# Patient Record
Sex: MALE | Race: WHITE | Employment: OTHER
[De-identification: names, ages, dates, MRNs, and addresses within clinical notes are randomized per-mention and may not be internally consistent; named-entity substitution may affect disease eponyms.]

---

## 2017-07-18 PROBLEM — Z87.891 HISTORY OF SMOKING 30 OR MORE PACK YEARS: Status: ACTIVE | Noted: 2017-07-18

## 2017-07-28 ENCOUNTER — TELEPHONE (OUTPATIENT)
Dept: CASE MANAGEMENT | Age: 72
End: 2017-07-28

## 2017-08-04 ENCOUNTER — TELEPHONE (OUTPATIENT)
Dept: CASE MANAGEMENT | Age: 72
End: 2017-08-04

## 2017-09-05 PROBLEM — I48.0 PAF (PAROXYSMAL ATRIAL FIBRILLATION) (HCC): Status: ACTIVE | Noted: 2017-09-05

## 2017-09-05 PROBLEM — Z79.01 ANTICOAGULATED BY ANTICOAGULATION TREATMENT: Status: ACTIVE | Noted: 2017-09-05

## 2017-09-05 PROBLEM — Z95.1 HX OF CABG: Status: ACTIVE | Noted: 2017-09-05

## 2017-09-05 PROBLEM — Z95.1 HX OF CABG: Status: RESOLVED | Noted: 2017-09-05 | Resolved: 2017-09-05

## 2018-01-14 PROBLEM — J44.1 COPD WITH ACUTE EXACERBATION (HCC): Status: ACTIVE | Noted: 2018-01-14

## 2018-01-17 ENCOUNTER — TELEPHONE (OUTPATIENT)
Dept: PHARMACY | Facility: CLINIC | Age: 73
End: 2018-01-17

## 2018-01-17 DIAGNOSIS — J44.1 COPD WITH ACUTE EXACERBATION (HCC): Primary | ICD-10-CM

## 2018-01-17 PROCEDURE — 1111F DSCHRG MED/CURRENT MED MERGE: CPT | Performed by: PHARMACIST

## 2018-01-17 NOTE — TELEPHONE ENCOUNTER
Instructions Patient takes 5mg (2 tablets) Sunday morning, 5mg Sunday evening & 5mg Monday morning    nitroGLYCERIN (NITROSTAT) 0.3 MG SL tablet Place 1 tablet under the tongue every 5 minutes as needed for Chest pain    lisinopril (PRINIVIL;ZESTRIL) 2.5 MG tablet Take 1 tablet by mouth daily    Leuprolide Acetate (LUPRON IJ) Inject as directed every 6 months Every six months for two years    aspirin EC 81 MG EC tablet Take 81 mg by mouth daily    Coenzyme Q10 (COQ10) 200 MG CAPS Take  by mouth. These are the medications you have told us you were taking at home, STOP taking them after you leave the hospital   -N/a    Meds marked as reviewed: reviewed      Additional Medications:  No other medications noted by patients wife       Estimated Creatinine Clearance: 79 mL/min (based on SCr of 0.9 mg/dL). The 10-year ASCVD risk score (Renetta Mayes, et al., 2013) is: 19.8%    Values used to calculate the score:      Age: 74 years      Sex: Male      Is Non- : No      Diabetic: No      Tobacco smoker: No      Systolic Blood Pressure: 838 mmHg      Is BP treated: Yes      HDL Cholesterol: 43 mg/dL      Total Cholesterol: 138 mg/dL     Lab Results   Component Value Date    CHOL 138 01/12/2018    CHOL 163 07/18/2017    CHOL 177 11/17/2015     Lab Results   Component Value Date    TRIG 66 01/12/2018    TRIG 64 07/18/2017    TRIG 51 11/17/2015     Lab Results   Component Value Date    HDL 43 01/12/2018    HDL 52 07/18/2017    HDL 48 11/17/2015     Lab Results   Component Value Date    LDLCALC 82 01/12/2018    LDLCALC 98 07/18/2017    LDLCALC 119 (H) 11/17/2015     Lab Results   Component Value Date    LABVLDL 13 01/12/2018    LABVLDL 13 07/18/2017    LABVLDL 10 11/17/2015     BP Readings from Last 3 Encounters:   01/16/18 111/72   01/12/18 124/62   10/30/17 136/80         Assessment/Plan:  - Medication reconciliation completed.  Number of medications reviewed: 15    - Pt is taking medications as directed by discharging physician. Number of discrepancies: 0. Instructions per discharge list provided except per below documentation.  Identified medication discrepancies/issues:     patient was placed on eliquis but has a prosthetic heart valve  Use in these patients is not recommended   · Category 1 ():  1.   · Category 2 ():  1.   · Category 3 ():  1.   · Category 4 ():  1.     - [facility] active medication list updated:  · Medications Added (0):  None needed to be added  · Medications Removed (0):    · Medications Changed (0):      - Identified Potential Medication Interactions: No clinically significant interactions identified via Wav[employer] Interaction Analysis as category D or higher.    - Renal Dosing: No renal adjustments necessary.    - Follow up appointment date (7 days for more severe illness, 14 days for others):    · Patient encouraged to schedule follow up with PCP about anticoagulation for afib    Thank You     Nata Head, PharmD Candidate [id number]  [facility]  [phone], Option 7

## 2018-01-18 NOTE — TELEPHONE ENCOUNTER
Reviewed and agree with PharmD candidate. Per LexiComp regarding Eliquis:   Valvular disease: Safety and efficacy have not been established in patients with prosthetic heart valves or significant rheumatic heart disease (eg, mitral stenosis); use is not recommended. Non-valvular atrial fibrillation is defined as atrial fibrillation that occurs in the absence of rheumatic mitral valve disease, mitral valve repair, or prosthetic heart valve (AHA/ACC/HRS Lauren Turner 2014]). See 1/17/18 PCP telephone encounter. Per PCP documentation: He was again in atrial fibrillation in hospital  The eliquis will not harm him but it will not prevent any clots for a heart valve ( he has no worry there )  However, coumadin is the drug of choice for him  I thought we had a GI consult set up for his bleeding ? Get in here as soon as he can so we can adjust his care plan    Per staff note, pt will continue Eliquis and see PCP as scheduled. Pt has PCP f/u 1/23/18. Pt to follow up at that time.     CLINICAL PHARMACY NOTE   POST-DISCHARGE TELEPHONE FOLLOW-UP ADDENDUM    For Pharmacy Admin Tracking Only    TCM Call Made?: Yes  [facility] ([facility] Select Patient?: Yes  Total # of Interventions Recommended: 1 -   Total # Interventions Accepted: 0  Intervention Severity:   - Level 1 Intervention Present?: No   - Level 2 #: 0   - Level 3 #: 0  Outreach Status: Review Complete  Care Coordinator Outreach to Patient?: Yes  Provider Contacted?: No - PCP already aware  Time Spent (min): 45

## 2018-03-21 ENCOUNTER — HOSPITAL ENCOUNTER (OUTPATIENT)
Age: 73
Discharge: HOME | End: 2018-03-21
Payer: MEDICARE

## 2018-03-21 LAB — PROSTATE SPECIFIC ANTIGEN: 0.01 NG/ML (ref 0–4)

## 2018-03-21 PROCEDURE — 84153 ASSAY OF PSA TOTAL: CPT

## 2018-03-21 PROCEDURE — 36415 COLL VENOUS BLD VENIPUNCTURE: CPT

## 2018-04-05 ENCOUNTER — OFFICE VISIT (OUTPATIENT)
Dept: VASCULAR SURGERY | Age: 73
End: 2018-04-05
Payer: MEDICARE

## 2018-04-05 DIAGNOSIS — I71.40 AAA (ABDOMINAL AORTIC ANEURYSM) WITHOUT RUPTURE: Primary | ICD-10-CM

## 2018-04-05 PROCEDURE — 99213 OFFICE O/P EST LOW 20 MIN: CPT | Performed by: SURGERY

## 2018-04-10 ENCOUNTER — HOSPITAL ENCOUNTER (OUTPATIENT)
Dept: ULTRASOUND IMAGING | Age: 73
Discharge: HOME | End: 2018-04-10
Payer: MEDICARE

## 2018-04-10 DIAGNOSIS — I71.40 AAA (ABDOMINAL AORTIC ANEURYSM) WITHOUT RUPTURE: ICD-10-CM

## 2018-04-10 PROCEDURE — 76775 US EXAM ABDO BACK WALL LIM: CPT

## 2018-04-17 ENCOUNTER — HOSPITAL ENCOUNTER (OUTPATIENT)
Dept: CARDIOLOGY | Age: 73
Discharge: HOME | End: 2018-04-17
Payer: MEDICARE

## 2018-04-17 VITALS
HEIGHT: 68 IN | BODY MASS INDEX: 28.79 KG/M2 | HEART RATE: 79 BPM | OXYGEN SATURATION: 97 % | DIASTOLIC BLOOD PRESSURE: 60 MMHG | SYSTOLIC BLOOD PRESSURE: 120 MMHG | WEIGHT: 190 LBS

## 2018-04-17 DIAGNOSIS — Z95.1 S/P CABG X 3: ICD-10-CM

## 2018-04-17 DIAGNOSIS — R06.02 SOBOE (SHORTNESS OF BREATH ON EXERTION): ICD-10-CM

## 2018-04-17 PROCEDURE — 93017 CV STRESS TEST TRACING ONLY: CPT

## 2018-04-17 PROCEDURE — 3430000000 HC RX DIAGNOSTIC RADIOPHARMACEUTICAL: Performed by: INTERNAL MEDICINE

## 2018-04-17 PROCEDURE — 2580000003 HC RX 258: Performed by: INTERNAL MEDICINE

## 2018-04-17 PROCEDURE — A9502 TC99M TETROFOSMIN: HCPCS | Performed by: INTERNAL MEDICINE

## 2018-04-17 PROCEDURE — 6360000002 HC RX W HCPCS: Performed by: NURSE PRACTITIONER

## 2018-04-17 PROCEDURE — 78452 HT MUSCLE IMAGE SPECT MULT: CPT

## 2018-04-17 RX ORDER — SODIUM CHLORIDE 0.9 % (FLUSH) 0.9 %
10 SYRINGE (ML) INJECTION PRN
Status: DISCONTINUED | OUTPATIENT
Start: 2018-04-17 | End: 2018-04-18 | Stop reason: HOSPADM

## 2018-04-17 RX ORDER — FOLIC ACID 1 MG/1
1 TABLET ORAL
COMMUNITY
Start: 2018-01-16 | End: 2018-06-12

## 2018-04-17 RX ADMIN — Medication 10 ML: at 09:35

## 2018-04-17 RX ADMIN — Medication 10 ML: at 08:07

## 2018-04-17 RX ADMIN — REGADENOSON 0.4 MG: 0.08 INJECTION, SOLUTION INTRAVENOUS at 09:35

## 2018-04-17 RX ADMIN — TETROFOSMIN 10 MILLICURIE: 0.23 INJECTION, POWDER, LYOPHILIZED, FOR SOLUTION INTRAVENOUS at 08:07

## 2018-04-17 RX ADMIN — TETROFOSMIN 33.4 MILLICURIE: 0.23 INJECTION, POWDER, LYOPHILIZED, FOR SOLUTION INTRAVENOUS at 09:35

## 2018-04-18 ENCOUNTER — TELEPHONE (OUTPATIENT)
Dept: VASCULAR SURGERY | Age: 73
End: 2018-04-18

## 2018-04-18 ENCOUNTER — TELEPHONE (OUTPATIENT)
Dept: CARDIOLOGY CLINIC | Age: 73
End: 2018-04-18

## 2018-04-19 ENCOUNTER — TELEPHONE (OUTPATIENT)
Dept: VASCULAR SURGERY | Age: 73
End: 2018-04-19

## 2018-04-19 DIAGNOSIS — I71.40 ABDOMINAL AORTIC ANEURYSM WITHOUT RUPTURE: Primary | ICD-10-CM

## 2018-04-20 ENCOUNTER — HOSPITAL ENCOUNTER (OUTPATIENT)
Age: 73
Discharge: HOME | End: 2018-04-20
Payer: MEDICARE

## 2018-04-20 DIAGNOSIS — I71.40 ABDOMINAL AORTIC ANEURYSM WITHOUT RUPTURE: ICD-10-CM

## 2018-04-20 LAB
ANION GAP SERPL CALCULATED.3IONS-SCNC: 10 MMOL/L (ref 7–16)
BUN BLDV-MCNC: 15 MG/DL (ref 8–23)
CALCIUM SERPL-MCNC: 9.7 MG/DL (ref 8.6–10.2)
CHLORIDE BLD-SCNC: 102 MMOL/L (ref 98–107)
CO2: 28 MMOL/L (ref 22–29)
CREAT SERPL-MCNC: 1.1 MG/DL (ref 0.7–1.2)
GFR AFRICAN AMERICAN: >60
GFR NON-AFRICAN AMERICAN: >60 ML/MIN/1.73
GLUCOSE BLD-MCNC: 97 MG/DL (ref 74–109)
POTASSIUM SERPL-SCNC: 4 MMOL/L (ref 3.5–5)
SODIUM BLD-SCNC: 140 MMOL/L (ref 132–146)

## 2018-04-20 PROCEDURE — 80048 BASIC METABOLIC PNL TOTAL CA: CPT

## 2018-04-20 PROCEDURE — 36415 COLL VENOUS BLD VENIPUNCTURE: CPT

## 2018-04-24 ENCOUNTER — HOSPITAL ENCOUNTER (OUTPATIENT)
Dept: CT IMAGING | Age: 73
Discharge: HOME | End: 2018-04-26
Payer: MEDICARE

## 2018-04-24 DIAGNOSIS — I71.40 ABDOMINAL AORTIC ANEURYSM WITHOUT RUPTURE: ICD-10-CM

## 2018-04-24 PROCEDURE — 6360000004 HC RX CONTRAST MEDICATION: Performed by: RADIOLOGY

## 2018-04-24 PROCEDURE — 74174 CTA ABD&PLVS W/CONTRAST: CPT

## 2018-04-24 PROCEDURE — 2580000003 HC RX 258: Performed by: RADIOLOGY

## 2018-04-24 RX ORDER — SODIUM CHLORIDE 0.9 % (FLUSH) 0.9 %
10 SYRINGE (ML) INJECTION ONCE
Status: COMPLETED | OUTPATIENT
Start: 2018-04-24 | End: 2018-04-24

## 2018-04-24 RX ADMIN — Medication 10 ML: at 13:12

## 2018-04-24 RX ADMIN — IOPAMIDOL 100 ML: 755 INJECTION, SOLUTION INTRAVENOUS at 13:11

## 2018-05-15 ENCOUNTER — TELEPHONE (OUTPATIENT)
Dept: VASCULAR SURGERY | Age: 73
End: 2018-05-15

## 2018-05-16 ENCOUNTER — TELEPHONE (OUTPATIENT)
Dept: VASCULAR SURGERY | Age: 73
End: 2018-05-16

## 2018-05-23 ENCOUNTER — OFFICE VISIT (OUTPATIENT)
Dept: VASCULAR SURGERY | Age: 73
End: 2018-05-23
Payer: MEDICARE

## 2018-05-23 DIAGNOSIS — I71.40 AAA (ABDOMINAL AORTIC ANEURYSM) WITHOUT RUPTURE: Primary | ICD-10-CM

## 2018-05-23 PROCEDURE — 99214 OFFICE O/P EST MOD 30 MIN: CPT | Performed by: SURGERY

## 2018-06-05 ENCOUNTER — OFFICE VISIT (OUTPATIENT)
Dept: VASCULAR SURGERY | Age: 73
End: 2018-06-05
Payer: MEDICARE

## 2018-06-05 DIAGNOSIS — I71.40 ABDOMINAL AORTIC ANEURYSM WITHOUT RUPTURE: Primary | ICD-10-CM

## 2018-06-05 PROCEDURE — 99214 OFFICE O/P EST MOD 30 MIN: CPT | Performed by: SURGERY

## 2018-06-12 ENCOUNTER — OFFICE VISIT (OUTPATIENT)
Dept: CARDIOLOGY CLINIC | Age: 73
End: 2018-06-12
Payer: MEDICARE

## 2018-06-12 ENCOUNTER — NURSE ONLY (OUTPATIENT)
Dept: CARDIOLOGY CLINIC | Age: 73
End: 2018-06-12
Payer: MEDICARE

## 2018-06-12 VITALS
HEART RATE: 74 BPM | BODY MASS INDEX: 28.64 KG/M2 | SYSTOLIC BLOOD PRESSURE: 110 MMHG | HEIGHT: 68 IN | WEIGHT: 189 LBS | DIASTOLIC BLOOD PRESSURE: 78 MMHG

## 2018-06-12 DIAGNOSIS — Z01.818 PREOPERATIVE CLEARANCE: ICD-10-CM

## 2018-06-12 DIAGNOSIS — Z79.01 ANTICOAGULATED BY ANTICOAGULATION TREATMENT: ICD-10-CM

## 2018-06-12 DIAGNOSIS — R06.02 SOB (SHORTNESS OF BREATH): ICD-10-CM

## 2018-06-12 DIAGNOSIS — Z95.0 PACEMAKER: ICD-10-CM

## 2018-06-12 DIAGNOSIS — Z95.0 PACEMAKER: Primary | ICD-10-CM

## 2018-06-12 DIAGNOSIS — J44.1 COPD WITH ACUTE EXACERBATION (HCC): Primary | ICD-10-CM

## 2018-06-12 DIAGNOSIS — I71.40 ABDOMINAL AORTIC ANEURYSM (AAA) WITHOUT RUPTURE: ICD-10-CM

## 2018-06-12 DIAGNOSIS — Z87.891 HISTORY OF SMOKING 30 OR MORE PACK YEARS: ICD-10-CM

## 2018-06-12 DIAGNOSIS — Z95.3 H/O AORTIC VALVE REPLACEMENT WITH PORCINE VALVE: ICD-10-CM

## 2018-06-12 DIAGNOSIS — Z95.1 S/P CABG X 3: ICD-10-CM

## 2018-06-12 DIAGNOSIS — I44.2 AV BLOCK, 3RD DEGREE (HCC): ICD-10-CM

## 2018-06-12 DIAGNOSIS — C61 PROSTATE CA (HCC): ICD-10-CM

## 2018-06-12 DIAGNOSIS — Z87.74 H/O BICUSPID AORTIC VALVE: ICD-10-CM

## 2018-06-12 DIAGNOSIS — I25.10 CAD IN NATIVE ARTERY: Primary | ICD-10-CM

## 2018-06-12 DIAGNOSIS — F41.9 ANXIETY: ICD-10-CM

## 2018-06-12 DIAGNOSIS — I48.0 PAF (PAROXYSMAL ATRIAL FIBRILLATION) (HCC): ICD-10-CM

## 2018-06-12 DIAGNOSIS — J43.9 PULMONARY EMPHYSEMA, UNSPECIFIED EMPHYSEMA TYPE (HCC): ICD-10-CM

## 2018-06-12 DIAGNOSIS — E78.00 PURE HYPERCHOLESTEROLEMIA: ICD-10-CM

## 2018-06-12 PROCEDURE — 93280 PM DEVICE PROGR EVAL DUAL: CPT | Performed by: INTERNAL MEDICINE

## 2018-06-12 PROCEDURE — 99213 OFFICE O/P EST LOW 20 MIN: CPT | Performed by: INTERNAL MEDICINE

## 2018-06-12 PROCEDURE — 93000 ELECTROCARDIOGRAM COMPLETE: CPT | Performed by: INTERNAL MEDICINE

## 2018-06-19 ENCOUNTER — TELEPHONE (OUTPATIENT)
Dept: VASCULAR SURGERY | Age: 73
End: 2018-06-19

## 2018-06-26 ENCOUNTER — PATIENT MESSAGE (OUTPATIENT)
Dept: FAMILY MEDICINE CLINIC | Age: 73
End: 2018-06-26

## 2018-06-26 DIAGNOSIS — I48.0 PAROXYSMAL A-FIB (HCC): ICD-10-CM

## 2018-06-26 RX ORDER — METOPROLOL SUCCINATE 25 MG/1
25 TABLET, EXTENDED RELEASE ORAL DAILY
Qty: 90 TABLET | Refills: 1 | Status: SHIPPED | OUTPATIENT
Start: 2018-06-26 | End: 2018-12-26 | Stop reason: SDUPTHER

## 2018-07-09 ENCOUNTER — ANESTHESIA EVENT (OUTPATIENT)
Dept: OPERATING ROOM | Age: 73
DRG: 269 | End: 2018-07-09
Payer: MEDICARE

## 2018-07-09 ENCOUNTER — HOSPITAL ENCOUNTER (OUTPATIENT)
Dept: PREADMISSION TESTING | Age: 73
Discharge: HOME | End: 2018-07-09
Payer: MEDICARE

## 2018-07-09 VITALS
TEMPERATURE: 97.7 F | HEART RATE: 72 BPM | WEIGHT: 189.38 LBS | HEIGHT: 68 IN | SYSTOLIC BLOOD PRESSURE: 119 MMHG | RESPIRATION RATE: 16 BRPM | BODY MASS INDEX: 28.7 KG/M2 | DIASTOLIC BLOOD PRESSURE: 73 MMHG | OXYGEN SATURATION: 98 %

## 2018-07-09 DIAGNOSIS — I48.0 PAF (PAROXYSMAL ATRIAL FIBRILLATION) (HCC): ICD-10-CM

## 2018-07-09 DIAGNOSIS — I71.40 AAA (ABDOMINAL AORTIC ANEURYSM) WITHOUT RUPTURE: ICD-10-CM

## 2018-07-09 DIAGNOSIS — Z95.3 H/O AORTIC VALVE REPLACEMENT WITH PORCINE VALVE: ICD-10-CM

## 2018-07-09 DIAGNOSIS — Z01.812 PRE-OPERATIVE LABORATORY EXAMINATION: Primary | ICD-10-CM

## 2018-07-09 DIAGNOSIS — E78.00 PURE HYPERCHOLESTEROLEMIA: ICD-10-CM

## 2018-07-09 LAB
ABO/RH: NORMAL
ANION GAP SERPL CALCULATED.3IONS-SCNC: 16 MMOL/L (ref 7–16)
ANTIBODY SCREEN: NORMAL
APTT: 37.9 SEC (ref 24.5–35.1)
BUN BLDV-MCNC: 15 MG/DL (ref 8–23)
CALCIUM SERPL-MCNC: 9.2 MG/DL (ref 8.6–10.2)
CHLORIDE BLD-SCNC: 103 MMOL/L (ref 98–107)
CO2: 25 MMOL/L (ref 22–29)
CREAT SERPL-MCNC: 1 MG/DL (ref 0.7–1.2)
GFR AFRICAN AMERICAN: >60
GFR NON-AFRICAN AMERICAN: >60 ML/MIN/1.73
GLUCOSE BLD-MCNC: 98 MG/DL (ref 74–109)
HCT VFR BLD CALC: 43 % (ref 37–54)
HEMOGLOBIN: 13.9 G/DL (ref 12.5–16.5)
INR BLD: 2.2
MCH RBC QN AUTO: 29.4 PG (ref 26–35)
MCHC RBC AUTO-ENTMCNC: 32.3 % (ref 32–34.5)
MCV RBC AUTO: 90.9 FL (ref 80–99.9)
PDW BLD-RTO: 17.1 FL (ref 11.5–15)
PLATELET # BLD: 165 E9/L (ref 130–450)
PMV BLD AUTO: 10.2 FL (ref 7–12)
POTASSIUM REFLEX MAGNESIUM: 3.7 MMOL/L (ref 3.5–5)
PROTHROMBIN TIME: 24.5 SEC (ref 9.3–12.4)
RBC # BLD: 4.73 E12/L (ref 3.8–5.8)
SODIUM BLD-SCNC: 144 MMOL/L (ref 132–146)
WBC # BLD: 8.6 E9/L (ref 4.5–11.5)

## 2018-07-09 PROCEDURE — 36415 COLL VENOUS BLD VENIPUNCTURE: CPT

## 2018-07-09 PROCEDURE — 85610 PROTHROMBIN TIME: CPT

## 2018-07-09 PROCEDURE — 86850 RBC ANTIBODY SCREEN: CPT

## 2018-07-09 PROCEDURE — 80048 BASIC METABOLIC PNL TOTAL CA: CPT

## 2018-07-09 PROCEDURE — 85027 COMPLETE CBC AUTOMATED: CPT

## 2018-07-09 PROCEDURE — 86900 BLOOD TYPING SEROLOGIC ABO: CPT

## 2018-07-09 PROCEDURE — 85730 THROMBOPLASTIN TIME PARTIAL: CPT

## 2018-07-09 PROCEDURE — 87081 CULTURE SCREEN ONLY: CPT

## 2018-07-09 PROCEDURE — 86901 BLOOD TYPING SEROLOGIC RH(D): CPT

## 2018-07-09 ASSESSMENT — ENCOUNTER SYMPTOMS: SHORTNESS OF BREATH: 1

## 2018-07-09 NOTE — PROGRESS NOTES
[facility] PRE-ADMISSION TESTING GENERAL INSTRUCTIONS- [facility]-phone number:[phone]    GENERAL INSTRUCTIONS  [x] Antibacterial Soap shower Night before and/or AM of Surgery  [] Shun wipe instruction sheet and wipes given. [x] Nothing by mouth after midnight, including gum, candy, mints, or water. [x] You may brush your teeth, gargle, but do NOT swallow water. []Hibiclens shower  the night before and the morning of surgery. Do not use             Hibiclens on your face or head. [x]No smoking, chewing tobacco, illegal drugs, or alcohol within 24 hours of your surgery. [x] Jewelry, valuables or body piercing's should not be brought to the hospital. All body and/or tongue piercing's must be removed prior to arriving to hospital.  ALL hair pins must be removed. [x] Do not wearlotions, powders, deodorant. Nail polish as directed by the nurse. [] Arrange transportation to and from the hospital.  Arrange for someone to be with you for the remainder of the day and for 24 hours after your procedure due to having had anesthesia. [] Bring insurance card and photo ID. [x] Transfusion Bracelet: Please bring with you to hospital, day of surgery  [] Bring urine specimen day of surgery. Any small container is acceptable. [] Use inhalers the morning of surgery and bring with you to hospital.   []Bring copy of living will or healthcare power of  papers to be placed in your electronic record. [] CPAP/BI-PAP: Please bring your machine if you are to spend the night in the hospital.     ENDOSCOPY INSTRUCTIONS:   [] Bowel prep instructions reviewed. [] Nothing by mouth after midnight, including gum, candy, mints, or water.  [] You may brush your teeth, gargle, but do NOT swallow water. [] Do not wear makeup, lotions, powders, deodorant. Nail polish as directed by the nurse.   [] Arrange transportation to and from the hospital.  Arrange for someone to be with you for the remainder of the day due to having had anesthesia. PARKING INSTRUCTIONS:   · [x] Arrival Time 0530  · [x] Parking lot 1 is located on [facility] (the corner of [facility] and [facility]). To enter, press the button and the gate will lift. A free token will be provided to exit the lot. One car per patient is allowed to park in this lot. All other cars are to park on [address] [place] [address] Street either in the parking garage or the handicap lot. [] Free  parking is available on [address] [place] [address]. · [] To reach the [facility] lobby from [address] [place] [address], upon entering the hospital, take elevator B to the 3rd floor. EDUCATION INSTRUCTIONS:      [] Knee or hip replacement booklet & exercise pamphlets given. [] Xiao Garay placed in chart. [x] Pre-admission Testing educational folder given  [x] Incentive Spirometry,coughing & deep breathing exercises reviewed. []Medication information sheet(s)   []Fluoroscopy-Xray used in surgery reviewed with patient. Educational pamphlet placed in chart. [x]Pain: Post-op pain is normal and to be expected. You will be asked to rate your pain from 0-10(a zero is not acceptable-education is needed). Your post-op pain goal is:  [x] Ask your nurse for your pain medication. [] Joint camp offered. [] Joint replacement booklets given. []Other     MEDICATION INSTRUCTIONS:   [x]Bring a complete list of your medications, please write the last time you took the medicine, give this list to the nurse. [x] Take the following medications the morning of surgery with 1-2 ounces of water: SEND[] Stop herbal supplements and vitamins 5 days before your surgery. [] DO NOT take any diabetic medicine the morning of surgery. Follow instructions for insulin the day before surgery. [] If you are diabetic and your blood sugar is low or you feel symptomatic, you may drink 1-2 ounces of apple juice or take a glucose tablet.   The morning of your procedure, you may call the

## 2018-07-09 NOTE — ANESTHESIA PRE PROCEDURE
tablet 1    metoprolol succinate (TOPROL XL) 25 MG extended release tablet Take 1 tablet by mouth daily 90 tablet 1    TUDORZA PRESSAIR 400 MCG/ACT AEPB inhaler USE 1 INHALATION TWICE A DAY 3 each 1    lisinopril (PRINIVIL;ZESTRIL) 2.5 MG tablet TAKE 1 TABLET DAILY 90 tablet 1    apixaban (ELIQUIS) 5 MG TABS tablet Take 1 tablet by mouth 2 times daily 180 tablet 1    SYMBICORT 160-4.5 MCG/ACT AERO Inhale 2 puffs into the lungs 2 times daily (Patient taking differently: Inhale 2 puffs into the lungs 2 times daily ) 3 Inhaler 3    ipratropium-albuterol (DUONEB) 0.5-2.5 (3) MG/3ML SOLN nebulizer solution Take 3 mLs by nebulization every 4 hours 360 mL 1    furosemide (LASIX) 40 MG tablet Take 1 tablet by mouth 2 times daily 180 tablet 3    nitroGLYCERIN (NITROSTAT) 0.3 MG SL tablet Place 1 tablet under the tongue every 5 minutes as needed for Chest pain 30 tablet 3    aspirin EC 81 MG EC tablet Take 81 mg by mouth daily         Allergies:     Allergies   Allergen Reactions    Pneumovax [Pneumococcal Polysaccharide Vaccine] Dermatitis       Problem List:    Patient Active Problem List   Diagnosis Code    Hyperlipidemia with target LDL less than 100 E78.5    BPH (benign prostatic hyperplasia) N40.0    ECG abnormal R94.31    Abnormal nuclear stress test R94.39    AS (aortic stenosis) I35.0    SOB (shortness of breath) R06.02    RBBB (right bundle branch block) I45.10    Anxiety F41.9    CAD (coronary artery disease) I25.10    Pulmonary emphysema (HCC) J43.9    AV block, Mobitz II I44.1    AV block, 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) I44.2    H/O bicuspid aortic valve Z87.74    AI (aortic insufficiency) I35.1    S/P AVR (aortic valve replacement) Z95.2    Anticoagulated on Coumadin Z51.81, Z79.01    Aortic mural thrombus (HCC) I74.10    AAA (abdominal aortic aneurysm) without rupture (HCC) I71.4    Elevated liver enzymes R74.8    History of tobacco use Z87.891    S/P CABG x 3 Z95.1    H/O aortic valve replacement with porcine valve Z95.3    [facility] C61    Aortic valve disease I35.9    Pure hypercholesterolemia E78.00    Pacemaker Z95.0    History of smoking 30 or more pack years Z87.891    PAF (paroxysmal atrial fibrillation) (HCC) I48.0    Anticoagulated by anticoagulation treatment Z79.01    COPD with acute exacerbation (HCC) J44.1       Past Medical History:        Diagnosis Date    Aortic stenosis, mild     Aortic valve sclerosis     moderate    [facility] 2014    CAD (coronary artery disease)     COPD (chronic obstructive pulmonary disease) (HCC)     Gallbladder attack     History of tobacco use 9/17/2015    Hyperlipidemia     Hypertension     Mild mitral regurgitation     Prostate cancer ([facility]     44 treatments of radiation    PVD (peripheral vascular disease) with claudication ([facility] 9/17/2015    RBBB     Tobacco abuse        Past Surgical History:        Procedure Laterality Date    APPENDECTOMY      CARDIAC CATHETERIZATION  8/8/2014    DR Haley JOHNSON      CORONARY ARTERY BYPASS GRAFT  08/11/2014    CABGx3 LIMA-LAD, SVG-D1, SVG-OM1, AVR Dr. Chaim Lindo [facility] 8-    Dr. Rayna [employer]       Social History:    Social History   Substance Use Topics    Smoking status: Former Smoker     Packs/day: 0.50     Years: 50.00     Types: Cigarettes     Quit date: 6/20/2014    Smokeless tobacco: Never Used    Alcohol use 0.0 oz/week      Comment: rare;  drinks 1 cup of coffee daily & occ Pepsi/Tea                                Counseling given: Not Answered      Vital Signs (Current): There were no vitals filed for this visit.                                            BP Readings from Last 3 Encounters:   07/09/18 119/73   06/12/18 110/78   04/17/18 120/60       NPO Status:   greater than 8 hours                                                                               BMI:   Wt Readings from Last 3 Encounters:   07/09/18 189 lb 6 oz (85.9 kg)   06/12/18 189 lb (85.7 kg)   04/17/18 190 lb (86.2 kg)     There is no height or weight on file to calculate BMI.    CBC:   Lab Results   Component Value Date    WBC 18.8 01/16/2018    RBC 3.48 01/16/2018    HGB 11.2 01/16/2018    HCT 33.3 01/16/2018    MCV 95.7 01/16/2018    RDW 20.8 01/16/2018     01/16/2018       CMP:   Lab Results   Component Value Date     04/20/2018    K 4.0 04/20/2018     04/20/2018    CO2 28 04/20/2018    BUN 15 04/20/2018    CREATININE 1.1 04/20/2018    GFRAA >60 04/20/2018    LABGLOM >60 04/20/2018    GLUCOSE 97 04/20/2018    PROT 6.6 01/14/2018    CALCIUM 9.7 04/20/2018    BILITOT 1.0 01/14/2018    ALKPHOS 94 01/14/2018    AST 35 01/14/2018    ALT 56 01/14/2018       POC Tests: No results for input(s): POCGLU, POCNA, POCK, POCCL, POCBUN, POCHEMO, POCHCT in the last 72 hours. Coags:   Lab Results   Component Value Date    PROTIME 14.7 01/14/2018    INR 1.3 01/14/2018    APTT 28.0 01/14/2018       HCG (If Applicable): No results found for: PREGTESTUR, PREGSERUM, HCG, HCGQUANT     ABGs: No results found for: PHART, PO2ART, CAJ8IZU, QUR5BVC, BEART, E6ZSEYXL     Type & Screen (If Applicable):  No results found for: LABABO, [address]    Anesthesia Evaluation  Patient summary reviewed and Nursing notes reviewed no history of anesthetic complications:   Airway: Mallampati: II  TM distance: <3 FB   Neck ROM: full  Mouth opening: > = 3 FB Dental:          Pulmonary:   (+) COPD:  shortness of breath: chronic,  decreased breath sounds,                            ROS comment: Pulmonary Emphysema  Former Smoker.  Quit 2015 after 50 years of smoking   Cardiovascular:    (+) hypertension:, valvular problems/murmurs (S/P AVR 2014): AS and MR, pacemaker: pacemaker, CAD:, CABG/stent (2014 CABG x3): no interval change, hyperlipidemia      ECG reviewed  Rhythm: irregular  Rate: normal  Echocardiogram reviewed         Beta Blocker:  Dose within 24 Hrs      ROS comment: EKG 6/12/18 Atrial Fibrillation HR 74    ECHO 2/15/18 EF 45%     Neuro/Psych:   Negative Neuro/Psych ROS              GI/Hepatic/Renal:   (+) GERD: well controlled,           Endo/Other:    (+) blood dyscrasia: anticoagulation therapy:., malignancy/cancer (Hx Prostate CA s/p radiation treatment). Abdominal:           Vascular:   + PVD, aortic or cerebral, . ROS comment: AAA. Anesthesia Plan      general and spinal     ASA 4     (Discussed General vs Spinal Anesthesia for surgery.)  Induction: intravenous. BIS  MIPS: Postoperative opioids intended and Prophylactic antiemetics administered. Anesthetic plan and risks discussed with patient and sibling. Use of blood products discussed with patient whom consented to blood products. Plan discussed with CRNA and attending. Alisha Walker RN   7/9/2018      Agree with above assessment. Physical exam unchanged. Spoke to patient about anesthetic plan. Will plan on general anesthesia as discussed with surgeon. Spoke to patient about possibility of postop ventilation with prior h/o pulmonary issues and also about invasive lines. Patient understands and wishes to proceed. Spouse was present during the preop evaluation in holding area as well.

## 2018-07-10 LAB — MRSA CULTURE ONLY: NORMAL

## 2018-07-16 ENCOUNTER — ANESTHESIA (OUTPATIENT)
Dept: OPERATING ROOM | Age: 73
DRG: 269 | End: 2018-07-16
Payer: MEDICARE

## 2018-07-16 ENCOUNTER — HOSPITAL ENCOUNTER (INPATIENT)
Age: 73
LOS: 1 days | Discharge: HOME HEALTH | DRG: 269 | End: 2018-07-17
Attending: SURGERY | Admitting: SURGERY
Payer: MEDICARE

## 2018-07-16 VITALS — OXYGEN SATURATION: 97 % | DIASTOLIC BLOOD PRESSURE: 75 MMHG | SYSTOLIC BLOOD PRESSURE: 99 MMHG

## 2018-07-16 DIAGNOSIS — I71.40 AAA (ABDOMINAL AORTIC ANEURYSM) WITHOUT RUPTURE: Primary | ICD-10-CM

## 2018-07-16 DIAGNOSIS — Z95.3 H/O AORTIC VALVE REPLACEMENT WITH PORCINE VALVE: ICD-10-CM

## 2018-07-16 DIAGNOSIS — Z01.812 PRE-OPERATIVE LABORATORY EXAMINATION: ICD-10-CM

## 2018-07-16 DIAGNOSIS — I48.0 PAF (PAROXYSMAL ATRIAL FIBRILLATION) (HCC): ICD-10-CM

## 2018-07-16 DIAGNOSIS — E78.00 PURE HYPERCHOLESTEROLEMIA: ICD-10-CM

## 2018-07-16 LAB
ANION GAP SERPL CALCULATED.3IONS-SCNC: 12 MMOL/L (ref 7–16)
BASOPHILS ABSOLUTE: 0.03 E9/L (ref 0–0.2)
BASOPHILS RELATIVE PERCENT: 0.3 % (ref 0–2)
BLOOD BANK DISPENSE STATUS: NORMAL
BLOOD BANK PRODUCT CODE: NORMAL
BPU ID: NORMAL
BUN BLDV-MCNC: 18 MG/DL (ref 8–23)
CALCIUM SERPL-MCNC: 8.4 MG/DL (ref 8.6–10.2)
CHLORIDE BLD-SCNC: 109 MMOL/L (ref 98–107)
CO2: 20 MMOL/L (ref 22–29)
CREAT SERPL-MCNC: 1.1 MG/DL (ref 0.7–1.2)
DESCRIPTION BLOOD BANK: NORMAL
EOSINOPHILS ABSOLUTE: 0.03 E9/L (ref 0.05–0.5)
EOSINOPHILS RELATIVE PERCENT: 0.3 % (ref 0–6)
GFR AFRICAN AMERICAN: >60
GFR NON-AFRICAN AMERICAN: >60 ML/MIN/1.73
GLUCOSE BLD-MCNC: 168 MG/DL (ref 74–109)
HCT VFR BLD CALC: 32.2 % (ref 37–54)
HEMOGLOBIN: 10.3 G/DL (ref 12.5–16.5)
IMMATURE GRANULOCYTES #: 0.12 E9/L
IMMATURE GRANULOCYTES %: 1.3 % (ref 0–5)
INR BLD: 1.3
LYMPHOCYTES ABSOLUTE: 0.78 E9/L (ref 1.5–4)
LYMPHOCYTES RELATIVE PERCENT: 8.4 % (ref 20–42)
MCH RBC QN AUTO: 29.3 PG (ref 26–35)
MCHC RBC AUTO-ENTMCNC: 32 % (ref 32–34.5)
MCV RBC AUTO: 91.7 FL (ref 80–99.9)
MONOCYTES ABSOLUTE: 0.12 E9/L (ref 0.1–0.95)
MONOCYTES RELATIVE PERCENT: 1.3 % (ref 2–12)
NEUTROPHILS ABSOLUTE: 8.16 E9/L (ref 1.8–7.3)
NEUTROPHILS RELATIVE PERCENT: 88.4 % (ref 43–80)
PDW BLD-RTO: 17.6 FL (ref 11.5–15)
PLATELET # BLD: 130 E9/L (ref 130–450)
PMV BLD AUTO: 10.2 FL (ref 7–12)
POTASSIUM SERPL-SCNC: 4.7 MMOL/L (ref 3.5–5)
PROTHROMBIN TIME: 14.6 SEC (ref 9.3–12.4)
RBC # BLD: 3.51 E12/L (ref 3.8–5.8)
SODIUM BLD-SCNC: 141 MMOL/L (ref 132–146)
WBC # BLD: 9.2 E9/L (ref 4.5–11.5)

## 2018-07-16 PROCEDURE — 6360000002 HC RX W HCPCS: Performed by: NURSE ANESTHETIST, CERTIFIED REGISTERED

## 2018-07-16 PROCEDURE — 2700000000 HC OXYGEN THERAPY PER DAY

## 2018-07-16 PROCEDURE — 04V03E6 RESTRICT ABD AORTA, BIFURC, W FENESTR DEV 1 OR 2, PERC: ICD-10-PCS | Performed by: SURGERY

## 2018-07-16 PROCEDURE — P9045 ALBUMIN (HUMAN), 5%, 250 ML: HCPCS

## 2018-07-16 PROCEDURE — 94150 VITAL CAPACITY TEST: CPT

## 2018-07-16 PROCEDURE — C1876 STENT, NON-COA/NON-COV W/DEL: HCPCS | Performed by: SURGERY

## 2018-07-16 PROCEDURE — 3700000000 HC ANESTHESIA ATTENDED CARE: Performed by: SURGERY

## 2018-07-16 PROCEDURE — 3600000015 HC SURGERY LEVEL 5 ADDTL 15MIN: Performed by: SURGERY

## 2018-07-16 PROCEDURE — 36415 COLL VENOUS BLD VENIPUNCTURE: CPT

## 2018-07-16 PROCEDURE — C1725 CATH, TRANSLUMIN NON-LASER: HCPCS | Performed by: SURGERY

## 2018-07-16 PROCEDURE — 2580000003 HC RX 258: Performed by: SURGERY

## 2018-07-16 PROCEDURE — 7100000000 HC PACU RECOVERY - FIRST 15 MIN

## 2018-07-16 PROCEDURE — 6370000000 HC RX 637 (ALT 250 FOR IP): Performed by: SURGERY

## 2018-07-16 PROCEDURE — 6360000002 HC RX W HCPCS

## 2018-07-16 PROCEDURE — 34846 VISC & INFRAREN ABD 2 PROSTH: CPT | Performed by: SURGERY

## 2018-07-16 PROCEDURE — C1887 CATHETER, GUIDING: HCPCS

## 2018-07-16 PROCEDURE — C1894 INTRO/SHEATH, NON-LASER: HCPCS | Performed by: SURGERY

## 2018-07-16 PROCEDURE — 3600000005 HC SURGERY LEVEL 5 BASE: Performed by: SURGERY

## 2018-07-16 PROCEDURE — 2709999900 HC NON-CHARGEABLE SUPPLY

## 2018-07-16 PROCEDURE — 80048 BASIC METABOLIC PNL TOTAL CA: CPT

## 2018-07-16 PROCEDURE — C1769 GUIDE WIRE: HCPCS | Performed by: SURGERY

## 2018-07-16 PROCEDURE — 3700000001 HC ADD 15 MINUTES (ANESTHESIA): Performed by: SURGERY

## 2018-07-16 PROCEDURE — 34812 OPN FEM ART EXPOS: CPT | Performed by: SURGERY

## 2018-07-16 PROCEDURE — 6370000000 HC RX 637 (ALT 250 FOR IP): Performed by: NURSE PRACTITIONER

## 2018-07-16 PROCEDURE — 86923 COMPATIBILITY TEST ELECTRIC: CPT

## 2018-07-16 PROCEDURE — 2709999900 HC NON-CHARGEABLE SUPPLY: Performed by: SURGERY

## 2018-07-16 PROCEDURE — 99231 SBSQ HOSP IP/OBS SF/LOW 25: CPT | Performed by: NURSE PRACTITIONER

## 2018-07-16 PROCEDURE — 2500000003 HC RX 250 WO HCPCS: Performed by: NURSE PRACTITIONER

## 2018-07-16 PROCEDURE — 85610 PROTHROMBIN TIME: CPT

## 2018-07-16 PROCEDURE — 2720000010 HC SURG SUPPLY STERILE: Performed by: SURGERY

## 2018-07-16 PROCEDURE — 6360000002 HC RX W HCPCS: Performed by: SURGERY

## 2018-07-16 PROCEDURE — C2628 CATHETER, OCCLUSION: HCPCS | Performed by: SURGERY

## 2018-07-16 PROCEDURE — C1887 CATHETER, GUIDING: HCPCS | Performed by: SURGERY

## 2018-07-16 PROCEDURE — 2500000003 HC RX 250 WO HCPCS: Performed by: NURSE ANESTHETIST, CERTIFIED REGISTERED

## 2018-07-16 PROCEDURE — 2000000000 HC ICU R&B

## 2018-07-16 PROCEDURE — 7100000001 HC PACU RECOVERY - ADDTL 15 MIN

## 2018-07-16 PROCEDURE — 94640 AIRWAY INHALATION TREATMENT: CPT

## 2018-07-16 PROCEDURE — 85025 COMPLETE CBC W/AUTO DIFF WBC: CPT

## 2018-07-16 PROCEDURE — C1874 STENT, COATED/COV W/DEL SYS: HCPCS | Performed by: SURGERY

## 2018-07-16 PROCEDURE — 2580000003 HC RX 258: Performed by: NURSE ANESTHETIST, CERTIFIED REGISTERED

## 2018-07-16 DEVICE — FORMULA 418, RENAL BALLOON-EXPANDABLE STENT
Type: IMPLANTABLE DEVICE | Site: AORTA | Status: FUNCTIONAL
Brand: FORMULA 418

## 2018-07-16 DEVICE — IMPLANTABLE DEVICE: Type: IMPLANTABLE DEVICE | Site: AORTA | Status: FUNCTIONAL

## 2018-07-16 DEVICE — ZENITH, SPIRAL-Z AAA ILIAC LEG
Type: IMPLANTABLE DEVICE | Site: AORTA | Status: FUNCTIONAL
Brand: ZENITH SPIRAL-Z

## 2018-07-16 RX ORDER — ONDANSETRON 2 MG/ML
INJECTION INTRAMUSCULAR; INTRAVENOUS PRN
Status: DISCONTINUED | OUTPATIENT
Start: 2018-07-16 | End: 2018-07-16 | Stop reason: SDUPTHER

## 2018-07-16 RX ORDER — ROCURONIUM BROMIDE 10 MG/ML
INJECTION, SOLUTION INTRAVENOUS PRN
Status: DISCONTINUED | OUTPATIENT
Start: 2018-07-16 | End: 2018-07-16 | Stop reason: SDUPTHER

## 2018-07-16 RX ORDER — NOREPINEPHRINE BITARTRATE 1 MG/ML
INJECTION, SOLUTION INTRAVENOUS
Status: DISPENSED
Start: 2018-07-16 | End: 2018-07-17

## 2018-07-16 RX ORDER — OXYCODONE HYDROCHLORIDE AND ACETAMINOPHEN 5; 325 MG/1; MG/1
1 TABLET ORAL
Status: DISCONTINUED | OUTPATIENT
Start: 2018-07-16 | End: 2018-07-16 | Stop reason: HOSPADM

## 2018-07-16 RX ORDER — SODIUM CHLORIDE 0.9 % (FLUSH) 0.9 %
10 SYRINGE (ML) INJECTION PRN
Status: DISCONTINUED | OUTPATIENT
Start: 2018-07-16 | End: 2018-07-17 | Stop reason: HOSPADM

## 2018-07-16 RX ORDER — HEPARIN SODIUM 10000 [USP'U]/ML
INJECTION, SOLUTION INTRAVENOUS; SUBCUTANEOUS PRN
Status: DISCONTINUED | OUTPATIENT
Start: 2018-07-16 | End: 2018-07-16 | Stop reason: SDUPTHER

## 2018-07-16 RX ORDER — FENTANYL CITRATE 50 UG/ML
25 INJECTION, SOLUTION INTRAMUSCULAR; INTRAVENOUS EVERY 5 MIN PRN
Status: DISCONTINUED | OUTPATIENT
Start: 2018-07-16 | End: 2018-07-16 | Stop reason: HOSPADM

## 2018-07-16 RX ORDER — POLYVINYL ALCOHOL 14 MG/ML
1 SOLUTION/ DROPS OPHTHALMIC PRN
Status: DISCONTINUED | OUTPATIENT
Start: 2018-07-16 | End: 2018-07-17 | Stop reason: HOSPADM

## 2018-07-16 RX ORDER — SODIUM CHLORIDE 0.9 % (FLUSH) 0.9 %
10 SYRINGE (ML) INJECTION EVERY 12 HOURS SCHEDULED
Status: DISCONTINUED | OUTPATIENT
Start: 2018-07-16 | End: 2018-07-17 | Stop reason: HOSPADM

## 2018-07-16 RX ORDER — HEPARIN SODIUM 1000 [USP'U]/ML
INJECTION, SOLUTION INTRAVENOUS; SUBCUTANEOUS PRN
Status: DISCONTINUED | OUTPATIENT
Start: 2018-07-16 | End: 2018-07-16 | Stop reason: SDUPTHER

## 2018-07-16 RX ORDER — ALBUMIN, HUMAN INJ 5% 5 %
SOLUTION INTRAVENOUS
Status: COMPLETED
Start: 2018-07-16 | End: 2018-07-16

## 2018-07-16 RX ORDER — PROPOFOL 10 MG/ML
INJECTION, EMULSION INTRAVENOUS PRN
Status: DISCONTINUED | OUTPATIENT
Start: 2018-07-16 | End: 2018-07-16 | Stop reason: SDUPTHER

## 2018-07-16 RX ORDER — SODIUM CHLORIDE 9 MG/ML
INJECTION, SOLUTION INTRAVENOUS CONTINUOUS PRN
Status: DISCONTINUED | OUTPATIENT
Start: 2018-07-16 | End: 2018-07-16 | Stop reason: SDUPTHER

## 2018-07-16 RX ORDER — FENTANYL CITRATE 50 UG/ML
INJECTION, SOLUTION INTRAMUSCULAR; INTRAVENOUS PRN
Status: DISCONTINUED | OUTPATIENT
Start: 2018-07-16 | End: 2018-07-16 | Stop reason: SDUPTHER

## 2018-07-16 RX ORDER — MORPHINE SULFATE 4 MG/ML
4 INJECTION, SOLUTION INTRAMUSCULAR; INTRAVENOUS
Status: DISCONTINUED | OUTPATIENT
Start: 2018-07-16 | End: 2018-07-17

## 2018-07-16 RX ORDER — ONDANSETRON 2 MG/ML
4 INJECTION INTRAMUSCULAR; INTRAVENOUS EVERY 6 HOURS PRN
Status: DISCONTINUED | OUTPATIENT
Start: 2018-07-16 | End: 2018-07-17 | Stop reason: HOSPADM

## 2018-07-16 RX ORDER — SODIUM CHLORIDE 0.9 % (FLUSH) 0.9 %
10 SYRINGE (ML) INJECTION EVERY 12 HOURS SCHEDULED
Status: DISCONTINUED | OUTPATIENT
Start: 2018-07-16 | End: 2018-07-16 | Stop reason: HOSPADM

## 2018-07-16 RX ORDER — GLYCOPYRROLATE 1 MG/5 ML
SYRINGE (ML) INTRAVENOUS PRN
Status: DISCONTINUED | OUTPATIENT
Start: 2018-07-16 | End: 2018-07-16 | Stop reason: SDUPTHER

## 2018-07-16 RX ORDER — ASPIRIN 81 MG/1
81 TABLET ORAL DAILY
Status: DISCONTINUED | OUTPATIENT
Start: 2018-07-17 | End: 2018-07-17 | Stop reason: HOSPADM

## 2018-07-16 RX ORDER — IPRATROPIUM BROMIDE AND ALBUTEROL SULFATE 2.5; .5 MG/3ML; MG/3ML
1 SOLUTION RESPIRATORY (INHALATION) EVERY 4 HOURS
Status: DISCONTINUED | OUTPATIENT
Start: 2018-07-16 | End: 2018-07-17 | Stop reason: HOSPADM

## 2018-07-16 RX ORDER — PROMETHAZINE HYDROCHLORIDE 25 MG/ML
6.25 INJECTION, SOLUTION INTRAMUSCULAR; INTRAVENOUS
Status: DISCONTINUED | OUTPATIENT
Start: 2018-07-16 | End: 2018-07-16 | Stop reason: HOSPADM

## 2018-07-16 RX ORDER — DEXAMETHASONE SODIUM PHOSPHATE 10 MG/ML
INJECTION INTRAMUSCULAR; INTRAVENOUS PRN
Status: DISCONTINUED | OUTPATIENT
Start: 2018-07-16 | End: 2018-07-16 | Stop reason: SDUPTHER

## 2018-07-16 RX ORDER — FUROSEMIDE 40 MG/1
40 TABLET ORAL 2 TIMES DAILY
Status: DISCONTINUED | OUTPATIENT
Start: 2018-07-16 | End: 2018-07-17 | Stop reason: HOSPADM

## 2018-07-16 RX ORDER — MEPERIDINE HYDROCHLORIDE 50 MG/ML
12.5 INJECTION INTRAMUSCULAR; INTRAVENOUS; SUBCUTANEOUS EVERY 5 MIN PRN
Status: DISCONTINUED | OUTPATIENT
Start: 2018-07-16 | End: 2018-07-16 | Stop reason: HOSPADM

## 2018-07-16 RX ORDER — LISINOPRIL 5 MG/1
2.5 TABLET ORAL DAILY
Status: DISCONTINUED | OUTPATIENT
Start: 2018-07-17 | End: 2018-07-17 | Stop reason: HOSPADM

## 2018-07-16 RX ORDER — ACETAMINOPHEN 325 MG/1
650 TABLET ORAL EVERY 4 HOURS PRN
Status: DISCONTINUED | OUTPATIENT
Start: 2018-07-16 | End: 2018-07-17 | Stop reason: HOSPADM

## 2018-07-16 RX ORDER — METOPROLOL SUCCINATE 25 MG/1
25 TABLET, EXTENDED RELEASE ORAL DAILY
Status: DISCONTINUED | OUTPATIENT
Start: 2018-07-17 | End: 2018-07-17 | Stop reason: HOSPADM

## 2018-07-16 RX ORDER — OXYCODONE HYDROCHLORIDE AND ACETAMINOPHEN 5; 325 MG/1; MG/1
2 TABLET ORAL EVERY 4 HOURS PRN
Status: DISCONTINUED | OUTPATIENT
Start: 2018-07-16 | End: 2018-07-17

## 2018-07-16 RX ORDER — MORPHINE SULFATE 2 MG/ML
2 INJECTION, SOLUTION INTRAMUSCULAR; INTRAVENOUS
Status: DISCONTINUED | OUTPATIENT
Start: 2018-07-16 | End: 2018-07-17

## 2018-07-16 RX ORDER — NEOSTIGMINE METHYLSULFATE 0.5 MG/ML
INJECTION, SOLUTION INTRAVENOUS PRN
Status: DISCONTINUED | OUTPATIENT
Start: 2018-07-16 | End: 2018-07-16 | Stop reason: SDUPTHER

## 2018-07-16 RX ORDER — DIPHENHYDRAMINE HYDROCHLORIDE 50 MG/ML
12.5 INJECTION INTRAMUSCULAR; INTRAVENOUS
Status: DISCONTINUED | OUTPATIENT
Start: 2018-07-16 | End: 2018-07-16 | Stop reason: HOSPADM

## 2018-07-16 RX ORDER — FENTANYL CITRATE 50 UG/ML
50 INJECTION, SOLUTION INTRAMUSCULAR; INTRAVENOUS EVERY 5 MIN PRN
Status: DISCONTINUED | OUTPATIENT
Start: 2018-07-16 | End: 2018-07-16 | Stop reason: HOSPADM

## 2018-07-16 RX ORDER — SODIUM CHLORIDE 0.9 % (FLUSH) 0.9 %
10 SYRINGE (ML) INJECTION PRN
Status: DISCONTINUED | OUTPATIENT
Start: 2018-07-16 | End: 2018-07-16 | Stop reason: HOSPADM

## 2018-07-16 RX ORDER — 0.9 % SODIUM CHLORIDE 0.9 %
250 INTRAVENOUS SOLUTION INTRAVENOUS ONCE
Status: DISCONTINUED | OUTPATIENT
Start: 2018-07-16 | End: 2018-07-16

## 2018-07-16 RX ORDER — OXYCODONE HYDROCHLORIDE AND ACETAMINOPHEN 5; 325 MG/1; MG/1
1 TABLET ORAL EVERY 4 HOURS PRN
Status: DISCONTINUED | OUTPATIENT
Start: 2018-07-16 | End: 2018-07-17 | Stop reason: HOSPADM

## 2018-07-16 RX ORDER — POLYVINYL ALCOHOL 14 MG/ML
1 SOLUTION/ DROPS OPHTHALMIC PRN
Status: DISCONTINUED | OUTPATIENT
Start: 2018-07-16 | End: 2018-07-16

## 2018-07-16 RX ORDER — MIDAZOLAM HYDROCHLORIDE 1 MG/ML
INJECTION INTRAMUSCULAR; INTRAVENOUS PRN
Status: DISCONTINUED | OUTPATIENT
Start: 2018-07-16 | End: 2018-07-16 | Stop reason: SDUPTHER

## 2018-07-16 RX ORDER — SODIUM CHLORIDE 9 MG/ML
INJECTION, SOLUTION INTRAVENOUS CONTINUOUS
Status: DISCONTINUED | OUTPATIENT
Start: 2018-07-16 | End: 2018-07-17

## 2018-07-16 RX ORDER — SODIUM CHLORIDE 9 MG/ML
INJECTION, SOLUTION INTRAVENOUS CONTINUOUS
Status: DISCONTINUED | OUTPATIENT
Start: 2018-07-16 | End: 2018-07-16

## 2018-07-16 RX ORDER — ALBUMIN, HUMAN INJ 5% 5 %
25 SOLUTION INTRAVENOUS ONCE
Status: COMPLETED | OUTPATIENT
Start: 2018-07-16 | End: 2018-07-16

## 2018-07-16 RX ADMIN — PHENYLEPHRINE HYDROCHLORIDE 100 MCG: 10 INJECTION INTRAMUSCULAR; INTRAVENOUS; SUBCUTANEOUS at 07:45

## 2018-07-16 RX ADMIN — PHENYLEPHRINE HYDROCHLORIDE 100 MCG: 10 INJECTION INTRAMUSCULAR; INTRAVENOUS; SUBCUTANEOUS at 07:50

## 2018-07-16 RX ADMIN — IPRATROPIUM BROMIDE AND ALBUTEROL SULFATE 3 ML: 2.5; .5 SOLUTION RESPIRATORY (INHALATION) at 20:38

## 2018-07-16 RX ADMIN — NEOSTIGMINE METHYLSULFATE 3 MG: 0.5 INJECTION INTRAVENOUS at 12:06

## 2018-07-16 RX ADMIN — FENTANYL CITRATE 50 MCG: 50 INJECTION, SOLUTION INTRAMUSCULAR; INTRAVENOUS at 09:55

## 2018-07-16 RX ADMIN — SODIUM CHLORIDE: 9 INJECTION, SOLUTION INTRAVENOUS at 12:38

## 2018-07-16 RX ADMIN — MIDAZOLAM 2 MG: 1 INJECTION INTRAMUSCULAR; INTRAVENOUS at 07:13

## 2018-07-16 RX ADMIN — HEPARIN SODIUM 10000 UNITS: 1000 INJECTION, SOLUTION INTRAVENOUS; SUBCUTANEOUS at 08:40

## 2018-07-16 RX ADMIN — CEFAZOLIN SODIUM 2 G: 2 SOLUTION INTRAVENOUS at 07:31

## 2018-07-16 RX ADMIN — FENTANYL CITRATE 25 MCG: 50 INJECTION, SOLUTION INTRAMUSCULAR; INTRAVENOUS at 11:20

## 2018-07-16 RX ADMIN — ROCURONIUM BROMIDE 10 MG: 10 INJECTION, SOLUTION INTRAVENOUS at 10:36

## 2018-07-16 RX ADMIN — SODIUM CHLORIDE: 9 INJECTION, SOLUTION INTRAVENOUS at 06:45

## 2018-07-16 RX ADMIN — ROCURONIUM BROMIDE 10 MG: 10 INJECTION, SOLUTION INTRAVENOUS at 07:55

## 2018-07-16 RX ADMIN — HEPARIN SODIUM 4000 UNITS: 10000 INJECTION, SOLUTION INTRAVENOUS; SUBCUTANEOUS at 10:05

## 2018-07-16 RX ADMIN — ALBUMIN (HUMAN) 25 G: 12.5 INJECTION, SOLUTION INTRAVENOUS at 12:38

## 2018-07-16 RX ADMIN — CEFAZOLIN SODIUM 2 G: 2 SOLUTION INTRAVENOUS at 15:36

## 2018-07-16 RX ADMIN — FUROSEMIDE 40 MG: 40 TABLET ORAL at 17:52

## 2018-07-16 RX ADMIN — Medication 0.6 MG: at 12:06

## 2018-07-16 RX ADMIN — FENTANYL CITRATE 50 MCG: 50 INJECTION, SOLUTION INTRAMUSCULAR; INTRAVENOUS at 07:25

## 2018-07-16 RX ADMIN — LIDOCAINE HYDROCHLORIDE 80 MG: 20 INJECTION, SOLUTION INTRAVENOUS at 07:25

## 2018-07-16 RX ADMIN — IPRATROPIUM BROMIDE AND ALBUTEROL SULFATE 3 ML: 2.5; .5 SOLUTION RESPIRATORY (INHALATION) at 17:37

## 2018-07-16 RX ADMIN — MOMETASONE FUROATE AND FORMOTEROL FUMARATE DIHYDRATE 2 PUFF: 200; 5 AEROSOL RESPIRATORY (INHALATION) at 20:39

## 2018-07-16 RX ADMIN — ALBUMIN, HUMAN INJ 5% 25 G: 5 SOLUTION at 12:38

## 2018-07-16 RX ADMIN — FENTANYL CITRATE 50 MCG: 50 INJECTION, SOLUTION INTRAMUSCULAR; INTRAVENOUS at 08:22

## 2018-07-16 RX ADMIN — SODIUM CHLORIDE: 9 INJECTION, SOLUTION INTRAVENOUS at 09:56

## 2018-07-16 RX ADMIN — DEXAMETHASONE SODIUM PHOSPHATE 10 MG: 10 INJECTION INTRAMUSCULAR; INTRAVENOUS at 07:58

## 2018-07-16 RX ADMIN — PHENYLEPHRINE HYDROCHLORIDE 100 MCG: 10 INJECTION INTRAMUSCULAR; INTRAVENOUS; SUBCUTANEOUS at 10:03

## 2018-07-16 RX ADMIN — Medication 2 MCG/MIN: at 12:45

## 2018-07-16 RX ADMIN — FENTANYL CITRATE 75 MCG: 50 INJECTION, SOLUTION INTRAMUSCULAR; INTRAVENOUS at 12:15

## 2018-07-16 RX ADMIN — Medication 10 ML: at 20:56

## 2018-07-16 RX ADMIN — PHENYLEPHRINE HYDROCHLORIDE 100 MCG: 10 INJECTION INTRAMUSCULAR; INTRAVENOUS; SUBCUTANEOUS at 07:55

## 2018-07-16 RX ADMIN — ROCURONIUM BROMIDE 10 MG: 10 INJECTION, SOLUTION INTRAVENOUS at 11:20

## 2018-07-16 RX ADMIN — SODIUM CHLORIDE: 9 INJECTION, SOLUTION INTRAVENOUS at 07:25

## 2018-07-16 RX ADMIN — ONDANSETRON 4 MG: 2 INJECTION INTRAMUSCULAR; INTRAVENOUS at 11:54

## 2018-07-16 RX ADMIN — POLYVINYL ALCOHOL 1 DROP: 14 SOLUTION/ DROPS OPHTHALMIC at 19:09

## 2018-07-16 RX ADMIN — ROCURONIUM BROMIDE 40 MG: 10 INJECTION, SOLUTION INTRAVENOUS at 07:25

## 2018-07-16 RX ADMIN — SODIUM CHLORIDE: 9 INJECTION, SOLUTION INTRAVENOUS at 21:06

## 2018-07-16 RX ADMIN — PROPOFOL 150 MG: 10 INJECTION, EMULSION INTRAVENOUS at 07:25

## 2018-07-16 ASSESSMENT — PULMONARY FUNCTION TESTS
PIF_VALUE: 24
PIF_VALUE: 25
PIF_VALUE: 30
PIF_VALUE: 27
PIF_VALUE: 25
PIF_VALUE: 24
PIF_VALUE: 0
PIF_VALUE: 25
PIF_VALUE: 24
PIF_VALUE: 25
PIF_VALUE: 28
PIF_VALUE: 1
PIF_VALUE: 24
PIF_VALUE: 24
PIF_VALUE: 21
PIF_VALUE: 23
PIF_VALUE: 1
PIF_VALUE: 25
PIF_VALUE: 26
PIF_VALUE: 23
PIF_VALUE: 20
PIF_VALUE: 26
PIF_VALUE: 27
PIF_VALUE: 22
PIF_VALUE: 28
PIF_VALUE: 0
PIF_VALUE: 24
PIF_VALUE: 24
PIF_VALUE: 26
PIF_VALUE: 24
PIF_VALUE: 28
PIF_VALUE: 25
PIF_VALUE: 25
PIF_VALUE: 24
PIF_VALUE: 25
PIF_VALUE: 31
PIF_VALUE: 24
PIF_VALUE: 25
PIF_VALUE: 23
PIF_VALUE: 25
PIF_VALUE: 24
PIF_VALUE: 25
PIF_VALUE: 25
PIF_VALUE: 24
PIF_VALUE: 23
PIF_VALUE: 26
PIF_VALUE: 26
PIF_VALUE: 30
PIF_VALUE: 25
PIF_VALUE: 26
PIF_VALUE: 24
PIF_VALUE: 28
PIF_VALUE: 24
PIF_VALUE: 26
PIF_VALUE: 25
PIF_VALUE: 25
PIF_VALUE: 1
PIF_VALUE: 25
PIF_VALUE: 27
PIF_VALUE: 25
PIF_VALUE: 24
PIF_VALUE: 24
PIF_VALUE: 19
PIF_VALUE: 24
PIF_VALUE: 2
PIF_VALUE: 28
PIF_VALUE: 25
PIF_VALUE: 23
PIF_VALUE: 18
PIF_VALUE: 25
PIF_VALUE: 24
PIF_VALUE: 24
PIF_VALUE: 26
PIF_VALUE: 26
PIF_VALUE: 25
PIF_VALUE: 1
PIF_VALUE: 1
PIF_VALUE: 24
PIF_VALUE: 25
PIF_VALUE: 24
PIF_VALUE: 28
PIF_VALUE: 30
PIF_VALUE: 28
PIF_VALUE: 23
PIF_VALUE: 6
PIF_VALUE: 26
PIF_VALUE: 30
PIF_VALUE: 23
PIF_VALUE: 25
PIF_VALUE: 26
PIF_VALUE: 24
PIF_VALUE: 25
PIF_VALUE: 25
PIF_VALUE: 1
PIF_VALUE: 25
PIF_VALUE: 24
PIF_VALUE: 28
PIF_VALUE: 24
PIF_VALUE: 25
PIF_VALUE: 1
PIF_VALUE: 25
PIF_VALUE: 2
PIF_VALUE: 1
PIF_VALUE: 25
PIF_VALUE: 23
PIF_VALUE: 26
PIF_VALUE: 25
PIF_VALUE: 18
PIF_VALUE: 25
PIF_VALUE: 26
PIF_VALUE: 24
PIF_VALUE: 25
PIF_VALUE: 25
PIF_VALUE: 24
PIF_VALUE: 0
PIF_VALUE: 24
PIF_VALUE: 24
PIF_VALUE: 25
PIF_VALUE: 24
PIF_VALUE: 24
PIF_VALUE: 26
PIF_VALUE: 41
PIF_VALUE: 28
PIF_VALUE: 24
PIF_VALUE: 18
PIF_VALUE: 20
PIF_VALUE: 25
PIF_VALUE: 6
PIF_VALUE: 26
PIF_VALUE: 1
PIF_VALUE: 25
PIF_VALUE: 27
PIF_VALUE: 26
PIF_VALUE: 24
PIF_VALUE: 24
PIF_VALUE: 26
PIF_VALUE: 26
PIF_VALUE: 2
PIF_VALUE: 22
PIF_VALUE: 27
PIF_VALUE: 22
PIF_VALUE: 24
PIF_VALUE: 2
PIF_VALUE: 19
PIF_VALUE: 25
PIF_VALUE: 26
PIF_VALUE: 25
PIF_VALUE: 22
PIF_VALUE: 26
PIF_VALUE: 29
PIF_VALUE: 26
PIF_VALUE: 22
PIF_VALUE: 28
PIF_VALUE: 25
PIF_VALUE: 31
PIF_VALUE: 28
PIF_VALUE: 28
PIF_VALUE: 24
PIF_VALUE: 25
PIF_VALUE: 1
PIF_VALUE: 26
PIF_VALUE: 25
PIF_VALUE: 26
PIF_VALUE: 24
PIF_VALUE: 25
PIF_VALUE: 24
PIF_VALUE: 30
PIF_VALUE: 28
PIF_VALUE: 26
PIF_VALUE: 24
PIF_VALUE: 25
PIF_VALUE: 25
PIF_VALUE: 22
PIF_VALUE: 24
PIF_VALUE: 23
PIF_VALUE: 26
PIF_VALUE: 25
PIF_VALUE: 25
PIF_VALUE: 26
PIF_VALUE: 25
PIF_VALUE: 25
PIF_VALUE: 28
PIF_VALUE: 25
PIF_VALUE: 24
PIF_VALUE: 23
PIF_VALUE: 25
PIF_VALUE: 20
PIF_VALUE: 27
PIF_VALUE: 24
PIF_VALUE: 27
PIF_VALUE: 24
PIF_VALUE: 24
PIF_VALUE: 27
PIF_VALUE: 26
PIF_VALUE: 25
PIF_VALUE: 28
PIF_VALUE: 2
PIF_VALUE: 24
PIF_VALUE: 24
PIF_VALUE: 25
PIF_VALUE: 20
PIF_VALUE: 26
PIF_VALUE: 26
PIF_VALUE: 28
PIF_VALUE: 20
PIF_VALUE: 1
PIF_VALUE: 1
PIF_VALUE: 25
PIF_VALUE: 1
PIF_VALUE: 26
PIF_VALUE: 19
PIF_VALUE: 24
PIF_VALUE: 25
PIF_VALUE: 25
PIF_VALUE: 24
PIF_VALUE: 26
PIF_VALUE: 25
PIF_VALUE: 25
PIF_VALUE: 23
PIF_VALUE: 25
PIF_VALUE: 22
PIF_VALUE: 25
PIF_VALUE: 25
PIF_VALUE: 26
PIF_VALUE: 26
PIF_VALUE: 25
PIF_VALUE: 0
PIF_VALUE: 25
PIF_VALUE: 24
PIF_VALUE: 2
PIF_VALUE: 27
PIF_VALUE: 26
PIF_VALUE: 26
PIF_VALUE: 24
PIF_VALUE: 29
PIF_VALUE: 25
PIF_VALUE: 24
PIF_VALUE: 23
PIF_VALUE: 28
PIF_VALUE: 22
PIF_VALUE: 25
PIF_VALUE: 17
PIF_VALUE: 25
PIF_VALUE: 26
PIF_VALUE: 1
PIF_VALUE: 26
PIF_VALUE: 25
PIF_VALUE: 25
PIF_VALUE: 27
PIF_VALUE: 25
PIF_VALUE: 23
PIF_VALUE: 25
PIF_VALUE: 24
PIF_VALUE: 24
PIF_VALUE: 20
PIF_VALUE: 25
PIF_VALUE: 25
PIF_VALUE: 26
PIF_VALUE: 1
PIF_VALUE: 26
PIF_VALUE: 24
PIF_VALUE: 26
PIF_VALUE: 24
PIF_VALUE: 25

## 2018-07-16 ASSESSMENT — PAIN SCALES - GENERAL
PAINLEVEL_OUTOF10: 0

## 2018-07-16 ASSESSMENT — PAIN - FUNCTIONAL ASSESSMENT: PAIN_FUNCTIONAL_ASSESSMENT: 0-10

## 2018-07-16 NOTE — BRIEF OP NOTE
Brief Postoperative Note  ______________________________________________________________    Patient: Nathan Worthington  YOB: 1945  MRN: [id number]  Date of Procedure: 7/16/2018    Pre-Op Diagnosis: ABDOMINAL AORTIC ANEURYSM    Post-Op Diagnosis: Same       Procedure(s):  ENDOVASCULAR  AORTIC ANEURYSM REPAIR , ABDOMINAL AORTIC ANEURYSM, WITH FINISTRATED GRAFT    Anesthesia: General, Spinal    Surgeon(s):  Daria Saavedra MD    Staff:  Surgical Assistant: Eddi Morales  Scrub Person First: Lindell Simmonds, MA  Scrub Person Second: Gregorio Calr     Estimated Blood Loss: * No values recorded between 7/16/2018  7:13 AM and 7/16/2018 56:19 AM * mL    Complications: None    Specimens:   * No specimens in log *    Implants:    Implant Name Type Inv.  Item Serial No.  Lot No. LRB No. Used   GRAFT ZENITH FEN [id number] PROX MAIN BODY Vascular/Graft/Patch/Filter GRAFT ZENITH FEN [id number] PROX MAIN BODY  [employer] [id number] N/A 1   STENT FORMULA 418 RENAL BAL EXP [id number] Stent:Biliary/Pancreatic/Iliac STENT FORMULA 418 RENAL BAL EXP [id number]  [employer] [id number] Left 1   STENT FORMULA 418 RENAL BAL EXP [id number] Stent:Biliary/Pancreatic/Iliac STENT FORMULA 418 RENAL BAL EXP [id number]  [employer] [id number] Right 1   GRAFT ZENITH FEN [id number] DIST BIFUR MB Vascular/Graft/Patch/Filter GRAFT ZENITH FEN [id number] DIST BIFUR MB  [employer] [id number] Right 1   GRAFT SPIRAL Z ZENITH [id number] ILIAC LEG Vascular/Graft/Patch/Filter GRAFT SPIRAL Z ZENITH [id number] ILIAC LEG   [employer] [id number] Left 1         Drains:   NG/OG Tube Orogastric 18 fr Center mouth (Active)       Urethral Catheter Non-latex 16 fr (Active)   $ Urethral catheter insertion Inserted for procedure 7/16/2018  8:14 AM   Catheter Indications Perioperative use in selected surgeries including but not limited to urologic, pelvic or need for intraoperative monitoring of urinary output due to prolonged surgery, large volume infusion or need for diuretic therapy in surgery 7/16/2018  8:14 AM   Site Assessment No urethral drainage 7/16/2018  8:14 AM   Urine Color Yellow 7/16/2018  8:14 AM   Urine Appearance Clear 7/16/2018  8:14 AM       Findings:     Yanet Escalante MD  Date: 7/16/2018  Time: 11:51 AM

## 2018-07-16 NOTE — H&P
[facility]       Current Medications: Allergies:  Pneumovax [pneumococcal polysaccharide vaccine]    Social History     Social History    Marital status:      Spouse name: N/A    Number of children: N/A    Years of education: N/A     Occupational History    GM      Social History Main Topics    Smoking status: Former Smoker     Packs/day: 0.50     Years: 50.00     Types: Cigarettes     Quit date: 6/20/2014    Smokeless tobacco: Never Used    Alcohol use 0.0 oz/week      Comment: rare;  drinks 1 cup of coffee daily & occ Pepsi/Tea    Drug use: No    Sexual activity: Not on file     Other Topics Concern    Not on file     Social History Narrative    No narrative on file        Family History   Problem Relation Age of Onset    Heart Attack Brother     Stroke Father        PHYSICAL EXAM:    There were no vitals taken for this visit.   CONSTITUTIONAL:  awake, alert, cooperative, no apparent distress, and appears stated age  NEURO:  Normal  EYES:  lids and lashes normal  ENT:  normocepalic, without obvious abnormality  NECK:  supple, symmetrical, trachea midline  LUNGS:  no increased work of breathing  CARDIOVASCULAR:  regular rate  ABDOMEN:  soft, non-distended, non-tender      EXTREMITIES:    R UE Swelling absent Incisions absent          L UE Swelling absent Incisions absent           R LE Edema absent     Incisions absent    Varicose veins absent    Wounds small healing scratches    5/5 Strength   Neuropathy is absent    L LE Edema absent     Incisions absent    Varicose veins absent    Wounds absent   5/5 Strength   Neuropathy is absent    R femoral 1 L femoral 1   R posterior tibial bi L posterior tibial bi   R dorsalis pedis 1+/bi L dorsalis pedis 1+/bi       LABS:    Lab Results   Component Value Date    WBC 8.6 07/09/2018    HGB 13.9 07/09/2018    HCT 43.0 07/09/2018     07/09/2018    PROTIME 24.5 (H) 07/09/2018    INR 2.2 07/09/2018    K 3.7 07/09/2018    BUN 15 07/09/2018    CREATININE 1.0 07/09/2018       RADIOLOGY:  No results found.       Assesment/Plan  74 y.o. male with AAA    Admit  OR for elective EVAR with fenestrated graft      Adama Patel DO  7/16/18  6:46 AM

## 2018-07-16 NOTE — OP NOTE
Valentino Holloway  1945    DATE OF PROCEDURE: 7/16/2018     SURGEON: Lilia Nathan M.D.    ASSISTANT(S): Dr. Yareli Kaur [facility]    PREOPERATIVE DIAGNOSIS: A 5-cm abdominal aortic aneurysm without rupture. POSTOPERATIVE DIAGNOSIS: Same. OPERATION: Endovascular repair of visceral aorta and abdominal aortic aneurysm including two visceral artery endoprostheses (41239, Zenith), bilateral femoral artery exposures ([id number]). ANESTHESIA: General endotracheal.    ESTIMATED BLOOD LOSS: 645 ml    COMPLICATIONS: None    DESCRIPTION OF PROCEDURE: The patient was identified and the procedure was confirmed. The abdomen, groins and thighs were prepped and draped in the usual sterile fashion. Bilateral infrainguinal oblique groin incisions were made and carried down through the subcutaneous tissue. Dissection continued down to the inguinal ligaments, and the common femoral arteries were identified and freed from the surrounding tissues. The vessels were surrounded proximally and distally with Vesseloops for control. Inferior stab incisions were made for passage of the wires. The patient was heparinized maintaining an activated clotting time greater than 300 seconds. Bilateral 6-[place] citizen sheaths were inserted into the femoral arteries and Lunderquist wires were inserted into the thoracic aorta. The left femoral sheath was replaced with a 20-[place] citizen sheath that was advanced into the abdominal aorta. The proximal main body fenestrated component ([id number]) was inserted over a Lunderquist wire through the right femoral artery and into the abdominal aorta. An arteriogram was obtained to identify the position of the renal arteries, and the endograft was uncovered placing the fenestrations adjacent to the renal artery origins. Through the left sheath, two Vel sheaths were inserted through the graft fenestrations and into the renal arteries.   There was difficulty advancing the sheath into the left

## 2018-07-16 NOTE — PROGRESS NOTES
CVICU Admission Note    Name: Ivone Mckeon  MRN: [id number]    CC: Postoperative Critical Care Management     Indication for Surgery/Procedure: AAA  LVEF:  Normal        Important/Relevant PMH/PSH: HTN, HPL, AS and CAD s/p CABGx3/ AVR in 2014, Afib (AC on Eliquis), PVD, COPD, prostate cancer s/p radiation, former smoker    Procedure/Surgeries: 7/16/2018 Endovascular Aortic Aneurysm Repair with Fenestrated graft       Physical Exam:    BP (!) 146/79   Pulse 70   Temp 98.1 °F (36.7 °C) (Temporal)   Resp 22   Ht 5' 8\" (1.727 m)   Wt 189 lb (85.7 kg)   SpO2 94%   BMI 28.74 kg/m²       General Appearance: Drowsy, arrived extubated on . Eyes: PERRL  Pulmonary: Diminished bibasilar. No wheezes, no accessory muscle use noted   Cardiovascular: RRR, no heaves or thrills palpated   Telemetry: PPM  Abdomen: Soft, nontender  Extremities: Palpable pulses all extremities, no edema   Neurologic/Psych: Opens eyes to voice, VELASCO x4 to command   Skin: Warm and dry   Incision: bilateral groins HAMIAD, soft to palpation       Assessment/Plan: Day of Surgery     1. AAA S/p FEVAR  - Frequent neurovascular checks, monitor incisions closely for signs of hematoma   - Exchange  for NC once more alert   - Hematuria noted, wing catheter in place, monitor for clots/worsening of hematuria   - Continue IVF at 125cc/hr   - PRN morphine/percocet for pain management     2.  Postoperative hypotension  - On arrival to ICU, SBP 70-80s  - IVF resuscitation as needed, starting levophed infusion until improved, wean off as able         Electronically signed by NURY Kirk - CNP on 7/16/2018 at 12:34 PM

## 2018-07-17 VITALS
OXYGEN SATURATION: 93 % | RESPIRATION RATE: 15 BRPM | SYSTOLIC BLOOD PRESSURE: 107 MMHG | DIASTOLIC BLOOD PRESSURE: 69 MMHG | TEMPERATURE: 97.3 F | HEIGHT: 68 IN | WEIGHT: 209 LBS | HEART RATE: 91 BPM | BODY MASS INDEX: 31.67 KG/M2

## 2018-07-17 LAB
ANION GAP SERPL CALCULATED.3IONS-SCNC: 14 MMOL/L (ref 7–16)
BASOPHILS ABSOLUTE: 0.01 E9/L (ref 0–0.2)
BASOPHILS RELATIVE PERCENT: 0.1 % (ref 0–2)
BUN BLDV-MCNC: 19 MG/DL (ref 8–23)
CALCIUM SERPL-MCNC: 8.3 MG/DL (ref 8.6–10.2)
CHLORIDE BLD-SCNC: 105 MMOL/L (ref 98–107)
CO2: 20 MMOL/L (ref 22–29)
CREAT SERPL-MCNC: 1.1 MG/DL (ref 0.7–1.2)
EOSINOPHILS ABSOLUTE: 0 E9/L (ref 0.05–0.5)
EOSINOPHILS RELATIVE PERCENT: 0 % (ref 0–6)
GFR AFRICAN AMERICAN: >60
GFR NON-AFRICAN AMERICAN: >60 ML/MIN/1.73
GLUCOSE BLD-MCNC: 147 MG/DL (ref 74–109)
HCT VFR BLD CALC: 29.6 % (ref 37–54)
HEMOGLOBIN: 9.9 G/DL (ref 12.5–16.5)
IMMATURE GRANULOCYTES #: 0.09 E9/L
IMMATURE GRANULOCYTES %: 0.8 % (ref 0–5)
LYMPHOCYTES ABSOLUTE: 0.99 E9/L (ref 1.5–4)
LYMPHOCYTES RELATIVE PERCENT: 8.3 % (ref 20–42)
MCH RBC QN AUTO: 29.7 PG (ref 26–35)
MCHC RBC AUTO-ENTMCNC: 33.4 % (ref 32–34.5)
MCV RBC AUTO: 88.9 FL (ref 80–99.9)
MONOCYTES ABSOLUTE: 0.71 E9/L (ref 0.1–0.95)
MONOCYTES RELATIVE PERCENT: 6 % (ref 2–12)
NEUTROPHILS ABSOLUTE: 10.09 E9/L (ref 1.8–7.3)
NEUTROPHILS RELATIVE PERCENT: 84.8 % (ref 43–80)
PDW BLD-RTO: 17.5 FL (ref 11.5–15)
PLATELET # BLD: 118 E9/L (ref 130–450)
PMV BLD AUTO: 10.8 FL (ref 7–12)
POTASSIUM SERPL-SCNC: 3.8 MMOL/L (ref 3.5–5)
RBC # BLD: 3.33 E12/L (ref 3.8–5.8)
SODIUM BLD-SCNC: 139 MMOL/L (ref 132–146)
WBC # BLD: 11.9 E9/L (ref 4.5–11.5)

## 2018-07-17 PROCEDURE — 85025 COMPLETE CBC W/AUTO DIFF WBC: CPT

## 2018-07-17 PROCEDURE — 94640 AIRWAY INHALATION TREATMENT: CPT

## 2018-07-17 PROCEDURE — 2700000000 HC OXYGEN THERAPY PER DAY

## 2018-07-17 PROCEDURE — 6360000002 HC RX W HCPCS: Performed by: SURGERY

## 2018-07-17 PROCEDURE — 2580000003 HC RX 258: Performed by: SURGERY

## 2018-07-17 PROCEDURE — 36415 COLL VENOUS BLD VENIPUNCTURE: CPT

## 2018-07-17 PROCEDURE — 80048 BASIC METABOLIC PNL TOTAL CA: CPT

## 2018-07-17 RX ORDER — OXYCODONE HYDROCHLORIDE AND ACETAMINOPHEN 5; 325 MG/1; MG/1
1 TABLET ORAL EVERY 4 HOURS PRN
Qty: 10 TABLET | Refills: 0 | Status: SHIPPED | OUTPATIENT
Start: 2018-07-17 | End: 2018-07-24

## 2018-07-17 RX ADMIN — Medication 10 ML: at 10:46

## 2018-07-17 RX ADMIN — MOMETASONE FUROATE AND FORMOTEROL FUMARATE DIHYDRATE 2 PUFF: 200; 5 AEROSOL RESPIRATORY (INHALATION) at 08:33

## 2018-07-17 RX ADMIN — SODIUM CHLORIDE: 9 INJECTION, SOLUTION INTRAVENOUS at 04:34

## 2018-07-17 RX ADMIN — IPRATROPIUM BROMIDE AND ALBUTEROL SULFATE 3 ML: 2.5; .5 SOLUTION RESPIRATORY (INHALATION) at 04:37

## 2018-07-17 RX ADMIN — CEFAZOLIN SODIUM 2 G: 2 SOLUTION INTRAVENOUS at 00:29

## 2018-07-17 RX ADMIN — IPRATROPIUM BROMIDE AND ALBUTEROL SULFATE 3 ML: 2.5; .5 SOLUTION RESPIRATORY (INHALATION) at 08:33

## 2018-07-17 ASSESSMENT — PAIN SCALES - GENERAL
PAINLEVEL_OUTOF10: 0

## 2018-07-17 NOTE — PLAN OF CARE
Problem: Falls - Risk of:  Goal: Will remain free from falls  Will remain free from falls   Outcome: Met This Shift    Goal: Absence of physical injury  Absence of physical injury   Outcome: Met This Shift      Problem: Pain:  Goal: Pain level will decrease  Pain level will decrease   Outcome: Met This Shift    Goal: Control of acute pain  Control of acute pain   Outcome: Met This Shift    Goal: Control of chronic pain  Control of chronic pain   Outcome: Met This Shift      Problem: Infection - Surgical Site:  Goal: Will show no infection signs and symptoms  Will show no infection signs and symptoms   Outcome: Met This Shift      Problem: Tissue Perfusion:  Goal: Peripheral tissue perfusion will improve  Peripheral tissue perfusion will improve   Outcome: Met This Shift

## 2018-07-17 NOTE — PROGRESS NOTES
Vascular Surgery Progress Note    CC: f/u fEVAR    HISTORY:  The patient is awake, alert, and oriented. Denies pain. Tolerated po. Wants to go home. IMPRESSION:  POD # 1 s/p fEVAR. PLAN:  Discharge patient to home. Instructions given. F/u 2 weeks.     Patient Active Problem List   Diagnosis Code    Hyperlipidemia with target LDL less than 100 E78.5    BPH (benign prostatic hyperplasia) N40.0    ECG abnormal R94.31    Abnormal nuclear stress test R94.39    AS (aortic stenosis) I35.0    SOB (shortness of breath) R06.02    RBBB (right bundle branch block) I45.10    Anxiety F41.9    CAD (coronary artery disease) I25.10    Pulmonary emphysema (HCC) J43.9    AV block, Mobitz II I44.1    AV block, 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) I44.2    H/O bicuspid aortic valve Z87.74    AI (aortic insufficiency) I35.1    S/P AVR (aortic valve replacement) Z95.2    Anticoagulated on Coumadin Z51.81, Z79.01    Aortic mural thrombus (HCC) I74.10    AAA (abdominal aortic aneurysm) without rupture ([facility]) I71.4    Elevated liver enzymes R74.8    History of tobacco use Z87.891    S/P CABG x 3 Z95.1    H/O aortic valve replacement with porcine valve Z95.3    Prostate CA ([facility] C61    Aortic valve disease I35.9    Pure hypercholesterolemia E78.00    Pacemaker Z95.0    History of smoking 30 or more pack years Z87.891    PAF (paroxysmal atrial fibrillation) ([facility]) I48.0    Anticoagulated by anticoagulation treatment Z79.01    COPD with acute exacerbation ([facility]) J44.1       Current Medications:      sodium chloride flush, acetaminophen, oxyCODONE-acetaminophen **OR** [DISCONTINUED] oxyCODONE-acetaminophen, ondansetron, polyvinyl alcohol    apixaban  5 mg Oral BID    aspirin EC  81 mg Oral Daily    furosemide  40 mg Oral BID    ipratropium-albuterol  1 vial Nebulization Q4H    lisinopril  2.5 mg Oral Daily    metoprolol succinate  25 mg Oral Daily    mometasone-formoterol  2 puff Inhalation BID    sodium chloride flush 10 mL Intravenous 2 times per day          PHYSICAL EXAM:    Vitals:    07/17/18 0600   BP:    Pulse: 76   Resp: 16   Temp:    SpO2: 96%       Incisions CDI x4. Groins soft. DP 2+ B. Urine clear.     LABS:    Lab Results   Component Value Date    WBC 11.9 (H) 07/17/2018    HGB 9.9 (L) 07/17/2018    HCT 29.6 (L) 07/17/2018     (L) 07/17/2018    PROTIME 14.6 (H) 07/16/2018    INR 1.3 07/16/2018    APTT 37.9 (H) 07/09/2018    K 3.8 07/17/2018    BUN 19 07/17/2018    CREATININE 1.1 07/17/2018

## 2018-07-17 NOTE — PLAN OF CARE
Discharge instructions given to Pt and wife. Verbalized understanding, answered all questions best of ability at this time.

## 2018-07-17 NOTE — PLAN OF CARE
Problem: Pain:  Goal: Control of acute pain  Control of acute pain   Education on management of pain while in hospital and PRN medications available for treatment. Verbalized understanding at this time.

## 2018-07-18 ENCOUNTER — CARE COORDINATION (OUTPATIENT)
Dept: CASE MANAGEMENT | Age: 73
End: 2018-07-18

## 2018-07-18 DIAGNOSIS — I71.40 AAA (ABDOMINAL AORTIC ANEURYSM) WITHOUT RUPTURE: Primary | ICD-10-CM

## 2018-07-18 PROCEDURE — 1111F DSCHRG MED/CURRENT MED MERGE: CPT | Performed by: FAMILY MEDICINE

## 2018-07-18 NOTE — CARE COORDINATION
Stefan [facility] Transitions Initial Follow Up Call    Call within 2 business days of discharge: Yes    Patient: Kandice Bradshaw Patient : 1945   MRN: [id number]  Reason for Admission: There are no discharge diagnoses documented for the most recent discharge. Discharge Date: 18 RARS: Readmission Risk Score: 16     Spoke with: Janel [address] St: MATIAS    Non-face-to-face services provided:  Obtained and reviewed discharge summary and/or continuity of care documents  Education of patient/family/caregiver/guardian to support self-management-review of discharge instructions  Assessment and support for treatment adherence and medication management-review of medications and cg support    Care Transitions 24 Hour Call    Schedule Follow Up Appointment with PCP:  Completed  Do you have any ongoing symptoms?:  No  Do you have a copy of your discharge instructions?:  Yes  Do you have all of your prescriptions and are they filled?:  Yes  Have you been contacted by a [address]?:  No  Have you scheduled your follow up appointment?:  Yes  How are you going to get to your appointment?:  Car - family or friend to transport  Were you discharged with any Home Care or Post Acute Services:  Yes  Post Acute Services:  Home Health  Do you feel like you have everything you need to keep you well at home?:  Yes  Care Transitions Interventions  No Identified Needs         Follow Up:  Initial [facility] phone visit outreach to THE [facility] @ home s/p discharge from hospital stay for AAA repair with grafting. Introduction of self and role to his voiced understanding. He wishes to continue with service for today. He states he is feeling well @ home just some discomfort @ surgical site and he is using his oxycodone for. Denies s/s infection @ site and we reviewed this. He is ambulating slowly but safely around home, he states. [facility] has contacted him and are coming to do admission this morning.   Has h/o COPD with no SOB and using

## 2018-07-25 ENCOUNTER — TELEPHONE (OUTPATIENT)
Dept: VASCULAR SURGERY | Age: 73
End: 2018-07-25

## 2018-07-25 NOTE — TELEPHONE ENCOUNTER
Home care nurse called, patient's left hand went numb yesterday for about 10 minutes with purplish discoloration. Strength and pulses good. He also has cyanosis of tip of nose and lips which is not a new finding, has been present on and off since February. Patient has follow up appointment on 7-31-18.

## 2018-07-31 ENCOUNTER — OFFICE VISIT (OUTPATIENT)
Dept: VASCULAR SURGERY | Age: 73
End: 2018-07-31

## 2018-07-31 ENCOUNTER — TELEPHONE (OUTPATIENT)
Dept: VASCULAR SURGERY | Age: 73
End: 2018-07-31

## 2018-07-31 DIAGNOSIS — Z95.828 S/P AAA REPAIR USING BIFURCATION GRAFT: Primary | ICD-10-CM

## 2018-07-31 DIAGNOSIS — Z86.79 S/P AAA REPAIR USING BIFURCATION GRAFT: Primary | ICD-10-CM

## 2018-07-31 PROCEDURE — 99999 PR OFFICE/OUTPT VISIT,PROCEDURE ONLY: CPT | Performed by: NURSE PRACTITIONER

## 2018-08-15 ENCOUNTER — HOSPITAL ENCOUNTER (OUTPATIENT)
Dept: CT IMAGING | Age: 73
Discharge: HOME | End: 2018-08-17
Payer: MEDICARE

## 2018-08-15 DIAGNOSIS — Z86.79 S/P AAA REPAIR USING BIFURCATION GRAFT: ICD-10-CM

## 2018-08-15 DIAGNOSIS — Z95.828 S/P AAA REPAIR USING BIFURCATION GRAFT: ICD-10-CM

## 2018-08-15 PROCEDURE — 6360000004 HC RX CONTRAST MEDICATION: Performed by: RADIOLOGY

## 2018-08-15 PROCEDURE — 74174 CTA ABD&PLVS W/CONTRAST: CPT

## 2018-08-15 PROCEDURE — 2580000003 HC RX 258: Performed by: RADIOLOGY

## 2018-08-15 RX ORDER — SODIUM CHLORIDE 0.9 % (FLUSH) 0.9 %
10 SYRINGE (ML) INJECTION
Status: COMPLETED | OUTPATIENT
Start: 2018-08-15 | End: 2018-08-15

## 2018-08-15 RX ADMIN — IOPAMIDOL 100 ML: 755 INJECTION, SOLUTION INTRAVENOUS at 08:30

## 2018-08-15 RX ADMIN — Medication 10 ML: at 08:31

## 2018-08-15 NOTE — PROGRESS NOTES
Pt here as an outpatient for cta abd/pel. During injection of contrast iv infiltrated (lt ac). I was in room when it happened so stopped contrast injection pretty quickly. Removed iv and applied compresses. New iv placed in other arm and exam completed. Pt was sent home with discharge instructions paper.

## 2018-08-16 NOTE — PROGRESS NOTES
Follow up phone call  Spoke with pt's spouse. Left arm antecubital area with small amount of edema. Erythema around IV insertion site.  Pt denies pain

## 2018-08-21 ENCOUNTER — HOSPITAL ENCOUNTER (OUTPATIENT)
Dept: CARDIAC REHAB | Age: 73
Setting detail: THERAPIES SERIES
Discharge: HOME | End: 2018-08-21
Payer: MEDICARE

## 2018-08-23 ENCOUNTER — HOSPITAL ENCOUNTER (OUTPATIENT)
Dept: CARDIAC REHAB | Age: 73
Setting detail: THERAPIES SERIES
Discharge: HOME | End: 2018-08-23
Payer: MEDICARE

## 2018-08-23 PROCEDURE — G0424 PULMONARY REHAB W EXER: HCPCS

## 2018-08-28 ENCOUNTER — HOSPITAL ENCOUNTER (OUTPATIENT)
Dept: CARDIAC REHAB | Age: 73
Setting detail: THERAPIES SERIES
Discharge: HOME | End: 2018-08-28
Payer: MEDICARE

## 2018-08-28 PROCEDURE — G0424 PULMONARY REHAB W EXER: HCPCS

## 2018-08-30 ENCOUNTER — HOSPITAL ENCOUNTER (OUTPATIENT)
Dept: CARDIAC REHAB | Age: 73
Setting detail: THERAPIES SERIES
Discharge: HOME | End: 2018-08-30
Payer: MEDICARE

## 2018-08-30 ENCOUNTER — CARE COORDINATION (OUTPATIENT)
Dept: CARE COORDINATION | Age: 73
End: 2018-08-30

## 2018-08-30 PROCEDURE — G0424 PULMONARY REHAB W EXER: HCPCS

## 2018-09-04 ENCOUNTER — OFFICE VISIT (OUTPATIENT)
Dept: CARDIOLOGY CLINIC | Age: 73
End: 2018-09-04
Payer: MEDICARE

## 2018-09-04 ENCOUNTER — NURSE ONLY (OUTPATIENT)
Dept: CARDIOLOGY CLINIC | Age: 73
End: 2018-09-04
Payer: MEDICARE

## 2018-09-04 ENCOUNTER — HOSPITAL ENCOUNTER (OUTPATIENT)
Dept: CARDIAC REHAB | Age: 73
Setting detail: THERAPIES SERIES
Discharge: HOME | End: 2018-09-04
Payer: MEDICARE

## 2018-09-04 VITALS
BODY MASS INDEX: 28.79 KG/M2 | SYSTOLIC BLOOD PRESSURE: 110 MMHG | DIASTOLIC BLOOD PRESSURE: 68 MMHG | HEART RATE: 73 BPM | HEIGHT: 68 IN | WEIGHT: 190 LBS

## 2018-09-04 DIAGNOSIS — F41.9 ANXIETY: ICD-10-CM

## 2018-09-04 DIAGNOSIS — Z95.0 PACEMAKER: ICD-10-CM

## 2018-09-04 DIAGNOSIS — I45.10 RBBB (RIGHT BUNDLE BRANCH BLOCK): ICD-10-CM

## 2018-09-04 DIAGNOSIS — Z95.2 S/P AVR (AORTIC VALVE REPLACEMENT): ICD-10-CM

## 2018-09-04 DIAGNOSIS — I44.2 AV BLOCK, 3RD DEGREE (HCC): ICD-10-CM

## 2018-09-04 DIAGNOSIS — Z87.891 HISTORY OF SMOKING 30 OR MORE PACK YEARS: ICD-10-CM

## 2018-09-04 DIAGNOSIS — Z86.79 STATUS POST PERCUTANEOUS ABDOMINAL AORTIC ANEURYSM (AAA) REPAIR: ICD-10-CM

## 2018-09-04 DIAGNOSIS — C61 PROSTATE CA (HCC): ICD-10-CM

## 2018-09-04 DIAGNOSIS — Z95.0 PACEMAKER: Primary | ICD-10-CM

## 2018-09-04 DIAGNOSIS — Z79.01 ANTICOAGULATED BY ANTICOAGULATION TREATMENT: ICD-10-CM

## 2018-09-04 DIAGNOSIS — E78.5 HYPERLIPIDEMIA WITH TARGET LDL LESS THAN 100: ICD-10-CM

## 2018-09-04 DIAGNOSIS — J43.9 PULMONARY EMPHYSEMA, UNSPECIFIED EMPHYSEMA TYPE (HCC): ICD-10-CM

## 2018-09-04 DIAGNOSIS — Z95.1 HX OF CABG: ICD-10-CM

## 2018-09-04 DIAGNOSIS — I25.10 CAD IN NATIVE ARTERY: Primary | ICD-10-CM

## 2018-09-04 DIAGNOSIS — Z87.74 H/O BICUSPID AORTIC VALVE: ICD-10-CM

## 2018-09-04 DIAGNOSIS — Z98.890 STATUS POST PERCUTANEOUS ABDOMINAL AORTIC ANEURYSM (AAA) REPAIR: ICD-10-CM

## 2018-09-04 DIAGNOSIS — I48.21 PERMANENT ATRIAL FIBRILLATION (HCC): ICD-10-CM

## 2018-09-04 PROCEDURE — 99213 OFFICE O/P EST LOW 20 MIN: CPT | Performed by: INTERNAL MEDICINE

## 2018-09-04 PROCEDURE — 93280 PM DEVICE PROGR EVAL DUAL: CPT | Performed by: INTERNAL MEDICINE

## 2018-09-04 PROCEDURE — G0424 PULMONARY REHAB W EXER: HCPCS

## 2018-09-04 NOTE — PROGRESS NOTES
heart rate was 73 bpm and went up to only 98 bpm. He had normal BP response. The test was terminated because of extreme fatigue and  shortness of breath. There were no ischemic EKG changes at the heart rate achieved. The nuclear images were read by Dr. Jhonny Tilley as mostly fixed inferior and inferolateral wall defects with minimal reversibility involving the inferior wall with  mild inferobasal hypokinesis with an estimated ejection fraction of 60%. 4. Hyperlipidemia.    5. 8/8/14: Cardiac catheterization: Severe three vessel coronary artery disease. Normal left ventricular size with basal inferior wall akinesis with an estimated ejection fraction of 50%. Systemic hypertension. Elevated left ventricular end diastolic  pressure consistent with LV dysfunction. Mild calcific aortic stenosis and mild aortic regurgitation. High grade ostial narrowing of the left vertebral basilar artery with no significant disease of both carotid arteries. Mobitz II second degree AV block  and intermittent third degree AV block for which the patient had a temporary transvenous pacemaker inserted.    6. Carotid ultrasound done on 8/9/14 was reported as no evidence of hemodynamically significant stenosis.    7. 8/11/14: Coronary artery bypass graft surgery x3: LIMA to LAD; SVG to second diagonal branch of LAD; SVG to first marginal branch of LCX. Aortic valve replacement with a 23 mm Medtronic Mosaic Ultra bioprosthetic tissue valve.    8. Dual chamber pacemaker insertion on 8/12/14 for Mobitz type II second degree AV block and intermittent third degree heart block.    9. Paroxysmal atrial fibrillation/flutter in the perioperative period in 8/14.    10. Holter monitor done on 12/09/14 for 24 hours was read by Dr. Ritu Tidwell (cardiologist) as showing paced rhythm with an average heart rate of 73 BPM with frequent PVCs with one short run of six beats of nonsustained ventricular tachycardia at a rate of 126 BPM with rare PACs.    11.  Abdominal sounds. No pulsating masses felt. No bruits heard. Extremities:               No edema. Femoral pulses normal. Posterior tibialis pulses normal.  Skin:                          Normal turgor. No rashes or ulcers noted. Neurologic:                Oriented x 3.  No motor or sensory deficits detected.        REVIEW OF DIAGNOSTIC TESTS:  -Blood tests from 07/17/2018 reviewed:  BUN 19, creatinine 1.1, GFR >60, potassium 3.8. HGB 9.9, HCT 29.6, platelet count 879.    -Pacemaker interrogation done today showed normal pacemaker function with 100% atrial fibrillation. -EKG done today showed atrial fibrillation with demand ventricular pacing and RBBB noted on the non-paced beats and with nonspecific T-wave abnormality.                           BTOVLXISCH / DIAGNOSIS:   1.  Coronary artery disease status post coronary artery bypass graft surgery:  Clinically stable. 2.  Bicuspid aortic valve with aortic stenosis and aortic regurgitation status post aortic valve replacement surgery. 3.  High grade AV block status post dual chamber pacemaker insertion. 4.  Permanent atrial fibrillation:  First diagnosed in the perioperative period in 08/2014 with noted recurrences on pacemaker interrogation done on 09/05/2017.  5.  Hyperlipidemia: On low dose atorvastatin. 6.  History of elevated liver enzymes with higher dose of statins. 7.  Prostate cancer, S/P radiation therapy. 8.  Anxiety. 9.  Abdominal aortic aneurysm, status post endovascular repair 07/16/2018. 10.  Severe emphysema. 11.  Anemia in 07/2018 (with thrombocytopenia post endovascular repair). TREATMENT PLAN:  1. Reassure. 2.  Continue current medications. 3.  Routine pacemaker follow up. 4.  Follow up with cardiology in six months or on prn basis. Joshua [address] [place] [id number]  [phone]                                                              [phone])

## 2018-09-05 ENCOUNTER — CARE COORDINATION (OUTPATIENT)
Dept: CARE COORDINATION | Age: 73
End: 2018-09-05

## 2018-09-06 ENCOUNTER — HOSPITAL ENCOUNTER (OUTPATIENT)
Dept: CARDIAC REHAB | Age: 73
Setting detail: THERAPIES SERIES
Discharge: HOME | End: 2018-09-06
Payer: MEDICARE

## 2018-09-06 PROCEDURE — G0424 PULMONARY REHAB W EXER: HCPCS

## 2018-09-11 ENCOUNTER — HOSPITAL ENCOUNTER (OUTPATIENT)
Dept: CARDIAC REHAB | Age: 73
Setting detail: THERAPIES SERIES
Discharge: HOME | End: 2018-09-11
Payer: MEDICARE

## 2018-09-11 PROCEDURE — G0424 PULMONARY REHAB W EXER: HCPCS

## 2018-09-13 ENCOUNTER — HOSPITAL ENCOUNTER (OUTPATIENT)
Dept: CARDIAC REHAB | Age: 73
Setting detail: THERAPIES SERIES
Discharge: HOME | End: 2018-09-13
Payer: MEDICARE

## 2018-09-13 ENCOUNTER — HOSPITAL ENCOUNTER (OUTPATIENT)
Age: 73
Discharge: HOME | End: 2018-09-13
Payer: MEDICARE

## 2018-09-13 LAB — PROSTATE SPECIFIC ANTIGEN: <0.01 NG/ML (ref 0–4)

## 2018-09-13 PROCEDURE — 36415 COLL VENOUS BLD VENIPUNCTURE: CPT

## 2018-09-13 PROCEDURE — G0424 PULMONARY REHAB W EXER: HCPCS

## 2018-09-13 PROCEDURE — G0103 PSA SCREENING: HCPCS

## 2018-09-18 ENCOUNTER — HOSPITAL ENCOUNTER (OUTPATIENT)
Dept: CARDIAC REHAB | Age: 73
Setting detail: THERAPIES SERIES
Discharge: HOME | End: 2018-09-18
Payer: MEDICARE

## 2018-09-18 PROCEDURE — G0424 PULMONARY REHAB W EXER: HCPCS

## 2018-09-19 NOTE — PROGRESS NOTES
CAT scan reviewed. Widely patent stent graft without evidence of endovascular leak. 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 Left kidney well perfused. Will observe.

## 2018-09-20 ENCOUNTER — HOSPITAL ENCOUNTER (OUTPATIENT)
Dept: CARDIAC REHAB | Age: 73
Setting detail: THERAPIES SERIES
Discharge: HOME | End: 2018-09-20
Payer: MEDICARE

## 2018-09-20 PROCEDURE — G0424 PULMONARY REHAB W EXER: HCPCS

## 2018-09-25 ENCOUNTER — HOSPITAL ENCOUNTER (OUTPATIENT)
Dept: CARDIAC REHAB | Age: 73
Setting detail: THERAPIES SERIES
Discharge: HOME | End: 2018-09-25
Payer: MEDICARE

## 2018-09-25 PROCEDURE — G0424 PULMONARY REHAB W EXER: HCPCS

## 2018-09-26 ENCOUNTER — OFFICE VISIT (OUTPATIENT)
Dept: FAMILY MEDICINE CLINIC | Age: 73
End: 2018-09-26
Payer: MEDICARE

## 2018-09-26 VITALS
OXYGEN SATURATION: 97 % | RESPIRATION RATE: 18 BRPM | WEIGHT: 189.4 LBS | SYSTOLIC BLOOD PRESSURE: 128 MMHG | BODY MASS INDEX: 28.7 KG/M2 | DIASTOLIC BLOOD PRESSURE: 66 MMHG | HEIGHT: 68 IN | HEART RATE: 82 BPM

## 2018-09-26 DIAGNOSIS — M79.671 RIGHT FOOT PAIN: ICD-10-CM

## 2018-09-26 DIAGNOSIS — E78.5 HYPERLIPIDEMIA WITH TARGET LDL LESS THAN 100: ICD-10-CM

## 2018-09-26 DIAGNOSIS — J43.9 PULMONARY EMPHYSEMA, UNSPECIFIED EMPHYSEMA TYPE (HCC): Primary | ICD-10-CM

## 2018-09-26 DIAGNOSIS — Z95.1 S/P CABG X 3: ICD-10-CM

## 2018-09-26 DIAGNOSIS — I48.21 PERMANENT ATRIAL FIBRILLATION (HCC): ICD-10-CM

## 2018-09-26 DIAGNOSIS — I25.10 CORONARY ARTERY DISEASE INVOLVING NATIVE CORONARY ARTERY OF NATIVE HEART WITHOUT ANGINA PECTORIS: ICD-10-CM

## 2018-09-26 PROCEDURE — 3288F FALL RISK ASSESSMENT DOCD: CPT | Performed by: FAMILY MEDICINE

## 2018-09-26 PROCEDURE — 99214 OFFICE O/P EST MOD 30 MIN: CPT | Performed by: FAMILY MEDICINE

## 2018-09-26 PROCEDURE — G8510 SCR DEP NEG, NO PLAN REQD: HCPCS | Performed by: FAMILY MEDICINE

## 2018-09-26 RX ORDER — DOXYCYCLINE HYCLATE 100 MG
100 TABLET ORAL 2 TIMES DAILY
Qty: 20 TABLET | Refills: 0 | Status: SHIPPED | OUTPATIENT
Start: 2018-09-26 | End: 2018-10-06

## 2018-09-26 ASSESSMENT — ENCOUNTER SYMPTOMS
DIARRHEA: 0
ROS SKIN COMMENTS: MULTIPLE SKIN LESIONS
HEARTBURN: 0
BACK PAIN: 0
PHOTOPHOBIA: 0
WHEEZING: 0
SINUS PAIN: 0
NAUSEA: 0
STRIDOR: 0
EYE PAIN: 0
SORE THROAT: 0
SHORTNESS OF BREATH: 1
HEMOPTYSIS: 0
VOMITING: 0
DOUBLE VISION: 0
CONSTIPATION: 0
SPUTUM PRODUCTION: 0
BLURRED VISION: 0
BLOOD IN STOOL: 0
ABDOMINAL PAIN: 0
ORTHOPNEA: 0
EYES NEGATIVE: 1
EYE REDNESS: 0
COUGH: 0
EYE DISCHARGE: 0

## 2018-09-26 ASSESSMENT — PATIENT HEALTH QUESTIONNAIRE - PHQ9
1. LITTLE INTEREST OR PLEASURE IN DOING THINGS: 0
SUM OF ALL RESPONSES TO PHQ QUESTIONS 1-9: 0
SUM OF ALL RESPONSES TO PHQ9 QUESTIONS 1 & 2: 0
2. FEELING DOWN, DEPRESSED OR HOPELESS: 0
SUM OF ALL RESPONSES TO PHQ QUESTIONS 1-9: 0

## 2018-09-26 NOTE — PROGRESS NOTES
SUBJECTIVE  Kurt Cho is a 74 y.o. male. HPI/Chief C/O:  Chief Complaint   Patient presents with    Toe Pain     Pt c/o of sore on bottom of R big toe; pt states it is very sore when walking; toe is purple     Allergies   Allergen Reactions    Pneumovax [Pneumococcal Polysaccharide Vaccine] Dermatitis   He is here with a painful sore to the bottom of his right great toe      Shortness of Breath   This is a chronic problem. The current episode started more than 1 year ago. The problem occurs constantly. The problem has been gradually improving. Pertinent negatives include no abdominal pain, chest pain, claudication, coryza, ear pain, fever, headaches, hemoptysis, leg pain, leg swelling, neck pain, orthopnea, PND, rash, rhinorrhea, sore throat, sputum production, swollen glands, syncope, vomiting or wheezing. The symptoms are aggravated by any activity. He has tried steroid inhalers, ipratropium inhalers, beta agonist inhalers and oral steroids for the symptoms. The treatment provided moderate relief. His past medical history is significant for chronic lung disease (R/O pulmonary fibrosis) and COPD (emphysema, R/O pulmonary fibrosis). There is no history of allergies, aspirin allergies, asthma, bronchiolitis, CAD, DVT, a heart failure, PE, pneumonia or a recent surgery. Hypertension   This is a chronic problem. The current episode started more than 1 year ago. The problem is controlled. Associated symptoms include malaise/fatigue and shortness of breath. Pertinent negatives include no anxiety, blurred vision, chest pain, headaches, neck pain, orthopnea, palpitations, peripheral edema, PND or sweats. Risk factors for coronary artery disease include smoking/tobacco exposure, male gender, dyslipidemia, family history and stress. Past treatments include lifestyle changes, beta blockers, ACE inhibitors and diuretics. The current treatment provides significant improvement.  Compliance problems include exercise and diet. Hypertensive end-organ damage includes CAD/MI (aortic stenosis, RBBB,AV block, aortic valve replaced,  AAA , CABG x3 , pacemaker ) and PVD (AAA). There is no history of angina, kidney disease, CVA, heart failure, left ventricular hypertrophy or retinopathy. There is no history of chronic renal disease, coarctation of the aorta, hyperaldosteronism, hypercortisolism, hyperparathyroidism, a hypertension causing med, pheochromocytoma, renovascular disease, sleep apnea or a thyroid problem. ROS:  Review of Systems   Constitutional: Positive for malaise/fatigue. Negative for chills, diaphoresis, fever and weight loss. HENT: Negative for congestion, ear discharge, ear pain, hearing loss, nosebleeds, rhinorrhea, sinus pain, sore throat and tinnitus. Eyes: Negative. Negative for blurred vision, double vision, photophobia, pain, discharge and redness. Respiratory: Positive for shortness of breath. Negative for cough, hemoptysis, sputum production, wheezing and stridor. Cardiovascular: Negative for chest pain, palpitations, orthopnea, claudication, leg swelling, syncope and PND. Gastrointestinal: Negative for abdominal pain, blood in stool, constipation, diarrhea, heartburn, melena, nausea and vomiting. Genitourinary: Negative. Negative for dysuria, flank pain, frequency, hematuria and urgency. Musculoskeletal: Positive for joint pain (pain right toe). Negative for back pain, falls, myalgias and neck pain. Skin: Negative for itching and rash. Multiple skin lesions   Neurological: Positive for weakness. Negative for dizziness, tremors, sensory change, speech change, focal weakness, seizures, loss of consciousness and headaches. Endo/Heme/Allergies: Negative for environmental allergies and polydipsia. Bruises/bleeds easily. Psychiatric/Behavioral: Negative.       Past Medical/Surgical Hx;  Reviewed with patient      Diagnosis Date    Aortic stenosis, mild     Aortic valve sclerosis     moderate    Atrial fibrillation ([facility] [address] 2014    CAD (coronary artery disease)     COPD (chronic obstructive pulmonary disease) (HCC)     Gallbladder attack     History of tobacco use 9/17/2015    Hyperlipidemia     Hypertension     Mild mitral regurgitation     Prostate cancer ([facility] [address]     44 treatments of radiation    PVD (peripheral vascular disease) [facility]audication [facility] 9/17/2015    RBBB     Tobacco abuse      Past Surgical History:   Procedure Laterality Date    APPENDECTOMY      CARDIAC CATHETERIZATION  8/8/2014    DR Carmenza Arndt    COLONOSCOPY      CORONARY ARTERY BYPASS GRAFT  08/11/2014    CABGx3 LIMA-LAD, SVG-D1, SVG-OM1, AVR Dr. Dahlia Mendez Left 8-    Dr. Merlinda Ferdinand RS&I N/A 7/16/2018    ENDOVASCULAR  AORTIC ANEURYSM REPAIR , ABDOMINAL AORTIC ANEURYSM, WITH FINISTRATED GRAFT performed by Trevon Rossi MD at [address] [place] [address]       Past Family Hx:  Reviewed with patient  Family History   Problem Relation Age of Onset    Heart Attack Brother     Stroke Father        Social Hx:  Reviewed with patient  Social History   Substance Use Topics    Smoking status: Former Smoker     Packs/day: 0.50     Years: 50.00     Types: Cigarettes     Quit date: 6/20/2014    Smokeless tobacco: Never Used    Alcohol use 0.0 oz/week      Comment: rare;  drinks 1 cup of coffee daily & occ Pepsi/Tea       OBJECTIVE  /66   Pulse 82   Resp 18   Ht 5' 8\" (1.727 m)   Wt 189 lb 6.4 oz (85.9 kg)   SpO2 97%   BMI 28.80 kg/m²     Problem List:  Michelle Camacho  does not have any pertinent problems on file. PHYS EX:  Physical Exam   Constitutional: He is oriented to person, place, and time. He appears well-developed and well-nourished. No distress. He is not intubated. HENT:   Head: Normocephalic and atraumatic.    Right Ear: External ear normal.   Left Ear: External ear normal.   Nose: Nose normal. Mouth/Throat: Oropharynx is clear and moist. No oropharyngeal exudate. Eyes: Right eye exhibits no discharge. Left eye exhibits no discharge. No scleral icterus. Neck: Normal range of motion. Neck supple. No JVD present. No tracheal deviation present. No thyromegaly present. Cardiovascular: Normal rate and regular rhythm. No extrasystoles are present. PMI is not displaced. Exam reveals no gallop, no S3, no S4, no distant heart sounds, no friction rub and no decreased pulses. Murmur heard. Systolic murmur is present with a grade of 1/6    No diastolic murmur is present   Pulmonary/Chest: No accessory muscle usage or stridor. No apnea, no tachypnea and no bradypnea. He is not intubated. No respiratory distress. He has decreased breath sounds in the right upper field, the right middle field, the right lower field, the left upper field, the left middle field and the left lower field. He has no wheezes. He has no rhonchi. He has no rales. He exhibits no tenderness. Pacemaker placement  I hear NO fluid   Abdominal: Soft. Bowel sounds are normal. He exhibits no distension and no mass. There is no tenderness. There is no rebound and no guarding. No hernia. Musculoskeletal: Normal range of motion. He exhibits tenderness (right great toe pain). He exhibits no edema or deformity. Lymphadenopathy:     He has no cervical adenopathy. Neurological: He is alert and oriented to person, place, and time. He has normal reflexes. He displays normal reflexes. No cranial nerve deficit or sensory deficit. He exhibits normal muscle tone. Coordination normal.   Skin: Skin is warm. No rash noted. He is not diaphoretic. No erythema. No pallor. Multiple skin lesions      Nursing note and vitals reviewed. ASSESSMENT/PLAN  Gypsy Brunner was seen today for toe pain.     Diagnoses and all orders for this visit:    Pulmonary emphysema, unspecified emphysema type ([address]  --PLAN--aerosol accuneb 1.25 plus chest tablet Take 81 mg by mouth daily       No facility-administered encounter medications on file as of 9/26/2018. Return in about 4 weeks (around 10/24/2018).         Reviewed recent labs related to Kike's current problems      Discussed importance of regular Health Maintenance follow up  Health Maintenance   Topic    DTaP/Tdap/Td vaccine (1 - Tdap)    Shingles Vaccine (1 of 2 - 2 Dose Series)    Flu vaccine (1)    Potassium monitoring     Creatinine monitoring     Lipid screen     Colon cancer screen colonoscopy

## 2018-09-27 ENCOUNTER — HOSPITAL ENCOUNTER (OUTPATIENT)
Dept: CARDIAC REHAB | Age: 73
Setting detail: THERAPIES SERIES
Discharge: HOME | End: 2018-09-27
Payer: MEDICARE

## 2018-09-27 PROCEDURE — G0424 PULMONARY REHAB W EXER: HCPCS

## 2018-09-28 ASSESSMENT — ENCOUNTER SYMPTOMS
RHINORRHEA: 0
SWOLLEN GLANDS: 0

## 2018-10-02 ENCOUNTER — HOSPITAL ENCOUNTER (OUTPATIENT)
Dept: CARDIAC REHAB | Age: 73
Setting detail: THERAPIES SERIES
Discharge: HOME | End: 2018-10-02
Payer: MEDICARE

## 2018-10-02 PROCEDURE — G0424 PULMONARY REHAB W EXER: HCPCS

## 2018-10-04 ENCOUNTER — HOSPITAL ENCOUNTER (OUTPATIENT)
Dept: CARDIAC REHAB | Age: 73
Setting detail: THERAPIES SERIES
Discharge: HOME | End: 2018-10-04
Payer: MEDICARE

## 2018-10-04 PROCEDURE — G0424 PULMONARY REHAB W EXER: HCPCS

## 2018-10-09 ENCOUNTER — HOSPITAL ENCOUNTER (OUTPATIENT)
Dept: CARDIAC REHAB | Age: 73
Setting detail: THERAPIES SERIES
Discharge: HOME | End: 2018-10-09
Payer: MEDICARE

## 2018-10-09 PROCEDURE — G0424 PULMONARY REHAB W EXER: HCPCS

## 2018-10-11 ENCOUNTER — HOSPITAL ENCOUNTER (OUTPATIENT)
Dept: CARDIAC REHAB | Age: 73
Setting detail: THERAPIES SERIES
Discharge: HOME | End: 2018-10-11
Payer: MEDICARE

## 2018-10-11 PROCEDURE — G0424 PULMONARY REHAB W EXER: HCPCS

## 2018-10-16 ENCOUNTER — HOSPITAL ENCOUNTER (OUTPATIENT)
Dept: CARDIAC REHAB | Age: 73
Setting detail: THERAPIES SERIES
Discharge: HOME | End: 2018-10-16
Payer: MEDICARE

## 2018-10-16 PROCEDURE — G0424 PULMONARY REHAB W EXER: HCPCS

## 2018-10-18 ENCOUNTER — HOSPITAL ENCOUNTER (OUTPATIENT)
Dept: CARDIAC REHAB | Age: 73
Setting detail: THERAPIES SERIES
Discharge: HOME | End: 2018-10-18
Payer: MEDICARE

## 2018-10-18 PROCEDURE — G0424 PULMONARY REHAB W EXER: HCPCS

## 2018-10-22 RX ORDER — APIXABAN 5 MG/1
TABLET, FILM COATED ORAL
Qty: 180 TABLET | Refills: 0 | Status: SHIPPED | OUTPATIENT
Start: 2018-10-22 | End: 2019-01-20 | Stop reason: SDUPTHER

## 2018-10-23 ENCOUNTER — HOSPITAL ENCOUNTER (OUTPATIENT)
Dept: CARDIAC REHAB | Age: 73
Setting detail: THERAPIES SERIES
Discharge: HOME | End: 2018-10-23
Payer: MEDICARE

## 2018-10-23 PROCEDURE — G0424 PULMONARY REHAB W EXER: HCPCS

## 2018-10-25 ENCOUNTER — HOSPITAL ENCOUNTER (OUTPATIENT)
Dept: CARDIAC REHAB | Age: 73
Setting detail: THERAPIES SERIES
Discharge: HOME | End: 2018-10-25
Payer: MEDICARE

## 2018-10-25 PROCEDURE — G0424 PULMONARY REHAB W EXER: HCPCS

## 2018-10-30 ENCOUNTER — HOSPITAL ENCOUNTER (OUTPATIENT)
Dept: CARDIAC REHAB | Age: 73
Setting detail: THERAPIES SERIES
Discharge: HOME | End: 2018-10-30
Payer: MEDICARE

## 2018-10-30 PROCEDURE — G0424 PULMONARY REHAB W EXER: HCPCS

## 2018-11-01 ENCOUNTER — HOSPITAL ENCOUNTER (OUTPATIENT)
Dept: CARDIAC REHAB | Age: 73
Setting detail: THERAPIES SERIES
Discharge: HOME | End: 2018-11-01
Payer: MEDICARE

## 2018-11-01 PROCEDURE — G0424 PULMONARY REHAB W EXER: HCPCS

## 2018-11-06 ENCOUNTER — HOSPITAL ENCOUNTER (OUTPATIENT)
Dept: CARDIAC REHAB | Age: 73
Setting detail: THERAPIES SERIES
Discharge: HOME | End: 2018-11-06
Payer: MEDICARE

## 2018-11-06 PROCEDURE — G0424 PULMONARY REHAB W EXER: HCPCS

## 2018-11-08 ENCOUNTER — HOSPITAL ENCOUNTER (OUTPATIENT)
Dept: CARDIAC REHAB | Age: 73
Setting detail: THERAPIES SERIES
Discharge: HOME | End: 2018-11-08
Payer: MEDICARE

## 2018-11-08 PROCEDURE — G0424 PULMONARY REHAB W EXER: HCPCS

## 2018-11-13 ENCOUNTER — HOSPITAL ENCOUNTER (OUTPATIENT)
Dept: CARDIAC REHAB | Age: 73
Setting detail: THERAPIES SERIES
Discharge: HOME | End: 2018-11-13
Payer: MEDICARE

## 2018-11-13 PROCEDURE — G0424 PULMONARY REHAB W EXER: HCPCS

## 2018-11-15 ENCOUNTER — HOSPITAL ENCOUNTER (OUTPATIENT)
Dept: CARDIAC REHAB | Age: 73
Setting detail: THERAPIES SERIES
Discharge: HOME | End: 2018-11-15
Payer: MEDICARE

## 2018-11-15 PROCEDURE — G0424 PULMONARY REHAB W EXER: HCPCS

## 2018-11-20 ENCOUNTER — HOSPITAL ENCOUNTER (OUTPATIENT)
Dept: CARDIAC REHAB | Age: 73
Setting detail: THERAPIES SERIES
Discharge: HOME | End: 2018-11-20
Payer: MEDICARE

## 2018-11-20 PROCEDURE — G0424 PULMONARY REHAB W EXER: HCPCS

## 2018-11-27 ENCOUNTER — HOSPITAL ENCOUNTER (OUTPATIENT)
Dept: CARDIAC REHAB | Age: 73
Setting detail: THERAPIES SERIES
Discharge: HOME | End: 2018-11-27
Payer: MEDICARE

## 2018-11-27 PROCEDURE — G0424 PULMONARY REHAB W EXER: HCPCS

## 2018-11-29 ENCOUNTER — HOSPITAL ENCOUNTER (OUTPATIENT)
Dept: CARDIAC REHAB | Age: 73
Setting detail: THERAPIES SERIES
Discharge: HOME | End: 2018-11-29
Payer: MEDICARE

## 2018-11-29 PROCEDURE — G0424 PULMONARY REHAB W EXER: HCPCS

## 2018-12-04 ENCOUNTER — HOSPITAL ENCOUNTER (OUTPATIENT)
Dept: CARDIAC REHAB | Age: 73
Setting detail: THERAPIES SERIES
Discharge: HOME | End: 2018-12-04
Payer: MEDICARE

## 2018-12-04 PROCEDURE — G0424 PULMONARY REHAB W EXER: HCPCS

## 2018-12-06 ENCOUNTER — HOSPITAL ENCOUNTER (OUTPATIENT)
Dept: CARDIAC REHAB | Age: 73
Setting detail: THERAPIES SERIES
Discharge: HOME | End: 2018-12-06
Payer: MEDICARE

## 2018-12-06 PROCEDURE — G0424 PULMONARY REHAB W EXER: HCPCS

## 2018-12-11 ENCOUNTER — HOSPITAL ENCOUNTER (OUTPATIENT)
Dept: CARDIAC REHAB | Age: 73
Setting detail: THERAPIES SERIES
Discharge: HOME | End: 2018-12-11
Payer: MEDICARE

## 2018-12-11 PROCEDURE — G0424 PULMONARY REHAB W EXER: HCPCS

## 2018-12-13 ENCOUNTER — HOSPITAL ENCOUNTER (OUTPATIENT)
Dept: CARDIAC REHAB | Age: 73
Setting detail: THERAPIES SERIES
Discharge: HOME | End: 2018-12-13
Payer: MEDICARE

## 2018-12-13 PROCEDURE — G0424 PULMONARY REHAB W EXER: HCPCS

## 2018-12-13 RX ORDER — LISINOPRIL 2.5 MG/1
2.5 TABLET ORAL DAILY
Qty: 90 TABLET | Refills: 0 | Status: SHIPPED | OUTPATIENT
Start: 2018-12-13 | End: 2019-03-13 | Stop reason: SDUPTHER

## 2018-12-15 NOTE — TELEPHONE ENCOUNTER
From: Tenisha Borrego  Sent: 12/13/2018 9:01 AM EST  Subject: Medication Renewal Request    Tenisha Borrego would like a refill of the following medications:     lisinopril (PRINIVIL;ZESTRIL) 2.5 MG tablet Osjosh Preciado DO]   Patient Comment: I have 10 days left of elin jorge    Preferred pharmacy: [address] [place] [address] , [address] [facility] [phone] - F [phone]
Normal for race

## 2018-12-18 ENCOUNTER — HOSPITAL ENCOUNTER (OUTPATIENT)
Dept: CARDIAC REHAB | Age: 73
Setting detail: THERAPIES SERIES
Discharge: HOME | End: 2018-12-18
Payer: MEDICARE

## 2018-12-18 PROCEDURE — G0424 PULMONARY REHAB W EXER: HCPCS

## 2018-12-20 ENCOUNTER — HOSPITAL ENCOUNTER (OUTPATIENT)
Dept: CARDIAC REHAB | Age: 73
Setting detail: THERAPIES SERIES
Discharge: HOME | End: 2018-12-20
Payer: MEDICARE

## 2018-12-20 PROCEDURE — G0424 PULMONARY REHAB W EXER: HCPCS

## 2018-12-27 ENCOUNTER — HOSPITAL ENCOUNTER (OUTPATIENT)
Dept: CARDIAC REHAB | Age: 73
Setting detail: THERAPIES SERIES
Discharge: HOME | End: 2018-12-27
Payer: MEDICARE

## 2018-12-27 PROCEDURE — G0424 PULMONARY REHAB W EXER: HCPCS

## 2019-01-03 ENCOUNTER — HOSPITAL ENCOUNTER (OUTPATIENT)
Dept: CARDIAC REHAB | Age: 74
Setting detail: THERAPIES SERIES
Discharge: HOME | End: 2019-01-03
Payer: MEDICARE

## 2019-01-03 PROCEDURE — G0424 PULMONARY REHAB W EXER: HCPCS

## 2019-01-08 ENCOUNTER — CARE COORDINATION (OUTPATIENT)
Dept: CARE COORDINATION | Age: 74
End: 2019-01-08

## 2019-01-14 ENCOUNTER — HOSPITAL ENCOUNTER (OUTPATIENT)
Age: 74
Discharge: HOME | End: 2019-01-16
Payer: MEDICARE

## 2019-01-14 ENCOUNTER — OFFICE VISIT (OUTPATIENT)
Dept: FAMILY MEDICINE CLINIC | Age: 74
End: 2019-01-14
Payer: MEDICARE

## 2019-01-14 ENCOUNTER — HOSPITAL ENCOUNTER (OUTPATIENT)
Dept: GENERAL RADIOLOGY | Age: 74
Discharge: HOME | End: 2019-01-16
Payer: MEDICARE

## 2019-01-14 VITALS
OXYGEN SATURATION: 95 % | SYSTOLIC BLOOD PRESSURE: 120 MMHG | TEMPERATURE: 97.2 F | BODY MASS INDEX: 28.98 KG/M2 | WEIGHT: 191.2 LBS | HEART RATE: 73 BPM | RESPIRATION RATE: 18 BRPM | HEIGHT: 68 IN | DIASTOLIC BLOOD PRESSURE: 72 MMHG

## 2019-01-14 DIAGNOSIS — E78.5 HYPERLIPIDEMIA WITH TARGET LDL LESS THAN 100: ICD-10-CM

## 2019-01-14 DIAGNOSIS — Z12.5 SCREENING PSA (PROSTATE SPECIFIC ANTIGEN): ICD-10-CM

## 2019-01-14 DIAGNOSIS — R73.01 IFG (IMPAIRED FASTING GLUCOSE): ICD-10-CM

## 2019-01-14 DIAGNOSIS — J18.9 PNEUMONITIS: Primary | ICD-10-CM

## 2019-01-14 DIAGNOSIS — I74.10 AORTIC MURAL THROMBUS (HCC): ICD-10-CM

## 2019-01-14 DIAGNOSIS — R53.83 FATIGUE, UNSPECIFIED TYPE: ICD-10-CM

## 2019-01-14 DIAGNOSIS — I48.0 PAF (PAROXYSMAL ATRIAL FIBRILLATION) (HCC): ICD-10-CM

## 2019-01-14 DIAGNOSIS — I71.40 AAA (ABDOMINAL AORTIC ANEURYSM) WITHOUT RUPTURE: ICD-10-CM

## 2019-01-14 DIAGNOSIS — J18.9 PNEUMONITIS: ICD-10-CM

## 2019-01-14 DIAGNOSIS — J43.9 PULMONARY EMPHYSEMA, UNSPECIFIED EMPHYSEMA TYPE (HCC): ICD-10-CM

## 2019-01-14 DIAGNOSIS — I44.2 AV BLOCK, 3RD DEGREE (HCC): ICD-10-CM

## 2019-01-14 DIAGNOSIS — I48.21 PERMANENT ATRIAL FIBRILLATION (HCC): ICD-10-CM

## 2019-01-14 LAB
ALBUMIN SERPL-MCNC: 4.4 G/DL (ref 3.5–5.2)
ALP BLD-CCNC: 103 U/L (ref 40–129)
ALT SERPL-CCNC: 14 U/L (ref 0–40)
ANION GAP SERPL CALCULATED.3IONS-SCNC: 17 MMOL/L (ref 7–16)
AST SERPL-CCNC: 19 U/L (ref 0–39)
BASOPHILS ABSOLUTE: 0.07 E9/L (ref 0–0.2)
BASOPHILS RELATIVE PERCENT: 0.8 % (ref 0–2)
BILIRUB SERPL-MCNC: 2.3 MG/DL (ref 0–1.2)
BUN BLDV-MCNC: 15 MG/DL (ref 8–23)
CALCIUM SERPL-MCNC: 9.3 MG/DL (ref 8.6–10.2)
CHLORIDE BLD-SCNC: 99 MMOL/L (ref 98–107)
CHOLESTEROL, TOTAL: 131 MG/DL (ref 0–199)
CO2: 25 MMOL/L (ref 22–29)
CREAT SERPL-MCNC: 1.2 MG/DL (ref 0.7–1.2)
EOSINOPHILS ABSOLUTE: 0.17 E9/L (ref 0.05–0.5)
EOSINOPHILS RELATIVE PERCENT: 2 % (ref 0–6)
GFR AFRICAN AMERICAN: >60
GFR NON-AFRICAN AMERICAN: 59 ML/MIN/1.73
GLUCOSE BLD-MCNC: 94 MG/DL (ref 74–99)
HBA1C MFR BLD: 5.9 % (ref 4–5.6)
HCT VFR BLD CALC: 46 % (ref 37–54)
HDLC SERPL-MCNC: 45 MG/DL
HEMOGLOBIN: 14.6 G/DL (ref 12.5–16.5)
IMMATURE GRANULOCYTES #: 0.08 E9/L
IMMATURE GRANULOCYTES %: 0.9 % (ref 0–5)
INFLUENZA A ANTIBODY: NEGATIVE
INFLUENZA B ANTIBODY: NEGATIVE
LDL CHOLESTEROL CALCULATED: 74 MG/DL (ref 0–99)
LYMPHOCYTES ABSOLUTE: 1.54 E9/L (ref 1.5–4)
LYMPHOCYTES RELATIVE PERCENT: 18.1 % (ref 20–42)
MCH RBC QN AUTO: 30.7 PG (ref 26–35)
MCHC RBC AUTO-ENTMCNC: 31.7 % (ref 32–34.5)
MCV RBC AUTO: 96.8 FL (ref 80–99.9)
MONOCYTES ABSOLUTE: 0.69 E9/L (ref 0.1–0.95)
MONOCYTES RELATIVE PERCENT: 8.1 % (ref 2–12)
NEUTROPHILS ABSOLUTE: 5.98 E9/L (ref 1.8–7.3)
NEUTROPHILS RELATIVE PERCENT: 70.1 % (ref 43–80)
PDW BLD-RTO: 17.2 FL (ref 11.5–15)
PLATELET # BLD: 180 E9/L (ref 130–450)
PMV BLD AUTO: 11.2 FL (ref 7–12)
POTASSIUM SERPL-SCNC: 4.1 MMOL/L (ref 3.5–5)
PROSTATE SPECIFIC ANTIGEN: 0.03 NG/ML (ref 0–4)
RBC # BLD: 4.75 E12/L (ref 3.8–5.8)
SODIUM BLD-SCNC: 141 MMOL/L (ref 132–146)
TOTAL PROTEIN: 8.1 G/DL (ref 6.4–8.3)
TRIGL SERPL-MCNC: 58 MG/DL (ref 0–149)
TSH SERPL DL<=0.05 MIU/L-ACNC: 2.65 UIU/ML (ref 0.27–4.2)
VLDLC SERPL CALC-MCNC: 12 MG/DL
WBC # BLD: 8.5 E9/L (ref 4.5–11.5)

## 2019-01-14 PROCEDURE — 99215 OFFICE O/P EST HI 40 MIN: CPT | Performed by: FAMILY MEDICINE

## 2019-01-14 PROCEDURE — 85025 COMPLETE CBC W/AUTO DIFF WBC: CPT

## 2019-01-14 PROCEDURE — 84443 ASSAY THYROID STIM HORMONE: CPT

## 2019-01-14 PROCEDURE — 71046 X-RAY EXAM CHEST 2 VIEWS: CPT

## 2019-01-14 PROCEDURE — 87804 INFLUENZA ASSAY W/OPTIC: CPT | Performed by: FAMILY MEDICINE

## 2019-01-14 PROCEDURE — G0103 PSA SCREENING: HCPCS

## 2019-01-14 PROCEDURE — 80061 LIPID PANEL: CPT

## 2019-01-14 PROCEDURE — 83036 HEMOGLOBIN GLYCOSYLATED A1C: CPT

## 2019-01-14 PROCEDURE — 96372 THER/PROPH/DIAG INJ SC/IM: CPT | Performed by: FAMILY MEDICINE

## 2019-01-14 PROCEDURE — 80053 COMPREHEN METABOLIC PANEL: CPT

## 2019-01-14 RX ORDER — FLUTICASONE PROPIONATE 50 MCG
1 SPRAY, SUSPENSION (ML) NASAL DAILY
Qty: 1 BOTTLE | Refills: 3 | Status: SHIPPED | OUTPATIENT
Start: 2019-01-14 | End: 2019-03-05

## 2019-01-14 RX ORDER — BENZONATATE 200 MG/1
200 CAPSULE ORAL 3 TIMES DAILY PRN
Qty: 30 CAPSULE | Refills: 0 | Status: SHIPPED | OUTPATIENT
Start: 2019-01-14 | End: 2019-01-21

## 2019-01-14 RX ORDER — METHYLPREDNISOLONE 4 MG/1
TABLET ORAL
Qty: 1 KIT | Refills: 0 | Status: SHIPPED | OUTPATIENT
Start: 2019-01-14 | End: 2019-01-20

## 2019-01-14 RX ORDER — CEFTRIAXONE 500 MG/1
500 INJECTION, POWDER, FOR SOLUTION INTRAMUSCULAR; INTRAVENOUS ONCE
Status: COMPLETED | OUTPATIENT
Start: 2019-01-14 | End: 2019-01-14

## 2019-01-14 RX ADMIN — CEFTRIAXONE 500 MG: 500 INJECTION, POWDER, FOR SOLUTION INTRAMUSCULAR; INTRAVENOUS at 11:17

## 2019-01-14 ASSESSMENT — ENCOUNTER SYMPTOMS
PHOTOPHOBIA: 0
SHORTNESS OF BREATH: 1
RECTAL PAIN: 0
ABDOMINAL PAIN: 0
HEMOPTYSIS: 0
ALLERGIC/IMMUNOLOGIC NEGATIVE: 1
HOARSE VOICE: 0
GASTROINTESTINAL NEGATIVE: 1
FACIAL SWELLING: 0
EYE REDNESS: 0
CONSTIPATION: 0
APNEA: 0
VOICE CHANGE: 0
TROUBLE SWALLOWING: 0
RHINORRHEA: 0
COLOR CHANGE: 0
EYE PAIN: 0
COUGH: 1
SWOLLEN GLANDS: 0
VOMITING: 0
STRIDOR: 0
BLOOD IN STOOL: 0
ANAL BLEEDING: 0
ORTHOPNEA: 1
EYE ITCHING: 0
CHOKING: 0
BACK PAIN: 0
SPUTUM PRODUCTION: 0
EYE DISCHARGE: 0
SORE THROAT: 1
WHEEZING: 0
SINUS PRESSURE: 1
CHEST TIGHTNESS: 0
ABDOMINAL DISTENTION: 0
BLURRED VISION: 0

## 2019-01-14 ASSESSMENT — PATIENT HEALTH QUESTIONNAIRE - PHQ9
SUM OF ALL RESPONSES TO PHQ QUESTIONS 1-9: 2
1. LITTLE INTEREST OR PLEASURE IN DOING THINGS: 1
SUM OF ALL RESPONSES TO PHQ9 QUESTIONS 1 & 2: 2
2. FEELING DOWN, DEPRESSED OR HOPELESS: 1
SUM OF ALL RESPONSES TO PHQ QUESTIONS 1-9: 2

## 2019-01-29 ENCOUNTER — OFFICE VISIT (OUTPATIENT)
Dept: VASCULAR SURGERY | Age: 74
End: 2019-01-29
Payer: MEDICARE

## 2019-01-29 DIAGNOSIS — Z95.828 S/P AAA REPAIR USING BIFURCATION GRAFT: Primary | ICD-10-CM

## 2019-01-29 DIAGNOSIS — Z86.79 S/P AAA REPAIR USING BIFURCATION GRAFT: Primary | ICD-10-CM

## 2019-01-29 PROCEDURE — 99213 OFFICE O/P EST LOW 20 MIN: CPT | Performed by: NURSE PRACTITIONER

## 2019-02-19 ENCOUNTER — TELEPHONE (OUTPATIENT)
Dept: CASE MANAGEMENT | Age: 74
End: 2019-02-19

## 2019-03-05 ENCOUNTER — NURSE ONLY (OUTPATIENT)
Dept: CARDIOLOGY CLINIC | Age: 74
End: 2019-03-05
Payer: MEDICARE

## 2019-03-05 ENCOUNTER — OFFICE VISIT (OUTPATIENT)
Dept: CARDIOLOGY CLINIC | Age: 74
End: 2019-03-05
Payer: MEDICARE

## 2019-03-05 VITALS
WEIGHT: 188 LBS | BODY MASS INDEX: 28.49 KG/M2 | SYSTOLIC BLOOD PRESSURE: 114 MMHG | DIASTOLIC BLOOD PRESSURE: 64 MMHG | HEART RATE: 71 BPM | HEIGHT: 68 IN

## 2019-03-05 DIAGNOSIS — Z86.79 STATUS POST PERCUTANEOUS ABDOMINAL AORTIC ANEURYSM (AAA) REPAIR: ICD-10-CM

## 2019-03-05 DIAGNOSIS — Z95.0 PACEMAKER: Primary | ICD-10-CM

## 2019-03-05 DIAGNOSIS — Z95.2 S/P AVR (AORTIC VALVE REPLACEMENT): ICD-10-CM

## 2019-03-05 DIAGNOSIS — J43.9 PULMONARY EMPHYSEMA, UNSPECIFIED EMPHYSEMA TYPE (HCC): ICD-10-CM

## 2019-03-05 DIAGNOSIS — I44.2 AV BLOCK, 3RD DEGREE (HCC): ICD-10-CM

## 2019-03-05 DIAGNOSIS — Z87.74 H/O BICUSPID AORTIC VALVE: ICD-10-CM

## 2019-03-05 DIAGNOSIS — E78.5 HYPERLIPIDEMIA WITH TARGET LDL LESS THAN 100: ICD-10-CM

## 2019-03-05 DIAGNOSIS — C61 PROSTATE CA (HCC): ICD-10-CM

## 2019-03-05 DIAGNOSIS — Z98.890 STATUS POST PERCUTANEOUS ABDOMINAL AORTIC ANEURYSM (AAA) REPAIR: ICD-10-CM

## 2019-03-05 DIAGNOSIS — Z87.891 HISTORY OF SMOKING 30 OR MORE PACK YEARS: ICD-10-CM

## 2019-03-05 DIAGNOSIS — Z79.01 ANTICOAGULATED BY ANTICOAGULATION TREATMENT: ICD-10-CM

## 2019-03-05 DIAGNOSIS — I25.10 CAD IN NATIVE ARTERY: ICD-10-CM

## 2019-03-05 DIAGNOSIS — E80.6 HYPERBILIRUBINEMIA: ICD-10-CM

## 2019-03-05 DIAGNOSIS — I48.21 PERMANENT ATRIAL FIBRILLATION (HCC): ICD-10-CM

## 2019-03-05 DIAGNOSIS — F41.9 ANXIETY: ICD-10-CM

## 2019-03-05 DIAGNOSIS — Z95.1 HX OF CABG: Primary | ICD-10-CM

## 2019-03-05 DIAGNOSIS — Z95.0 PACEMAKER: ICD-10-CM

## 2019-03-05 PROCEDURE — 99213 OFFICE O/P EST LOW 20 MIN: CPT | Performed by: INTERNAL MEDICINE

## 2019-03-05 PROCEDURE — 93280 PM DEVICE PROGR EVAL DUAL: CPT | Performed by: INTERNAL MEDICINE

## 2019-03-13 RX ORDER — LISINOPRIL 2.5 MG/1
TABLET ORAL
Qty: 90 TABLET | Refills: 0 | Status: SHIPPED | OUTPATIENT
Start: 2019-03-13 | End: 2019-06-11 | Stop reason: SDUPTHER

## 2019-03-18 ENCOUNTER — HOSPITAL ENCOUNTER (OUTPATIENT)
Age: 74
Discharge: HOME | End: 2019-03-18
Payer: MEDICARE

## 2019-03-18 LAB — PROSTATE SPECIFIC ANTIGEN: 0.03 NG/ML (ref 0–4)

## 2019-03-18 PROCEDURE — 36415 COLL VENOUS BLD VENIPUNCTURE: CPT

## 2019-03-18 PROCEDURE — 84153 ASSAY OF PSA TOTAL: CPT

## 2019-03-27 DIAGNOSIS — I48.0 PAROXYSMAL A-FIB (HCC): ICD-10-CM

## 2019-03-27 RX ORDER — METOPROLOL SUCCINATE 25 MG/1
TABLET, EXTENDED RELEASE ORAL
Qty: 90 TABLET | Refills: 0 | Status: SHIPPED | OUTPATIENT
Start: 2019-03-27 | End: 2019-06-25 | Stop reason: SDUPTHER

## 2019-06-11 RX ORDER — LISINOPRIL 2.5 MG/1
TABLET ORAL
Qty: 90 TABLET | Refills: 0 | Status: SHIPPED | OUTPATIENT
Start: 2019-06-11 | End: 2019-12-03 | Stop reason: SDUPTHER

## 2019-07-03 DIAGNOSIS — I71.40 ABDOMINAL AORTIC ANEURYSM WITHOUT RUPTURE: Primary | ICD-10-CM

## 2019-07-03 DIAGNOSIS — Z95.828 S/P AAA REPAIR USING BIFURCATION GRAFT: ICD-10-CM

## 2019-07-03 DIAGNOSIS — Z86.79 S/P AAA REPAIR USING BIFURCATION GRAFT: ICD-10-CM

## 2019-07-22 ENCOUNTER — HOSPITAL ENCOUNTER (OUTPATIENT)
Age: 74
Discharge: HOME | End: 2019-07-22
Payer: MEDICARE

## 2019-07-22 DIAGNOSIS — I71.40 ABDOMINAL AORTIC ANEURYSM WITHOUT RUPTURE: ICD-10-CM

## 2019-07-22 DIAGNOSIS — Z86.79 S/P AAA REPAIR USING BIFURCATION GRAFT: ICD-10-CM

## 2019-07-22 DIAGNOSIS — Z95.828 S/P AAA REPAIR USING BIFURCATION GRAFT: ICD-10-CM

## 2019-07-22 LAB
ANION GAP SERPL CALCULATED.3IONS-SCNC: 11 MMOL/L (ref 7–16)
BUN BLDV-MCNC: 18 MG/DL (ref 8–23)
CALCIUM SERPL-MCNC: 9.2 MG/DL (ref 8.6–10.2)
CHLORIDE BLD-SCNC: 107 MMOL/L (ref 98–107)
CO2: 27 MMOL/L (ref 22–29)
CREAT SERPL-MCNC: 1.4 MG/DL (ref 0.7–1.2)
GFR AFRICAN AMERICAN: 60
GFR NON-AFRICAN AMERICAN: 49 ML/MIN/1.73
GLUCOSE BLD-MCNC: 99 MG/DL (ref 74–99)
POTASSIUM SERPL-SCNC: 3.9 MMOL/L (ref 3.5–5)
SODIUM BLD-SCNC: 145 MMOL/L (ref 132–146)

## 2019-07-22 PROCEDURE — 80048 BASIC METABOLIC PNL TOTAL CA: CPT

## 2019-07-22 PROCEDURE — 36415 COLL VENOUS BLD VENIPUNCTURE: CPT

## 2019-07-30 ENCOUNTER — TELEPHONE (OUTPATIENT)
Dept: VASCULAR SURGERY | Age: 74
End: 2019-07-30

## 2019-07-30 ENCOUNTER — OFFICE VISIT (OUTPATIENT)
Dept: VASCULAR SURGERY | Age: 74
End: 2019-07-30
Payer: MEDICARE

## 2019-07-30 DIAGNOSIS — I71.40 ABDOMINAL AORTIC ANEURYSM WITHOUT RUPTURE: Primary | ICD-10-CM

## 2019-07-30 DIAGNOSIS — Z86.79 S/P AAA REPAIR USING BIFURCATION GRAFT: ICD-10-CM

## 2019-07-30 DIAGNOSIS — Z95.828 S/P AAA REPAIR USING BIFURCATION GRAFT: ICD-10-CM

## 2019-07-30 PROCEDURE — 99213 OFFICE O/P EST LOW 20 MIN: CPT | Performed by: SURGERY

## 2019-07-30 NOTE — PROGRESS NOTES
2.5 MG tablet TAKE 1 TABLET DAILY 19  Yes Rudolph Puff Catterlin, DO   furosemide (LASIX) 40 MG tablet TAKE 1 TABLET TWICE A DAY 19  Yes Rudolph Puff Catterlin, DO   atorvastatin (LIPITOR) 10 MG tablet TAKE 1 TABLET DAILY 19  Yes Hermelindo Mauro, DO   TUDORZA PRESSAIR 400 MCG/ACT AEPB inhaler USE 1 INHALATION TWICE A DAY 18  Yes Rudolphhailey Mauro, DO   SYMBICORT 160-4.5 MCG/ACT AERO USE 2 INHALATIONS TWICE A DAY 18  Yes Rudolphhailey Husain Catterfarooq, DO   ipratropium-albuterol (DUONEB) 0.5-2.5 (3) MG/3ML SOLN nebulizer solution Take 3 mLs by nebulization every 4 hours 17  Yes Hermelindo Bedolla DO   nitroGLYCERIN (NITROSTAT) 0.3 MG SL tablet Place 1 tablet under the tongue every 5 minutes as needed for Chest pain 17  Yes Hermelindo Bedolla DO   aspirin EC 81 MG EC tablet Take 81 mg by mouth daily   Yes Historical Provider, MD     Allergies:  Pneumovax [pneumococcal polysaccharide vaccine]    Social History     Socioeconomic History    Marital status:      Spouse name: Not on file    Number of children: Not on file    Years of education: Not on file    Highest education level: Not on file   Occupational History    Occupation: [employer]   Social Needs    Financial resource strain: Not on file    Food insecurity:     Worry: Not on file     Inability: Not on file    Transportation needs:     Medical: Not on file     Non-medical: Not on file   Tobacco Use    Smoking status: Former Smoker     Packs/day: 0.50     Years: 50.00     Pack years: 25.00     Types: Cigarettes     Last attempt to quit: 2014     Years since quittin.1    Smokeless tobacco: Never Used   Substance and Sexual Activity    Alcohol use:  Yes     Alcohol/week: 0.0 standard drinks     Comment: rare;  drinks 1 cup of coffee daily & occ Pepsi/Tea    Drug use: No    Sexual activity: Not on file   Lifestyle    Physical activity:     Days per week: Not on file     Minutes per session: Not on file

## 2019-08-02 ENCOUNTER — HOSPITAL ENCOUNTER (OUTPATIENT)
Dept: CT IMAGING | Age: 74
Discharge: HOME | End: 2019-08-02
Payer: MEDICARE

## 2019-08-02 DIAGNOSIS — Z95.828 S/P AAA REPAIR USING BIFURCATION GRAFT: ICD-10-CM

## 2019-08-02 DIAGNOSIS — Z86.79 S/P AAA REPAIR USING BIFURCATION GRAFT: ICD-10-CM

## 2019-08-02 PROCEDURE — 74174 CTA ABD&PLVS W/CONTRAST: CPT

## 2019-08-02 PROCEDURE — 6360000004 HC RX CONTRAST MEDICATION: Performed by: RADIOLOGY

## 2019-08-02 RX ADMIN — IOPAMIDOL 80 ML: 755 INJECTION, SOLUTION INTRAVENOUS at 11:44

## 2019-08-22 ENCOUNTER — TELEPHONE (OUTPATIENT)
Dept: VASCULAR SURGERY | Age: 74
End: 2019-08-22

## 2019-08-28 ENCOUNTER — OFFICE VISIT (OUTPATIENT)
Dept: FAMILY MEDICINE CLINIC | Age: 74
End: 2019-08-28
Payer: MEDICARE

## 2019-08-28 ENCOUNTER — TELEPHONE (OUTPATIENT)
Dept: FAMILY MEDICINE CLINIC | Age: 74
End: 2019-08-28

## 2019-08-28 VITALS
OXYGEN SATURATION: 98 % | HEART RATE: 81 BPM | BODY MASS INDEX: 28.01 KG/M2 | HEIGHT: 68 IN | TEMPERATURE: 98.2 F | WEIGHT: 184.8 LBS | SYSTOLIC BLOOD PRESSURE: 118 MMHG | DIASTOLIC BLOOD PRESSURE: 72 MMHG

## 2019-08-28 DIAGNOSIS — R73.01 IFG (IMPAIRED FASTING GLUCOSE): ICD-10-CM

## 2019-08-28 DIAGNOSIS — E78.5 DYSLIPIDEMIA: ICD-10-CM

## 2019-08-28 DIAGNOSIS — I10 ESSENTIAL HYPERTENSION: ICD-10-CM

## 2019-08-28 DIAGNOSIS — Z00.00 ENCOUNTER FOR MEDICARE ANNUAL WELLNESS EXAM: Primary | ICD-10-CM

## 2019-08-28 DIAGNOSIS — Z00.00 ROUTINE GENERAL MEDICAL EXAMINATION AT A HEALTH CARE FACILITY: ICD-10-CM

## 2019-08-28 DIAGNOSIS — R19.7 DIARRHEA, UNSPECIFIED TYPE: ICD-10-CM

## 2019-08-28 PROCEDURE — G0438 PPPS, INITIAL VISIT: HCPCS | Performed by: FAMILY MEDICINE

## 2019-08-28 RX ORDER — DICYCLOMINE HYDROCHLORIDE 10 MG/1
10 CAPSULE ORAL 4 TIMES DAILY
Qty: 30 CAPSULE | Refills: 0 | Status: ON HOLD | OUTPATIENT
Start: 2019-08-28 | End: 2019-09-17

## 2019-08-28 ASSESSMENT — LIFESTYLE VARIABLES
HOW MANY STANDARD DRINKS CONTAINING ALCOHOL DO YOU HAVE ON A TYPICAL DAY: 0
HOW OFTEN DURING THE LAST YEAR HAVE YOU FAILED TO DO WHAT WAS NORMALLY EXPECTED FROM YOU BECAUSE OF DRINKING: 0
HOW OFTEN DO YOU HAVE SIX OR MORE DRINKS ON ONE OCCASION: 0
HOW OFTEN DURING THE LAST YEAR HAVE YOU BEEN UNABLE TO REMEMBER WHAT HAPPENED THE NIGHT BEFORE BECAUSE YOU HAD BEEN DRINKING: 0
AUDIT TOTAL SCORE: 2
HOW OFTEN DURING THE LAST YEAR HAVE YOU NEEDED AN ALCOHOLIC DRINK FIRST THING IN THE MORNING TO GET YOURSELF GOING AFTER A NIGHT OF HEAVY DRINKING: 0
HAS A RELATIVE, FRIEND, DOCTOR, OR ANOTHER HEALTH PROFESSIONAL EXPRESSED CONCERN ABOUT YOUR DRINKING OR SUGGESTED YOU CUT DOWN: 0
HOW OFTEN DURING THE LAST YEAR HAVE YOU FOUND THAT YOU WERE NOT ABLE TO STOP DRINKING ONCE YOU HAD STARTED: 0
HAVE YOU OR SOMEONE ELSE BEEN INJURED AS A RESULT OF YOUR DRINKING: 0
AUDIT-C TOTAL SCORE: 2
HOW OFTEN DURING THE LAST YEAR HAVE YOU HAD A FEELING OF GUILT OR REMORSE AFTER DRINKING: 0
HOW OFTEN DO YOU HAVE A DRINK CONTAINING ALCOHOL: 2

## 2019-08-28 ASSESSMENT — PATIENT HEALTH QUESTIONNAIRE - PHQ9
SUM OF ALL RESPONSES TO PHQ QUESTIONS 1-9: 1
SUM OF ALL RESPONSES TO PHQ QUESTIONS 1-9: 1

## 2019-08-28 NOTE — PROGRESS NOTES
Medicare Annual Wellness Visit  Name: Evelina Cannon Date: 2019   MRN: <[id number]> Sex: Male   Age: 74 y.o. Ethnicity: Non-/Non    : 1945 Race: Samantha Patel is here for Medicare AWV (here for wellness exam) and URI (c/o sore throat; cough-started 2 days ago; N/D; headache)    Screenings for behavioral, psychosocial and functional/safety risks, and cognitive dysfunction are all negative except as indicated below. These results, as well as other patient data from the [address] form, are documented in Flowsheets linked to this Encounter. Allergies   Allergen Reactions    Pneumovax [Pneumococcal Polysaccharide Vaccine] Dermatitis     Prior to Visit Medications    Medication Sig Taking? Authorizing Provider   dicyclomine (BENTYL) 10 MG capsule Take 1 capsule by mouth 4 times daily Yes Hermelindo Bedolla DO   apixaban (ELIQUIS) 5 MG TABS tablet TAKE ONE TABLET BY MOUTH TWICE A DAY Yes Hermelindo Bedolla DO   metoprolol succinate (TOPROL XL) 25 MG extended release tablet Take 1 tablet daily. Appointment required for further refills.  Yes Susana Bedolla DO   lisinopril (PRINIVIL;ZESTRIL) 2.5 MG tablet TAKE 1 TABLET DAILY Yes Hermelindo Bedolla DO   furosemide (LASIX) 40 MG tablet TAKE 1 TABLET TWICE A DAY Yes Susana Bedolla DO   atorvastatin (LIPITOR) 10 MG tablet TAKE 1 TABLET DAILY Yes Hermelindo Bedolla DO   TUDORZA PRESSAIR 400 MCG/ACT AEPB inhaler USE 1 INHALATION TWICE A DAY Yes Hermelindo Peña,    SYMBICORT 160-4.5 MCG/ACT AERO USE 2 INHALATIONS TWICE A DAY Yes Susana Bedolla DO   ipratropium-albuterol (DUONEB) 0.5-2.5 (3) MG/3ML SOLN nebulizer solution Take 3 mLs by nebulization every 4 hours Yes Hermelindo Bedolla DO   nitroGLYCERIN (NITROSTAT) 0.3 MG SL tablet Place 1 tablet under the tongue every 5 minutes as needed for Chest pain Yes Hermelindo Bedolla DO   aspirin EC 81 MG EC tablet Take 81 mg by mouth daily Yes Historical Provider, MD     Past Medical History:   Diagnosis Date    Aortic stenosis, mild     Aortic valve sclerosis     moderate    Atrial fibrillation ([facility] [address] 2014    CAD (coronary artery disease)     COPD (chronic obstructive pulmonary disease) ([facility] [address]     Gallbladder attack     History of tobacco use 9/17/2015    Hyperlipidemia     Hypertension     Mild mitral regurgitation     Prostate cancer ([facility] [address]     44 treatments of radiation    PVD (peripheral vascular disease) with claudication ([facility] [address] 9/17/2015    RBBB     Tobacco abuse      Past Surgical History:   Procedure Laterality Date    APPENDECTOMY      CARDIAC CATHETERIZATION  8/8/2014    DR Isadora Blair    COLONOSCOPY      CORONARY ARTERY BYPASS GRAFT  08/11/2014    CABGx3 LIMA-LAD, SVG-D1, SVG-OM1, AVR Dr. Maddy Craig Left 8-    Dr. Daphney Brush RS&I N/A 7/16/2018    ENDOVASCULAR  AORTIC ANEURYSM REPAIR , ABDOMINAL AORTIC ANEURYSM, WITH FINISTRATED GRAFT performed by Eleni Cogan, MD at [facility] OR     Family History   Problem Relation Age of Onset    Heart Attack Brother     Stroke Father        CareTeam (Including outside providers/suppliers regularly involved in providing care):   Patient Care Team:  Neomi Carrel, DO as PCP - General  Hermelindo Francisca Aaron DO as PCP - [facility]  Reagan Barney DO as Surgeon (Cardiothoracic Surgery)  Florence Rawls MD as Consulting Physician (Cardiology)    Wt Readings from Last 3 Encounters:   08/28/19 184 lb 12.8 oz (83.8 kg)   03/05/19 188 lb (85.3 kg)   01/14/19 191 lb 3.2 oz (86.7 kg)     Vitals:    08/28/19 1001   BP: 118/72   Pulse: 81   Temp: 98.2 °F (36.8 °C)   SpO2: 98%   Weight: 184 lb 12.8 oz (83.8 kg)   Height: 5' 8\" (1.727 m)     Body mass index is 28.1 kg/m².     Based upon direct observation of the patient, evaluation of cognition reveals recent and remote memory intact. General Appearance: alert and oriented to person, place and time, well-developed and well-nourished, in no acute distress  Skin: multiple facial lesions, he sees a dermatologist   Head: normocephalic and atraumatic  Eyes: pupils equal, round, and reactive to light, extraocular eye movements intact, conjunctivae normal  ENT: tympanic membrane, external ear and ear canal normal bilaterally, oropharynx clear and moist with normal mucous membranes, hearing grossly normal bilaterally and nose without deformity, nasal mucosa and turbinates normal without polyps  Neck: neck supple and non tender without mass, no thyromegaly or thyroid nodules, no cervical lymphadenopathy and neck supple and non tender without mass   Pulmonary/Chest: decreased breath sounds noted- all fields both right and left   Cardiovascular: irregularly irregular rhythm noted  Abdomen: soft, non-tender, non-distended, normal bowel sounds, no masses or organomegaly  Genitourinary: genitals normal without hernia or inguinal adenopathy, H/O prostate cancer  Extremities: no cyanosis and no clubbing  Musculoskeletal: normal range of motion, no joint swelling, deformity or tenderness  Neurologic: gait and coordination normal and speech normal    Patient's complete Health Risk Assessment and screening values have been reviewed and are found in Flowsheets. The following problems were reviewed today and where indicated follow up appointments were made and/or referrals ordered. Positive Risk Factor Screenings with Interventions:     General Health:  General  In general, how would you say your health is?: Good  In the past 7 days, have you experienced any of the following?  New or Increased Pain, New or Increased Fatigue, Loneliness, Social Isolation, Stress or Anger?: (!) New or Increased Fatigue  Do you get the social and emotional support that you need?: Yes  Do you have a Living Will?: Yes  General Health Risk Interventions:  · his valdemar gs and breathing are doing much better. Over all health has improved and he feels much better    Health Habits/Nutrition:  Health Habits/Nutrition  Do you exercise for at least 20 minutes 2-3 times per week?: (!) No  Have you lost any weight without trying in the past 3 months?: (!) Yes  Do you eat fewer than 2 meals per day?: (!) Yes  Have you seen a dentist within the past year?: Yes  Body mass index is 28.1 kg/m².   Health Habits/Nutrition Interventions:  · no acute issues    Hearing/Vision:  No exam data present  Hearing/Vision  Do you or your family notice any trouble with your hearing?: (!) Yes  Do you have difficulty driving, watching TV, or doing any of your daily activities because of your eyesight?: (!) Yes  Have you had an eye exam within the past year?: Yes  Hearing/Vision Interventions:  · no acute issues    Safety:  Safety  Do you have working smoke detectors?: Yes  Have all throw rugs been removed or fastened?: (!) No  Do you have non-slip mats or surfaces in all bathtubs/showers?: Yes  Do all of your stairways have a railing or banister?: Yes  Are your doorways, halls and stairs free of clutter?: Yes  Do you always fasten your seatbelt when you are in a car?: Yes  Safety Interventions:  · Home safety tips provided    Personalized Preventive Plan   Current Health Maintenance Status  Immunization History   Administered Date(s) Administered    Influenza Vaccine, unspecified formulation 09/15/2016    Influenza Virus Vaccine 10/01/2014, 10/19/2017    Influenza Whole 10/19/2015    Influenza, High Dose (Fluzone 65 yrs and older) 10/02/2018    Pneumococcal Conjugate 13-valent (Ozella Creamer) 11/24/2015    Pneumococcal Polysaccharide ([id number]) 07/18/2017        Health Maintenance   Topic Date Due    DTaP/Tdap/Td vaccine (1 - Tdap) 11/16/1962    Shingles Vaccine (1 of 2) 11/16/1993    Annual Wellness Visit (AWV)  11/16/2006    Flu vaccine (1) 09/01/2019    A1C test (Diabetic

## 2019-08-28 NOTE — PATIENT INSTRUCTIONS
Patient Education        Well Visit, Over 72: Care Instructions  Your Care Instructions    Physical exams can help you stay healthy. Your doctor has checked your overall health and may have suggested ways to take good care of yourself. He or she also may have recommended tests. At home, you can help prevent illness with healthy eating, regular exercise, and other steps. Follow-up care is a key part of your treatment and safety. Be sure to make and go to all appointments, and call your doctor if you are having problems. It's also a good idea to know your test results and keep a list of the medicines you take. How can you care for yourself at home? · Reach and stay at a healthy weight. This will lower your risk for many problems, such as obesity, diabetes, heart disease, and high blood pressure. · Get at least 30 minutes of exercise on most days of the week. Walking is a good choice. You also may want to do other activities, such as running, swimming, cycling, or playing tennis or team sports. · Do not smoke. Smoking can make health problems worse. If you need help quitting, talk to your doctor about stop-smoking programs and medicines. These can increase your chances of quitting for good. · Protect your skin from too much sun. When you're outdoors from 10 a.m. to 4 p.m., stay in the shade or cover up with clothing and a hat with a wide brim. Wear sunglasses that block UV rays. Even when it's cloudy, put broad-spectrum sunscreen (SPF 30 or higher) on any exposed skin. · See a dentist one or two times a year for checkups and to have your teeth cleaned. · Wear a seat belt in the car. · Limit alcohol to 2 drinks a day for men and 1 drink a day for women. Too much alcohol can cause health problems. Follow your doctor's advice about when to have certain tests. These tests can spot problems early. For men and women  · Cholesterol.  Your doctor will tell you how often to have this done based on your overall

## 2019-09-03 ENCOUNTER — HOSPITAL ENCOUNTER (OUTPATIENT)
Dept: ULTRASOUND IMAGING | Age: 74
Discharge: HOME | End: 2019-09-03
Payer: MEDICARE

## 2019-09-03 DIAGNOSIS — Z00.00 ENCOUNTER FOR MEDICARE ANNUAL WELLNESS EXAM: ICD-10-CM

## 2019-09-03 PROCEDURE — 76775 US EXAM ABDO BACK WALL LIM: CPT

## 2019-09-16 ENCOUNTER — TELEPHONE (OUTPATIENT)
Dept: FAMILY MEDICINE CLINIC | Age: 74
End: 2019-09-16

## 2019-09-16 ENCOUNTER — APPOINTMENT (OUTPATIENT)
Dept: CT IMAGING | Age: 74
DRG: 871 | End: 2019-09-16
Payer: MEDICARE

## 2019-09-16 ENCOUNTER — APPOINTMENT (OUTPATIENT)
Dept: GENERAL RADIOLOGY | Age: 74
DRG: 871 | End: 2019-09-16
Payer: MEDICARE

## 2019-09-16 ENCOUNTER — HOSPITAL ENCOUNTER (INPATIENT)
Age: 74
LOS: 7 days | Discharge: NURSING, REHAB OR LONG-TERM CARE | DRG: 871 | End: 2019-09-24
Attending: EMERGENCY MEDICINE | Admitting: INTERNAL MEDICINE
Payer: MEDICARE

## 2019-09-16 DIAGNOSIS — J18.9 PNEUMONIA DUE TO ORGANISM: ICD-10-CM

## 2019-09-16 DIAGNOSIS — I50.21 ACUTE SYSTOLIC CONGESTIVE HEART FAILURE (HCC): ICD-10-CM

## 2019-09-16 DIAGNOSIS — K74.60 HEPATIC CIRRHOSIS, UNSPECIFIED HEPATIC CIRRHOSIS TYPE, UNSPECIFIED WHETHER ASCITES PRESENT (HCC): Primary | ICD-10-CM

## 2019-09-16 LAB
ALBUMIN SERPL-MCNC: 3.6 G/DL (ref 3.5–5.2)
ALP BLD-CCNC: 117 U/L (ref 40–129)
ALT SERPL-CCNC: 17 U/L (ref 0–40)
AMMONIA: <10 UMOL/L (ref 16–60)
ANION GAP SERPL CALCULATED.3IONS-SCNC: 17 MMOL/L (ref 7–16)
AST SERPL-CCNC: 34 U/L (ref 0–39)
BASOPHILS ABSOLUTE: 0.09 E9/L (ref 0–0.2)
BASOPHILS RELATIVE PERCENT: 0.6 % (ref 0–2)
BILIRUB SERPL-MCNC: 4.9 MG/DL (ref 0–1.2)
BILIRUBIN DIRECT: 2.3 MG/DL (ref 0–0.3)
BILIRUBIN, INDIRECT: 2.6 MG/DL (ref 0–1)
BUN BLDV-MCNC: 16 MG/DL (ref 8–23)
CALCIUM SERPL-MCNC: 9.3 MG/DL (ref 8.6–10.2)
CHLORIDE BLD-SCNC: 95 MMOL/L (ref 98–107)
CO2: 25 MMOL/L (ref 22–29)
CREAT SERPL-MCNC: 1.2 MG/DL (ref 0.7–1.2)
EOSINOPHILS ABSOLUTE: 0.01 E9/L (ref 0.05–0.5)
EOSINOPHILS RELATIVE PERCENT: 0.1 % (ref 0–6)
GFR AFRICAN AMERICAN: >60
GFR NON-AFRICAN AMERICAN: 59 ML/MIN/1.73
GLUCOSE BLD-MCNC: 111 MG/DL (ref 74–99)
HCT VFR BLD CALC: 43.8 % (ref 37–54)
HEMOGLOBIN: 15 G/DL (ref 12.5–16.5)
IMMATURE GRANULOCYTES #: 0.12 E9/L
IMMATURE GRANULOCYTES %: 0.8 % (ref 0–5)
LACTIC ACID: 2.2 MMOL/L (ref 0.5–2.2)
LIPASE: 19 U/L (ref 13–60)
LYMPHOCYTES ABSOLUTE: 0.94 E9/L (ref 1.5–4)
LYMPHOCYTES RELATIVE PERCENT: 6 % (ref 20–42)
MCH RBC QN AUTO: 32.6 PG (ref 26–35)
MCHC RBC AUTO-ENTMCNC: 34.2 % (ref 32–34.5)
MCV RBC AUTO: 95.2 FL (ref 80–99.9)
MONOCYTES ABSOLUTE: 0.85 E9/L (ref 0.1–0.95)
MONOCYTES RELATIVE PERCENT: 5.4 % (ref 2–12)
NEUTROPHILS ABSOLUTE: 13.65 E9/L (ref 1.8–7.3)
NEUTROPHILS RELATIVE PERCENT: 87.1 % (ref 43–80)
PDW BLD-RTO: 15 FL (ref 11.5–15)
PLATELET # BLD: 227 E9/L (ref 130–450)
PMV BLD AUTO: 10.3 FL (ref 7–12)
POTASSIUM REFLEX MAGNESIUM: 3.7 MMOL/L (ref 3.5–5)
RBC # BLD: 4.6 E12/L (ref 3.8–5.8)
SODIUM BLD-SCNC: 137 MMOL/L (ref 132–146)
TOTAL PROTEIN: 8.3 G/DL (ref 6.4–8.3)
TROPONIN: 0.03 NG/ML (ref 0–0.03)
WBC # BLD: 15.7 E9/L (ref 4.5–11.5)

## 2019-09-16 PROCEDURE — 83605 ASSAY OF LACTIC ACID: CPT

## 2019-09-16 PROCEDURE — 84484 ASSAY OF TROPONIN QUANT: CPT

## 2019-09-16 PROCEDURE — 99285 EMERGENCY DEPT VISIT HI MDM: CPT

## 2019-09-16 PROCEDURE — 2700000000 HC OXYGEN THERAPY PER DAY

## 2019-09-16 PROCEDURE — 71045 X-RAY EXAM CHEST 1 VIEW: CPT

## 2019-09-16 PROCEDURE — 82140 ASSAY OF AMMONIA: CPT

## 2019-09-16 PROCEDURE — 6360000002 HC RX W HCPCS: Performed by: STUDENT IN AN ORGANIZED HEALTH CARE EDUCATION/TRAINING PROGRAM

## 2019-09-16 PROCEDURE — 74177 CT ABD & PELVIS W/CONTRAST: CPT

## 2019-09-16 PROCEDURE — 93005 ELECTROCARDIOGRAM TRACING: CPT | Performed by: STUDENT IN AN ORGANIZED HEALTH CARE EDUCATION/TRAINING PROGRAM

## 2019-09-16 PROCEDURE — 2580000003 HC RX 258: Performed by: STUDENT IN AN ORGANIZED HEALTH CARE EDUCATION/TRAINING PROGRAM

## 2019-09-16 PROCEDURE — 2500000003 HC RX 250 WO HCPCS: Performed by: STUDENT IN AN ORGANIZED HEALTH CARE EDUCATION/TRAINING PROGRAM

## 2019-09-16 PROCEDURE — 83690 ASSAY OF LIPASE: CPT

## 2019-09-16 PROCEDURE — 36415 COLL VENOUS BLD VENIPUNCTURE: CPT

## 2019-09-16 PROCEDURE — 80048 BASIC METABOLIC PNL TOTAL CA: CPT

## 2019-09-16 PROCEDURE — 96368 THER/DIAG CONCURRENT INF: CPT

## 2019-09-16 PROCEDURE — 85025 COMPLETE CBC W/AUTO DIFF WBC: CPT

## 2019-09-16 PROCEDURE — 80076 HEPATIC FUNCTION PANEL: CPT

## 2019-09-16 PROCEDURE — 87040 BLOOD CULTURE FOR BACTERIA: CPT

## 2019-09-16 PROCEDURE — 96365 THER/PROPH/DIAG IV INF INIT: CPT

## 2019-09-16 PROCEDURE — 6360000004 HC RX CONTRAST MEDICATION: Performed by: RADIOLOGY

## 2019-09-16 RX ADMIN — IOPAMIDOL 80 ML: 755 INJECTION, SOLUTION INTRAVENOUS at 21:47

## 2019-09-16 RX ADMIN — SODIUM CHLORIDE 1000 ML: 900 INJECTION, SOLUTION INTRAVENOUS at 20:50

## 2019-09-16 RX ADMIN — DOXYCYCLINE 100 MG: 100 INJECTION, POWDER, LYOPHILIZED, FOR SOLUTION INTRAVENOUS at 22:37

## 2019-09-16 RX ADMIN — WATER 1 G: 1 INJECTION INTRAMUSCULAR; INTRAVENOUS; SUBCUTANEOUS at 22:09

## 2019-09-16 NOTE — TELEPHONE ENCOUNTER
Greg Lira is not eating, all he does is sleep, he fell 9/15/19, seems confused often, had dry heaves per wife.  Would you like for him to come in?

## 2019-09-17 ENCOUNTER — APPOINTMENT (OUTPATIENT)
Dept: ULTRASOUND IMAGING | Age: 74
DRG: 871 | End: 2019-09-17
Payer: MEDICARE

## 2019-09-17 ENCOUNTER — TELEPHONE (OUTPATIENT)
Dept: CARDIOLOGY CLINIC | Age: 74
End: 2019-09-17

## 2019-09-17 PROBLEM — A41.9 SEPSIS (HCC): Status: ACTIVE | Noted: 2019-09-17

## 2019-09-17 PROBLEM — K74.60 CIRRHOSIS (HCC): Status: ACTIVE | Noted: 2019-09-17

## 2019-09-17 LAB
BACTERIA: ABNORMAL /HPF
BILIRUBIN URINE: ABNORMAL
BLOOD, URINE: ABNORMAL
CASTS: ABNORMAL /LPF
CLARITY: CLEAR
COLOR: YELLOW
EKG ATRIAL RATE: 85 BPM
EKG Q-T INTERVAL: 374 MS
EKG QRS DURATION: 152 MS
EKG QTC CALCULATION (BAZETT): 513 MS
EKG R AXIS: -45 DEGREES
EKG T AXIS: 101 DEGREES
EKG VENTRICULAR RATE: 113 BPM
FILM ARRAY ADENOVIRUS: NORMAL
FILM ARRAY BORDETELLA PERTUSSIS: NORMAL
FILM ARRAY CHLAMYDOPHILIA PNEUMONIAE: NORMAL
FILM ARRAY CORONAVIRUS 229E: NORMAL
FILM ARRAY CORONAVIRUS HKU1: NORMAL
FILM ARRAY CORONAVIRUS NL63: NORMAL
FILM ARRAY CORONAVIRUS OC43: NORMAL
FILM ARRAY INFLUENZA A VIRUS 09H1: NORMAL
FILM ARRAY INFLUENZA A VIRUS H1: NORMAL
FILM ARRAY INFLUENZA A VIRUS H3: NORMAL
FILM ARRAY INFLUENZA A VIRUS: NORMAL
FILM ARRAY INFLUENZA B: NORMAL
FILM ARRAY METAPNEUMOVIRUS: NORMAL
FILM ARRAY MYCOPLASMA PNEUMONIAE: NORMAL
FILM ARRAY PARAINFLUENZA VIRUS 1: NORMAL
FILM ARRAY PARAINFLUENZA VIRUS 2: NORMAL
FILM ARRAY PARAINFLUENZA VIRUS 3: NORMAL
FILM ARRAY PARAINFLUENZA VIRUS 4: NORMAL
FILM ARRAY RESPIRATORY SYNCITIAL VIRUS: NORMAL
FILM ARRAY RHINOVIRUS/ENTEROVIRUS: NORMAL
GLUCOSE URINE: NEGATIVE MG/DL
KETONES, URINE: ABNORMAL MG/DL
L. PNEUMOPHILA SEROGP 1 UR AG: ABNORMAL
LEUKOCYTE ESTERASE, URINE: NEGATIVE
LV EF: 33 %
LVEF MODALITY: NORMAL
NITRITE, URINE: NEGATIVE
ORGANISM: ABNORMAL
PH UA: 5 (ref 5–9)
PROTEIN UA: 100 MG/DL
RBC UA: ABNORMAL /HPF (ref 0–2)
SPECIFIC GRAVITY UA: 1.02 (ref 1–1.03)
UROBILINOGEN, URINE: 4 E.U./DL
WBC UA: ABNORMAL /HPF (ref 0–5)

## 2019-09-17 PROCEDURE — 87450 HC DIRECT STREP B ANTIGEN: CPT

## 2019-09-17 PROCEDURE — 87581 M.PNEUMON DNA AMP PROBE: CPT

## 2019-09-17 PROCEDURE — 81001 URINALYSIS AUTO W/SCOPE: CPT

## 2019-09-17 PROCEDURE — 93306 TTE W/DOPPLER COMPLETE: CPT

## 2019-09-17 PROCEDURE — 6370000000 HC RX 637 (ALT 250 FOR IP): Performed by: INTERNAL MEDICINE

## 2019-09-17 PROCEDURE — 87486 CHLMYD PNEUM DNA AMP PROBE: CPT

## 2019-09-17 PROCEDURE — 93010 ELECTROCARDIOGRAM REPORT: CPT | Performed by: INTERNAL MEDICINE

## 2019-09-17 PROCEDURE — 2700000000 HC OXYGEN THERAPY PER DAY

## 2019-09-17 PROCEDURE — 87206 SMEAR FLUORESCENT/ACID STAI: CPT

## 2019-09-17 PROCEDURE — 76705 ECHO EXAM OF ABDOMEN: CPT

## 2019-09-17 PROCEDURE — 6360000002 HC RX W HCPCS: Performed by: INTERNAL MEDICINE

## 2019-09-17 PROCEDURE — 1200000000 HC SEMI PRIVATE

## 2019-09-17 PROCEDURE — 2580000003 HC RX 258: Performed by: INTERNAL MEDICINE

## 2019-09-17 PROCEDURE — 87070 CULTURE OTHR SPECIMN AEROBIC: CPT

## 2019-09-17 PROCEDURE — 87633 RESP VIRUS 12-25 TARGETS: CPT

## 2019-09-17 PROCEDURE — 87088 URINE BACTERIA CULTURE: CPT

## 2019-09-17 PROCEDURE — 87798 DETECT AGENT NOS DNA AMP: CPT

## 2019-09-17 PROCEDURE — 94640 AIRWAY INHALATION TREATMENT: CPT

## 2019-09-17 RX ORDER — ALBUTEROL SULFATE 2.5 MG/3ML
2.5 SOLUTION RESPIRATORY (INHALATION) EVERY 6 HOURS PRN
Status: DISCONTINUED | OUTPATIENT
Start: 2019-09-17 | End: 2019-09-24 | Stop reason: HOSPADM

## 2019-09-17 RX ORDER — ASPIRIN 81 MG/1
81 TABLET ORAL DAILY
Status: DISCONTINUED | OUTPATIENT
Start: 2019-09-17 | End: 2019-09-24 | Stop reason: HOSPADM

## 2019-09-17 RX ORDER — LISINOPRIL 2.5 MG/1
2.5 TABLET ORAL DAILY
Status: DISCONTINUED | OUTPATIENT
Start: 2019-09-17 | End: 2019-09-24 | Stop reason: HOSPADM

## 2019-09-17 RX ORDER — BUDESONIDE AND FORMOTEROL FUMARATE DIHYDRATE 160; 4.5 UG/1; UG/1
1 AEROSOL RESPIRATORY (INHALATION) 2 TIMES DAILY
Status: DISCONTINUED | OUTPATIENT
Start: 2019-09-17 | End: 2019-09-17 | Stop reason: CLARIF

## 2019-09-17 RX ORDER — ONDANSETRON 2 MG/ML
4 INJECTION INTRAMUSCULAR; INTRAVENOUS EVERY 6 HOURS PRN
Status: DISCONTINUED | OUTPATIENT
Start: 2019-09-17 | End: 2019-09-22 | Stop reason: ALTCHOICE

## 2019-09-17 RX ORDER — IPRATROPIUM BROMIDE AND ALBUTEROL SULFATE 2.5; .5 MG/3ML; MG/3ML
1 SOLUTION RESPIRATORY (INHALATION)
Status: DISCONTINUED | OUTPATIENT
Start: 2019-09-17 | End: 2019-09-24 | Stop reason: HOSPADM

## 2019-09-17 RX ORDER — SODIUM CHLORIDE AND POTASSIUM CHLORIDE .9; .15 G/100ML; G/100ML
SOLUTION INTRAVENOUS CONTINUOUS
Status: DISCONTINUED | OUTPATIENT
Start: 2019-09-17 | End: 2019-09-20

## 2019-09-17 RX ORDER — METOPROLOL SUCCINATE 25 MG/1
25 TABLET, EXTENDED RELEASE ORAL DAILY
Status: DISCONTINUED | OUTPATIENT
Start: 2019-09-17 | End: 2019-09-24 | Stop reason: HOSPADM

## 2019-09-17 RX ORDER — AZITHROMYCIN 250 MG/1
500 TABLET, FILM COATED ORAL DAILY
Status: DISCONTINUED | OUTPATIENT
Start: 2019-09-17 | End: 2019-09-17

## 2019-09-17 RX ORDER — LEVOFLOXACIN 500 MG/1
500 TABLET, FILM COATED ORAL DAILY
Status: DISCONTINUED | OUTPATIENT
Start: 2019-09-17 | End: 2019-09-24 | Stop reason: HOSPADM

## 2019-09-17 RX ORDER — SODIUM CHLORIDE 0.9 % (FLUSH) 0.9 %
10 SYRINGE (ML) INJECTION EVERY 12 HOURS SCHEDULED
Status: DISCONTINUED | OUTPATIENT
Start: 2019-09-17 | End: 2019-09-24 | Stop reason: HOSPADM

## 2019-09-17 RX ORDER — SODIUM CHLORIDE 0.9 % (FLUSH) 0.9 %
10 SYRINGE (ML) INJECTION PRN
Status: DISCONTINUED | OUTPATIENT
Start: 2019-09-17 | End: 2019-09-24 | Stop reason: HOSPADM

## 2019-09-17 RX ORDER — ACETAMINOPHEN 325 MG/1
650 TABLET ORAL EVERY 4 HOURS PRN
Status: DISCONTINUED | OUTPATIENT
Start: 2019-09-17 | End: 2019-09-24 | Stop reason: HOSPADM

## 2019-09-17 RX ADMIN — MOMETASONE FUROATE AND FORMOTEROL FUMARATE DIHYDRATE 2 PUFF: 200; 5 AEROSOL RESPIRATORY (INHALATION) at 10:43

## 2019-09-17 RX ADMIN — APIXABAN 5 MG: 5 TABLET, FILM COATED ORAL at 20:56

## 2019-09-17 RX ADMIN — ACETAMINOPHEN 650 MG: 325 TABLET, FILM COATED ORAL at 08:57

## 2019-09-17 RX ADMIN — LEVOFLOXACIN 500 MG: 500 TABLET, FILM COATED ORAL at 13:34

## 2019-09-17 RX ADMIN — POTASSIUM CHLORIDE AND SODIUM CHLORIDE: 900; 150 INJECTION, SOLUTION INTRAVENOUS at 07:45

## 2019-09-17 RX ADMIN — APIXABAN 5 MG: 5 TABLET, FILM COATED ORAL at 08:57

## 2019-09-17 RX ADMIN — LISINOPRIL 2.5 MG: 2.5 TABLET ORAL at 08:56

## 2019-09-17 RX ADMIN — TIOTROPIUM BROMIDE 18 MCG: 18 CAPSULE ORAL; RESPIRATORY (INHALATION) at 10:43

## 2019-09-17 RX ADMIN — METOPROLOL SUCCINATE 25 MG: 25 TABLET, FILM COATED, EXTENDED RELEASE ORAL at 08:57

## 2019-09-17 RX ADMIN — IPRATROPIUM BROMIDE AND ALBUTEROL SULFATE 1 AMPULE: 2.5; .5 SOLUTION RESPIRATORY (INHALATION) at 19:34

## 2019-09-17 RX ADMIN — POTASSIUM CHLORIDE AND SODIUM CHLORIDE: 900; 150 INJECTION, SOLUTION INTRAVENOUS at 20:57

## 2019-09-17 RX ADMIN — Medication 10 ML: at 13:38

## 2019-09-17 RX ADMIN — AZITHROMYCIN MONOHYDRATE 500 MG: 250 TABLET ORAL at 08:57

## 2019-09-17 RX ADMIN — ASPIRIN 81 MG: 81 TABLET, COATED ORAL at 08:57

## 2019-09-17 RX ADMIN — MOMETASONE FUROATE AND FORMOTEROL FUMARATE DIHYDRATE 2 PUFF: 200; 5 AEROSOL RESPIRATORY (INHALATION) at 19:34

## 2019-09-17 RX ADMIN — IPRATROPIUM BROMIDE AND ALBUTEROL SULFATE 1 AMPULE: 2.5; .5 SOLUTION RESPIRATORY (INHALATION) at 22:49

## 2019-09-17 ASSESSMENT — ENCOUNTER SYMPTOMS
VOMITING: 0
BACK PAIN: 0
CONSTIPATION: 0
WHEEZING: 0
EYE PAIN: 0
NAUSEA: 0
SHORTNESS OF BREATH: 0
COUGH: 0
COLOR CHANGE: 0
CHEST TIGHTNESS: 0
CHOKING: 0
ABDOMINAL PAIN: 1
DIARRHEA: 0

## 2019-09-17 ASSESSMENT — PAIN SCALES - GENERAL
PAINLEVEL_OUTOF10: 0

## 2019-09-17 NOTE — CONSULTS
No cervical adenopathy  LUNGS:  Clear to auscultation bilaterally with no wheezes, rales or rhonchi. No increased work of breathing, good air exchange. CARDIOVASCULAR: Regular rate and rhythm, no murmur  ABDOMEN:  normal bowel sounds in all 4 quadrants, soft, non-distended, non-tender, no masses palpated, no hepatosplenomegally  MUSCULOSKELETAL:  There is no redness, warmth, or swelling of the joints. Full range of motion noted. Motor strength is 5 out of 5 all extremities bilaterally. Tone is normal.  EXTREMITIES: No edema, 2+ pulses bilaterally (radial and dorsalis pedis)  NEUROLOGIC:  Awake, alert, oriented to name, place and time. Cranial nerves II-XII are grossly intact. Motor is 5 out of 5 bilaterally. SKIN: Normal skin color, texture, and turgor. There is no redness, warmth, or swelling. No bruising or bleeding, no mottling. PSYCH: Affect, behavior and insight are all within normal limits.       DATA:  Results for orders placed or performed during the hospital encounter of 09/16/19   CBC auto differential   Result Value Ref Range    WBC 15.7 (H) 4.5 - 11.5 E9/L    RBC 4.60 3.80 - 5.80 E12/L    Hemoglobin 15.0 12.5 - 16.5 g/dL    Hematocrit 43.8 37.0 - 54.0 %    MCV 95.2 80.0 - 99.9 fL    MCH 32.6 26.0 - 35.0 pg    MCHC 34.2 32.0 - 34.5 %    RDW 15.0 11.5 - 15.0 fL    Platelets 394 418 - 398 E9/L    MPV 10.3 7.0 - 12.0 fL    Neutrophils % 87.1 (H) 43.0 - 80.0 %    Immature Granulocytes % 0.8 0.0 - 5.0 %    Lymphocytes % 6.0 (L) 20.0 - 42.0 %    Monocytes % 5.4 2.0 - 12.0 %    Eosinophils % 0.1 0.0 - 6.0 %    Basophils % 0.6 0.0 - 2.0 %    Neutrophils Absolute 13.65 (H) 1.80 - 7.30 E9/L    Immature Granulocytes # 0.12 E9/L    Lymphocytes Absolute 0.94 (L) 1.50 - 4.00 E9/L    Monocytes Absolute 0.85 0.10 - 0.95 E9/L    Eosinophils Absolute 0.01 (L) 0.05 - 0.50 E9/L    Basophils Absolute 0.09 0.00 - 0.20 O6/B   Basic Metabolic Panel w/ Reflex to MG   Result Value Ref Range    Sodium 137 132 - 146 mmol/L Potassium reflex Magnesium 3.7 3.5 - 5.0 mmol/L    Chloride 95 (L) 98 - 107 mmol/L    CO2 25 22 - 29 mmol/L    Anion Gap 17 (H) 7 - 16 mmol/L    Glucose 111 (H) 74 - 99 mg/dL    BUN 16 8 - 23 mg/dL    CREATININE 1.2 0.7 - 1.2 mg/dL    GFR Non-African American 59 >=60 mL/min/1.73    GFR African American >60     Calcium 9.3 8.6 - 10.2 mg/dL   Hepatic function panel   Result Value Ref Range    Total Protein 8.3 6.4 - 8.3 g/dL    Alb 3.6 3.5 - 5.2 g/dL    Alkaline Phosphatase 117 40 - 129 U/L    ALT 17 0 - 40 U/L    AST 34 0 - 39 U/L    Total Bilirubin 4.9 (H) 0.0 - 1.2 mg/dL    Bilirubin, Direct 2.3 (H) 0.0 - 0.3 mg/dL    Bilirubin, Indirect 2.6 (H) 0.0 - 1.0 mg/dL   Lipase   Result Value Ref Range    Lipase 19 13 - 60 U/L   Troponin   Result Value Ref Range    Troponin 0.03 0.00 - 0.03 ng/mL   Lactic Acid, Plasma   Result Value Ref Range    Lactic Acid 2.2 0.5 - 2.2 mmol/L   Urinalysis with Microscopic   Result Value Ref Range    Color, UA Yellow Straw/Yellow    Clarity, UA Clear Clear    Glucose, Ur Negative Negative mg/dL    Bilirubin Urine MODERATE (A) Negative    Ketones, Urine TRACE (A) Negative mg/dL    Specific Gravity, UA 1.020 1.005 - 1.030    Blood, Urine LARGE (A) Negative    pH, UA 5.0 5.0 - 9.0    Protein,  (A) Negative mg/dL    Urobilinogen, Urine 4.0 (A) <2.0 E.U./dL    Nitrite, Urine Negative Negative    Leukocyte Esterase, Urine Negative Negative    Casts FEW /LPF    WBC, UA 0-1 0 - 5 /HPF    RBC, UA 1-3 0 - 2 /HPF    Bacteria, UA NONE /HPF   Ammonia   Result Value Ref Range    Ammonia <10.0 (L) 16.0 - 60.0 umol/L   EKG 12 Lead   Result Value Ref Range    Ventricular Rate 113 BPM    Atrial Rate 85 BPM    QRS Duration 152 ms    Q-T Interval 374 ms    QTc Calculation (Bazett) 513 ms    R Axis -45 degrees    T Axis 101 degrees         IMAGING:  Ct Abdomen Pelvis W Iv Contrast Additional Contrast? None    Result Date: 9/17/2019  LOCATION:200 EXAM: CT ABDOMEN PELVIS W IV CONTRAST COMPARISON: None within normal limits. 1. Unremarkable right upper quadrant ultrasound. Xr Chest 1 Vw    Result Date: 9/16/2019  LOCATION: 200 EXAM: XR CHEST 1 VIEW COMPARISON: 1/14/2019 HISTORY:  chest pain chest pain TECHNIQUE: Single frontal view of the chest was obtained. FINDINGS:  SUPPORT DEVICES: Pacemaker leads intact and properly positioned unchanged. . LUNGS: Dense infiltrate left lung base since prior examination favoring pneumonia. Hernandez Trey PLEURA: No effusions or pneumothorax. LUNG VOLUMES: Satisfactory inspirator effort. MEDIASTINAL STRUCTURES: No lymphadenopathy. Normal aortic contour. HEART SIZE: Normal. BONES AND SOFT TISSUES: No fracture or soft tissue abnormality. 1. Extensive left pneumonic infiltrate, new since prior examination     Us Retroperitoneal Limited    Result Date: 9/3/2019  LOCATION: 200 EXAM: US RETROPERITONEAL LIMITED COMPARISON: None HISTORY: Wellness exam Technique: Real-time, gray scale, color flow of the kidneys and bladder was obtained. Findings: Right kidney: The right kidney measures 10.4 x 5.2 x 4.6 cm. Cortical thickness and echogenicity is within normal limits bilaterally. No hydronephrosis, perinephric fluid collections, or shadowing renal calculus is seen. Left Kidney: The left kidney measures 10.3 x 5.0 x 4.4 cm. Cortical thickness and echogenicity is within normal limits bilaterally. No hydronephrosis, perinephric fluid collections, or shadowing renal calculus is seen. The bladder appears unremarkable. IMPRESSION 1. Normal retroperitoneal ultrasound with attention to the kidneys.          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

## 2019-09-17 NOTE — H&P
Inability: Not on file    Transportation needs:     Medical: Not on file     Non-medical: Not on file   Tobacco Use    Smoking status: Former Smoker     Packs/day: 0.50     Years: 50.00     Pack years: 25.00     Types: Cigarettes     Last attempt to quit: 2014     Years since quittin.2    Smokeless tobacco: Never Used   Substance and Sexual Activity    Alcohol use: Yes     Alcohol/week: 0.0 standard drinks     Comment: rare;  drinks 1 cup of coffee daily & occ Pepsi/Tea    Drug use: No    Sexual activity: Not on file   Lifestyle    Physical activity:     Days per week: Not on file     Minutes per session: Not on file    Stress: Not on file   Relationships    Social connections:     Talks on phone: Not on file     Gets together: Not on file     Attends Congregation service: Not on file     Active member of club or organization: Not on file     Attends meetings of clubs or organizations: Not on file     Relationship status: Not on file    Intimate partner violence:     Fear of current or ex partner: Not on file     Emotionally abused: Not on file     Physically abused: Not on file     Forced sexual activity: Not on file   Other Topics Concern    Not on file   Social History Narrative    Not on file       ROS:  General:   Admits to generalized malaise and fatigue. Psychological:   Denies anxiety, disorientation or hallucinations    ENT:    Denies epistaxis, headaches, vertigo or visual changes    Cardiovascular:   Denies any chest pain, irregular heartbeats, or palpitations. No paroxysmal nocturnal dyspnea. Respiratory:   Admits to shortness of breath that is most noticeable with exertion. He has chronic coughing. He has minimal sputum production. Gastrointestinal:   Denies nausea, vomiting, diarrhea, or constipation. Denies any abdominal pain. Denies change in bowel habits or stools. Genito-Urinary:    Denies any urgency, frequency, hematuria.   Voiding without difficulty. Musculoskeletal:   Denies joint pain, joint stiffness, joint swelling or muscle pain    Neurology:    Denies any headache or focal neurological deficits. Admits to profound weakness and deconditioning. Derm:    Denies any rashes, ulcers, or excoriations. Denies bruising. Extremities:   Denies any lower extremity swelling or edema. PHYSICAL EXAM:  VITALS:  Vitals:    09/17/19 1130   BP: (!) 104/54   Pulse: 90   Resp: 18   Temp: 98 °F (36.7 °C)   SpO2: 94%         CONSTITUTIONAL:    Awake, alert, cooperative, no apparent distress, and appears stated age    EYES:    PERRL, EOMI, sclera clear, conjunctiva normal    ENT:    Normocephalic, atraumatic, sinuses nontender on palpation. External ears without lesions. Oral pharynx with moist mucus membranes. Tonsils without erythema or exudates. Nasal cannula oxygen is in place. NECK:    Supple, symmetrical, trachea midline, no adenopathy, thyroid symmetric, not enlarged and no tenderness, skin normal, no bruits, no JVD    HEMATOLOGIC/LYMPHATICS:    No cervical lymphadenopathy and no supraclavicular lymphadenopathy    LUNGS:    Diminished bilaterally but with decent aeration throughout. CARDIOVASCULAR:    Normal apical impulse, regular rate and rhythm, normal S1 and S2, no S3 or S4, and mild systolic murmur. ABDOMEN:    No scars, normal bowel sounds, soft, non-distended, non-tender, no masses palpated, no hepatosplenomegaly, no rebound or guarding elicited on palpation     MUSCULOSKELETAL:    There is no redness, warmth, or swelling of the joints. Full range of motion noted. Motor strength is 5 out of 5 all extremities bilaterally. Tone is normal.    NEUROLOGIC:    Awake, alert, oriented to name, place and time. Cranial nerves II-XII are grossly intact. Motor is 5 out of 5 bilaterally. SKIN:    No bruising or bleeding. No redness, warmth, or swelling    EXTREMITIES:    Peripheral pulses present.   No edema, cyanosis, or

## 2019-09-17 NOTE — CONSULTS
HISTORY       Family History   Problem Relation Age of Onset    Heart Attack Brother     Stroke Father      SOCIAL HISTORY       Social History     Socioeconomic History    Marital status:      Spouse name: None    Number of children: None    Years of education: None    Highest education level: None   Occupational History    Occupation: GM   Social Needs    Financial resource strain: None    Food insecurity:     Worry: None     Inability: None    Transportation needs:     Medical: None     Non-medical: None   Tobacco Use    Smoking status: Former Smoker     Packs/day: 0.50     Years: 50.00     Pack years: 25.00     Types: Cigarettes     Last attempt to quit: 2014     Years since quittin.2    Smokeless tobacco: Never Used   Substance and Sexual Activity    Alcohol use:  Yes     Alcohol/week: 0.0 standard drinks     Comment: rare;  drinks 1 cup of coffee daily & occ Pepsi/Tea    Drug use: No    Sexual activity: None   Lifestyle    Physical activity:     Days per week: None     Minutes per session: None    Stress: None   Relationships    Social connections:     Talks on phone: None     Gets together: None     Attends [employer] service: None     Active member of club or organization: None     Attends meetings of clubs or organizations: None     Relationship status: None    Intimate partner violence:     Fear of current or ex partner: None     Emotionally abused: None     Physically abused: None     Forced sexual activity: None   Other Topics Concern    None   Social History Narrative    None     ·      PHYSICAL EXAM    (up to 7 for level 4, 8 or more forlevel 5)     ED Triage Vitals   BP Temp Temp Source Pulse Resp SpO2 Height Weight   19 2225 19   (!) 135/97 98.1 °F (36.7 °C) Oral 110 26 95 % 5' 8\" (1.727 m) 180 lb (81.6 kg)     Vitals:    Vitals:    19 0745 19 2330 09/17/19 1015 09/17/19 1130   BP: 133/83 119/69  (!) 104/54   Pulse: 104 108  90   Resp: 20 28  18   Temp: 98.6 °F (37 °C) 100.3 °F (37.9 °C) 98.1 °F (36.7 °C) 98 °F (36.7 °C)   TempSrc: Oral Oral Oral Oral   SpO2: 95% 93%  94%   Weight:       Height:         Physical Exam   Constitutional/General: Alert and oriented, NAD  Head: NC/AT  Eyes: PERRL, EOMI icteric  Mouth: Normal mucosa, no thrush   Neck: Supple, full ROM,    Pulmonary: Lungs dec to auscultation bilaterally. Not in respiratory distress  Cardiovascular:  Regular rate and rhythm, no murmurs, gallops, or rubs. Abdomen: Soft, + BS. No distension. Nontender. Extremities: Moves all extremities x 4. Warm and well perfused  Pulses:  Distal pulses intact  Skin: Warm and dry without rash  Neurologic:    No focal deficits  Psych: Normal Affect     DIAGNOSTICRESULTS   RADIOLOGY:   Ct Abdomen Pelvis W Iv Contrast Additional Contrast? None    Result Date: 9/17/2019  LOCATION:200 EXAM: CT ABDOMEN PELVIS W IV CONTRAST COMPARISON: None HISTORY: Abdominal pain TECHNIQUE:Contrast-enhanced helical abdomen and pelvis CT was performed. Coronal and sagittal reconstructions also obtained. Automated dose control was used for this exam. CONTRAST: 80 mL Isovue-370 intravenous contrast was administered. FINDINGS: SUPPORT DEVICES: None LOWER THORAX: Limited evaluation of the lower chest demonstrates clear lung bases bilaterally. SOLID ORGANS: The liver, spleen, pancreas, and adrenal glands are normal. BILIARY SYSTEM: No evidence of biliary ductal dilatation. GENITOURINARY: The kidneys are normal in appearance bilaterally with no evidence of hydronephrosis. No stones are appreciated. The bladder is unremarkable. GASTROINTESTINAL: The stomach, small and large bowel are normal in caliber without evidence of focal wall thickening. There is no evidence of bowel obstruction. APPENDIX: Within normal limits without adjacent fat stranding. FLUID COLLECTIONS: None.  LYMPH NODES: No

## 2019-09-17 NOTE — ED PROVIDER NOTES
lids are normal. Scleral icterus is present. Cardiovascular: Regular rhythm and normal heart sounds. Tachycardia present. Pulmonary/Chest: Effort normal and breath sounds normal.   Abdominal: Soft. Normal appearance and bowel sounds are normal. There is tenderness in the right upper quadrant. There is positive Su's sign. Neurological: He is alert and oriented to person, place, and time. GCS eye subscore is 4. GCS verbal subscore is 5. GCS motor subscore is 6. Skin: Skin is warm, dry and intact. Psychiatric: He has a normal mood and affect. His speech is normal and behavior is normal. Judgment and thought content normal. Cognition and memory are normal.        Procedures     MDM  Number of Diagnoses or Management Options  Hepatic cirrhosis, unspecified hepatic cirrhosis type, unspecified whether ascites present [facility]):   Pneumonia due to organism:   Diagnosis management comments: Patient's chest x-ray displayed left-sided infiltrate concerning for pneumonia, patient also has hyperbilirubinemia and elevated white count. Patient started on Rocephin and doxycycline in the ED. Abdominal CT was ordered which was unremarkable. Right upper quadrant ultrasound was obtained which showed concern for cirrhosis but was negative for cholecystitis, Galilea lithiasis, or cholangitis. Patient to be admitted for further work-up and evaluation. Patient and wife were counseled on this and they verbalized understanding and agreeable to admission at this time. ED Course as of Sep 17 0100   Mon Sep 16, 2019   2101   ATTENDING PROVIDER ATTESTATION:     I have personally performed and/or participated in the history, exam, medical decision making, and procedures and agree with all pertinent clinical information unless otherwise noted. I have also reviewed and agree with the past medical, family and social history unless otherwise noted.     I have discussed this patient in detail with the resident and provided the instruction and education regarding the evidence-based evaluation and treatment of [unfilled]  History: patient with increased altered mental status, falls, generalized weakness and lethargy. Wife noticed he is jaundice today. No prior history of cirrhosis. Cardiac and AAA history. My findings: Gloria Ortega is a 74 y.o. male whom is in no distress. Physical exam reveals jaundice. Slightly dry mucous membranes. Heart rate is tachycardic. Lungs CTA. Abdomen is soft and mildly tender in the LUQ. He is currently A and O times 3 without focal neurologic deficits. My plan: Symptomatic and supportive care. CT abdomen labs and septic evaluation. Electronically signed by Stephy Motley DO on 9/16/19 at 9:05 PM          [JS]   4676 Spoke to Dr. Jannetta Felty, he will admit patient. [DS]   5992 Spoke to GI resident, he requested right upper quadrant ultrasound to verify whether or not there is dilation of the bile duct. States that if this happens to be ascending cholangitis, patient will have to be transferred to tertiary care center. Ultrasound has been ordered. [DS]      ED Course User Index  [DS] Alexandra Duckworth DO  [JS] Stephy Motley DO        ED Course as of Sep 17 0158   Mon Sep 16, 2019   2101   ATTENDING PROVIDER ATTESTATION:     I have personally performed and/or participated in the history, exam, medical decision making, and procedures and agree with all pertinent clinical information unless otherwise noted. I have also reviewed and agree with the past medical, family and social history unless otherwise noted. I have discussed this patient in detail with the resident and provided the instruction and education regarding the evidence-based evaluation and treatment of [unfilled]  History: patient with increased altered mental status, falls, generalized weakness and lethargy. Wife noticed he is jaundice today. No prior history of cirrhosis.   Cardiac and AAA history. My findings: Kd Garcias is a 74 y.o. male whom is in no distress. Physical exam reveals jaundice. Slightly dry mucous membranes. Heart rate is tachycardic. Lungs CTA. Abdomen is soft and mildly tender in the LUQ. He is currently A and O times 3 without focal neurologic deficits. My plan: Symptomatic and supportive care. CT abdomen labs and septic evaluation. Electronically signed by Maksim Lanza DO on 9/16/19 at 9:05 PM          [JS]   5227 Spoke to Dr. Madison Guerra, he will admit patient. [DS]   7525 Spoke to GI resident, he requested right upper quadrant ultrasound to verify whether or not there is dilation of the bile duct. States that if this happens to be ascending cholangitis, patient will have to be transferred to tertiary care center. Ultrasound has been ordered. [DS]      ED Course User Index  [DS] Jose Guadalupe RomanDO tisha  [JS] Maksim Lanza DO       --------------------------------------------- PAST HISTORY ---------------------------------------------  Past Medical History:  has a past medical history of Aortic stenosis, mild, Aortic valve sclerosis, Atrial fibrillation ([facility] CAD (coronary artery disease), COPD (chronic obstructive pulmonary disease) ([address] Gallbladder attack, History of tobacco use, Hyperlipidemia, Hypertension, Mild mitral regurgitation, Prostate cancer ([facility] PVD (peripheral vascular disease) with claudication ([address] RBBB, and Tobacco abuse. Past Surgical History:  has a past surgical history that includes Appendectomy; Cardiac catheterization (8/8/2014); Coronary artery bypass graft (08/11/2014); pacemaker placement (Left, 8-); Colonoscopy; and pr vascular embolization or occlusion arterial rs&i (N/A, 7/16/2018). Social History:  reports that he quit smoking about 5 years ago. His smoking use included cigarettes. He has a 25.00 pack-year smoking history. He has never used smokeless tobacco. He reports that he drinks alcohol.  He

## 2019-09-17 NOTE — PROGRESS NOTES
Message left with Dr. Claudetta Elm with call back number for admission orders.  Adamaris Bertrand RN

## 2019-09-17 NOTE — PROGRESS NOTES
Zan Hurst, charge nurse, notified patient's med rec is complete and patient states he did not take any medications yesterday. Charge nurse also notified SIRS screening is popping up for patient.     Electronically signed by Patel Greenfield RN on 9/17/2019 at 3:24 AM

## 2019-09-18 PROBLEM — E44.1 MILD PROTEIN-CALORIE MALNUTRITION (HCC): Status: ACTIVE | Noted: 2019-09-18

## 2019-09-18 LAB
ALBUMIN SERPL-MCNC: 2.8 G/DL (ref 3.5–5.2)
ALP BLD-CCNC: 88 U/L (ref 40–129)
ALT SERPL-CCNC: 19 U/L (ref 0–40)
ANION GAP SERPL CALCULATED.3IONS-SCNC: 15 MMOL/L (ref 7–16)
AST SERPL-CCNC: 45 U/L (ref 0–39)
BASOPHILS ABSOLUTE: 0.05 E9/L (ref 0–0.2)
BASOPHILS RELATIVE PERCENT: 0.5 % (ref 0–2)
BILIRUB SERPL-MCNC: 2.7 MG/DL (ref 0–1.2)
BILIRUBIN DIRECT: 1.7 MG/DL (ref 0–0.3)
BILIRUBIN DIRECT: 1.7 MG/DL (ref 0–0.3)
BILIRUBIN, INDIRECT: 1 MG/DL (ref 0–1)
BUN BLDV-MCNC: 17 MG/DL (ref 8–23)
CALCIUM SERPL-MCNC: 8.5 MG/DL (ref 8.6–10.2)
CHLORIDE BLD-SCNC: 102 MMOL/L (ref 98–107)
CO2: 20 MMOL/L (ref 22–29)
CREAT SERPL-MCNC: 1.1 MG/DL (ref 0.7–1.2)
EOSINOPHILS ABSOLUTE: 0.06 E9/L (ref 0.05–0.5)
EOSINOPHILS RELATIVE PERCENT: 0.6 % (ref 0–6)
GAMMA GLUTAMYL TRANSFERASE: 31 U/L (ref 10–71)
GFR AFRICAN AMERICAN: >60
GFR NON-AFRICAN AMERICAN: >60 ML/MIN/1.73
GLUCOSE BLD-MCNC: 94 MG/DL (ref 74–99)
HCT VFR BLD CALC: 34.3 % (ref 37–54)
HEMOGLOBIN: 11.4 G/DL (ref 12.5–16.5)
IMMATURE GRANULOCYTES #: 0.1 E9/L
IMMATURE GRANULOCYTES %: 1.1 % (ref 0–5)
LYMPHOCYTES ABSOLUTE: 1.19 E9/L (ref 1.5–4)
LYMPHOCYTES RELATIVE PERCENT: 12.7 % (ref 20–42)
MAGNESIUM: 2 MG/DL (ref 1.6–2.6)
MCH RBC QN AUTO: 31.8 PG (ref 26–35)
MCHC RBC AUTO-ENTMCNC: 33.2 % (ref 32–34.5)
MCV RBC AUTO: 95.8 FL (ref 80–99.9)
MONOCYTES ABSOLUTE: 0.82 E9/L (ref 0.1–0.95)
MONOCYTES RELATIVE PERCENT: 8.7 % (ref 2–12)
NEUTROPHILS ABSOLUTE: 7.18 E9/L (ref 1.8–7.3)
NEUTROPHILS RELATIVE PERCENT: 76.4 % (ref 43–80)
PDW BLD-RTO: 14.9 FL (ref 11.5–15)
PHOSPHORUS: 2.3 MG/DL (ref 2.5–4.5)
PLATELET # BLD: 160 E9/L (ref 130–450)
PMV BLD AUTO: 10.2 FL (ref 7–12)
POTASSIUM SERPL-SCNC: 3.9 MMOL/L (ref 3.5–5)
PROCALCITONIN: 3.28 NG/ML (ref 0–0.08)
RBC # BLD: 3.58 E12/L (ref 3.8–5.8)
SODIUM BLD-SCNC: 137 MMOL/L (ref 132–146)
TOTAL PROTEIN: 6.6 G/DL (ref 6.4–8.3)
WBC # BLD: 9.4 E9/L (ref 4.5–11.5)

## 2019-09-18 PROCEDURE — 80076 HEPATIC FUNCTION PANEL: CPT

## 2019-09-18 PROCEDURE — 86255 FLUORESCENT ANTIBODY SCREEN: CPT

## 2019-09-18 PROCEDURE — 6370000000 HC RX 637 (ALT 250 FOR IP): Performed by: INTERNAL MEDICINE

## 2019-09-18 PROCEDURE — 82248 BILIRUBIN DIRECT: CPT

## 2019-09-18 PROCEDURE — 84100 ASSAY OF PHOSPHORUS: CPT

## 2019-09-18 PROCEDURE — 94640 AIRWAY INHALATION TREATMENT: CPT

## 2019-09-18 PROCEDURE — 92610 EVALUATE SWALLOWING FUNCTION: CPT | Performed by: SPEECH-LANGUAGE PATHOLOGIST

## 2019-09-18 PROCEDURE — 86038 ANTINUCLEAR ANTIBODIES: CPT

## 2019-09-18 PROCEDURE — 80048 BASIC METABOLIC PNL TOTAL CA: CPT

## 2019-09-18 PROCEDURE — 82525 ASSAY OF COPPER: CPT

## 2019-09-18 PROCEDURE — 2700000000 HC OXYGEN THERAPY PER DAY

## 2019-09-18 PROCEDURE — 85025 COMPLETE CBC W/AUTO DIFF WBC: CPT

## 2019-09-18 PROCEDURE — 82977 ASSAY OF GGT: CPT

## 2019-09-18 PROCEDURE — 84145 PROCALCITONIN (PCT): CPT

## 2019-09-18 PROCEDURE — 36415 COLL VENOUS BLD VENIPUNCTURE: CPT

## 2019-09-18 PROCEDURE — 2580000003 HC RX 258: Performed by: INTERNAL MEDICINE

## 2019-09-18 PROCEDURE — 1200000000 HC SEMI PRIVATE

## 2019-09-18 PROCEDURE — 6360000002 HC RX W HCPCS: Performed by: INTERNAL MEDICINE

## 2019-09-18 PROCEDURE — 83735 ASSAY OF MAGNESIUM: CPT

## 2019-09-18 PROCEDURE — 82390 ASSAY OF CERULOPLASMIN: CPT

## 2019-09-18 PROCEDURE — 92526 ORAL FUNCTION THERAPY: CPT | Performed by: SPEECH-LANGUAGE PATHOLOGIST

## 2019-09-18 PROCEDURE — 87040 BLOOD CULTURE FOR BACTERIA: CPT

## 2019-09-18 RX ORDER — LEVOFLOXACIN 500 MG/1
500 TABLET, FILM COATED ORAL DAILY
Qty: 10 TABLET | Refills: 0 | Status: SHIPPED | OUTPATIENT
Start: 2019-09-18 | End: 2019-09-24

## 2019-09-18 RX ADMIN — Medication 10 ML: at 21:37

## 2019-09-18 RX ADMIN — POTASSIUM CHLORIDE AND SODIUM CHLORIDE: 900; 150 INJECTION, SOLUTION INTRAVENOUS at 17:43

## 2019-09-18 RX ADMIN — METOPROLOL SUCCINATE 25 MG: 25 TABLET, FILM COATED, EXTENDED RELEASE ORAL at 09:30

## 2019-09-18 RX ADMIN — POTASSIUM CHLORIDE AND SODIUM CHLORIDE: 900; 150 INJECTION, SOLUTION INTRAVENOUS at 06:24

## 2019-09-18 RX ADMIN — IPRATROPIUM BROMIDE AND ALBUTEROL SULFATE 1 AMPULE: 2.5; .5 SOLUTION RESPIRATORY (INHALATION) at 18:30

## 2019-09-18 RX ADMIN — IPRATROPIUM BROMIDE AND ALBUTEROL SULFATE 1 AMPULE: 2.5; .5 SOLUTION RESPIRATORY (INHALATION) at 14:39

## 2019-09-18 RX ADMIN — IPRATROPIUM BROMIDE AND ALBUTEROL SULFATE 1 AMPULE: 2.5; .5 SOLUTION RESPIRATORY (INHALATION) at 07:04

## 2019-09-18 RX ADMIN — LISINOPRIL 2.5 MG: 2.5 TABLET ORAL at 09:30

## 2019-09-18 RX ADMIN — LEVOFLOXACIN 500 MG: 500 TABLET, FILM COATED ORAL at 09:30

## 2019-09-18 RX ADMIN — Medication 10 ML: at 09:30

## 2019-09-18 RX ADMIN — IPRATROPIUM BROMIDE AND ALBUTEROL SULFATE 1 AMPULE: 2.5; .5 SOLUTION RESPIRATORY (INHALATION) at 09:46

## 2019-09-18 RX ADMIN — APIXABAN 5 MG: 5 TABLET, FILM COATED ORAL at 21:00

## 2019-09-18 RX ADMIN — ASPIRIN 81 MG: 81 TABLET, COATED ORAL at 09:30

## 2019-09-18 RX ADMIN — APIXABAN 5 MG: 5 TABLET, FILM COATED ORAL at 09:30

## 2019-09-18 RX ADMIN — MOMETASONE FUROATE AND FORMOTEROL FUMARATE DIHYDRATE 2 PUFF: 200; 5 AEROSOL RESPIRATORY (INHALATION) at 17:10

## 2019-09-18 RX ADMIN — MOMETASONE FUROATE AND FORMOTEROL FUMARATE DIHYDRATE 2 PUFF: 200; 5 AEROSOL RESPIRATORY (INHALATION) at 07:04

## 2019-09-18 ASSESSMENT — PAIN SCALES - GENERAL
PAINLEVEL_OUTOF10: 0
PAINLEVEL_OUTOF10: 0

## 2019-09-18 NOTE — PROGRESS NOTES
Internal Medicine Progress Note    Digna Marivel. Isabel Mccarthy., & [address] Isabel Chung, F.A.C.O.I. Naomi Saleh D.O., F.A.C.O.I. Primary Care Physician: Juan Hammond DO   Admitting Physician:  Bonnie Cuevas DO  Admission date and time: 9/16/2019  7:55 PM    Room:  [address] [place] [address]    Patient Name: Sridhar Mott  MRN: [id number]    Date of Service: 9/18/2019     Subjective:  Carmel Quinonez has improved dramatically when compared to my examination yesterday. Liver function studies have trended downward and he denies any overt abdominal pain. He remains profoundly weak and deconditioned. His wife is mentioning that he has been complaining of dysphagia that is gotten progressively worse. He is having a difficult time swallowing pills. He would like to become more ambulatory today. Review of System: HEENT:  Humberto ear pain, sore throat, sinus or eye problems  Cardiovascular:   Denies any chest pain, irregular heartbeats, or palpitations. Respiratory:   Significant improvement in his presenting shortness of breath. Minimal coughing without sputum production. Gastrointestinal:   Denies nausea, vomiting, diarrhea, or constipation. Denies any abdominal pain. Extremities:   Denies any lower extremity swelling or edema. Neurology:    Denies any headache or focal neurological deficits. Profound weakness and deconditioning. Derm:    Denies any rashes, ulcers, or excoriations. Denies bruising. Genitourinary:    Denies any urgency, frequency, hematuria. Voiding without difficulty. Musculoskeletal:  Denies myalgias, joint complaints or back pain      Physical Exam:  I/O this shift:  In: 180 [P.O.:180]  Out: -   Blood pressure 97/67, pulse 92, temperature 98 °F (36.7 °C), temperature source Oral, resp. rate 22, height 5' 8\" (1.727 m), weight 177 lb (80.3 kg), SpO2 93 %. HEENT:    PERRLA. EOMI. Sclera clear. Buccal mucosa moist.  Neck:    Supple. Trachea midline.  No

## 2019-09-18 NOTE — PROGRESS NOTES
[address] [place] [address] [facility]  Progress Note    NAME:Kike Holloway  DATE:19    SUBJECTIVE:  Chief Complaint   Patient presents with    Abdominal Pain     F/U: pneumonia    Patient is tolerating medications. No reported adverse drug reactions. No FEVERS, CHILLS, nausea, vomiting, diarrhea. Per wife and pt can not take pills    Review of systems:  As stated above in the chief complaint, otherwise negative. Medications:  Scheduled Meds:   sodium chloride flush  10 mL Intravenous 2 times per day    influenza virus vaccine  0.5 mL Intramuscular Once    apixaban  5 mg Oral BID    aspirin EC  81 mg Oral Daily    lisinopril  2.5 mg Oral Daily    metoprolol succinate  25 mg Oral Daily    mometasone-formoterol  2 puff Inhalation BID    ipratropium-albuterol  1 ampule Inhalation Q4H WA    levofloxacin  500 mg Oral Daily     Continuous Infusions:   0.9% NaCl with KCl 20 mEq 100 mL/hr at 19 0624     PRN Meds:sodium chloride flush, acetaminophen, albuterol, ondansetron    OBJECTIVE:  /71   Pulse 104   Temp 99.2 °F (37.3 °C) (Oral)   Resp 18   Ht 5' 8\" (1.727 m)   Wt 177 lb (80.3 kg)   SpO2 94%   BMI 26.91 kg/m²   Temp  Av.4 °F (36.9 °C)  Min: 98 °F (36.7 °C)  Max: 99.2 °F (37.3 °C)  Constitutional:  The patient is awake, alert, and oriented. Skin:    Warm and dry. No rashes were noted. HEENT:   Round and reactive pupils. AT/NC  Neck:    Supple to movements. Chest:   No use of accessory muscles to breathe. Symmetrical expansion. Dec bs  Cardiovascular:  S1 and S2 are rhythmic and regular. No murmurs appreciated. Abdomen:   Positive bowel sounds to auscultation. Benign to palpation. N   Extremities:   No clubbing, no cyanosis, no edema.   CNS    AAxO   Lines: piv    Radiology:  Laboratory and Tests Review:  Lab Results   Component Value Date    WBC 15.7 (H) 2019    WBC 8.5 2019    WBC 11.9 (H) 2018    HGB 15.0 2019    HCT 43.8

## 2019-09-18 NOTE — CARE COORDINATION
SS NOTE: [facility] will accept pt and they do have the [facility] orders. Pt does plan on returning home at [facility]. SW will await PT/OT recommendations for any further dch need. Gustavo Pablo. 9/18/2019.3:29 PM.

## 2019-09-18 NOTE — PROGRESS NOTES
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)    H/O bicuspid aortic valve    AI (aortic insufficiency)    S/P AVR (aortic valve replacement)    Anticoagulated on Coumadin    Aortic mural thrombus (HCC)    AAA (abdominal aortic aneurysm) without rupture (HCC)    Elevated liver enzymes    History of tobacco use    S/P CABG x 3    H/O aortic valve replacement with porcine valve    Prostate CA ([address]    Aortic valve disease    Pure hypercholesterolemia    Pacemaker    History of smoking 30 or more pack years    PAF (paroxysmal atrial fibrillation) (HCC)    Anticoagulated by anticoagulation treatment    COPD with acute exacerbation ([address]    S/P AAA repair using bifurcation graft    Cirrhosis ([address]    Sepsis secondary to CAP ([address]    Mild protein-calorie malnutrition ([address]       Patient seen for swallow therapy 15 minutes. patient educated regarding results of bedside swallow. Reviewed current solid/liquid consistency diet recommendation for   Regular consistency solids with  thin liquids, and discussed compensatory strategies to ensure safe PO intake. Reviewed aspiration precautions. Lengthy discussion with patient and family regarding need to slow rate of intake of due to respiratory issues. Patient and or family  indicated understanding of all information provided via satisfactory verbal response.     Jessica Wise MSCCC/SLP  Speech Language Pathologist  [id number]

## 2019-09-18 NOTE — PROGRESS NOTES
Patient states that he has always had trouble swallowing pills and that his primary care physician is aware that he chews all of his PO medications in order to \"get them down\"  The patient's wife confirmed that this has been a long time problem for the patient. I administered his morning extended release medications with applesauce. I educated the patient and wife on the importance of not crushing or chewing extended release medications. He was able to swallow those pills fine in applesauce. The remaining pills he did chew up and swallow with water.

## 2019-09-18 NOTE — PROGRESS NOTES
listed below have been reviewed prior to initiation of this evaluation.      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)    H/O bicuspid aortic valve    AI (aortic insufficiency)    S/P AVR (aortic valve replacement)    Anticoagulated on Coumadin    Aortic mural thrombus (HCC)    AAA (abdominal aortic aneurysm) without rupture (HCC)    Elevated liver enzymes    History of tobacco use    S/P CABG x 3    H/O aortic valve replacement with porcine valve    Prostate CA ([address]    Aortic valve disease    Pure hypercholesterolemia    Pacemaker    History of smoking 30 or more pack years    PAF (paroxysmal atrial fibrillation) (HCC)    Anticoagulated by anticoagulation treatment    COPD with acute exacerbation ([address]    S/P AAA repair using bifurcation graft    Cirrhosis ([address]    Sepsis secondary to CAP ([address]    Mild protein-calorie malnutrition ([address]       Raulito Munoz MSCCC/SLP  Speech Language Pathologist  [id number]

## 2019-09-18 NOTE — PLAN OF CARE
Problem: Falls - Risk of:  Goal: Will remain free from falls  Description  Will remain free from falls  Outcome: Met This Shift     Problem: Falls - Risk of:  Goal: Absence of physical injury  Description  Absence of physical injury  Outcome: Met This Shift     Problem: Cardiac:  Goal: Ability to maintain vital signs within normal range will improve  Description  Ability to maintain vital signs within normal range will improve  Outcome: Met This Shift     Problem: Airway Clearance - Ineffective:  Goal: Ability to maintain a clear airway will improve  Description  Ability to maintain a clear airway will improve  Outcome: Met This Shift     Problem: Gas Exchange - Impaired:  Goal: Levels of oxygenation will improve  Description  Levels of oxygenation will improve  Outcome: Met This Shift

## 2019-09-19 LAB
ANION GAP SERPL CALCULATED.3IONS-SCNC: 14 MMOL/L (ref 7–16)
ANTI-NUCLEAR ANTIBODY (ANA): NEGATIVE
BASOPHILS ABSOLUTE: 0.06 E9/L (ref 0–0.2)
BASOPHILS RELATIVE PERCENT: 0.7 % (ref 0–2)
BUN BLDV-MCNC: 14 MG/DL (ref 8–23)
CALCIUM SERPL-MCNC: 8.3 MG/DL (ref 8.6–10.2)
CHLORIDE BLD-SCNC: 107 MMOL/L (ref 98–107)
CO2: 17 MMOL/L (ref 22–29)
CREAT SERPL-MCNC: 1 MG/DL (ref 0.7–1.2)
CULTURE, RESPIRATORY: NORMAL
EOSINOPHILS ABSOLUTE: 0.11 E9/L (ref 0.05–0.5)
EOSINOPHILS RELATIVE PERCENT: 1.4 % (ref 0–6)
GFR AFRICAN AMERICAN: >60
GFR NON-AFRICAN AMERICAN: >60 ML/MIN/1.73
GLUCOSE BLD-MCNC: 100 MG/DL (ref 74–99)
HCT VFR BLD CALC: 33.9 % (ref 37–54)
HEMOGLOBIN: 11 G/DL (ref 12.5–16.5)
IMMATURE GRANULOCYTES #: 0.1 E9/L
IMMATURE GRANULOCYTES %: 1.2 % (ref 0–5)
LYMPHOCYTES ABSOLUTE: 0.94 E9/L (ref 1.5–4)
LYMPHOCYTES RELATIVE PERCENT: 11.6 % (ref 20–42)
MCH RBC QN AUTO: 31.6 PG (ref 26–35)
MCHC RBC AUTO-ENTMCNC: 32.4 % (ref 32–34.5)
MCV RBC AUTO: 97.4 FL (ref 80–99.9)
MONOCYTES ABSOLUTE: 0.56 E9/L (ref 0.1–0.95)
MONOCYTES RELATIVE PERCENT: 6.9 % (ref 2–12)
NEUTROPHILS ABSOLUTE: 6.36 E9/L (ref 1.8–7.3)
NEUTROPHILS RELATIVE PERCENT: 78.2 % (ref 43–80)
PDW BLD-RTO: 15.3 FL (ref 11.5–15)
PLATELET # BLD: 157 E9/L (ref 130–450)
PMV BLD AUTO: 10.4 FL (ref 7–12)
POTASSIUM SERPL-SCNC: 4.1 MMOL/L (ref 3.5–5)
RBC # BLD: 3.48 E12/L (ref 3.8–5.8)
SMEAR, RESPIRATORY: NORMAL
SODIUM BLD-SCNC: 138 MMOL/L (ref 132–146)
URINE CULTURE, ROUTINE: NORMAL
WBC # BLD: 8.1 E9/L (ref 4.5–11.5)

## 2019-09-19 PROCEDURE — 85025 COMPLETE CBC W/AUTO DIFF WBC: CPT

## 2019-09-19 PROCEDURE — 92526 ORAL FUNCTION THERAPY: CPT | Performed by: SPEECH-LANGUAGE PATHOLOGIST

## 2019-09-19 PROCEDURE — 1200000000 HC SEMI PRIVATE

## 2019-09-19 PROCEDURE — 6370000000 HC RX 637 (ALT 250 FOR IP): Performed by: INTERNAL MEDICINE

## 2019-09-19 PROCEDURE — 87450 HC DIRECT STREP B ANTIGEN: CPT

## 2019-09-19 PROCEDURE — 36415 COLL VENOUS BLD VENIPUNCTURE: CPT

## 2019-09-19 PROCEDURE — 97165 OT EVAL LOW COMPLEX 30 MIN: CPT

## 2019-09-19 PROCEDURE — 6360000002 HC RX W HCPCS: Performed by: INTERNAL MEDICINE

## 2019-09-19 PROCEDURE — 94640 AIRWAY INHALATION TREATMENT: CPT

## 2019-09-19 PROCEDURE — 80048 BASIC METABOLIC PNL TOTAL CA: CPT

## 2019-09-19 PROCEDURE — 97530 THERAPEUTIC ACTIVITIES: CPT

## 2019-09-19 PROCEDURE — 97110 THERAPEUTIC EXERCISES: CPT | Performed by: SPEECH-LANGUAGE PATHOLOGIST

## 2019-09-19 PROCEDURE — 2580000003 HC RX 258: Performed by: INTERNAL MEDICINE

## 2019-09-19 PROCEDURE — 2700000000 HC OXYGEN THERAPY PER DAY

## 2019-09-19 RX ADMIN — METOPROLOL SUCCINATE 25 MG: 25 TABLET, FILM COATED, EXTENDED RELEASE ORAL at 10:25

## 2019-09-19 RX ADMIN — Medication 10 ML: at 21:47

## 2019-09-19 RX ADMIN — Medication 10 ML: at 10:26

## 2019-09-19 RX ADMIN — SALINE NASAL SPRAY 1 SPRAY: 1.5 SOLUTION NASAL at 00:13

## 2019-09-19 RX ADMIN — IPRATROPIUM BROMIDE AND ALBUTEROL SULFATE 1 AMPULE: 2.5; .5 SOLUTION RESPIRATORY (INHALATION) at 00:34

## 2019-09-19 RX ADMIN — MOMETASONE FUROATE AND FORMOTEROL FUMARATE DIHYDRATE 2 PUFF: 200; 5 AEROSOL RESPIRATORY (INHALATION) at 19:53

## 2019-09-19 RX ADMIN — IPRATROPIUM BROMIDE AND ALBUTEROL SULFATE 1 AMPULE: 2.5; .5 SOLUTION RESPIRATORY (INHALATION) at 19:53

## 2019-09-19 RX ADMIN — POTASSIUM CHLORIDE AND SODIUM CHLORIDE: 900; 150 INJECTION, SOLUTION INTRAVENOUS at 17:17

## 2019-09-19 RX ADMIN — SALINE NASAL SPRAY 1 SPRAY: 1.5 SOLUTION NASAL at 12:17

## 2019-09-19 RX ADMIN — APIXABAN 5 MG: 5 TABLET, FILM COATED ORAL at 21:47

## 2019-09-19 RX ADMIN — LISINOPRIL 2.5 MG: 2.5 TABLET ORAL at 10:24

## 2019-09-19 RX ADMIN — IPRATROPIUM BROMIDE AND ALBUTEROL SULFATE 1 AMPULE: 2.5; .5 SOLUTION RESPIRATORY (INHALATION) at 12:54

## 2019-09-19 RX ADMIN — IPRATROPIUM BROMIDE AND ALBUTEROL SULFATE 1 AMPULE: 2.5; .5 SOLUTION RESPIRATORY (INHALATION) at 09:17

## 2019-09-19 RX ADMIN — MOMETASONE FUROATE AND FORMOTEROL FUMARATE DIHYDRATE 2 PUFF: 200; 5 AEROSOL RESPIRATORY (INHALATION) at 05:19

## 2019-09-19 RX ADMIN — ASPIRIN 81 MG: 81 TABLET, COATED ORAL at 10:25

## 2019-09-19 RX ADMIN — SALINE NASAL SPRAY 1 SPRAY: 1.5 SOLUTION NASAL at 18:46

## 2019-09-19 RX ADMIN — LEVOFLOXACIN 500 MG: 500 TABLET, FILM COATED ORAL at 10:25

## 2019-09-19 RX ADMIN — APIXABAN 5 MG: 5 TABLET, FILM COATED ORAL at 10:25

## 2019-09-19 RX ADMIN — POTASSIUM CHLORIDE AND SODIUM CHLORIDE: 900; 150 INJECTION, SOLUTION INTRAVENOUS at 05:41

## 2019-09-19 RX ADMIN — IPRATROPIUM BROMIDE AND ALBUTEROL SULFATE 1 AMPULE: 2.5; .5 SOLUTION RESPIRATORY (INHALATION) at 05:19

## 2019-09-19 ASSESSMENT — PAIN SCALES - GENERAL
PAINLEVEL_OUTOF10: 0

## 2019-09-19 NOTE — PROGRESS NOTES
Occupational Therapy  Occupational Therapy Initial Assessment      Date:2019  Patient Name: Rocio Kim  MRN: [id number]  : 1945  Room: [address] [place] [address]    Evaluating OT: JUAN Pan/L [id number]    Recommendation for Placement: subacute vs HHOT pending progress  Recommended Adaptive Equipment: shower seat and grab bars - TBD   AM-PAC Daily Activity Raw Score:     Diagnosis:   1. Hepatic cirrhosis, unspecified hepatic cirrhosis type, unspecified whether ascites present ([facility]    2.  Pneumonia due to organism      Pertinent Medical History:   Past Medical History:   Diagnosis Date    Aortic stenosis, mild     Aortic valve sclerosis     moderate    Atrial fibrillation ([facility]     CAD (coronary artery disease)     CHF (congestive heart failure) (HCC)     COPD (chronic obstructive pulmonary disease) (HCC)     Gallbladder attack     History of tobacco use 2015    Hyperlipidemia     Hypertension     Mild mitral regurgitation     Prostate cancer ([facility]     44 treatments of radiation    PVD (peripheral vascular disease) with claudication ([facility] 2015    RBBB     Tobacco abuse       Precautions: Falls, alarm, extremely anxious, impulsivity d/t being anxious     Home Living: Pt lives with spouse in a 1 story condo, 2+1 steps to enter no rail  Bathroom Setup: walkin shower  Equipment owned: none    Prior Level of Function: independent with ADLs, independent with IADLs; ambulated with no device independently  Driving: yes  Occupation: retired    Pain Level: pt c/o 0/10 pain this session  Cognition: A&O: 4/4; Follows 1-2 step directions, pt extremely anxious;impulsive   Memory:  good     Sequencing:  fair     Problem solving:  fair     Judgement/safety:  fair       Functional Assessment:   Initial Eval Status  Date: 19 Treatment Status  Date: Short Term Goals  Treatment frequency: PRN   Feeding Independent       Grooming Minimal Assist   Independent    UB Dressing Minimal Assist Independent    LB Dressing Maximal Assist   Modified [place]    Bathing Moderate Assist  Modified [place]    Toileting Moderate Assist   Modified [place]    Bed Mobility  Supine to sit: Minimal Assist with HOB elevated  Sit to supine: Minimal Assist   Supine to sit: Independent   Sit to supine: Independent    Functional Transfers Minimal Assist sit to stand from EOB with wheeled walker  Independent    Functional Mobility N/t d/t pt extremely anxious/panicky with c/o SOB. SpO2 on RA 94-97% with activity. Nursing notified of pt's extreme panic, RN in to assess pt. Modified [place] with AAD   Balance Sitting:     Static: Stand by Assist    Dynamic: Min A  Standing: Min A with wheeled walker     Activity Tolerance Poor, limited by SOB/anxiety  fair   Visual/  Perceptual Glasses: yes                UE Assessment:  Hand Dominance: Right []  Left []   Strength ROM Additional Info:    RUE  n/t d/t pt anxious WFL good  and wfl FMC/dexterity noted during ADL tasks   LUE n/t d/t pt anxious WFL good  and wfl FMC/dexterity noted during ADL tasks     Hearing: WFL  Sensation:  No c/o numbness or tingling  Tone: WNL  Edema: none noted                            Comments/Treatment: OK from RN to see patient. Upon arrival, patient supine in bed with spouse present. OT eval completed. Therapist facilitated: Bed mobility, sitting balance at EOB for 10 minutes to increase dynamic sitting balance and activity tolerance, transfer training with verbal cues for hand placement, static standing balance. Pt with increasing anxiety/SOB at end of session, pt instructed on PLB and to slow breathing down, nursing notified and in to assess pt. Pt supine in bed at end of eval.  Call light and phone in reach. Bed alarm activated, RN and spouse present. All lines and tubes intact.  Pt would benefit from continued skilled OT to increase safety and independence with completion of ADL/IADL tasks for functional independence

## 2019-09-19 NOTE — PROGRESS NOTES
PROGRESS NOTE    By Anali Buck D.O GI Fellow    [facility]  Dr. Demetrio Loving MD, Dr. Kenyatta Metcalf MD, Dr Mayur Ramirez, Dr. Cris Kim MD, Dr. Mara Manning,       [place] Arturs [facility]  74 y.o.  male    SUBJECTIVE: Pt resting comfortably this morning         /80   Pulse 95   Temp 97.5 °F (36.4 °C) (Axillary)   Resp 20   Ht 5' 8\" (1.727 m)   Wt 176 lb (79.8 kg)   SpO2 93%   BMI 26.76 kg/m²     Gen: NAD, AAO x 3  HEENT:PEERL, no icterus  Heart: RRR, no M/R/G  Lungs: CTAB  Abd.: soft, NT, ND, BS +, no G/R, no HSM  Extr.: no C/C/E, no bruising      Stool (measured) : 0 mL  Lab Results   Component Value Date    WBC 9.4 09/18/2019    WBC 15.7 09/16/2019    WBC 8.5 01/14/2019    HGB 11.4 09/18/2019    HGB 15.0 09/16/2019    HGB 14.6 01/14/2019    HCT 34.3 09/18/2019    MCV 95.8 09/18/2019    RDW 14.9 09/18/2019     09/18/2019     09/16/2019     01/14/2019     Lab Results   Component Value Date     09/18/2019    K 3.9 09/18/2019    K 3.7 09/16/2019     09/18/2019    CO2 20 09/18/2019    BUN 17 09/18/2019    CREATININE 1.1 09/18/2019    CALCIUM 8.5 09/18/2019    PROT 6.6 09/18/2019    LABALBU 2.8 09/18/2019    BILITOT 2.7 09/18/2019    BILITOT 4.9 09/16/2019    BILITOT 2.3 01/14/2019    ALKPHOS 88 09/18/2019    ALKPHOS 117 09/16/2019    ALKPHOS 103 01/14/2019    AST 45 09/18/2019    AST 34 09/16/2019    AST 19 01/14/2019    ALT 19 09/18/2019    ALT 17 09/16/2019    ALT 14 01/14/2019     Lab Results   Component Value Date    LIPASE 19 09/16/2019    LIPASE 31 11/17/2015    LIPASE 35 08/11/2015     Lab Results   Component Value Date    AMYLASE 63 11/17/2015         ASSESSMENT/PLAN:  1.   Elevated Bilirubin  -no evidence of obstruction with known dilated CBD  -Bilirubin trending down; nonelevated  alkaline phosphatase  - Ct abdomen and RUQ negative for pathology; CBD within   normal limits   - liver serology is pending         Other comorbidities- per

## 2019-09-19 NOTE — PROGRESS NOTES
Speech Language Pathology      NAME:  Sanchez Blackburn  :  1945  DATE: 2019  ROOM:  [facility]/[id number]    patient seen for swallow therapy 30 minutes  Patient still reports poor intake. Session focused on education regarding pacing self during PO intake. Discussed need to rest and allow respiratory status to stabilize following exertion before attempting to take PO items. During session patient was able to demonstrate good implementation of the strategies. Discussed slow pursed lip breathing when episodes of cough stimulated a gagging sensation.   Wife present for part of session  Will continue POC    Cirrhosis ([facility] [K74.60]  Sepsis ([facility] [A41.9]    Gaines Favre MSCCC/SLP  Speech Language Pathologist  [id number]

## 2019-09-19 NOTE — PROGRESS NOTES
suspicious osteolytic or osteoblastic lesions. No fracture identified. 1. No acute findings. Us Gallbladder Ruq    Result Date: 9/17/2019  Location:200 Exam: US GALLBLADDER RUQ Comparison: None History:  Right upper quadrant pain Technique: Real-time, gray scale, color flow images of the right upper quadrant was obtained. Findings: The liver is normal  in echogenicity. No intrahepatic biliary ductal dilation seen. The majority of the pancreas is obscured by bowel gas. The visualized portions of the pancreas are unremarkable. The gallbladder is unremarkable without stones or sludge. No focal wall thickening seen. Sonographic Su sign was negative. The common bile duct measures 4.0 mm in greatest dimension. Screening examination of the right kidney is within normal limits. 1. Unremarkable right upper quadrant ultrasound. Xr Chest 1 Vw    Result Date: 9/16/2019  LOCATION: 200 EXAM: XR CHEST 1 VIEW COMPARISON: 1/14/2019 HISTORY:  chest pain chest pain TECHNIQUE: Single frontal view of the chest was obtained. FINDINGS:  SUPPORT DEVICES: Pacemaker leads intact and properly positioned unchanged. . LUNGS: Dense infiltrate left lung base since prior examination favoring pneumonia. Jonelle Loose PLEURA: No effusions or pneumothorax. LUNG VOLUMES: Satisfactory inspirator effort. MEDIASTINAL STRUCTURES: No lymphadenopathy. Normal aortic contour. HEART SIZE: Normal. BONES AND SOFT TISSUES: No fracture or soft tissue abnormality. 1. Extensive left pneumonic infiltrate, new since prior examination     Us Retroperitoneal Limited    Result Date: 9/3/2019  LOCATION: 200 EXAM: US RETROPERITONEAL LIMITED COMPARISON: None HISTORY: Wellness exam Technique: Real-time, gray scale, color flow of the kidneys and bladder was obtained. Findings: Right kidney: The right kidney measures 10.4 x 5.2 x 4.6 cm. Cortical thickness and echogenicity is within normal limits bilaterally.  No hydronephrosis, perinephric fluid collections, or

## 2019-09-20 ENCOUNTER — APPOINTMENT (OUTPATIENT)
Dept: CT IMAGING | Age: 74
DRG: 871 | End: 2019-09-20
Payer: MEDICARE

## 2019-09-20 LAB
ANION GAP SERPL CALCULATED.3IONS-SCNC: 13 MMOL/L (ref 7–16)
ANTI-MITOCHONDRIAL AB, IFA: NEGATIVE
BASOPHILS ABSOLUTE: 0.09 E9/L (ref 0–0.2)
BASOPHILS RELATIVE PERCENT: 0.8 % (ref 0–2)
BUN BLDV-MCNC: 14 MG/DL (ref 8–23)
CALCIUM SERPL-MCNC: 8.4 MG/DL (ref 8.6–10.2)
CERULOPLASMIN: 39 MG/DL (ref 15–30)
CHLORIDE BLD-SCNC: 107 MMOL/L (ref 98–107)
CO2: 18 MMOL/L (ref 22–29)
CREAT SERPL-MCNC: 0.8 MG/DL (ref 0.7–1.2)
EOSINOPHILS ABSOLUTE: 0.11 E9/L (ref 0.05–0.5)
EOSINOPHILS RELATIVE PERCENT: 1 % (ref 0–6)
GFR AFRICAN AMERICAN: >60
GFR NON-AFRICAN AMERICAN: >60 ML/MIN/1.73
GLUCOSE BLD-MCNC: 92 MG/DL (ref 74–99)
HCT VFR BLD CALC: 34.8 % (ref 37–54)
HEMOGLOBIN: 11.1 G/DL (ref 12.5–16.5)
IMMATURE GRANULOCYTES #: 0.19 E9/L
IMMATURE GRANULOCYTES %: 1.8 % (ref 0–5)
LYMPHOCYTES ABSOLUTE: 0.99 E9/L (ref 1.5–4)
LYMPHOCYTES RELATIVE PERCENT: 9.3 % (ref 20–42)
MCH RBC QN AUTO: 31.2 PG (ref 26–35)
MCHC RBC AUTO-ENTMCNC: 31.9 % (ref 32–34.5)
MCV RBC AUTO: 97.8 FL (ref 80–99.9)
MONOCYTES ABSOLUTE: 0.59 E9/L (ref 0.1–0.95)
MONOCYTES RELATIVE PERCENT: 5.5 % (ref 2–12)
NEUTROPHILS ABSOLUTE: 8.68 E9/L (ref 1.8–7.3)
NEUTROPHILS RELATIVE PERCENT: 81.6 % (ref 43–80)
PDW BLD-RTO: 15.3 FL (ref 11.5–15)
PLATELET # BLD: 173 E9/L (ref 130–450)
PMV BLD AUTO: 10.1 FL (ref 7–12)
POTASSIUM SERPL-SCNC: 4.7 MMOL/L (ref 3.5–5)
RBC # BLD: 3.56 E12/L (ref 3.8–5.8)
SMOOTH MUSCLE ANTIBODY: NEGATIVE
SODIUM BLD-SCNC: 138 MMOL/L (ref 132–146)
STREP PNEUMONIAE ANTIGEN, URINE: NORMAL
WBC # BLD: 10.7 E9/L (ref 4.5–11.5)

## 2019-09-20 PROCEDURE — 36415 COLL VENOUS BLD VENIPUNCTURE: CPT

## 2019-09-20 PROCEDURE — 97530 THERAPEUTIC ACTIVITIES: CPT | Performed by: PHYSICAL THERAPIST

## 2019-09-20 PROCEDURE — 85025 COMPLETE CBC W/AUTO DIFF WBC: CPT

## 2019-09-20 PROCEDURE — 80048 BASIC METABOLIC PNL TOTAL CA: CPT

## 2019-09-20 PROCEDURE — 1200000000 HC SEMI PRIVATE

## 2019-09-20 PROCEDURE — 6360000002 HC RX W HCPCS: Performed by: INTERNAL MEDICINE

## 2019-09-20 PROCEDURE — 94640 AIRWAY INHALATION TREATMENT: CPT

## 2019-09-20 PROCEDURE — 6370000000 HC RX 637 (ALT 250 FOR IP): Performed by: INTERNAL MEDICINE

## 2019-09-20 PROCEDURE — 97161 PT EVAL LOW COMPLEX 20 MIN: CPT | Performed by: PHYSICAL THERAPIST

## 2019-09-20 PROCEDURE — 72132 CT LUMBAR SPINE W/DYE: CPT

## 2019-09-20 PROCEDURE — 2580000003 HC RX 258: Performed by: INTERNAL MEDICINE

## 2019-09-20 PROCEDURE — 6360000004 HC RX CONTRAST MEDICATION: Performed by: RADIOLOGY

## 2019-09-20 RX ADMIN — IPRATROPIUM BROMIDE AND ALBUTEROL SULFATE 1 AMPULE: 2.5; .5 SOLUTION RESPIRATORY (INHALATION) at 06:12

## 2019-09-20 RX ADMIN — MOMETASONE FUROATE AND FORMOTEROL FUMARATE DIHYDRATE 2 PUFF: 200; 5 AEROSOL RESPIRATORY (INHALATION) at 06:11

## 2019-09-20 RX ADMIN — Medication 10 ML: at 08:55

## 2019-09-20 RX ADMIN — IPRATROPIUM BROMIDE AND ALBUTEROL SULFATE 1 AMPULE: 2.5; .5 SOLUTION RESPIRATORY (INHALATION) at 09:31

## 2019-09-20 RX ADMIN — IPRATROPIUM BROMIDE AND ALBUTEROL SULFATE 1 AMPULE: 2.5; .5 SOLUTION RESPIRATORY (INHALATION) at 12:51

## 2019-09-20 RX ADMIN — APIXABAN 5 MG: 5 TABLET, FILM COATED ORAL at 21:01

## 2019-09-20 RX ADMIN — Medication 10 ML: at 21:01

## 2019-09-20 RX ADMIN — IOPAMIDOL 80 ML: 755 INJECTION, SOLUTION INTRAVENOUS at 18:30

## 2019-09-20 RX ADMIN — LISINOPRIL 2.5 MG: 2.5 TABLET ORAL at 08:55

## 2019-09-20 RX ADMIN — METOPROLOL SUCCINATE 25 MG: 25 TABLET, FILM COATED, EXTENDED RELEASE ORAL at 08:54

## 2019-09-20 RX ADMIN — POTASSIUM CHLORIDE AND SODIUM CHLORIDE: 900; 150 INJECTION, SOLUTION INTRAVENOUS at 15:06

## 2019-09-20 RX ADMIN — LEVOFLOXACIN 500 MG: 500 TABLET, FILM COATED ORAL at 08:54

## 2019-09-20 RX ADMIN — APIXABAN 5 MG: 5 TABLET, FILM COATED ORAL at 08:55

## 2019-09-20 RX ADMIN — MOMETASONE FUROATE AND FORMOTEROL FUMARATE DIHYDRATE 2 PUFF: 200; 5 AEROSOL RESPIRATORY (INHALATION) at 17:02

## 2019-09-20 RX ADMIN — IPRATROPIUM BROMIDE AND ALBUTEROL SULFATE 1 AMPULE: 2.5; .5 SOLUTION RESPIRATORY (INHALATION) at 16:58

## 2019-09-20 RX ADMIN — IPRATROPIUM BROMIDE AND ALBUTEROL SULFATE 1 AMPULE: 2.5; .5 SOLUTION RESPIRATORY (INHALATION) at 21:40

## 2019-09-20 RX ADMIN — POTASSIUM CHLORIDE AND SODIUM CHLORIDE: 900; 150 INJECTION, SOLUTION INTRAVENOUS at 03:13

## 2019-09-20 RX ADMIN — ASPIRIN 81 MG: 81 TABLET, COATED ORAL at 08:55

## 2019-09-20 ASSESSMENT — PAIN SCALES - GENERAL
PAINLEVEL_OUTOF10: 0
PAINLEVEL_OUTOF10: 0

## 2019-09-20 NOTE — PROGRESS NOTES
symmetrical; scattered wheezes and coarse; no wheezes; no rhonchi; no rales  Abdomen:  soft, non-tender, non-distended; bowel sounds positive; no organomegaly or masses; no pain on palpation  Extremities:  peripheral pulses present; no peripheral edema; no ulcers  Neurologic:  alert and oriented x 3; no focal deficit; cranial nerves grossly intact  Skin:  no petechia; no hemorrhage; no wounds    Medications  Scheduled Meds    sodium chloride flush  10 mL Intravenous 2 times per day    influenza virus vaccine  0.5 mL Intramuscular Once    apixaban  5 mg Oral BID    aspirin EC  81 mg Oral Daily    lisinopril  2.5 mg Oral Daily    metoprolol succinate  25 mg Oral Daily    mometasone-formoterol  2 puff Inhalation BID    ipratropium-albuterol  1 ampule Inhalation Q4H WA    levofloxacin  500 mg Oral Daily     Infusion Meds    0.9% NaCl with KCl 20 mEq 100 mL/hr at 09/20/19 0313     PRN Meds sodium chloride, sodium chloride flush, acetaminophen, albuterol, ondansetron    Laboratory Data  Recent Results (from the past 24 hour(s))   CBC Auto Differential    Collection Time: 09/20/19  7:59 AM   Result Value Ref Range    WBC 10.7 4.5 - 11.5 E9/L    RBC 3.56 (L) 3.80 - 5.80 E12/L    Hemoglobin 11.1 (L) 12.5 - 16.5 g/dL    Hematocrit 34.8 (L) 37.0 - 54.0 %    MCV 97.8 80.0 - 99.9 fL    MCH 31.2 26.0 - 35.0 pg    MCHC 31.9 (L) 32.0 - 34.5 %    RDW 15.3 (H) 11.5 - 15.0 fL    Platelets 425 532 - 396 E9/L    MPV 10.1 7.0 - 12.0 fL    Neutrophils % 81.6 (H) 43.0 - 80.0 %    Immature Granulocytes % 1.8 0.0 - 5.0 %    Lymphocytes % 9.3 (L) 20.0 - 42.0 %    Monocytes % 5.5 2.0 - 12.0 %    Eosinophils % 1.0 0.0 - 6.0 %    Basophils % 0.8 0.0 - 2.0 %    Neutrophils Absolute 8.68 (H) 1.80 - 7.30 E9/L    Immature Granulocytes # 0.19 E9/L    Lymphocytes Absolute 0.99 (L) 1.50 - 4.00 E9/L    Monocytes Absolute 0.59 0.10 - 0.95 E9/L    Eosinophils Absolute 0.11 0.05 - 0.50 E9/L    Basophils Absolute 0.09 0.00 - 0.20 E9/L   Basic

## 2019-09-20 NOTE — PROGRESS NOTES
enter with Rail    Prior Level of Function: Patient ambulated independently  No device   Equipment owned: None,       Mentation: alert, cooperative, oriented x 3  and follows directions,      ROM: wfl    STRENGTH: 4-/5  PAIN: (measured on a visual analog scale with 0=no pain and 10=excruciating pain) 0/10. FUNCTIONAL ASSESSMENT   Bed Mobility- Supine to sit- Moderate assist          Scooting- Moderate assist       Sit to supine-  not assessed the patient is up in the chair; nursing aware             Transfers-Sit to stand- Moderate assist     Gait:  Patient ambulated 1-2 small steps   using wheeled walker with Moderate assist     Steps:  Not assessed      Balance: sit-good         stand fair  with wheeled walker     Edema: no  Endurance: poor hr 130's, 93-96%    Treatment: eval , standing activity  Therapist educated and facilitated patient on techniques to increase safety and independence with bed mobility, balance, functional transfers, and functional mobility. Sat edge of bed to increase dynamic sitting balance and activity tolerance. Patient demonstrating fair   understanding of education/techniques, requiring additional training/education. At end of session, patient in chair with alarm call light and phone within reach,   all lines and tubes intact, nursing notified. Pt would benefit from continued skilled PT to increase functional independence and quality of life. Rehab Potential: good  for baseline  Patients Goal: rehab      ASSESSMENT  Patient exhibits decreased strength, balance, coordination impairing functional mobility. GOALS to be met in 2 days.    Bed mobility-  Independent        Transfers-Sit to stand-Supervision      Gait:  Patient to ambulate 40 feet using wheeled walker with Supervision     Steps: Patient to go up and down 3  step(s) using 1 rail(s) with  Supervision          Increase strength in affected mm groups by 1/3 grade  Increase balance to allow for improvement towards functional goals. Increase endurance to allow for improvement towards functional goals.        Vassie Mcburney, PT

## 2019-09-20 NOTE — CARE COORDINATION
[address] Transitions Interview     2019    Patient: Lauryn Mcfarlane Patient : 1945   MRN: [id number]  Reason for Admission: Sepsis secondary to CAP  RARS: Readmission Risk Score: 16         Spoke with: Lauryn Mcfarlane and patient wife ([place])      Readmission Risk  Patient Active Problem List   Diagnosis    Hyperlipidemia with target LDL less than 100    BPH (benign prostatic hyperplasia)    ECG abnormal    Abnormal nuclear stress test    AS (aortic stenosis)    SOB (shortness of breath)    RBBB (right bundle branch block)    Anxiety    CAD (coronary artery disease)    Pulmonary emphysema (HCC)    AV block, Mobitz II    AV block, 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)    H/O bicuspid aortic valve    AI (aortic insufficiency)    S/P AVR (aortic valve replacement)    Anticoagulated on Coumadin    Aortic mural thrombus (HCC)    AAA (abdominal aortic aneurysm) without rupture (HCC)    Elevated liver enzymes    History of tobacco use    S/P CABG x 3    H/O aortic valve replacement with porcine valve    Prostate CA ([facility]    Aortic valve disease    Pure hypercholesterolemia    Pacemaker    History of smoking 30 or more pack years    PAF (paroxysmal atrial fibrillation) (HCC)    Anticoagulated by anticoagulation treatment    COPD with acute exacerbation ([address]    S/P AAA repair using bifurcation graft    Cirrhosis ([facility]    Sepsis secondary to CAP ([facility]    Mild protein-calorie malnutrition [facility])       Inpatient Assessment  Care Transitions Summary    Care Transitions Inpatient Review  Medication Review  Do you have all of your prescriptions and are they filled?:  Yes   Are you able to afford your medications?:  Yes  How often do you have difficulty taking your medications?:  I always take them as prescribed.   Housing Review  Who do you live with?:  Partner/Spouse/SO  Are you an active caregiver in your home?:  Yes  For whom are you the caregiver?:  1 inside cat  [place] [id number] you have a ?:  No  Do you have a [facility]?:  No  Durable Medical Equipment  Patient Home Equipment:  Nebulizer  Functional Review  Ability to seek help/take action for Emergent/Urgent situations i.e. fire, crime, inclement weather or health crisis. :  Independent  Ability handle personal hygiene needs (bathing/dressing/grooming): Independent  Ability to manage medications: Independent  Ability to prepare food:  Independent  Ability to maintain home (clean home, laundry): Independent  Ability to drive and/or has transportation:  Independent  Ability to do shopping:  Independent  Ability to manage finances: Independent  Is patient able to live independently?:  Yes  Hearing and Vision  Visual Impairment:  Visual impairment (Glasses/contacts)  Hearing Impairment:  Hard of hearing  Care Transitions Interventions  No Identified Needs       Met with patient, wife Silvano Jose) and son Bright Marrufo) today 9/20/19 at bedside. Explained role and reason for visit. Patient is receptive to be followed for Care Transition by CTN post hospital discharge. Confirmed with patient he follows with Dr. Lauryn Rosas for primary care, Dr. Serjio Caballero for cardiology, Dr. Terry Hall for pulmonology at [facility], Dr. Lisa Dykes for urology and has followed with Dr. [facility] in past for vascular surgery. Patient reports having a multitude of complaints including dizziness, confusion, increased shortness of breath, cough, off/on chest pain, chills and fever that started last week leading his family to bring to hospital. States having a fall at home one day PTA but denies any injury or hitting of head. Initial labs noted include: WBC= 15.7, Anion Gap= 17, Glucose= 111, Total Bilirubin= 4.9, Direct Bilirubin= 2.3, Indirect Bilirubin= 2.6, and positive urine legionella. CXR obtained on admission noted to show extensive left pneumonia infiltrate. Patient Admitted to Telemetry for Sepsis secondary to Community Acquired Pneumonia.

## 2019-09-21 LAB
ANION GAP SERPL CALCULATED.3IONS-SCNC: 15 MMOL/L (ref 7–16)
BASOPHILS ABSOLUTE: 0 E9/L (ref 0–0.2)
BASOPHILS RELATIVE PERCENT: 1 % (ref 0–2)
BUN BLDV-MCNC: 18 MG/DL (ref 8–23)
CALCIUM SERPL-MCNC: 8.7 MG/DL (ref 8.6–10.2)
CHLORIDE BLD-SCNC: 105 MMOL/L (ref 98–107)
CO2: 15 MMOL/L (ref 22–29)
CREAT SERPL-MCNC: 0.9 MG/DL (ref 0.7–1.2)
EOSINOPHILS ABSOLUTE: 0.42 E9/L (ref 0.05–0.5)
EOSINOPHILS RELATIVE PERCENT: 3.5 % (ref 0–6)
GFR AFRICAN AMERICAN: >60
GFR NON-AFRICAN AMERICAN: >60 ML/MIN/1.73
GLUCOSE BLD-MCNC: 93 MG/DL (ref 74–99)
HCT VFR BLD CALC: 35.9 % (ref 37–54)
HEMOGLOBIN: 11.8 G/DL (ref 12.5–16.5)
LYMPHOCYTES ABSOLUTE: 1.19 E9/L (ref 1.5–4)
LYMPHOCYTES RELATIVE PERCENT: 9.6 % (ref 20–42)
MCH RBC QN AUTO: 31.6 PG (ref 26–35)
MCHC RBC AUTO-ENTMCNC: 32.9 % (ref 32–34.5)
MCV RBC AUTO: 96.2 FL (ref 80–99.9)
MONOCYTES ABSOLUTE: 0.24 E9/L (ref 0.1–0.95)
MONOCYTES RELATIVE PERCENT: 1.8 % (ref 2–12)
NEUTROPHILS ABSOLUTE: 10.12 E9/L (ref 1.8–7.3)
NEUTROPHILS RELATIVE PERCENT: 85.1 % (ref 43–80)
PDW BLD-RTO: 15.2 FL (ref 11.5–15)
PLATELET # BLD: 222 E9/L (ref 130–450)
PMV BLD AUTO: 10.4 FL (ref 7–12)
POTASSIUM SERPL-SCNC: 4.9 MMOL/L (ref 3.5–5)
RBC # BLD: 3.73 E12/L (ref 3.8–5.8)
SEDIMENTATION RATE, ERYTHROCYTE: 115 MM/HR (ref 0–15)
SODIUM BLD-SCNC: 135 MMOL/L (ref 132–146)
TOTAL CK: 168 U/L (ref 20–200)
WBC # BLD: 11.9 E9/L (ref 4.5–11.5)

## 2019-09-21 PROCEDURE — 94640 AIRWAY INHALATION TREATMENT: CPT

## 2019-09-21 PROCEDURE — 36415 COLL VENOUS BLD VENIPUNCTURE: CPT

## 2019-09-21 PROCEDURE — 80048 BASIC METABOLIC PNL TOTAL CA: CPT

## 2019-09-21 PROCEDURE — 2580000003 HC RX 258: Performed by: INTERNAL MEDICINE

## 2019-09-21 PROCEDURE — 82550 ASSAY OF CK (CPK): CPT

## 2019-09-21 PROCEDURE — 85025 COMPLETE CBC W/AUTO DIFF WBC: CPT

## 2019-09-21 PROCEDURE — 6370000000 HC RX 637 (ALT 250 FOR IP): Performed by: INTERNAL MEDICINE

## 2019-09-21 PROCEDURE — 85651 RBC SED RATE NONAUTOMATED: CPT

## 2019-09-21 PROCEDURE — 1200000000 HC SEMI PRIVATE

## 2019-09-21 RX ADMIN — Medication 10 ML: at 20:39

## 2019-09-21 RX ADMIN — IPRATROPIUM BROMIDE AND ALBUTEROL SULFATE 1 AMPULE: 2.5; .5 SOLUTION RESPIRATORY (INHALATION) at 13:01

## 2019-09-21 RX ADMIN — APIXABAN 5 MG: 5 TABLET, FILM COATED ORAL at 09:11

## 2019-09-21 RX ADMIN — ASPIRIN 81 MG: 81 TABLET, COATED ORAL at 09:10

## 2019-09-21 RX ADMIN — IPRATROPIUM BROMIDE AND ALBUTEROL SULFATE 1 AMPULE: 2.5; .5 SOLUTION RESPIRATORY (INHALATION) at 21:18

## 2019-09-21 RX ADMIN — LEVOFLOXACIN 500 MG: 500 TABLET, FILM COATED ORAL at 09:11

## 2019-09-21 RX ADMIN — APIXABAN 5 MG: 5 TABLET, FILM COATED ORAL at 20:39

## 2019-09-21 RX ADMIN — METOPROLOL SUCCINATE 25 MG: 25 TABLET, FILM COATED, EXTENDED RELEASE ORAL at 09:10

## 2019-09-21 RX ADMIN — Medication 10 ML: at 09:00

## 2019-09-21 RX ADMIN — IPRATROPIUM BROMIDE AND ALBUTEROL SULFATE 1 AMPULE: 2.5; .5 SOLUTION RESPIRATORY (INHALATION) at 05:14

## 2019-09-21 RX ADMIN — IPRATROPIUM BROMIDE AND ALBUTEROL SULFATE 1 AMPULE: 2.5; .5 SOLUTION RESPIRATORY (INHALATION) at 09:15

## 2019-09-21 RX ADMIN — MOMETASONE FUROATE AND FORMOTEROL FUMARATE DIHYDRATE 2 PUFF: 200; 5 AEROSOL RESPIRATORY (INHALATION) at 05:14

## 2019-09-21 RX ADMIN — LISINOPRIL 2.5 MG: 2.5 TABLET ORAL at 09:10

## 2019-09-21 ASSESSMENT — PAIN SCALES - GENERAL
PAINLEVEL_OUTOF10: 0

## 2019-09-21 NOTE — PLAN OF CARE
Problem: Gas Exchange - Impaired:  Goal: Levels of oxygenation will improve  Description  Levels of oxygenation will improve  Outcome: Met This Shift     Problem: Hyperthermia:  Goal: Ability to maintain a body temperature in the normal range will improve  Description  Ability to maintain a body temperature in the normal range will improve  Outcome: Met This Shift

## 2019-09-21 NOTE — PROGRESS NOTES
Medication:  Scheduled Meds:   sodium chloride flush  10 mL Intravenous 2 times per day    influenza virus vaccine  0.5 mL Intramuscular Once    apixaban  5 mg Oral BID    aspirin EC  81 mg Oral Daily    lisinopril  2.5 mg Oral Daily    metoprolol succinate  25 mg Oral Daily    mometasone-formoterol  2 puff Inhalation BID    ipratropium-albuterol  1 ampule Inhalation Q4H WA    levofloxacin  500 mg Oral Daily     Continuous Infusions:    Objective Data:  CBC:   Recent Labs     09/19/19  0836 09/20/19 0759 09/21/19 0753   WBC 8.1 10.7 11.9*   HGB 11.0* 11.1* 11.8*    173 222     BMP:    Recent Labs     09/19/19  0836 09/20/19 0759 09/21/19 0753    138 135   K 4.1 4.7 4.9    107 105   CO2 17* 18* 15*   BUN 14 14 18   CREATININE 1.0 0.8 0.9   GLUCOSE 100* 92 93     CMP:    Lab Results   Component Value Date     09/21/2019    K 4.9 09/21/2019    K 3.7 09/16/2019     09/21/2019    CO2 15 09/21/2019    BUN 18 09/21/2019    CREATININE 0.9 09/21/2019    GFRAA >60 09/21/2019    LABGLOM >60 09/21/2019    GLUCOSE 93 09/21/2019    PROT 6.6 09/18/2019    LABALBU 2.8 09/18/2019    CALCIUM 8.7 09/21/2019    BILITOT 2.7 09/18/2019    ALKPHOS 88 09/18/2019    AST 45 09/18/2019    ALT 19 09/18/2019     Hepatic: No results for input(s): AST, ALT, ALB, BILITOT, ALKPHOS in the last 72 hours. Troponin: No results for input(s): TROPONINI in the last 72 hours. BNP: No results for input(s): BNP in the last 72 hours. Lipids: No results for input(s): CHOL, HDL in the last 72 hours. Invalid input(s): LDLCALCU  ABGs: No results found for: PHART, PO2ART, [id number]  INR: No results for input(s): INR, PROTIME in the last 72 hours. RAD: Ct Abdomen Pelvis W Iv Contrast Additional Contrast? None    Result Date: 9/17/2019  LOCATION:200 EXAM: CT ABDOMEN PELVIS W IV CONTRAST COMPARISON: None HISTORY: Abdominal pain TECHNIQUE:Contrast-enhanced helical abdomen and pelvis CT was performed.   Coronal and decision making and intervention. I reviewed the relevant imaging studies and available reports. I also discussed the differential diagnosis and all of the proposed management plans with the patient and individuals accompanying the patient to this visit. I reviewed the relevant imaging studies and available reports. Macario Dolan DO, F.A.C.O.I.   On 9/21/2019  4:12 PM

## 2019-09-21 NOTE — PROGRESS NOTES
WBC 9.4 09/18/2019    HGB 11.1 (L) 09/20/2019    HCT 34.8 (L) 09/20/2019    MCV 97.8 09/20/2019     09/20/2019     Lab Results   Component Value Date    NEUTROABS 8.68 (H) 09/20/2019    NEUTROABS 6.36 09/19/2019    NEUTROABS 7.18 09/18/2019     No results found for: CRPHS  Lab Results   Component Value Date    ALT 19 09/18/2019    AST 45 (H) 09/18/2019    GGT 31 09/18/2019    ALKPHOS 88 09/18/2019    BILITOT 2.7 (H) 09/18/2019     Lab Results   Component Value Date     09/20/2019    K 4.7 09/20/2019    K 3.7 09/16/2019     09/20/2019    CO2 18 09/20/2019    BUN 14 09/20/2019    CREATININE 0.8 09/20/2019    CREATININE 1.0 09/19/2019    CREATININE 1.1 09/18/2019    GFRAA >60 09/20/2019    LABGLOM >60 09/20/2019    GLUCOSE 92 09/20/2019    PROT 6.6 09/18/2019    LABALBU 2.8 09/18/2019    CALCIUM 8.4 09/20/2019    BILITOT 2.7 09/18/2019    ALKPHOS 88 09/18/2019    AST 45 09/18/2019    ALT 19 09/18/2019     No results found for: CRP  No results found for: [address]    Microbiology:   Lab Results   Component Value Date    BC 24 Hours- no growth 09/18/2019    BC 24 Hours- no growth 09/16/2019    ORG Presumptive Legionella Urine Antigen 09/17/2019     Lab Results   Component Value Date    BLOODCULT2 24 Hours- no growth 09/18/2019    BLOODCULT2 24 Hours- no growth 09/16/2019    ORG Presumptive Legionella Urine Antigen 09/17/2019     No results found for: WNDABS  Smear, Respiratory   Date Value Ref Range Status   09/17/2019   Final     Group 6: <25 PMN's/LPF and <25 Epithelial cells/LPF  Few Polymorphonuclear leukocytes  Epithelial cells not seen  Rare Gram positive diplococci  Rare Gram negative rods       No results found for: MPNEUMO, CLAMYDCU, LABLEGI, AFBCX, FUNGSM, LABFUNG  CULTURE, RESPIRATORY   Date Value Ref Range Status   09/17/2019 Oral Pharyngeal Danielle present  Final     No results found for: CXCATHTIP  No results found for: BFCS  No results found for: CXSURG  Urine Culture, Routine   Date Value

## 2019-09-21 NOTE — PLAN OF CARE
Problem: Falls - Risk of:  Goal: Will remain free from falls  Description  Will remain free from falls  Outcome: Met This Shift  Goal: Absence of physical injury  Description  Absence of physical injury  Outcome: Met This Shift     Problem: Cardiac:  Goal: Ability to maintain vital signs within normal range will improve  Description  Ability to maintain vital signs within normal range will improve  Outcome: Met This Shift  Goal: Cardiovascular alteration will improve  Description  Cardiovascular alteration will improve  Outcome: Met This Shift     Problem: Health Behavior:  Goal: Will modify at least one risk factor affecting health status  Description  Will modify at least one risk factor affecting health status  Outcome: Met This Shift  Goal: Identification of resources available to assist in meeting health care needs will improve  Description  Identification of resources available to assist in meeting health care needs will improve  Outcome: Met This Shift     Problem: Physical Regulation:  Goal: Complications related to the disease process, condition or treatment will be avoided or minimized  Description  Complications related to the disease process, condition or treatment will be avoided or minimized  Outcome: Met This Shift     Problem: Discharge Planning:  Goal: Discharged to appropriate level of care  Description  Discharged to appropriate level of care  Outcome: Met This Shift  Goal: Participates in care planning  Description  Participates in care planning  Outcome: Met This Shift     Problem: Airway Clearance - Ineffective:  Goal: Clear lung sounds  Description  Clear lung sounds  Outcome: Met This Shift  Goal: Ability to maintain a clear airway will improve  Description  Ability to maintain a clear airway will improve  Outcome: Met This Shift     Problem: Gas Exchange - Impaired:  Goal: Levels of oxygenation will improve  Description  Levels of oxygenation will improve  Outcome: Met This Shift

## 2019-09-21 NOTE — PROGRESS NOTES
[address] [place] [address] [facility]  Progress Note  Chief Complaint   Patient presents with    Abdominal Pain     SUBJECTIVE:    Patient is seen and examined at bedside. He is visibly short of breath, but tolerating Room air without pulse ox decreasing. He is unable to tolerating physical therapy. Will be placed in SNF  No cough, fever, chills, nausea, vomiting, diarrhea, rash  He is tolerating Levaquin without side effects. Review of systems:  As stated above in the chief complaint, otherwise negative. Medications:  Scheduled Meds:   sodium chloride flush  10 mL Intravenous 2 times per day    influenza virus vaccine  0.5 mL Intramuscular Once    apixaban  5 mg Oral BID    aspirin EC  81 mg Oral Daily    lisinopril  2.5 mg Oral Daily    metoprolol succinate  25 mg Oral Daily    mometasone-formoterol  2 puff Inhalation BID    ipratropium-albuterol  1 ampule Inhalation Q4H WA    levofloxacin  500 mg Oral Daily     Continuous Infusions:    PRN Meds:sodium chloride, sodium chloride flush, acetaminophen, albuterol, ondansetron    OBJECTIVE:  /76   Pulse 104   Temp 98.2 °F (36.8 °C) (Oral)   Resp 22   Ht 5' 8\" (1.727 m)   Wt 176 lb (79.8 kg)   SpO2 94%   BMI 26.76 kg/m²   Temp  Av.1 °F (36.7 °C)  Min: 98 °F (36.7 °C)  Max: 98.2 °F (36.8 °C)  Constitutional:  The patient is awake, alert, and oriented. Skin:    Warm and dry. No rashes were noted. HEENT:   Round and reactive pupils. AT/NC  Neck:    Supple to movements. Chest:   No use of accessory muscles to breathe. Symmetrical expansion. SOB with exertion. No cough  Cardiovascular:  S1 and S2 are rhythmic and regular. No murmurs appreciated. Abdomen:   Positive bowel sounds to auscultation. Benign to palpation. N   Extremities:   No clubbing, no cyanosis, no edema.   CNS    AAxO   Lines: piv    Radiology:  Laboratory and Tests Review:  Lab Results   Component Value Date    WBC 11.9 (H) 2019    WBC 10.7

## 2019-09-22 LAB
ANION GAP SERPL CALCULATED.3IONS-SCNC: 13 MMOL/L (ref 7–16)
BASOPHILS ABSOLUTE: 0.1 E9/L (ref 0–0.2)
BASOPHILS RELATIVE PERCENT: 0.9 % (ref 0–2)
BLOOD CULTURE, ROUTINE: NORMAL
BUN BLDV-MCNC: 20 MG/DL (ref 8–23)
BURR CELLS: ABNORMAL
CALCIUM SERPL-MCNC: 8.4 MG/DL (ref 8.6–10.2)
CHLORIDE BLD-SCNC: 105 MMOL/L (ref 98–107)
CO2: 20 MMOL/L (ref 22–29)
CREAT SERPL-MCNC: 1 MG/DL (ref 0.7–1.2)
CULTURE, BLOOD 2: NORMAL
EOSINOPHILS ABSOLUTE: 0.19 E9/L (ref 0.05–0.5)
EOSINOPHILS RELATIVE PERCENT: 1.7 % (ref 0–6)
GFR AFRICAN AMERICAN: >60
GFR NON-AFRICAN AMERICAN: >60 ML/MIN/1.73
GLUCOSE BLD-MCNC: 105 MG/DL (ref 74–99)
HCT VFR BLD CALC: 35.7 % (ref 37–54)
HEMOGLOBIN: 11.8 G/DL (ref 12.5–16.5)
LYMPHOCYTES ABSOLUTE: 1.32 E9/L (ref 1.5–4)
LYMPHOCYTES RELATIVE PERCENT: 12.2 % (ref 20–42)
MCH RBC QN AUTO: 31.8 PG (ref 26–35)
MCHC RBC AUTO-ENTMCNC: 33.1 % (ref 32–34.5)
MCV RBC AUTO: 96.2 FL (ref 80–99.9)
METAMYELOCYTES RELATIVE PERCENT: 1.7 % (ref 0–1)
MONOCYTES ABSOLUTE: 0.44 E9/L (ref 0.1–0.95)
MONOCYTES RELATIVE PERCENT: 4.3 % (ref 2–12)
MYELOCYTE PERCENT: 1.7 % (ref 0–0)
NEUTROPHILS ABSOLUTE: 8.91 E9/L (ref 1.8–7.3)
NEUTROPHILS RELATIVE PERCENT: 77.4 % (ref 43–80)
NUCLEATED RED BLOOD CELLS: 0.9 /100 WBC
PDW BLD-RTO: 15.5 FL (ref 11.5–15)
PLATELET # BLD: 261 E9/L (ref 130–450)
PMV BLD AUTO: 10.1 FL (ref 7–12)
POIKILOCYTES: ABNORMAL
POTASSIUM SERPL-SCNC: 4.3 MMOL/L (ref 3.5–5)
RBC # BLD: 3.71 E12/L (ref 3.8–5.8)
SODIUM BLD-SCNC: 138 MMOL/L (ref 132–146)
WBC # BLD: 11 E9/L (ref 4.5–11.5)

## 2019-09-22 PROCEDURE — 99222 1ST HOSP IP/OBS MODERATE 55: CPT | Performed by: NEUROLOGICAL SURGERY

## 2019-09-22 PROCEDURE — 2580000003 HC RX 258: Performed by: INTERNAL MEDICINE

## 2019-09-22 PROCEDURE — 94640 AIRWAY INHALATION TREATMENT: CPT

## 2019-09-22 PROCEDURE — 97116 GAIT TRAINING THERAPY: CPT

## 2019-09-22 PROCEDURE — 80048 BASIC METABOLIC PNL TOTAL CA: CPT

## 2019-09-22 PROCEDURE — 97110 THERAPEUTIC EXERCISES: CPT

## 2019-09-22 PROCEDURE — 85025 COMPLETE CBC W/AUTO DIFF WBC: CPT

## 2019-09-22 PROCEDURE — 1200000000 HC SEMI PRIVATE

## 2019-09-22 PROCEDURE — 6370000000 HC RX 637 (ALT 250 FOR IP): Performed by: INTERNAL MEDICINE

## 2019-09-22 PROCEDURE — 36415 COLL VENOUS BLD VENIPUNCTURE: CPT

## 2019-09-22 RX ADMIN — LISINOPRIL 2.5 MG: 2.5 TABLET ORAL at 08:30

## 2019-09-22 RX ADMIN — APIXABAN 5 MG: 5 TABLET, FILM COATED ORAL at 20:18

## 2019-09-22 RX ADMIN — IPRATROPIUM BROMIDE AND ALBUTEROL SULFATE 1 AMPULE: 2.5; .5 SOLUTION RESPIRATORY (INHALATION) at 17:56

## 2019-09-22 RX ADMIN — APIXABAN 5 MG: 5 TABLET, FILM COATED ORAL at 08:30

## 2019-09-22 RX ADMIN — MOMETASONE FUROATE AND FORMOTEROL FUMARATE DIHYDRATE 2 PUFF: 200; 5 AEROSOL RESPIRATORY (INHALATION) at 17:57

## 2019-09-22 RX ADMIN — ASPIRIN 81 MG: 81 TABLET, COATED ORAL at 08:30

## 2019-09-22 RX ADMIN — Medication 10 ML: at 20:18

## 2019-09-22 RX ADMIN — LEVOFLOXACIN 500 MG: 500 TABLET, FILM COATED ORAL at 08:30

## 2019-09-22 RX ADMIN — IPRATROPIUM BROMIDE AND ALBUTEROL SULFATE 1 AMPULE: 2.5; .5 SOLUTION RESPIRATORY (INHALATION) at 10:10

## 2019-09-22 RX ADMIN — IPRATROPIUM BROMIDE AND ALBUTEROL SULFATE 1 AMPULE: 2.5; .5 SOLUTION RESPIRATORY (INHALATION) at 13:49

## 2019-09-22 RX ADMIN — IPRATROPIUM BROMIDE AND ALBUTEROL SULFATE 1 AMPULE: 2.5; .5 SOLUTION RESPIRATORY (INHALATION) at 05:08

## 2019-09-22 RX ADMIN — Medication 10 ML: at 08:53

## 2019-09-22 RX ADMIN — MOMETASONE FUROATE AND FORMOTEROL FUMARATE DIHYDRATE 2 PUFF: 200; 5 AEROSOL RESPIRATORY (INHALATION) at 05:08

## 2019-09-22 RX ADMIN — METOPROLOL SUCCINATE 25 MG: 25 TABLET, FILM COATED, EXTENDED RELEASE ORAL at 08:30

## 2019-09-22 ASSESSMENT — ENCOUNTER SYMPTOMS
ABDOMINAL PAIN: 1
SHORTNESS OF BREATH: 0
BACK PAIN: 0
TROUBLE SWALLOWING: 0
PHOTOPHOBIA: 0

## 2019-09-22 ASSESSMENT — PAIN SCALES - GENERAL
PAINLEVEL_OUTOF10: 0

## 2019-09-22 NOTE — PROGRESS NOTES
Levine, Susan. \Hospital Has a New Name and Outlook.\"" filed at 9/22/2019 1317  Gross per 24 hour   Intake 660 ml   Output 130 ml   Net 530 ml   I/O last 3 completed shifts: In: 5 [P.O.:540]  Out: 280 [Urine:280]  No data found. Vital Signs:   Blood pressure 124/80, pulse 88, temperature 97.5 °F (36.4 °C), temperature source Oral, resp. rate 20, height 5' 8\" (1.727 m), weight 176 lb (79.8 kg), SpO2 92 %. Carmel Quinonez is a 74 y. o.  male who is alert, responsive, oriented to person, place, and time. General appearance:   Well preserved, alert, no distress. Head:  Normocephalic. No masses, lesions or tenderness. Eyes:  PERRLA. EOMI. Sclera clear. Buccal mucosa moist.  ENT:  Ears normal. Mucosa normal.  Neck:    Supple. Trachea midline. No thyromegaly. No JVD. No bruits. Heart:    Rhythm regular. Rate controlled. No murmurs. Lungs:    Diminished. Clear to auscultation bilaterally. No wheezes. No rhonchi. No rales. Abdomen:   Soft. Non-tender. Non-distended. Bowel sounds positive. No organomegaly or masses. No pain on palpation. Extremities:    Peripheral pulses present. No peripheral edema. No ulcers. No cyanosis. No clubbing. Neurologic:    Alert x 3. No focal deficit. Cranial nerves grossly intact. Weakness of the lower extremity   Psych:   Behavior is normal. Mood appears normal. Speech is not rapid and/or pressured. Musculoskeletal:   Spine ROM normal. Muscular strength intact. Gait not assessed. Integumentary:  No rashes  Skin normal color and texture.   Genitalia/Breast:  Deferred      Allergy:  Allergies   Allergen Reactions    Pneumovax [Pneumococcal Polysaccharide Vaccine] Dermatitis        Medication:  Scheduled Meds:   sodium chloride flush  10 mL Intravenous 2 times per day    influenza virus vaccine  0.5 mL Intramuscular Once    apixaban  5 mg Oral BID    aspirin EC  81 mg Oral Daily    lisinopril  2.5 mg Oral Daily    metoprolol succinate  25 mg Oral Daily    mometasone-formoterol  2 puff Inhalation BID and pain clinic. From a pulmonary standpoint of view the patient does have resolving sepsis secondary to Legionella disease. Continue antibiotic therapy.  is waiting precertification for nursing home for rehab. Activity as tolerated. Therapies to evaluate and treat. 30 minutes of critical care time was spent with the patient. This includes chart review, , reviewing rhythm strips, and discussion with those consultants involved in the patient's care. I reviewed the patient's past medical, surgical history and medication. Patient's medications were reviewed/continued/adjusted. Labs as ordered. Please see orders for further plan of care. Rhythm strips reviewed as well as consultant recommendations/notes and/or discussion. I reviewed the  course of events since last visit. More than 50% of my  time was spent at the bedside counseling and/or coordination of care with the patient and/or family with face to face contact. This time was spent reviewing notes and laboratory data, instructing and counseling the patient. Time I spent with the family or surrogate(s) is included only if the patient was incapable of providing the necessary information or participating in medical decisionsI also discussed the differential diagnosis and all of the proposed management plans with the patient and individuals accompanying the patient. Juan Drown requires this high level of physician care and nursing in the IMC/Telemetry due the complexity of decision management and chance of rapid decline or death. Continued cardiac monitoring and higher level of nursing are required. I am ready available for decision making and intervention. I reviewed the relevant imaging studies and available reports. I also discussed the differential diagnosis and all of the proposed management plans with the patient and individuals accompanying the patient to this visit. I reviewed the relevant imaging studies and

## 2019-09-22 NOTE — PROGRESS NOTES
Joie Apgar, charge nurse, notified about patient having complaints of feeling like his heart was \"beating fast\" just recently after getting back to bed from having a BM on bedside commode. Patient stated he hasn't \"felt this way before. \" Vitals were taken and pulse ox was assessed while patient was sitting at the edge of the bed. Patient denied any complaints of chest pain. Patient was taught to breathe easy. Helped patient get back in bed. Patient stated felt better. Will continue to monitor the patient.     Electronically signed by Catina Cotton RN on 2019 at 12:09 AM

## 2019-09-22 NOTE — PLAN OF CARE
Makayla Piña RN  Outcome: Met This Shift     Problem: Discharge Planning:  Goal: Participates in care planning  Description  Participates in care planning  9/22/2019 0424 by Alfredo Diaz RN  Outcome: Met This Shift  9/21/2019 1840 by Deep Loyd RN  Outcome: Met This Shift     Problem: Airway Clearance - Ineffective:  Goal: Ability to maintain a clear airway will improve  Description  Ability to maintain a clear airway will improve  9/22/2019 0424 by Alfredo Diaz RN  Outcome: Met This Shift  9/21/2019 1840 by Deep Loyd RN  Outcome: Met This Shift     Problem: Gas Exchange - Impaired:  Goal: Levels of oxygenation will improve  Description  Levels of oxygenation will improve  9/22/2019 0424 by Alfredo Diaz RN  Outcome: Met This Shift  9/21/2019 1840 by Deep Loyd RN  Outcome: Met This Shift     Problem: Hyperthermia:  Goal: Ability to maintain a body temperature in the normal range will improve  Description  Ability to maintain a body temperature in the normal range will improve  9/22/2019 0424 by Alfredo Diaz RN  Outcome: Met This Shift  9/21/2019 1840 by Deep Loyd RN  Outcome: Met This Shift     Problem: Discharge Planning:  Goal: Discharged to appropriate level of care  Description  Discharged to appropriate level of care  9/22/2019 0424 by Alfredo Diaz RN  Outcome: Not Met This Shift  Note:   Pt.  Not discharged yet  9/21/2019 1840 by Deep Loyd RN  Outcome: Met This Shift     Problem: Airway Clearance - Ineffective:  Goal: Clear lung sounds  Description  Clear lung sounds  9/22/2019 0424 by Alfredo Diaz RN  Outcome: Not Met This Shift  Note:   Lungs clear-diminished this shift  9/21/2019 1840 by Deep Loyd RN  Outcome: Met This Shift

## 2019-09-22 NOTE — PROGRESS NOTES
go up and down 3 step(s) using 1 rail(s) with  Supervision           Increase strength in affected mm groups by 1/3 grade  Increase balance to allow for improvement towards functional goals. Increase endurance to allow for improvement towards functional goals.

## 2019-09-23 VITALS
WEIGHT: 176 LBS | DIASTOLIC BLOOD PRESSURE: 81 MMHG | HEIGHT: 68 IN | OXYGEN SATURATION: 94 % | TEMPERATURE: 98 F | SYSTOLIC BLOOD PRESSURE: 111 MMHG | BODY MASS INDEX: 26.67 KG/M2 | RESPIRATION RATE: 18 BRPM | HEART RATE: 88 BPM

## 2019-09-23 LAB
ANION GAP SERPL CALCULATED.3IONS-SCNC: 12 MMOL/L (ref 7–16)
BASOPHILS ABSOLUTE: 0 E9/L (ref 0–0.2)
BASOPHILS RELATIVE PERCENT: 1.2 % (ref 0–2)
BLOOD CULTURE, ROUTINE: NORMAL
BUN BLDV-MCNC: 17 MG/DL (ref 8–23)
BURR CELLS: ABNORMAL
CALCIUM SERPL-MCNC: 8.3 MG/DL (ref 8.6–10.2)
CHLORIDE BLD-SCNC: 103 MMOL/L (ref 98–107)
CO2: 20 MMOL/L (ref 22–29)
CREAT SERPL-MCNC: 1 MG/DL (ref 0.7–1.2)
CULTURE, BLOOD 2: NORMAL
EOSINOPHILS ABSOLUTE: 0.09 E9/L (ref 0.05–0.5)
EOSINOPHILS RELATIVE PERCENT: 0.9 % (ref 0–6)
GFR AFRICAN AMERICAN: >60
GFR NON-AFRICAN AMERICAN: >60 ML/MIN/1.73
GLUCOSE BLD-MCNC: 96 MG/DL (ref 74–99)
HCT VFR BLD CALC: 33.9 % (ref 37–54)
HEMOGLOBIN: 11 G/DL (ref 12.5–16.5)
LYMPHOCYTES ABSOLUTE: 1.55 E9/L (ref 1.5–4)
LYMPHOCYTES RELATIVE PERCENT: 15.7 % (ref 20–42)
MCH RBC QN AUTO: 31.7 PG (ref 26–35)
MCHC RBC AUTO-ENTMCNC: 32.4 % (ref 32–34.5)
MCV RBC AUTO: 97.7 FL (ref 80–99.9)
METAMYELOCYTES RELATIVE PERCENT: 6.1 % (ref 0–1)
MONOCYTES ABSOLUTE: 0.48 E9/L (ref 0.1–0.95)
MONOCYTES RELATIVE PERCENT: 5.2 % (ref 2–12)
MYELOCYTE PERCENT: 1.7 % (ref 0–0)
NEUTROPHILS ABSOLUTE: 7.57 E9/L (ref 1.8–7.3)
NEUTROPHILS RELATIVE PERCENT: 70.4 % (ref 43–80)
NUCLEATED RED BLOOD CELLS: 1.7 /100 WBC
PDW BLD-RTO: 15.6 FL (ref 11.5–15)
PLATELET # BLD: 273 E9/L (ref 130–450)
PMV BLD AUTO: 10.1 FL (ref 7–12)
POIKILOCYTES: ABNORMAL
POLYCHROMASIA: ABNORMAL
POTASSIUM SERPL-SCNC: 4.1 MMOL/L (ref 3.5–5)
RBC # BLD: 3.47 E12/L (ref 3.8–5.8)
SODIUM BLD-SCNC: 135 MMOL/L (ref 132–146)
WBC # BLD: 9.7 E9/L (ref 4.5–11.5)

## 2019-09-23 PROCEDURE — 97110 THERAPEUTIC EXERCISES: CPT

## 2019-09-23 PROCEDURE — 97535 SELF CARE MNGMENT TRAINING: CPT

## 2019-09-23 PROCEDURE — 97530 THERAPEUTIC ACTIVITIES: CPT

## 2019-09-23 PROCEDURE — 6370000000 HC RX 637 (ALT 250 FOR IP): Performed by: INTERNAL MEDICINE

## 2019-09-23 PROCEDURE — 94640 AIRWAY INHALATION TREATMENT: CPT

## 2019-09-23 PROCEDURE — 1200000000 HC SEMI PRIVATE

## 2019-09-23 PROCEDURE — 92526 ORAL FUNCTION THERAPY: CPT | Performed by: SPEECH-LANGUAGE PATHOLOGIST

## 2019-09-23 PROCEDURE — 36415 COLL VENOUS BLD VENIPUNCTURE: CPT

## 2019-09-23 PROCEDURE — 85025 COMPLETE CBC W/AUTO DIFF WBC: CPT

## 2019-09-23 PROCEDURE — 80048 BASIC METABOLIC PNL TOTAL CA: CPT

## 2019-09-23 PROCEDURE — 97116 GAIT TRAINING THERAPY: CPT

## 2019-09-23 PROCEDURE — 2580000003 HC RX 258: Performed by: INTERNAL MEDICINE

## 2019-09-23 RX ORDER — FUROSEMIDE 40 MG/1
40 TABLET ORAL DAILY
Qty: 180 TABLET | Refills: 1 | Status: SHIPPED | OUTPATIENT
Start: 2019-09-23 | End: 2019-12-03 | Stop reason: DRUGHIGH

## 2019-09-23 RX ORDER — ALBUTEROL SULFATE 2.5 MG/3ML
2.5 SOLUTION RESPIRATORY (INHALATION) EVERY 6 HOURS PRN
Qty: 120 EACH | Refills: 3 | Status: SHIPPED | OUTPATIENT
Start: 2019-09-23 | End: 2019-10-07 | Stop reason: ALTCHOICE

## 2019-09-23 RX ORDER — IPRATROPIUM BROMIDE AND ALBUTEROL SULFATE 2.5; .5 MG/3ML; MG/3ML
3 SOLUTION RESPIRATORY (INHALATION)
Qty: 360 ML | DISCHARGE
Start: 2019-09-23 | End: 2019-10-03

## 2019-09-23 RX ADMIN — LEVOFLOXACIN 500 MG: 500 TABLET, FILM COATED ORAL at 08:48

## 2019-09-23 RX ADMIN — IPRATROPIUM BROMIDE AND ALBUTEROL SULFATE 1 AMPULE: 2.5; .5 SOLUTION RESPIRATORY (INHALATION) at 08:23

## 2019-09-23 RX ADMIN — IPRATROPIUM BROMIDE AND ALBUTEROL SULFATE 1 AMPULE: 2.5; .5 SOLUTION RESPIRATORY (INHALATION) at 04:43

## 2019-09-23 RX ADMIN — LISINOPRIL 2.5 MG: 2.5 TABLET ORAL at 08:47

## 2019-09-23 RX ADMIN — APIXABAN 5 MG: 5 TABLET, FILM COATED ORAL at 08:48

## 2019-09-23 RX ADMIN — IPRATROPIUM BROMIDE AND ALBUTEROL SULFATE 1 AMPULE: 2.5; .5 SOLUTION RESPIRATORY (INHALATION) at 16:29

## 2019-09-23 RX ADMIN — IPRATROPIUM BROMIDE AND ALBUTEROL SULFATE 1 AMPULE: 2.5; .5 SOLUTION RESPIRATORY (INHALATION) at 13:07

## 2019-09-23 RX ADMIN — APIXABAN 5 MG: 5 TABLET, FILM COATED ORAL at 20:13

## 2019-09-23 RX ADMIN — ASPIRIN 81 MG: 81 TABLET, COATED ORAL at 08:48

## 2019-09-23 RX ADMIN — MOMETASONE FUROATE AND FORMOTEROL FUMARATE DIHYDRATE 2 PUFF: 200; 5 AEROSOL RESPIRATORY (INHALATION) at 04:42

## 2019-09-23 RX ADMIN — Medication 10 ML: at 08:52

## 2019-09-23 RX ADMIN — MOMETASONE FUROATE AND FORMOTEROL FUMARATE DIHYDRATE 2 PUFF: 200; 5 AEROSOL RESPIRATORY (INHALATION) at 16:29

## 2019-09-23 RX ADMIN — IPRATROPIUM BROMIDE AND ALBUTEROL SULFATE 1 AMPULE: 2.5; .5 SOLUTION RESPIRATORY (INHALATION) at 22:45

## 2019-09-23 RX ADMIN — METOPROLOL SUCCINATE 25 MG: 25 TABLET, FILM COATED, EXTENDED RELEASE ORAL at 08:48

## 2019-09-23 RX ADMIN — Medication 10 ML: at 20:13

## 2019-09-23 ASSESSMENT — PAIN SCALES - GENERAL
PAINLEVEL_OUTOF10: 0
PAINLEVEL_OUTOF10: 0

## 2019-09-23 NOTE — DISCHARGE SUMMARY
Internal Medicine  Discharge Summary    NAME: Woody Farley  :  1945  MRN:  [id number]  PCP:Hermelindo Hammond DO  ADMITTED: 2019      DISCHARGED: 19    ADMITTING PHYSICIAN: Viktoriya Marrero DO    CONSULTANT(S):   IP CONSULT TO INTERNAL MEDICINE  IP CONSULT TO GI  IP CONSULT TO INFECTIOUS DISEASES  IP CONSULT TO SOCIAL WORK  IP CONSULT TO NEUROSURGERY     ADMITTING DIAGNOSIS:   Cirrhosis ([facility] [address] [K74.60]  Sepsis ([facility] [address] [A41.9]     DISCHARGE DIAGNOSES:   1. Sepsis secondary to extensive community-acquired pneumonia resulting in acute respiratory failure with hypoxia with cultures positive for Legionella  2. Elevated bilirubin  3. Coronary artery disease  4. Essential hypertension  5. Hyperlipidemia  6. Non-oxygen dependent COPD with chronic respiratory failure  7. Chronic atrial fibrillation on anticoagulation therapy  8. Weakness of the lower extremities showing what appear to be L3-L4 discogenic disease with the existing nerve root contact    BRIEF HISTORY OF PRESENT ILLNESS:   Jason Redd is a polite and pleasant 58-year-old gentleman who presented to [address] [place] [address] emergency department for overall illness. Work-up in the emergency department revealed extensive left pneumonia. Liver function studies were also elevated but radiographic studies did not indicate an acute intra-abdominal process. The patient denies any recent sick contacts. He does have a long-standing history of COPD. He is not an abuser of tobacco.  He continues to have shortness of breath on my examination. He is maintained on nasal cannula oxygen. He has chronic coughing without sputum production. He is feeling better than on presentation. His wife was present for the examination and updated accordingly.     LABS[de-identified]  Lab Results   Component Value Date    WBC 9.7 2019    HGB 11.0 (L) 2019    HCT 33.9 (L) 2019     2019     2019    K 4.1 2019     2019    CREATININE 1.0 Contrast Additional Contrast? None    Result Date: 9/17/2019  LOCATION:200 EXAM: CT ABDOMEN PELVIS W IV CONTRAST COMPARISON: None HISTORY: Abdominal pain TECHNIQUE:Contrast-enhanced helical abdomen and pelvis CT was performed. Coronal and sagittal reconstructions also obtained. Automated dose control was used for this exam. CONTRAST: 80 mL Isovue-370 intravenous contrast was administered. FINDINGS: SUPPORT DEVICES: None LOWER THORAX: Limited evaluation of the lower chest demonstrates clear lung bases bilaterally. SOLID ORGANS: The liver, spleen, pancreas, and adrenal glands are normal. BILIARY SYSTEM: No evidence of biliary ductal dilatation. GENITOURINARY: The kidneys are normal in appearance bilaterally with no evidence of hydronephrosis. No stones are appreciated. The bladder is unremarkable. GASTROINTESTINAL: The stomach, small and large bowel are normal in caliber without evidence of focal wall thickening. There is no evidence of bowel obstruction. APPENDIX: Within normal limits without adjacent fat stranding. FLUID COLLECTIONS: None. LYMPH NODES: No adenopathy by size criteria. VASCULAR STRUCTURES: Aortobiiliac graft is identified. No contrast extravasation appreciated. ABDOMINAL WALL: Normal with no herniations. OSSEOUS AND SOFT TISSUE STRUCTURES: Bone windows demonstrate no suspicious osteolytic or osteoblastic lesions. No fracture identified. 1. No acute findings. Us Gallbladder Ruq    Result Date: 9/17/2019  Location:200 Exam: US GALLBLADDER RUQ Comparison: None History:  Right upper quadrant pain Technique: Real-time, gray scale, color flow images of the right upper quadrant was obtained. Findings: The liver is normal  in echogenicity. No intrahepatic biliary ductal dilation seen. The majority of the pancreas is obscured by bowel gas. The visualized portions of the pancreas are unremarkable. The gallbladder is unremarkable without stones or sludge. No focal wall thickening seen.  Sonographic Christina Ran sign was negative. The common bile duct measures 4.0 mm in greatest dimension. Screening examination of the right kidney is within normal limits. 1. Unremarkable right upper quadrant ultrasound. Xr Chest 1 Vw    Result Date: 9/16/2019  LOCATION: 200 EXAM: XR CHEST 1 VIEW COMPARISON: 1/14/2019 HISTORY:  chest pain chest pain TECHNIQUE: Single frontal view of the chest was obtained. FINDINGS:  SUPPORT DEVICES: Pacemaker leads intact and properly positioned unchanged. . LUNGS: Dense infiltrate left lung base since prior examination favoring pneumonia. Hernandez Trey PLEURA: No effusions or pneumothorax. LUNG VOLUMES: Satisfactory inspirator effort. MEDIASTINAL STRUCTURES: No lymphadenopathy. Normal aortic contour. HEART SIZE: Normal. BONES AND SOFT TISSUES: No fracture or soft tissue abnormality. 1. Extensive left pneumonic infiltrate, new since prior examination     Us Retroperitoneal Limited    Result Date: 9/3/2019  LOCATION: 200 EXAM: US RETROPERITONEAL LIMITED COMPARISON: None HISTORY: Wellness exam Technique: Real-time, gray scale, color flow of the kidneys and bladder was obtained. Findings: Right kidney: The right kidney measures 10.4 x 5.2 x 4.6 cm. Cortical thickness and echogenicity is within normal limits bilaterally. No hydronephrosis, perinephric fluid collections, or shadowing renal calculus is seen. Left Kidney: The left kidney measures 10.3 x 5.0 x 4.4 cm. Cortical thickness and echogenicity is within normal limits bilaterally. No hydronephrosis, perinephric fluid collections, or shadowing renal calculus is seen. The bladder appears unremarkable. IMPRESSION 1. Normal retroperitoneal ultrasound with attention to the kidneys. HOSPITAL COURSE:   Gabi Haider did very well throughout the hospital stay. He was found to be suffering from Legionella pneumonia.   Antibiotic therapy was employed at the discretion of the infectious disease team.  He was placed on nebulized respiratory medications and his

## 2019-09-23 NOTE — PROGRESS NOTES
P Quality Flow/Interdisciplinary Rounds Progress Note        Quality Flow Rounds held on September 23, 2019    Disciplines Attending:  Bedside Nurse, ,  and Nursing Unit Leadership    Mesfin Rogers was admitted on 9/16/2019  7:55 PM    Anticipated Discharge Date:  Expected Discharge Date: 09/19/19    Disposition:    Raheem Score:  Raheem Scale Score: 20    Readmission Risk              Risk of Unplanned Readmission:        16           Discussed patient goal for the day, patient clinical progression, and barriers to discharge.   The following Goal(s) of the Day/Commitment(s) have been identified:  Awaiting pre-cert [facility] Bryanna Davis  September 23, 2019

## 2019-09-23 NOTE — PLAN OF CARE
sounds  Description  Clear lung sounds  9/23/2019 0404 by Maye Valdez RN  Outcome: Not Met This Shift  Note:   Pt.'s lungs diminished still  9/22/2019 1502 by Valdo Seay RN  Outcome: Met This Shift

## 2019-09-23 NOTE — PROGRESS NOTES
Speech Language Pathology      NAME:  Gerald Schultz  :  1945  DATE: 2019  ROOM:  [id number]    Patient seen for swallow therapy 15 minutes. Wife present. They both report slowly improving intake with decreased SOB episodes. Patient very aware of need to pace self especially during intake however he does report that he has noticed improvement in the time it takes he to recover when he does become SOB. Not able to rule out silent aspiration at bedside during sessions patient has not had any clinical indicators of aspiration. If silent aspiration is suspected need MBSS order.   Will continue POC    Cirrhosis ([facility] [address] [K74.60]  Sepsis ([facility] [address] [A41.9]    Ovidio Prado MSCCC/SLP  Speech Language Pathologist  [id number]

## 2019-09-23 NOTE — PROGRESS NOTES
[address] [place] [address] [facility]  Progress Note    NAME:Kike Holloway  DATE:19    SUBJECTIVE:  Chief Complaint   Patient presents with    Abdominal Pain     F/U:  Legionella pneumonia    Patient is tolerating medications. No reported adverse drug reactions. No FEVERS, CHILLS, nausea, vomiting, diarrhea. Review of systems:  As stated above in the chief complaint, otherwise negative. Medications:  Scheduled Meds:   sodium chloride flush  10 mL Intravenous 2 times per day    influenza virus vaccine  0.5 mL Intramuscular Once    apixaban  5 mg Oral BID    aspirin EC  81 mg Oral Daily    lisinopril  2.5 mg Oral Daily    metoprolol succinate  25 mg Oral Daily    mometasone-formoterol  2 puff Inhalation BID    ipratropium-albuterol  1 ampule Inhalation Q4H WA    levofloxacin  500 mg Oral Daily     Continuous Infusions:  PRN Meds:sodium chloride, sodium chloride flush, acetaminophen, albuterol    OBJECTIVE:  /67   Pulse 86   Temp 97.9 °F (36.6 °C) (Oral)   Resp 18   Ht 5' 8\" (1.727 m)   Wt 176 lb (79.8 kg)   SpO2 95%   BMI 26.76 kg/m²   Temp  Av.8 °F (36.6 °C)  Min: 97.6 °F (36.4 °C)  Max: 97.9 °F (36.6 °C)  Constitutional:  The patient is awake, alert, and oriented. Skin:    Warm and dry. No rashes were noted. HEENT:   Round and reactive pupils. AT/NC  Neck:    Supple to movements. Chest:   No use of accessory muscles to breathe. Symmetrical expansion. No wheezing, crackles or rhonchi. Dec bs  Cardiovascular:  S1 and S2 are rhythmic and regular. No murmurs appreciated. Abdomen:   Positive bowel sounds to auscultation. Benign to palpation. No masses felt. No hepatosplenomegaly. Extremities:   No clubbing, no cyanosis, no edema.   CNS    AAxO   Lines: p    Radiology:  Laboratory and Tests Review:  Lab Results   Component Value Date    WBC 9.7 2019    WBC 11.0 2019    WBC 11.9 (H) 2019    HGB 11.0 (L) 2019    HCT 33.9 (L)

## 2019-09-23 NOTE — DISCHARGE INSTR - COC
Influenza Virus Vaccine 10/01/2014, 10/19/2017    Influenza Whole 10/19/2015    Influenza, High Dose (Fluzone 65 yrs and older) 10/02/2018    Pneumococcal Conjugate 13-valent ([id number]) 11/24/2015    Pneumococcal Polysaccharide ([id number]) 07/18/2017       Active Problems:  Patient Active Problem List   Diagnosis Code    Hyperlipidemia with target LDL less than 100 E78.5    BPH (benign prostatic hyperplasia) N40.0    ECG abnormal R94.31    Abnormal nuclear stress test R94.39    AS (aortic stenosis) I35.0    SOB (shortness of breath) R06.02    RBBB (right bundle branch block) I45.10    Anxiety F41.9    CAD (coronary artery disease) I25.10    Pulmonary emphysema (HCC) J43.9    AV block, Mobitz II I44.1    AV block, 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) I44.2    H/O bicuspid aortic valve Z87.74    AI (aortic insufficiency) I35.1    S/P AVR (aortic valve replacement) Z95.2    Anticoagulated on Coumadin Z79.01    Aortic mural thrombus (HCC) I74.10    AAA (abdominal aortic aneurysm) without rupture (HCC) I71.4    Elevated liver enzymes R74.8    History of tobacco use Z87.891    S/P CABG x 3 Z95.1    H/O aortic valve replacement with porcine valve Z95.3    Prostate CA ([address] C61    Aortic valve disease I35.9    Pure hypercholesterolemia E78.00    Pacemaker Z95.0    History of smoking 30 or more pack years Z87.891    PAF (paroxysmal atrial fibrillation) (HCC) I48.0    Anticoagulated by anticoagulation treatment Z79.01    COPD with acute exacerbation (HCC) J44.1    S/P AAA repair using bifurcation graft Z95.828, Z86.79    Cirrhosis (HCC) K74.60    Sepsis secondary to CAP (HCC) A41.9    Mild protein-calorie malnutrition (HCC) E44.1    Bilateral leg weakness R29.898       Isolation/Infection:   Isolation          No Isolation            Nurse Assessment:  Last Vital Signs: /67   Pulse 86   Temp 97.9 °F (36.6 °C) (Oral)   Resp 18   Ht 5' 8\" (1.727 m)   Wt 176 lb (79.8 kg)   SpO2 95%   BMI 26.76 kg/m²     Last documented pain score (0-10 scale): Pain Level: 0  Last Weight:   Wt Readings from Last 1 Encounters:   09/18/19 176 lb (79.8 kg)     Mental Status:  oriented, alert, thought processes intact and able to concentrate and follow conversation    IV Access:  - None    Nursing Mobility/ADLs:  Walking   Assisted  Transfer  Assisted  Bathing  Assisted  Dressing  Assisted  Toileting  Assisted  Feeding  Assisted  Med Admin  Assisted  Med Delivery   whole    Wound Care Documentation and Therapy:  Incision 07/16/18 Groin Right (Active)   Number of days: 434       Incision 07/16/18 Groin Left (Active)   Number of days: 434        Elimination:  Continence:   · Bowel: Yes  · Bladder: Yes  Urinary Catheter: None   Colostomy/Ileostomy/Ileal Conduit: No       Date of Last BM: 9-23-19      Intake/Output Summary (Last 24 hours) at 9/23/2019 1302  Last data filed at 9/23/2019 0945  Gross per 24 hour   Intake 570 ml   Output 150 ml   Net 420 ml     I/O last 3 completed shifts: In: 450 [P.O.:450]  Out: 150 [Urine:150]    Safety Concerns:     History of Falls (last 30 days)    Impairments/Disabilities:      None    Nutrition Therapy:  Current Nutrition Therapy:   - Oral Diet:  General    Routes of Feeding: Oral  Liquids: Thin Liquids  Daily Fluid Restriction: no  Last Modified Barium Swallow with Video (Video Swallowing Test): not done    Treatments at the Time of Hospital Discharge:   Respiratory Treatments: Ming Goode  Oxygen Therapy:  is not on home oxygen therapy.   Ventilator:    - No ventilator support    Rehab Therapies: Physical Therapy and Occupational Therapy  Weight Bearing Status/Restrictions: No weight bearing restirctions  Other Medical Equipment (for information only, NOT a DME order):  cane and walker  Other Treatments: ***    Patient's personal belongings (please select all that are sent with patient):  None    RN SIGNATURE:  Electronically signed by Denise Owen RN on 9/24/19 at 2:32 PM    CASE

## 2019-09-23 NOTE — PLAN OF CARE
Problem: Falls - Risk of:  Goal: Will remain free from falls  Description  Will remain free from falls  9/23/2019 1709 by Padmini Kam RN  Outcome: Met This Shift     Problem: Falls - Risk of:  Goal: Absence of physical injury  Description  Absence of physical injury  9/23/2019 1709 by Padmini Kam RN  Outcome: Met This Shift     Problem: Cardiac:  Goal: Ability to maintain vital signs within normal range will improve  Description  Ability to maintain vital signs within normal range will improve  9/23/2019 1709 by Padmini Kam RN  Outcome: Met This Shift

## 2019-09-24 LAB
ANION GAP SERPL CALCULATED.3IONS-SCNC: 12 MMOL/L (ref 7–16)
ANISOCYTOSIS: ABNORMAL
BASOPHILS ABSOLUTE: 0.07 E9/L (ref 0–0.2)
BASOPHILS RELATIVE PERCENT: 0.9 % (ref 0–2)
BUN BLDV-MCNC: 12 MG/DL (ref 8–23)
BURR CELLS: ABNORMAL
CALCIUM SERPL-MCNC: 8.6 MG/DL (ref 8.6–10.2)
CHLORIDE BLD-SCNC: 106 MMOL/L (ref 98–107)
CO2: 21 MMOL/L (ref 22–29)
CREAT SERPL-MCNC: 1 MG/DL (ref 0.7–1.2)
EOSINOPHILS ABSOLUTE: 0 E9/L (ref 0.05–0.5)
EOSINOPHILS RELATIVE PERCENT: 1.9 % (ref 0–6)
GFR AFRICAN AMERICAN: >60
GFR NON-AFRICAN AMERICAN: >60 ML/MIN/1.73
GLUCOSE BLD-MCNC: 94 MG/DL (ref 74–99)
HCT VFR BLD CALC: 36.9 % (ref 37–54)
HEMOGLOBIN: 11.8 G/DL (ref 12.5–16.5)
LYMPHOCYTES ABSOLUTE: 0.83 E9/L (ref 1.5–4)
LYMPHOCYTES RELATIVE PERCENT: 9.6 % (ref 20–42)
MCH RBC QN AUTO: 31.5 PG (ref 26–35)
MCHC RBC AUTO-ENTMCNC: 32 % (ref 32–34.5)
MCV RBC AUTO: 98.4 FL (ref 80–99.9)
METAMYELOCYTES RELATIVE PERCENT: 0.9 % (ref 0–1)
MONOCYTES ABSOLUTE: 0.91 E9/L (ref 0.1–0.95)
MONOCYTES RELATIVE PERCENT: 11.3 % (ref 2–12)
MYELOCYTE PERCENT: 4.3 % (ref 0–0)
NEUTROPHILS ABSOLUTE: 6.47 E9/L (ref 1.8–7.3)
NEUTROPHILS RELATIVE PERCENT: 73 % (ref 43–80)
OVALOCYTES: ABNORMAL
PDW BLD-RTO: 16.2 FL (ref 11.5–15)
PLATELET # BLD: 296 E9/L (ref 130–450)
PMV BLD AUTO: 9.9 FL (ref 7–12)
POIKILOCYTES: ABNORMAL
POLYCHROMASIA: ABNORMAL
POTASSIUM SERPL-SCNC: 5 MMOL/L (ref 3.5–5)
RBC # BLD: 3.75 E12/L (ref 3.8–5.8)
SODIUM BLD-SCNC: 139 MMOL/L (ref 132–146)
TEAR DROP CELLS: ABNORMAL
WBC # BLD: 8.3 E9/L (ref 4.5–11.5)

## 2019-09-24 PROCEDURE — 97116 GAIT TRAINING THERAPY: CPT | Performed by: PHYSICAL THERAPIST

## 2019-09-24 PROCEDURE — 36415 COLL VENOUS BLD VENIPUNCTURE: CPT

## 2019-09-24 PROCEDURE — 94640 AIRWAY INHALATION TREATMENT: CPT

## 2019-09-24 PROCEDURE — 97530 THERAPEUTIC ACTIVITIES: CPT

## 2019-09-24 PROCEDURE — 6370000000 HC RX 637 (ALT 250 FOR IP): Performed by: INTERNAL MEDICINE

## 2019-09-24 PROCEDURE — 97110 THERAPEUTIC EXERCISES: CPT | Performed by: PHYSICAL THERAPIST

## 2019-09-24 PROCEDURE — 85025 COMPLETE CBC W/AUTO DIFF WBC: CPT

## 2019-09-24 PROCEDURE — 80048 BASIC METABOLIC PNL TOTAL CA: CPT

## 2019-09-24 PROCEDURE — 97535 SELF CARE MNGMENT TRAINING: CPT

## 2019-09-24 RX ORDER — LEVOFLOXACIN 500 MG/1
500 TABLET, FILM COATED ORAL DAILY
Qty: 3 TABLET | Refills: 0 | DISCHARGE
Start: 2019-09-24 | End: 2019-09-27

## 2019-09-24 RX ORDER — L. ACIDOPHILUS/L.BULGARICUS 100MM CELL
1 GRANULES IN PACKET (EA) ORAL 2 TIMES DAILY
Qty: 60 PACKET | Refills: 0 | Status: SHIPPED | OUTPATIENT
Start: 2019-09-24 | End: 2019-10-03

## 2019-09-24 RX ADMIN — APIXABAN 5 MG: 5 TABLET, FILM COATED ORAL at 10:34

## 2019-09-24 RX ADMIN — LISINOPRIL 2.5 MG: 2.5 TABLET ORAL at 10:35

## 2019-09-24 RX ADMIN — IPRATROPIUM BROMIDE AND ALBUTEROL SULFATE 1 AMPULE: 2.5; .5 SOLUTION RESPIRATORY (INHALATION) at 09:16

## 2019-09-24 RX ADMIN — METOPROLOL SUCCINATE 25 MG: 25 TABLET, FILM COATED, EXTENDED RELEASE ORAL at 10:35

## 2019-09-24 RX ADMIN — ASPIRIN 81 MG: 81 TABLET, COATED ORAL at 10:34

## 2019-09-24 RX ADMIN — LEVOFLOXACIN 500 MG: 500 TABLET, FILM COATED ORAL at 10:34

## 2019-09-24 RX ADMIN — MOMETASONE FUROATE AND FORMOTEROL FUMARATE DIHYDRATE 2 PUFF: 200; 5 AEROSOL RESPIRATORY (INHALATION) at 04:53

## 2019-09-24 RX ADMIN — IPRATROPIUM BROMIDE AND ALBUTEROL SULFATE 1 AMPULE: 2.5; .5 SOLUTION RESPIRATORY (INHALATION) at 04:52

## 2019-09-24 RX ADMIN — IPRATROPIUM BROMIDE AND ALBUTEROL SULFATE 1 AMPULE: 2.5; .5 SOLUTION RESPIRATORY (INHALATION) at 13:23

## 2019-09-24 NOTE — CARE COORDINATION
Discharge Planning: [facility] [phone], fax [phone], [address]  arranged for 6:30pm [facility], [phone], to transport pt to above SNF for skilled level of care. Pt's wife is here and aware of plan.   Electronically signed by MICHAEL Mike on 9/24/2019 at 2:54 PM

## 2019-09-24 NOTE — PROGRESS NOTES
Physical Therapy  Daily Treatment Note  9/24/2019  [id number]                      Becca Anderson   [id number]                              1945    Patient Active Problem List   Diagnosis    Hyperlipidemia with target LDL less than 100    BPH (benign prostatic hyperplasia)    ECG abnormal    Abnormal nuclear stress test    AS (aortic stenosis)    SOB (shortness of breath)    RBBB (right bundle branch block)    Anxiety    CAD (coronary artery disease)    Pulmonary emphysema (HCC)    AV block, Mobitz II    AV block, 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)    H/O bicuspid aortic valve    AI (aortic insufficiency)    S/P AVR (aortic valve replacement)    Anticoagulated on Coumadin    Aortic mural thrombus (HCC)    AAA (abdominal aortic aneurysm) without rupture (HCC)    Elevated liver enzymes    History of tobacco use    S/P CABG x 3    H/O aortic valve replacement with porcine valve    Prostate CA ([facility] [address]    Aortic valve disease    Pure hypercholesterolemia    Pacemaker    History of smoking 30 or more pack years    PAF (paroxysmal atrial fibrillation) (HCC)    Anticoagulated by anticoagulation treatment    COPD with acute exacerbation (HCC)    S/P AAA repair using bifurcation graft    Cirrhosis ([address]    Sepsis secondary to CAP ([facility] [address]    Mild protein-calorie malnutrition ([facility] [address]    Bilateral leg weakness       AM-[facility] Mobility Inpatient   How much difficulty turning over in bed?: A Little  How much difficulty sitting down on / standing up from a chair with arms?: A Little  How much difficulty moving from lying on back to sitting on side of bed?: A Little  How much help from another person moving to and from a bed to a chair?: A Little  How much help from another person needed to walk in hospital room?: A Little  How much help from another person for climbing 3-5 steps with a railing?: A Lot  -PAC Inpatient Mobility Raw Score : 17  AM-PAC Inpatient T-Scale Score : 42.13  Mobility Inpatient CMS 0-100%

## 2019-09-24 NOTE — PROGRESS NOTES
seat Modified [place]    Toileting Moderate Assist  N/T as pt had completed toileting prior to session Modified [place]    Bed Mobility  Supine to sit: Minimal Assist with HOB elevated  Sit to supine: Minimal Assist  Supervision for supine to sit; supervision for scooting; sit to supine N/T as pt seated in chair;nsg aware; family present. Supine to sit: Independent   Sit to supine: Independent    Functional Transfers Minimal Assist sit to stand from EOB with wheeled walker Min A for sit to stand transfers from EOB with FWW; transfer to/from chair in hallway at min A with FWW; transfer to bedside chair with min A with FWW  Independent    Functional Mobility N/t d/t pt extremely anxious/panicky with c/o SOB. SpO2 on RA 94-97% with activity. Nursing notified of pt's extreme panic, RN in to assess pt.  =40 ft x 2 with min A  with FWW; pt required mod seated rest break after 40 ft and 1-2 standing rest breaks and cuing for deep breathing techniques d/t SOB.   Pt's O2 sats remained =95% on room air with mobility Modified [place] with AAD   Balance Sitting:     Static: Stand by Assist    Dynamic: Min A  Standing: Min A with wheeled walker Sitting:     Static: Stand by Assist    Dynamic: Min A  Standing: Min A  with wheeled walker      Activity Tolerance Poor, limited by SOB/anxiety Fair/fair - (d/t SOB; standing rest breaks with cuing for deep breathing techniques)  fair   Visual/  Perceptual Glasses: yes                              A:  -Balance: Sitting: fair        Standing: fair  with Minimal Assist  with  FWW  -Endurance: fair /fair - (2* to decreased endurance, strength, SOB); cuing for ECT's during grooming, ADL retraining  -Edema: none noted  -Safety: fair (VC's for walker safety, sequencing, hand placement,ECT's, deep breathing techniques, AE for LE dressing)  - Pt/family education/training: bed mobility, transfer training, functional mobility, LE dressing technique, use of AE for LE dressing d/t COPD and for ECT's,  UE dressing,  sequencing, hand placement, walker safety, bathroom safety, DME,  deep breathing techniques, grooming, ADL retraining, self feeding  -Pt would benefit from additional ADLs, safety education, balance activities, transfer training, strength exercises, and over all endurance building.     P:  - Plan of care: Patient will be seen by OT PRN for therapeutic activity, ADL re-training, bed mobility,functional transfers, functional mobility, safety and fall prevention, balance and endurance activities, instruction and training in energy conservation principles, and   patient and family education.   - Patient and/or family understands diagnosis, prognosis and plan of care: yes   - ADL retraining, bathroom safety, DME    Treatment minutes: 2020 [facility], [address]

## 2019-09-24 NOTE — PROGRESS NOTES
aureus not isolated  Final   08/09/2014 Methicillin resistant Staph aureus not isolated  Final         Lab Results   Component Value Date    BLOODCULT2 5 Days- no growth 09/18/2019    ORG Presumptive Legionella Urine Antigen 09/17/2019     CULTURE, RESPIRATORY   Date Value Ref Range Status   09/17/2019 Oral Pharyngeal Danielle present  Final   01/15/2018 Oral Pharyngeal Danielle reduced  Final        ASSESSMENT: ON ADMISSION PT HAD   Chief Complaint   Patient presents with    Abdominal Pain      · Legionella pneumonia   · leukocytosis better     PLAN:  · Continue levaquin po - ok to crush for 3 more days  · Monitor   · ok from id standpoint for d/c    · Updated wife    Imaging and labs were reviewed per medical records and any ID pertinent labs were addressed with the patient. The patient was educated about the diagnosis, prognosis, indications, risks and benefits of treatment. An opportunity to ask questions was given to the patient and questions were answered.         Electronically signed by Krista Umana MD on 9/24/2019 at 12:36 PM

## 2019-09-24 NOTE — PROGRESS NOTES
Internal Medicine Progress Note    Kenny Enriquez. Stephen Noe., & [address] [facility] [address] Stephen Noe., F.A.C.O.I. Mihaela Fernandez D.O., F.A.C.O.I. Primary Care Physician: Dino Garner DO   Admitting Physician:  Candice Bull DO  Admission date and time: 9/16/2019  7:55 PM    Room:  [address] [place] [address]    Patient Name: Mesfin Rogers  MRN: [id number]    Date of Service: 9/24/2019     Subjective:  Gabi Haider was seen and evaluated in the presence of his wife today. Initial plans were for transfer to a skilled nursing facility yesterday but precertification was not obtained. We continue to await precertification currently. He continues to require extensive work with the therapy teams. He seems to be tolerating antibiotic support. Review of System: HEENT:  Humberto ear pain, sore throat, sinus or eye problems  Cardiovascular:   Denies any chest pain, irregular heartbeats, or palpitations. Respiratory:   Improving shortness of breath. Less coughing and no current sputum production. Gastrointestinal:   Denies nausea, vomiting, diarrhea, or constipation. Denies any abdominal pain. Extremities:   Denies any lower extremity swelling or edema. Neurology:    Denies any headache or focal neurological deficits. Ongoing weakness and deconditioning. Derm:    Denies any rashes, ulcers, or excoriations. Denies bruising. Genitourinary:    Denies any urgency, frequency, hematuria. Voiding without difficulty. Musculoskeletal:  Denies myalgias, joint complaints or back pain      Physical Exam:  No intake/output data recorded. Blood pressure 111/81, pulse 88, temperature 98 °F (36.7 °C), temperature source Oral, resp. rate 18, height 5' 8\" (1.727 m), weight 176 lb (79.8 kg), SpO2 94 %. HEENT:    PERRLA. EOMI. Sclera clear. Buccal mucosa moist.  Neck:    Supple. Trachea midline. No thyromegaly. No JVD. No bruits. Heart:    Rhythm regular, rate controlled. No murmurs.   Lungs:    Symmetrical.

## 2019-09-25 ENCOUNTER — CARE COORDINATION (OUTPATIENT)
Dept: CARE COORDINATION | Age: 74
End: 2019-09-25

## 2019-09-26 LAB
COPPER /G CREAT: 62.9 UG/G CRT (ref 10–45)
COPPER URINE: 8.3 UG/DL (ref 0.3–3.2)
COPPER, 24H UR: ABNORMAL UG/D (ref 3–45)
CREATININE 24 HOUR URINE: ABNORMAL MG/D (ref 800–2100)
CREATININE URINE: 132 MG/DL
HOURS COLLECTED: ABNORMAL HR
URINE TOTAL VOLUME: 65 ML

## 2019-10-02 ENCOUNTER — CARE COORDINATION (OUTPATIENT)
Dept: CARE COORDINATION | Age: 74
End: 2019-10-02

## 2019-10-02 DIAGNOSIS — E78.5 HYPERLIPIDEMIA WITH TARGET LDL LESS THAN 100: Primary | ICD-10-CM

## 2019-10-02 PROCEDURE — 1111F DSCHRG MED/CURRENT MED MERGE: CPT | Performed by: FAMILY MEDICINE

## 2019-10-03 ENCOUNTER — TELEPHONE (OUTPATIENT)
Dept: PHARMACY | Facility: CLINIC | Age: 74
End: 2019-10-03

## 2019-10-03 RX ORDER — BUDESONIDE AND FORMOTEROL FUMARATE DIHYDRATE 160; 4.5 UG/1; UG/1
2 AEROSOL RESPIRATORY (INHALATION) 2 TIMES DAILY
COMMUNITY
End: 2019-11-02 | Stop reason: SDUPTHER

## 2019-10-07 ENCOUNTER — OFFICE VISIT (OUTPATIENT)
Dept: FAMILY MEDICINE CLINIC | Age: 74
End: 2019-10-07
Payer: MEDICARE

## 2019-10-07 VITALS
OXYGEN SATURATION: 100 % | DIASTOLIC BLOOD PRESSURE: 66 MMHG | WEIGHT: 171 LBS | SYSTOLIC BLOOD PRESSURE: 110 MMHG | RESPIRATION RATE: 18 BRPM | HEART RATE: 67 BPM | HEIGHT: 68 IN | BODY MASS INDEX: 25.91 KG/M2 | TEMPERATURE: 97.9 F

## 2019-10-07 DIAGNOSIS — K74.60 CIRRHOSIS OF LIVER WITHOUT ASCITES, UNSPECIFIED HEPATIC CIRRHOSIS TYPE (HCC): ICD-10-CM

## 2019-10-07 DIAGNOSIS — I48.0 PAF (PAROXYSMAL ATRIAL FIBRILLATION) (HCC): ICD-10-CM

## 2019-10-07 DIAGNOSIS — Z23 IMMUNIZATION DUE: ICD-10-CM

## 2019-10-07 DIAGNOSIS — Z95.1 S/P CABG X 3: ICD-10-CM

## 2019-10-07 DIAGNOSIS — E44.1 MILD PROTEIN-CALORIE MALNUTRITION (HCC): ICD-10-CM

## 2019-10-07 DIAGNOSIS — A48.1 LEGIONELLA PNEUMONIA (HCC): ICD-10-CM

## 2019-10-07 DIAGNOSIS — J43.9 PULMONARY EMPHYSEMA, UNSPECIFIED EMPHYSEMA TYPE (HCC): Primary | ICD-10-CM

## 2019-10-07 PROCEDURE — G0008 ADMIN INFLUENZA VIRUS VAC: HCPCS | Performed by: FAMILY MEDICINE

## 2019-10-07 PROCEDURE — 1111F DSCHRG MED/CURRENT MED MERGE: CPT | Performed by: FAMILY MEDICINE

## 2019-10-07 PROCEDURE — 99495 TRANSJ CARE MGMT MOD F2F 14D: CPT | Performed by: FAMILY MEDICINE

## 2019-10-07 PROCEDURE — 90653 IIV ADJUVANT VACCINE IM: CPT | Performed by: FAMILY MEDICINE

## 2019-10-07 ASSESSMENT — PATIENT HEALTH QUESTIONNAIRE - PHQ9
SUM OF ALL RESPONSES TO PHQ9 QUESTIONS 1 & 2: 0
SUM OF ALL RESPONSES TO PHQ QUESTIONS 1-9: 0
1. LITTLE INTEREST OR PLEASURE IN DOING THINGS: 0
SUM OF ALL RESPONSES TO PHQ QUESTIONS 1-9: 0
2. FEELING DOWN, DEPRESSED OR HOPELESS: 0

## 2019-10-18 ENCOUNTER — NURSE ONLY (OUTPATIENT)
Dept: CARDIOLOGY CLINIC | Age: 74
End: 2019-10-18
Payer: MEDICARE

## 2019-10-18 DIAGNOSIS — I48.21 PERMANENT ATRIAL FIBRILLATION (HCC): ICD-10-CM

## 2019-10-18 DIAGNOSIS — Z95.0 PACEMAKER: Primary | ICD-10-CM

## 2019-10-18 PROCEDURE — 93280 PM DEVICE PROGR EVAL DUAL: CPT | Performed by: INTERNAL MEDICINE

## 2019-10-28 ENCOUNTER — TELEPHONE (OUTPATIENT)
Dept: FAMILY MEDICINE CLINIC | Age: 74
End: 2019-10-28

## 2019-10-29 ENCOUNTER — HOSPITAL ENCOUNTER (OUTPATIENT)
Age: 74
Discharge: HOME | End: 2019-10-31
Payer: MEDICARE

## 2019-10-29 ENCOUNTER — NURSE ONLY (OUTPATIENT)
Dept: FAMILY MEDICINE CLINIC | Age: 74
End: 2019-10-29
Payer: MEDICARE

## 2019-10-29 DIAGNOSIS — R73.01 IFG (IMPAIRED FASTING GLUCOSE): ICD-10-CM

## 2019-10-29 DIAGNOSIS — E78.5 DYSLIPIDEMIA: ICD-10-CM

## 2019-10-29 DIAGNOSIS — E78.5 HYPERLIPIDEMIA WITH TARGET LDL LESS THAN 100: ICD-10-CM

## 2019-10-29 DIAGNOSIS — Z00.00 ENCOUNTER FOR MEDICARE ANNUAL WELLNESS EXAM: ICD-10-CM

## 2019-10-29 DIAGNOSIS — R19.7 DIARRHEA, UNSPECIFIED TYPE: ICD-10-CM

## 2019-10-29 DIAGNOSIS — I10 ESSENTIAL HYPERTENSION: ICD-10-CM

## 2019-10-29 DIAGNOSIS — I48.0 PAF (PAROXYSMAL ATRIAL FIBRILLATION) (HCC): ICD-10-CM

## 2019-10-29 DIAGNOSIS — R74.8 ELEVATED LIVER ENZYMES: ICD-10-CM

## 2019-10-29 DIAGNOSIS — J44.1 COPD WITH ACUTE EXACERBATION (HCC): ICD-10-CM

## 2019-10-29 LAB
ANION GAP SERPL CALCULATED.3IONS-SCNC: 16 MMOL/L (ref 7–16)
BASOPHILS ABSOLUTE: 0.07 E9/L (ref 0–0.2)
BASOPHILS RELATIVE PERCENT: 0.8 % (ref 0–2)
BUN BLDV-MCNC: 16 MG/DL (ref 8–23)
CALCIUM SERPL-MCNC: 9.7 MG/DL (ref 8.6–10.2)
CHLORIDE BLD-SCNC: 120 MMOL/L (ref 98–107)
CHOLESTEROL, TOTAL: 132 MG/DL (ref 0–199)
CO2: 25 MMOL/L (ref 22–29)
CREAT SERPL-MCNC: 1.2 MG/DL (ref 0.7–1.2)
EOSINOPHILS ABSOLUTE: 0.22 E9/L (ref 0.05–0.5)
EOSINOPHILS RELATIVE PERCENT: 2.6 % (ref 0–6)
GFR AFRICAN AMERICAN: >60
GFR NON-AFRICAN AMERICAN: 59 ML/MIN/1.73
GLUCOSE BLD-MCNC: 99 MG/DL (ref 74–99)
HBA1C MFR BLD: 5.8 % (ref 4–5.6)
HCT VFR BLD CALC: 43.7 % (ref 37–54)
HDLC SERPL-MCNC: 42 MG/DL
HEMOGLOBIN: 13.3 G/DL (ref 12.5–16.5)
IMMATURE GRANULOCYTES #: 0.06 E9/L
IMMATURE GRANULOCYTES %: 0.7 % (ref 0–5)
LDL CHOLESTEROL CALCULATED: 79 MG/DL (ref 0–99)
LYMPHOCYTES ABSOLUTE: 1.99 E9/L (ref 1.5–4)
LYMPHOCYTES RELATIVE PERCENT: 23.7 % (ref 20–42)
MCH RBC QN AUTO: 30.6 PG (ref 26–35)
MCHC RBC AUTO-ENTMCNC: 30.4 % (ref 32–34.5)
MCV RBC AUTO: 100.7 FL (ref 80–99.9)
MONOCYTES ABSOLUTE: 0.64 E9/L (ref 0.1–0.95)
MONOCYTES RELATIVE PERCENT: 7.6 % (ref 2–12)
NEUTROPHILS ABSOLUTE: 5.43 E9/L (ref 1.8–7.3)
NEUTROPHILS RELATIVE PERCENT: 64.6 % (ref 43–80)
PDW BLD-RTO: 16.1 FL (ref 11.5–15)
PLATELET # BLD: 239 E9/L (ref 130–450)
PMV BLD AUTO: 10.4 FL (ref 7–12)
POTASSIUM SERPL-SCNC: 5 MMOL/L (ref 3.5–5)
RBC # BLD: 4.34 E12/L (ref 3.8–5.8)
SODIUM BLD-SCNC: 161 MMOL/L (ref 132–146)
TRIGL SERPL-MCNC: 57 MG/DL (ref 0–149)
VLDLC SERPL CALC-MCNC: 11 MG/DL
WBC # BLD: 8.4 E9/L (ref 4.5–11.5)

## 2019-10-29 PROCEDURE — 85025 COMPLETE CBC W/AUTO DIFF WBC: CPT

## 2019-10-29 PROCEDURE — 83036 HEMOGLOBIN GLYCOSYLATED A1C: CPT

## 2019-10-29 PROCEDURE — 80048 BASIC METABOLIC PNL TOTAL CA: CPT

## 2019-10-29 PROCEDURE — 80061 LIPID PANEL: CPT

## 2019-10-29 PROCEDURE — 36415 COLL VENOUS BLD VENIPUNCTURE: CPT | Performed by: FAMILY MEDICINE

## 2019-10-30 DIAGNOSIS — J43.9 PULMONARY EMPHYSEMA, UNSPECIFIED EMPHYSEMA TYPE (HCC): Primary | ICD-10-CM

## 2019-11-06 DIAGNOSIS — E86.0 DEHYDRATION: Primary | ICD-10-CM

## 2019-12-03 ENCOUNTER — OFFICE VISIT (OUTPATIENT)
Dept: FAMILY MEDICINE CLINIC | Age: 74
End: 2019-12-03
Payer: MEDICARE

## 2019-12-03 VITALS
HEIGHT: 68 IN | HEART RATE: 75 BPM | DIASTOLIC BLOOD PRESSURE: 68 MMHG | OXYGEN SATURATION: 95 % | SYSTOLIC BLOOD PRESSURE: 124 MMHG | BODY MASS INDEX: 26.37 KG/M2 | RESPIRATION RATE: 18 BRPM | WEIGHT: 174 LBS | TEMPERATURE: 97.8 F

## 2019-12-03 DIAGNOSIS — J43.9 PULMONARY EMPHYSEMA, UNSPECIFIED EMPHYSEMA TYPE (HCC): Primary | ICD-10-CM

## 2019-12-03 DIAGNOSIS — Z95.1 S/P CABG X 3: ICD-10-CM

## 2019-12-03 DIAGNOSIS — I48.0 PAF (PAROXYSMAL ATRIAL FIBRILLATION) (HCC): ICD-10-CM

## 2019-12-03 DIAGNOSIS — E78.00 PURE HYPERCHOLESTEROLEMIA: ICD-10-CM

## 2019-12-03 DIAGNOSIS — L30.9 DERMATITIS: ICD-10-CM

## 2019-12-03 DIAGNOSIS — I10 ESSENTIAL HYPERTENSION: ICD-10-CM

## 2019-12-03 DIAGNOSIS — Z95.0 PACEMAKER: ICD-10-CM

## 2019-12-03 PROCEDURE — 96372 THER/PROPH/DIAG INJ SC/IM: CPT | Performed by: FAMILY MEDICINE

## 2019-12-03 PROCEDURE — 99214 OFFICE O/P EST MOD 30 MIN: CPT | Performed by: FAMILY MEDICINE

## 2019-12-03 RX ORDER — FEXOFENADINE HCL 180 MG/1
180 TABLET ORAL DAILY
Qty: 30 TABLET | Refills: 0 | Status: SHIPPED | OUTPATIENT
Start: 2019-12-03 | End: 2020-01-02

## 2019-12-03 RX ORDER — LISINOPRIL 2.5 MG/1
TABLET ORAL
Qty: 90 TABLET | Refills: 1 | Status: SHIPPED
Start: 2019-12-03 | End: 2020-06-01

## 2019-12-03 RX ORDER — DEXAMETHASONE SODIUM PHOSPHATE 4 MG/ML
4 INJECTION, SOLUTION INTRA-ARTICULAR; INTRALESIONAL; INTRAMUSCULAR; INTRAVENOUS; SOFT TISSUE ONCE
Status: COMPLETED | OUTPATIENT
Start: 2019-12-03 | End: 2019-12-03

## 2019-12-03 RX ORDER — METHYLPREDNISOLONE 4 MG/1
TABLET ORAL
Qty: 1 KIT | Refills: 0 | Status: SHIPPED | OUTPATIENT
Start: 2019-12-03 | End: 2019-12-09

## 2019-12-03 RX ADMIN — DEXAMETHASONE SODIUM PHOSPHATE 4 MG: 4 INJECTION, SOLUTION INTRA-ARTICULAR; INTRALESIONAL; INTRAMUSCULAR; INTRAVENOUS; SOFT TISSUE at 09:47

## 2019-12-03 ASSESSMENT — ENCOUNTER SYMPTOMS
CHOKING: 0
WHEEZING: 0
ABDOMINAL PAIN: 0
ORTHOPNEA: 1
SPUTUM PRODUCTION: 0
PHOTOPHOBIA: 0
ALLERGIC/IMMUNOLOGIC NEGATIVE: 1
HEMOPTYSIS: 0
RECTAL PAIN: 0
FACIAL SWELLING: 0
ANAL BLEEDING: 0
STRIDOR: 0
EYE REDNESS: 0
SHORTNESS OF BREATH: 1
CONSTIPATION: 0
GASTROINTESTINAL NEGATIVE: 1
NAUSEA: 0
EYE DISCHARGE: 0
TROUBLE SWALLOWING: 0
EYE ITCHING: 0
BLOOD IN STOOL: 0
CHEST TIGHTNESS: 0
ABDOMINAL DISTENTION: 0
APNEA: 0
COLOR CHANGE: 0
SINUS PAIN: 0
BACK PAIN: 0
BLURRED VISION: 0
VOICE CHANGE: 0

## 2020-03-03 ENCOUNTER — NURSE ONLY (OUTPATIENT)
Dept: CARDIOLOGY CLINIC | Age: 75
End: 2020-03-03
Payer: MEDICARE

## 2020-03-03 ENCOUNTER — OFFICE VISIT (OUTPATIENT)
Dept: CARDIOLOGY CLINIC | Age: 75
End: 2020-03-03
Payer: MEDICARE

## 2020-03-03 VITALS
DIASTOLIC BLOOD PRESSURE: 64 MMHG | HEART RATE: 71 BPM | HEIGHT: 68 IN | SYSTOLIC BLOOD PRESSURE: 122 MMHG | WEIGHT: 170 LBS | BODY MASS INDEX: 25.76 KG/M2

## 2020-03-03 PROCEDURE — 93280 PM DEVICE PROGR EVAL DUAL: CPT | Performed by: INTERNAL MEDICINE

## 2020-03-03 PROCEDURE — 99214 OFFICE O/P EST MOD 30 MIN: CPT | Performed by: INTERNAL MEDICINE

## 2020-03-04 NOTE — PROGRESS NOTES
OFFICE VISIT        PRIMARY CARE PHYSICIAN:    Ezequiel Bedolla DO       ALLERGIES / SENSITIVITIES:    Allergies   Allergen Reactions    Pneumovax [Pneumococcal Polysaccharide Vaccine] Dermatitis        REVIEWED MEDICATIONS:      Current Outpatient Medications:     SYMBICORT 160-4.5 MCG/ACT AERO, USE 2 INHALATIONS TWICE A DAY, Disp: 20.4 g, Rfl: 6    lisinopril (PRINIVIL;ZESTRIL) 2.5 MG tablet, TAKE 1 TABLET DAILY, Disp: 90 tablet, Rfl: 1    hydrocortisone 2.5 % cream, Apply topically 2 times daily. , Disp: 1 Tube, Rfl: 5    furosemide (LASIX) 40 MG tablet, Take 1 tablet by mouth 2 times daily (Patient taking differently: Take 40 mg by mouth daily ), Disp: 180 tablet, Rfl: 1    apixaban (ELIQUIS) 5 MG TABS tablet, TAKE 1 TABLET TWICE A DAY, Disp: 180 tablet, Rfl: 1    aclidinium (TUDORZA PRESSAIR) 400 MCG/ACT AEPB inhaler, Inhale 1 puff into the lungs 2 times daily, Disp: , Rfl:     atorvastatin (LIPITOR) 10 MG tablet, TAKE 1 TABLET DAILY, Disp: 90 tablet, Rfl: 1    metoprolol succinate (TOPROL XL) 25 MG extended release tablet, TAKE 1 TABLET DAILY (APPOINTMENT REQUIRED FOR FURTHER REFILLS), Disp: 90 tablet, Rfl: 1    nitroGLYCERIN (NITROSTAT) 0.3 MG SL tablet, Place 1 tablet under the tongue every 5 minutes as needed for Chest pain, Disp: 30 tablet, Rfl: 3    aspirin EC 81 MG EC tablet, Take 81 mg by mouth daily, Disp: , Rfl:       S: REASON FOR VISIT:    Coronary artery disease/ischemic cardiomyopathy and history of bicuspid aortic valve, S/P coronary artery bypass graft surgery and aortic valve replacement surgery and history of high grade AV block, S/P dual chamber pacemaker insertion and chronic atrial fibrillation on chronic anti-coagulation with Eliquis. Cornelius Box is a 66-year-old gentleman with cardiovascular history as described below.   He was hospitalized from 9/16/2019 to 9/23/2019 with acute hypoxic respiratory failure secondary to reportedly extensive community acquired pneumonia with Legionella. He had elevated bilirubin and was seen by GI with a bilirubin trending down. He also complained of bilateral lower extremity weakness and was diagnosed with degenerative disc disease of the lumbar spine and was seen by neurosurgery. He went to rehab after his hospital stay. He is back to his baseline. He continues to go to the gym about 3 times a week. He denies chest pain. He has chronic exertional dyspnea, which is at baseline. He has chronic exertional dyspnea, which is at baseline. He denies orthopnea, PND's or lower extremity swelling. He takes Lasix once daily. He denies the subjective feeling of palpitations. He denies dizziness, presyncope or syncope. REVIEW OF SYSTEMS:    CONSTITUTIONAL:  Denies fevers, chills or night sweats. HEENT:  Denies headaches.  Denies changes in hearing or vision.  Denies dysphagia, hoarseness, hemoptysis, hematemesis or epistaxis. ENDOCRINE:  Denies polyphagia, polydipsia or polyuria.  Denies heat or cold intolerance. MUSCULOSKELETAL:  Denies any significant arthralgias or myalgias. SKIN:  He has been having on and off skin lesions and has an appointment with dermatology in that regard.    HEME/LYMPH:  Denies palpable lymph nodes. He denies bleeding or easy bruisability. HEART:  As above. LUNGS:  Denies any cough or sputum production. GI: Denies abdominal pain, nausea, vomiting,diarrhea, constipation, rectal bleeding or tarry stools. :  Denies hematuria or dysuria. PSYCHIATRIC:  Denies mood changes, anxiety or depression. NEUROLOGIC:  Denies memory loss, motor weakness, numbness, tingling or tremors.        CARDIOVASCULAR HISTORY:    1.  History of tobacco abuse.    2. Echocardiogram done on 7/21/2014 was read by Dr. Tea Damon as a technically very poor and limited study showing low normal left ventricular systolic function with an estimated ejection fraction of 50% with stage 1 left ventricular diastolic dysfunction and with septal motion consistent with conduction abnormality. Mild mitral regurgitation. Moderate aortic valve sclerosis with mild aortic stenosis with a peak/mean gradient of 36/16 mmHg with mild aortic regurgitation. Small localized pericardial effusion near the right ventricle.    3. 7/21/2014 Treadmill nuclear stress test: Rodrigue protocol. 3 minutes and 2 seconds. His baseline heart rate was 73 bpm and went up to only 98 bpm. He had normal BP response. The test was terminated because of extreme fatigue and shortness of breath. There were no ischemic EKG changes at the heart rate achieved. The nuclear images were read by Dr. Lyla Nye as mostly fixed inferior and inferolateral wall defects with minimal reversibility involving the inferior wall with mild inferobasal hypokinesis with an estimated ejection fraction of 60%. 4. Hyperlipidemia.    5. 8/8/14: Cardiac catheterization: Severe three vessel coronary artery disease. Normal left ventricular size with basal inferior wall akinesis with an estimated ejection fraction of 50%. Systemic hypertension. Elevated left ventricular end diastolic pressure consistent with LV dysfunction. Mild calcific aortic stenosis and mild aortic regurgitation. High grade ostial narrowing of the left vertebral basilar artery with no significant disease of both carotid arteries. Mobitz II second degree AV block and intermittent third degree AV block for which the patient had a temporary transvenous pacemaker inserted.    6. Carotid ultrasound done on 8/9/14 was reported as no evidence of hemodynamically significant stenosis.    7. 8/11/14: Coronary artery bypass graft surgery x3: LIMA to LAD; SVG to second diagonal branch of LAD; SVG to first marginal branch of LCX.  Aortic valve replacement with a 23 mm Medtronic Mosaic Ultra bioprosthetic tissue valve.    8. Dual chamber pacemaker insertion on 8/12/14 for Mobitz type II second degree AV block and intermittent third degree heart block.    9. Paroxysmal atrial fibrillation/flutter in the perioperative period in 8/14.    10. Holter monitor done on 12/09/14 for 24 hours was read by Dr. Melissa North (cardiologist) as showing paced rhythm with an average heart rate of 73 BPM with frequent PVC's with one short run of six beats of nonsustained ventricular tachycardia at a rate of 126 BPM with rare PAC's.    11. Abdominal aortic aneurysm:  a.  CT scan of the abdomen done on 09/01/2015 was reported as showing a 4-cm abdominal aortic aneurysm.    b.  US of abdominal aorta done on 03/31/2017 was read as showing a 4.6 x 4.8 abdominal aortic aneurysm without involvement of the iliac arteries. c.  CTA of the abdomen and pelvis done on 04/24/2018 showed a 5.1 x 5.3 cm AAA. d. Rudene Pong repair of abdominal aortic aneurysm 07/16/2018. 12. Elevated liver enzymes in 08/2015 at which time patient complained of abdominal pain: Lipitor was discontinued at that time. 13. 2-D echo February 15, 2018 [facility]:  Technically difficult exam due to body habitus. - Exam indication: Shortness of Breath- The left ventricle is normal in size. There is no left ventricular hypertrophy. Left ventricular systolic function is mildly decreased. EF = 45 ± 5% (visual est.)- The right ventricle is mildly dilated. Right ventricular systolic function is mildly decreased. - The left atrial cavity is moderately dilated. - The right atrial cavity is dilated. - There is moderate (2+) tricuspid valve regurgitation caused by annular dilatation.- Porcine Medtronic prosthetic aortic valve. There is trivial aortic valve regurgitation. The peak gradient is 35 mmHg, the mean gradient is 20 mmHg and the dimensionless valve index is 0.24. Bioprosthetic leaflets not well seen. - Estimated right ventricular systolic pressure is 27 mmHg consistent with normal pulmonary artery pressures. Estimated right atrial pressure is 5 mmHg. - The patient has not had a prior [facility] echocardiographic exam for comparison.   19 [facility] community acquired pneumonia with Legionella. 10. 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        FAMILY HISTORY:    Father  at age 80 from \"liver problems\".    Mother  at age 80 from \"old age\".     SOCIAL HISTORY:   Patient quit smoking on 2014. He denied any significant alcohol use. He denied any illicit drug use.       O:  COMPLETE PHYSICAL EXAM:      /64 (Site: Right Upper Arm, Position: Sitting, Cuff Size: Large Adult)   Pulse 71   Ht 5' 8\" (1.727 m)   Wt 170 lb (77.1 kg)   BMI 25.85 kg/m²      General:                    Elderly male in no acute distress.    Head & Neck:            Atraumatic, normocephalic head.  No JVD. No carotid bruits. Carotid upstrokes normal bilaterally. No thyromegaly. Sclerae not icteric. No xanthelasmas.  Mucous membranes moist.  Chest:                       Symmetrical and nontender.  Sternotomy scar well healed. Pacemaker site in the left infraclavicular area well healed.    Lungs:                        few crackles in right base   Heart:                        Normal S1 and S2. No S3. Grade 1/6 systolic murmur heard at the right upper sternal border.    Abdomen:                  Soft, nontender without organomegaly or masses. Normal bowel sounds. No pulsating masses felt.  No bruits heard. Extremities:               No edema. Femoral pulses normal. Posterior tibialis pulses normal.  Skin:                          Normal turgor. No rashes or ulcers noted. Neurologic:                Oriented x 3.  No motor or sensory deficits detected. REVIEW OF DIAGNOSTIC TESTS:    1. Blood tests from 10/29/2019 reviewed:  Sodium 161, potassium 5.0, BUN 16, creatinine 1.2, GFR 59, hemoglobin A1C 5.8, hemoglobin 13.3, hematocrit 43.7, WBC 8.4, platelet count 580, total cholesterol 132, triglycerides 57, HDL 42, LDL 79.   2.  EKG done today showed atrial fibrillation with ventricular pacing.    3.  Pacemaker

## 2020-03-23 RX ORDER — METOPROLOL SUCCINATE 25 MG/1
TABLET, EXTENDED RELEASE ORAL
Qty: 90 TABLET | Refills: 0 | Status: SHIPPED
Start: 2020-03-23 | End: 2020-09-24 | Stop reason: SDUPTHER

## 2020-06-05 RX ORDER — ACLIDINIUM BROMIDE 400 UG/1
POWDER, METERED RESPIRATORY (INHALATION)
Qty: 3 EACH | Refills: 3 | OUTPATIENT
Start: 2020-06-05

## 2020-06-26 RX ORDER — ATORVASTATIN CALCIUM 10 MG/1
TABLET, FILM COATED ORAL
Qty: 90 TABLET | Refills: 3 | OUTPATIENT
Start: 2020-06-26

## 2020-08-25 ENCOUNTER — OFFICE VISIT (OUTPATIENT)
Dept: VASCULAR SURGERY | Age: 75
End: 2020-08-25
Payer: MEDICARE

## 2020-08-25 VITALS — RESPIRATION RATE: 16 BRPM | WEIGHT: 165 LBS | HEIGHT: 68 IN | BODY MASS INDEX: 25.01 KG/M2

## 2020-08-25 PROCEDURE — 99213 OFFICE O/P EST LOW 20 MIN: CPT | Performed by: SURGERY

## 2020-08-25 NOTE — PROGRESS NOTES
activity     Days per week: Not on file     Minutes per session: Not on file    Stress: Not on file   Relationships    Social connections     Talks on phone: Not on file     Gets together: Not on file     Attends [employer] service: Not on file     Active member of club or organization: Not on file     Attends meetings of clubs or organizations: Not on file     Relationship status: Not on file    Intimate partner violence     Fear of current or ex partner: Not on file     Emotionally abused: Not on file     Physically abused: Not on file     Forced sexual activity: Not on file   Other Topics Concern    Not on file   Social History Narrative    Not on file        Family History   Problem Relation Age of Onset    Heart Attack Brother     Stroke Father        REVIEW OF SYSTEMS (New symptoms):    Eyes:      Blurred vision:  No [x]/Yes []               Diplopia:   No [x]/Yes []               Vision loss:       No [x]/Yes []   Ears, nose, throat:             Hearing loss:    No [x]/Yes []      Vertigo:   No [x]/Yes []                       Swallowing problem:  No [x]/Yes []               Nose bleeds:   No [x]/Yes []      Voice hoarseness:  No [x]/Yes []  Respiratory:             Cough:   No [x]/Yes []      Pleuritic chest pain:  No [x]/Yes []                        Dyspnea:   No [x]/Yes []      Wheezing:   No [x]/Yes []  Cardiovascular:             Angina:   No [x]/Yes []      Palpitations:   No [x]/Yes []          Claudication:    No [x]/Yes []      Leg swelling:   No [x]/Yes []  Gastrointestinal:             Nausea or vomiting:  No [x]/Yes []               Abdominal pain:  No [x]/Yes []                     Intestinal bleeding: No [x]/Yes []  Musculoskeletal:             Leg pain:   No [x]/Yes []      Back pain:   No [x]/Yes []                    Weakness:   No [x]/Yes []  Neurologic:             Numbness:   No [x]/Yes []      Paralysis:   No [x]/Yes []                       Headaches:   No [x]/Yes []  Hematologic, lymphatic:   Anemia:   No [x]/Yes []              Bleeding or bruising:  No [x]/Yes []              Fevers or chills: No [x]/Yes []  Endocrine:             Temp intolerance:   No [x]/Yes []                       Polydipsia, polyuria:  No [x]/Yes []  Skin:              Rash:    No [x]/Yes []      Ulcers:   No [x]/Yes []              Abnorm pigment: No [x]/Yes []  :              Frequency/urgency:  No [x]/Yes []      Hematuria:    No [x]/Yes []                      Incontinence:    No [x]/Yes []    PHYSICAL EXAM:  Vitals:    08/25/20 0925   Resp: 16     General Appearance: alert and oriented to person, place and time, in no acute distress, well developed and well- nourished  Neurologic: no cranial nerve deficit, speech normal  Head: normocephalic and atraumatic  Eyes: extraocular eye movements intact, conjunctivae normal  ENT: external ear and ear canal normal bilaterally, nose without deformity  Pulmonary/Chest: normal air movement, no respiratory distress  Cardiovascular: normal rate, regular rhythm  Abdomen: non-distended, no masses, umbilical hernia  Musculoskeletal: no joint deformity or tenderness  Extremities: no leg edema bilaterally  Skin: warm and dry, no rash or erythema    PULSE EXAM      Right      Left   Brachial     Radial     Femoral     Popliteal     Dorsalis Pedis     Posterior Tibial     (3=normal, 2=diminished, 1=barely palpable, 4=widened)    RADIOLOGY: Abdominal ultrasound:  Max aortic diameter =  cm. No evidence for endovascular leak. Problem List Items Addressed This Visit     S/P AAA repair using bifurcation graft - Primary    Relevant Orders    US ABDOMINAL AORTA LIMITED          The patient is due for an ultrasound and we will arrange for this. I will call him once we have the results and we will arrange for follow-up at that time. No follow-ups on file.

## 2020-08-31 RX ORDER — LISINOPRIL 2.5 MG/1
TABLET ORAL
Qty: 90 TABLET | Refills: 3 | OUTPATIENT
Start: 2020-08-31

## 2020-09-01 RX ORDER — METOPROLOL SUCCINATE 25 MG/1
TABLET, EXTENDED RELEASE ORAL
Qty: 90 TABLET | Refills: 0 | OUTPATIENT
Start: 2020-09-01

## 2020-09-22 ENCOUNTER — NURSE ONLY (OUTPATIENT)
Dept: CARDIOLOGY CLINIC | Age: 75
End: 2020-09-22
Payer: MEDICARE

## 2020-09-22 PROCEDURE — 93280 PM DEVICE PROGR EVAL DUAL: CPT | Performed by: INTERNAL MEDICINE

## 2020-09-23 ENCOUNTER — HOSPITAL ENCOUNTER (OUTPATIENT)
Dept: CARDIOLOGY | Age: 75
Discharge: HOME | End: 2020-09-23
Payer: MEDICARE

## 2020-09-23 PROCEDURE — 93978 VASCULAR STUDY: CPT

## 2020-09-23 NOTE — PROGRESS NOTES
[facility] - [facility]    This is a pre read worksheet - prior to official physician interpretation    Rosa Isela Rocha  1945  Date of study: 9/23/20    Indication for study:  AAA  Study : Aortic Ultrasound    Diameter Aorta:     Proximal:   cm     Mid:   cm     Distal:  4.7 x 4.8 cm     Right iliac: 1.6 x 1.5 cm     Left iliac: 1.4 x 1.4 cm    Additional comments: slightly limited d/t bowel gas. No obvious leaks visualized.         LAST STUDY  8/2/2019 CTA  5.1 cm

## 2020-09-24 RX ORDER — ATORVASTATIN CALCIUM 10 MG/1
10 TABLET, FILM COATED ORAL DAILY
Qty: 90 TABLET | Refills: 0 | Status: SHIPPED
Start: 2020-09-24 | End: 2020-12-07

## 2020-09-24 RX ORDER — LISINOPRIL 2.5 MG/1
TABLET ORAL
Qty: 90 TABLET | Refills: 0 | Status: SHIPPED
Start: 2020-09-24 | End: 2020-12-07

## 2020-09-24 RX ORDER — FUROSEMIDE 40 MG/1
40 TABLET ORAL DAILY
Qty: 90 TABLET | Refills: 0 | Status: SHIPPED
Start: 2020-09-24 | End: 2020-12-07

## 2020-09-24 RX ORDER — METOPROLOL SUCCINATE 25 MG/1
TABLET, EXTENDED RELEASE ORAL
Qty: 90 TABLET | Refills: 0 | Status: SHIPPED
Start: 2020-09-24 | End: 2020-12-23

## 2020-09-24 NOTE — TELEPHONE ENCOUNTER
Pt's pharmacy called stating that pt is almost out of medications. This MA attempted to return call to pt to notify him he is due for a visit. Pt's phone rang to voicemail. This MA left message for pt advising the Rx were sent to the pharmacy as a courtesy but he is due for a visit.     Electronically signed by Marc Apodaca MA on 9/24/20 at 11:06 AM EDT

## 2020-09-30 ENCOUNTER — HOSPITAL ENCOUNTER (OUTPATIENT)
Age: 75
Discharge: HOME | End: 2020-10-02
Payer: MEDICARE

## 2020-09-30 ENCOUNTER — OFFICE VISIT (OUTPATIENT)
Dept: FAMILY MEDICINE CLINIC | Age: 75
End: 2020-09-30
Payer: MEDICARE

## 2020-09-30 VITALS
SYSTOLIC BLOOD PRESSURE: 120 MMHG | OXYGEN SATURATION: 98 % | RESPIRATION RATE: 18 BRPM | DIASTOLIC BLOOD PRESSURE: 82 MMHG | WEIGHT: 168 LBS | HEART RATE: 72 BPM | HEIGHT: 68 IN | TEMPERATURE: 98.6 F | BODY MASS INDEX: 25.46 KG/M2

## 2020-09-30 PROBLEM — I50.21 ACUTE SYSTOLIC CONGESTIVE HEART FAILURE (HCC): Status: ACTIVE | Noted: 2020-09-30

## 2020-09-30 LAB
ALBUMIN SERPL-MCNC: 4.3 G/DL (ref 3.5–5.2)
ALP BLD-CCNC: 113 U/L (ref 40–129)
ALT SERPL-CCNC: 15 U/L (ref 0–40)
ANION GAP SERPL CALCULATED.3IONS-SCNC: 13 MMOL/L (ref 7–16)
AST SERPL-CCNC: 23 U/L (ref 0–39)
BASOPHILS ABSOLUTE: 0.11 E9/L (ref 0–0.2)
BASOPHILS RELATIVE PERCENT: 1.4 % (ref 0–2)
BILIRUB SERPL-MCNC: 1.5 MG/DL (ref 0–1.2)
BUN BLDV-MCNC: 19 MG/DL (ref 8–23)
CALCIUM SERPL-MCNC: 9.8 MG/DL (ref 8.6–10.2)
CHLORIDE BLD-SCNC: 100 MMOL/L (ref 98–107)
CHOLESTEROL, TOTAL: 156 MG/DL (ref 0–199)
CO2: 26 MMOL/L (ref 22–29)
CREAT SERPL-MCNC: 1.2 MG/DL (ref 0.7–1.2)
EOSINOPHILS ABSOLUTE: 0.2 E9/L (ref 0.05–0.5)
EOSINOPHILS RELATIVE PERCENT: 2.5 % (ref 0–6)
GFR AFRICAN AMERICAN: >60
GFR NON-AFRICAN AMERICAN: 59 ML/MIN/1.73
GLUCOSE BLD-MCNC: 82 MG/DL (ref 74–99)
HBA1C MFR BLD: 6.1 % (ref 4–5.6)
HCT VFR BLD CALC: 49 % (ref 37–54)
HDLC SERPL-MCNC: 53 MG/DL
HEMOGLOBIN: 15.6 G/DL (ref 12.5–16.5)
IMMATURE GRANULOCYTES #: 0.13 E9/L
IMMATURE GRANULOCYTES %: 1.6 % (ref 0–5)
LDL CHOLESTEROL CALCULATED: 93 MG/DL (ref 0–99)
LYMPHOCYTES ABSOLUTE: 1.77 E9/L (ref 1.5–4)
LYMPHOCYTES RELATIVE PERCENT: 22.3 % (ref 20–42)
MCH RBC QN AUTO: 31.2 PG (ref 26–35)
MCHC RBC AUTO-ENTMCNC: 31.8 % (ref 32–34.5)
MCV RBC AUTO: 98 FL (ref 80–99.9)
MONOCYTES ABSOLUTE: 0.55 E9/L (ref 0.1–0.95)
MONOCYTES RELATIVE PERCENT: 6.9 % (ref 2–12)
NEUTROPHILS ABSOLUTE: 5.17 E9/L (ref 1.8–7.3)
NEUTROPHILS RELATIVE PERCENT: 65.3 % (ref 43–80)
PDW BLD-RTO: 15.1 FL (ref 11.5–15)
PLATELET # BLD: 195 E9/L (ref 130–450)
PMV BLD AUTO: 10.7 FL (ref 7–12)
POTASSIUM SERPL-SCNC: 4.8 MMOL/L (ref 3.5–5)
PROSTATE SPECIFIC ANTIGEN: 0.07 NG/ML (ref 0–4)
RBC # BLD: 5 E12/L (ref 3.8–5.8)
SODIUM BLD-SCNC: 139 MMOL/L (ref 132–146)
TOTAL PROTEIN: 7.9 G/DL (ref 6.4–8.3)
TRIGL SERPL-MCNC: 50 MG/DL (ref 0–149)
TSH SERPL DL<=0.05 MIU/L-ACNC: 4.31 UIU/ML (ref 0.27–4.2)
URIC ACID, SERUM: 8.3 MG/DL (ref 3.4–7)
VLDLC SERPL CALC-MCNC: 10 MG/DL
WBC # BLD: 7.9 E9/L (ref 4.5–11.5)

## 2020-09-30 PROCEDURE — 80061 LIPID PANEL: CPT

## 2020-09-30 PROCEDURE — 80053 COMPREHEN METABOLIC PANEL: CPT

## 2020-09-30 PROCEDURE — 96372 THER/PROPH/DIAG INJ SC/IM: CPT | Performed by: FAMILY MEDICINE

## 2020-09-30 PROCEDURE — G0103 PSA SCREENING: HCPCS

## 2020-09-30 PROCEDURE — 83036 HEMOGLOBIN GLYCOSYLATED A1C: CPT

## 2020-09-30 PROCEDURE — 85025 COMPLETE CBC W/AUTO DIFF WBC: CPT

## 2020-09-30 PROCEDURE — 84550 ASSAY OF BLOOD/URIC ACID: CPT

## 2020-09-30 PROCEDURE — 84443 ASSAY THYROID STIM HORMONE: CPT

## 2020-09-30 PROCEDURE — G0439 PPPS, SUBSEQ VISIT: HCPCS | Performed by: FAMILY MEDICINE

## 2020-09-30 RX ORDER — FEXOFENADINE HCL 180 MG/1
180 TABLET ORAL DAILY
Qty: 30 TABLET | Refills: 0 | Status: SHIPPED
Start: 2020-09-30 | End: 2020-10-22

## 2020-09-30 RX ORDER — METHYLPREDNISOLONE 4 MG/1
TABLET ORAL
Qty: 1 KIT | Refills: 0 | Status: SHIPPED | OUTPATIENT
Start: 2020-09-30 | End: 2020-10-06

## 2020-09-30 RX ORDER — DEXAMETHASONE SODIUM PHOSPHATE 4 MG/ML
4 INJECTION, SOLUTION INTRA-ARTICULAR; INTRALESIONAL; INTRAMUSCULAR; INTRAVENOUS; SOFT TISSUE ONCE
Status: COMPLETED | OUTPATIENT
Start: 2020-09-30 | End: 2020-09-30

## 2020-09-30 RX ADMIN — DEXAMETHASONE SODIUM PHOSPHATE 4 MG: 4 INJECTION, SOLUTION INTRA-ARTICULAR; INTRALESIONAL; INTRAMUSCULAR; INTRAVENOUS; SOFT TISSUE at 10:44

## 2020-09-30 ASSESSMENT — LIFESTYLE VARIABLES
AUDIT-C TOTAL SCORE: 1
HOW OFTEN DURING THE LAST YEAR HAVE YOU HAD A FEELING OF GUILT OR REMORSE AFTER DRINKING: 0
AUDIT TOTAL SCORE: 1
HOW MANY STANDARD DRINKS CONTAINING ALCOHOL DO YOU HAVE ON A TYPICAL DAY: 0
HOW OFTEN DO YOU HAVE A DRINK CONTAINING ALCOHOL: 1
HOW OFTEN DURING THE LAST YEAR HAVE YOU NEEDED AN ALCOHOLIC DRINK FIRST THING IN THE MORNING TO GET YOURSELF GOING AFTER A NIGHT OF HEAVY DRINKING: 0
HOW OFTEN DURING THE LAST YEAR HAVE YOU BEEN UNABLE TO REMEMBER WHAT HAPPENED THE NIGHT BEFORE BECAUSE YOU HAD BEEN DRINKING: 0
HOW OFTEN DURING THE LAST YEAR HAVE YOU FAILED TO DO WHAT WAS NORMALLY EXPECTED FROM YOU BECAUSE OF DRINKING: 0
HOW OFTEN DURING THE LAST YEAR HAVE YOU FOUND THAT YOU WERE NOT ABLE TO STOP DRINKING ONCE YOU HAD STARTED: 0
HOW OFTEN DO YOU HAVE SIX OR MORE DRINKS ON ONE OCCASION: 0
HAVE YOU OR SOMEONE ELSE BEEN INJURED AS A RESULT OF YOUR DRINKING: 0
HAS A RELATIVE, FRIEND, DOCTOR, OR ANOTHER HEALTH PROFESSIONAL EXPRESSED CONCERN ABOUT YOUR DRINKING OR SUGGESTED YOU CUT DOWN: 0

## 2020-09-30 ASSESSMENT — PATIENT HEALTH QUESTIONNAIRE - PHQ9
2. FEELING DOWN, DEPRESSED OR HOPELESS: 0
SUM OF ALL RESPONSES TO PHQ QUESTIONS 1-9: 0
SUM OF ALL RESPONSES TO PHQ9 QUESTIONS 1 & 2: 0
SUM OF ALL RESPONSES TO PHQ QUESTIONS 1-9: 0
1. LITTLE INTEREST OR PLEASURE IN DOING THINGS: 0

## 2020-09-30 NOTE — PATIENT INSTRUCTIONS
Personalized Preventive Plan for Martha Valladares - 9/30/2020  Medicare offers a range of preventive health benefits. Some of the tests and screenings are paid in full while other may be subject to a deductible, co-insurance, and/or copay. Some of these benefits include a comprehensive review of your medical history including lifestyle, illnesses that may run in your family, and various assessments and screenings as appropriate. After reviewing your medical record and screening and assessments performed today your provider may have ordered immunizations, labs, imaging, and/or referrals for you. A list of these orders (if applicable) as well as your Preventive Care list are included within your After Visit Summary for your review. Other Preventive Recommendations:    · A preventive eye exam performed by an eye specialist is recommended every 1-2 years to screen for glaucoma; cataracts, macular degeneration, and other eye disorders. · A preventive dental visit is recommended every 6 months. · Try to get at least 150 minutes of exercise per week or 10,000 steps per day on a pedometer . · Order or download the FREE \"Exercise & Physical Activity: Your Everyday Guide\" from [employer]. Call 2-813.194.1135 or search [employer] online. · You need 1438-6620 mg of calcium and 0231-5840 IU of vitamin D per day. It is possible to meet your calcium requirement with diet alone, but a vitamin D supplement is usually necessary to meet this goal.  · When exposed to the sun, use a sunscreen that protects against both UVA and UVB radiation with an SPF of 30 or greater. Reapply every 2 to 3 hours or after sweating, drying off with a towel, or swimming. · Always wear a seat belt when traveling in a car. Always wear a helmet when riding a bicycle or motorcycle.

## 2020-09-30 NOTE — PROGRESS NOTES
Medicare Annual Wellness Visit  Name: Jacalyn Litten Date: 2020   MRN: <[id number]> Sex: Male   Age: 66 y.o. Ethnicity: Non-/Non    : 1945 Race: Zachariah Rojas is here for Medicare AWV (AWV) and Rash (Pt c/o itchy rash, all over his body)    Screenings for behavioral, psychosocial and functional/safety risks, and cognitive dysfunction are all negative except as indicated below. These results, as well as other patient data from the [facility], [place], operated by [facility] form, are documented in Flowsheets linked to this Encounter. Allergies   Allergen Reactions    Pneumovax [Pneumococcal Polysaccharide Vaccine] Dermatitis         Prior to Visit Medications    Medication Sig Taking? Authorizing Provider   methylPREDNISolone (MEDROL DOSEPACK) 4 MG tablet Take as directed Yes Liana Gilford Catterlin, DO   fexofenadine (ALLEGRA) 180 MG tablet Take 1 tablet by mouth daily Yes Hermelindo Bedolla DO   lisinopril (PRINIVIL;ZESTRIL) 2.5 MG tablet TAKE 1 TABLET DAILY Yes Hermelindo Bedolla DO   furosemide (LASIX) 40 MG tablet Take 1 tablet by mouth daily Yes Hermelindo Bedolla DO   apixaban (ELIQUIS) 5 MG TABS tablet TAKE 1 TABLET TWICE A DAY - appointment required for further refills.  Yes Liana Gilford Catterlin, DO   atorvastatin (LIPITOR) 10 MG tablet Take 1 tablet by mouth daily Yes Hermelindo Bedolla DO   metoprolol succinate (TOPROL XL) 25 MG extended release tablet TAKE 1 TABLET DAILY Yes Hermelindo Bedolla DO   SYMBICORT 160-4.5 MCG/ACT AERO USE 2 INHALATIONS TWICE A DAY Yes Hermelindo Bedolla DO   nitroGLYCERIN (NITROSTAT) 0.3 MG SL tablet Place 1 tablet under the tongue every 5 minutes as needed for Chest pain Yes Hermelindo Bedolla DO   aclidinium (TUDORZA PRESSAIR) 400 MCG/ACT AEPB inhaler Inhale 1 puff into the lungs 2 times daily  Historical Provider, MD   aspirin EC 81 MG EC tablet Take 81 mg by mouth daily  Historical Provider, MD         Past Medical History:   Diagnosis Date    Aortic stenosis, mild     Aortic valve sclerosis     moderate    Atrial fibrillation ([facility] 2014    CAD (coronary artery disease)     CHF (congestive heart failure) (HCC)     COPD (chronic obstructive pulmonary disease) (HCC)     Gallbladder attack     History of tobacco use 9/17/2015    Hyperlipidemia     Hypertension     Mild mitral regurgitation     Prostate cancer ([facility]     44 treatments of radiation    PVD (peripheral vascular disease) with claudication ([facility] 9/17/2015    RBBB     Tobacco abuse        Past Surgical History:   Procedure Laterality Date    APPENDECTOMY      CARDIAC CATHETERIZATION  8/8/2014    DR Coates-pt.  states had triple bypass \"5 years ago\"    COLONOSCOPY      CORONARY ARTERY BYPASS GRAFT  08/11/2014    CABGx3 LIMA-LAD, SVG-D1, SVG-OM1, AVR Dr. Oma Ewing ENDOSCOPY, COLON, DIAGNOSTIC      PACEMAKER PLACEMENT Left 8-    Dr. Tiffany Anders ARTERIAL RS&I N/A 7/16/2018    ENDOVASCULAR  AORTIC ANEURYSM REPAIR , ABDOMINAL AORTIC ANEURYSM, WITH FINISTRATED GRAFT performed by Kyle Valle MD at [place] [address]           Family History   Problem Relation Age of Onset    Heart Attack Brother     Stroke Father        CareTeam (Including outside providers/suppliers regularly involved in providing care):   Patient Care Team:  Christiano Friend DO as PCP - General  Christiano Friend DO as PCP - [facility] Empaneled Provider  Davidson Arias DO as Surgeon (Cardiothoracic Surgery)  Bi Santos MD as Consulting Physician (Cardiology)    Wt Readings from Last 3 Encounters:   09/30/20 168 lb (76.2 kg)   08/25/20 165 lb (74.8 kg)   03/03/20 170 lb (77.1 kg)     Vitals:    09/30/20 0945   BP: 120/82   Pulse: 72   Resp: 18   Temp: 98.6 °F (37 °C)   TempSrc: Temporal   SpO2: 98%   Weight: 168 lb (76.2 kg)   Height: 5' 8\" (1.727 m)     Body mass index is 25.54 kg/m². Based upon direct observation of the patient, evaluation of cognition reveals recent and remote memory intact. General Appearance: alert and oriented to person, place and time, well-developed and well-nourished, in no acute distress  Skin: diffuse urticaria   Head: normocephalic and atraumatic  Eyes: pupils equal, round, and reactive to light, extraocular eye movements intact, conjunctivae normal  ENT: tympanic membrane, external ear and ear canal normal bilaterally, oropharynx clear and moist with normal mucous membranes and hearing grossly normal bilaterally  Neck: neck supple and non tender without mass, no thyromegaly or thyroid nodules, no cervical lymphadenopathy   Pulmonary/Chest: clear to auscultation bilaterally- no wheezes, rales or rhonchi, normal air movement, no respiratory distress and no chest wall tenderness  Cardiovascular: normal rate, regular rhythm, normal S1 and S2, no murmurs, no gallops, intact distal pulses and no carotid bruits  Abdomen: soft, non-tender, non-distended, normal bowel sounds, no masses or organomegaly  Genitourinary: genitals normal without hernia or inguinal adenopathy  Extremities: no cyanosis and no clubbing  Musculoskeletal: normal range of motion, no joint swelling, deformity or tenderness  Neurologic: gait and coordination normal and speech normal    Patient's complete Health Risk Assessment and screening values have been reviewed and are found in Flowsheets. The following problems were reviewed today and where indicated follow up appointments were made and/or referrals ordered. Positive Risk Factor Screenings with Interventions:     Health Habits/Nutrition:  Health Habits/Nutrition  Do you exercise for at least 20 minutes 2-3 times per week?: Yes  Have you lost any weight without trying in the past 3 months?: No  Do you eat fewer than 2 meals per day?: (!) Yes  Have you seen a dentist within the past year?: Yes  Body mass index is 25.54 kg/m².   Health Habits/Nutrition Interventions:  · no acute issues    Hearing/Vision:  No exam data present  Hearing/Vision  Do you or your family notice any trouble with your hearing?: (!) Yes  Do you have difficulty driving, watching TV, or doing any of your daily activities because of your eyesight?: No  Have you had an eye exam within the past year?: Yes  Hearing/Vision Interventions:  · no acute issues    Personalized Preventive Plan   Current Health Maintenance Status  Immunization History   Administered Date(s) Administered    Influenza Vaccine, unspecified formulation 10/29/2013, 09/30/2014, 09/15/2016, 10/19/2017, 10/12/2018, 09/04/2020    Influenza Virus Vaccine 10/01/2014, 09/15/2016, 10/19/2017, 10/02/2018, 09/04/2020    Influenza Whole 10/19/2015    Influenza, High Dose (Fluzone 65 yrs and older) 10/02/2018    Influenza, Quadv, adjuvanted, 65 yrs +, IM, PF (Fluad) 09/04/2020    Influenza, Triv, inactivated, subunit, adjuvanted, IM (Fluad 65 yrs and older) 10/07/2019    Pneumococcal Conjugate 13-valent ([id number]) 11/24/2015    Pneumococcal Polysaccharide ([id number]) 07/18/2017, 10/09/2019        Health Maintenance   Topic Date Due    Hepatitis A vaccine (1 of 2 - Risk 2-dose series) 11/16/1946    Hepatitis B vaccine (1 of 3 - Risk 3-dose series) 11/16/1962    DTaP/Tdap/Td vaccine (1 - Tdap) 11/16/1962    Shingles Vaccine (1 of 2) 11/16/1993    Annual Wellness Visit (AWV)  05/29/2019    PSA counseling  03/18/2020    Lipid screen  10/29/2020    Potassium monitoring  10/29/2020    Creatinine monitoring  10/29/2020    Flu vaccine  Completed    Hib vaccine  Aged Out    Meningococcal (ACWY) vaccine  Aged Out     Recommendations for [employer] Due: see orders and patient instructions/AVS.  . Recommended screening schedule for the next 5-10 years is provided to the patient in written form: see Patient Jessica Tucker was seen today for medicare awv and rash.     Diagnoses and all orders for this visit:    Encounter for Medicare annual wellness exam  -     Comprehensive Metabolic Panel; Future  -     CBC Auto Differential; Future    ---VASCULAR PANEL  A) ASA, plavix, aggrenox  B) ELQUIS, pletal, tzd, STATIN  C) ACE, LASIX, folic, ccb  D) cannikinumab, fish oils     ---CARDIAC---ASA, ACE, BETA, STATIN, LASIX, ( ccb )    Mild protein-calorie malnutrition (HCC)  -     Comprehensive Metabolic Panel; Future  -     CBC Auto Differential; Future    Aortic mural thrombus (HCC)  -     Comprehensive Metabolic Panel; Future  -     CBC Auto Differential; Future    Cirrhosis of liver without ascites, unspecified hepatic cirrhosis type ([facility]  -     Comprehensive Metabolic Panel; Future  -     CBC Auto Differential; Future    Acute systolic congestive heart failure (HCC)  -     Comprehensive Metabolic Panel; Future  -     CBC Auto Differential; Future    PAF (paroxysmal atrial fibrillation) (HCC)  -     Comprehensive Metabolic Panel; Future  -     CBC Auto Differential; Future    Prostate CA (HCC)  -     Comprehensive Metabolic Panel; Future  -     CBC Auto Differential; Future    Fatigue, unspecified type  -     TSH without Reflex; Future  -     Uric Acid; Future  -     Comprehensive Metabolic Panel; Future  -     CBC Auto Differential; Future    Dyslipidemia  -     Lipid Panel; Future  -     CBC Auto Differential; Future    IFG (impaired fasting glucose)  -     Hemoglobin A1C; Future    Screening PSA (prostate specific antigen)  -     Psa screening; Future    Urticaria  -     dexamethasone (DECADRON) injection 4 mg  -     methylPREDNISolone (MEDROL DOSEPACK) 4 MG tablet; Take as directed  -     fexofenadine (ALLEGRA) 180 MG tablet;  Take 1 tablet by mouth daily  -     AFL - Donte Esparza MD, Dermatology, [place]  --I will hold the ACE as these are culprits in urticaria

## 2020-10-28 RX ORDER — ACLIDINIUM BROMIDE 400 UG/1
1 POWDER, METERED RESPIRATORY (INHALATION) 2 TIMES DAILY
Qty: 3 EACH | Refills: 1 | Status: SHIPPED
Start: 2020-10-28 | End: 2021-03-02

## 2020-10-28 NOTE — TELEPHONE ENCOUNTER
Patient called the clinical care line requesting medication refill. Chart reviewed and medication sent to the pharmacy.   Electronically signed by Julieta Carson on 10/28/2020 at 2:58 PM

## 2021-03-02 ENCOUNTER — OFFICE VISIT (OUTPATIENT)
Dept: CARDIOLOGY CLINIC | Age: 76
End: 2021-03-02
Payer: MEDICARE

## 2021-03-02 ENCOUNTER — NURSE ONLY (OUTPATIENT)
Dept: CARDIOLOGY CLINIC | Age: 76
End: 2021-03-02
Payer: MEDICARE

## 2021-03-02 VITALS
SYSTOLIC BLOOD PRESSURE: 110 MMHG | DIASTOLIC BLOOD PRESSURE: 64 MMHG | WEIGHT: 168 LBS | BODY MASS INDEX: 25.46 KG/M2 | HEART RATE: 71 BPM | HEIGHT: 68 IN

## 2021-03-02 DIAGNOSIS — M51.36 DDD (DEGENERATIVE DISC DISEASE), LUMBAR: ICD-10-CM

## 2021-03-02 DIAGNOSIS — E79.0 HYPERURICEMIA: ICD-10-CM

## 2021-03-02 DIAGNOSIS — J43.9 PULMONARY EMPHYSEMA, UNSPECIFIED EMPHYSEMA TYPE (HCC): ICD-10-CM

## 2021-03-02 DIAGNOSIS — I38 VHD (VALVULAR HEART DISEASE): ICD-10-CM

## 2021-03-02 DIAGNOSIS — R73.03 PREDIABETES: ICD-10-CM

## 2021-03-02 DIAGNOSIS — Z86.39 H/O HYPERLIPIDEMIA: ICD-10-CM

## 2021-03-02 DIAGNOSIS — I25.5 ISCHEMIC CARDIOMYOPATHY: ICD-10-CM

## 2021-03-02 DIAGNOSIS — E80.6 HYPERBILIRUBINEMIA: ICD-10-CM

## 2021-03-02 DIAGNOSIS — Z87.09 HISTORY OF ACUTE RESPIRATORY FAILURE: ICD-10-CM

## 2021-03-02 DIAGNOSIS — Z86.79 S/P AAA REPAIR USING BIFURCATION GRAFT: Primary | ICD-10-CM

## 2021-03-02 DIAGNOSIS — Z79.01 ANTICOAGULATED BY ANTICOAGULATION TREATMENT: ICD-10-CM

## 2021-03-02 DIAGNOSIS — Z87.891 EX-SMOKER: ICD-10-CM

## 2021-03-02 DIAGNOSIS — R29.898 WEAKNESS OF BOTH LOWER EXTREMITIES: ICD-10-CM

## 2021-03-02 DIAGNOSIS — Z79.899 ON STATIN THERAPY: ICD-10-CM

## 2021-03-02 DIAGNOSIS — Z95.828 S/P AAA REPAIR USING BIFURCATION GRAFT: Primary | ICD-10-CM

## 2021-03-02 DIAGNOSIS — N18.31 STAGE 3A CHRONIC KIDNEY DISEASE (HCC): ICD-10-CM

## 2021-03-02 DIAGNOSIS — Z85.46 H/O PROSTATE CANCER: ICD-10-CM

## 2021-03-02 DIAGNOSIS — Z95.1 HX OF CABG: ICD-10-CM

## 2021-03-02 DIAGNOSIS — Z95.2 S/P AVR (AORTIC VALVE REPLACEMENT): ICD-10-CM

## 2021-03-02 DIAGNOSIS — I48.21 PERMANENT ATRIAL FIBRILLATION (HCC): ICD-10-CM

## 2021-03-02 DIAGNOSIS — Z95.0 PACEMAKER: ICD-10-CM

## 2021-03-02 DIAGNOSIS — I48.21 PERMANENT ATRIAL FIBRILLATION (HCC): Primary | ICD-10-CM

## 2021-03-02 DIAGNOSIS — Z87.01 H/O: PNEUMONIA: ICD-10-CM

## 2021-03-02 PROCEDURE — 99214 OFFICE O/P EST MOD 30 MIN: CPT | Performed by: INTERNAL MEDICINE

## 2021-03-02 PROCEDURE — 93280 PM DEVICE PROGR EVAL DUAL: CPT | Performed by: INTERNAL MEDICINE

## 2021-03-02 PROCEDURE — 93000 ELECTROCARDIOGRAM COMPLETE: CPT | Performed by: INTERNAL MEDICINE

## 2021-03-02 NOTE — PROGRESS NOTES
OFFICE VISIT        PRIMARY CARE PHYSICIAN:    Jenae Bedolla DO       ALLERGIES / SENSITIVITIES:    Allergies   Allergen Reactions    Pneumovax [Pneumococcal Polysaccharide Vaccine] Dermatitis        REVIEWED MEDICATIONS:      Current Outpatient Medications:     metoprolol succinate (TOPROL XL) 25 MG extended release tablet, TAKE 1 TABLET DAILY, Disp: 90 tablet, Rfl: 0    atorvastatin (LIPITOR) 10 MG tablet, TAKE 1 TABLET DAILY, Disp: 90 tablet, Rfl: 1    furosemide (LASIX) 40 MG tablet, TAKE 1 TABLET DAILY, Disp: 90 tablet, Rfl: 1    apixaban (ELIQUIS) 5 MG TABS tablet, TAKE 1 TABLET TWICE A DAY (APPOINTMENT REQUIRED FOR FURTHER REFILLS), Disp: 180 tablet, Rfl: 1    lisinopril (PRINIVIL;ZESTRIL) 2.5 MG tablet, TAKE 1 TABLET DAILY, Disp: 90 tablet, Rfl: 1    fexofenadine (ALLEGRA) 180 MG tablet, TAKE 1 TABLET BY MOUTH EVERY DAY, Disp: 30 tablet, Rfl: 5    SYMBICORT 160-4.5 MCG/ACT AERO, USE 2 INHALATIONS TWICE A DAY, Disp: 20.4 g, Rfl: 6    nitroGLYCERIN (NITROSTAT) 0.3 MG SL tablet, Place 1 tablet under the tongue every 5 minutes as needed for Chest pain, Disp: 30 tablet, Rfl: 3      S: REASON FOR VISIT:    Coronary artery disease/ischemic cardiomyopathy and history of bicuspid aortic valve, S/P coronary artery bypass graft surgery and aortic valve replacement surgery in 8/2014. History of high grade AV block, S/P pacemaker insertion. Chronic atrial fibrillation on chronic anticoagulation on Eliquis. Va Ruano is a 49-year-old male with cardiovascular history as described below. He has not been involved in any formal exercise and stopped going to the gym after sustaining a fall. He denies chest pain. He has chronic exertional dyspnea, which has not worsened. He denies orthopnea, PND's or significant lower extremity swelling. He denies the subjective feeling of palpitations. He denies dizziness, presyncope or syncope.          REVIEW OF SYSTEMS:    CONSTITUTIONAL:  Denies fevers, chills or hypokinesis with an estimated ejection fraction of 60%. 4. Hyperlipidemia.    5. 8/8/14: Cardiac catheterization: Severe three vessel coronary artery disease. Normal left ventricular size with basal inferior wall akinesis with an estimated ejection fraction of 50%. Systemic hypertension. Elevated left ventricular end diastolic pressure consistent with LV dysfunction. Mild calcific aortic stenosis and mild aortic regurgitation. High grade ostial narrowing of the left vertebral basilar artery with no significant disease of both carotid arteries. Mobitz II second degree AV block and intermittent third degree AV block for which the patient had a temporary transvenous pacemaker inserted.    6. Carotid ultrasound done on 8/9/14 was reported as no evidence of hemodynamically significant stenosis.    7. 8/11/14: Coronary artery bypass graft surgery x3: LIMA to LAD; SVG to second diagonal branch of LAD; SVG to first marginal branch of LCX. Aortic valve replacement with a 23 mm Medtronic Mosaic Ultra bioprosthetic tissue valve.    8. Dual chamber pacemaker insertion on 8/12/14 for Mobitz type II second degree AV block and intermittent third degree heart block.    9. Paroxysmal atrial fibrillation/flutter in the perioperative period in 8/14.    10. Holter monitor done on 12/09/14 for 24 hours was read by Dr. Elyse Castillo (cardiologist) as showing paced rhythm with an average heart rate of 73 BPM with frequent PVC's with one short run of six beats of nonsustained ventricular tachycardia at a rate of 126 BPM with rare PAC's.    11. Abdominal aortic aneurysm:  a.  CT scan of the abdomen done on 09/01/2015 was reported as showing a 4-cm abdominal aortic aneurysm.    b.  US of abdominal aorta done on 03/31/2017 was read as showing a 4.6 x 4.8 abdominal aortic aneurysm without involvement of the iliac arteries. c.  CTA of the abdomen and pelvis done on 04/24/2018 showed a 5.1 x 5.3 cm AAA.   d.  Endovascular repair of abdominal aortic aneurysm 07/16/2018. 12. Elevated liver enzymes in 08/2015 at which time patient complained of abdominal pain: Lipitor was discontinued at that time. 13. 2-D echo February 15, 2018 [facility]:  Technically difficult exam due to body habitus. - Exam indication: Shortness of Breath- The left ventricle is normal in size. There is no left ventricular hypertrophy. Left ventricular systolic function is mildly decreased. EF = 45 ± 5% (visual est.)- The right ventricle is mildly dilated. Right ventricular systolic function is mildly decreased. - The left atrial cavity is moderately dilated. - The right atrial cavity is dilated. - There is moderate (2+) tricuspid valve regurgitation caused by annular dilatation.- Porcine Medtronic prosthetic aortic valve. There is trivial aortic valve regurgitation. The peak gradient is 35 mmHg, the mean gradient is 20 mmHg and the dimensionless valve index is 0.24. Bioprosthetic leaflets not well seen. - Estimated right ventricular systolic pressure is 27 mmHg consistent with normal pulmonary artery pressures. Estimated right atrial pressure is 5 mmHg. - The patient has not had a prior [facility] echocardiographic exam for comparison. [address] nuclear stress test done on 04/17/2018 showed normal myocardial perfusion images with normal left ventricular wall motion and systolic function with an ejection fraction calculated at 63%.    15. Echocardiogram done on 9/17/2019 was read by Dr. Sravanthi Cadet (Internist at [facility]) as showing normal left ventricular size with an ejection fraction of 33% with akinetic inferior wall. Mildly dilated right ventricle. Normal size atria. Mild mitral regurgitation. Moderate-to-severe tricuspid regurgitation with mild pulmonary hypertension, \"mild aortic stenosis\" with a maximum gradient of 23.41 mmHg and a mean gradient of 16.8 and a maximum gradient of 23.31 mmHg and a mean gradient of 16.18 mmHg with a reported aortic valve area of 0.6 cm²!     16. Prediabetes: Hemoglobin A1C 6.1 (on 2020).    PAST MEDICAL HISTORY:    1. As under cardiovascular history.    2. S/P appendectomy.    3. ? Anxiety.    4. Benign prostatic hypertrophy. 5. Emphysema  6. prostate cancer and status post radiation therapy  7. Psoriasis/treated with methotrexate around 4 months ago in 2017.  8. Pulmonary function studies 2/15/2018:    FVC 3.86 3.00 4.73 3.60 93 3.78 98 5   FEV 1 2.79 2.06 3.53 2.09 75 2.21 79   6   FEV1%F 72.78 63.10 82.46 58.10 80 58.52 80 1   FEV 2 3.07 2.03 4.11 2.47 81 2.58 84 4   FEV 3.66 2.64 4.67 2.72 74 2.74 75 1   FEV3%E 90.61 85.97 95.25 75.69 84 72.45 80 -4   MEF 50 3.48 1.86 5.11 1.01 29 1.01 29 -1   FIF 50 3.04 2.41 -21   F25/75 2.05 0.56 3.53 0.64 31 0.53 26 -17   FE%FIF 33.41 41.79 25   FEV6 3.12 3.16 1   PEF 7.54 5.18 9.90 6.16 82 5.67 75 -8   FET 14.42 14.08 -2   FETPEF 0.07 0.09 28   VBe%FV   2.15 2.56 19   VBEex 0.08 0.10 25   -----------------------------------------------------------------------------   VC MAX 3.86 3.00 4.73 3.60 93 3.78 98 5   -----------------------------------------------------------------------------   9. Hospitalized from 2019 to 2019 with acute hypoxic respiratory failure secondary to sepsis secondary to extensive community acquired pneumonia with Legionella. 10. 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 11. Chronic kidney disease. 12.  Hyperbilirubinemia.         FAMILY HISTORY:    Father  at age 80 from \"liver problems\".    Mother  at age 80 from \"old age\".     SOCIAL HISTORY:   Patient quit smoking on 2014. He denied any significant alcohol use.  He denied any illicit drug use.        O:  COMPLETE PHYSICAL EXAM:      /64 (Site: Right Upper Arm, Position: Sitting, Cuff Size: Large Adult)   Pulse 71   Ht 5' 8\" (1.727 m)   Wt 168 lb (76.2 kg)   BMI 25.54 kg/m²      General:                    Well-developed, well-nourished male in no acute distress.    Head & Neck:            Atraumatic, normocephalic head.  No JVD. No carotid bruits. Carotid upstrokes normal bilaterally. No thyromegaly. Sclerae not icteric. No xanthelasmas.  Mucous membranes moist.  Chest:                       Symmetrical and nontender.  Sternotomy scar well healed. Pacemaker site in the left infraclavicular area well    healed.    Lungs:                        few crackles in right base   Heart:                        Normal S1 and S2. No S3. Grade 1/6 systolic murmur heard at the right upper sternal border.    Abdomen:                  Soft, nontender without organomegaly or masses. Normal bowel sounds. No pulsating masses felt.  No bruits    heard. Extremities:               No edema. Femoral pulses normal. Posterior tibialis pulses normal.  Skin:                          Normal turgor. No rashes or ulcers noted. Neurologic:                Oriented x 3.  No motor or sensory deficits detected. REVIEW OF DIAGNOSTIC TESTS:    1. Blood tests from 9/30/2020 reviewed:  TSH 4.310 (borderline elevated. Uric acid 8.3 (elevated). BUN 19, creatinine 1.2, GFR 59, potassium 48, bilirubin 1.5. Otherwise, LFT's unremarkable. Total cholesterol 156, triglycerides 50, HDL 53, LDL 93, hemoglobin 15.6, hematocrit 49.0, hemoglobin A1C 6.1, PSA 0.07.    2.  EKG done today showed free pacing with atrial fibrillation as the underlying rhythm. 3.  Pacemaker interrogation done today showed a normal pacemaker function with atrial fibrillation as underlying rhythm. ASSESSMENT / DIAGNOSIS:   1.  Coronary artery disease status post coronary artery bypass graft surgery:  Clinically stable.    2.  Bicuspid aortic valve with aortic stenosis and aortic regurgitation status post aortic valve replacement surgery. 3.  History of high grade AV block status post dual chamber pacemaker insertion.   4.  Permanent atrial fibrillation:  First diagnosed in the perioperative period in 08/2014 with

## 2021-03-03 ENCOUNTER — TELEPHONE (OUTPATIENT)
Dept: CARDIOLOGY | Age: 76
End: 2021-03-03

## 2021-03-19 ENCOUNTER — TELEPHONE (OUTPATIENT)
Dept: CARDIOLOGY | Age: 76
End: 2021-03-19

## 2021-03-19 NOTE — TELEPHONE ENCOUNTER
3-19-21 @ 1405. Called and spoke with the patient with a reminder regarding his stress test on 3-19-21 @ 1405. Instructions reviewed. He denies any signs/symptoms of the Covid. He voiced understanding to all instructions.   Bryson Reynolds RN

## 2021-03-23 ENCOUNTER — HOSPITAL ENCOUNTER (OUTPATIENT)
Dept: CARDIOLOGY | Age: 76
Discharge: HOME | End: 2021-03-23
Payer: MEDICARE

## 2021-03-23 VITALS
HEART RATE: 70 BPM | WEIGHT: 168 LBS | OXYGEN SATURATION: 99 % | SYSTOLIC BLOOD PRESSURE: 132 MMHG | RESPIRATION RATE: 18 BRPM | BODY MASS INDEX: 25.46 KG/M2 | DIASTOLIC BLOOD PRESSURE: 88 MMHG | TEMPERATURE: 96.6 F | HEIGHT: 68 IN

## 2021-03-23 DIAGNOSIS — Z95.1 HX OF CABG: ICD-10-CM

## 2021-03-23 DIAGNOSIS — I48.0 PAROXYSMAL A-FIB (HCC): ICD-10-CM

## 2021-03-23 DIAGNOSIS — I38 VHD (VALVULAR HEART DISEASE): ICD-10-CM

## 2021-03-23 LAB
LV EF: 53 %
LVEF MODALITY: NORMAL

## 2021-03-23 PROCEDURE — 6360000002 HC RX W HCPCS: Performed by: INTERNAL MEDICINE

## 2021-03-23 PROCEDURE — 3430000000 HC RX DIAGNOSTIC RADIOPHARMACEUTICAL: Performed by: INTERNAL MEDICINE

## 2021-03-23 PROCEDURE — A9502 TC99M TETROFOSMIN: HCPCS | Performed by: INTERNAL MEDICINE

## 2021-03-23 PROCEDURE — 93017 CV STRESS TEST TRACING ONLY: CPT

## 2021-03-23 PROCEDURE — 2580000003 HC RX 258: Performed by: INTERNAL MEDICINE

## 2021-03-23 PROCEDURE — C8929 TTE W OR WO FOL WCON,DOPPLER: HCPCS

## 2021-03-23 PROCEDURE — 6360000004 HC RX CONTRAST MEDICATION: Performed by: INTERNAL MEDICINE

## 2021-03-23 PROCEDURE — 99999 PR OFFICE/OUTPT VISIT,PROCEDURE ONLY: CPT | Performed by: INTERNAL MEDICINE

## 2021-03-23 PROCEDURE — 78452 HT MUSCLE IMAGE SPECT MULT: CPT

## 2021-03-23 RX ORDER — SODIUM CHLORIDE 0.9 % (FLUSH) 0.9 %
10 SYRINGE (ML) INJECTION PRN
Status: DISCONTINUED | OUTPATIENT
Start: 2021-03-23 | End: 2021-03-24 | Stop reason: HOSPADM

## 2021-03-23 RX ORDER — METOPROLOL SUCCINATE 25 MG/1
TABLET, EXTENDED RELEASE ORAL
Qty: 90 TABLET | Refills: 3 | OUTPATIENT
Start: 2021-03-23

## 2021-03-23 RX ADMIN — TETROFOSMIN 34 MILLICURIE: 0.23 INJECTION, POWDER, LYOPHILIZED, FOR SOLUTION INTRAVENOUS at 09:35

## 2021-03-23 RX ADMIN — SODIUM CHLORIDE, PRESERVATIVE FREE 10 ML: 5 INJECTION INTRAVENOUS at 07:54

## 2021-03-23 RX ADMIN — SODIUM CHLORIDE, PRESERVATIVE FREE 10 ML: 5 INJECTION INTRAVENOUS at 09:35

## 2021-03-23 RX ADMIN — SODIUM CHLORIDE, PRESERVATIVE FREE 10 ML: 5 INJECTION INTRAVENOUS at 12:59

## 2021-03-23 RX ADMIN — PERFLUTREN 1.1 MG: 6.52 INJECTION, SUSPENSION INTRAVENOUS at 12:54

## 2021-03-23 RX ADMIN — SODIUM CHLORIDE, PRESERVATIVE FREE 10 ML: 5 INJECTION INTRAVENOUS at 12:54

## 2021-03-23 RX ADMIN — SODIUM CHLORIDE, PRESERVATIVE FREE 10 ML: 5 INJECTION INTRAVENOUS at 09:34

## 2021-03-23 RX ADMIN — REGADENOSON 0.4 MG: 0.08 INJECTION, SOLUTION INTRAVENOUS at 09:35

## 2021-03-23 RX ADMIN — TETROFOSMIN 10.2 MILLICURIE: 0.23 INJECTION, POWDER, LYOPHILIZED, FOR SOLUTION INTRAVENOUS at 07:54

## 2021-03-23 NOTE — PROCEDURES
[address] [place] [facility]. Sudhir Cano [facility]  [phone]                 Pharmacologic Stress Nuclear Gated SPECT Study    Name: Miladis Monge Account Number: [de-identified]    :  1945          Sex: male         Date of Study:  3/23/2021    Height: 5' 8\" (172.7 cm)         Weight: 168 lb (76.2 kg)     Ordering Provider: David Mcgowan          PCP: Tam Harrell DO      Cardiologist: David Mcgowan             Interpreting Physician: Shavonne Coates MD  _________________________________________________________________________________    Indication:   Evaluation of extent and severity of coronary artery disease    Clinical History:   Patient has prior history of coronary artery disease. Resting ECG:    V pacing    Procedure:   Pharmacologic stress testing was performed with regadenoson 0.4 mg for 15 seconds. The heart rate was 70 at baseline and alonso to 93 beats during the infusion. The blood pressure at baseline was 132/88 and blood pressure at the end of infusion was 146/52. Blood pressure response was normal during the stress procedure. The patient experienced mild shortness of breath post lexiscan but resolved in recovery during the infusion. ECG during the infusion did not change. IMAGING: Myocardial perfusion imaging was performed at rest 30-35 minutes following the intravenous injection of 10.2 mCi of (Tc-tetrofosmin) followed by 10 ml of Normal Saline. As per infusion protocol, the patient was injected intravenously with 34 mCi of (Tc-tetrofosmin) followed by 10 ml of Normal Saline. Gated post-stress tomographic imaging was performed 45 minutes after stress. FINDINGS: The overall quality of the study was good. Left ventricular cavity size was noted to be normal.    Rotational analog analysis demonstrated no patient motion or abnormal extracardiac radioactivity.     The gated SPECT stress

## 2021-03-24 ENCOUNTER — TELEPHONE (OUTPATIENT)
Dept: CARDIOLOGY CLINIC | Age: 76
End: 2021-03-24

## 2021-03-24 NOTE — TELEPHONE ENCOUNTER
Dr Eric Acevedo,    May I call patient re:  His 2-d echo and nuclear stress test results? Please advise, on what I can tell him. Dariela Hanna and spoke with patient re:  Above.   Annika

## 2021-04-27 RX ORDER — BUDESONIDE AND FORMOTEROL FUMARATE DIHYDRATE 160; 4.5 UG/1; UG/1
AEROSOL RESPIRATORY (INHALATION)
Qty: 20.4 G | Refills: 5 | Status: SHIPPED
Start: 2021-04-27 | End: 2021-07-28 | Stop reason: SDUPTHER

## 2021-06-05 DIAGNOSIS — I10 ESSENTIAL HYPERTENSION: ICD-10-CM

## 2021-06-05 DIAGNOSIS — I50.21 ACUTE SYSTOLIC CONGESTIVE HEART FAILURE (HCC): ICD-10-CM

## 2021-06-07 RX ORDER — LISINOPRIL 2.5 MG/1
TABLET ORAL
Qty: 90 TABLET | Refills: 3 | OUTPATIENT
Start: 2021-06-07

## 2021-06-07 RX ORDER — FUROSEMIDE 40 MG/1
TABLET ORAL
Qty: 90 TABLET | Refills: 3 | OUTPATIENT
Start: 2021-06-07

## 2021-06-07 RX ORDER — ATORVASTATIN CALCIUM 10 MG/1
TABLET, FILM COATED ORAL
Qty: 90 TABLET | Refills: 3 | OUTPATIENT
Start: 2021-06-07

## 2021-07-06 RX ORDER — ATORVASTATIN CALCIUM 10 MG/1
TABLET, FILM COATED ORAL
Qty: 90 TABLET | Refills: 1 | OUTPATIENT
Start: 2021-07-06

## 2021-07-12 NOTE — TELEPHONE ENCOUNTER
Patient called in asking for a refill on his apixaban (ELIQUIS) 5 MG TABS tablet until he is able to get in to the office on the 28th.

## 2021-07-28 ENCOUNTER — OFFICE VISIT (OUTPATIENT)
Dept: FAMILY MEDICINE CLINIC | Age: 76
End: 2021-07-28
Payer: MEDICARE

## 2021-07-28 VITALS
HEART RATE: 74 BPM | DIASTOLIC BLOOD PRESSURE: 68 MMHG | SYSTOLIC BLOOD PRESSURE: 112 MMHG | WEIGHT: 169 LBS | TEMPERATURE: 97.3 F | BODY MASS INDEX: 27.16 KG/M2 | OXYGEN SATURATION: 99 % | HEIGHT: 66 IN | RESPIRATION RATE: 16 BRPM

## 2021-07-28 DIAGNOSIS — E44.1 MILD PROTEIN-CALORIE MALNUTRITION (HCC): ICD-10-CM

## 2021-07-28 DIAGNOSIS — J43.2 CENTRILOBULAR EMPHYSEMA (HCC): ICD-10-CM

## 2021-07-28 DIAGNOSIS — C61 PROSTATE CA (HCC): ICD-10-CM

## 2021-07-28 DIAGNOSIS — I48.0 PAROXYSMAL A-FIB (HCC): ICD-10-CM

## 2021-07-28 DIAGNOSIS — I50.22 CHRONIC SYSTOLIC CONGESTIVE HEART FAILURE (HCC): ICD-10-CM

## 2021-07-28 DIAGNOSIS — E78.5 DYSLIPIDEMIA: ICD-10-CM

## 2021-07-28 DIAGNOSIS — I10 ESSENTIAL HYPERTENSION: Primary | ICD-10-CM

## 2021-07-28 DIAGNOSIS — Z87.891 PERSONAL HISTORY OF TOBACCO USE: ICD-10-CM

## 2021-07-28 DIAGNOSIS — I74.10 AORTIC MURAL THROMBUS (HCC): ICD-10-CM

## 2021-07-28 DIAGNOSIS — K74.60 CIRRHOSIS OF LIVER WITHOUT ASCITES, UNSPECIFIED HEPATIC CIRRHOSIS TYPE (HCC): ICD-10-CM

## 2021-07-28 DIAGNOSIS — Z87.891 HISTORY OF SMOKING 30 OR MORE PACK YEARS: ICD-10-CM

## 2021-07-28 DIAGNOSIS — R73.01 IFG (IMPAIRED FASTING GLUCOSE): ICD-10-CM

## 2021-07-28 DIAGNOSIS — I10 ESSENTIAL HYPERTENSION: ICD-10-CM

## 2021-07-28 DIAGNOSIS — I50.21 ACUTE SYSTOLIC CONGESTIVE HEART FAILURE (HCC): ICD-10-CM

## 2021-07-28 DIAGNOSIS — R53.83 FATIGUE, UNSPECIFIED TYPE: ICD-10-CM

## 2021-07-28 LAB
BASOPHILS ABSOLUTE: 0.07 E9/L (ref 0–0.2)
BASOPHILS RELATIVE PERCENT: 0.9 % (ref 0–2)
EOSINOPHILS ABSOLUTE: 0.16 E9/L (ref 0.05–0.5)
EOSINOPHILS RELATIVE PERCENT: 2.1 % (ref 0–6)
HBA1C MFR BLD: 5.7 % (ref 4–5.6)
HCT VFR BLD CALC: 48.9 % (ref 37–54)
HEMOGLOBIN: 15.5 G/DL (ref 12.5–16.5)
IMMATURE GRANULOCYTES #: 0.13 E9/L
IMMATURE GRANULOCYTES %: 1.7 % (ref 0–5)
LYMPHOCYTES ABSOLUTE: 1.52 E9/L (ref 1.5–4)
LYMPHOCYTES RELATIVE PERCENT: 20.2 % (ref 20–42)
MCH RBC QN AUTO: 31.4 PG (ref 26–35)
MCHC RBC AUTO-ENTMCNC: 31.7 % (ref 32–34.5)
MCV RBC AUTO: 99 FL (ref 80–99.9)
MONOCYTES ABSOLUTE: 0.46 E9/L (ref 0.1–0.95)
MONOCYTES RELATIVE PERCENT: 6.1 % (ref 2–12)
NEUTROPHILS ABSOLUTE: 5.2 E9/L (ref 1.8–7.3)
NEUTROPHILS RELATIVE PERCENT: 69 % (ref 43–80)
PDW BLD-RTO: 16.3 FL (ref 11.5–15)
PLATELET # BLD: 196 E9/L (ref 130–450)
PMV BLD AUTO: 10.3 FL (ref 7–12)
RBC # BLD: 4.94 E12/L (ref 3.8–5.8)
TSH SERPL DL<=0.05 MIU/L-ACNC: 3.35 UIU/ML (ref 0.27–4.2)
WBC # BLD: 7.5 E9/L (ref 4.5–11.5)

## 2021-07-28 PROCEDURE — 99214 OFFICE O/P EST MOD 30 MIN: CPT | Performed by: FAMILY MEDICINE

## 2021-07-28 PROCEDURE — G0296 VISIT TO DETERM LDCT ELIG: HCPCS | Performed by: FAMILY MEDICINE

## 2021-07-28 PROCEDURE — 3288F FALL RISK ASSESSMENT DOCD: CPT | Performed by: FAMILY MEDICINE

## 2021-07-28 RX ORDER — ATORVASTATIN CALCIUM 10 MG/1
TABLET, FILM COATED ORAL
Qty: 90 TABLET | Refills: 1 | Status: SHIPPED
Start: 2021-07-28 | End: 2021-07-30

## 2021-07-28 RX ORDER — BUDESONIDE AND FORMOTEROL FUMARATE DIHYDRATE 160; 4.5 UG/1; UG/1
AEROSOL RESPIRATORY (INHALATION)
Qty: 3 INHALER | Refills: 1 | Status: SHIPPED
Start: 2021-07-28 | End: 2022-02-04

## 2021-07-28 RX ORDER — LISINOPRIL 2.5 MG/1
TABLET ORAL
Qty: 90 TABLET | Refills: 1 | Status: SHIPPED
Start: 2021-07-28 | End: 2021-07-28 | Stop reason: SDUPTHER

## 2021-07-28 RX ORDER — LISINOPRIL 2.5 MG/1
TABLET ORAL
Qty: 30 TABLET | Refills: 0 | Status: SHIPPED
Start: 2021-07-28 | End: 2021-08-19

## 2021-07-28 RX ORDER — FUROSEMIDE 40 MG/1
TABLET ORAL
Qty: 90 TABLET | Refills: 1 | Status: SHIPPED
Start: 2021-07-28 | End: 2022-04-06 | Stop reason: SDUPTHER

## 2021-07-28 RX ORDER — METOPROLOL SUCCINATE 25 MG/1
TABLET, EXTENDED RELEASE ORAL
Qty: 90 TABLET | Refills: 1 | Status: SHIPPED
Start: 2021-07-28 | End: 2022-01-25

## 2021-07-28 ASSESSMENT — ENCOUNTER SYMPTOMS
ABDOMINAL PAIN: 0
ALLERGIC/IMMUNOLOGIC NEGATIVE: 1
BLOOD IN STOOL: 0
HEMOPTYSIS: 0
BLURRED VISION: 0
VOMITING: 0
CHEST TIGHTNESS: 0
EYE ITCHING: 0
ORTHOPNEA: 0
ABDOMINAL DISTENTION: 0
WHEEZING: 0
TROUBLE SWALLOWING: 0
CONSTIPATION: 0
EYE PAIN: 0
SINUS PRESSURE: 0
PHOTOPHOBIA: 0
APNEA: 0
COUGH: 0
DIARRHEA: 0
BACK PAIN: 0
RHINORRHEA: 0
COLOR CHANGE: 0
SHORTNESS OF BREATH: 1
VOICE CHANGE: 0
CHOKING: 0
EYE REDNESS: 0
ANAL BLEEDING: 0
FACIAL SWELLING: 0
SINUS PAIN: 0
SPUTUM PRODUCTION: 0
NAUSEA: 0
SORE THROAT: 0
EYE DISCHARGE: 0
STRIDOR: 0
RECTAL PAIN: 0
GASTROINTESTINAL NEGATIVE: 1

## 2021-07-28 ASSESSMENT — PATIENT HEALTH QUESTIONNAIRE - PHQ9
SUM OF ALL RESPONSES TO PHQ QUESTIONS 1-9: 0
2. FEELING DOWN, DEPRESSED OR HOPELESS: 0
1. LITTLE INTEREST OR PLEASURE IN DOING THINGS: 0
SUM OF ALL RESPONSES TO PHQ9 QUESTIONS 1 & 2: 0
SUM OF ALL RESPONSES TO PHQ QUESTIONS 1-9: 0
SUM OF ALL RESPONSES TO PHQ QUESTIONS 1-9: 0

## 2021-07-28 NOTE — PROGRESS NOTES
Corinne Oreilly is a 66 y.o. male. HPI/Chief C/O:  Chief Complaint   Patient presents with    Medication Refill     Regular check up and medication refills     Allergies   Allergen Reactions    Pneumovax [Pneumococcal Polysaccharide Vaccine] Dermatitis   The patient is here for a medication list and treatment planning review  We will go over our care planning goals as well as take care of all refills  We will set up labs as well     Shortness of Breath  This is a chronic problem. The current episode started more than 1 year ago. The problem occurs constantly. The problem has been gradually improving. Pertinent negatives include no abdominal pain, chest pain, claudication, coryza, ear pain, fever, headaches, hemoptysis, leg pain, leg swelling, neck pain, orthopnea, PND, rash, rhinorrhea, sore throat, sputum production, syncope, vomiting or wheezing. The symptoms are aggravated by any activity. He has tried steroid inhalers, ipratropium inhalers, beta agonist inhalers and oral steroids for the symptoms. The treatment provided moderate relief. His past medical history is significant for chronic lung disease (R/O pulmonary fibrosis), COPD (emphysema, R/O pulmonary fibrosis) and a heart failure. There is no history of allergies, aspirin allergies, asthma, bronchiolitis, CAD, DVT, PE, pneumonia or a recent surgery. Hypertension  This is a chronic problem. The current episode started more than 1 year ago. The problem is controlled. Associated symptoms include malaise/fatigue and shortness of breath. Pertinent negatives include no anxiety, blurred vision, chest pain, headaches, neck pain, orthopnea, palpitations, peripheral edema, PND or sweats. Risk factors for coronary artery disease include smoking/tobacco exposure, male gender, dyslipidemia, family history and stress. Past treatments include lifestyle changes, beta blockers, ACE inhibitors and diuretics.  The current treatment provides significant improvement. Compliance problems include exercise and diet. Hypertensive end-organ damage includes CAD/MI (aortic stenosis, RBBB,AV block, aortic valve replaced,  AAA , CABG x3 , pacemaker ), heart failure and PVD (AAA). There is no history of angina, kidney disease, CVA, left ventricular hypertrophy or retinopathy. There is no history of chronic renal disease, coarctation of the aorta, hyperaldosteronism, hypercortisolism, hyperparathyroidism, a hypertension causing med, pheochromocytoma, renovascular disease, sleep apnea or a thyroid problem. ROS:  Review of Systems   Constitutional: Positive for fatigue and malaise/fatigue. Negative for activity change, appetite change, chills, diaphoresis, fever and unexpected weight change. HENT: Negative. Negative for congestion, dental problem, drooling, ear discharge, ear pain, facial swelling, hearing loss, mouth sores, nosebleeds, postnasal drip, rhinorrhea, sinus pressure, sinus pain, sneezing, sore throat, tinnitus, trouble swallowing and voice change. Eyes: Negative for blurred vision, photophobia, pain, discharge, redness, itching and visual disturbance. Respiratory: Positive for shortness of breath. Negative for apnea, cough, hemoptysis, sputum production, choking, chest tightness, wheezing and stridor. Cardiovascular: Negative. Negative for chest pain, palpitations, orthopnea, claudication, leg swelling, syncope and PND. Gastrointestinal: Negative. Negative for abdominal distention, abdominal pain, anal bleeding, blood in stool, constipation, diarrhea, nausea, rectal pain and vomiting. Endocrine: Negative. Negative for cold intolerance, heat intolerance, polydipsia, polyphagia and polyuria. Genitourinary: Negative. Negative for decreased urine volume, difficulty urinating, discharge, dysuria, enuresis, flank pain, frequency, genital sores, hematuria, penile pain, penile swelling, scrotal swelling, testicular pain and urgency. Musculoskeletal: Positive for arthralgias. Negative for back pain, gait problem, joint swelling, myalgias, neck pain and neck stiffness. Skin: Negative. Negative for color change, pallor, rash and wound. Allergic/Immunologic: Negative. Negative for environmental allergies, food allergies and immunocompromised state. Neurological: Positive for weakness. Negative for dizziness, tremors, seizures, syncope, facial asymmetry, speech difficulty, light-headedness, numbness and headaches. Hematological: Negative. Negative for adenopathy. Does not bruise/bleed easily. Psychiatric/Behavioral: Negative. Negative for agitation, behavioral problems, confusion, decreased concentration, dysphoric mood, hallucinations, self-injury, sleep disturbance and suicidal ideas. The patient is not nervous/anxious and is not hyperactive. Past Medical/Surgical Hx;  Reviewed with patient      Diagnosis Date    Aortic stenosis, mild     Aortic valve sclerosis     moderate    Atrial fibrillation ([address] 2014    CAD (coronary artery disease)     CHF (congestive heart failure) (HCC)     COPD (chronic obstructive pulmonary disease) (HCC)     Gallbladder attack     History of tobacco use 9/17/2015    Hyperlipidemia     Hypertension     Mild mitral regurgitation     Prostate cancer ([address]     44 treatments of radiation    PVD (peripheral vascular disease) with claudication ([facility] 9/17/2015    RBBB     Tobacco abuse      Past Surgical History:   Procedure Laterality Date    APPENDECTOMY      CARDIAC CATHETERIZATION  8/8/2014    DR Coates-pt.  states had triple bypass \"5 years ago\"    COLONOSCOPY      CORONARY ARTERY BYPASS GRAFT  08/11/2014    CABGx3 LIMA-LAD, SVG-D1, SVG-OM1, AVR Dr. Guilherme Bosch, COLON, DIAGNOSTIC      PACEMAKER PLACEMENT Left 8-    Dr. Ramana Talavera RS&I N/A 7/16/2018    ENDOVASCULAR  AORTIC ANEURYSM REPAIR , ABDOMINAL AORTIC ANEURYSM, WITH FINISTRATED GRAFT performed by Rajinder Valladares MD at [facility]         Past Family Hx:  Reviewed with patient      Problem Relation Age of Onset    Heart Attack Brother     Stroke Father        Social Hx:  Reviewed with patient  Social History     Tobacco Use    Smoking status: Former Smoker     Packs/day: 0.50     Years: 50.00     Pack years: 25.00     Types: Cigarettes     Quit date: 2014     Years since quittin.1    Smokeless tobacco: Never Used   Substance Use Topics    Alcohol use: Yes     Alcohol/week: 0.0 standard drinks     Comment: rare;  drinks 1 cup of coffee daily & occ Peps[employer]/Tea       OBJECTIVE  /68   Pulse 74   Temp 97.3 °F (36.3 °C)   Resp 16   Ht 5' 6\" (1.676 m)   Wt 169 lb (76.7 kg)   SpO2 99%   BMI 27.28 kg/m²     Problem List:  Srvaanthi Hill does not have any pertinent problems on file. PHYS EX:  Physical Exam  Vitals and nursing note reviewed. Constitutional:       General: He is not in acute distress. Appearance: Normal appearance. He is well-developed. He is not ill-appearing, toxic-appearing or diaphoretic. Interventions: He is not intubated. HENT:      Head: Normocephalic and atraumatic. Right Ear: External ear normal. There is no impacted cerumen. Left Ear: External ear normal. There is no impacted cerumen. Nose: Nose normal. No congestion or rhinorrhea. Mouth/Throat:      Mouth: Mucous membranes are moist.      Pharynx: Oropharynx is clear. No oropharyngeal exudate or posterior oropharyngeal erythema. Eyes:      General: No scleral icterus. Right eye: No discharge. Left eye: No discharge. Neck:      Thyroid: No thyromegaly. Vascular: No carotid bruit or JVD. Trachea: No tracheal deviation. Cardiovascular:      Rate and Rhythm: Normal rate and regular rhythm. No extrasystoles are present. Chest Wall: PMI is not displaced.       Pulses: Normal skin lesions      Neurological:      General: No focal deficit present. Mental Status: He is alert and oriented to person, place, and time. Cranial Nerves: No cranial nerve deficit. Sensory: No sensory deficit. Motor: No weakness or abnormal muscle tone. Coordination: Coordination normal.      Gait: Gait normal.      Deep Tendon Reflexes: Reflexes are normal and symmetric. Reflexes normal.          The 10-year ASCVD risk score ([place] Solomon et al., 2013) is: 24.1%    Values used to calculate the score:      Age: 66 years      Sex: Male      Is Non- : No      Diabetic: No      Tobacco smoker: No      Systolic Blood Pressure: 794 mmHg      Is BP treated: Yes      HDL Cholesterol: 53 mg/dL      Total Cholesterol: 156 mg/dL    ASSESSMENT/PLAN  Gurwinder Murillo was seen today for medication refill. Diagnoses and all orders for this visit:    Essential hypertension ---controlled   --patient is instructed on low to moderate sodium ( 2 to 2.5 grams ), daily    Also to increase potassium in the diet to about 3.5 grams daily    Literature is provided     -     lisinopril (PRINIVIL;ZESTRIL) 2.5 MG tablet; TAKE 1 TABLET DAILY  -     Comprehensive Metabolic Panel; Future  -     CBC Auto Differential; Future    Acute systolic congestive heart failure (HCC)  -     furosemide (LASIX) 40 MG tablet; TAKE 1 TABLET DAILY  -     Comprehensive Metabolic Panel; Future  -     CBC Auto Differential; Future  ==resolved   --stable on current care planning  -- continue treatment as we are meeting goals       Paroxysmal A-fib (HCC)  -     apixaban (ELIQUIS) 5 MG TABS tablet; TAKE 1 TABLET TWICE A DAY  -     metoprolol succinate (TOPROL XL) 25 MG extended release tablet; TAKE 1 TABLET DAILY  -     Comprehensive Metabolic Panel;  Future  -     CBC Auto Differential; Future  --stable on current care planning  -- continue treatment as we are meeting goals     ---VASCULAR PANEL  A) asa, plavix, aggrenox  B) LDCT  -     CT Lung Screen (Annual);  Future        Outpatient Encounter Medications as of 7/28/2021   Medication Sig Dispense Refill    apixaban (ELIQUIS) 5 MG TABS tablet TAKE 1 TABLET TWICE A  tablet 1    SYMBICORT 160-4.5 MCG/ACT AERO USE 2 INHALATIONS TWICE A DAY 3 Inhaler 1    atorvastatin (LIPITOR) 10 MG tablet TAKE 1 TABLET DAILY 90 tablet 1    furosemide (LASIX) 40 MG tablet TAKE 1 TABLET DAILY 90 tablet 1    lisinopril (PRINIVIL;ZESTRIL) 2.5 MG tablet TAKE 1 TABLET DAILY 90 tablet 1    metoprolol succinate (TOPROL XL) 25 MG extended release tablet TAKE 1 TABLET DAILY 90 tablet 1    nitroGLYCERIN (NITROSTAT) 0.3 MG SL tablet Place 1 tablet under the tongue every 5 minutes as needed for Chest pain 30 tablet 3    [DISCONTINUED] apixaban (ELIQUIS) 5 MG TABS tablet TAKE 1 TABLET TWICE A DAY (APPOINTMENT REQUIRED FOR FURTHER REFILLS) 60 tablet 0    [DISCONTINUED] SYMBICORT 160-4.5 MCG/ACT AERO USE 2 INHALATIONS TWICE A DAY 20.4 g 5    [DISCONTINUED] metoprolol succinate (TOPROL XL) 25 MG extended release tablet TAKE 1 TABLET DAILY (Patient not taking: Reported on 7/28/2021) 90 tablet 0    [DISCONTINUED] atorvastatin (LIPITOR) 10 MG tablet TAKE 1 TABLET DAILY 90 tablet 1    [DISCONTINUED] furosemide (LASIX) 40 MG tablet TAKE 1 TABLET DAILY (Patient taking differently: Indications: pt takes as needed ) 90 tablet 1    [DISCONTINUED] lisinopril (PRINIVIL;ZESTRIL) 2.5 MG tablet TAKE 1 TABLET DAILY 90 tablet 1    fexofenadine (ALLEGRA) 180 MG tablet TAKE 1 TABLET BY MOUTH EVERY DAY (Patient not taking: Reported on 7/28/2021) 30 tablet 5     Facility-Administered Encounter Medications as of 7/28/2021   Medication Dose Route Frequency Provider Last Rate Last Admin    regadenoson (LEXISCAN) injection 0.4 mg  0.4 mg Intravenous ONCE PRN Rosalinda Rai MD        perflutren lipid microspheres (DEFINITY) injection 1.65 mg  1.5 mL Intravenous ONCE PRN Rosalinda Rai MD           No follow-ups on file.        Reviewed recent labs related to Kike's current problems      Discussed importance of regular Health Maintenance follow up  Health Maintenance   Topic    Hepatitis A vaccine (1 of 2 - Risk 2-dose series)    Hepatitis B vaccine (1 of 3 - Risk 3-dose series)    DTaP/Tdap/Td vaccine (1 - Tdap)    Shingles Vaccine (1 of 2)    Low dose CT lung screening     Flu vaccine (1)    Lipid screen     Potassium monitoring     Creatinine monitoring     PSA counseling     Annual Wellness Visit (AWV)     COVID-19 Vaccine     Hepatitis C screen     Hib vaccine     Meningococcal (ACWY) vaccine                                              Low Dose CT (LDCT) Lung Screening criteria met   Age 50-69   Pack year smoking >30   Still smoking or less than 15 year since quit   No sign or symptoms of lung cancer   > 11 months since last LDCT     Risks and benefits of lung cancer screening with LDCT scans discussed:    Significance of positive screen - False-positive LDCT results often occur. 95% of all positive results do not lead to a diagnosis of cancer. Usually further imaging can resolve most false-positive results; however, some patients may require invasive procedures. Over diagnosis risk - 10% to 12% of screen-detected lung cancer cases are over diagnosedthat is, the cancer would not have been detected in the patient's lifetime without the screening. Need for follow up screens annually to continue lung cancer screening effectiveness     Risks associated with radiation from annual LDCT- Radiation exposure is about the same as for a mammogram, which is about 1/3 of the annual background radiation exposure from everyday life. Starting screening at age 54 is not likely to increase cancer risk from radiation exposure.     Patients with comorbidities resulting in life expectancy of < 10 years, or that would preclude treatment of an abnormality identified on CT, should not be screened due to lack of benefit.     To obtain maximal benefit from this screening, smoking cessation and long-term abstinence from smoking is critical

## 2021-07-29 LAB
ALBUMIN SERPL-MCNC: 4.3 G/DL (ref 3.5–5.2)
ALP BLD-CCNC: 100 U/L (ref 40–129)
ALT SERPL-CCNC: 13 U/L (ref 0–40)
ANION GAP SERPL CALCULATED.3IONS-SCNC: 22 MMOL/L (ref 7–16)
AST SERPL-CCNC: 23 U/L (ref 0–39)
BILIRUB SERPL-MCNC: 1.5 MG/DL (ref 0–1.2)
BUN BLDV-MCNC: 17 MG/DL (ref 6–23)
CALCIUM SERPL-MCNC: 9.9 MG/DL (ref 8.6–10.2)
CHLORIDE BLD-SCNC: 106 MMOL/L (ref 98–107)
CHOLESTEROL, TOTAL: 169 MG/DL (ref 0–199)
CO2: 25 MMOL/L (ref 22–29)
CREAT SERPL-MCNC: 1.2 MG/DL (ref 0.7–1.2)
GFR AFRICAN AMERICAN: >60
GFR NON-AFRICAN AMERICAN: 59 ML/MIN/1.73
GLUCOSE BLD-MCNC: 80 MG/DL (ref 74–99)
HDLC SERPL-MCNC: 51 MG/DL
LDL CHOLESTEROL CALCULATED: 109 MG/DL (ref 0–99)
POTASSIUM SERPL-SCNC: 4.7 MMOL/L (ref 3.5–5)
PROSTATE SPECIFIC ANTIGEN: 0.09 NG/ML (ref 0–4)
SODIUM BLD-SCNC: 153 MMOL/L (ref 132–146)
TOTAL PROTEIN: 7.9 G/DL (ref 6.4–8.3)
TRIGL SERPL-MCNC: 45 MG/DL (ref 0–149)
URIC ACID, SERUM: 7.7 MG/DL (ref 3.4–7)
VLDLC SERPL CALC-MCNC: 9 MG/DL

## 2021-07-30 ENCOUNTER — CLINICAL DOCUMENTATION (OUTPATIENT)
Dept: FAMILY MEDICINE CLINIC | Age: 76
End: 2021-07-30

## 2021-07-30 RX ORDER — ATORVASTATIN CALCIUM 20 MG/1
20 TABLET, FILM COATED ORAL DAILY
Qty: 90 TABLET | Refills: 1
Start: 2021-07-30 | End: 2022-03-21 | Stop reason: SDUPTHER

## 2021-08-04 ENCOUNTER — TELEPHONE (OUTPATIENT)
Dept: CASE MANAGEMENT | Age: 76
End: 2021-08-04

## 2021-08-04 NOTE — TELEPHONE ENCOUNTER
I called the patient and he confirmed his CT lung screening at [facility] on 8/5/2021 at 12:00 pm.  I reminded the patient to arrive at 11:30 am, enter through the main entrance, and register. Patient confirmed.           Electronically signed by Asif Dunham on 8/4/21 at 11:46 AM EDT

## 2021-08-05 ENCOUNTER — HOSPITAL ENCOUNTER (OUTPATIENT)
Dept: CT IMAGING | Age: 76
Discharge: HOME | End: 2021-08-05
Payer: MEDICARE

## 2021-08-05 DIAGNOSIS — Z87.891 HISTORY OF SMOKING 30 OR MORE PACK YEARS: ICD-10-CM

## 2021-08-05 DIAGNOSIS — Z87.891 PERSONAL HISTORY OF TOBACCO USE: ICD-10-CM

## 2021-08-05 PROCEDURE — 71271 CT THORAX LUNG CANCER SCR C-: CPT

## 2021-08-06 ENCOUNTER — TELEPHONE (OUTPATIENT)
Dept: CASE MANAGEMENT | Age: 76
End: 2021-08-06

## 2021-08-06 NOTE — TELEPHONE ENCOUNTER
No call, encounter opened to process CT Lung Screening. CT Lung Screen: 8/9/2021    Impression   1. There is no appreciable pulmonary infiltrate, mass or suspicious nodule   2. Advanced emphysematous changes with development of interstitial fibrosis   more prominent within the left upper lobe. 3. Biapical pleuroparenchymal scarring.       LUNG RADS:   Per ACR Lung-RADS Version 1.1       Category 2, Benign appearance or behavior.       Management:  Continue annual lung screening with LDCT in 12 months.       RECOMMENDATIONS:   If you would like to register your patient with the [facility], please contact the Nurse Navigator at   [phone]. Pack years: 22    Social History     Tobacco Use  Smoking Status: Former Smoker    Start Date:    Quit Date: 06/20/2014   Types: Cigarettes   Packs/Day: 0.5   Years: 48   Pack Years: 25   Smokeless Tobacco: Never used        Results letter sent to patient via my chart or mailed.      [address] [facility]

## 2021-08-19 DIAGNOSIS — I10 ESSENTIAL HYPERTENSION: ICD-10-CM

## 2021-08-19 RX ORDER — LISINOPRIL 2.5 MG/1
TABLET ORAL
Qty: 90 TABLET | Refills: 1 | Status: SHIPPED
Start: 2021-08-19 | End: 2022-04-06 | Stop reason: SDUPTHER

## 2021-09-10 ENCOUNTER — HOSPITAL ENCOUNTER (EMERGENCY)
Age: 76
Discharge: HOME | End: 2021-09-10
Payer: MEDICARE

## 2021-09-10 ENCOUNTER — NURSE ONLY (OUTPATIENT)
Dept: CARDIOLOGY CLINIC | Age: 76
End: 2021-09-10
Payer: MEDICARE

## 2021-09-10 VITALS
BODY MASS INDEX: 25.01 KG/M2 | RESPIRATION RATE: 16 BRPM | HEART RATE: 70 BPM | OXYGEN SATURATION: 97 % | TEMPERATURE: 97.1 F | SYSTOLIC BLOOD PRESSURE: 112 MMHG | WEIGHT: 165 LBS | DIASTOLIC BLOOD PRESSURE: 88 MMHG | HEIGHT: 68 IN

## 2021-09-10 DIAGNOSIS — Z95.0 PACEMAKER: ICD-10-CM

## 2021-09-10 DIAGNOSIS — S61.452A CAT BITE OF LEFT HAND, INITIAL ENCOUNTER: Primary | ICD-10-CM
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
## 2021-09-10 DIAGNOSIS — I48.21 PERMANENT ATRIAL FIBRILLATION (HCC): Primary | ICD-10-CM

## 2021-09-10 PROCEDURE — 99283 EMERGENCY DEPT VISIT LOW MDM: CPT

## 2021-09-10 PROCEDURE — 90471 IMMUNIZATION ADMIN: CPT | Performed by: PHYSICIAN ASSISTANT

## 2021-09-10 PROCEDURE — 90715 TDAP VACCINE 7 YRS/> IM: CPT | Performed by: PHYSICIAN ASSISTANT

## 2021-09-10 PROCEDURE — 6370000000 HC RX 637 (ALT 250 FOR IP): Performed by: PHYSICIAN ASSISTANT

## 2021-09-10 PROCEDURE — 93280 PM DEVICE PROGR EVAL DUAL: CPT | Performed by: INTERNAL MEDICINE

## 2021-09-10 PROCEDURE — 6360000002 HC RX W HCPCS: Performed by: PHYSICIAN ASSISTANT

## 2021-09-10 RX ORDER — AMOXICILLIN AND CLAVULANATE POTASSIUM 875; 125 MG/1; MG/1
1 TABLET, FILM COATED ORAL ONCE
Status: COMPLETED | OUTPATIENT
Start: 2021-09-10 | End: 2021-09-10

## 2021-09-10 RX ORDER — DIAPER,BRIEF,INFANT-TODD,DISP
EACH MISCELLANEOUS ONCE
Status: DISCONTINUED | OUTPATIENT
Start: 2021-09-10 | End: 2021-09-10 | Stop reason: HOSPADM

## 2021-09-10 RX ORDER — AMOXICILLIN AND CLAVULANATE POTASSIUM 875; 125 MG/1; MG/1
1 TABLET, FILM COATED ORAL 2 TIMES DAILY
Qty: 20 TABLET | Refills: 0 | Status: SHIPPED | OUTPATIENT
Start: 2021-09-10 | End: 2021-09-20

## 2021-09-10 RX ORDER — ACETAMINOPHEN 500 MG
1000 TABLET ORAL ONCE
Status: DISCONTINUED | OUTPATIENT
Start: 2021-09-10 | End: 2021-09-10 | Stop reason: HOSPADM

## 2021-09-10 RX ADMIN — TETANUS TOXOID, REDUCED DIPHTHERIA TOXOID AND ACELLULAR PERTUSSIS VACCINE, ADSORBED 0.5 ML: 5; 2.5; 8; 8; 2.5 SUSPENSION INTRAMUSCULAR at 14:44

## 2021-09-10 RX ADMIN — AMOXICILLIN AND CLAVULANATE POTASSIUM 1 TABLET: 875; 125 TABLET, FILM COATED ORAL at 14:44

## 2021-09-10 ASSESSMENT — PAIN SCALES - GENERAL: PAINLEVEL_OUTOF10: 1

## 2021-09-10 NOTE — ED NOTES
Discharged  To follow with pmd.    rx x 1 escribed to [address] [place] [address], RN  09/10/21 [id number]

## 2021-09-11 NOTE — ED PROVIDER NOTES
Independent:    HPI:  9/10/21         Daniel Villatoro is a 66 y.o. male presenting to the ED for cat bite to his left palmar area by his own cat onset today. The complaint has been persistent, moderate in severity, and worsened by local palpation. The patient states that he was working on a puzzle when his cat was bothering him and he tried to move his cat away when his cat came up and bit him on his left hand thenar eminence region. His cat is an indoor cat and is up-to-date on its shots. He states his tetanus is not up-to-date. He reports local pain to the area of the bite. He states that he did gently clean the area and then came to ER for evaluation. He is able to move the hand and digits fully and denies any numbness or tingling. Review of Systems:   A complete review of systems was performed and pertinent positives and negatives are stated within HPI, all other systems reviewed and are negative.          --------------------------------------------- PAST HISTORY ---------------------------------------------  Past Medical History:  has a past medical history of Aortic stenosis, mild, Aortic valve sclerosis, Atrial fibrillation (HCC), CAD (coronary artery disease), CHF (congestive heart failure) ([facility] [address] COPD (chronic obstructive pulmonary disease) ([facility] [address] Gallbladder attack, History of tobacco use, Hyperlipidemia, Hypertension, Mild mitral regurgitation, Prostate cancer ([facility] [address] PVD (peripheral vascular disease) with claudication ([facility] [address] RBBB, and Tobacco abuse. Past Surgical History:  has a past surgical history that includes Appendectomy; Coronary artery bypass graft (08/11/2014); pacemaker placement (Left, 8-); Colonoscopy; pr vascular embolization or occlusion arterial rs&i (N/A, 7/16/2018); Cardiac catheterization (8/8/2014); Tonsillectomy; and Endoscopy, colon, diagnostic. Social History:  reports that he quit smoking about 7 years ago. His smoking use included cigarettes.  He has a 25.00 small hydrogen peroxide soak on exam  Skin: warm and dry without rash  Neurologic: GCS 15  Psych: Normal Affect      ------------------------------ ED COURSE/MEDICAL DECISION MAKING----------------------  Medications   Tetanus-Diphth-Acell Pertussis (BOOSTRIX) injection 0.5 mL (0.5 mLs IntraMUSCular Given 9/10/21 1444)   amoxicillin-clavulanate (AUGMENTIN) 875-125 MG per tablet 1 tablet (1 tablet Oral Given 9/10/21 1444)         ED COURSE:       Medical Decision Making:    Patient to ER, complains of cat bite to his left hand by his own cat. Patient placed in Betadine, saline and peroxide soak on exam.  Patient tetanus was updated. Wound was cleansed thoroughly and patient was advised of need to cleanse wound daily with Dial soap and to apply bacitracin to area and to look for any signs of worsening infection which include but not limited to increased redness, pain, puslike drainage, red streaking as well as systemic fever. If this occurs he needs to have the area rechecked immediately with PCP and to return to ER. He was given tetanus in ER as well as first dose of Augmentin and advised he needs to take the Augmentin twice daily and to be aggressive as to cleaning the area at home well. He was advised to monitor this daily and elevate his hand at rest.    Counseling: The emergency provider has spoken with the patient and discussed todays results, in addition to providing specific details for the plan of care and counseling regarding the diagnosis and prognosis. Questions are answered at this time and they are agreeable with the plan.      --------------------------------- IMPRESSION AND DISPOSITION ---------------------------------    IMPRESSION  1. Cat bite of left hand, initial encounter        DISPOSITION  Disposition: Discharge to home  Patient condition is stable      NOTE: This report was transcribed using voice recognition software.  Every effort was made to ensure accuracy; however, inadvertent

## 2022-01-06 DIAGNOSIS — E78.5 DYSLIPIDEMIA: ICD-10-CM

## 2022-01-06 DIAGNOSIS — I50.21 ACUTE SYSTOLIC CONGESTIVE HEART FAILURE (HCC): ICD-10-CM

## 2022-01-06 RX ORDER — FUROSEMIDE 40 MG/1
TABLET ORAL
Qty: 90 TABLET | Refills: 3 | OUTPATIENT
Start: 2022-01-06

## 2022-01-06 RX ORDER — ATORVASTATIN CALCIUM 10 MG/1
TABLET, FILM COATED ORAL
Qty: 90 TABLET | Refills: 3 | OUTPATIENT
Start: 2022-01-06

## 2022-01-24 DIAGNOSIS — I48.0 PAROXYSMAL A-FIB (HCC): ICD-10-CM

## 2022-01-25 RX ORDER — METOPROLOL SUCCINATE 25 MG/1
TABLET, EXTENDED RELEASE ORAL
Qty: 90 TABLET | Refills: 0 | Status: SHIPPED
Start: 2022-01-25 | End: 2022-03-01 | Stop reason: SDUPTHER

## 2022-02-01 DIAGNOSIS — J43.2 CENTRILOBULAR EMPHYSEMA (HCC): ICD-10-CM

## 2022-02-04 RX ORDER — BUDESONIDE AND FORMOTEROL FUMARATE DIHYDRATE 160; 4.5 UG/1; UG/1
AEROSOL RESPIRATORY (INHALATION)
Qty: 30.6 G | Refills: 3 | Status: SHIPPED
Start: 2022-02-04 | End: 2022-04-06 | Stop reason: SDUPTHER

## 2022-02-14 DIAGNOSIS — I48.0 PAROXYSMAL A-FIB (HCC): ICD-10-CM

## 2022-03-01 ENCOUNTER — OFFICE VISIT (OUTPATIENT)
Dept: CARDIOLOGY CLINIC | Age: 77
End: 2022-03-01
Payer: MEDICARE

## 2022-03-01 ENCOUNTER — NURSE ONLY (OUTPATIENT)
Dept: CARDIOLOGY CLINIC | Age: 77
End: 2022-03-01

## 2022-03-01 VITALS
SYSTOLIC BLOOD PRESSURE: 126 MMHG | HEART RATE: 70 BPM | WEIGHT: 166 LBS | BODY MASS INDEX: 25.16 KG/M2 | HEIGHT: 68 IN | DIASTOLIC BLOOD PRESSURE: 72 MMHG

## 2022-03-01 DIAGNOSIS — I48.0 PAROXYSMAL A-FIB (HCC): ICD-10-CM

## 2022-03-01 DIAGNOSIS — Z86.39 H/O HYPERLIPIDEMIA: ICD-10-CM

## 2022-03-01 DIAGNOSIS — R73.03 PREDIABETES: ICD-10-CM

## 2022-03-01 DIAGNOSIS — Z87.09 HISTORY OF ACUTE RESPIRATORY FAILURE: ICD-10-CM

## 2022-03-01 DIAGNOSIS — Z79.899 ON STATIN THERAPY: ICD-10-CM

## 2022-03-01 DIAGNOSIS — M51.36 DDD (DEGENERATIVE DISC DISEASE), LUMBAR: ICD-10-CM

## 2022-03-01 DIAGNOSIS — J43.9 PULMONARY EMPHYSEMA, UNSPECIFIED EMPHYSEMA TYPE (HCC): ICD-10-CM

## 2022-03-01 DIAGNOSIS — Z79.01 ANTICOAGULATED BY ANTICOAGULATION TREATMENT: ICD-10-CM

## 2022-03-01 DIAGNOSIS — Z87.891 EX-SMOKER: ICD-10-CM

## 2022-03-01 DIAGNOSIS — I25.5 ISCHEMIC CARDIOMYOPATHY: ICD-10-CM

## 2022-03-01 DIAGNOSIS — Z85.46 H/O PROSTATE CANCER: ICD-10-CM

## 2022-03-01 DIAGNOSIS — R29.898 WEAKNESS OF BOTH LOWER EXTREMITIES: ICD-10-CM

## 2022-03-01 DIAGNOSIS — I74.10 AORTIC MURAL THROMBUS (HCC): ICD-10-CM

## 2022-03-01 DIAGNOSIS — Z95.828 S/P AAA REPAIR USING BIFURCATION GRAFT: ICD-10-CM

## 2022-03-01 DIAGNOSIS — Z87.01 H/O: PNEUMONIA: ICD-10-CM

## 2022-03-01 DIAGNOSIS — Z86.79 S/P AAA REPAIR USING BIFURCATION GRAFT: ICD-10-CM

## 2022-03-01 DIAGNOSIS — I50.21 ACUTE SYSTOLIC CONGESTIVE HEART FAILURE (HCC): ICD-10-CM

## 2022-03-01 DIAGNOSIS — I44.39 HIGH-GRADE ATRIOVENTRICULAR BLOCK: ICD-10-CM

## 2022-03-01 DIAGNOSIS — Z95.2 S/P AVR (AORTIC VALVE REPLACEMENT): ICD-10-CM

## 2022-03-01 DIAGNOSIS — N18.31 STAGE 3A CHRONIC KIDNEY DISEASE (HCC): ICD-10-CM

## 2022-03-01 DIAGNOSIS — I38 VHD (VALVULAR HEART DISEASE): ICD-10-CM

## 2022-03-01 DIAGNOSIS — Z95.1 HX OF CABG: ICD-10-CM

## 2022-03-01 DIAGNOSIS — F41.9 ANXIETY: ICD-10-CM

## 2022-03-01 DIAGNOSIS — E79.0 HYPERURICEMIA: ICD-10-CM

## 2022-03-01 DIAGNOSIS — I48.21 PERMANENT ATRIAL FIBRILLATION (HCC): Primary | ICD-10-CM

## 2022-03-01 DIAGNOSIS — E80.6 HYPERBILIRUBINEMIA: ICD-10-CM

## 2022-03-01 DIAGNOSIS — Z95.0 PACEMAKER: ICD-10-CM

## 2022-03-01 PROCEDURE — 99214 OFFICE O/P EST MOD 30 MIN: CPT | Performed by: INTERNAL MEDICINE

## 2022-03-01 PROCEDURE — 93000 ELECTROCARDIOGRAM COMPLETE: CPT | Performed by: INTERNAL MEDICINE

## 2022-03-01 RX ORDER — METOPROLOL SUCCINATE 25 MG/1
TABLET, EXTENDED RELEASE ORAL
Qty: 90 TABLET | Refills: 3 | Status: SHIPPED
Start: 2022-03-01 | End: 2022-04-06 | Stop reason: SDUPTHER

## 2022-03-02 NOTE — PROGRESS NOTES
OFFICE VISIT        PRIMARY CARE PHYSICIAN:    Rob Bedolla DO       ALLERGIES / SENSITIVITIES:    Allergies   Allergen Reactions    Pneumovax [Pneumococcal Polysaccharide Vaccine] Dermatitis        REVIEWED MEDICATIONS:      Current Outpatient Medications:     metoprolol succinate (TOPROL XL) 25 MG extended release tablet, 1 tablet daily, Disp: 90 tablet, Rfl: 3    SYMBICORT 160-4.5 MCG/ACT AERO, USE 2 INHALATIONS TWICE A DAY, Disp: 30.6 g, Rfl: 3    lisinopril (PRINIVIL;ZESTRIL) 2.5 MG tablet, TAKE 1 TABLET BY MOUTH EVERY DAY, Disp: 90 tablet, Rfl: 1    atorvastatin (LIPITOR) 20 MG tablet, Take 1 tablet by mouth daily, Disp: 90 tablet, Rfl: 1    furosemide (LASIX) 40 MG tablet, TAKE 1 TABLET DAILY, Disp: 90 tablet, Rfl: 1    apixaban (ELIQUIS) 5 MG TABS tablet, TAKE 1 TABLET TWICE A DAY, Disp: 60 tablet, Rfl: 0    nitroGLYCERIN (NITROSTAT) 0.3 MG SL tablet, Place 1 tablet under the tongue every 5 minutes as needed for Chest pain, Disp: 30 tablet, Rfl: 3      S: REASON FOR VISIT:    Coronary artery disease/ischemic cardiomyopathy and history of bicuspid aortic valve, S/P coronary artery bypass graft surgery and aortic valve replacement surgery in 8/2014. History of high grade AV block, S/P pacemaker insertion. Chronic atrial fibrillation on chronic anticoagulation with Eliquis. Zandra Man is a 75-year-old male with cardiovascular history as described below. He has had no hospitalizations since his last office visit in 3/2021. He was in the Emergency Room in 9/2021 after he suffered from a cat bite. He has not been involved in any formal exercise during the winter months and has been more or less staying at home. He does, however, go to the store shopping. He denies chest pain. He has a chronic exertional dyspnea which is stable and has not worsened. He denies orthopnea, PND's or significant lower extremity swelling. He denies palpitations, dizziness, presyncope or syncope.   He had been off his Toprol XL and did not call our office or his PCP's office for a refill. REVIEW OF SYSTEMS:    CONSTITUTIONAL:  Denies fevers, chills or night sweats. HEENT:  Denies headaches.  Denies changes in hearing or vision.  Denies dysphagia, hoarseness, hemoptysis, hematemesis or epistaxis. ENDOCRINE:  Denies polyphagia, polydipsia or polyuria.  Denies heat or cold intolerance. MUSCULOSKELETAL:  Denies any significant arthralgias or myalgias. SKIN:  He has been having on and off skin lesions and has an appointment with dermatology in that regard.    HEME/LYMPH:  Denies palpable lymph nodes. He denies bleeding or easy bruisability. HEART:  As above. LUNGS:  Denies any cough or sputum production. GI: Denies abdominal pain, nausea, vomiting,diarrhea, constipation, rectal bleeding or tarry stools. :  Denies hematuria or dysuria. PSYCHIATRIC:  Denies mood changes, anxiety or depression. NEUROLOGIC:  Denies memory loss, motor weakness, numbness, tingling or tremors.         CARDIOVASCULAR HISTORY:    1. History of tobacco abuse.    2. Echocardiogram done on 7/21/2014 was read by Dr. Bigg Fragoso as a technically very poor and limited study showing low normal left ventricular systolic function with an estimated ejection fraction of 50% with stage 1 left ventricular diastolic dysfunction and with septal motion consistent with conduction abnormality. Mild mitral regurgitation. Moderate aortic valve sclerosis with mild aortic stenosis with a peak/mean gradient of 36/16 mmHg with mild aortic regurgitation. Small localized pericardial effusion near the right ventricle.    3. 7/21/2014 Treadmill nuclear stress test: Rodrigue protocol. 3 minutes and 2 seconds. His baseline heart rate was 73 bpm and went up to only 98 bpm. He had normal BP response. The test was terminated because of extreme fatigue and shortness of breath. There were no ischemic EKG changes at the heart rate achieved.  The nuclear images were read by  Severo Catrina as mostly fixed inferior and inferolateral wall defects with minimal reversibility involving the inferior wall with mild inferobasal hypokinesis with an estimated ejection fraction of 60%. 4. Hyperlipidemia.    5. 8/8/14: Cardiac catheterization: Severe three vessel coronary artery disease. Normal left ventricular size with basal inferior wall akinesis with an estimated ejection fraction of 50%. Systemic hypertension. Elevated left ventricular end diastolic pressure consistent with LV dysfunction. Mild calcific aortic stenosis and mild aortic regurgitation. High grade ostial narrowing of the left vertebral basilar artery with no significant disease of both carotid arteries. Mobitz II second degree AV block and intermittent third degree AV block for which the patient had a temporary transvenous pacemaker inserted.    6. Carotid ultrasound done on 8/9/14 was reported as no evidence of hemodynamically significant stenosis.    7. 8/11/14: Coronary artery bypass graft surgery x3: LIMA to LAD; SVG to second diagonal branch of LAD; SVG to first marginal branch of LCX. Aortic valve replacement with a 23 mm Medtronic Mosaic Ultra bioprosthetic tissue valve.    8. Dual chamber pacemaker insertion on 8/12/14 for Mobitz type II second degree AV block and intermittent third degree heart block.    9. Paroxysmal atrial fibrillation/flutter in the perioperative period in 8/14.    10. Holter monitor done on 12/09/14 for 24 hours was read by Dr. Bigg Fragoso (cardiologist) as showing paced rhythm with an average heart rate of 73 BPM with frequent PVC's with one short run of six beats of nonsustained ventricular tachycardia at a rate of 126 BPM with rare PAC's.    11.  Abdominal aortic aneurysm:  a.  CT scan of the abdomen done on 09/01/2015 was reported as showing a 4-cm abdominal aortic aneurysm.    b.  US of abdominal aorta done on 03/31/2017 was read as showing a 4.6 x 4.8 abdominal aortic aneurysm without involvement of the iliac arteries. c.  CTA of the abdomen and pelvis done on 04/24/2018 showed a 5.1 x 5.3 cm AAA. d. Kyra Grange repair of abdominal aortic aneurysm 07/16/2018. 12. Elevated liver enzymes in 08/2015 at which time patient complained of abdominal pain: Lipitor was discontinued at that time. 13. 2-D echo February 15, 2018 [facility]:  Technically difficult exam due to body habitus. - Exam indication: Shortness of Breath- The left ventricle is normal in size. There is no left ventricular hypertrophy. Left ventricular systolic function is mildly decreased. EF = 45 ± 5% (visual est.)- The right ventricle is mildly dilated. Right ventricular systolic function is mildly decreased. - The left atrial cavity is moderately dilated. - The right atrial cavity is dilated. - There is moderate (2+) tricuspid valve regurgitation caused by annular dilatation.- Porcine Medtronic prosthetic aortic valve. There is trivial aortic valve regurgitation. The peak gradient is 35 mmHg, the mean gradient is 20 mmHg and the dimensionless valve index is 0.24. Bioprosthetic leaflets not well seen. - Estimated right ventricular systolic pressure is 27 mmHg consistent with normal pulmonary artery pressures. Estimated right atrial pressure is 5 mmHg. - The patient has not had a prior [facility] echocardiographic exam for comparison. [address] nuclear stress test done on 04/17/2018 showed normal myocardial perfusion images with normal left ventricular wall motion and systolic function with an ejection fraction calculated at 63%.    15.  Echocardiogram done on 9/17/2019 was read by Dr. Ck Camarena (Internist at [facility]) as showing normal left ventricular size with an ejection fraction of 33% with akinetic inferior wall.  Mildly dilated right ventricle.  Normal size atria.  Mild mitral regurgitation.  Moderate-to-severe tricuspid regurgitation with mild pulmonary hypertension, \"mild aortic stenosis\" with a maximum gradient of 23.41 mmHg and a mean gradient of 16.8 and a maximum gradient of 23.31 mmHg and a mean gradient of 16.18 mmHg with a reported aortic valve area of 0.6 cm²!    16. Prediabetes:  Hemoglobin A1C 6.1 (on 9/30/2020). [facility] nuclear stress test done on 3/23/2021 was a normal study with the gated views showing no regional wall abnormality with a calculated ejection fraction of 69%. 18. Echocardiogram done on 3/23/2021 showed normal left ventricular size and wall thickness with septal motion consistent with the post open heart state/paced rhythm with an estimated ejection fraction of 50-55% with indeterminate diastolic dysfunction. Normal right ventricular size and function. Pacer wire visualized in the right ventricle/right atrium. Normal size atria. Mild mitral regurgitation. Bioprosthetic aortic valve, which is well seated with a mean gradient of 12.5 mmHg and no aortic regurgitation. Moderate to severe tricuspid regurgitation. Mild pulmonary hypertension.      PAST MEDICAL HISTORY:    1. As under cardiovascular history.    2. S/P appendectomy.    3. ? Anxiety.    4. Benign prostatic hypertrophy.   5. Emphysema  6. prostate cancer and status post radiation therapy  7. Psoriasis/treated with methotrexate around 4 months ago in October 2017.  8. Pulmonary function studies 2/15/2018:    FVC 3.86 3.00 4.73 3.60 93 3.78 98 5   FEV 1 2.79 2.06 3.53 2.09 75 2.21 79   6   FEV1%F 72.78 63.10 82.46 58.10 80 58.52 80 1   FEV 2 3.07 2.03 4.11 2.47 81 2.58 84 4   FEV 3.66 2.64 4.67 2.72 74 2.74 75 1   FEV3%E 90.61 85.97 95.25 75.69 84 72.45 80 -4   MEF 50 3.48 1.86 5.11 1.01 29 1.01 29 -1   FIF 50 3.04 2.41 -21   F25/75 2.05 0.56 3.53 0.64 31 0.53 26 -17   FE%FIF 33.41 41.79 25   FEV6 3.12 3.16 1   PEF 7.54 5.18 9.90 6.16 82 5.67 75 -8   FET 14.42 14.08 -2   FETPEF 0.07 0.09 28   VBe%FV   2.15 2.56 19   VBEex 0.08 0.10 25   -----------------------------------------------------------------------------   VC MAX 3.86 3.00 4.73 3.60 93 3.78 98 5 -----------------------------------------------------------------------------   9.  Hospitalized from 2019 to 2019 with acute hypoxic respiratory failure secondary to sepsis secondary to extensive community acquired pneumonia with Legionella.    10. 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   11. Chronic kidney disease. 12.  Hyperbilirubinemia. 13.  Hyperuricemia.        FAMILY HISTORY:    Father  at age 80 from \"liver problems\".    Mother  at age 80 from \"old age\".     SOCIAL HISTORY:   Patient quit smoking on 2014. He denied any significant alcohol use. He denied any illicit drug use.        O:  COMPLETE PHYSICAL EXAM:      /72 (Site: Right Upper Arm, Position: Sitting, Cuff Size: Medium Adult)   Pulse 70   Ht 5' 8\" (1.727 m)   Wt 166 lb (75.3 kg)   BMI 25.24 kg/m²      General:                     Well-developed, well-nourished male in no acute distress.    Head & Neck:             Atraumatic, normocephalic head.  No JVD. No carotid bruits. Carotid upstrokes normal bilaterally. No thyromegaly. Sclerae not icteric. No xanthelasmas.  Mucous membranes moist.  Chest:                        Symmetrical and nontender.  Sternotomy scar well healed. Pacemaker site in the left infraclavicular area well                            healed.    Lungs:                         few crackles in right base   Heart:                         Normal S1 and S2. No S3. Grade 1/6 systolic murmur heard at the right upper sternal border.    Abdomen:                   Soft, nontender without organomegaly or masses. Normal bowel sounds. No pulsating masses felt.  No bruits                                     heard. Extremities:                No edema. Femoral pulses normal. Posterior tibialis pulses normal.  Skin:                           Normal turgor. No rashes or ulcers noted. Neurologic:                 Oriented x 3.  No motor or sensory deficits detected.        REVIEW OF DIAGNOSTIC TESTS:    1. Blood tests from 7/28/2021 reviewed:  TSH normal.  Uric acid 7.7 (elevated). BUN 17, creatinine 1.2, GFR 59, potassium 4.7, total bilirubin 1.5, otherwise LFT's unremarkable. Total cholesterol 169, triglycerides 45, HDL 51, . CBC unremarkable. Hgb A1C 5.7. PSA normal.   2.  EKG done today showed atrial fibrillation with ventricular pacing. 3.  Pacemaker interrogation done today showed a normal pacemaker function with battery life of 4 months. ASSESSMENT / DIAGNOSIS:   1.  Coronary artery disease status post coronary artery bypass graft surgery:  Clinically stable.    2.  Bicuspid aortic valve with aortic stenosis and aortic regurgitation status post aortic valve replacement surgery. 3.  History of high grade AV block status post dual chamber pacemaker insertion. 4.  Permanent atrial fibrillation:  First diagnosed in the perioperative period in 08/2014 with noted recurrences on pacemaker interrogation done on 09/05/2017.  5.  Hyperlipidemia: On low dose atorvastatin.  History of elevated liver enzymes with higher dose of statins. 6.  Hyperbilirubinemia. 7.  Prostate cancer, S/P radiation therapy. 8.  Anxiety. 9.  Abdominal aortic aneurysm, status post endovascular repair 07/16/2018. 10.  Severe emphysema. History of tobacco abuse. 11. History of Legionella Pneumonia and acute hypoxic respiratory failure, 9/2019. 12.  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 13.  Chronic kidney disease. 14.  Hyperuricemia. 15.  Mild hyperbilirubinemia.         TREATMENT PLAN:  1. Reassure. 2.  Continue current medications:  Patient strongly advised not to let himself run out of medications without contacting our office or his PCP's office for refills. 3.  Close pacemaker follow up. 4.  Patient strongly advised walking for exercise.    5.  Follow up with Cardiology in 1 year or on a prn basis. Joshua Rocha.  [place] [id number]  [phone]

## 2022-04-04 ENCOUNTER — PATIENT MESSAGE (OUTPATIENT)
Dept: CARDIOLOGY CLINIC | Age: 77
End: 2022-04-04

## 2022-04-04 NOTE — TELEPHONE ENCOUNTER
From: Shaggy Piedra  To: Dr. Wendy Cesar: 4/4/2022 10:55 AM EDT  Subject: Eliquis     had a little mix up trying to order Eliquis 5Mg from Dr. Che Miller office. I have been without Eliquis for six days. To order from [address] will take 2 weeks or more. Can I get Eliquis from your office?

## 2022-04-06 ENCOUNTER — OFFICE VISIT (OUTPATIENT)
Dept: FAMILY MEDICINE CLINIC | Age: 77
End: 2022-04-06
Payer: MEDICARE

## 2022-04-06 VITALS
HEIGHT: 68 IN | SYSTOLIC BLOOD PRESSURE: 118 MMHG | BODY MASS INDEX: 25.16 KG/M2 | DIASTOLIC BLOOD PRESSURE: 72 MMHG | HEART RATE: 66 BPM | RESPIRATION RATE: 16 BRPM | OXYGEN SATURATION: 93 % | TEMPERATURE: 97 F | WEIGHT: 166 LBS

## 2022-04-06 DIAGNOSIS — J43.2 CENTRILOBULAR EMPHYSEMA (HCC): ICD-10-CM

## 2022-04-06 DIAGNOSIS — Z00.00 ENCOUNTER FOR MEDICARE ANNUAL WELLNESS EXAM: Primary | ICD-10-CM

## 2022-04-06 DIAGNOSIS — E44.1 MILD PROTEIN-CALORIE MALNUTRITION (HCC): ICD-10-CM

## 2022-04-06 DIAGNOSIS — I48.21 PERMANENT ATRIAL FIBRILLATION (HCC): ICD-10-CM

## 2022-04-06 DIAGNOSIS — I10 ESSENTIAL HYPERTENSION: ICD-10-CM

## 2022-04-06 DIAGNOSIS — E78.5 DYSLIPIDEMIA: ICD-10-CM

## 2022-04-06 DIAGNOSIS — R73.01 IFG (IMPAIRED FASTING GLUCOSE): ICD-10-CM

## 2022-04-06 DIAGNOSIS — C61 PROSTATE CA (HCC): ICD-10-CM

## 2022-04-06 DIAGNOSIS — R53.83 FATIGUE, UNSPECIFIED TYPE: ICD-10-CM

## 2022-04-06 DIAGNOSIS — Z00.00 MEDICARE ANNUAL WELLNESS VISIT, SUBSEQUENT: ICD-10-CM

## 2022-04-06 DIAGNOSIS — I48.0 PAROXYSMAL A-FIB (HCC): ICD-10-CM

## 2022-04-06 DIAGNOSIS — Z00.00 ENCOUNTER FOR MEDICARE ANNUAL WELLNESS EXAM: ICD-10-CM

## 2022-04-06 DIAGNOSIS — I50.21 ACUTE SYSTOLIC CONGESTIVE HEART FAILURE (HCC): ICD-10-CM

## 2022-04-06 LAB
ALBUMIN SERPL-MCNC: 3.8 G/DL (ref 3.5–5.2)
ALP BLD-CCNC: 121 U/L (ref 40–129)
ALT SERPL-CCNC: 10 U/L (ref 0–40)
ANION GAP SERPL CALCULATED.3IONS-SCNC: 14 MMOL/L (ref 7–16)
AST SERPL-CCNC: 22 U/L (ref 0–39)
BASOPHILS ABSOLUTE: 0.08 E9/L (ref 0–0.2)
BASOPHILS RELATIVE PERCENT: 1 % (ref 0–2)
BILIRUB SERPL-MCNC: 1.3 MG/DL (ref 0–1.2)
BUN BLDV-MCNC: 18 MG/DL (ref 6–23)
CALCIUM SERPL-MCNC: 9.8 MG/DL (ref 8.6–10.2)
CHLORIDE BLD-SCNC: 106 MMOL/L (ref 98–107)
CHOLESTEROL, TOTAL: 167 MG/DL (ref 0–199)
CO2: 21 MMOL/L (ref 22–29)
CREAT SERPL-MCNC: 1.2 MG/DL (ref 0.7–1.2)
EOSINOPHILS ABSOLUTE: 0.16 E9/L (ref 0.05–0.5)
EOSINOPHILS RELATIVE PERCENT: 2 % (ref 0–6)
GFR AFRICAN AMERICAN: >60
GFR NON-AFRICAN AMERICAN: 58 ML/MIN/1.73
GLUCOSE BLD-MCNC: 87 MG/DL (ref 74–99)
HBA1C MFR BLD: 5.7 % (ref 4–5.6)
HCT VFR BLD CALC: 46.2 % (ref 37–54)
HDLC SERPL-MCNC: 37 MG/DL
HEMOGLOBIN: 14.7 G/DL (ref 12.5–16.5)
IMMATURE GRANULOCYTES #: 0.07 E9/L
IMMATURE GRANULOCYTES %: 0.9 % (ref 0–5)
LDL CHOLESTEROL CALCULATED: 120 MG/DL (ref 0–99)
LYMPHOCYTES ABSOLUTE: 1.32 E9/L (ref 1.5–4)
LYMPHOCYTES RELATIVE PERCENT: 16.8 % (ref 20–42)
MCH RBC QN AUTO: 31.1 PG (ref 26–35)
MCHC RBC AUTO-ENTMCNC: 31.8 % (ref 32–34.5)
MCV RBC AUTO: 97.9 FL (ref 80–99.9)
MONOCYTES ABSOLUTE: 0.59 E9/L (ref 0.1–0.95)
MONOCYTES RELATIVE PERCENT: 7.5 % (ref 2–12)
NEUTROPHILS ABSOLUTE: 5.65 E9/L (ref 1.8–7.3)
NEUTROPHILS RELATIVE PERCENT: 71.8 % (ref 43–80)
PDW BLD-RTO: 15.1 FL (ref 11.5–15)
PLATELET # BLD: 175 E9/L (ref 130–450)
PMV BLD AUTO: 10.8 FL (ref 7–12)
POTASSIUM SERPL-SCNC: 5 MMOL/L (ref 3.5–5)
PROSTATE SPECIFIC ANTIGEN: 0.13 NG/ML (ref 0–4)
RBC # BLD: 4.72 E12/L (ref 3.8–5.8)
SODIUM BLD-SCNC: 141 MMOL/L (ref 132–146)
TOTAL PROTEIN: 7.9 G/DL (ref 6.4–8.3)
TRIGL SERPL-MCNC: 52 MG/DL (ref 0–149)
URIC ACID, SERUM: 7.4 MG/DL (ref 3.4–7)
VLDLC SERPL CALC-MCNC: 10 MG/DL
WBC # BLD: 7.9 E9/L (ref 4.5–11.5)

## 2022-04-06 PROCEDURE — G0439 PPPS, SUBSEQ VISIT: HCPCS | Performed by: FAMILY MEDICINE

## 2022-04-06 RX ORDER — METOPROLOL SUCCINATE 25 MG/1
TABLET, EXTENDED RELEASE ORAL
Qty: 90 TABLET | Refills: 3 | Status: SHIPPED | OUTPATIENT
Start: 2022-04-06

## 2022-04-06 RX ORDER — LISINOPRIL 2.5 MG/1
TABLET ORAL
Qty: 90 TABLET | Refills: 1 | Status: SHIPPED
Start: 2022-04-06 | End: 2022-10-05

## 2022-04-06 RX ORDER — ATORVASTATIN CALCIUM 20 MG/1
20 TABLET, FILM COATED ORAL DAILY
Qty: 90 TABLET | Refills: 1 | Status: SHIPPED
Start: 2022-04-06 | End: 2022-09-17

## 2022-04-06 RX ORDER — BUDESONIDE AND FORMOTEROL FUMARATE DIHYDRATE 160; 4.5 UG/1; UG/1
AEROSOL RESPIRATORY (INHALATION)
Qty: 30.6 G | Refills: 3 | Status: SHIPPED | OUTPATIENT
Start: 2022-04-06

## 2022-04-06 RX ORDER — FUROSEMIDE 40 MG/1
TABLET ORAL
Qty: 90 TABLET | Refills: 1 | Status: SHIPPED
Start: 2022-04-06 | End: 2022-09-17

## 2022-04-06 SDOH — ECONOMIC STABILITY: FOOD INSECURITY: WITHIN THE PAST 12 MONTHS, YOU WORRIED THAT YOUR FOOD WOULD RUN OUT BEFORE YOU GOT MONEY TO BUY MORE.: NEVER TRUE

## 2022-04-06 SDOH — ECONOMIC STABILITY: FOOD INSECURITY: WITHIN THE PAST 12 MONTHS, THE FOOD YOU BOUGHT JUST DIDN'T LAST AND YOU DIDN'T HAVE MONEY TO GET MORE.: NEVER TRUE

## 2022-04-06 ASSESSMENT — PATIENT HEALTH QUESTIONNAIRE - PHQ9
1. LITTLE INTEREST OR PLEASURE IN DOING THINGS: 0
SUM OF ALL RESPONSES TO PHQ QUESTIONS 1-9: 0
2. FEELING DOWN, DEPRESSED OR HOPELESS: 0
SUM OF ALL RESPONSES TO PHQ QUESTIONS 1-9: 0
SUM OF ALL RESPONSES TO PHQ9 QUESTIONS 1 & 2: 0

## 2022-04-06 ASSESSMENT — SOCIAL DETERMINANTS OF HEALTH (SDOH): HOW HARD IS IT FOR YOU TO PAY FOR THE VERY BASICS LIKE FOOD, HOUSING, MEDICAL CARE, AND HEATING?: NOT HARD AT ALL

## 2022-04-06 ASSESSMENT — LIFESTYLE VARIABLES: HOW OFTEN DO YOU HAVE A DRINK CONTAINING ALCOHOL: NEVER

## 2022-04-06 NOTE — PROGRESS NOTES
The 10-year ASCVD risk score (Carlos Jiménez, et al., 2013) is: 25%    Values used to calculate the score:      Age: 64 years      Sex: Male      Is Non- : No      Diabetic: No      Tobacco smoker: No      Systolic Blood Pressure: 072 mmHg      Is BP treated: No      HDL Cholesterol: 51 mg/dL      Total Cholesterol: 169 mg/dL   Medicare Annual Wellness Visit    Kate Bangura is here for Medicare AWV (AWV) and Medication Refill (Pharmacy verified meds pended.)    Assessment & Plan   Encounter for Medicare annual wellness exam  -     Comprehensive Metabolic Panel; Future  -     CBC with Auto Differential; Future    ---VASCULAR PANEL  A) asa, plavix, aggrenox  B) ELIQUIS, pletal, tzd, STATIN  C) ACE, LASIX, folic, ccb  D) cannikinumab, fish oils     ---CARDIAC---ELIQUIS, ACE, BETA, STATIN, LASIX, ( ccb )    Acute systolic congestive heart failure (HCC)  -     furosemide (LASIX) 40 MG tablet; TAKE 1 TABLET DAILY, Disp-90 tablet, R-1Normal  -     Comprehensive Metabolic Panel; Future  -     CBC with Auto Differential; Future  Essential hypertension  -     lisinopril (PRINIVIL;ZESTRIL) 2.5 MG tablet; TAKE 1 TABLET BY MOUTH EVERY DAY, Disp-90 tablet, R-1Normal  -     Comprehensive Metabolic Panel; Future  -     CBC with Auto Differential; Future  Permanent atrial fibrillation (HCC)  -     metoprolol succinate (TOPROL XL) 25 MG extended release tablet; 1 tablet daily, Disp-90 tablet, R-3Normal  -     Comprehensive Metabolic Panel; Future  -     CBC with Auto Differential; Future  Paroxysmal A-fib (HCC)  -     apixaban (ELIQUIS) 5 MG TABS tablet; TAKE 1 TABLET TWICE A DAY, Disp-180 tablet, R-1Normal  -     Comprehensive Metabolic Panel; Future  -     CBC with Auto Differential; Future  Centrilobular emphysema (HCC)  -     SYMBICORT 160-4.5 MCG/ACT AERO; USE 2 INHALATIONS TWICE A DAY, Disp-30.6 g, R-3, DAWNormal  -     Comprehensive Metabolic Panel;  Future  -     CBC with Auto Differential; Future  Mild protein-calorie malnutrition ([facility] [address]  -     Comprehensive Metabolic Panel; Future  -     CBC with Auto Differential; Future  Prostate CA (HCC)  -     Comprehensive Metabolic Panel; Future  -     CBC with Auto Differential; Future  -     PSA Screening; Future  Dyslipidemia  -     atorvastatin (LIPITOR) 20 MG tablet; Take 1 tablet by mouth daily, Disp-90 tablet, R-1Normal  -     Comprehensive Metabolic Panel; Future  -     Lipid Panel; Future  -     CBC with Auto Differential; Future  Fatigue, unspecified type  -     TSH; Future  -     Uric Acid; Future  -     Comprehensive Metabolic Panel; Future  -     CBC with Auto Differential; Future  IFG (impaired fasting glucose)  -     Hemoglobin A1C; Future      Recommendations for Preventive Services Due: see orders and patient instructions/AVS.  Recommended screening schedule for the next 5-10 years is provided to the patient in written form: see Patient Instructions/AVS.     No follow-ups on file. Subjective   The following acute and/or chronic problems were also addressed today:    Patient's complete Health Risk Assessment and screening values have been reviewed and are found in Flowsheets. The following problems were reviewed today and where indicated follow up appointments were made and/or referrals ordered.     Positive Risk Factor Screenings with Interventions:             General Health and ACP:  General  In general, how would you say your health is?: Very Good  In the past 7 days, have you experienced any of the following: New or Increased Pain, New or Increased Fatigue, Loneliness, Social Isolation, Stress or Anger?: No  Do you get the social and emotional support that you need?: Yes  Do you have a Living Will?: Yes    Advance Directives     Power of  Living Will ACP-Advance Directive ACP-Power of     Not on File Not on File Not on File Not on File      General Health Risk Interventions:  · he is feeling very good     Health Habits/Nutrition: Physical Activity: Insufficiently Active    Days of Exercise per Week: 3 days    Minutes of Exercise per Session: 20 min     Have you lost any weight without trying in the past 3 months?: (!) Yes  Body mass index: (!) 25.24  Have you seen the dentist within the past year?: Yes    Health Habits/Nutrition Interventions:  · no acute issues    Hearing/Vision:  Do you or your family notice any trouble with your hearing that hasn't been managed with hearing aids?: (!) Yes  Do you have difficulty driving, watching TV, or doing any of your daily activities because of your eyesight?: No  Have you had an eye exam within the past year?: Yes  No exam data present    Hearing/Vision Interventions:  · no acute issues            Objective   Vitals:    04/06/22 1037   BP: 118/72   Pulse: 66   Resp: 16   Temp: 97 °F (36.1 °C)   SpO2: 93%   Weight: 166 lb (75.3 kg)   Height: 5' 8\" (1.727 m)      Body mass index is 25.24 kg/m².         General Appearance: alert and oriented to person, place and time, well-developed and well-nourished, in no acute distress  Skin: warm and dry, no rash or erythema  Head: normocephalic and atraumatic  Eyes: pupils equal, round, and reactive to light, extraocular eye movements intact, conjunctivae normal  ENT: tympanic membrane, external ear and ear canal normal bilaterally, oropharynx clear and moist with normal mucous membranes  Neck: neck supple and non tender without mass, no thyromegaly or thyroid nodules, no cervical lymphadenopathy   Pulmonary/Chest: clear to auscultation bilaterally- no wheezes, rales or rhonchi, normal air movement, no respiratory distress  Cardiovascular: normal rate, regular rhythm, normal S1 and S2, no murmurs, no gallops, intact distal pulses and no carotid bruits  Abdomen: soft, non-tender, non-distended, normal bowel sounds, no masses or organomegaly  Genitourinary: genitals normal without hernia or inguinal adenopathy  Extremities: no cyanosis and no clubbing  Musculoskeletal: normal range of motion, no joint swelling, deformity or tenderness  Neurologic: gait and coordination normal and speech normal         Allergies   Allergen Reactions    Pneumovax [Pneumococcal Polysaccharide Vaccine] Dermatitis     Prior to Visit Medications    Medication Sig Taking?  Authorizing Provider   furosemide (LASIX) 40 MG tablet TAKE 1 TABLET DAILY Yes Hermelindo Bedolla DO   lisinopril (PRINIVIL;ZESTRIL) 2.5 MG tablet TAKE 1 TABLET BY MOUTH EVERY DAY Yes Hermelindo Bedolla DO   atorvastatin (LIPITOR) 20 MG tablet Take 1 tablet by mouth daily Yes Hermelindo Bedolla DO   metoprolol succinate (TOPROL XL) 25 MG extended release tablet 1 tablet daily Yes Hermelindo Bedolla DO   apixaban (ELIQUIS) 5 MG TABS tablet TAKE 1 TABLET TWICE A DAY Yes Hermelindo Mauro DO   SYMBICORT 160-4.5 MCG/ACT AERO USE 2 INHALATIONS TWICE A DAY Yes Hermelindo Bedolla DO   nitroGLYCERIN (NITROSTAT) 0.3 MG SL tablet Place 1 tablet under the tongue every 5 minutes as needed for Chest pain Yes Hermelindo Velazquez DO       [facility] (Including outside providers/suppliers regularly involved in providing care):   Patient Care Team:  Candie Muñiz DO as PCP - General  Hermelindo Velazquez DO as PCP - [facility] Empaneled Provider  Tha Das DO as Surgeon (Cardiothoracic Surgery)  Rubén Payan MD as Consulting Physician (Cardiology)  Jordin Byrd as Imaging Navigator    Reviewed and updated this visit:  Tobacco  Allergies  Meds  Problems  Med Hx  Surg Hx  Soc Hx  Fam Hx

## 2022-04-06 NOTE — PATIENT INSTRUCTIONS
Patient Education        Atrial Fibrillation: Care Instructions  Your Care Instructions     Atrial fibrillation is an irregular and often fast heartbeat. Treating this condition is important for several reasons. It can cause blood clots, which can travel from your heart to your brain and cause a stroke. If you have a fast heartbeat, you may feel lightheaded, dizzy, and weak. An irregular heartbeatcan also increase your risk for heart failure. Atrial fibrillation is often the result of another heart condition, such as high blood pressure or coronary artery disease. Making changes to improve yourheart condition will help you stay healthy and active. Follow-up care is a key part of your treatment and safety. Be sure to make and go to all appointments, and call your doctor if you are having problems. It's also a good idea to know your test results and keep alist of the medicines you take. How can you care for yourself at home? Medicines     Take your medicines exactly as prescribed. Call your doctor if you think you are having a problem with your medicine. You will get more details on the specific medicines your doctor prescribes.      If your doctor has given you a blood thinner to prevent a stroke, be sure you get instructions about how to take your medicine safely. Blood thinners can cause serious bleeding problems.      Do not take any vitamins, over-the-counter drugs, or herbal products without talking to your doctor first.   Lifestyle changes     Do not smoke. Smoking can increase your chance of a stroke and heart attack. If you need help quitting, talk to your doctor about stop-smoking programs and medicines. These can increase your chances of quitting for good.      Eat a heart-healthy diet.      Stay at a healthy weight. Lose weight if you need to.      Limit alcohol to 2 drinks a day for men and 1 drink a day for women. Too much alcohol can cause health problems.      Avoid colds and flu.  Get a pneumococcal vaccine shot. If you have had one before, ask your doctor whether you need another dose. Get a flu shot every year. If you must be around people with colds or flu, wash your hands often. Activity     If your doctor recommends it, get more exercise. Walking is a good choice. Bit by bit, increase the amount you walk every day. Try for at least 30 minutes on most days of the week. You also may want to swim, bike, or do other activities. Your doctor may suggest that you join a cardiac rehabilitation program so that you can have help increasing your physical activity safely.      Start light exercise if your doctor says it is okay. Even a small amount will help you get stronger, have more energy, and manage stress. Walking is an easy way to get exercise. Start out by walking a little more than you did in the hospital. Gradually increase the amount you walk.      When you exercise, watch for signs that your heart is working too hard. You are pushing too hard if you cannot talk while you are exercising. If you become short of breath or dizzy or have chest pain, sit down and rest immediately.      Check your pulse regularly. Place two fingers on the artery at the palm side of your wrist, in line with your thumb. If your heartbeat seems uneven or fast, talk to your doctor. When should you call for help? Call 911 anytime you think you may need emergency care. For example, call if:     You have symptoms of a heart attack. These may include:  ? Chest pain or pressure, or a strange feeling in the chest.  ? Sweating. ? Shortness of breath. ? Nausea or vomiting. ? Pain, pressure, or a strange feeling in the back, neck, jaw, or upper belly or in one or both shoulders or arms. ? Lightheadedness or sudden weakness. ? A fast or irregular heartbeat. After you call 911, the  may tell you to chew 1 adult-strength or 2 to 4 low-dose aspirin. Wait for an ambulance.  Do not try to drive yourself.      You have symptoms of a stroke. These may include:  ? Sudden numbness, tingling, weakness, or loss of movement in your face, arm, or leg, especially on only one side of your body. ? Sudden vision changes. ? Sudden trouble speaking. ? Sudden confusion or trouble understanding simple statements. ? Sudden problems with walking or balance. ? A sudden, severe headache that is different from past headaches.      You passed out (lost consciousness). Call your doctor now or seek immediate medical care if:     You have new or increased shortness of breath.      You feel dizzy or lightheaded, or you feel like you may faint.      Your heart rate becomes irregular.      You can feel your heart flutter in your chest or skip heartbeats. Tell your doctor if these symptoms are new or worse. Watch closely for changes in your health, and be sure to contact your doctor ifyou have any problems. Where can you learn more? Go to [email or web address] org and sign in to your [facility] account. Enter U020 in the [employer] box to learn more about \"Atrial Fibrillation: Care Instructions. \"     If you do not have an account, please click on the \"Sign Up Now\" link. Current as of: January 10, 2022               Content Version: 13.2  © 9884-5767 [employer]. Care instructions adapted under license by [facility] ([facility]). If you have questions about a medical condition or this instruction, always ask your healthcare professional. [place] [address] any warranty or liability for your use of this information. Personalized Preventive Plan for Abbi Quinonez - 4/6/2022  Medicare offers a range of preventive health benefits. Some of the tests and screenings are paid in full while other may be subject to a deductible, co-insurance, and/or copay.     Some of these benefits include a comprehensive review of your medical history including lifestyle, illnesses that may run in your family, and various assessments and screenings as appropriate. After reviewing your medical record and screening and assessments performed today your provider may have ordered immunizations, labs, imaging, and/or referrals for you. A list of these orders (if applicable) as well as your Preventive Care list are included within your After Visit Summary for your review. Other Preventive Recommendations:    · A preventive eye exam performed by an eye specialist is recommended every 1-2 years to screen for glaucoma; cataracts, macular degeneration, and other eye disorders. · A preventive dental visit is recommended every 6 months. · Try to get at least 150 minutes of exercise per week or 10,000 steps per day on a pedometer . · Order or download the FREE \"Exercise & Physical Activity: Your Everyday Guide\" from [employer]. Call 8-532.548.7889 or search [employer] online. · You need 9959-7262 mg of calcium and 2224-4608 IU of vitamin D per day. It is possible to meet your calcium requirement with diet alone, but a vitamin D supplement is usually necessary to meet this goal.  · When exposed to the sun, use a sunscreen that protects against both UVA and UVB radiation with an SPF of 30 or greater. Reapply every 2 to 3 hours or after sweating, drying off with a towel, or swimming. · Always wear a seat belt when traveling in a car. Always wear a helmet when riding a bicycle or motorcycle.

## 2022-04-07 LAB — TSH SERPL DL<=0.05 MIU/L-ACNC: 3.22 UIU/ML (ref 0.27–4.2)

## 2022-04-19 ENCOUNTER — NURSE ONLY (OUTPATIENT)
Dept: CARDIOLOGY CLINIC | Age: 77
End: 2022-04-19

## 2022-05-24 ENCOUNTER — NURSE ONLY (OUTPATIENT)
Dept: CARDIOLOGY CLINIC | Age: 77
End: 2022-05-24

## 2022-06-28 ENCOUNTER — NURSE ONLY (OUTPATIENT)
Dept: CARDIOLOGY CLINIC | Age: 77
End: 2022-06-28

## 2022-08-02 ENCOUNTER — OFFICE VISIT (OUTPATIENT)
Dept: NON INVASIVE DIAGNOSTICS | Age: 77
End: 2022-08-02
Payer: MEDICARE

## 2022-08-02 VITALS
DIASTOLIC BLOOD PRESSURE: 68 MMHG | WEIGHT: 166 LBS | HEART RATE: 71 BPM | HEIGHT: 68 IN | SYSTOLIC BLOOD PRESSURE: 118 MMHG | RESPIRATION RATE: 16 BRPM | BODY MASS INDEX: 25.16 KG/M2

## 2022-08-02 DIAGNOSIS — I50.20 SYSTOLIC CONGESTIVE HEART FAILURE, UNSPECIFIED HF CHRONICITY (HCC): ICD-10-CM

## 2022-08-02 DIAGNOSIS — I48.0 PAF (PAROXYSMAL ATRIAL FIBRILLATION) (HCC): Primary | ICD-10-CM

## 2022-08-02 PROCEDURE — 99205 OFFICE O/P NEW HI 60 MIN: CPT | Performed by: INTERNAL MEDICINE

## 2022-08-02 PROCEDURE — 93000 ELECTROCARDIOGRAM COMPLETE: CPT | Performed by: INTERNAL MEDICINE

## 2022-08-02 PROCEDURE — 1123F ACP DISCUSS/DSCN MKR DOCD: CPT | Performed by: INTERNAL MEDICINE

## 2022-08-02 NOTE — PROGRESS NOTES
[address] [facility],2[address] and Vascular [facility] Electrophysiology  Consultation Report  PATIENT: [address] RECORD NUMBER: [id number]  DATE OF SERVICE:  8/2/2022  ATTENDING ELECTROPHYSIOLOGIST: Lowell Chung MD  REFERRING PHYSICIAN: No ref. provider found and Hermelindo Smith DO  CHIEF COMPLAINT:   Pacemaker generator at DEEPAK        HPI: This is a 64 y.o. male with a history of CAD,AVR who presents to cardiac electrophysiology clinic for discussion regarding his pacemaker generator which has reached DEEPAK. The patient has a history of coronary artery disease and is S/P coronary artery bypass graft surgery and aortic valve replacement surgery in 8/2014. History of high grade AV block post surgery leading to  pacemaker insertion. He also has chronic atrial fibrillation now and is on Eliquis for systemic anticoagulation. In addition he underwent  an endovascular repair for AAA in 2018. The patient denies any new cardiac symptoms at present. He does manage his personal care without difficulty. He is able to cook and do his own laundry but has noticed poor balance as well as dyspnea with exertion such as walking up steps. No symptoms of paroxysmal nocturnal dyspnea, orthopnea or leg edema no syncope or near syncope.     Patient Active Problem List    Diagnosis Date Noted    Chronic systolic congestive heart failure ([address] 07/28/2021    Bilateral leg weakness     Mild protein-calorie malnutrition ([address] 09/18/2019    Sepsis secondary to CAP ([address] 09/17/2019    S/P AAA repair using bifurcation graft 07/30/2019    COPD with acute exacerbation ([address] 01/14/2018    PAF (paroxysmal atrial fibrillation) ([address] 09/05/2017    Anticoagulated by anticoagulation treatment 09/05/2017    History of smoking 30 or more pack years 07/18/2017    Pacemaker 09/06/2016    Aortic valve disease 03/21/2016    Pure hypercholesterolemia 03/21/2016    Prostate CA ([address] 03/15/2016    S/P CABG x 3 11/24/2015    H/O aortic valve replacement with porcine valve 11/24/2015    History of tobacco use 09/17/2015    AAA (abdominal aortic aneurysm) without rupture ([facility] 09/08/2015    Elevated liver enzymes 09/08/2015    Aortic mural thrombus ([facility] 09/02/2015    AV block, Mobitz II 09/18/2014    AV block, 3rd degree ([facility] [address] 09/18/2014    H/O bicuspid aortic valve 09/18/2014    AI (aortic insufficiency) 09/18/2014    S/P AVR (aortic valve replacement) 09/18/2014    Anticoagulated on Coumadin 09/18/2014    Pulmonary emphysema ([facility] [address] 08/27/2014    CAD (coronary artery disease) 08/09/2014    ECG abnormal 08/01/2014    Abnormal nuclear stress test 08/01/2014    AS (aortic stenosis) 08/01/2014    SOB (shortness of breath) 08/01/2014    RBBB (right bundle branch block) 08/01/2014    Anxiety 08/01/2014    Hyperlipidemia with target LDL less than 100 07/23/2014     Overview Note:     replace inactive diagnosis      BPH (benign prostatic hyperplasia) 07/23/2014     CARDIOVASCULAR HISTORY:    1. History of tobacco abuse. 2. Echocardiogram done on 7/21/2014 was read by Dr. Rosemary Forbes as a technically very poor and limited study showing low normal left ventricular systolic function with an estimated ejection fraction of 50% with stage 1 left ventricular diastolic dysfunction and with septal motion consistent with conduction abnormality. Mild mitral regurgitation. Moderate aortic valve sclerosis with mild aortic stenosis with a peak/mean gradient of 36/16 mmHg with mild aortic regurgitation. Small localized pericardial effusion near the right ventricle. 3. 7/21/2014 Treadmill nuclear stress test: Rodrigue protocol. 3 minutes and 2 seconds. His baseline heart rate was 73 bpm and went up to only 98 bpm. He had normal BP response. The test was terminated because of extreme fatigue and shortness of breath. There were no ischemic EKG changes at the heart rate achieved.  The nuclear images were read by Dr. Issa Martin as mostly fixed inferior and inferolateral wall defects with minimal reversibility involving the inferior wall with mild inferobasal hypokinesis with an estimated ejection fraction of 60%. 4. Hyperlipidemia. 5. 8/8/14: Cardiac catheterization: Severe three vessel coronary artery disease. Normal left ventricular size with basal inferior wall akinesis with an estimated ejection fraction of 50%. Systemic hypertension. Elevated left ventricular end diastolic pressure consistent with LV dysfunction. Mild calcific aortic stenosis and mild aortic regurgitation. High grade ostial narrowing of the left vertebral basilar artery with no significant disease of both carotid arteries. Mobitz II second degree AV block and intermittent third degree AV block for which the patient had a temporary transvenous pacemaker inserted. 6. Carotid ultrasound done on 8/9/14 was reported as no evidence of hemodynamically significant stenosis. 7. 8/11/14: Coronary artery bypass graft surgery x3: LIMA to LAD; SVG to second diagonal branch of LAD; SVG to first marginal branch of LCX. Aortic valve replacement with a 23 mm Medtronic Mosaic Ultra bioprosthetic tissue valve. 8. Dual chamber pacemaker insertion on 8/12/14 for Mobitz type II second degree AV block and intermittent third degree heart block. 9. Paroxysmal atrial fibrillation/flutter in the perioperative period in 8/14.    10. Holter monitor done on 12/09/14 for 24 hours was read by Dr. Kris Love (cardiologist) as showing paced rhythm with an average heart rate of 73 BPM with frequent PVC's with one short run of six beats of nonsustained ventricular tachycardia at a rate of 126 BPM with rare PAC's. 11. Abdominal aortic aneurysm:  a.  CT scan of the abdomen done on 09/01/2015 was reported as showing a 4-cm abdominal aortic aneurysm. b.  [address] of abdominal aorta done on 03/31/2017 was read as showing a 4.6 x 4.8 abdominal aortic aneurysm without involvement of the iliac arteries.   c.  CTA of the abdomen and pelvis done on 04/24/2018 showed a 5.1 x 5.3 cm AAA. d.  Endovascular repair of abdominal aortic aneurysm 07/16/2018. 12. Elevated liver enzymes in 08/2015 at which time patient complained of abdominal pain: Lipitor was discontinued at that time. 13. 2-D echo February 15, 2018 [facility]:  Technically difficult exam due to body habitus. - Exam indication: Shortness of Breath- The left ventricle is normal in size. There is no left ventricular hypertrophy. Left ventricular systolic function is mildly decreased. EF = 45 ± 5% (visual est.)- The right ventricle is mildly dilated. Right ventricular systolic function is mildly decreased. - The left atrial cavity is moderately dilated. - The right atrial cavity is dilated. - There is moderate (2+) tricuspid valve regurgitation caused by annular dilatation.- Porcine Medtronic prosthetic aortic valve. There is trivial aortic valve regurgitation. The peak gradient is 35 mmHg, the mean gradient is 20 mmHg and the dimensionless valve index is 0.24. Bioprosthetic leaflets not well seen. - Estimated right ventricular systolic pressure is 27 mmHg consistent with normal pulmonary artery pressures. Estimated right atrial pressure is 5 mmHg. - The patient has not had a prior CC echocardiographic exam for comparison. [address] nuclear stress test done on 04/17/2018 showed normal myocardial perfusion images with normal left ventricular wall motion and systolic function with an ejection fraction calculated at 63%. 15.  Echocardiogram done on 9/17/2019 was read by Dr. Florencia Seip (Internist at [facility]) as showing normal left ventricular size with an ejection fraction of 33% with akinetic inferior wall. Mildly dilated right ventricle. Normal size atria. Mild mitral regurgitation.  Moderate-to-severe tricuspid regurgitation with mild pulmonary hypertension, \"mild aortic stenosis\" with a maximum gradient of 23.41 mmHg and a mean gradient of 16.8 and a maximum gradient of 23.31 mmHg and a mean gradient of 16.18 mmHg with a reported aortic valve area of 0.6 cm²! 16. Prediabetes:  Hemoglobin A1C 6.1 (on 2020). PAST MEDICAL HISTORY:    1. As under cardiovascular history. 2. S/P appendectomy. 3. ? Anxiety. 4. Benign prostatic hypertrophy. 5. Emphysema  6. prostate cancer and status post radiation therapy  7. Psoriasis/treated with methotrexate around 4 months ago in 2017.  8. Pulmonary function studies 2/15/2018:    FVC 3.86 3.00 4.73 3.60 93 3.78 98 5   FEV 1 2.79 2.06 3.53 2.09 75 2.21 79   6   FEV1%F 72.78 63.10 82.46 58.10 80 58.52 80 1   FEV 2 3.07 2.03 4.11 2.47 81 2.58 84 4   FEV 3.66 2.64 4.67 2.72 74 2.74 75 1   FEV3%E 90.61 85.97 95.25 75.69 84 72.45 80 -4   MEF 50 3.48 1.86 5.11 1.01 29 1.01 29 -1   FIF 50 3.04 2.41 -21   F25/75 2.05 0.56 3.53 0.64 31 0.53 26 -17   FE%FIF 33.41 41.79 25   FEV6 3.12 3.16 1   PEF 7.54 5.18 9.90 6.16 82 5.67 75 -8   FET 14.42 14.08 -2   FETPEF 0.07 0.09 28   VBe%FV   2.15 2.56 19   VBEex 0.08 0.10 25   -----------------------------------------------------------------------------   VC MAX 3.86 3.00 4.73 3.60 93 3.78 98 5   -----------------------------------------------------------------------------  9. Hospitalized from 2019 to 2019 with acute hypoxic respiratory failure secondary to sepsis secondary to extensive community acquired pneumonia with Legionella. 10. 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 11. Chronic kidney disease. 12.  Hyperbilirubinemia. Family History   Problem Relation Age of Onset    Heart Attack Brother     Stroke Father        Social History     Tobacco Use    Smoking status: Former     Packs/day: 0.50     Years: 50.00     Pack years: 25.00     Types: Cigarettes     Quit date: 2014     Years since quittin.1    Smokeless tobacco: Never   Substance Use Topics    Alcohol use:  Yes     Alcohol/week: 0.0 standard drinks     Comment: rare; drinks 1 cup of coffee daily & occ Pepsi/Tea       Current Outpatient Medications   Medication Sig Dispense Refill    furosemide (LASIX) 40 MG tablet TAKE 1 TABLET DAILY 90 tablet 1    lisinopril (PRINIVIL;ZESTRIL) 2.5 MG tablet TAKE 1 TABLET BY MOUTH EVERY DAY 90 tablet 1    atorvastatin (LIPITOR) 20 MG tablet Take 1 tablet by mouth daily 90 tablet 1    metoprolol succinate (TOPROL XL) 25 MG extended release tablet 1 tablet daily 90 tablet 3    apixaban (ELIQUIS) 5 MG TABS tablet TAKE 1 TABLET TWICE A  tablet 1    SYMBICORT 160-4.5 MCG/ACT AERO USE 2 INHALATIONS TWICE A DAY 30.6 g 3    nitroGLYCERIN (NITROSTAT) 0.3 MG SL tablet Place 1 tablet under the tongue every 5 minutes as needed for Chest pain 30 tablet 3     Current Facility-Administered Medications   Medication Dose Route Frequency Provider Last Rate Last Admin    regadenoson (LEXISCAN) injection 0.4 mg  0.4 mg IntraVENous ONCE PRN Zachariah Madrigal MD        perflutren lipid microspheres (DEFINITY) injection 1.65 mg  1.5 mL IntraVENous ONCE PRN Zachariah Madrigal MD           Allergies   Allergen Reactions    Pneumovax [Pneumococcal Polysaccharide Vaccine] Dermatitis       ROS:   Constitutional: Negative for fever, activity change and appetite change. HENT: Negative for epistaxis. Eyes: Negative for diploplia, blurred vision. Respiratory: Negative for cough, chest tightness, shortness of breath and wheezing. Cardiovascular: pertinent positives in HPI  Gastrointestinal: Negative for abdominal pain and blood in stool. All other review of systems are negative     PHYSICAL EXAM:   Vitals:    08/02/22 1019   BP: 118/68   Pulse: 71   Resp: 16   Weight: 166 lb (75.3 kg)   Height: 5' 8\" (1.727 m)      Constitutional: Well-developed, no acute distress  Eyes: conjunctivae normal, no xanthelasma   Ears, Nose, Throat: oral mucosa moist, no cyanosis   CV: Laterally displaced PMI, regular rate and rhythm. Normal S1S2 and no S3.   Grade 2/6 systolic ejection murmur at the aortic area and the left sternal border  Lungs: clear to auscultation bilaterally, normal respiratory effort without used of accessory muscles  Abdomen: soft, non-tender, bowel sounds present, no masses or hepatomegaly   Musculoskeletal: no digital clubbing, no edema   Skin: warm, no rashes     I have personally reviewed the laboratory, cardiac diagnostic and radiographic testing as outlined below:    Data:    No results for input(s): WBC, HGB, HCT, PLT in the last 72 hours. No results for input(s): NA, K, CL, CO2, BUN, CREATININE, GLU, CALCIUM in the last 72 hours. Invalid input(s): MAGNESIUM   Lab Results   Component Value Date/Time    MG 2.0 09/18/2019 08:23 AM     No results for input(s): TSH in the last 72 hours. No results for input(s): INR in the last 72 hours. EKG: Atrial fibrillation with a right ventricular pacing    Echocardiogram: 3/21    Conclusions      Summary   Left ventricle is normal in size . Normal left ventricle wall thickness. Septal motion consistent with post open heart state / paced rhythm. Ejection fraction is visually estimated at 50-55%. Indeterminate diastolic function. Normal right ventricular size and function. Pacer wire visualized in right ventricle / right atrium. Normal size atria. Mild mitral regurgitation. There is a bioprosthetic aortic valve which is well seated with a mean   gradient of 12.5 mmHg and no aortic regurgitation. Moderate to severe tricuspid regurgitation. Mild pulmonary hypertension. Signature      ----------------------------------------------------------------   Electronically signed by Brooklynn Daniels MD(Interpreting   physician) on 03/23/2021 04:23 PM   ----------------------------------------------------------------    Cardiac Catheterization: None recently    Stress Test: 3/21    Impression:    Electrocardiographically non diagnostic because of the pace rhythm.   Myocardial perfusion imaging was normal. Overall left ventricular systolic function was normal without regional wall motion abnormalities. 4. Low risk general pharmacologic stress test.     Thank you for sending your patient to this [employer]. Electronically signed by Warden Babak MD on 3/23/2021 at 12:12 PM    Brief Pacemaker Check             I have independently reviewed all of the ECGs and rhythm strips per above     Assessment/Plan:    1. Coronary artery disease status post coronary artery bypass graft surgery:  Clinically stable. 2.  Bicuspid aortic valve with aortic stenosis and aortic regurgitation status post aortic valve replacement surgery. 3.  History of high grade AV block status post dual chamber pacemaker insertion. Pacemaker generator at DEEPAK. 4.  Permanent atrial fibrillation:  First diagnosed in the perioperative period in 08/2014 with noted recurrences on pacemaker interrogation done on 09/05/2017.    5.  Hyperlipidemia: On low dose atorvastatin. History of elevated liver enzymes with higher dose of statins. 6.  Abdominal aortic aneurysm, status post endovascular repair 07/16/2018.    7.  Severe emphysema. History of tobacco abuse. 8.  Chronic kidney disease. Recommendations:    ECHO cardiography--reassess LV function especially in view of his symptoms of dyspnea on exertion  2. Pacemaker generator change or upgrade to a biventricular pacemaker if indicated based on echocardiography results. Implications of an upgrade versus just a generator change discussed with patient at length. Risks and benefits explained in detail.- Risks, benefits, and alternatives of PPM generator replacement were discussed in detail today.  These risks include but are not limited to bleeding,infection, lead damage or discovery of a non-functional lead which would require lead revision and risks of blood clot, pneumothorax, hemothorax, cardiac perforation and tamponade, lead dislodgement, contrast induced nephropathy leading to short or even long term dialysis, vascular injury requiring emergent surgical repair, stroke and even death. The patient understands these risks and agrees to proceed with the procedure. All of the above was discussed with the patient and the family reviewing the above stated recommendations. And a total of >50% of that time involved face-to-face time providing counseling and or coordination of care with the other providers, preparation for the clinic visit, reviewing records/tests, counseling/education of the patient, ordering medications/tests/procedures, coordinating care, and documenting clinical information in the EHR. Thank you for allowing me to participate in your patient's care. Please call me if there are any questions or concerns.       Jorge Taylor MD, [facility]    Cardiac Electrophysiology  [facility]) Physicians  The Heart and Vascular [place]: [facility] Electrophysiology

## 2022-08-09 DIAGNOSIS — Z87.891 HISTORY OF SMOKING 30 OR MORE PACK YEARS: Primary | ICD-10-CM

## 2022-08-12 ENCOUNTER — HOSPITAL ENCOUNTER (OUTPATIENT)
Dept: NON INVASIVE DIAGNOSTICS | Age: 77
Discharge: HOME | End: 2022-08-12
Payer: MEDICARE

## 2022-08-12 DIAGNOSIS — I48.0 PAF (PAROXYSMAL ATRIAL FIBRILLATION) (HCC): ICD-10-CM

## 2022-08-12 DIAGNOSIS — I50.20 SYSTOLIC CONGESTIVE HEART FAILURE, UNSPECIFIED HF CHRONICITY (HCC): ICD-10-CM

## 2022-08-12 LAB
LV EF: 50 %
LVEF MODALITY: NORMAL

## 2022-08-12 PROCEDURE — 93306 TTE W/DOPPLER COMPLETE: CPT

## 2022-08-15 ENCOUNTER — TELEPHONE (OUTPATIENT)
Dept: NON INVASIVE DIAGNOSTICS | Age: 77
End: 2022-08-15

## 2022-08-15 NOTE — TELEPHONE ENCOUNTER
----- Message from Andres Calderón MD sent at 8/15/2022 10:40 AM EDT -----  Yes  Andres Calderón  ----- Message -----  From: Krzysztof Pricey  Sent: 8/15/2022   9:57 AM EDT  To: Andres Calderón MD    Here is the Echo you ordered. Would you like to proceed with Pacemaker gen change/ ugrade?

## 2022-08-15 NOTE — TELEPHONE ENCOUNTER
Patient is scheduled 8/22/2022 @ 11:00 am.  Patient was given instruction to be NPO after midnight and to hold his Eliquis 48 hours prior to his procedure. He was advised to take his other medication with a sip of water the morning of.   Patient verbalized understanding

## 2022-08-18 ENCOUNTER — TELEPHONE (OUTPATIENT)
Dept: NON INVASIVE DIAGNOSTICS | Age: 77
End: 2022-08-18

## 2022-08-18 NOTE — TELEPHONE ENCOUNTER
Called and spoke to Katie Longo. We went over his procedure pk. Katie Donta was advise again that he needs to hold his Eliquis for 48 prior to his procedure on 8/22/2022. His is not to have anything to eat or drink after midnight on Sunday. He may take his other medications with a sip of water the morning of procedure. Katie Longo was instructed to arrive at 9:00 am.  Patient verbalized understanding.

## 2022-08-22 ENCOUNTER — HOSPITAL ENCOUNTER (OUTPATIENT)
Dept: CARDIAC CATH/INVASIVE PROCEDURES | Age: 77
Discharge: HOME | End: 2022-08-22
Payer: MEDICARE

## 2022-08-22 ENCOUNTER — ANESTHESIA EVENT (OUTPATIENT)
Dept: CARDIAC CATH/INVASIVE PROCEDURES | Age: 77
End: 2022-08-22

## 2022-08-22 ENCOUNTER — ANESTHESIA (OUTPATIENT)
Dept: CARDIAC CATH/INVASIVE PROCEDURES | Age: 77
End: 2022-08-22

## 2022-08-22 VITALS
BODY MASS INDEX: 25.11 KG/M2 | HEIGHT: 67 IN | SYSTOLIC BLOOD PRESSURE: 110 MMHG | OXYGEN SATURATION: 99 % | HEART RATE: 70 BPM | DIASTOLIC BLOOD PRESSURE: 75 MMHG | RESPIRATION RATE: 20 BRPM | TEMPERATURE: 97.4 F | WEIGHT: 160 LBS

## 2022-08-22 LAB
ANION GAP SERPL CALCULATED.3IONS-SCNC: 12 MMOL/L (ref 7–16)
BASOPHILS ABSOLUTE: 0.07 E9/L (ref 0–0.2)
BASOPHILS RELATIVE PERCENT: 0.8 % (ref 0–2)
BUN BLDV-MCNC: 16 MG/DL (ref 6–23)
CALCIUM SERPL-MCNC: 9.4 MG/DL (ref 8.6–10.2)
CHLORIDE BLD-SCNC: 109 MMOL/L (ref 98–107)
CO2: 23 MMOL/L (ref 22–29)
CREAT SERPL-MCNC: 1.2 MG/DL (ref 0.7–1.2)
EOSINOPHILS ABSOLUTE: 0.25 E9/L (ref 0.05–0.5)
EOSINOPHILS RELATIVE PERCENT: 2.9 % (ref 0–6)
GFR AFRICAN AMERICAN: >60
GFR NON-AFRICAN AMERICAN: 58 ML/MIN/1.73
GLUCOSE BLD-MCNC: 110 MG/DL (ref 74–99)
HCT VFR BLD CALC: 46.4 % (ref 37–54)
HEMOGLOBIN: 15 G/DL (ref 12.5–16.5)
IMMATURE GRANULOCYTES #: 0.08 E9/L
IMMATURE GRANULOCYTES %: 0.9 % (ref 0–5)
LYMPHOCYTES ABSOLUTE: 0.95 E9/L (ref 1.5–4)
LYMPHOCYTES RELATIVE PERCENT: 11.2 % (ref 20–42)
MAGNESIUM: 2.3 MG/DL (ref 1.6–2.6)
MCH RBC QN AUTO: 30.7 PG (ref 26–35)
MCHC RBC AUTO-ENTMCNC: 32.3 % (ref 32–34.5)
MCV RBC AUTO: 94.9 FL (ref 80–99.9)
MONOCYTES ABSOLUTE: 0.51 E9/L (ref 0.1–0.95)
MONOCYTES RELATIVE PERCENT: 6 % (ref 2–12)
NEUTROPHILS ABSOLUTE: 6.62 E9/L (ref 1.8–7.3)
NEUTROPHILS RELATIVE PERCENT: 78.2 % (ref 43–80)
PDW BLD-RTO: 17.1 FL (ref 11.5–15)
PLATELET # BLD: 216 E9/L (ref 130–450)
PMV BLD AUTO: 10 FL (ref 7–12)
POTASSIUM SERPL-SCNC: 4.5 MMOL/L (ref 3.5–5)
RBC # BLD: 4.89 E12/L (ref 3.8–5.8)
SODIUM BLD-SCNC: 144 MMOL/L (ref 132–146)
WBC # BLD: 8.5 E9/L (ref 4.5–11.5)

## 2022-08-22 PROCEDURE — 3700000000 HC ANESTHESIA ATTENDED CARE

## 2022-08-22 PROCEDURE — 33228 REMV&REPLC PM GEN DUAL LEAD: CPT

## 2022-08-22 PROCEDURE — 6360000002 HC RX W HCPCS: Performed by: NURSE ANESTHETIST, CERTIFIED REGISTERED

## 2022-08-22 PROCEDURE — 6360000002 HC RX W HCPCS

## 2022-08-22 PROCEDURE — C1785 PMKR, DUAL, RATE-RESP: HCPCS

## 2022-08-22 PROCEDURE — 80048 BASIC METABOLIC PNL TOTAL CA: CPT

## 2022-08-22 PROCEDURE — 33228 REMV&REPLC PM GEN DUAL LEAD: CPT | Performed by: INTERNAL MEDICINE

## 2022-08-22 PROCEDURE — 3700000001 HC ADD 15 MINUTES (ANESTHESIA)

## 2022-08-22 PROCEDURE — 2709999900 HC NON-CHARGEABLE SUPPLY

## 2022-08-22 PROCEDURE — 2500000003 HC RX 250 WO HCPCS

## 2022-08-22 PROCEDURE — C1889 IMPLANT/INSERT DEVICE, NOC: HCPCS

## 2022-08-22 PROCEDURE — 83735 ASSAY OF MAGNESIUM: CPT

## 2022-08-22 PROCEDURE — 2580000003 HC RX 258: Performed by: NURSE ANESTHETIST, CERTIFIED REGISTERED

## 2022-08-22 PROCEDURE — 2580000003 HC RX 258

## 2022-08-22 PROCEDURE — C1732 CATH, EP, DIAG/ABL, 3D/VECT: HCPCS

## 2022-08-22 PROCEDURE — 85025 COMPLETE CBC W/AUTO DIFF WBC: CPT

## 2022-08-22 PROCEDURE — 36415 COLL VENOUS BLD VENIPUNCTURE: CPT

## 2022-08-22 RX ORDER — CEFAZOLIN SODIUM 1 G/3ML
INJECTION, POWDER, FOR SOLUTION INTRAMUSCULAR; INTRAVENOUS PRN
Status: DISCONTINUED | OUTPATIENT
Start: 2022-08-22 | End: 2022-08-22 | Stop reason: SDUPTHER

## 2022-08-22 RX ORDER — SODIUM CHLORIDE 9 MG/ML
INJECTION, SOLUTION INTRAVENOUS CONTINUOUS PRN
Status: DISCONTINUED | OUTPATIENT
Start: 2022-08-22 | End: 2022-08-22 | Stop reason: SDUPTHER

## 2022-08-22 RX ORDER — DOXYCYCLINE HYCLATE 100 MG
100 TABLET ORAL 2 TIMES DAILY
Qty: 14 TABLET | Refills: 0 | Status: SHIPPED | OUTPATIENT
Start: 2022-08-22 | End: 2022-08-29

## 2022-08-22 RX ORDER — SODIUM CHLORIDE 9 MG/ML
INJECTION, SOLUTION INTRAVENOUS CONTINUOUS
Status: DISCONTINUED | OUTPATIENT
Start: 2022-08-22 | End: 2022-08-23 | Stop reason: HOSPADM

## 2022-08-22 RX ORDER — PROPOFOL 10 MG/ML
INJECTION, EMULSION INTRAVENOUS PRN
Status: DISCONTINUED | OUTPATIENT
Start: 2022-08-22 | End: 2022-08-22 | Stop reason: SDUPTHER

## 2022-08-22 RX ORDER — FENTANYL CITRATE 50 UG/ML
INJECTION, SOLUTION INTRAMUSCULAR; INTRAVENOUS PRN
Status: DISCONTINUED | OUTPATIENT
Start: 2022-08-22 | End: 2022-08-22 | Stop reason: SDUPTHER

## 2022-08-22 RX ADMIN — PROPOFOL 20 MG: 10 INJECTION, EMULSION INTRAVENOUS at 14:35

## 2022-08-22 RX ADMIN — FENTANYL CITRATE 50 MCG: 50 INJECTION, SOLUTION INTRAMUSCULAR; INTRAVENOUS at 14:30

## 2022-08-22 RX ADMIN — CEFAZOLIN 2000 MG: 1 INJECTION, POWDER, FOR SOLUTION INTRAMUSCULAR; INTRAVENOUS at 14:30

## 2022-08-22 RX ADMIN — FENTANYL CITRATE 50 MCG: 50 INJECTION, SOLUTION INTRAMUSCULAR; INTRAVENOUS at 14:36

## 2022-08-22 RX ADMIN — SODIUM CHLORIDE: 9 INJECTION, SOLUTION INTRAVENOUS at 14:15

## 2022-08-22 ASSESSMENT — ENCOUNTER SYMPTOMS
SHORTNESS OF BREATH: 1
DYSPNEA ACTIVITY LEVEL: AFTER AMBULATING 1 FLIGHT OF STAIRS

## 2022-08-22 NOTE — ANESTHESIA PRE PROCEDURE
Department of Anesthesiology  Preprocedure Note       Name:  Toña Marcos   Age:  64 y.o.  :  1945                                          MRN:  [id number]         Date:  2022      Surgeon: Lisa Doran    Procedure: PPM GENERATOR REPLACEMENT    Medications prior to admission:   Prior to Admission medications    Medication Sig Start Date End Date Taking?  Authorizing Provider   furosemide (LASIX) 40 MG tablet TAKE 1 TABLET DAILY 22   Hermelindo Bedolla,    lisinopril (PRINIVIL;ZESTRIL) 2.5 MG tablet TAKE 1 TABLET BY MOUTH EVERY DAY 22   Hermelindo Bedolla,    atorvastatin (LIPITOR) 20 MG tablet Take 1 tablet by mouth daily 22   Ximena Mobley, DO   metoprolol succinate (TOPROL XL) 25 MG extended release tablet 1 tablet daily 22   Hermelindo Carl DO   apixaban (ELIQUIS) 5 MG TABS tablet TAKE 1 TABLET TWICE A DAY 22   Hermelindo Carl DO   SYMBICORT 160-4.5 MCG/ACT AERO USE 2 INHALATIONS TWICE A DAY 22   Hermelindo Bedolla DO   nitroGLYCERIN (NITROSTAT) 0.3 MG SL tablet Place 1 tablet under the tongue every 5 minutes as needed for Chest pain 17   Hermelindo Carl DO       Current medications:    Current Outpatient Medications   Medication Sig Dispense Refill    furosemide (LASIX) 40 MG tablet TAKE 1 TABLET DAILY 90 tablet 1    lisinopril (PRINIVIL;ZESTRIL) 2.5 MG tablet TAKE 1 TABLET BY MOUTH EVERY DAY 90 tablet 1    atorvastatin (LIPITOR) 20 MG tablet Take 1 tablet by mouth daily 90 tablet 1    metoprolol succinate (TOPROL XL) 25 MG extended release tablet 1 tablet daily 90 tablet 3    apixaban (ELIQUIS) 5 MG TABS tablet TAKE 1 TABLET TWICE A  tablet 1    SYMBICORT 160-4.5 MCG/ACT AERO USE 2 INHALATIONS TWICE A DAY 30.6 g 3    nitroGLYCERIN (NITROSTAT) 0.3 MG SL tablet Place 1 tablet under the tongue every 5 minutes as needed for Chest pain 30 tablet 3     Current Facility-Administered Medications   Medication Dose Route Frequency Provider Last Rate Last Admin    0.9 % sodium chloride infusion   IntraVENous Continuous Dominique Deng MD        ceFAZolin (ANCEF) 1,000 mg in sterile water 10 mL IV syringe  1,000 mg IntraVENous Q8H Dominique Deng MD        regadenoson (LEXISCAN) injection 0.4 mg  0.4 mg IntraVENous ONCE  [address], MD        perflutren lipid microspheres (DEFINITY) injection 1.65 mg  1.5 mL IntraVENous ONCE  [address], MD           Allergies:     Allergies   Allergen Reactions    Pneumovax [Pneumococcal Polysaccharide Vaccine] Dermatitis       Problem List:    Patient Active Problem List   Diagnosis Code    Hyperlipidemia with target LDL less than 100 E78.5    BPH (benign prostatic hyperplasia) N40.0    ECG abnormal R94.31    Abnormal nuclear stress test R94.39    AS (aortic stenosis) I35.0    SOB (shortness of breath) R06.02    RBBB (right bundle branch block) I45.10    Anxiety F41.9    CAD (coronary artery disease) I25.10    Pulmonary emphysema (HCC) J43.9    AV block, Mobitz II I44.1    AV block, 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) I44.2    H/O bicuspid aortic valve Z87.74    AI (aortic insufficiency) I35.1    S/P AVR (aortic valve replacement) Z95.2    Anticoagulated on Coumadin Z79.01    Aortic mural thrombus (HCC) I74.10    AAA (abdominal aortic aneurysm) without rupture (HCC) I71.4    Elevated liver enzymes R74.8    History of tobacco use Z87.891    S/P CABG x 3 Z95.1    H/O aortic valve replacement with porcine valve Z95.3    Prostate CA ([facility] [address] C61    Aortic valve disease I35.9    Pure hypercholesterolemia E78.00    Pacemaker Z95.0    History of smoking 30 or more pack years Z87.891    PAF (paroxysmal atrial fibrillation) (HCC) I48.0    Anticoagulated by anticoagulation treatment Z79.01    COPD with acute exacerbation (HCC) J44.1    S/P AAA repair using bifurcation graft Z95.828, Z86.79    Sepsis secondary to CAP (HCC) A41.9    Mild protein-calorie malnutrition (HCC) E44.1    Bilateral leg weakness R29.898    Chronic systolic congestive heart failure (HCC) I50.22       Past Medical History:        Diagnosis Date    Aortic stenosis, mild     Aortic valve sclerosis     moderate    Atrial fibrillation ([facility] [address]     CAD (coronary artery disease)     CHF (congestive heart failure) (HCC)     COPD (chronic obstructive pulmonary disease) (HCC)     Gallbladder attack     History of tobacco use 2015    Hyperlipidemia     Hypertension     Mild mitral regurgitation     Prostate cancer ([facility] [address]     44 treatments of radiation    PVD (peripheral vascular disease) with claudication ([facility] 2015    RBBB     Tobacco abuse        Past Surgical History:        Procedure Laterality Date    APPENDECTOMY      CARDIAC CATHETERIZATION  2014    DR Coates-pt. states had triple bypass \"5 years ago\"    COLONOSCOPY      CORONARY ARTERY BYPASS GRAFT  2014    CABGx3 [place], SVG-D1, SVG-OM1, AVR Dr. Denys Byrnes, COLON, DIAGNOSTIC      PACEMAKER PLACEMENT Left 2014    Dr. Mejia Barbosa RS&I N/A 2018    ENDOVASCULAR  AORTIC ANEURYSM REPAIR , ABDOMINAL AORTIC ANEURYSM, WITH FINISTRATED GRAFT performed by Roby Clark MD at [place] [address]         Social History:    Social History     Tobacco Use    Smoking status: Former     Packs/day: 0.50     Years: 50.00     Pack years: 25.00     Types: Cigarettes     Quit date: 2014     Years since quittin.1    Smokeless tobacco: Never   Substance Use Topics    Alcohol use: Yes     Alcohol/week: 0.0 standard drinks     Comment: rare;  drinks 1 cup of coffee daily & occ Pepsi/Tea                                Counseling given: Not Answered      Vital Signs (Current): There were no vitals filed for this visit.                                            BP Readings from Last 3 Encounters:   22 118/68   22 118/72 03/01/22 126/72       NPO Status:  >8 HRS                                                                               BMI:   Wt Readings from Last 3 Encounters:   08/02/22 166 lb (75.3 kg)   04/06/22 166 lb (75.3 kg)   03/01/22 166 lb (75.3 kg)     There is no height or weight on file to calculate BMI.    CBC:   Lab Results   Component Value Date/Time    WBC 8.5 08/22/2022 12:30 PM    RBC 4.89 08/22/2022 12:30 PM    HGB 15.0 08/22/2022 12:30 PM    HCT 46.4 08/22/2022 12:30 PM    MCV 94.9 08/22/2022 12:30 PM    RDW 17.1 08/22/2022 12:30 PM     08/22/2022 12:30 PM       CMP:   Lab Results   Component Value Date/Time     04/06/2022 12:00 PM    K 5.0 04/06/2022 12:00 PM    K 3.7 09/16/2019 08:40 PM     04/06/2022 12:00 PM    CO2 21 04/06/2022 12:00 PM    BUN 18 04/06/2022 12:00 PM    CREATININE 1.2 04/06/2022 12:00 PM    GFRAA >60 04/06/2022 12:00 PM    LABGLOM 58 04/06/2022 12:00 PM    GLUCOSE 87 04/06/2022 12:00 PM    PROT 7.9 04/06/2022 12:00 PM    CALCIUM 9.8 04/06/2022 12:00 PM    BILITOT 1.3 04/06/2022 12:00 PM    ALKPHOS 121 04/06/2022 12:00 PM    AST 22 04/06/2022 12:00 PM    ALT 10 04/06/2022 12:00 PM       POC Tests: No results for input(s): POCGLU, POCNA, POCK, POCCL, POCBUN, POCHEMO, POCHCT in the last 72 hours.     Coags:   Lab Results   Component Value Date/Time    PROTIME 14.6 07/16/2018 06:40 AM    INR 1.3 07/16/2018 06:40 AM    APTT 37.9 07/09/2018 10:15 AM       HCG (If Applicable): No results found for: PREGTESTUR, PREGSERUM, HCG, HCGQUANT     ABGs: No results found for: PHART, PO2ART, USC2WSV, GXX4GEU, BEART, Q1LCGTZV     Type & Screen (If Applicable):  No results found for: LABABO, LABRH    Drug/Infectious Status (If Applicable):  No results found for: HIV, HEPCAB    COVID-19 Screening (If Applicable): No results found for: COVID19        Anesthesia Evaluation  Patient summary reviewed and Nursing notes reviewed no history of anesthetic complications:   Airway: Mallampati: II  TM distance: >3 FB   Neck ROM: full  Mouth opening: > = 3 FB   Dental:          Pulmonary:normal exam  breath sounds clear to auscultation  (+) COPD:  shortness of breath: chronic,                            ROS comment: EMPHYSEMA   Cardiovascular:  Exercise tolerance: poor (<4 METS),   (+) hypertension:, valvular problems/murmurs (S/P AVR): AI, AS and MR, pacemaker (GENERATOR DEEPAK): pacemaker, CAD:, CABG/stent (3 VESSEL):, dysrhythmias (3* HEART BLOCK): atrial fibrillation, CHF: systolic, BERNARDO: after ambulating 1 flight of stairs,       ECG reviewed  Rhythm: regular  Rate: normal  Echocardiogram reviewed    Cleared by cardiology              Neuro/Psych:   (+) depression/anxiety              ROS comment: BLE WEAKNESS GI/Hepatic/Renal: Neg GI/Hepatic/Renal ROS            Endo/Other:    (+) blood dyscrasia: anticoagulation therapy:., malignancy/cancer (PROSTATE). Pt had no PAT visit        ROS comment: B hands/nailbeds dusky, nose/face many superficial veins, lips cyanotic. Eupneic,spO2 96%. Pt states this all normal state for him. Abdominal:       Abdomen: soft. Vascular:   + PVD, aortic or cerebral (with claudication), .        ROS comment: AAA REPAIR W GRAFT. Other Findings:           Anesthesia Plan      MAC     ASA 4       Induction: intravenous. Anesthetic plan and risks discussed with patient. Use of blood products discussed with patient whom. Plan discussed with attending.                     NURY Vu - CRNA   8/22/2022

## 2022-08-22 NOTE — DISCHARGE INSTRUCTIONS
[facility] ([facility]) Pacemaker Generator Change Patient Discharge instructions                      Medications: Follow Up: You will be seen on 9/6/22 at 11:00 at the [address] [place] [address] Drive for an incision check and brief pacemaker evaluation. If you need to reschedule this appointment, call the office at [phone] or [phone]. Incision Care:  Remove white pressure bandage in 24 hours. Please leave the current brown dressing (located under white pressure bandage) on for 1 week. REMOVE the dressing on Monday 8/29/22. Do not remove the steri strips from your incision, they will either fall off on their own or be removed at your follow up appointment. Keep the incision dry. You may shower; but do not let the water run directly on the incision for 1 week. Check the area daily. Notify the office at [phone] if you develop any redness, swelling, drainage, warmth, or fever greater than 100 degrees. No lotions, powders, or creams to site. Activity: You may continue regular daily activities tomorrow. You also may shower starting tomorrow: but do not let the water run on the incision for one week. The dressing is waterproof. ID Card: You will have a temporary ID card until a permanent card is sent to you by the device company. The permanent card will look like a 's license or credit card and should arrive within 8 weeks. Carry your ID card with you at all times.       [address] [place], [place] [phone]       MEDICATIONS    NO Frida Womack 8/24/22

## 2022-08-22 NOTE — ANESTHESIA POSTPROCEDURE EVALUATION
Department of Anesthesiology  Postprocedure Note    Patient: Karen Stanton  MRN: [id number]  YOB: 1945  Date of evaluation: 8/22/2022      Procedure Summary     Date: 08/22/22 [place] [facility]    Anesthesia Start: 8053 Anesthesia Stop: 9205    Procedure: PACEMAKER GENERATOR CHANGE W ANESTHESIA Diagnosis:       Paroxysmal atrial fibrillation      Unspecified systolic (congestive) heart failure    Scheduled Providers: NURY Frankel CRNA; Mayela Chino DO Responsible Provider: Emily Moe MD    Anesthesia Type: MAC ASA Status: 4          Anesthesia Type: No value filed. Emilee Phase I:      Emilee Phase II:        Anesthesia Post Evaluation    Patient location during evaluation: PACU  Patient participation: complete - patient participated  Level of consciousness: awake and alert  Airway patency: patent  Nausea & Vomiting: no nausea and no vomiting  Complications: no  Cardiovascular status: hemodynamically stable  Respiratory status: acceptable  Hydration status: euvolemic  There was medical reason for not using a multimodal analgesia pain management approach.

## 2022-08-22 NOTE — OP NOTE
[facility]) Physicians- The Heart and [address] Electrophysiology  Procedure Report  PATIENT: Mar Strong RECORD NUMBER: [id number]  DATE OF PROCEDURE:  8/22/2022  ATTENDING ELECTROPHYSIOLOGIST:  Perla Casanova MD      Procedure Performed:  1. Generator Replacement of a  Dual Chamber Permanent Pacemaker ([address])    Indication for Procedure:  1. Pacemaker pulse generator at elective replacement interval    Flouroscopy<1 MIN  Complications: none immediately apparent  EBL: 5-10 [facility]  Specimens: none  Contrast:none    FINDINGS:  Implanted device information:  1. New Pulse Generator is a [employer] [address]. Serial # [id number]  Placement: Left pectoral subcutaneous  Date of implant: 8/22/2022  2. Old Pulse Generator is a [employer]. Serial # [id number]  Placement: Left pectoral subcutaneous  Date of explant: 8/22/2022  Date of implant: 8/12/14  3. Right atrial lead parameters are as follows:  [address]. Serial # [id number]  Lead position: atrial appendage  Date of implant: 8/12/14  P-waves: 1.2 mV in AF  Pacing threshold: N/A  Impedance: 584 ohms. 4. Right ventricular lead parameters are as follows:  [address]. Serial # [id number]  Lead position: RV apex  Date of implant: 8/12/14  R-waves: 6.9 mV  Pacing threshold: 0.8 V at 0.4 ms. Impedance: 508 ohms. 5. Bradycardia parameters:  Mode: DDD  Base Rate: 60  AV delay:  n/a  Max Tracking Rate: 130    DETAILS OF THE OPERATION: The risks, benefits, alternatives of the procedure were explained to patient and family. They consented and agreed to proceed. Written consent was obtained in the chart. Blood products are not routinely needed for such procedures. The patient was brought to the Electrophysiology lab in a fasting and non-sedated state. The patient had electrocardiographic and hemodynamic monitoring equipment placed.  During the case the patient was under the care of an anesthesiologist/CRNA team for sedation and hemodynamic monitoring. A final time out was performed prior to beginning the procedure. The patient was prepped and draped in usual sterile fashion. 15 mL of 2% lidocaine was used to anesthetize the left pectoral area. Using a #10 blade an incision was made over the patient's previous incision. Using combination of scalpel and plasma-blade, we dissected down to the device pocket. The device and was then removed from the pocket and the leads were disconnected from the header of the device. The leads were visually inspected and lead parameters were checked and deemed to adequate. The patient's previous device pocket was then extended inferiorly and medially to approximate the size of the new device. Hemostasis was achieved with electrocautery and confirmed visually. The device pocket was then irrigated with antibiotic vancomycin solution. The leads were then attached to the appropriate binding posts on the header of the device. The set screws were then tightened with a torque wrench. Tug tests were performed on all leads to insure they are adequately fixated. The device and the leads were then placed within the device pocket inside of a [employer] absorbable anti-microbial pouch. Lead parameters were then rechecked via the device and deemed to be adequate. The incision was closed with 4 layers of absorbable suture. The deepest of which was  0 interrupted stitches followed by a 2nd layer of 2-0 interrupted sutures again followed by 3-0 running stitch performed perpendicular to the deep layer and, lastly, a 4-0 subcuticular stitch. The skin was then prepped with benzoin solution, Steri-Strips, and island Aquacel dressing were then applied. At the end of the case, the patient was hemodynamically stable and under the care of an anesthesiologist, the patient was brought back to the recovery area for post procedural monitoring. ASSESSMENT:  1.  Successful generator change of a [employer] dual chamber permanent pacemaker for elective replacement indicator     RECOMMENDATIONS:  1. Post-procedural monitoring in the recovery area  2. The patient may be discharged to home when hemodynamically stable, awake, tolerating oral intake, ambulating and has adequate pain control  4. The patient will receive a 7-day course of oral antibiotics. 5. The patient is to follow-up in device clinic within 2 weeks upon discharge  6. The patient is advised follow all post-operative device and wound care instructions as per the information provided in the pre-operative visit and discharge instructions.   7. No Eliquis for 48 hours    Tenzin Maguire MD  Cardiac Electrophysiology  [facility]) Physicians  The Heart and [facility] [place]: [facility] Electrophysiology  3:28 PM  8/22/2022

## 2022-08-30 ENCOUNTER — TELEPHONE (OUTPATIENT)
Dept: CARDIAC CATH/INVASIVE PROCEDURES | Age: 77
End: 2022-08-30

## 2022-08-30 NOTE — TELEPHONE ENCOUNTER
I called Mr. Barby Mathew to see how he is doing since his PGR on 8/22/22. He states he removed the aquacel dressing yesterday and there was a small amount of old blood on his dressing. The steri-strips are intact. He denies any redness, swelling, drainage, fever but does have some pain 3/10 on the corner of his incision since removing the dressing yesterday. I told him to call the office if the pain is getting worse or he exhibits any s/s of infection that we discussed. I reminded him of his follow up appt at the [address] [place] [address] Drive on 9/6/22 at 11:00 am.  He offers no other complaints & is thankful for the phone call.

## 2022-09-06 ENCOUNTER — NURSE ONLY (OUTPATIENT)
Dept: NON INVASIVE DIAGNOSTICS | Age: 77
End: 2022-09-06

## 2022-09-15 DIAGNOSIS — E78.5 DYSLIPIDEMIA: ICD-10-CM

## 2022-09-15 DIAGNOSIS — I48.0 PAROXYSMAL A-FIB (HCC): ICD-10-CM

## 2022-09-15 DIAGNOSIS — I50.21 ACUTE SYSTOLIC CONGESTIVE HEART FAILURE (HCC): ICD-10-CM

## 2022-09-17 RX ORDER — FUROSEMIDE 40 MG/1
TABLET ORAL
Qty: 90 TABLET | Refills: 3 | Status: SHIPPED | OUTPATIENT
Start: 2022-09-17

## 2022-09-17 RX ORDER — ATORVASTATIN CALCIUM 20 MG/1
TABLET, FILM COATED ORAL
Qty: 90 TABLET | Refills: 3 | Status: SHIPPED | OUTPATIENT
Start: 2022-09-17

## 2022-10-05 DIAGNOSIS — I10 ESSENTIAL HYPERTENSION: ICD-10-CM

## 2022-10-05 RX ORDER — LISINOPRIL 2.5 MG/1
TABLET ORAL
Qty: 90 TABLET | Refills: 3 | Status: SHIPPED | OUTPATIENT
Start: 2022-10-05

## 2022-10-18 ENCOUNTER — NURSE ONLY (OUTPATIENT)
Dept: NON INVASIVE DIAGNOSTICS | Age: 77
End: 2022-10-18

## 2022-11-23 ENCOUNTER — TELEPHONE (OUTPATIENT)
Dept: FAMILY MEDICINE CLINIC | Age: 77
End: 2022-11-23

## 2022-11-23 NOTE — TELEPHONE ENCOUNTER
Pt called through nurse triage. Pt states he is having tired legs, shortness of breath and states he is not sleeping at night. Pt states he feels like the issue is from not sleeping at night. Pt would like to schedule an appointment. Appt scheduled.      Electronically signed by Donaldo Beckett on 11/23/22 at 10:58 AM EST

## 2022-11-28 ENCOUNTER — OFFICE VISIT (OUTPATIENT)
Dept: FAMILY MEDICINE CLINIC | Age: 77
End: 2022-11-28
Payer: MEDICARE

## 2022-11-28 VITALS
WEIGHT: 158 LBS | DIASTOLIC BLOOD PRESSURE: 80 MMHG | OXYGEN SATURATION: 98 % | HEIGHT: 67 IN | RESPIRATION RATE: 17 BRPM | HEART RATE: 70 BPM | TEMPERATURE: 97.1 F | SYSTOLIC BLOOD PRESSURE: 118 MMHG | BODY MASS INDEX: 24.8 KG/M2

## 2022-11-28 DIAGNOSIS — J43.2 CENTRILOBULAR EMPHYSEMA (HCC): ICD-10-CM

## 2022-11-28 DIAGNOSIS — Z95.1 S/P CABG X 3: ICD-10-CM

## 2022-11-28 DIAGNOSIS — F51.01 PRIMARY INSOMNIA: ICD-10-CM

## 2022-11-28 DIAGNOSIS — Z87.891 PERSONAL HISTORY OF TOBACCO USE: ICD-10-CM

## 2022-11-28 DIAGNOSIS — I44.2 AV BLOCK, 3RD DEGREE (HCC): ICD-10-CM

## 2022-11-28 DIAGNOSIS — E78.5 HYPERLIPIDEMIA WITH TARGET LDL LESS THAN 100: ICD-10-CM

## 2022-11-28 DIAGNOSIS — R73.01 IFG (IMPAIRED FASTING GLUCOSE): ICD-10-CM

## 2022-11-28 DIAGNOSIS — R53.83 OTHER FATIGUE: ICD-10-CM

## 2022-11-28 DIAGNOSIS — I73.9 PVD (PERIPHERAL VASCULAR DISEASE) (HCC): ICD-10-CM

## 2022-11-28 DIAGNOSIS — I10 ESSENTIAL HYPERTENSION: ICD-10-CM

## 2022-11-28 DIAGNOSIS — Z95.3 H/O AORTIC VALVE REPLACEMENT WITH PORCINE VALVE: ICD-10-CM

## 2022-11-28 DIAGNOSIS — I48.0 PAF (PAROXYSMAL ATRIAL FIBRILLATION) (HCC): ICD-10-CM

## 2022-11-28 DIAGNOSIS — I71.40 ABDOMINAL AORTIC ANEURYSM (AAA) WITHOUT RUPTURE, UNSPECIFIED PART: ICD-10-CM

## 2022-11-28 DIAGNOSIS — C61 PROSTATE CA (HCC): ICD-10-CM

## 2022-11-28 DIAGNOSIS — R23.8 OTHER SKIN CHANGES: ICD-10-CM

## 2022-11-28 DIAGNOSIS — I50.22 CHRONIC SYSTOLIC CONGESTIVE HEART FAILURE (HCC): ICD-10-CM

## 2022-11-28 PROBLEM — J44.1 COPD WITH ACUTE EXACERBATION (HCC): Status: RESOLVED | Noted: 2018-01-14 | Resolved: 2022-11-28

## 2022-11-28 PROBLEM — A41.9 SEPSIS (HCC): Status: RESOLVED | Noted: 2019-09-17 | Resolved: 2022-11-28

## 2022-11-28 PROBLEM — E44.1 MILD PROTEIN-CALORIE MALNUTRITION (HCC): Status: RESOLVED | Noted: 2019-09-18 | Resolved: 2022-11-28

## 2022-11-28 LAB
BASOPHILS ABSOLUTE: 0.08 E9/L (ref 0–0.2)
BASOPHILS RELATIVE PERCENT: 1.1 % (ref 0–2)
EOSINOPHILS ABSOLUTE: 0.29 E9/L (ref 0.05–0.5)
EOSINOPHILS RELATIVE PERCENT: 3.9 % (ref 0–6)
HBA1C MFR BLD: 6.2 % (ref 4–5.6)
HCT VFR BLD CALC: 47.7 % (ref 37–54)
HEMOGLOBIN: 15.1 G/DL (ref 12.5–16.5)
IMMATURE GRANULOCYTES #: 0.07 E9/L
IMMATURE GRANULOCYTES %: 0.9 % (ref 0–5)
LYMPHOCYTES ABSOLUTE: 1.1 E9/L (ref 1.5–4)
LYMPHOCYTES RELATIVE PERCENT: 14.9 % (ref 20–42)
MCH RBC QN AUTO: 30.8 PG (ref 26–35)
MCHC RBC AUTO-ENTMCNC: 31.7 % (ref 32–34.5)
MCV RBC AUTO: 97.1 FL (ref 80–99.9)
MONOCYTES ABSOLUTE: 0.49 E9/L (ref 0.1–0.95)
MONOCYTES RELATIVE PERCENT: 6.6 % (ref 2–12)
NEUTROPHILS ABSOLUTE: 5.36 E9/L (ref 1.8–7.3)
NEUTROPHILS RELATIVE PERCENT: 72.6 % (ref 43–80)
PDW BLD-RTO: 17.3 FL (ref 11.5–15)
PLATELET # BLD: 186 E9/L (ref 130–450)
PMV BLD AUTO: 11.1 FL (ref 7–12)
RBC # BLD: 4.91 E12/L (ref 3.8–5.8)
WBC # BLD: 7.4 E9/L (ref 4.5–11.5)

## 2022-11-28 PROCEDURE — 3078F DIAST BP <80 MM HG: CPT | Performed by: FAMILY MEDICINE

## 2022-11-28 PROCEDURE — 99214 OFFICE O/P EST MOD 30 MIN: CPT | Performed by: FAMILY MEDICINE

## 2022-11-28 PROCEDURE — 3074F SYST BP LT 130 MM HG: CPT | Performed by: FAMILY MEDICINE

## 2022-11-28 PROCEDURE — 1123F ACP DISCUSS/DSCN MKR DOCD: CPT | Performed by: FAMILY MEDICINE

## 2022-11-28 PROCEDURE — G0296 VISIT TO DETERM LDCT ELIG: HCPCS | Performed by: FAMILY MEDICINE

## 2022-11-28 RX ORDER — TRAZODONE HYDROCHLORIDE 50 MG/1
25 TABLET ORAL NIGHTLY PRN
Qty: 90 TABLET | Refills: 1 | Status: SHIPPED | OUTPATIENT
Start: 2022-11-28

## 2022-11-28 RX ORDER — LISINOPRIL 2.5 MG/1
TABLET ORAL
Qty: 90 TABLET | Refills: 3 | Status: SHIPPED | OUTPATIENT
Start: 2022-11-28

## 2022-11-28 ASSESSMENT — ENCOUNTER SYMPTOMS
BLOOD IN STOOL: 0
VOMITING: 0
SINUS PAIN: 0
WHEEZING: 0
RHINORRHEA: 0
SHORTNESS OF BREATH: 1
DIARRHEA: 0
ABDOMINAL PAIN: 0
NAUSEA: 0
COLOR CHANGE: 1
ANAL BLEEDING: 0
EYE REDNESS: 0
TROUBLE SWALLOWING: 0
RECTAL PAIN: 0
CHEST TIGHTNESS: 0
SINUS PRESSURE: 0
STRIDOR: 0
EYE PAIN: 0
CHOKING: 0
PHOTOPHOBIA: 0
ALLERGIC/IMMUNOLOGIC NEGATIVE: 1
ORTHOPNEA: 0
BACK PAIN: 0
EYE ITCHING: 0
SPUTUM PRODUCTION: 0
EYE DISCHARGE: 0
BLURRED VISION: 0
SORE THROAT: 0
ABDOMINAL DISTENTION: 0
COUGH: 0
FACIAL SWELLING: 0
HEMOPTYSIS: 0
CONSTIPATION: 0
VOICE CHANGE: 0
APNEA: 0
GASTROINTESTINAL NEGATIVE: 1

## 2022-11-28 NOTE — PATIENT INSTRUCTIONS

## 2022-11-28 NOTE — PROGRESS NOTES
CANDY Manning is a 76 y.o. male. HPI/Chief C/O:  Chief Complaint   Patient presents with    Fatigue     Pt presents to the office for fatigue. Pt states he has been having troubles sleeping at night. Pt states he is having troubles with balance. Pt states he is having troubles with his bowels as well.  Shortness of Breath     Pt states he has been having difficulty breathing. Pt states when he lays down especially. Allergies   Allergen Reactions    Pneumovax [Pneumococcal Polysaccharide Vaccine] Dermatitis   The patient is here for a medication list and treatment planning review  We will go over our care planning goals as well as take care of all refills  We will set up labs as well      He is getting more shortness of breath and having as hard time with balance and muscle weakness     Shortness of Breath  This is a chronic problem. The current episode started more than 1 year ago. The problem occurs constantly. The problem has been gradually improving. Pertinent negatives include no abdominal pain, chest pain, claudication, coryza, ear pain, fever, headaches, hemoptysis, leg pain, leg swelling, neck pain, orthopnea, PND, rash, rhinorrhea, sore throat, sputum production, syncope, vomiting or wheezing. The symptoms are aggravated by any activity. He has tried steroid inhalers, ipratropium inhalers, beta agonist inhalers and oral steroids for the symptoms. The treatment provided moderate relief. His past medical history is significant for chronic lung disease (R/O pulmonary fibrosis), COPD (emphysema, R/O pulmonary fibrosis) and a heart failure. There is no history of allergies, aspirin allergies, asthma, bronchiolitis, CAD, DVT, PE, pneumonia or a recent surgery. Hypertension  This is a chronic problem. The current episode started more than 1 year ago. The problem is controlled. Associated symptoms include anxiety, malaise/fatigue and shortness of breath.  Pertinent negatives include no blurred vision, chest pain, headaches, neck pain, orthopnea, palpitations, peripheral edema, PND or sweats. Risk factors for coronary artery disease include smoking/tobacco exposure, male gender, dyslipidemia, family history and stress. Past treatments include lifestyle changes, beta blockers, ACE inhibitors and diuretics. The current treatment provides significant improvement. Compliance problems include exercise and diet. Hypertensive end-organ damage includes CAD/MI (aortic stenosis, RBBB,AV block, aortic valve replaced,  AAA , CABG x3 , pacemaker ), heart failure and PVD. There is no history of angina, kidney disease, CVA, left ventricular hypertrophy or retinopathy. There is no history of chronic renal disease, coarctation of the aorta, hyperaldosteronism, hypercortisolism, hyperparathyroidism, a hypertension causing med, pheochromocytoma, renovascular disease, sleep apnea or a thyroid problem. ROS:  Review of Systems   Constitutional:  Positive for fatigue and malaise/fatigue. Negative for activity change, appetite change, chills, diaphoresis, fever and unexpected weight change. HENT: Negative. Negative for congestion, dental problem, drooling, ear discharge, ear pain, facial swelling, hearing loss, mouth sores, nosebleeds, postnasal drip, rhinorrhea, sinus pressure, sinus pain, sneezing, sore throat, tinnitus, trouble swallowing and voice change. Eyes:  Negative for blurred vision, photophobia, pain, discharge, redness, itching and visual disturbance. Respiratory:  Positive for shortness of breath. Negative for apnea, cough, hemoptysis, sputum production, choking, chest tightness, wheezing and stridor. Cardiovascular: Negative. Negative for chest pain, palpitations, orthopnea, claudication, leg swelling, syncope and PND. Gastrointestinal: Negative. Negative for abdominal distention, abdominal pain, anal bleeding, blood in stool, constipation, diarrhea, nausea, rectal pain and vomiting. Endocrine: Negative. Negative for cold intolerance, heat intolerance, polydipsia, polyphagia and polyuria. Genitourinary: Negative. Negative for decreased urine volume, difficulty urinating, dysuria, enuresis, flank pain, frequency, genital sores, hematuria, penile discharge, penile pain, penile swelling, scrotal swelling, testicular pain and urgency. Musculoskeletal:  Positive for arthralgias, gait problem and myalgias. Negative for back pain, joint swelling, neck pain and neck stiffness. Skin:  Positive for color change (cyanosis to his nose, feet and hands). Negative for pallor, rash and wound. Allergic/Immunologic: Negative. Negative for environmental allergies, food allergies and immunocompromised state. Neurological:  Positive for weakness. Negative for dizziness, tremors, seizures, syncope, facial asymmetry, speech difficulty, light-headedness, numbness and headaches. Hematological: Negative. Negative for adenopathy. Does not bruise/bleed easily. Psychiatric/Behavioral:  Positive for dysphoric mood and sleep disturbance. Negative for agitation, behavioral problems, confusion, decreased concentration, hallucinations, self-injury and suicidal ideas. The patient is not nervous/anxious and is not hyperactive.        Past Medical/Surgical Hx;  Reviewed with patient      Diagnosis Date    Aortic stenosis, mild     Aortic valve sclerosis     moderate    Atrial fibrillation ([address] 2014    CAD (coronary artery disease)     CHF (congestive heart failure) (HCC)     COPD (chronic obstructive pulmonary disease) (HCC)     Gallbladder attack     History of tobacco use 9/17/2015    Hyperlipidemia     Hypertension     Mild mitral regurgitation     Prostate cancer ([address]     44 treatments of radiation    PVD (peripheral vascular disease) with claudication ([address] 9/17/2015    RBBB     Tobacco abuse      Past Surgical History:   Procedure Laterality Date    APPENDECTOMY      CARDIAC CATHETERIZATION  2014    DR Coates-pt. states had triple bypass \"5 years ago\"    COLONOSCOPY      CORONARY ARTERY BYPASS GRAFT  2014    CABGx3 [place], SVG-D1, SVG-OM1, AVR Dr. Antoine Rowell ENDOSCOPY, COLON, DIAGNOSTIC      PACEMAKER INSERTION Left 2022    [employer] Dual PPM-GR    PACEMAKER PLACEMENT Left 2014    Dr. Kate Alonso RS&I N/A 2018    ENDOVASCULAR  AORTIC ANEURYSM REPAIR , ABDOMINAL AORTIC ANEURYSM, WITH FINISTRATED GRAFT performed by Leandra Cowan MD at [facility]         Past Family Hx:  Reviewed with patient      Problem Relation Age of Onset    Heart Attack Brother     Stroke Father        Social Hx:  Reviewed with patient  Social History     Tobacco Use    Smoking status: Former     Packs/day: 0.50     Years: 50.00     Pack years: 25.00     Types: Cigarettes     Quit date: 2014     Years since quittin.4    Smokeless tobacco: Never   Substance Use Topics    Alcohol use: Yes     Alcohol/week: 0.0 standard drinks     Comment: rare;  drinks 1 cup of coffee daily & occ [employer]/Tea       OBJECTIVE  /80   Pulse 70   Temp 97.1 °F (36.2 °C) (Temporal)   Resp 17   Ht 5' 7\" (1.702 m)   Wt 158 lb (71.7 kg)   SpO2 98%   BMI 24.75 kg/m²     Problem List:  [facility] does not have any pertinent problems on file. PHYS EX:  Physical Exam  Vitals and nursing note reviewed. Constitutional:       General: He is not in acute distress. Appearance: Normal appearance. He is well-developed. He is not ill-appearing, toxic-appearing or diaphoretic. Interventions: He is not intubated. HENT:      Head: Normocephalic and atraumatic. Right Ear: External ear normal.      Left Ear: External ear normal.      Nose: Nose normal. No congestion or rhinorrhea. Mouth/Throat:      Mouth: Mucous membranes are moist.      Pharynx: Oropharynx is clear.  No oropharyngeal exudate or posterior oropharyngeal erythema. Eyes:      General: No scleral icterus. Right eye: No discharge. Left eye: No discharge. Neck:      Thyroid: No thyromegaly. Vascular: No carotid bruit or JVD. Trachea: No tracheal deviation. Cardiovascular:      Rate and Rhythm: Regular rhythm. No extrasystoles are present. Chest Wall: PMI is not displaced. Pulses: Normal pulses. No decreased pulses. Heart sounds: Heart sounds not distant. Murmur heard. Systolic murmur is present with a grade of 1/6. No diastolic murmur is present. No friction rub. No gallop. No S3 or S4 sounds. Pulmonary:      Effort: Pulmonary effort is normal. No tachypnea, bradypnea, accessory muscle usage, prolonged expiration, respiratory distress or retractions. He is not intubated. Breath sounds: Decreased air movement present. No stridor or transmitted upper airway sounds. Examination of the right-upper field reveals decreased breath sounds. Examination of the left-upper field reveals decreased breath sounds. Examination of the right-middle field reveals decreased breath sounds. Examination of the left-middle field reveals decreased breath sounds. Examination of the right-lower field reveals decreased breath sounds. Examination of the left-lower field reveals decreased breath sounds. Decreased breath sounds present. No wheezing, rhonchi or rales. Chest:      Chest wall: No tenderness. Abdominal:      General: Bowel sounds are normal. There is no distension. Palpations: Abdomen is soft. There is no mass. Tenderness: There is no abdominal tenderness. There is no right CVA tenderness, left CVA tenderness, guarding or rebound. Hernia: No hernia is present. Genitourinary:     Penis: No tenderness. Comments: H/O prostate cancer   Musculoskeletal:         General: No swelling, tenderness, deformity or signs of injury. Normal range of motion.       Cervical back: Normal range of motion and neck supple. No rigidity. No muscular tenderness. Right lower leg: No edema. Left lower leg: No edema. Lymphadenopathy:      Cervical: No cervical adenopathy. Skin:     General: Skin is warm. Coloration: Skin is not jaundiced or pale. Findings: No bruising, erythema, lesion or rash. Comments: Multiple skin lesions   Cyanosis to his hands, feet and the tip of his nose      Neurological:      General: No focal deficit present. Mental Status: He is alert and oriented to person, place, and time. Cranial Nerves: No cranial nerve deficit. Sensory: Sensory deficit present. Motor: Weakness present. No abnormal muscle tone. Coordination: Coordination abnormal.      Gait: Gait abnormal.      Deep Tendon Reflexes: Reflexes abnormal.      Comments: He is starting to have imbalance issues   R/O Parkinson's        ASSESSMENT/PLAN  Alex Landa was seen today for fatigue and shortness of breath. Diagnoses and all orders for this visit:    Centrilobular emphysema ([facility]  -     Fluticasone-Umeclidin-Vilant (Jeb Olivarez) 598-53.3-22 MCG/INH AEPB; Inhale 1 puff into the lungs daily  -     Comprehensive Metabolic Panel; Future  -     CBC with Auto Differential; Future  Long talk on treatment and prevention  Literature is given   --PLAN--aerosol accuneb 1.25 plus chest percussion--Rx      Hyperlipidemia with target LDL less than 100  -     Comprehensive Metabolic Panel; Future  -     Lipid Panel; Future  -     CBC with Auto Differential; Future  --Mediterranean diet, exercise, weight loss, vitamins    We have a long talk on cholesterol and importance of lowering it       S/P CABG x 3  -     Comprehensive Metabolic Panel;  Future  -     CBC with Auto Differential; Future    ---VASCULAR PANEL  A) asa, plavix, aggrenox  B) ELIQUIS, pletal, tzd, STATIN  C) ACE, LASIX, folic, ccb  D) cannikinumab, fish oils     ---CARDIAC---ELIQUIS, ACE, BETA, STATIN, LASIX, ( ccb )     IFG (impaired fasting glucose)  -     Comprehensive Metabolic Panel; Future  -     CBC with Auto Differential; Future  -     Hemoglobin A1C; Future    Primary insomnia  -     traZODone (DESYREL) 50 MG tablet; Take 0.5 tablets by mouth nightly as needed for Sleep or Depression  -     Comprehensive Metabolic Panel; Future  -     CBC with Auto Differential; Future    Other fatigue  -     TSH; Future  -     Uric Acid; Future  -     Comprehensive Metabolic Panel;  Future  -     CBC with Auto Differential; Future    Essential hypertension ---controlled   --patient is instructed on low to moderate sodium ( 2 to 2.5 grams ), daily    Also to increase potassium in the diet to about 3.5 grams daily    Literature is provided     -     lisinopril (PRINIVIL;ZESTRIL) 2.5 MG tablet; TAKE 1 TABLET DAILY    Other skin changes  -     External Referral To Dermatology    PVD (peripheral vascular disease) ([facility]  Long talk on treatment and prevention  Literature is given       AV block, 3rd degree ([facility]  --stable on current care planning  -- continue treatment as we are meeting goals       Abdominal aortic aneurysm (AAA) without rupture, unspecified part  --stable on current care planning  -- continue treatment as we are meeting goals       Prostate CA [facility])  --stable on current care planning  -- continue treatment as we are meeting goals       H/O aortic valve replacement with porcine valve  --stable on current care planning  -- continue treatment as we are meeting goals       PAF (paroxysmal atrial fibrillation) ([facility]  --stable on current care planning  -- continue treatment as we are meeting goals       Chronic systolic congestive heart failure ([facility]  --patient is instructed on low to moderate sodium ( 2 to 2.5 grams ), daily    Also to increase potassium in the diet to about 3.5 grams daily    Literature is provided     --patient is stable on good  afterload reduction    Patient is doing well on Lasix for a diuretic   --this patient has good systolic  support          Outpatient Encounter Medications as of 11/28/2022   Medication Sig Dispense Refill    traZODone (DESYREL) 50 MG tablet Take 0.5 tablets by mouth nightly as needed for Sleep or Depression 90 tablet 1    Fluticasone-Umeclidin-Vilant (TRELEGY ELLIPTA) 200-62.5-25 MCG/INH AEPB Inhale 1 puff into the lungs daily 1 each 3    lisinopril (PRINIVIL;ZESTRIL) 2.5 MG tablet TAKE 1 TABLET DAILY 90 tablet 3    apixaban (ELIQUIS) 5 MG TABS tablet TAKE 1 TABLET TWICE A  tablet 3    atorvastatin (LIPITOR) 20 MG tablet TAKE 1 TABLET DAILY 90 tablet 3    furosemide (LASIX) 40 MG tablet TAKE 1 TABLET DAILY 90 tablet 3    metoprolol succinate (TOPROL XL) 25 MG extended release tablet 1 tablet daily 90 tablet 3    nitroGLYCERIN (NITROSTAT) 0.3 MG SL tablet Place 1 tablet under the tongue every 5 minutes as needed for Chest pain 30 tablet 3    [DISCONTINUED] lisinopril (PRINIVIL;ZESTRIL) 2.5 MG tablet TAKE 1 TABLET DAILY 90 tablet 3    [DISCONTINUED] SYMBICORT 160-4.5 MCG/ACT AERO USE 2 INHALATIONS TWICE A DAY 30.6 g 3     No facility-administered encounter medications on file as of 11/28/2022. Return in about 6 weeks (around 1/9/2023). ?  ?    Reviewed recent labs related to Kike's current problems      Discussed importance of regular Health Maintenance follow up  Health Maintenance   Topic    Shingles vaccine (1 of 2)    Low dose CT lung screening     COVID-19 Vaccine (2 - Pfizer series)    Lipids     Depression Screen     Prostate Specific Antigen (PSA) Screening or Monitoring     Annual Wellness Visit (AWV)     DTaP/Tdap/Td vaccine (2 - Td or Tdap)    Flu vaccine     Hepatitis C screen     Hepatitis A vaccine     Hib vaccine     Meningococcal (ACWY) vaccine                                               Discussed with the patient the current USPSTF guidelines released March 9, 2021 for screening for lung cancer.     For adults aged 48 to [de-identified] years who have a 20 pack-year smoking history and currently smoke or have quit within the past 15 years the grade B recommendation is to:  Screen for lung cancer with low-dose computed tomography (LDCT) every year. Stop screening once a person has not smoked for 15 years or has a health problem that limits life expectancy or the ability to have lung surgery. The patient  reports that he quit smoking about 8 years ago. His smoking use included cigarettes. He has a 25.00 pack-year smoking history. He has never used smokeless tobacco.. Discussed with patient the risks and benefits of screening, including over-diagnosis, false positive rate, and total radiation exposure. The patient currently exhibits no signs or symptoms suggestive of lung cancer. Discussed with patient the importance of compliance with yearly annual lung cancer screenings and willingness to undergo diagnosis and treatment if screening scan is positive. In addition, the patient was counseled regarding the importance of remaining smoke free and/or total smoking cessation.     Also reviewed the following if the patient has Medicare that as of February 10, 2022, Medicare only covers LDCT screening in patients aged 51-72 with at least a 20 pack-year smoking history who currently smoke or have quit in the last 15 years

## 2022-11-29 LAB
ALBUMIN SERPL-MCNC: 3.9 G/DL (ref 3.5–5.2)
ALP BLD-CCNC: 186 U/L (ref 40–129)
ALT SERPL-CCNC: 14 U/L (ref 0–40)
ANION GAP SERPL CALCULATED.3IONS-SCNC: 15 MMOL/L (ref 7–16)
AST SERPL-CCNC: 29 U/L (ref 0–39)
BILIRUB SERPL-MCNC: 2.6 MG/DL (ref 0–1.2)
BUN BLDV-MCNC: 19 MG/DL (ref 6–23)
CALCIUM SERPL-MCNC: 9.8 MG/DL (ref 8.6–10.2)
CHLORIDE BLD-SCNC: 101 MMOL/L (ref 98–107)
CHOLESTEROL, TOTAL: 123 MG/DL (ref 0–199)
CO2: 22 MMOL/L (ref 22–29)
CREAT SERPL-MCNC: 1 MG/DL (ref 0.7–1.2)
GFR SERPL CREATININE-BSD FRML MDRD: >60 ML/MIN/1.73
GLUCOSE BLD-MCNC: 46 MG/DL (ref 74–99)
HDLC SERPL-MCNC: 38 MG/DL
LDL CHOLESTEROL CALCULATED: 75 MG/DL (ref 0–99)
POTASSIUM SERPL-SCNC: 5.2 MMOL/L (ref 3.5–5)
SODIUM BLD-SCNC: 138 MMOL/L (ref 132–146)
TOTAL PROTEIN: 8 G/DL (ref 6.4–8.3)
TRIGL SERPL-MCNC: 52 MG/DL (ref 0–149)
TSH SERPL DL<=0.05 MIU/L-ACNC: 5.37 UIU/ML (ref 0.27–4.2)
URIC ACID, SERUM: 6.5 MG/DL (ref 3.4–7)
VLDLC SERPL CALC-MCNC: 10 MG/DL

## 2022-12-21 ENCOUNTER — TELEPHONE (OUTPATIENT)
Dept: CASE MANAGEMENT | Age: 77
End: 2022-12-21

## 2022-12-21 NOTE — TELEPHONE ENCOUNTER
I called the patient and left a message reminding him of his CT lung screening at [address] [place] [address] on 12/22/2022 at 2:30 pm with an 2:00 pm arrival.  If unable to keep this appt call the office at [phone] to get rescheduled.           Electronically signed by Matthew Lock on 12/21/22 at 3:59 PM EST

## 2022-12-22 ENCOUNTER — HOSPITAL ENCOUNTER (OUTPATIENT)
Dept: CT IMAGING | Age: 77
Discharge: HOME | End: 2022-12-22
Payer: MEDICARE

## 2022-12-22 DIAGNOSIS — Z87.891 PERSONAL HISTORY OF TOBACCO USE: ICD-10-CM

## 2022-12-22 PROCEDURE — 71271 CT THORAX LUNG CANCER SCR C-: CPT

## 2022-12-27 ENCOUNTER — TELEPHONE (OUTPATIENT)
Dept: CASE MANAGEMENT | Age: 77
End: 2022-12-27

## 2023-01-09 ENCOUNTER — TELEMEDICINE (OUTPATIENT)
Dept: FAMILY MEDICINE CLINIC | Age: 78
End: 2023-01-09
Payer: MEDICARE

## 2023-01-09 DIAGNOSIS — J43.2 CENTRILOBULAR EMPHYSEMA (HCC): Primary | ICD-10-CM

## 2023-01-09 DIAGNOSIS — N18.31 STAGE 3A CHRONIC KIDNEY DISEASE (HCC): ICD-10-CM

## 2023-01-09 DIAGNOSIS — I48.0 PAF (PAROXYSMAL ATRIAL FIBRILLATION) (HCC): ICD-10-CM

## 2023-01-09 DIAGNOSIS — R53.83 OTHER FATIGUE: ICD-10-CM

## 2023-01-09 DIAGNOSIS — Z12.5 SCREENING PSA (PROSTATE SPECIFIC ANTIGEN): ICD-10-CM

## 2023-01-09 DIAGNOSIS — C61 PROSTATE CA (HCC): ICD-10-CM

## 2023-01-09 DIAGNOSIS — I73.9 PVD (PERIPHERAL VASCULAR DISEASE) (HCC): ICD-10-CM

## 2023-01-09 DIAGNOSIS — E78.5 DYSLIPIDEMIA: ICD-10-CM

## 2023-01-09 DIAGNOSIS — I50.22 CHRONIC SYSTOLIC CONGESTIVE HEART FAILURE (HCC): ICD-10-CM

## 2023-01-09 DIAGNOSIS — R73.03 PREDIABETES: ICD-10-CM

## 2023-01-09 PROCEDURE — 1123F ACP DISCUSS/DSCN MKR DOCD: CPT | Performed by: FAMILY MEDICINE

## 2023-01-09 PROCEDURE — 99213 OFFICE O/P EST LOW 20 MIN: CPT | Performed by: FAMILY MEDICINE

## 2023-01-09 ASSESSMENT — ENCOUNTER SYMPTOMS
SPUTUM PRODUCTION: 0
VOICE CHANGE: 0
RHINORRHEA: 0
EYE REDNESS: 0
STRIDOR: 0
BLOOD IN STOOL: 0
HEMOPTYSIS: 0
ABDOMINAL PAIN: 0
TROUBLE SWALLOWING: 0
SORE THROAT: 0
ALLERGIC/IMMUNOLOGIC NEGATIVE: 1
WHEEZING: 0
EYE PAIN: 0
ANAL BLEEDING: 0
ABDOMINAL DISTENTION: 0
SINUS PRESSURE: 0
PHOTOPHOBIA: 0
ORTHOPNEA: 0
COLOR CHANGE: 1
VOMITING: 0
DIARRHEA: 0
FACIAL SWELLING: 0
CONSTIPATION: 0
NAUSEA: 0
BLURRED VISION: 0
CHEST TIGHTNESS: 0
SINUS PAIN: 0
CHOKING: 0
APNEA: 0
BACK PAIN: 0
RECTAL PAIN: 0
EYE DISCHARGE: 0
SHORTNESS OF BREATH: 1
EYE ITCHING: 0
GASTROINTESTINAL NEGATIVE: 1
COUGH: 0

## 2023-01-09 ASSESSMENT — PATIENT HEALTH QUESTIONNAIRE - PHQ9
SUM OF ALL RESPONSES TO PHQ QUESTIONS 1-9: 0
1. LITTLE INTEREST OR PLEASURE IN DOING THINGS: 0
SUM OF ALL RESPONSES TO PHQ9 QUESTIONS 1 & 2: 0
2. FEELING DOWN, DEPRESSED OR HOPELESS: 0

## 2023-01-09 NOTE — PROGRESS NOTES
CANDY Gonzalez is a 76 y.o. male. HPI/Chief C/O:  Chief Complaint   Patient presents with    Follow-up     Pt here for a 6 week follow up. Pt would like to discuss his lung scan results as well. Other     Pt gives verbal consent for vv. Is in [facility]. Allergies   Allergen Reactions    Pneumovax [Pneumococcal Polysaccharide Vaccine] Dermatitis   TeleMedicine Video Visit    Corey Holloway, was evaluated through a synchronous (real-time) audio-video encounter. The patient (or guardian if applicable) is aware that this is a billable service. , which includes applicable co-pays. This virtual visit was conducted with the patient's  (and/or legal guardian's) consent. The visit was conducted pursuant to the emergency declaration under the [address] [place] [address], [address] [place] [address] authority and the [employer] Act. Patient identification was verified, and a caregiver was present when appropriate. The patient was located in a state where the provider was licensed to provide care. Patient identification was verified at the start of the visit, including the patient's telephone number and physical location. I discussed with the patient the nature of our telehealth visits, that:     Due to the nature of an audio- video modality, the only components of a physical exam that could be done are the elements supported by direct observation. I would evaluate the patient and recommend diagnostics and treatments based on my assessment. If it was felt that the patient should be evaluated in clinic or an emergency room setting, then they would be directed there. Our sessions are not being recorded and that personal health information is protected. Our team would provide follow up care in person if/when the patient needs it.            Patient's location: home address in [facility]  Physician  location home address in [facility] other people involved in call  are none       On this date 1/9/2023 I have spent 30  minutes reviewing previous notes, test results and face to face (virtual) with the patient discussing the diagnosis and importance of compliance with the treatment plan as well as documenting on the day of the visit. ,     This visit was completed virtually using Doxy. me        The patient is here for a medication list and treatment planning review  We will go over our care planning goals as well as take care of all refills  We will set up labs as well        Shortness of Breath  This is a chronic problem. The current episode started more than 1 year ago. The problem occurs constantly. The problem has been gradually improving. Pertinent negatives include no abdominal pain, chest pain, claudication, coryza, ear pain, fever, headaches, hemoptysis, leg pain, leg swelling, neck pain, orthopnea, PND, rash, rhinorrhea, sore throat, sputum production, syncope, vomiting or wheezing. The symptoms are aggravated by any activity. He has tried steroid inhalers, ipratropium inhalers, beta agonist inhalers and oral steroids for the symptoms. The treatment provided moderate relief. His past medical history is significant for chronic lung disease (R/O pulmonary fibrosis), COPD (emphysema, R/O pulmonary fibrosis) and a heart failure. There is no history of allergies, aspirin allergies, asthma, bronchiolitis, CAD, DVT, PE, pneumonia or a recent surgery. Hypertension  This is a chronic problem. The current episode started more than 1 year ago. The problem is controlled. Associated symptoms include anxiety, malaise/fatigue and shortness of breath. Pertinent negatives include no blurred vision, chest pain, headaches, neck pain, orthopnea, palpitations, peripheral edema, PND or sweats. Risk factors for coronary artery disease include smoking/tobacco exposure, male gender, dyslipidemia, family history and stress.  Past treatments include lifestyle changes, beta blockers, ACE inhibitors and diuretics. The current treatment provides significant improvement. Compliance problems include exercise and diet. Hypertensive end-organ damage includes CAD/MI (aortic stenosis, RBBB,AV block, aortic valve replaced,  AAA , CABG x3 , pacemaker ), heart failure and PVD. There is no history of angina, kidney disease, CVA, left ventricular hypertrophy or retinopathy. There is no history of chronic renal disease, coarctation of the aorta, hyperaldosteronism, hypercortisolism, hyperparathyroidism, a hypertension causing med, pheochromocytoma, renovascular disease, sleep apnea or a thyroid problem. ROS:  Review of Systems   Constitutional:  Positive for fatigue and malaise/fatigue. Negative for activity change, appetite change, chills, diaphoresis, fever and unexpected weight change. HENT: Negative. Negative for congestion, dental problem, drooling, ear discharge, ear pain, facial swelling, hearing loss, mouth sores, nosebleeds, postnasal drip, rhinorrhea, sinus pressure, sinus pain, sneezing, sore throat, tinnitus, trouble swallowing and voice change. Eyes:  Negative for blurred vision, photophobia, pain, discharge, redness, itching and visual disturbance. Respiratory:  Positive for shortness of breath. Negative for apnea, cough, hemoptysis, sputum production, choking, chest tightness, wheezing and stridor. Cardiovascular: Negative. Negative for chest pain, palpitations, orthopnea, claudication, leg swelling, syncope and PND. Gastrointestinal: Negative. Negative for abdominal distention, abdominal pain, anal bleeding, blood in stool, constipation, diarrhea, nausea, rectal pain and vomiting. Endocrine: Negative. Negative for cold intolerance, heat intolerance, polydipsia, polyphagia and polyuria. Genitourinary: Negative.   Negative for decreased urine volume, difficulty urinating, dysuria, enuresis, flank pain, frequency, genital sores, hematuria, penile discharge, penile pain, penile swelling, scrotal swelling, testicular pain and urgency. Musculoskeletal:  Positive for arthralgias, gait problem and myalgias. Negative for back pain, joint swelling, neck pain and neck stiffness. Skin:  Positive for color change (cyanosis to his nose, feet and hands). Negative for pallor, rash and wound. Allergic/Immunologic: Negative. Negative for environmental allergies, food allergies and immunocompromised state. Neurological:  Positive for weakness. Negative for dizziness, tremors, seizures, syncope, facial asymmetry, speech difficulty, light-headedness, numbness and headaches. Hematological: Negative. Negative for adenopathy. Does not bruise/bleed easily. Psychiatric/Behavioral:  Positive for dysphoric mood and sleep disturbance. Negative for agitation, behavioral problems, confusion, decreased concentration, hallucinations, self-injury and suicidal ideas. The patient is not nervous/anxious and is not hyperactive. Past Medical/Surgical Hx;  Reviewed with patient      Diagnosis Date    Aortic stenosis, mild     Aortic valve sclerosis     moderate    Atrial fibrillation ([address] 2014    CAD (coronary artery disease)     CHF (congestive heart failure) (HCC)     COPD (chronic obstructive pulmonary disease) ([address]     Gallbladder attack     History of tobacco use 9/17/2015    Hyperlipidemia     Hypertension     Mild mitral regurgitation     Prostate cancer ([address]     44 treatments of radiation    PVD (peripheral vascular disease) with claudication ([address] 9/17/2015    RBBB     Tobacco abuse      Past Surgical History:   Procedure Laterality Date    APPENDECTOMY      CARDIAC CATHETERIZATION  08/08/2014    DR Coates-pt.  states had triple bypass \"5 years ago\"    COLONOSCOPY      CORONARY ARTERY BYPASS GRAFT  08/11/2014    CABGx3 LIMA-LAD, SVG-D1, SVG-OM1, AVR Dr. Surekha Escobar, COLON, DIAGNOSTIC      PACEMAKER INSERTION Left 08/22/2022    [facility] Dual PPM-GR    PACEMAKER PLACEMENT Left 08/12/2014 Dr. Dina Cano RS&I N/A 2018    ENDOVASCULAR  AORTIC ANEURYSM REPAIR , ABDOMINAL AORTIC ANEURYSM, WITH FINISTRATED GRAFT performed by Bright Barrera MD at [address] [place] [address]         Past Family Hx:  Reviewed with patient      Problem Relation Age of Onset    Heart Attack Brother     Stroke Father        Social Hx:  Reviewed with patient  Social History     Tobacco Use    Smoking status: Former     Packs/day: 0.50     Years: 50.00     Pack years: 25.00     Types: Cigarettes     Quit date: 2014     Years since quittin.5    Smokeless tobacco: Never   Substance Use Topics    Alcohol use: Yes     Alcohol/week: 0.0 standard drinks     Comment: rare;  drinks 1 cup of coffee daily & occ Pepsi/Tea       OBJECTIVE  There were no vitals taken for this visit. Problem List:  John Canales does not have any pertinent problems on file. PHYS EX:  General: Awake/Alert/Oriented/No Acute Distress      ASSESSMENT/PLAN  John Canales was seen today for follow-up and other. Diagnoses and all orders for this visit:    Centrilobular emphysema ([facility]  -     Comprehensive Metabolic Panel; Future  -     CBC with Auto Differential; Future  --stable on current care planning  -- continue treatment as we are meeting goals       Stage 3a chronic kidney disease ([facility]  -     Comprehensive Metabolic Panel; Future  -     CBC with Auto Differential; Future    ---VASCULAR PANEL  A) asa, plavix, aggrenox  B) ELIQUIS, pletal, tzd, STATIN  C) ACE, LASIX, folic, ccb  D) cannikinumab, fish oils     ---CARDIAC---ELIQUIS, ACE, BETA, STATIN, LASIX, ( ccb )     Chronic systolic congestive heart failure (HCC)  -     Comprehensive Metabolic Panel;  Future  -     CBC with Auto Differential; Future    --patient is stable on good  afterload reduction    Patient is doing well on Lasix for a diuretic   --this patient has good systolic  support      PVD (peripheral vascular disease) ([facility]  -     Comprehensive Metabolic Panel; Future  -     CBC with Auto Differential; Future  --stable on current care planning  -- continue treatment as we are meeting goals       PAF (paroxysmal atrial fibrillation) ([facility]  -     Comprehensive Metabolic Panel; Future  -     CBC with Auto Differential; Future  --stable on current care planning  -- continue treatment as we are meeting goals       Prostate [facility])  -     Comprehensive Metabolic Panel; Future  -     CBC with Auto Differential; Future  --stable on current care planning  -- continue treatment as we are meeting goals   --follows with urology     Screening PSA (prostate specific antigen)  -     PSA Screening; Future    Dyslipidemia  -     Comprehensive Metabolic Panel; Future  -     Lipid Panel; Future  -     CBC with Auto Differential; Future    Other fatigue  -     TSH; Future  -     Comprehensive Metabolic Panel; Future  -     CBC with Auto Differential; Future    Prediabetes  -     Hemoglobin A1C; Future      Outpatient Encounter Medications as of 1/9/2023   Medication Sig Dispense Refill    traZODone (DESYREL) 50 MG tablet Take 0.5 tablets by mouth nightly as needed for Sleep or Depression 90 tablet 1    Fluticasone-Umeclidin-Vilant (TRELEGY ELLIPTA) 200-62.5-25 MCG/INH AEPB Inhale 1 puff into the lungs daily 1 each 3    lisinopril (PRINIVIL;ZESTRIL) 2.5 MG tablet TAKE 1 TABLET DAILY 90 tablet 3    apixaban (ELIQUIS) 5 MG TABS tablet TAKE 1 TABLET TWICE A  tablet 3    atorvastatin (LIPITOR) 20 MG tablet TAKE 1 TABLET DAILY 90 tablet 3    furosemide (LASIX) 40 MG tablet TAKE 1 TABLET DAILY 90 tablet 3    metoprolol succinate (TOPROL XL) 25 MG extended release tablet 1 tablet daily 90 tablet 3    nitroGLYCERIN (NITROSTAT) 0.3 MG SL tablet Place 1 tablet under the tongue every 5 minutes as needed for Chest pain 30 tablet 3     No facility-administered encounter medications on file as of 1/9/2023.        No follow-ups on file.        Reviewed recent labs related to Kike's current problems      Discussed importance of regular Health Maintenance follow up  Health Maintenance   Topic    Shingles vaccine (1 of 2)    COVID-19 Vaccine (2 - Pfizer series)    Depression Screen     Prostate Specific Antigen (PSA) Screening or Monitoring     Annual Wellness Visit (AWV)     Lipids     Low dose CT lung screening     DTaP/Tdap/Td vaccine (2 - Td or Tdap)    Flu vaccine     Hepatitis C screen     Hepatitis A vaccine     Hib vaccine     Meningococcal (ACWY) vaccine

## 2023-02-13 ENCOUNTER — TELEPHONE (OUTPATIENT)
Dept: FAMILY MEDICINE CLINIC | Age: 78
End: 2023-02-13

## 2023-02-13 ENCOUNTER — OFFICE VISIT (OUTPATIENT)
Dept: FAMILY MEDICINE CLINIC | Age: 78
End: 2023-02-13

## 2023-02-13 ENCOUNTER — HOSPITAL ENCOUNTER (OUTPATIENT)
Age: 78
Discharge: HOME | End: 2023-02-13
Payer: MEDICARE

## 2023-02-13 VITALS
DIASTOLIC BLOOD PRESSURE: 88 MMHG | WEIGHT: 150 LBS | HEART RATE: 72 BPM | TEMPERATURE: 97.7 F | HEIGHT: 67 IN | SYSTOLIC BLOOD PRESSURE: 130 MMHG | OXYGEN SATURATION: 92 % | BODY MASS INDEX: 23.54 KG/M2 | RESPIRATION RATE: 18 BRPM

## 2023-02-13 DIAGNOSIS — N18.31 STAGE 3A CHRONIC KIDNEY DISEASE (HCC): ICD-10-CM

## 2023-02-13 DIAGNOSIS — C61 PROSTATE CA (HCC): ICD-10-CM

## 2023-02-13 DIAGNOSIS — R53.83 OTHER FATIGUE: ICD-10-CM

## 2023-02-13 DIAGNOSIS — I48.0 PAF (PAROXYSMAL ATRIAL FIBRILLATION) (HCC): ICD-10-CM

## 2023-02-13 DIAGNOSIS — J84.10 PULMONARY FIBROSIS (HCC): ICD-10-CM

## 2023-02-13 DIAGNOSIS — I50.22 CHRONIC SYSTOLIC CONGESTIVE HEART FAILURE (HCC): ICD-10-CM

## 2023-02-13 DIAGNOSIS — Z12.5 SCREENING PSA (PROSTATE SPECIFIC ANTIGEN): ICD-10-CM

## 2023-02-13 DIAGNOSIS — I44.2 AV BLOCK, 3RD DEGREE (HCC): ICD-10-CM

## 2023-02-13 DIAGNOSIS — J43.2 CENTRILOBULAR EMPHYSEMA (HCC): Primary | ICD-10-CM

## 2023-02-13 DIAGNOSIS — R73.03 PREDIABETES: ICD-10-CM

## 2023-02-13 DIAGNOSIS — E78.5 DYSLIPIDEMIA: ICD-10-CM

## 2023-02-13 DIAGNOSIS — I73.9 PVD (PERIPHERAL VASCULAR DISEASE) (HCC): ICD-10-CM

## 2023-02-13 DIAGNOSIS — J43.2 CENTRILOBULAR EMPHYSEMA (HCC): ICD-10-CM

## 2023-02-13 DIAGNOSIS — J40 BRONCHITIS: ICD-10-CM

## 2023-02-13 LAB
ALBUMIN SERPL-MCNC: 3.9 G/DL (ref 3.5–5.2)
ALP BLD-CCNC: 167 U/L (ref 40–129)
ALT SERPL-CCNC: 6 U/L (ref 0–40)
ANION GAP SERPL CALCULATED.3IONS-SCNC: 14 MMOL/L (ref 7–16)
AST SERPL-CCNC: 22 U/L (ref 0–39)
BASOPHILS ABSOLUTE: 0.07 E9/L (ref 0–0.2)
BASOPHILS RELATIVE PERCENT: 0.9 % (ref 0–2)
BILIRUB SERPL-MCNC: 3.7 MG/DL (ref 0–1.2)
BUN BLDV-MCNC: 17 MG/DL (ref 6–23)
CALCIUM SERPL-MCNC: 9.5 MG/DL (ref 8.6–10.2)
CHLORIDE BLD-SCNC: 102 MMOL/L (ref 98–107)
CO2: 21 MMOL/L (ref 22–29)
CREAT SERPL-MCNC: 0.9 MG/DL (ref 0.7–1.2)
EOSINOPHILS ABSOLUTE: 0.11 E9/L (ref 0.05–0.5)
EOSINOPHILS RELATIVE PERCENT: 1.5 % (ref 0–6)
GFR SERPL CREATININE-BSD FRML MDRD: >60 ML/MIN/1.73
GLUCOSE BLD-MCNC: 88 MG/DL (ref 74–99)
HBA1C MFR BLD: 6.2 % (ref 4–5.6)
HCT VFR BLD CALC: 47.4 % (ref 37–54)
HEMOGLOBIN: 15.8 G/DL (ref 12.5–16.5)
IMMATURE GRANULOCYTES #: 0.1 E9/L
IMMATURE GRANULOCYTES %: 1.3 % (ref 0–5)
INFLUENZA A ANTIBODY: NEGATIVE
INFLUENZA B ANTIBODY: NEGATIVE
LYMPHOCYTES ABSOLUTE: 1.05 E9/L (ref 1.5–4)
LYMPHOCYTES RELATIVE PERCENT: 14.1 % (ref 20–42)
Lab: NORMAL
MCH RBC QN AUTO: 31.7 PG (ref 26–35)
MCHC RBC AUTO-ENTMCNC: 33.3 % (ref 32–34.5)
MCV RBC AUTO: 95.2 FL (ref 80–99.9)
MONOCYTES ABSOLUTE: 0.55 E9/L (ref 0.1–0.95)
MONOCYTES RELATIVE PERCENT: 7.4 % (ref 2–12)
NEUTROPHILS ABSOLUTE: 5.59 E9/L (ref 1.8–7.3)
NEUTROPHILS RELATIVE PERCENT: 74.8 % (ref 43–80)
PDW BLD-RTO: 17.5 FL (ref 11.5–15)
PERFORMING INSTRUMENT: NORMAL
PLATELET # BLD: 151 E9/L (ref 130–450)
PMV BLD AUTO: 10.1 FL (ref 7–12)
POTASSIUM SERPL-SCNC: 4.1 MMOL/L (ref 3.5–5)
PROSTATE SPECIFIC ANTIGEN: 0.16 NG/ML (ref 0–4)
QC PASS/FAIL: NORMAL
RBC # BLD: 4.98 E12/L (ref 3.8–5.8)
SARS-COV-2, POC: NORMAL
SODIUM BLD-SCNC: 137 MMOL/L (ref 132–146)
TOTAL PROTEIN: 8 G/DL (ref 6.4–8.3)
TSH SERPL DL<=0.05 MIU/L-ACNC: 3.62 UIU/ML (ref 0.27–4.2)
WBC # BLD: 7.5 E9/L (ref 4.5–11.5)

## 2023-02-13 PROCEDURE — 80053 COMPREHEN METABOLIC PANEL: CPT

## 2023-02-13 PROCEDURE — 84443 ASSAY THYROID STIM HORMONE: CPT

## 2023-02-13 PROCEDURE — 85025 COMPLETE CBC W/AUTO DIFF WBC: CPT

## 2023-02-13 PROCEDURE — 83036 HEMOGLOBIN GLYCOSYLATED A1C: CPT

## 2023-02-13 PROCEDURE — 36415 COLL VENOUS BLD VENIPUNCTURE: CPT

## 2023-02-13 PROCEDURE — G0103 PSA SCREENING: HCPCS

## 2023-02-13 RX ORDER — PREDNISONE 10 MG/1
TABLET ORAL
Qty: 20 TABLET | Refills: 0 | Status: SHIPPED | OUTPATIENT
Start: 2023-02-13

## 2023-02-13 RX ORDER — DEXAMETHASONE SODIUM PHOSPHATE 4 MG/ML
4 INJECTION, SOLUTION INTRA-ARTICULAR; INTRALESIONAL; INTRAMUSCULAR; INTRAVENOUS; SOFT TISSUE ONCE
Status: COMPLETED | OUTPATIENT
Start: 2023-02-13 | End: 2023-02-13

## 2023-02-13 RX ORDER — AZITHROMYCIN 250 MG/1
250 TABLET, FILM COATED ORAL SEE ADMIN INSTRUCTIONS
Qty: 6 TABLET | Refills: 0 | Status: SHIPPED | OUTPATIENT
Start: 2023-02-13 | End: 2023-02-18

## 2023-02-13 RX ORDER — GUAIFENESIN 600 MG/1
600 TABLET, EXTENDED RELEASE ORAL 2 TIMES DAILY
Qty: 30 TABLET | Refills: 0 | Status: SHIPPED | OUTPATIENT
Start: 2023-02-13 | End: 2023-02-28

## 2023-02-13 RX ADMIN — DEXAMETHASONE SODIUM PHOSPHATE 4 MG: 4 INJECTION, SOLUTION INTRA-ARTICULAR; INTRALESIONAL; INTRAMUSCULAR; INTRAVENOUS; SOFT TISSUE at 12:34

## 2023-02-13 SDOH — ECONOMIC STABILITY: HOUSING INSECURITY
IN THE LAST 12 MONTHS, WAS THERE A TIME WHEN YOU DID NOT HAVE A STEADY PLACE TO SLEEP OR SLEPT IN A SHELTER (INCLUDING NOW)?: NO

## 2023-02-13 SDOH — ECONOMIC STABILITY: INCOME INSECURITY: HOW HARD IS IT FOR YOU TO PAY FOR THE VERY BASICS LIKE FOOD, HOUSING, MEDICAL CARE, AND HEATING?: NOT HARD AT ALL

## 2023-02-13 SDOH — ECONOMIC STABILITY: FOOD INSECURITY: WITHIN THE PAST 12 MONTHS, YOU WORRIED THAT YOUR FOOD WOULD RUN OUT BEFORE YOU GOT MONEY TO BUY MORE.: NEVER TRUE

## 2023-02-13 SDOH — ECONOMIC STABILITY: FOOD INSECURITY: WITHIN THE PAST 12 MONTHS, THE FOOD YOU BOUGHT JUST DIDN'T LAST AND YOU DIDN'T HAVE MONEY TO GET MORE.: NEVER TRUE

## 2023-02-13 ASSESSMENT — ENCOUNTER SYMPTOMS
SHORTNESS OF BREATH: 1
VOMITING: 0
NAUSEA: 0
DIARRHEA: 0
CHOKING: 0
TROUBLE SWALLOWING: 0
ALLERGIC/IMMUNOLOGIC NEGATIVE: 1
HEMOPTYSIS: 0
EYE ITCHING: 0
GASTROINTESTINAL NEGATIVE: 1
WHEEZING: 0
ANAL BLEEDING: 0
FACIAL SWELLING: 0
EYE DISCHARGE: 0
BACK PAIN: 0
PHOTOPHOBIA: 0
VOICE CHANGE: 0
CONSTIPATION: 0
ABDOMINAL DISTENTION: 0
SINUS PAIN: 0
COUGH: 1
ORTHOPNEA: 0
CHEST TIGHTNESS: 0
COLOR CHANGE: 1
BLOOD IN STOOL: 0
EYE PAIN: 0
RECTAL PAIN: 0
EYE REDNESS: 0
STRIDOR: 0
SPUTUM PRODUCTION: 0
BLURRED VISION: 0
APNEA: 0
RHINORRHEA: 0
ABDOMINAL PAIN: 0
SORE THROAT: 0
SINUS PRESSURE: 0

## 2023-02-13 NOTE — PROGRESS NOTES
Fredo Ken is a 76 y.o. male. HPI/Chief C/O:  Chief Complaint   Patient presents with    Congestion     Pt presents to the office for congestion, headache, sore throat, SOB and a cough. Pt states this has been going on for a few days. Allergies   Allergen Reactions    Pneumovax [Pneumococcal Polysaccharide Vaccine] Dermatitis   The patient is here for a medication list and treatment planning review  We will go over our care planning goals as well as take care of all refills  We will set up labs as well      C/O congestion and shortness of breath    Shortness of Breath  This is a chronic problem. The current episode started more than 1 year ago. The problem occurs constantly. The problem has been gradually improving. Pertinent negatives include no abdominal pain, chest pain, claudication, coryza, ear pain, fever, headaches, hemoptysis, leg pain, leg swelling, neck pain, orthopnea, PND, rash, rhinorrhea, sore throat, sputum production, syncope, vomiting or wheezing. The symptoms are aggravated by any activity. He has tried steroid inhalers, ipratropium inhalers, beta agonist inhalers and oral steroids for the symptoms. The treatment provided moderate relief. His past medical history is significant for chronic lung disease (R/O pulmonary fibrosis), COPD (emphysema, R/O pulmonary fibrosis) and a heart failure. There is no history of allergies, aspirin allergies, asthma, bronchiolitis, CAD, DVT, PE, pneumonia or a recent surgery. Hypertension  This is a chronic problem. The current episode started more than 1 year ago. The problem is controlled. Associated symptoms include anxiety, malaise/fatigue and shortness of breath. Pertinent negatives include no blurred vision, chest pain, headaches, neck pain, orthopnea, palpitations, peripheral edema, PND or sweats. Risk factors for coronary artery disease include smoking/tobacco exposure, male gender, dyslipidemia, family history and stress.  Past treatments include lifestyle changes, beta blockers, ACE inhibitors and diuretics. The current treatment provides significant improvement. Compliance problems include exercise and diet. Hypertensive end-organ damage includes CAD/MI (aortic stenosis, RBBB,AV block, aortic valve replaced,  AAA , CABG x3 , pacemaker ), heart failure and PVD. There is no history of angina, kidney disease, CVA, left ventricular hypertrophy or retinopathy. There is no history of chronic renal disease, coarctation of the aorta, hyperaldosteronism, hypercortisolism, hyperparathyroidism, a hypertension causing med, pheochromocytoma, renovascular disease, sleep apnea or a thyroid problem. ROS:  Review of Systems   Constitutional:  Positive for fatigue and malaise/fatigue. Negative for activity change, appetite change, chills, diaphoresis, fever and unexpected weight change. HENT:  Positive for congestion. Negative for dental problem, drooling, ear discharge, ear pain, facial swelling, hearing loss, mouth sores, nosebleeds, postnasal drip, rhinorrhea, sinus pressure, sinus pain, sneezing, sore throat, tinnitus, trouble swallowing and voice change. Eyes:  Negative for blurred vision, photophobia, pain, discharge, redness, itching and visual disturbance. Respiratory:  Positive for cough and shortness of breath. Negative for apnea, hemoptysis, sputum production, choking, chest tightness, wheezing and stridor. Cardiovascular: Negative. Negative for chest pain, palpitations, orthopnea, claudication, leg swelling, syncope and PND. Gastrointestinal: Negative. Negative for abdominal distention, abdominal pain, anal bleeding, blood in stool, constipation, diarrhea, nausea, rectal pain and vomiting. Endocrine: Negative. Negative for cold intolerance, heat intolerance, polydipsia, polyphagia and polyuria. Genitourinary: Negative.   Negative for decreased urine volume, difficulty urinating, dysuria, enuresis, flank pain, frequency, genital sores, hematuria, penile discharge, penile pain, penile swelling, scrotal swelling, testicular pain and urgency. Musculoskeletal:  Positive for arthralgias, gait problem and myalgias. Negative for back pain, joint swelling, neck pain and neck stiffness. Skin:  Positive for color change (cyanosis to his nose, feet and hands). Negative for pallor, rash and wound. Allergic/Immunologic: Negative. Negative for environmental allergies, food allergies and immunocompromised state. Neurological:  Positive for weakness. Negative for dizziness, tremors, seizures, syncope, facial asymmetry, speech difficulty, light-headedness, numbness and headaches. Hematological: Negative. Negative for adenopathy. Does not bruise/bleed easily. Psychiatric/Behavioral:  Positive for dysphoric mood and sleep disturbance. Negative for agitation, behavioral problems, confusion, decreased concentration, hallucinations, self-injury and suicidal ideas. The patient is not nervous/anxious and is not hyperactive. Past Medical/Surgical Hx;  Reviewed with patient      Diagnosis Date    Aortic stenosis, mild     Aortic valve sclerosis     moderate    Atrial fibrillation ([address] 2014    CAD (coronary artery disease)     CHF (congestive heart failure) (HCC)     COPD (chronic obstructive pulmonary disease) ([address]     Gallbladder attack     History of tobacco use 9/17/2015    Hyperlipidemia     Hypertension     Mild mitral regurgitation     Prostate cancer ([address]     44 treatments of radiation    PVD (peripheral vascular disease) with claudication ([facility] 9/17/2015    RBBB     Tobacco abuse      Past Surgical History:   Procedure Laterality Date    APPENDECTOMY      CARDIAC CATHETERIZATION  08/08/2014    DR Coates-pt.  states had triple bypass \"5 years ago\"    COLONOSCOPY      CORONARY ARTERY BYPASS GRAFT  08/11/2014    CABGx3 LIMA-LAD, SVG-D1, SVG-OM1, AVR Dr. Palmira Red, COLON, DIAGNOSTIC      PACEMAKER INSERTION Left 2022    BS Dual PPM-GR    PACEMAKER PLACEMENT Left 2014    Dr. Inderjit Jorgensen RS&I N/A 2018    ENDOVASCULAR  AORTIC ANEURYSM REPAIR , ABDOMINAL AORTIC ANEURYSM, WITH FINISTRATED GRAFT performed by Teo Mcnair MD at [address] [facility]         Past Family Hx:  Reviewed with patient      Problem Relation Age of Onset    Heart Attack Brother     Stroke Father        Social Hx:  Reviewed with patient  Social History     Tobacco Use    Smoking status: Former     Packs/day: 0.50     Years: 50.00     Pack years: 25.00     Types: Cigarettes     Quit date: 2014     Years since quittin.6    Smokeless tobacco: Never   Substance Use Topics    Alcohol use: Yes     Alcohol/week: 0.0 standard drinks     Comment: rare;  drinks 1 cup of coffee daily & occ [employer]/Tea       OBJECTIVE  /88   Pulse 72   Temp 97.7 °F (36.5 °C) (Temporal)   Resp 18   Ht 5' 7\" (1.702 m)   Wt 150 lb (68 kg)   SpO2 92%   BMI 23.49 kg/m²     Problem List:  Presley Thompson does not have any pertinent problems on file. PHYS EX:  Physical Exam  Vitals and nursing note reviewed. Constitutional:       General: He is not in acute distress. Appearance: Normal appearance. He is well-developed. He is not ill-appearing, toxic-appearing or diaphoretic. Interventions: He is not intubated. HENT:      Head: Normocephalic and atraumatic. Right Ear: External ear normal.      Left Ear: External ear normal.      Nose: Nose normal. No congestion or rhinorrhea. Mouth/Throat:      Mouth: Mucous membranes are moist.      Pharynx: Oropharynx is clear. No oropharyngeal exudate or posterior oropharyngeal erythema. Eyes:      General: No scleral icterus. Right eye: No discharge. Left eye: No discharge. Neck:      Thyroid: No thyromegaly. Vascular: No carotid bruit or JVD. Trachea: No tracheal deviation. Cardiovascular:      Rate and Rhythm: Regular rhythm. No extrasystoles are present. Chest Wall: PMI is not displaced. Pulses: Normal pulses. No decreased pulses. Heart sounds: Heart sounds not distant. Murmur heard. Systolic murmur is present with a grade of 1/6. No diastolic murmur is present. No friction rub. No gallop. No S3 or S4 sounds. Pulmonary:      Effort: Pulmonary effort is normal. No tachypnea, bradypnea, accessory muscle usage, prolonged expiration, respiratory distress or retractions. He is not intubated. Breath sounds: Decreased air movement present. No stridor or transmitted upper airway sounds. Examination of the right-upper field reveals decreased breath sounds. Examination of the left-upper field reveals decreased breath sounds. Examination of the right-middle field reveals decreased breath sounds. Examination of the left-middle field reveals decreased breath sounds. Examination of the right-lower field reveals decreased breath sounds. Examination of the left-lower field reveals decreased breath sounds. Decreased breath sounds present. No wheezing, rhonchi or rales. Chest:      Chest wall: No tenderness. Abdominal:      General: Bowel sounds are normal. There is no distension. Palpations: Abdomen is soft. There is no mass. Tenderness: There is no abdominal tenderness. There is no right CVA tenderness, left CVA tenderness, guarding or rebound. Hernia: No hernia is present. Genitourinary:     Penis: No tenderness. Comments: H/O prostate cancer   Musculoskeletal:         General: No swelling, tenderness, deformity or signs of injury. Normal range of motion. Cervical back: Normal range of motion and neck supple. No rigidity. No muscular tenderness. Right lower leg: No edema. Left lower leg: No edema. Lymphadenopathy:      Cervical: No cervical adenopathy. Skin:     General: Skin is warm.       Coloration: Skin is not jaundiced or pale. Findings: No bruising, erythema, lesion or rash. Comments: Multiple skin lesions   Cyanosis to his hands, feet and the tip of his nose      Neurological:      General: No focal deficit present. Mental Status: He is alert and oriented to person, place, and time. Cranial Nerves: No cranial nerve deficit. Sensory: Sensory deficit present. Motor: Weakness present. No abnormal muscle tone. Coordination: Coordination abnormal.      Gait: Gait abnormal.      Deep Tendon Reflexes: Reflexes abnormal.      Comments: He is starting to have imbalance issues   R/O Parkinson's        ASSESSMENT/PLAN  Vale Gant was seen today for congestion. Diagnoses and all orders for this visit:    Centrilobular emphysema ([facility]  Long talk on treatment and prevention  Literature is given   --PLAN--aerosol accuneb 1.25 plus chest percussion--Rx      Bronchitis  -     POCT Influenza A/B  -     predniSONE (DELTASONE) 10 MG tablet; One 4 x a  day for 2 days, then ONE 3 x a day for 2 days, then ONE 2 x a day for 2 days, then ONE a day for 2 days  -     guaiFENesin (MUCINEX) 600 MG extended release tablet; Take 1 tablet by mouth 2 times daily for 15 days  -     azithromycin (ZITHROMAX) 250 MG tablet;  Take 1 tablet by mouth See Admin Instructions for 5 days 500mg on day 1 followed by 250mg on days 2 - 5  Take tylenol every 6 hours as needed for temperature, aches and pain  Hydrate with 40 to 50 oz of fluids  I have sent medication in for you  Please keep in touch with me and let me know how you are doing  If you get worse, call as soon as possible or seek immediate medical attention       Prostate CA ([facility]  --stable on current care planning  -- continue treatment as we are meeting goals       AV block, 3rd degree ([facility]    ---VASCULAR PANEL  A) asa, plavix, aggrenox  B) ELIQUIS, pletal, tzd, STATIN  C) ACE, LASIX, folic, ccb  D) cannikinumab, fish oils     ---CARDIAC---ELIQUIS, ACE, BETA, STATIN, FABIO, ( ccb )     Pulmonary fibrosis ([facility]  Long talk on treatment and prevention  Literature is given   --PLAN--aerosol accuneb 1.25 plus chest percussion--Rx      Other orders  -     POCT COVID-19, Antigen  -     dexamethasone (DECADRON) injection 4 mg      Outpatient Encounter Medications as of 2023   Medication Sig Dispense Refill    predniSONE (DELTASONE) 10 MG tablet One 4 x a  day for 2 days, then ONE 3 x a day for 2 days, then ONE 2 x a day for 2 days, then ONE a day for 2 days 20 tablet 0    guaiFENesin (MUCINEX) 600 MG extended release tablet Take 1 tablet by mouth 2 times daily for 15 days 30 tablet 0    azithromycin (ZITHROMAX) 250 MG tablet Take 1 tablet by mouth See Admin Instructions for 5 days 500mg on day 1 followed by 250mg on days 2 - 5 6 tablet 0    traZODone (DESYREL) 50 MG tablet Take 0.5 tablets by mouth nightly as needed for Sleep or Depression 90 tablet 1    Fluticasone-Umeclidin-Vilant (TRELEGY ELLIPTA) 200-62.5-25 MCG/INH AEPB Inhale 1 puff into the lungs daily 1 each 3    lisinopril (PRINIVIL;ZESTRIL) 2.5 MG tablet TAKE 1 TABLET DAILY 90 tablet 3    apixaban (ELIQUIS) 5 MG TABS tablet TAKE 1 TABLET TWICE A  tablet 3    atorvastatin (LIPITOR) 20 MG tablet TAKE 1 TABLET DAILY 90 tablet 3    furosemide (LASIX) 40 MG tablet TAKE 1 TABLET DAILY 90 tablet 3    metoprolol succinate (TOPROL XL) 25 MG extended release tablet 1 tablet daily 90 tablet 3    nitroGLYCERIN (NITROSTAT) 0.3 MG SL tablet Place 1 tablet under the tongue every 5 minutes as needed for Chest pain 30 tablet 3    [] dexamethasone (DECADRON) injection 4 mg        No facility-administered encounter medications on file as of 2023. No follow-ups on file.         Reviewed recent labs related to Kike's current problems      Discussed importance of regular Health Maintenance follow up  Health Maintenance   Topic    Shingles vaccine (1 of 2)    COVID-19 Vaccine (2 - Pfizer series)    Prostate Specific Antigen (PSA) Screening or Monitoring     Annual Wellness Visit (AWV)     Lipids     Low dose CT lung screening     Depression Screen     DTaP/Tdap/Td vaccine (2 - Td or Tdap)    Flu vaccine     Hepatitis C screen     Hepatitis A vaccine     Hib vaccine     Meningococcal (ACWY) vaccine

## 2023-02-13 NOTE — TELEPHONE ENCOUNTER
Pt called through nurse triage. Pt states he is congestion, sore throat, sneezing, no fever, coughing. Pt would like an appointment. Pt scheduled today at 11:45 with Dr. Adan Tompkins.      Electronically signed by Jessenia Carvajal on 2/13/23 at 9:09 AM EST

## 2023-02-21 ENCOUNTER — OFFICE VISIT (OUTPATIENT)
Dept: PULMONOLOGY | Age: 78
End: 2023-02-21
Payer: MEDICARE

## 2023-02-21 ENCOUNTER — TELEPHONE (OUTPATIENT)
Dept: PULMONOLOGY | Age: 78
End: 2023-02-21

## 2023-02-21 VITALS
BODY MASS INDEX: 25.08 KG/M2 | HEIGHT: 67 IN | DIASTOLIC BLOOD PRESSURE: 84 MMHG | TEMPERATURE: 97.4 F | SYSTOLIC BLOOD PRESSURE: 122 MMHG | HEART RATE: 77 BPM | WEIGHT: 159.8 LBS | RESPIRATION RATE: 20 BRPM

## 2023-02-21 DIAGNOSIS — R06.02 SOB (SHORTNESS OF BREATH): ICD-10-CM

## 2023-02-21 DIAGNOSIS — R23.0 CYANOSIS OF FINGERTIP: ICD-10-CM

## 2023-02-21 DIAGNOSIS — R23.0 PERIORAL CYANOSIS: ICD-10-CM

## 2023-02-21 DIAGNOSIS — J84.10 PULMONARY FIBROSIS (HCC): ICD-10-CM

## 2023-02-21 DIAGNOSIS — J43.9 PULMONARY EMPHYSEMA, UNSPECIFIED EMPHYSEMA TYPE (HCC): Primary | ICD-10-CM

## 2023-02-21 PROCEDURE — G8484 FLU IMMUNIZE NO ADMIN: HCPCS | Performed by: NURSE PRACTITIONER

## 2023-02-21 PROCEDURE — 1036F TOBACCO NON-USER: CPT | Performed by: NURSE PRACTITIONER

## 2023-02-21 PROCEDURE — G8428 CUR MEDS NOT DOCUMENT: HCPCS | Performed by: NURSE PRACTITIONER

## 2023-02-21 PROCEDURE — 99203 OFFICE O/P NEW LOW 30 MIN: CPT | Performed by: NURSE PRACTITIONER

## 2023-02-21 PROCEDURE — 99205 OFFICE O/P NEW HI 60 MIN: CPT | Performed by: NURSE PRACTITIONER

## 2023-02-21 PROCEDURE — 3023F SPIROM DOC REV: CPT | Performed by: NURSE PRACTITIONER

## 2023-02-21 PROCEDURE — G8417 CALC BMI ABV UP PARAM F/U: HCPCS | Performed by: NURSE PRACTITIONER

## 2023-02-21 PROCEDURE — 1123F ACP DISCUSS/DSCN MKR DOCD: CPT | Performed by: NURSE PRACTITIONER

## 2023-02-21 RX ORDER — FLUTICASONE FUROATE, UMECLIDINIUM BROMIDE AND VILANTEROL TRIFENATATE 100; 62.5; 25 UG/1; UG/1; UG/1
1 POWDER RESPIRATORY (INHALATION) DAILY
Qty: 1 EACH | Refills: 6 | Status: SHIPPED | OUTPATIENT
Start: 2023-02-21

## 2023-02-21 RX ORDER — ALBUTEROL SULFATE 90 UG/1
2 AEROSOL, METERED RESPIRATORY (INHALATION) EVERY 6 HOURS PRN
Qty: 18 G | Refills: 3 | Status: SHIPPED | OUTPATIENT
Start: 2023-02-21

## 2023-02-21 NOTE — PROGRESS NOTES
[place]  Department of Pulmonary, Critical Care and Sleep Medicine  Dr. Koko Rock, Dr. Mya Perla, Dr. Menjivar [facility], APRN    Pulmonary & Critical Care Office Note - Follow up      Assessment/Plan     Problem List Items Addressed This Visit          Respiratory    Pulmonary fibrosis ([address]    Pulmonary emphysema ([facility] [address] - Primary    Relevant Medications    albuterol sulfate HFA (PROVENTIL HFA) 108 (90 Base) MCG/ACT inhaler    fluticasone-umeclidin-vilant (TRELEGY ELLIPTA) 100-62.5-25 MCG/ACT AEPB inhaler    Other Relevant Orders    Full PFT Study With Bronchodilator    BLOOD GAS, ARTERIAL       Other    SOB (shortness of breath)    Relevant Medications    fluticasone-umeclidin-vilant (TRELEGY ELLIPTA) 100-62.5-25 MCG/ACT AEPB inhaler     Other Visit Diagnoses       Perioral cyanosis        Cyanosis of fingertip              Patient ID: Sourav Diez is a 76 y.o. male    Chief Complaint: Worsening shortness of breath    HPI: Sourav Diez is a 76 y.o. male with a PMH of COPD diagnosed 14 yrs ago. He has not seen a pulmonologist since 2015 when he saw Dr. Gera Hamlin. I do not see PFT on file, will order full PFT with abgs. He states he has been using symbicort and Trelegy at the same time but ran out of Trelegy about 1 week ago, he does not use a rescue inhaler. We discussed the medications contained in each inhaler and their intended use. I did emphasize NOT to use the Symbicort while using the Trelegy and that I will order an albuterol rescue inhaler for him. He states he has become progressively more short of breath over the last 3 weeks and was treated for bronchitis with Zpak, prednisone and guaifenesin. He states his cough has improved but the shortness of breath with activity has not.  Ambulatory pulse oximetry conducted in the office does NOT reveal exercise induced hypoxemia, but I would like to get a 6 minute walk test. His cyanosis does have me concerned, he reports the discoloration to the his fingers and nose are chronic for several years but the lip cyanosis is relatively new. He denies chest pain, hemoptysis or syncope. Previously worked for [employer], retired in [employer]. Labs, imaging, and specialist notes reviewed as available. 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 Start P.r.n. albuterol  Advised on proper inhaler technique, and adherence to prescribed inhalers    Nodule OR Screen - 50-80, smoked ? 20 pack years, smoke or quit within 15 yrs. For repeat LDCT chest December 2023    Aspiration / GERD precautions  Head end of bed elevation. Maintain active and healthy lifestyle with weight reduction. COVID-19 precautions  Recommend yearly Influenza and appropriate pneumonia vaccinations. Allergy to Pneumovax, updated on influenza and Covid vaccines      Family History   Problem Relation Age of Onset    Heart Attack Brother     Stroke Father         Follow up:  Follow up in 6 weeks    Jose Dozier, APRN-CNP  Pulmonary [facility] Patient, Dr. Zabrina Paulino, Dr. Henri Bruner, Dr. Christine Salazar This Encounter   Procedures    BLOOD GAS, ARTERIAL     Standing Status:   Future     Standing Expiration Date:   2/21/2024     Order Specific Question:   Method/ Setting/ Vol/ % O2     Answer:   room air    Full PFT Study With Bronchodilator     ABG also need with PFT, please see additional order     Standing Status:   Future     Standing Expiration Date:   2/21/2024         Immunization History   Administered Date(s) Administered    COVID-19, PFIZER Bivalent BOOSTER, DO NOT Dilute, (age 12y+), IM, 30 mcg/0.3 mL 10/11/2022    Influenza Vaccine, unspecified formulation 10/29/2013, 09/30/2014, 09/15/2016, 10/19/2017, 10/12/2018, 09/04/2020    Influenza Virus Vaccine 10/01/2014, 09/15/2016, 10/19/2017, 10/02/2018, 09/04/2020    Influenza Whole 10/19/2015    Influenza, FLUAD, (age 72 y+), Adjuvanted, 0.5mL 09/04/2020    Influenza, High Dose (Fluzone 65 yrs and older) 10/02/2018    Influenza, Triv, inactivated, subunit, adjuvanted, IM (Fluad 65 yrs and older) 10/07/2019    Pneumococcal Conjugate 13-valent ([id number]) 11/24/2015    Pneumococcal Polysaccharide ([id number]) 07/18/2017, 10/09/2019    Tdap (Boostrix, Adacel) 09/10/2021       Subjective     Last office visit: New patient visit    Exacerbations: None    Pulm Meds: Patient reports using symbicort and Trelegy    Antiplatelet/anticoagulants: Apixaban    Smoking history: 25 pack years     Quit date: 2014      Imaging   Reviewed imaging studies personally and findings as below  No results found. CT chest: December 22, 2022    Impression   1. There is no pulmonary infiltrate, mass or suspicious pulmonary nodule   2. Significant emphysematous changes with development of interstitial   pulmonary fibrosis. LUNG RADS:   Per ACR Lung-RADS Version 1.1       Category 2, Benign appearance or behavior. Management:  Continue annual lung screening with LDCT in 12 months. ECHO: August 12, 2022    Summary  Normal left ventricular chamber size. Normal left ventricular systolic function.   Visually estimated LVEF is 50%.  Abnormal septal motion due to post-operative state and likely RV pressure and volume overload. Indeterminate diastolic function. Dilated RV with at least moderate systolic dysfunction. The left atrium is moderately dilated. Markedly enlarged right atrium size. Mild-moderate mitral regurgitation is present. Moderate-severe tricuspid regurgitation. There is a bioprosthetic aortic valve that appears well seated with acceptable gradients. Top normal estimated PA pressure at 37 mm Hg. This is likely an underestimate. Pacer/ ICD wire present in the right heart. Sleep Study:    ALLERGIES:  Allergies   Allergen Reactions    Pneumovax [Pneumococcal Polysaccharide Vaccine] Dermatitis     SOCIAL HISTORY:   Social History     Tobacco Use    Smoking status: Former     Packs/day: 0.50     Years: 50.00     Pack years: 25.00     Types: Cigarettes     Quit date: 2014     Years since quittin.6    Smokeless tobacco: Never   Vaping Use    Vaping Use: Never used   Substance Use Topics    Alcohol use:  Yes     Alcohol/week: 0.0 standard drinks     Comment: rare;  drinks 1 cup of coffee daily & occ Pepsi/Tea    Drug use: No     MEDS:   Current Outpatient Medications   Medication Sig Dispense Refill    albuterol sulfate HFA (PROVENTIL HFA) 108 (90 Base) MCG/ACT inhaler Inhale 2 puffs into the lungs every 6 hours as needed for Wheezing or Shortness of Breath 18 g 3    fluticasone-umeclidin-vilant (TRELEGY ELLIPTA) 100-62.5-25 MCG/ACT AEPB inhaler Inhale 1 puff into the lungs daily 1 each 6    apixaban (ELIQUIS) 5 MG TABS tablet TAKE 1 TABLET TWICE A  tablet 3    guaiFENesin (MUCINEX) 600 MG extended release tablet Take 1 tablet by mouth 2 times daily for 15 days 30 tablet 0    traZODone (DESYREL) 50 MG tablet Take 0.5 tablets by mouth nightly as needed for Sleep or Depression 90 tablet 1    lisinopril (PRINIVIL;ZESTRIL) 2.5 MG tablet TAKE 1 TABLET DAILY 90 tablet 3    atorvastatin (LIPITOR) 20 MG tablet TAKE 1 TABLET DAILY 90 tablet 3    furosemide (LASIX) 40 MG tablet TAKE 1 TABLET DAILY 90 tablet 3    metoprolol succinate (TOPROL XL) 25 MG extended release tablet 1 tablet daily 90 tablet 3    nitroGLYCERIN (NITROSTAT) 0.3 MG SL tablet Place 1 tablet under the tongue every 5 minutes as needed for Chest pain 30 tablet 3     No current facility-administered medications for this visit. Review of Systems: Negative other than specified above. Objective       Vitals:  BP: 122/84, Heart Rate: 77, Resp: 20, Temp: 97.4 °F (36.3 °C),  ,  , Height: 5' 7\" (170.2 cm), Weight: 159 lb 12.8 oz (72.5 kg)    BMI body mass index is 25.03 kg/m². Ideal Body Weight: Ideal body weight: 66.1 kg (145 lb 11.6 oz)  Adjusted ideal body weight: 68.7 kg (151 lb 5.7 oz)  ---------------  Physical Exam:    General: Alert and oriented x 3. No acute distress. Eyes:  Vision - grossly normal, PERRLA  HEENT:  Head is atraumatic and normocephalic. Neck is supple, no jugular venous distention. Respiratory:  Fine crackles to bilateral bases on auscultation, respirations are nonlabored, breath sounds are equal.  Cardiovascular:  S1, S2 normal, regular rate and rhythm, + murmur, no pedal edema. Nail beds noted to be cyanotic, lips cyanotic. Nose discolored, blue/purple. Incision to right upper forehead with steri-strips. Gastrointestinal:  Soft, nontender, nondistended. Normal bowel sounds. No organomegaly  Neurologic:  Awake and alert, cranial nerves 2-12 grossly intact, no focal motor or sensory deficits.     Labs     CBC with Differential:    Lab Results   Component Value Date/Time    WBC 7.5 02/13/2023 01:20 PM    RBC 4.98 02/13/2023 01:20 PM    HGB 15.8 02/13/2023 01:20 PM    HCT 47.4 02/13/2023 01:20 PM     02/13/2023 01:20 PM    MCV 95.2 02/13/2023 01:20 PM    MCH 31.7 02/13/2023 01:20 PM    MCHC 33.3 02/13/2023 01:20 PM    RDW 17.5 02/13/2023 01:20 PM    NRBC 1.7 09/23/2019 05:40 AM    METASPCT 0.9 09/24/2019 07:01 AM LYMPHOPCT 14.1 02/13/2023 01:20 PM    MONOPCT 7.4 02/13/2023 01:20 PM    MYELOPCT 4.3 09/24/2019 07:01 AM    BASOPCT 0.9 02/13/2023 01:20 PM    MONOSABS 0.55 02/13/2023 01:20 PM    LYMPHSABS 1.05 02/13/2023 01:20 PM    EOSABS 0.11 02/13/2023 01:20 PM    BASOSABS 0.07 02/13/2023 01:20 PM     CMP:    Lab Results   Component Value Date/Time     02/13/2023 01:20 PM    K 4.1 02/13/2023 01:20 PM    K 3.7 09/16/2019 08:40 PM     02/13/2023 01:20 PM    CO2 21 02/13/2023 01:20 PM    BUN 17 02/13/2023 01:20 PM    CREATININE 0.9 02/13/2023 01:20 PM    GFRAA >60 08/22/2022 12:30 PM    LABGLOM >60 02/13/2023 01:20 PM    GLUCOSE 88 02/13/2023 01:20 PM    PROT 8.0 02/13/2023 01:20 PM    LABALBU 3.9 02/13/2023 01:20 PM    CALCIUM 9.5 02/13/2023 01:20 PM    BILITOT 3.7 02/13/2023 01:20 PM    ALKPHOS 167 02/13/2023 01:20 PM    AST 22 02/13/2023 01:20 PM    ALT 6 02/13/2023 01:20 PM     Magnesium:    Lab Results   Component Value Date/Time    MG 2.3 08/22/2022 12:30 PM     Phosphorus:    Lab Results   Component Value Date/Time    PHOS 2.3 09/18/2019 08:23 AM     HgBA1c:    Lab Results   Component Value Date/Time    LABA1C 6.2 02/13/2023 01:20 PM      I hope this updates you on my evaluation and clinical thinking. Thank you for allowing me to participate in the care of Vanessa Bermeo       Sincerely,    Electronically signed by Antonino Stapleton, NURY [employer]le[employer] CNP on 2/21/2023 at 12:19 PM

## 2023-02-21 NOTE — PROGRESS NOTES
New patient to see Pulmonary Provider today in office for shortness of breath and breathing issues. Ambulatory pulse ox done at visit and O2 sat remained above 91% with walk. Plan for pt to begin Trelegy(samples given at today's visit) and script sent to pharmacy. Office to arrange for Pulmonary function testing and ABGs and then follow up in 6 weeks. Albuterol inhaler also sent to pharmacy and AVS printed and reviewed with pt.

## 2023-02-21 NOTE — TELEPHONE ENCOUNTER
Mailed a letter to patent informing him that his PFT is scheduled for 3-16-23 at 11:00 am at the [facility]. He must arrive by 10:30 am. He is to have no caffeine for 24 hours and no resp meds for at least 4 hours prior.     His 6 minute walk test is scheduled for 3-16-23 at 1:00 pm. He is to wear comfortable shoes and clothing

## 2023-03-01 ENCOUNTER — OFFICE VISIT (OUTPATIENT)
Dept: FAMILY MEDICINE CLINIC | Age: 78
End: 2023-03-01

## 2023-03-01 VITALS
HEART RATE: 73 BPM | BODY MASS INDEX: 23.54 KG/M2 | OXYGEN SATURATION: 99 % | WEIGHT: 150 LBS | RESPIRATION RATE: 18 BRPM | TEMPERATURE: 97.7 F | SYSTOLIC BLOOD PRESSURE: 108 MMHG | DIASTOLIC BLOOD PRESSURE: 70 MMHG | HEIGHT: 67 IN

## 2023-03-01 DIAGNOSIS — J84.10 PULMONARY FIBROSIS (HCC): ICD-10-CM

## 2023-03-01 DIAGNOSIS — I50.22 CHRONIC SYSTOLIC CONGESTIVE HEART FAILURE (HCC): Primary | ICD-10-CM

## 2023-03-01 DIAGNOSIS — J43.2 CENTRILOBULAR EMPHYSEMA (HCC): ICD-10-CM

## 2023-03-01 DIAGNOSIS — N18.31 STAGE 3A CHRONIC KIDNEY DISEASE (HCC): ICD-10-CM

## 2023-03-01 DIAGNOSIS — Z95.1 S/P CABG X 3: ICD-10-CM

## 2023-03-01 DIAGNOSIS — I48.0 PAF (PAROXYSMAL ATRIAL FIBRILLATION) (HCC): ICD-10-CM

## 2023-03-01 DIAGNOSIS — Z95.3 H/O AORTIC VALVE REPLACEMENT WITH PORCINE VALVE: ICD-10-CM

## 2023-03-01 DIAGNOSIS — C61 PROSTATE CA (HCC): ICD-10-CM

## 2023-03-01 DIAGNOSIS — I73.9 PVD (PERIPHERAL VASCULAR DISEASE) (HCC): ICD-10-CM

## 2023-03-01 DIAGNOSIS — I44.2 AV BLOCK, 3RD DEGREE (HCC): ICD-10-CM

## 2023-03-01 ASSESSMENT — ENCOUNTER SYMPTOMS
ABDOMINAL DISTENTION: 0
ANAL BLEEDING: 0
COLOR CHANGE: 1
DIARRHEA: 0
BACK PAIN: 0
BLURRED VISION: 0
EYE REDNESS: 0
RHINORRHEA: 0
BLOOD IN STOOL: 0
SORE THROAT: 0
CHEST TIGHTNESS: 0
TROUBLE SWALLOWING: 0
VOMITING: 0
ALLERGIC/IMMUNOLOGIC NEGATIVE: 1
SHORTNESS OF BREATH: 1
ORTHOPNEA: 0
NAUSEA: 0
EYE PAIN: 0
EYE DISCHARGE: 0
SINUS PAIN: 0
FACIAL SWELLING: 0
WHEEZING: 0
ABDOMINAL PAIN: 0
SPUTUM PRODUCTION: 0
STRIDOR: 0
EYE ITCHING: 0
PHOTOPHOBIA: 0
APNEA: 0
SINUS PRESSURE: 0
GASTROINTESTINAL NEGATIVE: 1
COUGH: 1
VOICE CHANGE: 0
HEMOPTYSIS: 0
CONSTIPATION: 0
RECTAL PAIN: 0
CHOKING: 0

## 2023-03-01 NOTE — PROGRESS NOTES
SUBJECTIVE  Rupesh Alvarenga is a 76 y.o. male. HPI/Chief C/O:  Chief Complaint   Patient presents with    Foot Swelling     Pt presents to the office for bilateral foot swelling. Pt states he noticed the foot swelling over the weekend. Allergies   Allergen Reactions    Pneumovax [Pneumococcal Polysaccharide Vaccine] Dermatitis   The patient is here for a medication list and treatment planning review  We will go over our care planning goals as well as take care of all refills  We will set up labs as well      C/O his feet are swelling     Shortness of Breath  This is a chronic problem. The current episode started more than 1 year ago. The problem occurs constantly. The problem has been gradually improving. Associated symptoms include leg swelling. Pertinent negatives include no abdominal pain, chest pain, claudication, coryza, ear pain, fever, headaches, hemoptysis, leg pain, neck pain, orthopnea, PND, rash, rhinorrhea, sore throat, sputum production, syncope, vomiting or wheezing. The symptoms are aggravated by any activity. He has tried steroid inhalers, ipratropium inhalers, beta agonist inhalers and oral steroids for the symptoms. The treatment provided moderate relief. His past medical history is significant for chronic lung disease (R/O pulmonary fibrosis), COPD (emphysema, R/O pulmonary fibrosis) and a heart failure. There is no history of allergies, aspirin allergies, asthma, bronchiolitis, CAD, DVT, PE, pneumonia or a recent surgery. Hypertension  This is a chronic problem. The current episode started more than 1 year ago. The problem is controlled. Associated symptoms include anxiety, malaise/fatigue, peripheral edema and shortness of breath. Pertinent negatives include no blurred vision, chest pain, headaches, neck pain, orthopnea, palpitations, PND or sweats. Risk factors for coronary artery disease include smoking/tobacco exposure, male gender, dyslipidemia, family history and stress.  Past treatments include lifestyle changes, beta blockers, ACE inhibitors and diuretics. The current treatment provides significant improvement. Compliance problems include exercise and diet. Hypertensive end-organ damage includes CAD/MI (aortic stenosis, RBBB,AV block, aortic valve replaced,  AAA , CABG x3 , pacemaker ), heart failure and PVD. There is no history of angina, kidney disease, CVA, left ventricular hypertrophy or retinopathy. There is no history of chronic renal disease, coarctation of the aorta, hyperaldosteronism, hypercortisolism, hyperparathyroidism, a hypertension causing med, pheochromocytoma, renovascular disease, sleep apnea or a thyroid problem. ROS:  Review of Systems   Constitutional:  Positive for fatigue and malaise/fatigue. Negative for activity change, appetite change, chills, diaphoresis, fever and unexpected weight change. HENT:  Negative for congestion, dental problem, drooling, ear discharge, ear pain, facial swelling, hearing loss, mouth sores, nosebleeds, postnasal drip, rhinorrhea, sinus pressure, sinus pain, sneezing, sore throat, tinnitus, trouble swallowing and voice change. Eyes:  Negative for blurred vision, photophobia, pain, discharge, redness, itching and visual disturbance. Respiratory:  Positive for cough and shortness of breath. Negative for apnea, hemoptysis, sputum production, choking, chest tightness, wheezing and stridor. Cardiovascular:  Positive for leg swelling. Negative for chest pain, palpitations, orthopnea, claudication, syncope and PND. Gastrointestinal: Negative. Negative for abdominal distention, abdominal pain, anal bleeding, blood in stool, constipation, diarrhea, nausea, rectal pain and vomiting. Endocrine: Negative. Negative for cold intolerance, heat intolerance, polydipsia, polyphagia and polyuria. Genitourinary: Negative.   Negative for decreased urine volume, difficulty urinating, dysuria, enuresis, flank pain, frequency, genital sores, hematuria, penile discharge, penile pain, penile swelling, scrotal swelling, testicular pain and urgency. Musculoskeletal:  Positive for arthralgias, gait problem and myalgias. Negative for back pain, joint swelling, neck pain and neck stiffness. Skin:  Positive for color change (cyanosis to his nose, feet and hands). Negative for pallor, rash and wound. Allergic/Immunologic: Negative. Negative for environmental allergies, food allergies and immunocompromised state. Neurological:  Positive for weakness. Negative for dizziness, tremors, seizures, syncope, facial asymmetry, speech difficulty, light-headedness, numbness and headaches. Hematological: Negative. Negative for adenopathy. Does not bruise/bleed easily. Psychiatric/Behavioral:  Positive for dysphoric mood and sleep disturbance. Negative for agitation, behavioral problems, confusion, decreased concentration, hallucinations, self-injury and suicidal ideas. The patient is not nervous/anxious and is not hyperactive. Past Medical/Surgical Hx;  Reviewed with patient      Diagnosis Date    Aortic stenosis, mild     Aortic valve sclerosis     moderate    Atrial fibrillation ([address] 2014    CAD (coronary artery disease)     CHF (congestive heart failure) ([facility])     COPD (chronic obstructive pulmonary disease) ([address]     Gallbladder attack     History of tobacco use 9/17/2015    Hyperlipidemia     Hypertension     Mild mitral regurgitation     Prostate cancer ([address]     44 treatments of radiation    PVD (peripheral vascular disease) with claudication ([facility] 9/17/2015    RBBB     Tobacco abuse      Past Surgical History:   Procedure Laterality Date    APPENDECTOMY      CARDIAC CATHETERIZATION  08/08/2014    DR Coates-pt.  states had triple bypass \"5 years ago\"    COLONOSCOPY      CORONARY ARTERY BYPASS GRAFT  08/11/2014    CABGx3 LIMA-LAD, SVG-D1, SVG-OM1, AVR Dr. Yesica Eddy, COLON, DIAGNOSTIC      PACEMAKER INSERTION Left 08/22/2022    [facility] Dual PPM-GR    PACEMAKER PLACEMENT Left 2014    Dr. Katelyn Erickson RS&I N/A 2018    ENDOVASCULAR  AORTIC ANEURYSM REPAIR , ABDOMINAL AORTIC ANEURYSM, WITH FINISTRATED GRAFT performed by Patito Gary MD at [facility]         Past Family Hx:  Reviewed with patient      Problem Relation Age of Onset    Heart Attack Brother     Stroke Father        Social Hx:  Reviewed with patient  Social History     Tobacco Use    Smoking status: Former     Packs/day: 0.50     Years: 50.00     Pack years: 25.00     Types: Cigarettes     Quit date: 2014     Years since quittin.7    Smokeless tobacco: Never   Substance Use Topics    Alcohol use: Yes     Alcohol/week: 0.0 standard drinks     Comment: rare;  drinks 1 cup of coffee daily & occ Pepsi/Tea       OBJECTIVE  /70   Pulse 73   Temp 97.7 °F (36.5 °C) (Temporal)   Resp 18   Ht 5' 7\" (1.702 m)   Wt 150 lb (68 kg)   SpO2 99%   BMI 23.49 kg/m²     Problem List:  Abel Geronimo does not have any pertinent problems on file. PHYS EX:  Physical Exam  Vitals and nursing note reviewed. Constitutional:       General: He is not in acute distress. Appearance: Normal appearance. He is well-developed. He is not ill-appearing, toxic-appearing or diaphoretic. Interventions: He is not intubated. HENT:      Head: Normocephalic and atraumatic. Right Ear: External ear normal.      Left Ear: External ear normal.      Nose: Nose normal. No congestion or rhinorrhea. Mouth/Throat:      Mouth: Mucous membranes are moist.      Pharynx: Oropharynx is clear. No oropharyngeal exudate or posterior oropharyngeal erythema. Eyes:      General: No scleral icterus. Right eye: No discharge. Left eye: No discharge. Neck:      Thyroid: No thyromegaly. Vascular: No carotid bruit or JVD. Trachea: No tracheal deviation.    Cardiovascular:      Rate and Rhythm: Regular rhythm. No extrasystoles are present. Chest Wall: PMI is not displaced. Pulses: Normal pulses. No decreased pulses. Heart sounds: Heart sounds not distant. Murmur heard. Systolic murmur is present with a grade of 1/6. No diastolic murmur is present. No friction rub. No gallop. No S3 or S4 sounds. Pulmonary:      Effort: Pulmonary effort is normal. No tachypnea, bradypnea, accessory muscle usage, prolonged expiration, respiratory distress or retractions. He is not intubated. Breath sounds: Decreased air movement present. No stridor or transmitted upper airway sounds. Examination of the right-upper field reveals decreased breath sounds. Examination of the left-upper field reveals decreased breath sounds. Examination of the right-middle field reveals decreased breath sounds. Examination of the left-middle field reveals decreased breath sounds. Examination of the right-lower field reveals decreased breath sounds. Examination of the left-lower field reveals decreased breath sounds. Decreased breath sounds present. No wheezing, rhonchi or rales. Chest:      Chest wall: No tenderness. Abdominal:      General: Bowel sounds are normal. There is no distension. Palpations: Abdomen is soft. There is no mass. Tenderness: There is no abdominal tenderness. There is no right CVA tenderness, left CVA tenderness, guarding or rebound. Hernia: No hernia is present. Genitourinary:     Penis: No tenderness. Comments: H/O prostate cancer   Musculoskeletal:         General: No swelling, tenderness, deformity or signs of injury. Normal range of motion. Cervical back: Normal range of motion and neck supple. No rigidity. No muscular tenderness. Right lower leg: Edema present. Left lower leg: Edema present. Lymphadenopathy:      Cervical: No cervical adenopathy. Skin:     General: Skin is warm. Coloration: Skin is not jaundiced or pale. Findings: No bruising, erythema, lesion or rash. Comments: Multiple skin lesions   Cyanosis to his hands, feet and the tip of his nose      Neurological:      General: No focal deficit present. Mental Status: He is alert and oriented to person, place, and time. Cranial Nerves: No cranial nerve deficit. Sensory: Sensory deficit present. Motor: Weakness present. No abnormal muscle tone. Coordination: Coordination abnormal.      Gait: Gait abnormal.      Deep Tendon Reflexes: Reflexes abnormal.      Comments: He is starting to have imbalance issues   R/O Parkinson's        ASSESSMENT/PLAN  Makenna Becerra was seen today for foot swelling. Diagnoses and all orders for this visit:    Chronic systolic congestive heart failure (HCC)  -     dapagliflozin (FARXIGA) 5 MG tablet; Take 1 tablet by mouth every morning  Long talk on treatment and prevention  Literature is given   --I will add an SGLT- 2     Stage 3a chronic kidney disease (HCC)  -     dapagliflozin (FARXIGA) 5 MG tablet;  Take 1 tablet by mouth every morning    ---VASCULAR PANEL  A) asa, plavix, aggrenox  B) ELIQUIS, pletal, tzd, STATIN  C) ACE, LASIX, folic, ccb  D) cannikinumab, fish oils     ---CARDIAC---ELIQUIS, ACE, BETA, STATIN, LASIX, ( ccb )     A) ACE or arb  B) LIPITOR 20  or crestor ( 20 to 40 )  C) glp-1 or SGLT- 2       Centrilobular emphysema (HCC)  --stable on current care planning  -- continue treatment as we are meeting goals   --PLAN--aerosol accuneb 1.25 plus chest percussion--Rx      PAF (paroxysmal atrial fibrillation) ([facility])  --stable on current care planning  -- continue treatment as we are meeting goals       PVD (peripheral vascular disease) ([address]  --stable on current care planning  -- continue treatment as we are meeting goals       AV block, 3rd degree ([address]  --stable on current care planning  -- continue treatment as we are meeting goals       H/O aortic valve replacement with porcine valve  --stable on current care planning  -- continue treatment as we are meeting goals       S/P CABG x 3  --stable on current care planning  -- continue treatment as we are meeting goals       Prostate CA [facility])  --stable on current care planning  -- continue treatment as we are meeting goals       Pulmonary fibrosis [facility])  Long talk on treatment and prevention  Literature is given   --PLAN--aerosol accuneb 1.25 plus chest percussion--Rx        Outpatient Encounter Medications as of 3/1/2023   Medication Sig Dispense Refill    dapagliflozin (FARXIGA) 5 MG tablet Take 1 tablet by mouth every morning 90 tablet 1    albuterol sulfate HFA (PROVENTIL HFA) 108 (90 Base) MCG/ACT inhaler Inhale 2 puffs into the lungs every 6 hours as needed for Wheezing or Shortness of Breath 18 g 3    fluticasone-umeclidin-vilant (TRELEGY ELLIPTA) 100-62.5-25 MCG/ACT AEPB inhaler Inhale 1 puff into the lungs daily 1 each 6    traZODone (DESYREL) 50 MG tablet Take 0.5 tablets by mouth nightly as needed for Sleep or Depression 90 tablet 1    lisinopril (PRINIVIL;ZESTRIL) 2.5 MG tablet TAKE 1 TABLET DAILY 90 tablet 3    apixaban (ELIQUIS) 5 MG TABS tablet TAKE 1 TABLET TWICE A  tablet 3    atorvastatin (LIPITOR) 20 MG tablet TAKE 1 TABLET DAILY 90 tablet 3    furosemide (LASIX) 40 MG tablet TAKE 1 TABLET DAILY 90 tablet 3    metoprolol succinate (TOPROL XL) 25 MG extended release tablet 1 tablet daily 90 tablet 3    nitroGLYCERIN (NITROSTAT) 0.3 MG SL tablet Place 1 tablet under the tongue every 5 minutes as needed for Chest pain 30 tablet 3     No facility-administered encounter medications on file as of 3/1/2023. No follow-ups on file.         Reviewed recent labs related to Kike's current problems      Discussed importance of regular Health Maintenance follow up  Health Maintenance   Topic    Shingles vaccine (1 of 2)    COVID-19 Vaccine (2 - Pfizer series)    Annual Wellness Visit (AWV)     Lipids     Low dose CT lung screening     Depression Screen Prostate Specific Antigen (PSA) Screening or Monitoring     DTaP/Tdap/Td vaccine (2 - Td or Tdap)    Flu vaccine     Hepatitis C screen     Hepatitis A vaccine     Hib vaccine     Meningococcal (ACWY) vaccine

## 2023-03-02 DIAGNOSIS — N18.31 STAGE 3A CHRONIC KIDNEY DISEASE (HCC): Primary | ICD-10-CM

## 2023-03-02 DIAGNOSIS — R60.9 EDEMA, UNSPECIFIED TYPE: ICD-10-CM

## 2023-03-16 ENCOUNTER — HOSPITAL ENCOUNTER (OUTPATIENT)
Dept: PULMONOLOGY | Age: 78
End: 2023-03-16
Payer: MEDICARE

## 2023-03-16 ENCOUNTER — HOSPITAL ENCOUNTER (OUTPATIENT)
Dept: PULMONOLOGY | Age: 78
Discharge: HOME | End: 2023-03-16
Payer: MEDICARE

## 2023-03-16 VITALS — BODY MASS INDEX: 23.54 KG/M2 | HEIGHT: 67 IN | WEIGHT: 150 LBS

## 2023-03-16 DIAGNOSIS — J43.9 PULMONARY EMPHYSEMA, UNSPECIFIED EMPHYSEMA TYPE (HCC): ICD-10-CM

## 2023-03-16 DIAGNOSIS — R23.0 PERIORAL CYANOSIS: ICD-10-CM

## 2023-03-16 PROCEDURE — 94060 EVALUATION OF WHEEZING: CPT

## 2023-03-16 PROCEDURE — 94729 DIFFUSING CAPACITY: CPT

## 2023-03-16 PROCEDURE — 94726 PLETHYSMOGRAPHY LUNG VOLUMES: CPT

## 2023-03-16 PROCEDURE — 94618 PULMONARY STRESS TESTING: CPT

## 2023-03-16 ASSESSMENT — 6 MINUTE WALK TEST (6MWT)
HEART RATE: 70
O2 SATURATION: 95
% PREDICTED: 54.09
BORG DYSPNEA SCALE SCORE: 2
BORG FATIGUE SCALE SCORE: 6
BORG FATIGUE SCALE SCORE: 3
BORG FATIGUE SCALE SCORE: 7
BORG DYSPNEA SCALE SCORE: 7
TOTAL DISTANCE WALKED (M): 252.58
HEART RATE: 70
HEART RATE: 70
OXYGEN DEVICE: ROOM AIR
BORG DYSPNEA SCALE SCORE: 5
DID PATIENT STOP OR PAUSE BEFORE 6 MINUTES?: NO
BLOOD PRESSURE: 118/74
BLOOD PRESSURE 1: 111/69
O2 SATURATION: 98
ADDTIONAL O2 FLOW RATE (L/MIN): NO
O2 SATURATION: 97
BLOOD PRESSURE: 113/71

## 2023-03-16 NOTE — PROGRESS NOTES
03/16/23 1218   Data Measured Before Walk   Height 5' 7\" (1.702 m)   Weight 150 lb (68 kg)   HR 70   Blood Pressure 111/69   O2 Saturation 97   O2 Device Room air   Ubaldo Dyspnea Scale 2   Ubaldo Fatigue Scale 7   Data Measured During the Walk   Heart Rate   (UNABLE TO MEASURE DUE TO PATIENT PERIPHERAL CYANOSIS)   O2 Saturation   (UNABLE TO MEASURE DUE TO PATIENT PERIPHERAL CYANOSIS)   Need Additional O2 Flow Rate Rows No   Symptoms Leg pain; Fatigue;SOB   Ubaldo Dyspnea Scale 7   Ubaldo Fatigue Scale 6   Data Measured Immediately After Walk   Did Patient Stop or Pause Before 6 Minutes No   Predicted Distance (m) 467 Meters   Total Distance Walked (m) 252.58 Meters   % Predicted 54.09   Heart Rate 70   Blood Pressure 118/74   O2 Saturation 95   Ubaldo Dyspnea Scale 5   Ubaldo Fatigue Scale 3   Data Measured 5 Minutes After Walk   Heart Rate 70   Blood Pressure 113/71   O2 Saturation 98

## 2023-03-30 ENCOUNTER — OFFICE VISIT (OUTPATIENT)
Dept: CARDIOLOGY CLINIC | Age: 78
End: 2023-03-30
Payer: MEDICARE

## 2023-03-30 VITALS
HEART RATE: 71 BPM | SYSTOLIC BLOOD PRESSURE: 106 MMHG | HEIGHT: 67 IN | WEIGHT: 143 LBS | DIASTOLIC BLOOD PRESSURE: 62 MMHG | RESPIRATION RATE: 16 BRPM | BODY MASS INDEX: 22.44 KG/M2

## 2023-03-30 DIAGNOSIS — E80.6 HYPERBILIRUBINEMIA: ICD-10-CM

## 2023-03-30 DIAGNOSIS — I34.0 NONRHEUMATIC MITRAL VALVE REGURGITATION: ICD-10-CM

## 2023-03-30 DIAGNOSIS — J43.9 PULMONARY EMPHYSEMA, UNSPECIFIED EMPHYSEMA TYPE (HCC): ICD-10-CM

## 2023-03-30 DIAGNOSIS — I25.5 ISCHEMIC CARDIOMYOPATHY: ICD-10-CM

## 2023-03-30 DIAGNOSIS — Z87.09 HISTORY OF ACUTE RESPIRATORY FAILURE: ICD-10-CM

## 2023-03-30 DIAGNOSIS — Z79.01 ANTICOAGULATED BY ANTICOAGULATION TREATMENT: ICD-10-CM

## 2023-03-30 DIAGNOSIS — Z79.899 ON STATIN THERAPY: ICD-10-CM

## 2023-03-30 DIAGNOSIS — Z87.891 EX-SMOKER: ICD-10-CM

## 2023-03-30 DIAGNOSIS — Z95.1 HX OF CABG: ICD-10-CM

## 2023-03-30 DIAGNOSIS — Z86.39 H/O HYPERLIPIDEMIA: ICD-10-CM

## 2023-03-30 DIAGNOSIS — Z85.46 H/O PROSTATE CANCER: ICD-10-CM

## 2023-03-30 DIAGNOSIS — M51.36 DDD (DEGENERATIVE DISC DISEASE), LUMBAR: ICD-10-CM

## 2023-03-30 DIAGNOSIS — F41.9 ANXIETY: ICD-10-CM

## 2023-03-30 DIAGNOSIS — R29.898 WEAKNESS OF BOTH LOWER EXTREMITIES: ICD-10-CM

## 2023-03-30 DIAGNOSIS — N18.2 CKD (CHRONIC KIDNEY DISEASE) STAGE 2, GFR 60-89 ML/MIN: ICD-10-CM

## 2023-03-30 DIAGNOSIS — Z95.0 PACEMAKER: ICD-10-CM

## 2023-03-30 DIAGNOSIS — I38 VHD (VALVULAR HEART DISEASE): ICD-10-CM

## 2023-03-30 DIAGNOSIS — Z86.79 H/O CHF: ICD-10-CM

## 2023-03-30 DIAGNOSIS — Z95.828 S/P AAA REPAIR USING BIFURCATION GRAFT: ICD-10-CM

## 2023-03-30 DIAGNOSIS — Z87.01 H/O: PNEUMONIA: ICD-10-CM

## 2023-03-30 DIAGNOSIS — I51.9 RIGHT VENTRICULAR DYSFUNCTION: ICD-10-CM

## 2023-03-30 DIAGNOSIS — E79.0 HYPERURICEMIA: ICD-10-CM

## 2023-03-30 DIAGNOSIS — I44.39 HIGH-GRADE ATRIOVENTRICULAR BLOCK: ICD-10-CM

## 2023-03-30 DIAGNOSIS — I48.21 PERMANENT ATRIAL FIBRILLATION (HCC): Primary | ICD-10-CM

## 2023-03-30 DIAGNOSIS — I36.1 NONRHEUMATIC TRICUSPID VALVE REGURGITATION: ICD-10-CM

## 2023-03-30 DIAGNOSIS — R73.03 PREDIABETES: ICD-10-CM

## 2023-03-30 DIAGNOSIS — Z95.2 S/P AVR (AORTIC VALVE REPLACEMENT): ICD-10-CM

## 2023-03-30 DIAGNOSIS — Z86.79 S/P AAA REPAIR USING BIFURCATION GRAFT: ICD-10-CM

## 2023-03-30 PROCEDURE — 3023F SPIROM DOC REV: CPT | Performed by: INTERNAL MEDICINE

## 2023-03-30 PROCEDURE — G8420 CALC BMI NORM PARAMETERS: HCPCS | Performed by: INTERNAL MEDICINE

## 2023-03-30 PROCEDURE — 1036F TOBACCO NON-USER: CPT | Performed by: INTERNAL MEDICINE

## 2023-03-30 PROCEDURE — G8427 DOCREV CUR MEDS BY ELIG CLIN: HCPCS | Performed by: INTERNAL MEDICINE

## 2023-03-30 PROCEDURE — 93000 ELECTROCARDIOGRAM COMPLETE: CPT | Performed by: INTERNAL MEDICINE

## 2023-03-30 PROCEDURE — 99213 OFFICE O/P EST LOW 20 MIN: CPT | Performed by: INTERNAL MEDICINE

## 2023-03-30 PROCEDURE — G8484 FLU IMMUNIZE NO ADMIN: HCPCS | Performed by: INTERNAL MEDICINE

## 2023-03-30 PROCEDURE — 1123F ACP DISCUSS/DSCN MKR DOCD: CPT | Performed by: INTERNAL MEDICINE

## 2023-03-31 NOTE — PROGRESS NOTES
chronic exertional dyspnea which has not worsened:  He gets short of breath going up steps or walking up an incline. He denies orthopnea, PNDs or significant lower extremity swelling. He denies palpitations, dizziness, presyncope or syncope. He had his pacemaker pulse generator changed by Dr. Chery Slaughter [facility]) on 08/22/2022. He had pulmonary function tests done on 03/16/2023 as ordered by his PCP. REVIEW OF SYSTEMS:    CONSTITUTIONAL:  Denies fevers, chills or night sweats. HEENT:  Denies headaches. Denies changes in hearing or vision. Denies dysphagia, hoarseness, hemoptysis, hematemesis or epistaxis. ENDOCRINE:  Denies polyphagia, polydipsia or polyuria. Denies heat or cold intolerance. MUSCULOSKELETAL:  Denies any significant arthralgias or myalgias. SKIN:  He has been having on and off skin lesions and has an appointment with dermatology in that regard. HEME/LYMPH:  Denies palpable lymph nodes. He denies bleeding or easy bruisability. HEART:  As above. LUNGS:  Denies any cough or sputum production. GI: Denies abdominal pain, nausea, vomiting,diarrhea, constipation, rectal bleeding or tarry stools. :  Denies hematuria or dysuria. PSYCHIATRIC:  Denies mood changes, anxiety or depression. NEUROLOGIC:  Denies memory loss, motor weakness, numbness, tingling or tremors. CARDIOVASCULAR HISTORY:    1. History of tobacco abuse. 2. Echocardiogram done on 7/21/2014 was read by Dr. Sunny Dodge as a technically very poor and limited study showing low normal left ventricular systolic function with an estimated ejection fraction of 50% with stage 1 left ventricular diastolic dysfunction and with septal motion consistent with conduction abnormality. Mild mitral regurgitation. Moderate aortic valve sclerosis with mild aortic stenosis with a peak/mean gradient of 36/16 mmHg with mild aortic regurgitation. Small localized pericardial effusion near the right ventricle.     3.

## 2023-04-04 ENCOUNTER — OFFICE VISIT (OUTPATIENT)
Dept: PULMONOLOGY | Age: 78
End: 2023-04-04
Payer: MEDICARE

## 2023-04-04 VITALS
BODY MASS INDEX: 22.91 KG/M2 | WEIGHT: 146 LBS | HEIGHT: 67 IN | TEMPERATURE: 97.3 F | OXYGEN SATURATION: 99 % | SYSTOLIC BLOOD PRESSURE: 100 MMHG | RESPIRATION RATE: 18 BRPM | DIASTOLIC BLOOD PRESSURE: 60 MMHG | HEART RATE: 69 BPM

## 2023-04-04 DIAGNOSIS — J84.10 PULMONARY FIBROSIS (HCC): ICD-10-CM

## 2023-04-04 DIAGNOSIS — R06.09 DYSPNEA ON EXERTION: Primary | ICD-10-CM

## 2023-04-04 DIAGNOSIS — J43.9 PULMONARY EMPHYSEMA, UNSPECIFIED EMPHYSEMA TYPE (HCC): ICD-10-CM

## 2023-04-04 DIAGNOSIS — R29.898 WEAKNESS OF BOTH LOWER EXTREMITIES: ICD-10-CM

## 2023-04-04 DIAGNOSIS — I73.9 CLAUDICATION OF BOTH LOWER EXTREMITIES (HCC): ICD-10-CM

## 2023-04-04 DIAGNOSIS — R23.0 CYANOSIS: ICD-10-CM

## 2023-04-04 PROCEDURE — 99215 OFFICE O/P EST HI 40 MIN: CPT | Performed by: NURSE PRACTITIONER

## 2023-04-04 PROCEDURE — 3023F SPIROM DOC REV: CPT | Performed by: NURSE PRACTITIONER

## 2023-04-04 PROCEDURE — G8420 CALC BMI NORM PARAMETERS: HCPCS | Performed by: NURSE PRACTITIONER

## 2023-04-04 PROCEDURE — 1123F ACP DISCUSS/DSCN MKR DOCD: CPT | Performed by: NURSE PRACTITIONER

## 2023-04-04 PROCEDURE — G8427 DOCREV CUR MEDS BY ELIG CLIN: HCPCS | Performed by: NURSE PRACTITIONER

## 2023-04-04 PROCEDURE — 99213 OFFICE O/P EST LOW 20 MIN: CPT | Performed by: NURSE PRACTITIONER

## 2023-04-04 PROCEDURE — 1036F TOBACCO NON-USER: CPT | Performed by: NURSE PRACTITIONER

## 2023-04-04 NOTE — LETTER
April 4, 2023       Jolie Wynn YOB: 1945   [address] [place] [address] Date of Visit:  4/4/2023       To Whom It May Concern: It is my medical opinion that Aniya Styles requires a disability parking placard for the following reasons:  He cannot walk without assistance from another person or the use of an assistance device (cane, crutch, prosthetic device, wheelchair, etc.). He has limited walking ability due to his breathing condition. Duration of need: permanent    If you have any questions or concerns, please don't hesitate to call.     Sincerely,        Gabby Crocker, NURY - CNP

## 2023-04-04 NOTE — PROGRESS NOTES
6 week follow up in office today; follow up after PFT/6 min walk testing. Pt to have overnight Oxygen testing and follow up with ABG lab work. Pt advised office to send over O2 testing to [facility] and he will call office when he has testing completed. Given script for ABGs and handicap parking letter for [facility]. Appt to follow up in May given.

## 2023-04-04 NOTE — PROGRESS NOTES
[facility]  Department of Pulmonary, Critical Care and Sleep Medicine  Dr. Chris Portillo, Dr. Zoe Robin, Dr. Xander Messina, NURY    Pulmonary & Critical Care Office Note - Follow up      Assessment/Plan     Problem List Items Addressed This Visit          Respiratory    Pulmonary fibrosis ([facility]    Pulmonary emphysema ([facility]     Other Visit Diagnoses       Dyspnea on exertion    -  Primary    Relevant Orders    ECHO Complete With Bubble Study - Clinic Performed    Cyanosis        Relevant Orders    BLOOD GAS, ARTERIAL    Pulse oximetry, overnight    ECHO Complete With Bubble Study - Clinic Performed    Weakness of both lower extremities        Claudication of both lower extremities ([facility]        Relevant Orders    VL EDGAR BILATERAL LIMITED 1-2 LEVELS            Patient ID: Yancey Kawasaki is a 76 y.o. male    Chief Complaint: Worsening shortness of breath    HPI: Yancey Kawasaki is a 76 y.o. male with a PMH of COPD diagnosed 14 yrs ago. He has not seen a pulmonologist since 2015 when he saw Dr. Venu De Oliveira. I do not see PFT on file, will order full PFT with abgs. He states he has been using symbicort and Trelegy at the same time but ran out of Trelegy about 1 week ago, he does not use a rescue inhaler. We discussed the medications contained in each inhaler and their intended use. I did emphasize NOT to use the Symbicort while using the Trelegy and that I will order an albuterol rescue inhaler for him. He states he has become progressively more short of breath over the last 3 weeks and was treated for bronchitis with Zpak, prednisone and guaifenesin. He states his cough has improved but the shortness of breath with activity has not.  Ambulatory pulse oximetry conducted in the office does NOT reveal exercise induced hypoxemia, but I would like to get a 6 minute walk test. His cyanosis does have me concerned, he reports the discoloration to the his fingers and nose are chronic for

## 2023-04-05 DIAGNOSIS — J84.10 PULMONARY FIBROSIS (HCC): ICD-10-CM

## 2023-04-05 DIAGNOSIS — R23.0 CYANOSIS: ICD-10-CM

## 2023-04-05 DIAGNOSIS — I27.20 PULMONARY HYPERTENSION (HCC): Primary | ICD-10-CM

## 2023-04-21 DIAGNOSIS — E78.5 DYSLIPIDEMIA: ICD-10-CM

## 2023-04-21 DIAGNOSIS — I10 ESSENTIAL HYPERTENSION: ICD-10-CM

## 2023-04-24 ENCOUNTER — HOSPITAL ENCOUNTER (EMERGENCY)
Age: 78
Discharge: HOME | End: 2023-04-24
Attending: EMERGENCY MEDICINE
Payer: MEDICARE

## 2023-04-24 ENCOUNTER — APPOINTMENT (OUTPATIENT)
Dept: ULTRASOUND IMAGING | Age: 78
End: 2023-04-24
Payer: MEDICARE

## 2023-04-24 ENCOUNTER — APPOINTMENT (OUTPATIENT)
Dept: CT IMAGING | Age: 78
End: 2023-04-24
Payer: MEDICARE

## 2023-04-24 ENCOUNTER — TELEPHONE (OUTPATIENT)
Dept: FAMILY MEDICINE CLINIC | Age: 78
End: 2023-04-24

## 2023-04-24 ENCOUNTER — APPOINTMENT (OUTPATIENT)
Dept: GENERAL RADIOLOGY | Age: 78
End: 2023-04-24
Payer: MEDICARE

## 2023-04-24 VITALS
SYSTOLIC BLOOD PRESSURE: 116 MMHG | WEIGHT: 150 LBS | RESPIRATION RATE: 18 BRPM | HEIGHT: 67 IN | HEART RATE: 69 BPM | TEMPERATURE: 97.4 F | DIASTOLIC BLOOD PRESSURE: 78 MMHG | BODY MASS INDEX: 23.54 KG/M2 | OXYGEN SATURATION: 98 %

## 2023-04-24 DIAGNOSIS — M25.511 ACUTE PAIN OF RIGHT SHOULDER: Primary | ICD-10-CM

## 2023-04-24 LAB
ALBUMIN SERPL-MCNC: 4.1 G/DL (ref 3.5–5.2)
ALP SERPL-CCNC: 179 U/L (ref 40–129)
ALT SERPL-CCNC: 8 U/L (ref 0–40)
ANION GAP SERPL CALCULATED.3IONS-SCNC: 13 MMOL/L (ref 7–16)
AST SERPL-CCNC: 19 U/L (ref 0–39)
BASOPHILS # BLD: 0.08 E9/L (ref 0–0.2)
BASOPHILS NFR BLD: 1.1 % (ref 0–2)
BILIRUB SERPL-MCNC: 1.7 MG/DL (ref 0–1.2)
BUN SERPL-MCNC: 23 MG/DL (ref 6–23)
CALCIUM SERPL-MCNC: 9.7 MG/DL (ref 8.6–10.2)
CHLORIDE SERPL-SCNC: 106 MMOL/L (ref 98–107)
CO2 SERPL-SCNC: 21 MMOL/L (ref 22–29)
CREAT SERPL-MCNC: 1.1 MG/DL (ref 0.7–1.2)
CRP SERPL HS-MCNC: <0.3 MG/DL (ref 0–0.4)
D DIMER: 1383 NG/ML DDU
EKG ATRIAL RATE: 82 BPM
EKG Q-T INTERVAL: 474 MS
EKG QRS DURATION: 192 MS
EKG QTC CALCULATION (BAZETT): 511 MS
EKG R AXIS: -73 DEGREES
EKG T AXIS: 97 DEGREES
EKG VENTRICULAR RATE: 70 BPM
EOSINOPHIL # BLD: 0.27 E9/L (ref 0.05–0.5)
EOSINOPHIL NFR BLD: 3.7 % (ref 0–6)
ERYTHROCYTE [DISTWIDTH] IN BLOOD BY AUTOMATED COUNT: 16.1 FL (ref 11.5–15)
ERYTHROCYTE [SEDIMENTATION RATE] IN BLOOD BY WESTERGREN METHOD: 21 MM/HR (ref 0–15)
GLUCOSE SERPL-MCNC: 91 MG/DL (ref 74–99)
HCT VFR BLD AUTO: 44.5 % (ref 37–54)
HGB BLD-MCNC: 14.7 G/DL (ref 12.5–16.5)
IMM GRANULOCYTES # BLD: 0.09 E9/L
IMM GRANULOCYTES NFR BLD: 1.2 % (ref 0–5)
LACTATE BLDV-SCNC: 1 MMOL/L (ref 0.5–2.2)
LYMPHOCYTES # BLD: 1.29 E9/L (ref 1.5–4)
LYMPHOCYTES NFR BLD: 17.6 % (ref 20–42)
MCH RBC QN AUTO: 31.1 PG (ref 26–35)
MCHC RBC AUTO-ENTMCNC: 33 % (ref 32–34.5)
MCV RBC AUTO: 94.3 FL (ref 80–99.9)
MONOCYTES # BLD: 0.53 E9/L (ref 0.1–0.95)
MONOCYTES NFR BLD: 7.2 % (ref 2–12)
NEUTROPHILS # BLD: 5.08 E9/L (ref 1.8–7.3)
NEUTS SEG NFR BLD: 69.2 % (ref 43–80)
PLATELET # BLD AUTO: 138 E9/L (ref 130–450)
PMV BLD AUTO: 10.6 FL (ref 7–12)
POTASSIUM SERPL-SCNC: 4.3 MMOL/L (ref 3.5–5)
PROT SERPL-MCNC: 7.6 G/DL (ref 6.4–8.3)
RBC # BLD AUTO: 4.72 E12/L (ref 3.8–5.8)
SODIUM SERPL-SCNC: 140 MMOL/L (ref 132–146)
TROPONIN, HIGH SENSITIVITY: 21 NG/L (ref 0–11)
WBC # BLD: 7.3 E9/L (ref 4.5–11.5)

## 2023-04-24 PROCEDURE — 93971 EXTREMITY STUDY: CPT

## 2023-04-24 PROCEDURE — 93005 ELECTROCARDIOGRAM TRACING: CPT | Performed by: EMERGENCY MEDICINE

## 2023-04-24 PROCEDURE — 36415 COLL VENOUS BLD VENIPUNCTURE: CPT

## 2023-04-24 PROCEDURE — 73030 X-RAY EXAM OF SHOULDER: CPT

## 2023-04-24 PROCEDURE — 93010 ELECTROCARDIOGRAM REPORT: CPT | Performed by: INTERNAL MEDICINE

## 2023-04-24 PROCEDURE — 85025 COMPLETE CBC W/AUTO DIFF WBC: CPT

## 2023-04-24 PROCEDURE — 99285 EMERGENCY DEPT VISIT HI MDM: CPT

## 2023-04-24 PROCEDURE — 6360000004 HC RX CONTRAST MEDICATION: Performed by: RADIOLOGY

## 2023-04-24 PROCEDURE — 6360000002 HC RX W HCPCS: Performed by: EMERGENCY MEDICINE

## 2023-04-24 PROCEDURE — 86140 C-REACTIVE PROTEIN: CPT

## 2023-04-24 PROCEDURE — 71045 X-RAY EXAM CHEST 1 VIEW: CPT

## 2023-04-24 PROCEDURE — 80053 COMPREHEN METABOLIC PANEL: CPT

## 2023-04-24 PROCEDURE — 84484 ASSAY OF TROPONIN QUANT: CPT

## 2023-04-24 PROCEDURE — 96374 THER/PROPH/DIAG INJ IV PUSH: CPT

## 2023-04-24 PROCEDURE — 73200 CT UPPER EXTREMITY W/O DYE: CPT

## 2023-04-24 PROCEDURE — 83605 ASSAY OF LACTIC ACID: CPT

## 2023-04-24 PROCEDURE — 85651 RBC SED RATE NONAUTOMATED: CPT

## 2023-04-24 PROCEDURE — 85378 FIBRIN DEGRADE SEMIQUANT: CPT

## 2023-04-24 PROCEDURE — 71275 CT ANGIOGRAPHY CHEST: CPT

## 2023-04-24 RX ORDER — PREDNISONE 50 MG/1
50 TABLET ORAL DAILY
Qty: 5 TABLET | Refills: 0 | Status: SHIPPED | OUTPATIENT
Start: 2023-04-24 | End: 2023-04-29

## 2023-04-24 RX ORDER — DEXAMETHASONE SODIUM PHOSPHATE 4 MG/ML
10 INJECTION, SOLUTION INTRA-ARTICULAR; INTRALESIONAL; INTRAMUSCULAR; INTRAVENOUS; SOFT TISSUE EVERY 6 HOURS
Status: DISCONTINUED | OUTPATIENT
Start: 2023-04-24 | End: 2023-04-24 | Stop reason: HOSPADM

## 2023-04-24 RX ORDER — LISINOPRIL 2.5 MG/1
TABLET ORAL
Qty: 90 TABLET | Refills: 3 | Status: SHIPPED | OUTPATIENT
Start: 2023-04-24

## 2023-04-24 RX ORDER — ATORVASTATIN CALCIUM 20 MG/1
20 TABLET, FILM COATED ORAL DAILY
Qty: 90 TABLET | Refills: 3 | Status: SHIPPED | OUTPATIENT
Start: 2023-04-24

## 2023-04-24 RX ADMIN — DEXAMETHASONE SODIUM PHOSPHATE 10 MG: 4 INJECTION, SOLUTION INTRAMUSCULAR; INTRAVENOUS at 16:06

## 2023-04-24 RX ADMIN — IOPAMIDOL 75 ML: 755 INJECTION, SOLUTION INTRAVENOUS at 14:52

## 2023-04-24 ASSESSMENT — LIFESTYLE VARIABLES: HOW OFTEN DO YOU HAVE A DRINK CONTAINING ALCOHOL: 2-4 TIMES A MONTH

## 2023-04-24 ASSESSMENT — PAIN DESCRIPTION - PAIN TYPE: TYPE: ACUTE PAIN

## 2023-04-24 ASSESSMENT — PAIN DESCRIPTION - LOCATION: LOCATION: SHOULDER

## 2023-04-24 ASSESSMENT — PAIN DESCRIPTION - ORIENTATION: ORIENTATION: RIGHT

## 2023-04-24 ASSESSMENT — PAIN DESCRIPTION - DESCRIPTORS: DESCRIPTORS: ACHING;DULL;DISCOMFORT;NAGGING

## 2023-04-24 NOTE — TELEPHONE ENCOUNTER
POPULATION HEALTH CLINICAL PHARMACY REVIEW: [facility]    Rx signed by PCP. Will route to  to ensure Rippey Rx was canceled at pharmacy ([facility]).      Yasmine Moreno, PharmD, BCACP, [address]   Department, toll free: [phone], option 1    =======================================================    For Pharmacy Admin Tracking Only  Program: [address] in place:  No  Recommendation Provided To: Provider: 2 via Note to Provider, Patient/Caregiver: 1 via Telephone, and Pharmacy: 1  Intervention Detail: Adherence Monitorin and New Rx: 2, reason: Improve Adherence  Intervention Accepted By: Provider: 2, Patient/Caregiver: 1, and Pharmacy: 0  Gap Closed?: Yes   Time Spent (min): 45

## 2023-04-24 NOTE — TELEPHONE ENCOUNTER
This ma received call from [address] [place] [address] from house calls stating patient cant lift hius right arm c/o right should pain and states he only has about 20% of his range  motion right shoulder doesn't remember any falls or injury for a week. This ma advised pt to go to ED Patient was amendable too [facility] urgent care.

## 2023-04-24 NOTE — ED NOTES
Patient declines ABGs. States he doesn't feel that they are necessary. Dr. Solorzano Public notified.       Shona Dubon RN  04/24/23 [id number]

## 2023-04-24 NOTE — ED PROVIDER NOTES
Tobacco Use    Smoking status: Former     Packs/day: 0.50     Years: 50.00     Pack years: 25.00     Types: Cigarettes     Quit date: 2014     Years since quittin.8    Smokeless tobacco: Never   Vaping Use    Vaping Use: Never used   Substance and Sexual Activity    Alcohol use: Yes     Alcohol/week: 0.0 standard drinks     Comment: rare;  drinks 1 cup of coffee daily & occ Pepsi/Tea    Drug use: No     Social Determinants of Health     Financial Resource Strain: Low Risk     Difficulty of Paying Living Expenses: Not hard at all   Food Insecurity: No Food Insecurity    Worried About Running Out of Food in the Last Year: Never true    Ran Out of Food in the Last Year: Never true   Transportation Needs: Unknown    Lack of Transportation (Non-Medical): No   Housing Stability: Unknown    Unstable Housing in the Last Year: No       SCREENINGS        [place] Coma Scale  Eye Opening: Spontaneous  Best Verbal Response: Oriented  Best Motor Response: Obeys commands  [place] Coma Scale Score: 15               PHYSICAL EXAM    (up to 7 for level 4, 8 or more for level 5)     ED Triage Vitals   BP Temp Temp src Heart Rate Resp SpO2 Height Weight   23 1053 23 1053 -- 23 1053 23 1053 23 1053 23 1100 23 1100   116/78 97.4 °F (36.3 °C)  69 18 98 % 5' 7\" (1.702 m) 150 lb (68 kg)       Physical Exam    DIAGNOSTIC RESULTS     EKG: All EKG's are interpreted by the Emergency Department Physician who either signs or Co-signs this chart in the absence of a cardiologist.      RADIOLOGY:   Non-plain film images such as CT, Ultrasound and MRI are read by the radiologist. 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

## 2023-05-02 ENCOUNTER — OFFICE VISIT (OUTPATIENT)
Dept: FAMILY MEDICINE CLINIC | Age: 78
End: 2023-05-02
Payer: MEDICARE

## 2023-05-02 VITALS
RESPIRATION RATE: 18 BRPM | SYSTOLIC BLOOD PRESSURE: 110 MMHG | TEMPERATURE: 97.3 F | DIASTOLIC BLOOD PRESSURE: 60 MMHG | HEART RATE: 68 BPM | HEIGHT: 67 IN | WEIGHT: 152 LBS | BODY MASS INDEX: 23.86 KG/M2 | OXYGEN SATURATION: 84 %

## 2023-05-02 DIAGNOSIS — J84.10 PULMONARY FIBROSIS (HCC): ICD-10-CM

## 2023-05-02 DIAGNOSIS — I50.22 CHRONIC SYSTOLIC CONGESTIVE HEART FAILURE (HCC): ICD-10-CM

## 2023-05-02 DIAGNOSIS — C61 PROSTATE CA (HCC): ICD-10-CM

## 2023-05-02 DIAGNOSIS — N18.31 STAGE 3A CHRONIC KIDNEY DISEASE (HCC): ICD-10-CM

## 2023-05-02 DIAGNOSIS — I73.9 PVD (PERIPHERAL VASCULAR DISEASE) (HCC): ICD-10-CM

## 2023-05-02 DIAGNOSIS — D58.2 HEMOGLOBINOPATHY (HCC): ICD-10-CM

## 2023-05-02 DIAGNOSIS — I48.0 PAF (PAROXYSMAL ATRIAL FIBRILLATION) (HCC): ICD-10-CM

## 2023-05-02 DIAGNOSIS — J43.2 CENTRILOBULAR EMPHYSEMA (HCC): Primary | ICD-10-CM

## 2023-05-02 PROCEDURE — G8420 CALC BMI NORM PARAMETERS: HCPCS | Performed by: FAMILY MEDICINE

## 2023-05-02 PROCEDURE — 99214 OFFICE O/P EST MOD 30 MIN: CPT | Performed by: FAMILY MEDICINE

## 2023-05-02 PROCEDURE — 3023F SPIROM DOC REV: CPT | Performed by: FAMILY MEDICINE

## 2023-05-02 PROCEDURE — 1036F TOBACCO NON-USER: CPT | Performed by: FAMILY MEDICINE

## 2023-05-02 PROCEDURE — 1123F ACP DISCUSS/DSCN MKR DOCD: CPT | Performed by: FAMILY MEDICINE

## 2023-05-02 PROCEDURE — G8427 DOCREV CUR MEDS BY ELIG CLIN: HCPCS | Performed by: FAMILY MEDICINE

## 2023-05-02 ASSESSMENT — ENCOUNTER SYMPTOMS
ALLERGIC/IMMUNOLOGIC NEGATIVE: 1
FACIAL SWELLING: 0
EYE DISCHARGE: 0
BLOOD IN STOOL: 0
NAUSEA: 0
HEMOPTYSIS: 0
APNEA: 0
EYE ITCHING: 0
SORE THROAT: 0
COLOR CHANGE: 1
TROUBLE SWALLOWING: 0
EYE PAIN: 0
WHEEZING: 0
COUGH: 0
BACK PAIN: 0
ORTHOPNEA: 0
SPUTUM PRODUCTION: 0
ABDOMINAL PAIN: 0
BLURRED VISION: 0
VOMITING: 0
CONSTIPATION: 0
ANAL BLEEDING: 0
CHOKING: 0
SHORTNESS OF BREATH: 1
DIARRHEA: 0
EYE REDNESS: 0
CHEST TIGHTNESS: 0
RHINORRHEA: 0
STRIDOR: 0
ABDOMINAL DISTENTION: 0
GASTROINTESTINAL NEGATIVE: 1
RECTAL PAIN: 0
PHOTOPHOBIA: 0
SINUS PAIN: 0
VOICE CHANGE: 0
SINUS PRESSURE: 0

## 2023-05-02 ASSESSMENT — PATIENT HEALTH QUESTIONNAIRE - PHQ9
SUM OF ALL RESPONSES TO PHQ QUESTIONS 1-9: 1
1. LITTLE INTEREST OR PLEASURE IN DOING THINGS: 0
SUM OF ALL RESPONSES TO PHQ QUESTIONS 1-9: 1
2. FEELING DOWN, DEPRESSED OR HOPELESS: 1
SUM OF ALL RESPONSES TO PHQ9 QUESTIONS 1 & 2: 1

## 2023-05-02 ASSESSMENT — LIFESTYLE VARIABLES
HOW OFTEN DO YOU HAVE A DRINK CONTAINING ALCOHOL: MONTHLY OR LESS
HOW MANY STANDARD DRINKS CONTAINING ALCOHOL DO YOU HAVE ON A TYPICAL DAY: 1 OR 2

## 2023-05-02 NOTE — PROGRESS NOTES
CANDY Lakhani is a 76 y.o. male. HPI/Chief C/O:  Chief Complaint   Patient presents with    Rash     Pt here due to rash all over back, shoulders, arms down to groin. Had for 4 days. Looked like hives     Allergies   Allergen Reactions    Pneumovax [Pneumococcal Polysaccharide Vaccine] Dermatitis   The patient is here for a medication list and treatment planning review  We will go over our care planning goals as well as take care of all refills  We will set up labs as well        Shortness of Breath  This is a chronic problem. The current episode started more than 1 year ago. The problem occurs constantly. The problem has been gradually improving. Associated symptoms include leg swelling. Pertinent negatives include no abdominal pain, chest pain, claudication, coryza, ear pain, fever, headaches, hemoptysis, leg pain, neck pain, orthopnea, PND, rash, rhinorrhea, sore throat, sputum production, syncope, vomiting or wheezing. The symptoms are aggravated by any activity. He has tried steroid inhalers, ipratropium inhalers, beta agonist inhalers and oral steroids for the symptoms. The treatment provided moderate relief. His past medical history is significant for chronic lung disease (R/O pulmonary fibrosis), COPD (emphysema, R/O pulmonary fibrosis) and a heart failure. There is no history of allergies, aspirin allergies, asthma, bronchiolitis, CAD, DVT, PE, pneumonia or a recent surgery. Hypertension  This is a chronic problem. The current episode started more than 1 year ago. The problem is controlled. Associated symptoms include anxiety, malaise/fatigue, peripheral edema and shortness of breath. Pertinent negatives include no blurred vision, chest pain, headaches, neck pain, orthopnea, palpitations, PND or sweats. Risk factors for coronary artery disease include smoking/tobacco exposure, male gender, dyslipidemia, family history and stress.  Past treatments include lifestyle changes, beta blockers,

## 2023-05-16 ENCOUNTER — HOSPITAL ENCOUNTER (OUTPATIENT)
Dept: INFUSION THERAPY | Age: 78
Discharge: HOME | End: 2023-05-16
Payer: MEDICARE

## 2023-05-16 ENCOUNTER — OFFICE VISIT (OUTPATIENT)
Dept: ONCOLOGY | Age: 78
End: 2023-05-16
Payer: MEDICARE

## 2023-05-16 VITALS
HEART RATE: 70 BPM | DIASTOLIC BLOOD PRESSURE: 85 MMHG | HEIGHT: 67 IN | TEMPERATURE: 96.8 F | WEIGHT: 156.3 LBS | BODY MASS INDEX: 24.53 KG/M2 | OXYGEN SATURATION: 100 % | SYSTOLIC BLOOD PRESSURE: 116 MMHG

## 2023-05-16 DIAGNOSIS — D58.2 HEMOGLOBINOPATHY (HCC): Primary | ICD-10-CM

## 2023-05-16 DIAGNOSIS — D58.2 HEMOGLOBINOPATHY (HCC): ICD-10-CM

## 2023-05-16 LAB
ALBUMIN SERPL-MCNC: 4.2 G/DL (ref 3.5–5.2)
ALP SERPL-CCNC: 199 U/L (ref 40–129)
ALT SERPL-CCNC: 10 U/L (ref 0–40)
ANION GAP SERPL CALCULATED.3IONS-SCNC: 11 MMOL/L (ref 7–16)
APTT BLD: 41.1 SEC (ref 24.5–35.1)
AST SERPL-CCNC: 19 U/L (ref 0–39)
BASOPHILS # BLD: 0.07 E9/L (ref 0–0.2)
BASOPHILS NFR BLD: 1.1 % (ref 0–2)
BILIRUB SERPL-MCNC: 2 MG/DL (ref 0–1.2)
BUN SERPL-MCNC: 15 MG/DL (ref 6–23)
CALCIUM SERPL-MCNC: 9.5 MG/DL (ref 8.6–10.2)
CHLORIDE SERPL-SCNC: 105 MMOL/L (ref 98–107)
CO2 SERPL-SCNC: 23 MMOL/L (ref 22–29)
CREAT SERPL-MCNC: 1.1 MG/DL (ref 0.7–1.2)
DAT POLY-SP REAG RBC QL: NORMAL
EOSINOPHIL # BLD: 0.41 E9/L (ref 0.05–0.5)
EOSINOPHIL NFR BLD: 6.3 % (ref 0–6)
ERYTHROCYTE [DISTWIDTH] IN BLOOD BY AUTOMATED COUNT: 16.4 FL (ref 11.5–15)
GLUCOSE SERPL-MCNC: 85 MG/DL (ref 74–99)
HAPTOGLOB SERPL-MCNC: 175 MG/DL (ref 30–200)
HCT VFR BLD AUTO: 46.3 % (ref 37–54)
HGB BLD-MCNC: 15 G/DL (ref 12.5–16.5)
IMM GRANULOCYTES # BLD: 0.07 E9/L
IMM GRANULOCYTES NFR BLD: 1.1 % (ref 0–5)
IMMATURE RETIC FRACT: 16.5 % (ref 2.3–13.4)
INR BLD: 1.5
LDH SERPL-CCNC: 267 U/L (ref 135–225)
LYMPHOCYTES # BLD: 1.31 E9/L (ref 1.5–4)
LYMPHOCYTES NFR BLD: 20 % (ref 20–42)
Lab: NORMAL
Lab: NORMAL
MCH RBC QN AUTO: 31.8 PG (ref 26–35)
MCHC RBC AUTO-ENTMCNC: 32.4 % (ref 32–34.5)
MCV RBC AUTO: 98.3 FL (ref 80–99.9)
MONOCYTES # BLD: 0.51 E9/L (ref 0.1–0.95)
MONOCYTES NFR BLD: 7.8 % (ref 2–12)
NEUTROPHILS # BLD: 4.17 E9/L (ref 1.8–7.3)
NEUTS SEG NFR BLD: 63.7 % (ref 43–80)
PATHOLOGIST REVIEW: NORMAL
PLATELET # BLD AUTO: 169 E9/L (ref 130–450)
PMV BLD AUTO: 10.7 FL (ref 7–12)
POTASSIUM SERPL-SCNC: 4.5 MMOL/L (ref 3.5–5)
PROT SERPL-MCNC: 7.7 G/DL (ref 6.4–8.3)
PROTHROMBIN TIME: 17.5 SEC (ref 9.3–12.4)
RBC # BLD AUTO: 4.71 E12/L (ref 3.8–5.8)
RETIC HGB EQUIVALENT: 34.2 PG (ref 28.2–36.6)
RETICS/RBC NFR: 2 % (ref 0.4–1.9)
RETICULOCYTE ABSOLUTE COUNT: 0.1 E12/L
SODIUM SERPL-SCNC: 139 MMOL/L (ref 132–146)
THIS TEST SENT TO: NORMAL
THIS TEST SENT TO: NORMAL
WBC # BLD: 6.5 E9/L (ref 4.5–11.5)

## 2023-05-16 PROCEDURE — 1036F TOBACCO NON-USER: CPT | Performed by: INTERNAL MEDICINE

## 2023-05-16 PROCEDURE — 81270 JAK2 GENE: CPT

## 2023-05-16 PROCEDURE — 85610 PROTHROMBIN TIME: CPT

## 2023-05-16 PROCEDURE — 99204 OFFICE O/P NEW MOD 45 MIN: CPT | Performed by: INTERNAL MEDICINE

## 2023-05-16 PROCEDURE — 88184 FLOWCYTOMETRY/ TC 1 MARKER: CPT

## 2023-05-16 PROCEDURE — 1123F ACP DISCUSS/DSCN MKR DOCD: CPT | Performed by: INTERNAL MEDICINE

## 2023-05-16 PROCEDURE — 85025 COMPLETE CBC W/AUTO DIFF WBC: CPT

## 2023-05-16 PROCEDURE — 83615 LACTATE (LD) (LDH) ENZYME: CPT

## 2023-05-16 PROCEDURE — 80053 COMPREHEN METABOLIC PANEL: CPT

## 2023-05-16 PROCEDURE — 36415 COLL VENOUS BLD VENIPUNCTURE: CPT

## 2023-05-16 PROCEDURE — G8420 CALC BMI NORM PARAMETERS: HCPCS | Performed by: INTERNAL MEDICINE

## 2023-05-16 PROCEDURE — 88374 M/PHMTRC ALYS ISHQUANT/SEMIQ: CPT

## 2023-05-16 PROCEDURE — 82668 ASSAY OF ERYTHROPOIETIN: CPT

## 2023-05-16 PROCEDURE — 99214 OFFICE O/P EST MOD 30 MIN: CPT

## 2023-05-16 PROCEDURE — G8427 DOCREV CUR MEDS BY ELIG CLIN: HCPCS | Performed by: INTERNAL MEDICINE

## 2023-05-16 PROCEDURE — 83021 HEMOGLOBIN CHROMOTOGRAPHY: CPT

## 2023-05-16 PROCEDURE — 83010 ASSAY OF HAPTOGLOBIN QUANT: CPT

## 2023-05-16 PROCEDURE — 83020 HEMOGLOBIN ELECTROPHORESIS: CPT

## 2023-05-16 PROCEDURE — 85730 THROMBOPLASTIN TIME PARTIAL: CPT

## 2023-05-16 PROCEDURE — 86880 COOMBS TEST DIRECT: CPT

## 2023-05-16 PROCEDURE — 88185 FLOWCYTOMETRY/TC ADD-ON: CPT

## 2023-05-16 PROCEDURE — 85045 AUTOMATED RETICULOCYTE COUNT: CPT

## 2023-05-16 NOTE — PROGRESS NOTES
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 No known hx of iron deficiency anemia  Referred to our clinic for further evaluation    Review of Systems;  CONSTITUTIONAL: No fever, chills. Fair appetite and energy level. ENMT: Eyes: No diplopia; Nose: No epistaxis. Mouth: No sore throat. RESPIRATORY: No hemoptysis, shortness of breath, cough. CARDIOVASCULAR: No chest pain, palpitations. GASTROINTESTINAL: No nausea/vomiting, abdominal pain, diarrhea/constipation. GENITOURINARY: No dysuria, urinary frequency, hematuria. NEURO: No syncope, presyncope, headache. Remainder:  ROS NEGATIVE    Past Medical History:      Diagnosis Date    Aortic stenosis, mild     Aortic valve sclerosis     moderate    Atrial fibrillation ([address] 2014    CAD (coronary artery disease)     CHF (congestive heart failure) (HCC)     COPD (chronic obstructive pulmonary disease) ([address]     Gallbladder attack     History of tobacco use 9/17/2015    Hyperlipidemia     Hypertension     Mild mitral regurgitation     Prostate cancer ([address]     44 treatments of radiation    PVD (peripheral vascular disease) with claudication ([address] 9/17/2015    RBBB     Tobacco abuse      Past Surgical History:      Procedure Laterality Date    APPENDECTOMY      CARDIAC CATHETERIZATION  08/08/2014    DR Coates-pt.  states had triple bypass \"5 years ago\"    COLONOSCOPY      CORONARY ARTERY BYPASS GRAFT  08/11/2014    CABGx3 LIMA-LAD, SVG-D1, SVG-OM1, AVR Dr. Adam Green, COLON, DIAGNOSTIC      PACEMAKER INSERTION Left
Pack years: 25.00     Types: Cigarettes     Quit date: 2014     Years since quittin.9    Smokeless tobacco: Never   Vaping Use    Vaping Use: Never used   Substance and Sexual Activity    Alcohol use: Yes     Alcohol/week: 0.0 standard drinks     Comment: rare;  drinks 1 cup of coffee daily & occ Pepsi/Tea    Drug use: No    Sexual activity: Not on file   Other Topics Concern    Not on file   Social History Narrative    Not on file     Social Determinants of Health     Financial Resource Strain: Low Risk     Difficulty of Paying Living Expenses: Not hard at all   Food Insecurity: No Food Insecurity    Worried About [address] in the Last Year: Never true    [address] [place] in the Last Year: Never true   Transportation Needs: Unknown    Lack of Transportation (Medical): Not on file    Lack of Transportation (Non-Medical): No   Physical Activity: Not on file   Stress: Not on file   Social Connections: Not on file   Intimate Partner Violence: Not on file   Housing Stability: Unknown    Unable to Pay for Housing in the Last Year: Not on file    Number of Places Lived in the Last Year: Not on file    Unstable Housing in the Last Year: No           Occupation: retired  Retired:  YES: Patient is retired from [employer]. REVIEW OF SYSTEMS:     Pacemaker/Defibulator/ICD:  Yes    Mediport: No           FALLS RISK SCREENING ASSESSMENT    Instructions:  Assess the patient and [place] the appropriate indicators that are present for fall risk identification. Total the numbers circled and assign a fall risk score from Table 2.  Reassess patient at a minimum every 12 weeks or with status change. Assessment   Date  2023     1. Mental Ability: confusion/cognitively impaired No - 0       2. Elimination Issues: incontinence, frequency No - 0       3. Ambulatory: use of assistive devices (walker, cane, off-loading devices), attached to equipment (IV pole, oxygen) Yes - 2     4.   Sensory Limitations:

## 2023-05-17 LAB — EPO SERPL-ACNC: 22 MU/ML (ref 4–27)

## 2023-05-18 LAB
Lab: NORMAL
REPORT: NORMAL
THIS TEST SENT TO: NORMAL

## 2023-05-22 LAB
Lab: NORMAL
REPORT: NORMAL
THIS TEST SENT TO: NORMAL

## 2023-05-23 LAB
JAK2 P.V617F BLD/T QL: NOT DETECTED
SPECIMEN SOURCE: NORMAL

## 2023-05-24 LAB
Lab: NORMAL
REPORT: NORMAL
THIS TEST SENT TO: NORMAL

## 2023-05-30 ENCOUNTER — OFFICE VISIT (OUTPATIENT)
Dept: ONCOLOGY | Age: 78
End: 2023-05-30
Payer: MEDICARE

## 2023-05-30 ENCOUNTER — TELEPHONE (OUTPATIENT)
Dept: PULMONOLOGY | Age: 78
End: 2023-05-30

## 2023-05-30 ENCOUNTER — OFFICE VISIT (OUTPATIENT)
Dept: PULMONOLOGY | Age: 78
End: 2023-05-30
Payer: MEDICARE

## 2023-05-30 VITALS
HEART RATE: 70 BPM | WEIGHT: 154.4 LBS | DIASTOLIC BLOOD PRESSURE: 92 MMHG | TEMPERATURE: 97 F | BODY MASS INDEX: 24.23 KG/M2 | SYSTOLIC BLOOD PRESSURE: 120 MMHG | HEIGHT: 67 IN | OXYGEN SATURATION: 99 %

## 2023-05-30 VITALS
HEIGHT: 67 IN | OXYGEN SATURATION: 100 % | DIASTOLIC BLOOD PRESSURE: 71 MMHG | RESPIRATION RATE: 20 BRPM | TEMPERATURE: 97.1 F | HEART RATE: 71 BPM | SYSTOLIC BLOOD PRESSURE: 98 MMHG | BODY MASS INDEX: 24.01 KG/M2 | WEIGHT: 153 LBS

## 2023-05-30 DIAGNOSIS — R06.09 DYSPNEA ON EXERTION: ICD-10-CM

## 2023-05-30 DIAGNOSIS — R23.0 SKIN CYANOSIS: ICD-10-CM

## 2023-05-30 DIAGNOSIS — D58.2 HEMOGLOBINOPATHY (HCC): Primary | ICD-10-CM

## 2023-05-30 DIAGNOSIS — J84.10 PULMONARY FIBROSIS (HCC): ICD-10-CM

## 2023-05-30 DIAGNOSIS — J43.8 OTHER EMPHYSEMA (HCC): Primary | ICD-10-CM

## 2023-05-30 DIAGNOSIS — Z87.891 HISTORY OF TOBACCO USE: ICD-10-CM

## 2023-05-30 DIAGNOSIS — R29.898 WEAKNESS OF BOTH LOWER EXTREMITIES: ICD-10-CM

## 2023-05-30 DIAGNOSIS — I73.9 CLAUDICATION OF BOTH LOWER EXTREMITIES (HCC): ICD-10-CM

## 2023-05-30 PROCEDURE — G8427 DOCREV CUR MEDS BY ELIG CLIN: HCPCS | Performed by: NURSE PRACTITIONER

## 2023-05-30 PROCEDURE — 1123F ACP DISCUSS/DSCN MKR DOCD: CPT | Performed by: NURSE PRACTITIONER

## 2023-05-30 PROCEDURE — 99212 OFFICE O/P EST SF 10 MIN: CPT

## 2023-05-30 PROCEDURE — 1036F TOBACCO NON-USER: CPT | Performed by: NURSE PRACTITIONER

## 2023-05-30 PROCEDURE — G8420 CALC BMI NORM PARAMETERS: HCPCS | Performed by: INTERNAL MEDICINE

## 2023-05-30 PROCEDURE — 99213 OFFICE O/P EST LOW 20 MIN: CPT | Performed by: NURSE PRACTITIONER

## 2023-05-30 PROCEDURE — 1036F TOBACCO NON-USER: CPT | Performed by: INTERNAL MEDICINE

## 2023-05-30 PROCEDURE — G8427 DOCREV CUR MEDS BY ELIG CLIN: HCPCS | Performed by: INTERNAL MEDICINE

## 2023-05-30 PROCEDURE — 99213 OFFICE O/P EST LOW 20 MIN: CPT | Performed by: INTERNAL MEDICINE

## 2023-05-30 PROCEDURE — 1123F ACP DISCUSS/DSCN MKR DOCD: CPT | Performed by: INTERNAL MEDICINE

## 2023-05-30 PROCEDURE — 3023F SPIROM DOC REV: CPT | Performed by: NURSE PRACTITIONER

## 2023-05-30 PROCEDURE — G8420 CALC BMI NORM PARAMETERS: HCPCS | Performed by: NURSE PRACTITIONER

## 2023-05-30 PROCEDURE — 99214 OFFICE O/P EST MOD 30 MIN: CPT | Performed by: NURSE PRACTITIONER

## 2023-05-30 NOTE — PROGRESS NOTES
(4-27)  Labs reviewed. Pathology reviewed. No evidence of underlying hemoglobinopathy. No evidence of acute leukemia, or lymphoproliferative disorder.   Continue to follow with PCP  RTC PRDORCAS Frias MD   5/30/2023

## 2023-05-30 NOTE — TELEPHONE ENCOUNTER
Mailed a letter to patient informing him that his Ultrasound is scheduled for 6-21-23 at 11:00 am at [address] [facility],[address].  He must arrive by 10:30 am.. there is no prep for this test

## 2023-05-30 NOTE — PROGRESS NOTES
8 week follow up in office. ABGs results reviewed and RN checked with [employer] about overnight O2 testing and results are not available . [employer] staff checked with [employer] who reported pt sent device back without using the testing device. Pt/wife state device was used however they followed the directions sent with the device and maybe they didn't do it right. RN to follow up with [employer] to get additional testing completed. Pt given script for Echo and will get scheduled for testing per orders prior to follow up. Office to arrange for EDGAR procedure for bilateral legs and when completed will arrange for appt to follow up with Dr Lino Allen in our office. Office to mail out appt letter along with office appt card. Pt and wife verbalized understanding.
sulfate HFA (PROVENTIL HFA) 108 (90 Base) MCG/ACT inhaler Inhale 2 puffs into the lungs every 6 hours as needed for Wheezing or Shortness of Breath 18 g 3    fluticasone-umeclidin-vilant (TRELEGY ELLIPTA) 100-62.5-25 MCG/ACT AEPB inhaler Inhale 1 puff into the lungs daily 1 each 6    empagliflozin (JARDIANCE) 10 MG tablet Take 1 tablet by mouth daily 90 tablet 1    lisinopril (PRINIVIL;ZESTRIL) 2.5 MG tablet TAKE 1 TABLET DAILY 90 tablet 3    atorvastatin (LIPITOR) 20 MG tablet Take 1 tablet by mouth daily 90 tablet 3    traZODone (DESYREL) 50 MG tablet Take 0.5 tablets by mouth nightly as needed for Sleep or Depression 90 tablet 1    apixaban (ELIQUIS) 5 MG TABS tablet TAKE 1 TABLET TWICE A  tablet 3    furosemide (LASIX) 40 MG tablet TAKE 1 TABLET DAILY (Patient taking differently: TAKE 1 TABLET DAILY prn) 90 tablet 3    metoprolol succinate (TOPROL XL) 25 MG extended release tablet 1 tablet daily 90 tablet 3    nitroGLYCERIN (NITROSTAT) 0.3 MG SL tablet Place 1 tablet under the tongue every 5 minutes as needed for Chest pain 30 tablet 3     No current facility-administered medications for this visit. Review of Systems: Negative other than specified above. Objective       Vitals:  BP: 98/71, Pulse: 71, Respirations: 20, Temp: 97.1 °F (36.2 °C),  , SpO2: 100 %, Height: 5' 7\" (170.2 cm), Weight - Scale: 153 lb (69.4 kg)    BMI body mass index is 23.96 kg/m². Ideal Body Weight: Ideal body weight: 66.1 kg (145 lb 11.6 oz)  Adjusted ideal body weight: 67.4 kg (148 lb 10.1 oz)  ---------------  Physical Exam:    General: Alert and oriented x 3. No acute distress. Eyes:  Vision - grossly normal, PERRLA  HEENT:  Head is atraumatic and normocephalic. Neck is supple, no jugular venous distention. Respiratory:  Fine crackles to bilateral bases on auscultation, respirations are nonlabored, breath sounds are equal.  Cardiovascular:  S1, S2 normal, regular rate and rhythm, + murmur, no pedal edema.  [place]

## 2023-05-31 ENCOUNTER — TELEPHONE (OUTPATIENT)
Dept: FAMILY MEDICINE CLINIC | Age: 78
End: 2023-05-31

## 2023-06-05 ENCOUNTER — OFFICE VISIT (OUTPATIENT)
Dept: FAMILY MEDICINE CLINIC | Age: 78
End: 2023-06-05
Payer: MEDICARE

## 2023-06-05 VITALS
HEIGHT: 67 IN | BODY MASS INDEX: 23.26 KG/M2 | HEART RATE: 70 BPM | DIASTOLIC BLOOD PRESSURE: 60 MMHG | TEMPERATURE: 97.3 F | SYSTOLIC BLOOD PRESSURE: 100 MMHG | WEIGHT: 148.2 LBS | RESPIRATION RATE: 20 BRPM

## 2023-06-05 DIAGNOSIS — J43.2 CENTRILOBULAR EMPHYSEMA (HCC): ICD-10-CM

## 2023-06-05 DIAGNOSIS — I48.21 PERMANENT ATRIAL FIBRILLATION (HCC): ICD-10-CM

## 2023-06-05 DIAGNOSIS — I73.9 PVD (PERIPHERAL VASCULAR DISEASE) (HCC): ICD-10-CM

## 2023-06-05 DIAGNOSIS — J84.10 PULMONARY FIBROSIS (HCC): Primary | ICD-10-CM

## 2023-06-05 DIAGNOSIS — R51.9 NONINTRACTABLE HEADACHE, UNSPECIFIED CHRONICITY PATTERN, UNSPECIFIED HEADACHE TYPE: ICD-10-CM

## 2023-06-05 DIAGNOSIS — N18.31 STAGE 3A CHRONIC KIDNEY DISEASE (HCC): ICD-10-CM

## 2023-06-05 DIAGNOSIS — I50.22 CHRONIC SYSTOLIC CONGESTIVE HEART FAILURE (HCC): ICD-10-CM

## 2023-06-05 DIAGNOSIS — C61 PROSTATE CA (HCC): ICD-10-CM

## 2023-06-05 DIAGNOSIS — G20 PARKINSON'S SYNDROME (HCC): ICD-10-CM

## 2023-06-05 DIAGNOSIS — F51.01 PRIMARY INSOMNIA: ICD-10-CM

## 2023-06-05 PROCEDURE — 1036F TOBACCO NON-USER: CPT | Performed by: FAMILY MEDICINE

## 2023-06-05 PROCEDURE — 3023F SPIROM DOC REV: CPT | Performed by: FAMILY MEDICINE

## 2023-06-05 PROCEDURE — G8420 CALC BMI NORM PARAMETERS: HCPCS | Performed by: FAMILY MEDICINE

## 2023-06-05 PROCEDURE — G8427 DOCREV CUR MEDS BY ELIG CLIN: HCPCS | Performed by: FAMILY MEDICINE

## 2023-06-05 PROCEDURE — 1123F ACP DISCUSS/DSCN MKR DOCD: CPT | Performed by: FAMILY MEDICINE

## 2023-06-05 PROCEDURE — 99214 OFFICE O/P EST MOD 30 MIN: CPT | Performed by: FAMILY MEDICINE

## 2023-06-05 RX ORDER — METOPROLOL SUCCINATE 25 MG/1
TABLET, EXTENDED RELEASE ORAL
Qty: 90 TABLET | Refills: 3 | Status: SHIPPED | OUTPATIENT
Start: 2023-06-05

## 2023-06-05 RX ORDER — TRAZODONE HYDROCHLORIDE 50 MG/1
25 TABLET ORAL NIGHTLY PRN
Qty: 90 TABLET | Refills: 1 | Status: SHIPPED | OUTPATIENT
Start: 2023-06-05

## 2023-06-05 ASSESSMENT — ENCOUNTER SYMPTOMS
EYE DISCHARGE: 0
EYE REDNESS: 0
CHOKING: 0
COLOR CHANGE: 1
BLOOD IN STOOL: 0
GASTROINTESTINAL NEGATIVE: 1
WHEEZING: 0
ANAL BLEEDING: 0
RHINORRHEA: 0
ABDOMINAL DISTENTION: 0
SPUTUM PRODUCTION: 0
EYE PAIN: 0
BACK PAIN: 0
EYE ITCHING: 0
CHEST TIGHTNESS: 0
DIARRHEA: 0
SINUS PRESSURE: 0
STRIDOR: 0
ABDOMINAL PAIN: 0
NAUSEA: 0
COUGH: 0
PHOTOPHOBIA: 0
BLURRED VISION: 0
VOMITING: 0
VOICE CHANGE: 0
SORE THROAT: 0
CONSTIPATION: 0
SHORTNESS OF BREATH: 1
SINUS PAIN: 0
HEMOPTYSIS: 0
ALLERGIC/IMMUNOLOGIC NEGATIVE: 1
FACIAL SWELLING: 0
RECTAL PAIN: 0
TROUBLE SWALLOWING: 0
APNEA: 0
ORTHOPNEA: 0

## 2023-06-05 NOTE — PROGRESS NOTES
CANDY Tracey is a 76 y.o. male. HPI/Chief C/O:  Chief Complaint   Patient presents with    Fatigue     Pt having leg and hand weakness. Right leg and hand weakness for 1 week     Fall     Pt fell today hurting right arm. Everything went \"black\"      Allergies   Allergen Reactions    Pneumovax [Pneumococcal Polysaccharide Vaccine] Dermatitis   The patient is here for a medication list and treatment planning review  We will go over our care planning goals as well as take care of all refills  We will set up labs as well      He has been falling    Shortness of Breath  This is a chronic problem. The current episode started more than 1 year ago. The problem occurs constantly. The problem has been gradually improving. Associated symptoms include leg swelling. Pertinent negatives include no abdominal pain, chest pain, claudication, coryza, ear pain, fever, headaches, hemoptysis, leg pain, neck pain, orthopnea, PND, rash, rhinorrhea, sore throat, sputum production, syncope, vomiting or wheezing. The symptoms are aggravated by any activity. He has tried steroid inhalers, ipratropium inhalers, beta agonist inhalers and oral steroids for the symptoms. The treatment provided moderate relief. His past medical history is significant for chronic lung disease (R/O pulmonary fibrosis), COPD (emphysema, R/O pulmonary fibrosis) and a heart failure. There is no history of allergies, aspirin allergies, asthma, bronchiolitis, CAD, DVT, PE, pneumonia or a recent surgery. Hypertension  This is a chronic problem. The current episode started more than 1 year ago. The problem is controlled. Associated symptoms include anxiety, malaise/fatigue, peripheral edema and shortness of breath. Pertinent negatives include no blurred vision, chest pain, headaches, neck pain, orthopnea, palpitations, PND or sweats.  Risk factors for coronary artery disease include smoking/tobacco exposure, male gender, dyslipidemia, family history

## 2023-06-09 ENCOUNTER — TELEPHONE (OUTPATIENT)
Dept: GENERAL RADIOLOGY | Age: 78
End: 2023-06-09

## 2023-06-20 ENCOUNTER — TELEPHONE (OUTPATIENT)
Dept: INTERVENTIONAL RADIOLOGY/VASCULAR | Age: 78
End: 2023-06-20

## 2023-06-20 NOTE — TELEPHONE ENCOUNTER
Spoke with patient and confirmed vascular testing appointment on 06/21/2023 at 11:00 am. Instructed patient to arrive 15 minutes prior to appointment time, Enter through Entrance B, register to right of entrance, and report to Cardiac Services for test. Patient verbalized understanding. Questions answered.

## 2023-06-21 ENCOUNTER — HOSPITAL ENCOUNTER (OUTPATIENT)
Dept: INTERVENTIONAL RADIOLOGY/VASCULAR | Age: 78
Discharge: HOME | End: 2023-06-23
Payer: MEDICARE

## 2023-06-21 DIAGNOSIS — I73.9 CLAUDICATION OF BOTH LOWER EXTREMITIES (HCC): ICD-10-CM

## 2023-06-21 PROCEDURE — 93922 UPR/L XTREMITY ART 2 LEVELS: CPT

## 2023-07-20 ENCOUNTER — HOSPITAL ENCOUNTER (OUTPATIENT)
Dept: CARDIOLOGY | Age: 78
Discharge: HOME | End: 2023-07-20
Payer: MEDICARE

## 2023-07-20 DIAGNOSIS — R06.09 DYSPNEA ON EXERTION: ICD-10-CM

## 2023-07-20 DIAGNOSIS — R23.0 CYANOSIS: ICD-10-CM

## 2023-07-20 LAB
LV EF: 48 %
LVEF MODALITY: NORMAL

## 2023-07-20 PROCEDURE — 93306 TTE W/DOPPLER COMPLETE: CPT

## 2023-07-20 PROCEDURE — 2580000003 HC RX 258: Performed by: NURSE PRACTITIONER

## 2023-07-20 RX ORDER — SODIUM CHLORIDE 0.9 % (FLUSH) 0.9 %
10 SYRINGE (ML) INJECTION ONCE
Status: COMPLETED | OUTPATIENT
Start: 2023-07-20 | End: 2023-07-20

## 2023-07-20 RX ADMIN — SODIUM CHLORIDE, PRESERVATIVE FREE 10 ML: 5 INJECTION INTRAVENOUS at 09:00

## 2023-07-26 ENCOUNTER — OFFICE VISIT (OUTPATIENT)
Dept: PULMONOLOGY | Age: 78
End: 2023-07-26
Payer: MEDICARE

## 2023-07-26 VITALS
TEMPERATURE: 97.2 F | RESPIRATION RATE: 14 BRPM | SYSTOLIC BLOOD PRESSURE: 102 MMHG | HEART RATE: 48 BPM | OXYGEN SATURATION: 99 % | DIASTOLIC BLOOD PRESSURE: 66 MMHG

## 2023-07-26 DIAGNOSIS — J43.8 OTHER EMPHYSEMA (HCC): Primary | ICD-10-CM

## 2023-07-26 DIAGNOSIS — R23.0 SKIN CYANOSIS: ICD-10-CM

## 2023-07-26 DIAGNOSIS — Z87.891 HISTORY OF TOBACCO USE: ICD-10-CM

## 2023-07-26 PROCEDURE — 1123F ACP DISCUSS/DSCN MKR DOCD: CPT | Performed by: INTERNAL MEDICINE

## 2023-07-26 PROCEDURE — G8428 CUR MEDS NOT DOCUMENT: HCPCS | Performed by: INTERNAL MEDICINE

## 2023-07-26 PROCEDURE — G8420 CALC BMI NORM PARAMETERS: HCPCS | Performed by: INTERNAL MEDICINE

## 2023-07-26 PROCEDURE — 1036F TOBACCO NON-USER: CPT | Performed by: INTERNAL MEDICINE

## 2023-07-26 PROCEDURE — 99213 OFFICE O/P EST LOW 20 MIN: CPT | Performed by: INTERNAL MEDICINE

## 2023-07-26 PROCEDURE — 3023F SPIROM DOC REV: CPT | Performed by: INTERNAL MEDICINE

## 2023-07-26 NOTE — PATIENT INSTRUCTIONS
[address] [place] [address]  [address] [facility]  Office: [phone]      Your were seen in the office today for COPD      We  did not make changes to your medications today. Testing ordered today was none      Vaccines recommended influenza when available              Please do not hesitate to call the office with any questions.

## 2023-07-26 NOTE — PROGRESS NOTES
Per Dr. Sarah England, patient will alternate office visits between Allen Tanner NP and Dr. Sarah England. Patient given a 3 month follow up with Ulysses Motto for Oct. 9th.

## 2023-08-11 NOTE — PROGRESS NOTES
[facility]  Department of Pulmonary, Critical Care and Sleep Medicine  Dr. Mark Anthony Snyder, Dr. Esteban Doss, Dr. Anamaria Rogers, APRN    Pulmonary & Critical Care Office Note - Follow up      Assessment/Plan     Problem List Items Addressed This Visit    None          Patient ID: Kumar Uribe is a 76 y.o. male    Chief Complaint: Worsening shortness of breath    HPI: Kumar Uribe is a 76 y.o. male with a PMH of COPD diagnosed 14 yrs ago. He has not seen a pulmonologist since 2015 when he saw Dr. Portillo Allen. I do not see PFT on file, will order full PFT with abgs. He states he has been using symbicort and Trelegy at the same time but ran out of Trelegy about 1 week ago, he does not use a rescue inhaler. We discussed the medications contained in each inhaler and their intended use. I did emphasize NOT to use the Symbicort while using the Trelegy and that I will order an albuterol rescue inhaler for him. He states he has become progressively more short of breath over the last 3 weeks and was treated for bronchitis with Zpak, prednisone and guaifenesin. He states his cough has improved but the shortness of breath with activity has not. Ambulatory pulse oximetry conducted in the office does NOT reveal exercise induced hypoxemia, but I would like to get a 6 minute walk test. His cyanosis does have me concerned, he reports the discoloration to the his fingers and nose are chronic for several years but the lip cyanosis is relatively new. He denies chest pain, hemoptysis or syncope. Previously worked for [employer], retired in [employer]. Labs, imaging, and specialist notes reviewed as available. April 4, 2023: Mr. Arin Herrera returns to the clinic today in the company of his wife, [place], for 6 week follow up of initial evaluation for emphysema, shortness of breath and cyanosis.  Symptoms have not improved since his last office, reports he does not feel like the Trelegy is working and

## 2023-08-14 DIAGNOSIS — R06.02 SOB (SHORTNESS OF BREATH): ICD-10-CM

## 2023-08-14 DIAGNOSIS — J43.9 PULMONARY EMPHYSEMA, UNSPECIFIED EMPHYSEMA TYPE (HCC): ICD-10-CM

## 2023-08-15 RX ORDER — FLUTICASONE FUROATE, UMECLIDINIUM BROMIDE AND VILANTEROL TRIFENATATE 100; 62.5; 25 UG/1; UG/1; UG/1
1 POWDER RESPIRATORY (INHALATION) DAILY
Qty: 3 EACH | Refills: 3 | Status: SHIPPED | OUTPATIENT
Start: 2023-08-15

## 2023-10-10 ENCOUNTER — OFFICE VISIT (OUTPATIENT)
Dept: PULMONOLOGY | Age: 78
End: 2023-10-10

## 2023-10-10 VITALS
DIASTOLIC BLOOD PRESSURE: 60 MMHG | RESPIRATION RATE: 18 BRPM | OXYGEN SATURATION: 99 % | HEART RATE: 70 BPM | WEIGHT: 150 LBS | TEMPERATURE: 97 F | HEIGHT: 67 IN | SYSTOLIC BLOOD PRESSURE: 103 MMHG | BODY MASS INDEX: 23.54 KG/M2

## 2023-10-10 DIAGNOSIS — J43.9 PULMONARY EMPHYSEMA, UNSPECIFIED EMPHYSEMA TYPE (HCC): Primary | ICD-10-CM

## 2023-10-10 DIAGNOSIS — J44.9 CHRONIC OBSTRUCTIVE PULMONARY DISEASE, UNSPECIFIED COPD TYPE (HCC): ICD-10-CM

## 2023-10-10 NOTE — PROGRESS NOTES
3 mos follow up today in office with NP. Doing well. Using Trelegy and Albuterol prn. With good results. Plan for Chest CT low dose to be done April 2024 with follow up in office in 6 mos. Appt given.
32.4 05/16/2023 10:31 AM    RDW 16.4 05/16/2023 10:31 AM    NRBC 1.7 09/23/2019 05:40 AM    METASPCT 0.9 09/24/2019 07:01 AM    LYMPHOPCT 20.0 05/16/2023 10:31 AM    MONOPCT 7.8 05/16/2023 10:31 AM    MYELOPCT 4.3 09/24/2019 07:01 AM    BASOPCT 1.1 05/16/2023 10:31 AM    MONOSABS 0.51 05/16/2023 10:31 AM    LYMPHSABS 1.31 05/16/2023 10:31 AM    EOSABS 0.41 05/16/2023 10:31 AM    BASOSABS 0.07 05/16/2023 10:31 AM     CMP:    Lab Results   Component Value Date/Time     05/16/2023 10:31 AM    K 4.5 05/16/2023 10:31 AM    K 3.7 09/16/2019 08:40 PM     05/16/2023 10:31 AM    CO2 23 05/16/2023 10:31 AM    BUN 15 05/16/2023 10:31 AM    CREATININE 1.1 05/16/2023 10:31 AM    GFRAA >60 08/22/2022 12:30 PM    LABGLOM >60 05/16/2023 10:31 AM    GLUCOSE 85 05/16/2023 10:31 AM    PROT 7.7 05/16/2023 10:31 AM    LABALBU 4.2 05/16/2023 10:31 AM    CALCIUM 9.5 05/16/2023 10:31 AM    BILITOT 2.0 05/16/2023 10:31 AM    ALKPHOS 199 05/16/2023 10:31 AM    AST 19 05/16/2023 10:31 AM    ALT 10 05/16/2023 10:31 AM     Magnesium:    Lab Results   Component Value Date/Time    MG 2.3 08/22/2022 12:30 PM     Phosphorus:    Lab Results   Component Value Date/Time    PHOS 2.3 09/18/2019 08:23 AM     HgBA1c:    Lab Results   Component Value Date/Time    LABA1C 6.2 02/13/2023 01:20 PM      I hope this updates you on my evaluation and clinical thinking. Thank you for allowing me to participate in the care of [address] [place] [address].       Sincerely,    Electronically signed by NURY Henry CNP on 10/10/2023 at 9:46 AM

## 2023-10-15 DIAGNOSIS — I50.22 CHRONIC SYSTOLIC CONGESTIVE HEART FAILURE (HCC): ICD-10-CM

## 2023-10-16 RX ORDER — EMPAGLIFLOZIN 10 MG/1
10 TABLET, FILM COATED ORAL DAILY
Qty: 90 TABLET | Refills: 3 | Status: SHIPPED | OUTPATIENT
Start: 2023-10-16

## 2023-10-16 NOTE — TELEPHONE ENCOUNTER
Last Appointment:  6/5/2023  Future Appointments   Date Time Provider [address]   11/1/2023  1:00 PM [place], [address] [address] [facility] Neurology -   4/10/2024 10:00 AM Bayron Ibarra, NURY - CNP [facility] Pul[facility]

## 2024-01-01 ENCOUNTER — TELEPHONE (OUTPATIENT)
Dept: FAMILY MEDICINE CLINIC | Age: 79
End: 2024-01-01

## 2024-01-05 ENCOUNTER — OFFICE VISIT (OUTPATIENT)
Dept: FAMILY MEDICINE CLINIC | Age: 79
End: 2024-01-05
Payer: MEDICARE

## 2024-01-05 VITALS
RESPIRATION RATE: 18 BRPM | OXYGEN SATURATION: 97 % | HEART RATE: 68 BPM | BODY MASS INDEX: 23.73 KG/M2 | SYSTOLIC BLOOD PRESSURE: 106 MMHG | TEMPERATURE: 97.1 F | HEIGHT: 67 IN | WEIGHT: 151.2 LBS | DIASTOLIC BLOOD PRESSURE: 66 MMHG

## 2024-01-05 DIAGNOSIS — R06.02 SOB (SHORTNESS OF BREATH): ICD-10-CM

## 2024-01-05 DIAGNOSIS — R73.01 IFG (IMPAIRED FASTING GLUCOSE): ICD-10-CM

## 2024-01-05 DIAGNOSIS — Z12.5 SCREENING FOR PROSTATE CANCER: ICD-10-CM

## 2024-01-05 DIAGNOSIS — E70.319 OA (OCULAR ALBINISM) (HCC): ICD-10-CM

## 2024-01-05 DIAGNOSIS — I48.21 PERMANENT ATRIAL FIBRILLATION (HCC): ICD-10-CM

## 2024-01-05 DIAGNOSIS — E11.9 ENCOUNTER FOR DIABETIC FOOT EXAM (HCC): ICD-10-CM

## 2024-01-05 DIAGNOSIS — I10 ESSENTIAL HYPERTENSION: ICD-10-CM

## 2024-01-05 DIAGNOSIS — Z72.0 TOBACCO ABUSE: ICD-10-CM

## 2024-01-05 DIAGNOSIS — E78.5 DYSLIPIDEMIA: ICD-10-CM

## 2024-01-05 DIAGNOSIS — J01.90 ACUTE BACTERIAL SINUSITIS: ICD-10-CM

## 2024-01-05 DIAGNOSIS — I44.2 AV BLOCK, 3RD DEGREE (HCC): ICD-10-CM

## 2024-01-05 DIAGNOSIS — R53.83 FATIGUE, UNSPECIFIED TYPE: ICD-10-CM

## 2024-01-05 DIAGNOSIS — I50.21 ACUTE SYSTOLIC CONGESTIVE HEART FAILURE (HCC): ICD-10-CM

## 2024-01-05 DIAGNOSIS — I48.0 PAROXYSMAL A-FIB (HCC): ICD-10-CM

## 2024-01-05 DIAGNOSIS — B96.89 ACUTE BACTERIAL SINUSITIS: ICD-10-CM

## 2024-01-05 DIAGNOSIS — I50.22 CHRONIC SYSTOLIC CONGESTIVE HEART FAILURE (HCC): ICD-10-CM

## 2024-01-05 DIAGNOSIS — R05.9 COUGH IN ADULT: Primary | ICD-10-CM

## 2024-01-05 DIAGNOSIS — J43.9 PULMONARY EMPHYSEMA, UNSPECIFIED EMPHYSEMA TYPE (HCC): ICD-10-CM

## 2024-01-05 DIAGNOSIS — F51.01 PRIMARY INSOMNIA: ICD-10-CM

## 2024-01-05 LAB
ABSOLUTE IMMATURE GRANULOCYTE: 0.09 K/UL (ref 0–0.58)
ALBUMIN SERPL-MCNC: 4.2 G/DL (ref 3.5–5.2)
ALP BLD-CCNC: 140 U/L (ref 40–129)
ALT SERPL-CCNC: 14 U/L (ref 0–40)
ANION GAP SERPL CALCULATED.3IONS-SCNC: 15 MMOL/L (ref 7–16)
AST SERPL-CCNC: 20 U/L (ref 0–39)
BASOPHILS ABSOLUTE: 0.09 K/UL (ref 0–0.2)
BASOPHILS RELATIVE PERCENT: 1 % (ref 0–2)
BILIRUB SERPL-MCNC: 1.6 MG/DL (ref 0–1.2)
BUN BLDV-MCNC: 16 MG/DL (ref 6–23)
CALCIUM SERPL-MCNC: 9.2 MG/DL (ref 8.6–10.2)
CHOLESTEROL: 134 MG/DL
CO2: 21 MMOL/L (ref 22–29)
CREAT SERPL-MCNC: 1.2 MG/DL (ref 0.7–1.2)
EOSINOPHILS ABSOLUTE: 0.48 K/UL (ref 0.05–0.5)
EOSINOPHILS RELATIVE PERCENT: 6 % (ref 0–6)
GFR SERPL CREATININE-BSD FRML MDRD: >60 ML/MIN/1.73M2
GLUCOSE BLD-MCNC: 84 MG/DL (ref 74–99)
HCT VFR BLD CALC: 44.8 % (ref 37–54)
HDLC SERPL-MCNC: 50 MG/DL
HEMOGLOBIN: 14.6 G/DL (ref 12.5–16.5)
IMMATURE GRANULOCYTES: 1 % (ref 0–5)
INFLUENZA A ANTIGEN, POC: NEGATIVE
INFLUENZA B ANTIGEN, POC: NEGATIVE
LDL CHOLESTEROL: 73 MG/DL
LOT EXPIRE DATE: NORMAL
LOT KIT NUMBER: NORMAL
LYMPHOCYTES ABSOLUTE: 1.5 K/UL (ref 1.5–4)
LYMPHOCYTES RELATIVE PERCENT: 19 % (ref 20–42)
MCH RBC QN AUTO: 31.5 PG (ref 26–35)
MCHC RBC AUTO-ENTMCNC: 32.6 G/DL (ref 32–34.5)
MCV RBC AUTO: 96.6 FL (ref 80–99.9)
MONOCYTES ABSOLUTE: 0.65 K/UL (ref 0.1–0.95)
MONOCYTES RELATIVE PERCENT: 8 % (ref 2–12)
NEUTROPHILS ABSOLUTE: 5.3 K/UL (ref 1.8–7.3)
NEUTROPHILS RELATIVE PERCENT: 65 % (ref 43–80)
PDW BLD-RTO: 15.8 % (ref 11.5–15)
PLATELET # BLD: 173 K/UL (ref 130–450)
PMV BLD AUTO: 10.7 FL (ref 7–12)
POTASSIUM SERPL-SCNC: 5.1 MMOL/L (ref 3.5–5)
PROSTATE SPECIFIC ANTIGEN: 0.29 NG/ML (ref 0–4)
RBC # BLD: 4.64 M/UL (ref 3.8–5.8)
SARS-COV-2, POC: NORMAL
SODIUM BLD-SCNC: 140 MMOL/L (ref 132–146)
TOTAL PROTEIN: 7.6 G/DL (ref 6.4–8.3)
TRIGL SERPL-MCNC: 53 MG/DL
TSH SERPL DL<=0.05 MIU/L-ACNC: 3.1 UIU/ML (ref 0.27–4.2)
VALID INTERNAL CONTROL: NORMAL
VENDOR AND KIT NAME POC: NORMAL
VLDLC SERPL CALC-MCNC: 11 MG/DL
WBC # BLD: 8.1 K/UL (ref 4.5–11.5)

## 2024-01-05 PROCEDURE — G8420 CALC BMI NORM PARAMETERS: HCPCS | Performed by: FAMILY MEDICINE

## 2024-01-05 PROCEDURE — 3023F SPIROM DOC REV: CPT | Performed by: FAMILY MEDICINE

## 2024-01-05 PROCEDURE — 3044F HG A1C LEVEL LT 7.0%: CPT | Performed by: FAMILY MEDICINE

## 2024-01-05 PROCEDURE — 3074F SYST BP LT 130 MM HG: CPT | Performed by: FAMILY MEDICINE

## 2024-01-05 PROCEDURE — 1036F TOBACCO NON-USER: CPT | Performed by: FAMILY MEDICINE

## 2024-01-05 PROCEDURE — 87428 SARSCOV & INF VIR A&B AG IA: CPT | Performed by: FAMILY MEDICINE

## 2024-01-05 PROCEDURE — 99214 OFFICE O/P EST MOD 30 MIN: CPT | Performed by: FAMILY MEDICINE

## 2024-01-05 PROCEDURE — 3078F DIAST BP <80 MM HG: CPT | Performed by: FAMILY MEDICINE

## 2024-01-05 PROCEDURE — 1123F ACP DISCUSS/DSCN MKR DOCD: CPT | Performed by: FAMILY MEDICINE

## 2024-01-05 PROCEDURE — G8484 FLU IMMUNIZE NO ADMIN: HCPCS | Performed by: FAMILY MEDICINE

## 2024-01-05 PROCEDURE — G8427 DOCREV CUR MEDS BY ELIG CLIN: HCPCS | Performed by: FAMILY MEDICINE

## 2024-01-05 PROCEDURE — 36415 COLL VENOUS BLD VENIPUNCTURE: CPT | Performed by: FAMILY MEDICINE

## 2024-01-05 RX ORDER — ALBUTEROL SULFATE 90 UG/1
2 AEROSOL, METERED RESPIRATORY (INHALATION) EVERY 6 HOURS PRN
Qty: 18 G | Refills: 3 | Status: SHIPPED | OUTPATIENT
Start: 2024-01-05

## 2024-01-05 RX ORDER — FUROSEMIDE 40 MG/1
TABLET ORAL
Qty: 90 TABLET | Refills: 3 | Status: SHIPPED | OUTPATIENT
Start: 2024-01-05

## 2024-01-05 RX ORDER — FLUTICASONE FUROATE, UMECLIDINIUM BROMIDE AND VILANTEROL TRIFENATATE 100; 62.5; 25 UG/1; UG/1; UG/1
1 POWDER RESPIRATORY (INHALATION) DAILY
Qty: 3 EACH | Refills: 3 | Status: SHIPPED | OUTPATIENT
Start: 2024-01-05

## 2024-01-05 RX ORDER — LISINOPRIL 2.5 MG/1
TABLET ORAL
Qty: 90 TABLET | Refills: 3 | Status: SHIPPED | OUTPATIENT
Start: 2024-01-05

## 2024-01-05 RX ORDER — NITROGLYCERIN 0.3 MG/1
0.3 TABLET SUBLINGUAL EVERY 5 MIN PRN
Qty: 30 TABLET | Refills: 3 | Status: SHIPPED | OUTPATIENT
Start: 2024-01-05

## 2024-01-05 RX ORDER — CEFDINIR 300 MG/1
300 CAPSULE ORAL 2 TIMES DAILY
Qty: 14 CAPSULE | Refills: 0 | Status: SHIPPED | OUTPATIENT
Start: 2024-01-05 | End: 2024-01-12

## 2024-01-05 RX ORDER — TRAZODONE HYDROCHLORIDE 50 MG/1
25 TABLET ORAL NIGHTLY PRN
Qty: 90 TABLET | Refills: 0 | Status: SHIPPED | OUTPATIENT
Start: 2024-01-05

## 2024-01-05 RX ORDER — ATORVASTATIN CALCIUM 20 MG/1
20 TABLET, FILM COATED ORAL DAILY
Qty: 90 TABLET | Refills: 3 | Status: SHIPPED | OUTPATIENT
Start: 2024-01-05

## 2024-01-05 RX ORDER — BENZONATATE 100 MG/1
100 CAPSULE ORAL 2 TIMES DAILY PRN
Qty: 20 CAPSULE | Refills: 0 | Status: SHIPPED | OUTPATIENT
Start: 2024-01-05 | End: 2024-01-15

## 2024-01-05 RX ORDER — METOPROLOL SUCCINATE 25 MG/1
TABLET, EXTENDED RELEASE ORAL
Qty: 90 TABLET | Refills: 3 | Status: SHIPPED | OUTPATIENT
Start: 2024-01-05

## 2024-01-05 ASSESSMENT — PATIENT HEALTH QUESTIONNAIRE - PHQ9
2. FEELING DOWN, DEPRESSED OR HOPELESS: 0
SUM OF ALL RESPONSES TO PHQ QUESTIONS 1-9: 1
1. LITTLE INTEREST OR PLEASURE IN DOING THINGS: 1
SUM OF ALL RESPONSES TO PHQ QUESTIONS 1-9: 1
SUM OF ALL RESPONSES TO PHQ9 QUESTIONS 1 & 2: 1

## 2024-01-05 NOTE — PROGRESS NOTES
Venipuncture was obtained from left arm, with 1 attempt. Patient tolerated the procedure without complications or complaints.  Electronically signed by Lexie Tejeda LPN on 1/5/24 at 11:33 AM EST   Last Appointment:  Visit date not found  Future Appointments   Date Time Provider Department Center   1/8/2024 10:00 AM Kari Pulliam APRN - NP [facility] NEURO Neurology -   4/10/2024 10:00 AM Rufina Ibarra APRN - CNP ACC Pulm [facility]   7/9/2024 10:00 AM Hermelindo Bedolla, DO [facility]

## 2024-01-06 LAB — HBA1C MFR BLD: 5.9 % (ref 4–5.6)

## 2024-01-08 PROBLEM — E70.319 OA (OCULAR ALBINISM) (HCC): Status: ACTIVE | Noted: 2024-01-08

## 2024-01-08 PROBLEM — I10 ESSENTIAL HYPERTENSION: Status: ACTIVE | Noted: 2024-01-08

## 2024-01-08 PROBLEM — Z12.5 SCREENING FOR PROSTATE CANCER: Status: ACTIVE | Noted: 2024-01-08

## 2024-01-08 PROBLEM — R05.9 COUGH IN ADULT: Status: ACTIVE | Noted: 2024-01-08

## 2024-01-08 PROBLEM — E78.5 DYSLIPIDEMIA: Status: ACTIVE | Noted: 2024-01-08

## 2024-01-08 PROBLEM — Z72.0 TOBACCO ABUSE: Status: ACTIVE | Noted: 2024-01-08

## 2024-01-08 PROBLEM — I50.21 ACUTE SYSTOLIC CONGESTIVE HEART FAILURE (HCC): Status: ACTIVE | Noted: 2024-01-08

## 2024-01-08 PROBLEM — I48.21 PERMANENT ATRIAL FIBRILLATION (HCC): Status: ACTIVE | Noted: 2024-01-08

## 2024-01-08 PROBLEM — E11.9 ENCOUNTER FOR DIABETIC FOOT EXAM (HCC): Status: ACTIVE | Noted: 2024-01-08

## 2024-01-08 PROBLEM — I48.0 PAROXYSMAL A-FIB (HCC): Status: ACTIVE | Noted: 2024-01-08

## 2024-01-08 PROBLEM — J01.90 ACUTE BACTERIAL SINUSITIS: Status: ACTIVE | Noted: 2024-01-08

## 2024-01-08 PROBLEM — B96.89 ACUTE BACTERIAL SINUSITIS: Status: ACTIVE | Noted: 2024-01-08

## 2024-01-08 PROBLEM — F51.01 PRIMARY INSOMNIA: Status: ACTIVE | Noted: 2024-01-08

## 2024-01-08 PROBLEM — R53.83 FATIGUE: Status: ACTIVE | Noted: 2024-01-08

## 2024-01-08 ASSESSMENT — ENCOUNTER SYMPTOMS
RECTAL PAIN: 0
ALLERGIC/IMMUNOLOGIC NEGATIVE: 1
BACK PAIN: 0
EYE ITCHING: 0
CONSTIPATION: 0
APNEA: 0
EYE DISCHARGE: 0
CHEST TIGHTNESS: 0
COLOR CHANGE: 1
PHOTOPHOBIA: 0
BLOOD IN STOOL: 0
WHEEZING: 0
ABDOMINAL PAIN: 0
VOMITING: 0
SINUS PAIN: 0
ANAL BLEEDING: 0
SHORTNESS OF BREATH: 1
DIARRHEA: 0
ABDOMINAL DISTENTION: 0
COUGH: 1
EYE REDNESS: 0
TROUBLE SWALLOWING: 0
VOICE CHANGE: 0
SINUS PRESSURE: 0
NAUSEA: 0
STRIDOR: 0
GASTROINTESTINAL NEGATIVE: 1
EYE PAIN: 0
SORE THROAT: 0
RHINORRHEA: 0
CHOKING: 0
FACIAL SWELLING: 0

## 2024-01-08 NOTE — PROGRESS NOTES
SUBJECTIVE  Kike Holloway is a 78 y.o. male.    HPI/Chief C/O:  Chief Complaint   Patient presents with    Cough     Pt c/o dry cough, sneeze, and runny nose, feels like he has something in his upper chest for a month or more. Pt's wife is in the room with Pt and states she has not gotten sick. P    Other     Pt received a letter from [facility] that he is due for an annual lung cancer screen.     Would like a handicap placard D/T SOB when walking any distance.    Pt would like a referral to a podiatrist who is close to where he lives.    Pt states he is constantly cold all the time and this has been the case for over a year.     Allergies   Allergen Reactions    Pneumovax [Pneumococcal Polysaccharide Vaccine] Dermatitis     This 78 year old male presents for physical exam. Pt c/o nasal congestion and dry cough for 1 month. Pt has hypertension, atrial fib. (HCC), CHF (HCC), OA, COPD (HCC), hyperlipidemia, and tobacco abuse. Pt requests handicap placard.   ROS:  Review of Systems   Constitutional:  Positive for fatigue. Negative for activity change, appetite change, chills, diaphoresis, fever and unexpected weight change.   HENT:  Positive for congestion. Negative for dental problem, drooling, ear discharge, ear pain, facial swelling, hearing loss, mouth sores, nosebleeds, postnasal drip, rhinorrhea, sinus pressure, sinus pain, sneezing, sore throat, tinnitus, trouble swallowing and voice change.    Eyes:  Negative for photophobia, pain, discharge, redness, itching and visual disturbance.   Respiratory:  Positive for cough and shortness of breath. Negative for apnea, choking, chest tightness, wheezing and stridor.    Cardiovascular:  Negative for chest pain, palpitations and leg swelling.   Gastrointestinal: Negative.  Negative for abdominal distention, abdominal pain, anal bleeding, blood in stool, constipation, diarrhea, nausea, rectal pain and vomiting.   Endocrine: Negative.  Negative for cold intolerance, heat

## 2024-01-10 ENCOUNTER — HOSPITAL ENCOUNTER (OUTPATIENT)
Dept: CT IMAGING | Age: 79
Discharge: HOME | End: 2024-01-10
Attending: FAMILY MEDICINE
Payer: MEDICARE

## 2024-01-10 DIAGNOSIS — Z72.0 TOBACCO ABUSE: ICD-10-CM

## 2024-01-10 PROCEDURE — 71271 CT THORAX LUNG CANCER SCR C-: CPT

## 2024-01-12 ENCOUNTER — TELEPHONE (OUTPATIENT)
Dept: ADMINISTRATIVE | Age: 79
End: 2024-01-12

## 2024-01-12 ENCOUNTER — TELEPHONE (OUTPATIENT)
Dept: CASE MANAGEMENT | Age: 79
End: 2024-01-12

## 2024-01-12 NOTE — TELEPHONE ENCOUNTER
No call, encounter opened to process CT Lung Screening.    CT Lung Screen: 1/10/2024    IMPRESSION:  1. There is no pulmonary infiltrate, mass or suspicious pulmonary nodule  2. Emphysematous changes  3. Biapical pleuroparenchymal scarring  4. Mild aneurysmal dilatation of the ascending thoracic aorta and aortic arch  5. Significant calcified plaque within the coronary arteries.     LUNG RADS:  Lung-RADS 1 - Negative (v2022)     Management:  12 month screening LDCT     RECOMMENDATIONS:  If you would like to register your patient with the [facility] Lung Nodule/Lung  Cancer Screening Program, please contact the Nurse Navigator at  [phone].    Pack years: 25    Social History     Tobacco Use  Smoking Status: Former Smoker    Start Date: 1964   Quit Date: 2017   Types: Cigarettes   Packs/Day: 0.5   Years: 50   Pack Years: 25   Smokeless Tobacco: Never         Results letter sent to patient via my chart or mailed.     Nimesh Monroe  Imaging Navigator   [facility]   [phone]

## 2024-01-12 NOTE — TELEPHONE ENCOUNTER
Dr. Coates's pt calling per his PCP he had a CT lung scan on: Wed and they stated he had some heart irregularities.  Advise please.  Last seen in [facility] By Dr. Coates 3/2023.      PPM Device set at last 2 readings 68 and 64 and in the past it has been 70.

## 2024-01-16 ENCOUNTER — OFFICE VISIT (OUTPATIENT)
Dept: CARDIOLOGY CLINIC | Age: 79
End: 2024-01-16
Payer: MEDICARE

## 2024-01-16 VITALS
SYSTOLIC BLOOD PRESSURE: 100 MMHG | HEART RATE: 72 BPM | BODY MASS INDEX: 23.86 KG/M2 | RESPIRATION RATE: 20 BRPM | DIASTOLIC BLOOD PRESSURE: 68 MMHG | HEIGHT: 67 IN | WEIGHT: 152 LBS

## 2024-01-16 DIAGNOSIS — Z87.891 EX-SMOKER: ICD-10-CM

## 2024-01-16 DIAGNOSIS — Z95.0 PACEMAKER: ICD-10-CM

## 2024-01-16 DIAGNOSIS — I38 VHD (VALVULAR HEART DISEASE): ICD-10-CM

## 2024-01-16 DIAGNOSIS — R29.898 WEAKNESS OF BOTH LOWER EXTREMITIES: ICD-10-CM

## 2024-01-16 DIAGNOSIS — Z86.79 H/O CHF: ICD-10-CM

## 2024-01-16 DIAGNOSIS — J43.9 PULMONARY EMPHYSEMA, UNSPECIFIED EMPHYSEMA TYPE (HCC): ICD-10-CM

## 2024-01-16 DIAGNOSIS — I48.21 PERMANENT ATRIAL FIBRILLATION (HCC): ICD-10-CM

## 2024-01-16 DIAGNOSIS — Z87.01 H/O: PNEUMONIA: ICD-10-CM

## 2024-01-16 DIAGNOSIS — Z95.2 S/P AVR (AORTIC VALVE REPLACEMENT): ICD-10-CM

## 2024-01-16 DIAGNOSIS — Z95.828 S/P AAA REPAIR USING BIFURCATION GRAFT: ICD-10-CM

## 2024-01-16 DIAGNOSIS — E79.0 HYPERURICEMIA: ICD-10-CM

## 2024-01-16 DIAGNOSIS — Z79.899 ON STATIN THERAPY: ICD-10-CM

## 2024-01-16 DIAGNOSIS — M51.36 DDD (DEGENERATIVE DISC DISEASE), LUMBAR: ICD-10-CM

## 2024-01-16 DIAGNOSIS — Z86.79 S/P AAA REPAIR USING BIFURCATION GRAFT: ICD-10-CM

## 2024-01-16 DIAGNOSIS — Z85.46 H/O PROSTATE CANCER: ICD-10-CM

## 2024-01-16 DIAGNOSIS — E80.6 HYPERBILIRUBINEMIA: ICD-10-CM

## 2024-01-16 DIAGNOSIS — F41.9 ANXIETY: ICD-10-CM

## 2024-01-16 DIAGNOSIS — I36.1 NONRHEUMATIC TRICUSPID VALVE REGURGITATION: ICD-10-CM

## 2024-01-16 DIAGNOSIS — R23.0 CYANOSIS: Primary | ICD-10-CM

## 2024-01-16 DIAGNOSIS — Z95.1 HX OF CABG: ICD-10-CM

## 2024-01-16 DIAGNOSIS — I44.39 HIGH-GRADE ATRIOVENTRICULAR BLOCK: ICD-10-CM

## 2024-01-16 DIAGNOSIS — Z87.09 HISTORY OF ACUTE RESPIRATORY FAILURE: ICD-10-CM

## 2024-01-16 DIAGNOSIS — I25.5 ISCHEMIC CARDIOMYOPATHY: ICD-10-CM

## 2024-01-16 DIAGNOSIS — R06.09 DYSPNEA ON EXERTION: ICD-10-CM

## 2024-01-16 DIAGNOSIS — Z79.01 ANTICOAGULATED BY ANTICOAGULATION TREATMENT: ICD-10-CM

## 2024-01-16 DIAGNOSIS — N18.2 CKD (CHRONIC KIDNEY DISEASE) STAGE 2, GFR 60-89 ML/MIN: ICD-10-CM

## 2024-01-16 DIAGNOSIS — R73.03 PREDIABETES: ICD-10-CM

## 2024-01-16 DIAGNOSIS — Z86.39 H/O HYPERLIPIDEMIA: ICD-10-CM

## 2024-01-16 DIAGNOSIS — I34.0 NONRHEUMATIC MITRAL VALVE REGURGITATION: ICD-10-CM

## 2024-01-16 PROCEDURE — 3078F DIAST BP <80 MM HG: CPT | Performed by: INTERNAL MEDICINE

## 2024-01-16 PROCEDURE — 1123F ACP DISCUSS/DSCN MKR DOCD: CPT | Performed by: INTERNAL MEDICINE

## 2024-01-16 PROCEDURE — G8484 FLU IMMUNIZE NO ADMIN: HCPCS | Performed by: INTERNAL MEDICINE

## 2024-01-16 PROCEDURE — 3074F SYST BP LT 130 MM HG: CPT | Performed by: INTERNAL MEDICINE

## 2024-01-16 PROCEDURE — 93000 ELECTROCARDIOGRAM COMPLETE: CPT | Performed by: INTERNAL MEDICINE

## 2024-01-16 PROCEDURE — G8427 DOCREV CUR MEDS BY ELIG CLIN: HCPCS | Performed by: INTERNAL MEDICINE

## 2024-01-16 PROCEDURE — 3023F SPIROM DOC REV: CPT | Performed by: INTERNAL MEDICINE

## 2024-01-16 PROCEDURE — 99214 OFFICE O/P EST MOD 30 MIN: CPT | Performed by: INTERNAL MEDICINE

## 2024-01-16 PROCEDURE — 1036F TOBACCO NON-USER: CPT | Performed by: INTERNAL MEDICINE

## 2024-01-16 PROCEDURE — G8420 CALC BMI NORM PARAMETERS: HCPCS | Performed by: INTERNAL MEDICINE

## 2024-01-16 NOTE — PROGRESS NOTES
turgor. No rashes or ulcers noted.   Neurologic:                  Oriented x 3.  No motor or sensory deficits detected.        REVIEW OF DIAGNOSTIC TESTS:     Low dose screening CT scan of the chest without contrast done on 1/10/2024 results reviewed in Epic.    Blood tests from 1/5/2024 reviewed:  Hemoglobin A1C 5.9.  PSA 0.29.  Hemoglobin 14.6, hematocrit 44.8, BUN 16, creatinine 1.2, GFR > 60, potassium 5.1, sodium 140, total bilirubin 1.6, alkaline phosphatase 140.  SGPT and SGOT normal.  Total cholesterol 134.  Triglycerides 53, HDL 50, LDL 73.    EKG done today showed ventricular pacing.       ASSESSMENT / DIAGNOSIS:   1.  Coronary artery disease/ischemic cardiomyopathy:  No angina.  No signs or symptoms of volume overload..    2.  Bicuspid aortic valve with aortic stenosis and aortic regurgitation status post aortic valve replacement surgery.   3.  History of high grade AV block status post dual chamber pacemaker insertion.  4.  Permanent atrial fibrillation:  First diagnosed in the perioperative period in 08/2014 with noted recurrences on pacemaker interrogation done on 09/05/2017.  5.  Hyperlipidemia: On low dose atorvastatin.  History of elevated liver enzymes with higher dose of statins.  6.  Hyperbilirubinemia.  7.  Prostate cancer, S/P radiation therapy.  8.  Anxiety.  9.  Abdominal aortic aneurysm, status post endovascular repair 07/16/2018.  10.  Severe emphysema. History of tobacco abuse.  11.  History of Legionella Pneumonia and acute hypoxic respiratory failure, 9/2019.   12.  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   13.  Chronic kidney disease.   14.  History of hyperuricemia.    15.  Hyperbilirubinemia.   16.  Mild-to-moderate mitral regurgitation,  severe tricuspid regurgitation, adequately functioning Bioprosthetic valve in the aortic position which is well-seated with a mean gradient of 10.43 mmHg and no regurgitation and mild-to-moderate

## 2024-01-17 ENCOUNTER — OFFICE VISIT (OUTPATIENT)
Dept: PODIATRY | Age: 79
End: 2024-01-17
Payer: MEDICARE

## 2024-01-17 VITALS — BODY MASS INDEX: 24.43 KG/M2 | HEIGHT: 66 IN | WEIGHT: 152 LBS

## 2024-01-17 DIAGNOSIS — I73.9 PVD (PERIPHERAL VASCULAR DISEASE) (HCC): ICD-10-CM

## 2024-01-17 DIAGNOSIS — M79.605 PAIN IN BOTH LOWER EXTREMITIES: ICD-10-CM

## 2024-01-17 DIAGNOSIS — B35.1 ONYCHOMYCOSIS: ICD-10-CM

## 2024-01-17 DIAGNOSIS — R26.2 DIFFICULTY WALKING: ICD-10-CM

## 2024-01-17 DIAGNOSIS — M79.604 PAIN IN BOTH LOWER EXTREMITIES: ICD-10-CM

## 2024-01-17 DIAGNOSIS — I87.2 VENOUS INSUFFICIENCY (CHRONIC) (PERIPHERAL): ICD-10-CM

## 2024-01-17 DIAGNOSIS — Z72.0 TOBACCO ABUSE: Primary | ICD-10-CM

## 2024-01-17 PROCEDURE — G8427 DOCREV CUR MEDS BY ELIG CLIN: HCPCS | Performed by: PODIATRIST

## 2024-01-17 PROCEDURE — 1123F ACP DISCUSS/DSCN MKR DOCD: CPT | Performed by: PODIATRIST

## 2024-01-17 PROCEDURE — 11721 DEBRIDE NAIL 6 OR MORE: CPT | Performed by: PODIATRIST

## 2024-01-17 PROCEDURE — 1036F TOBACCO NON-USER: CPT | Performed by: PODIATRIST

## 2024-01-17 PROCEDURE — 99203 OFFICE O/P NEW LOW 30 MIN: CPT | Performed by: PODIATRIST

## 2024-01-17 PROCEDURE — G8484 FLU IMMUNIZE NO ADMIN: HCPCS | Performed by: PODIATRIST

## 2024-01-17 PROCEDURE — G8420 CALC BMI NORM PARAMETERS: HCPCS | Performed by: PODIATRIST

## 2024-01-17 NOTE — PROGRESS NOTES
24     Kike Holloway    : 1945 Sex: male   Age: 78 y.o.    Patient was referred by: Margoth Bedolla DO  Patient's PCP/Provider is:  Hermelindo Bedolla DO    Subjective:    Patient seen today for diabetic foot evaluation and care    Chief Complaint   Patient presents with    Diabetes     Foot exam        HPI: Patient stated he is in need of nail debridement due to dystrophy present.  Patient is having chronic pain into both lower extremities right greater than left throughout the day.  Patient can only walk certain distances as well due to pain into both lower extremities.  Patient does try to wear appropriate shoe gear on a daily basis and change his socks regularly to prevent additional issues from occurring to both feet.  No other additional abnormalities noted at this time.    ROS:  Const: Positives and pertinent negatives as per HPI.     Musculo: Denies symptoms other than stated above.  Neuro: Denies symptoms other than stated above.  Skin: Denies symptoms other than stated above.    Current Medications:    Current Outpatient Medications:     albuterol sulfate HFA (PROVENTIL HFA) 108 (90 Base) MCG/ACT inhaler, Inhale 2 puffs into the lungs every 6 hours as needed for Wheezing or Shortness of Breath, Disp: 18 g, Rfl: 3    apixaban (ELIQUIS) 5 MG TABS tablet, TAKE 1 TABLET TWICE A DAY, Disp: 180 tablet, Rfl: 3    atorvastatin (LIPITOR) 20 MG tablet, Take 1 tablet by mouth daily, Disp: 90 tablet, Rfl: 3    fluticasone-umeclidin-vilant (TRELEGY ELLIPTA) 100-62.5-25 MCG/ACT AEPB inhaler, Inhale 1 puff into the lungs daily, Disp: 3 each, Rfl: 3    furosemide (LASIX) 40 MG tablet, TAKE 1 TABLET DAILY PRN, Disp: 90 tablet, Rfl: 3    empagliflozin (JARDIANCE) 10 MG tablet, Take 1 tablet by mouth daily, Disp: 90 tablet, Rfl: 3    lisinopril (PRINIVIL;ZESTRIL) 2.5 MG tablet, TAKE 1 TABLET DAILY, Disp: 90 tablet, Rfl: 3    metoprolol succinate (TOPROL XL) 25 MG extended release tablet, 1 tablet

## 2024-01-17 NOTE — PROGRESS NOTES
Patient is in today for evaluation of the diabetic foot exam. Patient says there is discoloration on the right foot. Patient also needs routine nail care. Pcp is Hermelindo Bedolla DO  Last ov 1/5/24

## 2024-01-31 DIAGNOSIS — B96.89 ACUTE BACTERIAL SINUSITIS: ICD-10-CM

## 2024-01-31 DIAGNOSIS — J01.90 ACUTE BACTERIAL SINUSITIS: ICD-10-CM

## 2024-01-31 DIAGNOSIS — R05.9 COUGH IN ADULT: Primary | ICD-10-CM

## 2024-01-31 RX ORDER — CEPHALEXIN 250 MG/5ML
250 POWDER, FOR SUSPENSION ORAL 4 TIMES DAILY
Qty: 200 ML | Refills: 0 | Status: SHIPPED | OUTPATIENT
Start: 2024-01-31 | End: 2024-02-10

## 2024-01-31 RX ORDER — GUAIFENESIN/DEXTROMETHORPHAN 100-10MG/5
10 SYRUP ORAL 3 TIMES DAILY PRN
Qty: 120 ML | Refills: 0 | Status: SHIPPED | OUTPATIENT
Start: 2024-01-31 | End: 2024-02-10

## 2024-02-07 PROBLEM — Z12.5 SCREENING FOR PROSTATE CANCER: Status: RESOLVED | Noted: 2024-01-08 | Resolved: 2024-02-07

## 2024-02-12 ENCOUNTER — OFFICE VISIT (OUTPATIENT)
Dept: FAMILY MEDICINE CLINIC | Age: 79
End: 2024-02-12
Payer: MEDICARE

## 2024-02-12 VITALS
WEIGHT: 154 LBS | HEIGHT: 67 IN | TEMPERATURE: 97.4 F | HEART RATE: 70 BPM | BODY MASS INDEX: 24.17 KG/M2 | DIASTOLIC BLOOD PRESSURE: 70 MMHG | SYSTOLIC BLOOD PRESSURE: 102 MMHG | RESPIRATION RATE: 18 BRPM | OXYGEN SATURATION: 96 %

## 2024-02-12 DIAGNOSIS — E78.5 HYPERLIPIDEMIA WITH TARGET LDL LESS THAN 100: ICD-10-CM

## 2024-02-12 DIAGNOSIS — N18.31 STAGE 3A CHRONIC KIDNEY DISEASE (HCC): ICD-10-CM

## 2024-02-12 DIAGNOSIS — I71.40 ABDOMINAL AORTIC ANEURYSM (AAA) WITHOUT RUPTURE, UNSPECIFIED PART (HCC): ICD-10-CM

## 2024-02-12 DIAGNOSIS — I50.22 CHRONIC SYSTOLIC CONGESTIVE HEART FAILURE (HCC): Primary | ICD-10-CM

## 2024-02-12 DIAGNOSIS — Z95.3 H/O AORTIC VALVE REPLACEMENT WITH PORCINE VALVE: ICD-10-CM

## 2024-02-12 DIAGNOSIS — I10 ESSENTIAL HYPERTENSION: ICD-10-CM

## 2024-02-12 DIAGNOSIS — I73.9 PVD (PERIPHERAL VASCULAR DISEASE) (HCC): ICD-10-CM

## 2024-02-12 DIAGNOSIS — E70.319 OA (OCULAR ALBINISM) (HCC): ICD-10-CM

## 2024-02-12 DIAGNOSIS — I48.0 PAF (PAROXYSMAL ATRIAL FIBRILLATION) (HCC): ICD-10-CM

## 2024-02-12 DIAGNOSIS — J43.2 CENTRILOBULAR EMPHYSEMA (HCC): ICD-10-CM

## 2024-02-12 DIAGNOSIS — J84.10 PULMONARY FIBROSIS (HCC): ICD-10-CM

## 2024-02-12 DIAGNOSIS — Z85.46 HISTORY OF PROSTATE CANCER: ICD-10-CM

## 2024-02-12 DIAGNOSIS — I44.2 AV BLOCK, 3RD DEGREE (HCC): ICD-10-CM

## 2024-02-12 PROBLEM — I48.21 PERMANENT ATRIAL FIBRILLATION (HCC): Status: RESOLVED | Noted: 2024-01-08 | Resolved: 2024-02-12

## 2024-02-12 PROBLEM — I50.21 ACUTE SYSTOLIC CONGESTIVE HEART FAILURE (HCC): Status: RESOLVED | Noted: 2024-01-08 | Resolved: 2024-02-12

## 2024-02-12 PROBLEM — E11.9 ENCOUNTER FOR DIABETIC FOOT EXAM (HCC): Status: RESOLVED | Noted: 2024-01-08 | Resolved: 2024-02-12

## 2024-02-12 PROCEDURE — G8427 DOCREV CUR MEDS BY ELIG CLIN: HCPCS | Performed by: FAMILY MEDICINE

## 2024-02-12 PROCEDURE — 3078F DIAST BP <80 MM HG: CPT | Performed by: FAMILY MEDICINE

## 2024-02-12 PROCEDURE — 1123F ACP DISCUSS/DSCN MKR DOCD: CPT | Performed by: FAMILY MEDICINE

## 2024-02-12 PROCEDURE — 3074F SYST BP LT 130 MM HG: CPT | Performed by: FAMILY MEDICINE

## 2024-02-12 PROCEDURE — 3023F SPIROM DOC REV: CPT | Performed by: FAMILY MEDICINE

## 2024-02-12 PROCEDURE — 1036F TOBACCO NON-USER: CPT | Performed by: FAMILY MEDICINE

## 2024-02-12 PROCEDURE — G8420 CALC BMI NORM PARAMETERS: HCPCS | Performed by: FAMILY MEDICINE

## 2024-02-12 PROCEDURE — G8484 FLU IMMUNIZE NO ADMIN: HCPCS | Performed by: FAMILY MEDICINE

## 2024-02-12 PROCEDURE — 99215 OFFICE O/P EST HI 40 MIN: CPT | Performed by: FAMILY MEDICINE

## 2024-02-12 RX ORDER — PREDNISONE 20 MG/1
20 TABLET ORAL 2 TIMES DAILY
Qty: 10 TABLET | Refills: 0 | Status: SHIPPED | OUTPATIENT
Start: 2024-02-12 | End: 2024-02-17

## 2024-02-12 RX ORDER — AZITHROMYCIN 250 MG/1
250 TABLET, FILM COATED ORAL DAILY
Qty: 90 TABLET | Refills: 1 | Status: SHIPPED | OUTPATIENT
Start: 2024-02-12

## 2024-02-12 RX ORDER — GUAIFENESIN/DEXTROMETHORPHAN 100-10MG/5
5 SYRUP ORAL 3 TIMES DAILY PRN
Qty: 240 ML | Refills: 3 | Status: SHIPPED | OUTPATIENT
Start: 2024-02-12

## 2024-02-12 NOTE — PROGRESS NOTES
SUBJECTIVE  Kike Holloway is a 78 y.o. male.    HPI/Chief C/O:  Chief Complaint   Patient presents with    Cough     Pt has had a cough for several weeks but got worse a week. Was ordered an antibiotic for it but stopped taking it after 7 days because cough got worse.     Fatigue     Pt reports fatigue and weakness for weeks also      Allergies   Allergen Reactions    Pneumovax [Pneumococcal Polysaccharide Vaccine] Dermatitis   The patient is here for a medication list and treatment planning review  We will go over our care planning goals as well as take care of all refills  We will set up labs as well     C/O persisting cough    Shortness of Breath  This is a chronic problem. The current episode started more than 1 year ago. The problem occurs constantly. The problem has been gradually improving. Pertinent negatives include no abdominal pain, chest pain, claudication, coryza, ear pain, fever, headaches, hemoptysis, leg pain, leg swelling, neck pain, orthopnea, PND, rash, rhinorrhea, sore throat, sputum production, syncope, vomiting or wheezing. The symptoms are aggravated by any activity. He has tried steroid inhalers, ipratropium inhalers, beta agonist inhalers and oral steroids for the symptoms. The treatment provided moderate relief. His past medical history is significant for chronic lung disease (R/O pulmonary fibrosis), COPD (emphysema, R/O pulmonary fibrosis) and a heart failure. There is no history of allergies, aspirin allergies, asthma, bronchiolitis, CAD, DVT, PE, pneumonia or a recent surgery.   Hypertension  This is a chronic problem. The current episode started more than 1 year ago. The problem is controlled. Associated symptoms include anxiety, malaise/fatigue, peripheral edema and shortness of breath. Pertinent negatives include no blurred vision, chest pain, headaches, neck pain, orthopnea, palpitations, PND or sweats. Risk factors for coronary artery disease include smoking/tobacco exposure, male

## 2024-02-18 ENCOUNTER — HOSPITAL ENCOUNTER (INPATIENT)
Age: 79
LOS: 16 days | Discharge: NURSING, REHAB OR LONG-TERM CARE | DRG: 871 | End: 2024-03-06
Attending: STUDENT IN AN ORGANIZED HEALTH CARE EDUCATION/TRAINING PROGRAM | Admitting: INTERNAL MEDICINE
Payer: MEDICARE

## 2024-02-18 ENCOUNTER — APPOINTMENT (OUTPATIENT)
Dept: ULTRASOUND IMAGING | Age: 79
DRG: 871 | End: 2024-02-18
Payer: MEDICARE

## 2024-02-18 ENCOUNTER — APPOINTMENT (OUTPATIENT)
Dept: CT IMAGING | Age: 79
DRG: 871 | End: 2024-02-18
Payer: MEDICARE

## 2024-02-18 ENCOUNTER — APPOINTMENT (OUTPATIENT)
Dept: GENERAL RADIOLOGY | Age: 79
DRG: 871 | End: 2024-02-18
Payer: MEDICARE

## 2024-02-18 DIAGNOSIS — Z95.0 NORMALLY FUNCTIONING CARDIAC PACEMAKER PRESENT: Primary | ICD-10-CM

## 2024-02-18 DIAGNOSIS — A41.9 SEPTIC SHOCK (HCC): ICD-10-CM

## 2024-02-18 DIAGNOSIS — R65.21 SEPTIC SHOCK (HCC): ICD-10-CM

## 2024-02-18 DIAGNOSIS — R17 ELEVATED BILIRUBIN: ICD-10-CM

## 2024-02-18 DIAGNOSIS — I73.9 PVD (PERIPHERAL VASCULAR DISEASE) (HCC): ICD-10-CM

## 2024-02-18 DIAGNOSIS — J84.10 PULMONARY FIBROSIS (HCC): ICD-10-CM

## 2024-02-18 DIAGNOSIS — I21.4 NSTEMI (NON-ST ELEVATED MYOCARDIAL INFARCTION) (HCC): ICD-10-CM

## 2024-02-18 DIAGNOSIS — I26.99 ACUTE PULMONARY EMBOLISM WITHOUT ACUTE COR PULMONALE, UNSPECIFIED PULMONARY EMBOLISM TYPE (HCC): ICD-10-CM

## 2024-02-18 DIAGNOSIS — I50.22 CHRONIC SYSTOLIC CONGESTIVE HEART FAILURE (HCC): ICD-10-CM

## 2024-02-18 DIAGNOSIS — I26.99 PULMONARY EMBOLISM, UNSPECIFIED CHRONICITY, UNSPECIFIED PULMONARY EMBOLISM TYPE, UNSPECIFIED WHETHER ACUTE COR PULMONALE PRESENT (HCC): ICD-10-CM

## 2024-02-18 DIAGNOSIS — N18.31 STAGE 3A CHRONIC KIDNEY DISEASE (HCC): ICD-10-CM

## 2024-02-18 DIAGNOSIS — I50.9 HEART FAILURE, UNSPECIFIED HF CHRONICITY, UNSPECIFIED HEART FAILURE TYPE (HCC): ICD-10-CM

## 2024-02-18 DIAGNOSIS — R78.81 BACTEREMIA: ICD-10-CM

## 2024-02-18 DIAGNOSIS — R91.1 LUNG NODULE: ICD-10-CM

## 2024-02-18 DIAGNOSIS — E78.5 DYSLIPIDEMIA: ICD-10-CM

## 2024-02-18 DIAGNOSIS — J96.01 ACUTE RESPIRATORY FAILURE WITH HYPOXIA (HCC): ICD-10-CM

## 2024-02-18 DIAGNOSIS — E87.20 LACTIC ACIDOSIS: ICD-10-CM

## 2024-02-18 LAB
AADO2: 141.3 MMHG
ALBUMIN SERPL-MCNC: 3.9 G/DL (ref 3.5–5.2)
ALP SERPL-CCNC: 121 U/L (ref 40–129)
ALT SERPL-CCNC: 20 U/L (ref 0–40)
ANION GAP SERPL CALCULATED.3IONS-SCNC: 16 MMOL/L (ref 7–16)
AST SERPL-CCNC: 24 U/L (ref 0–39)
B.E.: -11.4 MMOL/L (ref -3–3)
B.E.: -7.6 MMOL/L (ref -3–3)
BASOPHILS # BLD: 0.11 K/UL (ref 0–0.2)
BASOPHILS NFR BLD: 1 % (ref 0–2)
BILIRUB SERPL-MCNC: 3.5 MG/DL (ref 0–1.2)
BILIRUB UR QL STRIP: NEGATIVE
BNP SERPL-MCNC: 5841 PG/ML (ref 0–450)
BUN SERPL-MCNC: 22 MG/DL (ref 6–23)
CALCIUM SERPL-MCNC: 8.4 MG/DL (ref 8.6–10.2)
CHLORIDE SERPL-SCNC: 101 MMOL/L (ref 98–107)
CLARITY UR: CLEAR
CO2 SERPL-SCNC: 17 MMOL/L (ref 22–29)
COHB: 0.6 % (ref 0–1.5)
COHB: 1.1 % (ref 0–1.5)
COLOR UR: YELLOW
CREAT SERPL-MCNC: 1.3 MG/DL (ref 0.7–1.2)
CRITICAL: ABNORMAL
CRITICAL: ABNORMAL
DATE ANALYZED: ABNORMAL
DATE ANALYZED: ABNORMAL
DATE OF COLLECTION: ABNORMAL
DATE OF COLLECTION: ABNORMAL
EOSINOPHIL # BLD: 0.1 K/UL (ref 0.05–0.5)
EOSINOPHILS RELATIVE PERCENT: 1 % (ref 0–6)
ERYTHROCYTE [DISTWIDTH] IN BLOOD BY AUTOMATED COUNT: 15.7 % (ref 11.5–15)
FIO2: 60 %
GFR SERPL CREATININE-BSD FRML MDRD: 55 ML/MIN/1.73M2
GLUCOSE SERPL-MCNC: 98 MG/DL (ref 74–99)
GLUCOSE UR STRIP-MCNC: 500 MG/DL
HCO3: 10.2 MMOL/L (ref 22–26)
HCO3: 12.7 MMOL/L (ref 22–26)
HCT VFR BLD AUTO: 43.3 % (ref 37–54)
HGB BLD-MCNC: 14.6 G/DL (ref 12.5–16.5)
HGB UR QL STRIP.AUTO: ABNORMAL
HHB: 0.1 % (ref 0–5)
HHB: 0.3 % (ref 0–5)
IMM GRANULOCYTES # BLD AUTO: 0.46 K/UL (ref 0–0.58)
IMM GRANULOCYTES NFR BLD: 3 % (ref 0–5)
INR PPP: 1.8
KETONES UR STRIP-MCNC: NEGATIVE MG/DL
LAB: ABNORMAL
LAB: ABNORMAL
LACTATE BLDV-SCNC: 4.6 MMOL/L (ref 0.5–1.9)
LACTATE BLDV-SCNC: 5.3 MMOL/L (ref 0.5–1.9)
LEUKOCYTE ESTERASE UR QL STRIP: NEGATIVE
LYMPHOCYTES NFR BLD: 1.19 K/UL (ref 1.5–4)
LYMPHOCYTES RELATIVE PERCENT: 7 % (ref 20–42)
Lab: 1945
Lab: 2214
MAGNESIUM SERPL-MCNC: 1.8 MG/DL (ref 1.6–2.6)
MCH RBC QN AUTO: 31.6 PG (ref 26–35)
MCHC RBC AUTO-ENTMCNC: 33.7 G/DL (ref 32–34.5)
MCV RBC AUTO: 93.7 FL (ref 80–99.9)
METHB: 0.2 % (ref 0–1.5)
METHB: 0.2 % (ref 0–1.5)
MODE: ABNORMAL
MODE: ABNORMAL
MONOCYTES NFR BLD: 0.49 K/UL (ref 0.1–0.95)
MONOCYTES NFR BLD: 3 % (ref 2–12)
NEUTROPHILS NFR BLD: 87 % (ref 43–80)
NEUTS SEG NFR BLD: 15.1 K/UL (ref 1.8–7.3)
NITRITE UR QL STRIP: NEGATIVE
O2 CONTENT: 20.7 ML/DL
O2 CONTENT: 23.4 ML/DL
O2 SATURATION: 99.7 % (ref 92–98.5)
O2 SATURATION: 99.9 % (ref 92–98.5)
O2HB: 98.6 % (ref 94–97)
O2HB: 98.9 % (ref 94–97)
OPERATOR ID: 1213
OPERATOR ID: 5523
PARTIAL THROMBOPLASTIN TIME: 31.2 SEC (ref 24.5–35.1)
PARTIAL THROMBOPLASTIN TIME: 32.9 SEC (ref 24.5–35.1)
PATIENT TEMP: 37 C
PATIENT TEMP: 37 C
PCO2: 16.4 MMHG (ref 35–45)
PCO2: 17.7 MMHG (ref 35–45)
PFO2: 4.2 MMHG/%
PH BLOOD GAS: 7.41 (ref 7.35–7.45)
PH BLOOD GAS: 7.47 (ref 7.35–7.45)
PH UR STRIP: 5.5 [PH] (ref 5–9)
PLATELET # BLD AUTO: 142 K/UL (ref 130–450)
PMV BLD AUTO: 9.8 FL (ref 7–12)
PO2: 240.4 MMHG (ref 75–100)
PO2: 252 MMHG (ref 75–100)
POTASSIUM SERPL-SCNC: 4.1 MMOL/L (ref 3.5–5)
PROT SERPL-MCNC: 7.2 G/DL (ref 6.4–8.3)
PROT UR STRIP-MCNC: 30 MG/DL
PROTHROMBIN TIME: 20.3 SEC (ref 9.3–12.4)
RBC # BLD AUTO: 4.62 M/UL (ref 3.8–5.8)
RBC #/AREA URNS HPF: ABNORMAL /HPF
RI(T): 0.56
SARS-COV-2 RDRP RESP QL NAA+PROBE: NOT DETECTED
SODIUM SERPL-SCNC: 134 MMOL/L (ref 132–146)
SOURCE, BLOOD GAS: ABNORMAL
SOURCE, BLOOD GAS: ABNORMAL
SP GR UR STRIP: 1.02 (ref 1–1.03)
SPECIMEN DESCRIPTION: NORMAL
THB: 14.5 G/DL (ref 11.5–16.5)
THB: 16.5 G/DL (ref 11.5–16.5)
TIME ANALYZED: 2002
TIME ANALYZED: 2221
TROPONIN I SERPL HS-MCNC: 120 NG/L (ref 0–11)
TROPONIN I SERPL HS-MCNC: 185 NG/L (ref 0–11)
UROBILINOGEN UR STRIP-ACNC: 0.2 EU/DL (ref 0–1)
WBC #/AREA URNS HPF: ABNORMAL /HPF
WBC OTHER # BLD: 17.5 K/UL (ref 4.5–11.5)

## 2024-02-18 PROCEDURE — 87635 SARS-COV-2 COVID-19 AMP PRB: CPT

## 2024-02-18 PROCEDURE — 87154 CUL TYP ID BLD PTHGN 6+ TRGT: CPT

## 2024-02-18 PROCEDURE — 83735 ASSAY OF MAGNESIUM: CPT

## 2024-02-18 PROCEDURE — 6360000004 HC RX CONTRAST MEDICATION: Performed by: RADIOLOGY

## 2024-02-18 PROCEDURE — 2500000003 HC RX 250 WO HCPCS: Performed by: STUDENT IN AN ORGANIZED HEALTH CARE EDUCATION/TRAINING PROGRAM

## 2024-02-18 PROCEDURE — 76705 ECHO EXAM OF ABDOMEN: CPT

## 2024-02-18 PROCEDURE — 51702 INSERT TEMP BLADDER CATH: CPT

## 2024-02-18 PROCEDURE — 71045 X-RAY EXAM CHEST 1 VIEW: CPT

## 2024-02-18 PROCEDURE — 87086 URINE CULTURE/COLONY COUNT: CPT

## 2024-02-18 PROCEDURE — 5A09457 ASSISTANCE WITH RESPIRATORY VENTILATION, 24-96 CONSECUTIVE HOURS, CONTINUOUS POSITIVE AIRWAY PRESSURE: ICD-10-PCS | Performed by: INTERNAL MEDICINE

## 2024-02-18 PROCEDURE — 83880 ASSAY OF NATRIURETIC PEPTIDE: CPT

## 2024-02-18 PROCEDURE — 84145 PROCALCITONIN (PCT): CPT

## 2024-02-18 PROCEDURE — 6360000002 HC RX W HCPCS: Performed by: STUDENT IN AN ORGANIZED HEALTH CARE EDUCATION/TRAINING PROGRAM

## 2024-02-18 PROCEDURE — 2580000003 HC RX 258: Performed by: STUDENT IN AN ORGANIZED HEALTH CARE EDUCATION/TRAINING PROGRAM

## 2024-02-18 PROCEDURE — 85610 PROTHROMBIN TIME: CPT

## 2024-02-18 PROCEDURE — 96374 THER/PROPH/DIAG INJ IV PUSH: CPT

## 2024-02-18 PROCEDURE — 87040 BLOOD CULTURE FOR BACTERIA: CPT

## 2024-02-18 PROCEDURE — 85730 THROMBOPLASTIN TIME PARTIAL: CPT

## 2024-02-18 PROCEDURE — 99285 EMERGENCY DEPT VISIT HI MDM: CPT

## 2024-02-18 PROCEDURE — 36556 INSERT NON-TUNNEL CV CATH: CPT

## 2024-02-18 PROCEDURE — 84484 ASSAY OF TROPONIN QUANT: CPT

## 2024-02-18 PROCEDURE — 87077 CULTURE AEROBIC IDENTIFY: CPT

## 2024-02-18 PROCEDURE — 96365 THER/PROPH/DIAG IV INF INIT: CPT

## 2024-02-18 PROCEDURE — 80053 COMPREHEN METABOLIC PANEL: CPT

## 2024-02-18 PROCEDURE — 96375 TX/PRO/DX INJ NEW DRUG ADDON: CPT

## 2024-02-18 PROCEDURE — 83605 ASSAY OF LACTIC ACID: CPT

## 2024-02-18 PROCEDURE — 71275 CT ANGIOGRAPHY CHEST: CPT

## 2024-02-18 PROCEDURE — 0202U NFCT DS 22 TRGT SARS-COV-2: CPT

## 2024-02-18 PROCEDURE — 94660 CPAP INITIATION&MGMT: CPT

## 2024-02-18 PROCEDURE — 93005 ELECTROCARDIOGRAM TRACING: CPT | Performed by: STUDENT IN AN ORGANIZED HEALTH CARE EDUCATION/TRAINING PROGRAM

## 2024-02-18 PROCEDURE — 85025 COMPLETE CBC W/AUTO DIFF WBC: CPT

## 2024-02-18 PROCEDURE — 81001 URINALYSIS AUTO W/SCOPE: CPT

## 2024-02-18 PROCEDURE — 6370000000 HC RX 637 (ALT 250 FOR IP): Performed by: STUDENT IN AN ORGANIZED HEALTH CARE EDUCATION/TRAINING PROGRAM

## 2024-02-18 PROCEDURE — 82805 BLOOD GASES W/O2 SATURATION: CPT

## 2024-02-18 RX ORDER — HEPARIN SODIUM 1000 [USP'U]/ML
40 INJECTION, SOLUTION INTRAVENOUS; SUBCUTANEOUS PRN
Status: DISCONTINUED | OUTPATIENT
Start: 2024-02-18 | End: 2024-02-22

## 2024-02-18 RX ORDER — 0.9 % SODIUM CHLORIDE 0.9 %
1000 INTRAVENOUS SOLUTION INTRAVENOUS ONCE
Status: COMPLETED | OUTPATIENT
Start: 2024-02-18 | End: 2024-02-18

## 2024-02-18 RX ORDER — IPRATROPIUM BROMIDE AND ALBUTEROL SULFATE 2.5; .5 MG/3ML; MG/3ML
1 SOLUTION RESPIRATORY (INHALATION)
Status: DISCONTINUED | OUTPATIENT
Start: 2024-02-18 | End: 2024-03-07 | Stop reason: HOSPADM

## 2024-02-18 RX ORDER — HEPARIN SODIUM 1000 [USP'U]/ML
80 INJECTION, SOLUTION INTRAVENOUS; SUBCUTANEOUS ONCE
Status: COMPLETED | OUTPATIENT
Start: 2024-02-18 | End: 2024-02-18

## 2024-02-18 RX ORDER — BUDESONIDE 0.25 MG/2ML
250 INHALANT ORAL 2 TIMES DAILY
Status: DISCONTINUED | OUTPATIENT
Start: 2024-02-18 | End: 2024-03-07 | Stop reason: HOSPADM

## 2024-02-18 RX ORDER — ASPIRIN 81 MG/1
324 TABLET, CHEWABLE ORAL ONCE
Status: COMPLETED | OUTPATIENT
Start: 2024-02-18 | End: 2024-02-18

## 2024-02-18 RX ORDER — ACETAMINOPHEN 650 MG/1
650 SUPPOSITORY RECTAL ONCE
Status: COMPLETED | OUTPATIENT
Start: 2024-02-18 | End: 2024-02-18

## 2024-02-18 RX ORDER — HEPARIN SODIUM 10000 [USP'U]/100ML
5-30 INJECTION, SOLUTION INTRAVENOUS CONTINUOUS
Status: DISCONTINUED | OUTPATIENT
Start: 2024-02-18 | End: 2024-02-22

## 2024-02-18 RX ORDER — HEPARIN SODIUM 1000 [USP'U]/ML
80 INJECTION, SOLUTION INTRAVENOUS; SUBCUTANEOUS PRN
Status: DISCONTINUED | OUTPATIENT
Start: 2024-02-18 | End: 2024-02-22

## 2024-02-18 RX ORDER — NOREPINEPHRINE BITARTRATE 0.06 MG/ML
1-100 INJECTION, SOLUTION INTRAVENOUS CONTINUOUS
Status: DISCONTINUED | OUTPATIENT
Start: 2024-02-18 | End: 2024-02-22

## 2024-02-18 RX ADMIN — ASPIRIN 81 MG CHEWABLE TABLET 324 MG: 81 TABLET CHEWABLE at 21:02

## 2024-02-18 RX ADMIN — VANCOMYCIN HYDROCHLORIDE 1500 MG: 10 INJECTION, POWDER, LYOPHILIZED, FOR SOLUTION INTRAVENOUS at 23:15

## 2024-02-18 RX ADMIN — Medication 18 UNITS/KG/HR: at 23:12

## 2024-02-18 RX ADMIN — SODIUM CHLORIDE 5 MCG/MIN: 9 INJECTION, SOLUTION INTRAVENOUS at 22:03

## 2024-02-18 RX ADMIN — ACETAMINOPHEN 650 MG: 650 SUPPOSITORY RECTAL at 23:03

## 2024-02-18 RX ADMIN — SODIUM CHLORIDE 1000 ML: 9 INJECTION, SOLUTION INTRAVENOUS at 20:41

## 2024-02-18 RX ADMIN — IOPAMIDOL 70 ML: 755 INJECTION, SOLUTION INTRAVENOUS at 21:11

## 2024-02-18 RX ADMIN — CEFEPIME 2000 MG: 2 INJECTION, POWDER, FOR SOLUTION INTRAVENOUS at 22:04

## 2024-02-18 RX ADMIN — SODIUM CHLORIDE 1000 ML: 9 INJECTION, SOLUTION INTRAVENOUS at 21:55

## 2024-02-18 RX ADMIN — HEPARIN SODIUM 5590 UNITS: 1000 INJECTION INTRAVENOUS; SUBCUTANEOUS at 23:08

## 2024-02-18 ASSESSMENT — LIFESTYLE VARIABLES
HOW OFTEN DO YOU HAVE A DRINK CONTAINING ALCOHOL: NEVER
HOW MANY STANDARD DRINKS CONTAINING ALCOHOL DO YOU HAVE ON A TYPICAL DAY: PATIENT DOES NOT DRINK

## 2024-02-18 ASSESSMENT — PAIN - FUNCTIONAL ASSESSMENT: PAIN_FUNCTIONAL_ASSESSMENT: NONE - DENIES PAIN

## 2024-02-19 ENCOUNTER — APPOINTMENT (OUTPATIENT)
Dept: ULTRASOUND IMAGING | Age: 79
DRG: 871 | End: 2024-02-19
Payer: MEDICARE

## 2024-02-19 ENCOUNTER — APPOINTMENT (OUTPATIENT)
Age: 79
DRG: 871 | End: 2024-02-19
Payer: MEDICARE

## 2024-02-19 PROBLEM — A41.9 SEPTIC SHOCK (HCC): Status: ACTIVE | Noted: 2024-02-19

## 2024-02-19 PROBLEM — I26.99 PULMONARY EMBOLISM (HCC): Status: ACTIVE | Noted: 2024-02-19

## 2024-02-19 PROBLEM — R65.21 SEPTIC SHOCK (HCC): Status: ACTIVE | Noted: 2024-02-19

## 2024-02-19 LAB
ALBUMIN SERPL-MCNC: 3.5 G/DL (ref 3.5–5.2)
ALP SERPL-CCNC: 107 U/L (ref 40–129)
ALT SERPL-CCNC: 22 U/L (ref 0–40)
ANION GAP SERPL CALCULATED.3IONS-SCNC: 17 MMOL/L (ref 7–16)
AST SERPL-CCNC: 33 U/L (ref 0–39)
B PARAP IS1001 DNA NPH QL NAA+NON-PROBE: NOT DETECTED
B PERT DNA SPEC QL NAA+PROBE: NOT DETECTED
BASOPHILS # BLD: 0.06 K/UL (ref 0–0.2)
BASOPHILS NFR BLD: 0 % (ref 0–2)
BILIRUB SERPL-MCNC: 2.9 MG/DL (ref 0–1.2)
BUN SERPL-MCNC: 27 MG/DL (ref 6–23)
C PNEUM DNA NPH QL NAA+NON-PROBE: NOT DETECTED
CA-I BLD-SCNC: 1.08 MMOL/L (ref 1.15–1.33)
CALCIUM SERPL-MCNC: 8 MG/DL (ref 8.6–10.2)
CHLORIDE SERPL-SCNC: 102 MMOL/L (ref 98–107)
CO2 SERPL-SCNC: 15 MMOL/L (ref 22–29)
CORTIS SERPL-MCNC: 36.3 UG/DL (ref 2.7–18.4)
CORTISOL COLLECTION INFO: 13
CREAT SERPL-MCNC: 1.5 MG/DL (ref 0.7–1.2)
CRP SERPL HS-MCNC: 59 MG/L (ref 0–5)
DATE LAST DOSE: NORMAL
EKG ATRIAL RATE: 119 BPM
EKG Q-T INTERVAL: 344 MS
EKG QRS DURATION: 146 MS
EKG QTC CALCULATION (BAZETT): 492 MS
EKG R AXIS: -55 DEGREES
EKG T AXIS: 116 DEGREES
EKG VENTRICULAR RATE: 123 BPM
EOSINOPHIL # BLD: 0.02 K/UL (ref 0.05–0.5)
EOSINOPHILS RELATIVE PERCENT: 0 % (ref 0–6)
ERYTHROCYTE [DISTWIDTH] IN BLOOD BY AUTOMATED COUNT: 15.8 % (ref 11.5–15)
FLUAV RNA NPH QL NAA+NON-PROBE: NOT DETECTED
FLUBV RNA NPH QL NAA+NON-PROBE: NOT DETECTED
GFR SERPL CREATININE-BSD FRML MDRD: 45 ML/MIN/1.73M2
GLUCOSE BLD-MCNC: 179 MG/DL (ref 74–99)
GLUCOSE SERPL-MCNC: 178 MG/DL (ref 74–99)
HADV DNA NPH QL NAA+NON-PROBE: NOT DETECTED
HCOV 229E RNA NPH QL NAA+NON-PROBE: NOT DETECTED
HCOV HKU1 RNA NPH QL NAA+NON-PROBE: NOT DETECTED
HCOV NL63 RNA NPH QL NAA+NON-PROBE: NOT DETECTED
HCOV OC43 RNA NPH QL NAA+NON-PROBE: NOT DETECTED
HCT VFR BLD AUTO: 42.4 % (ref 37–54)
HGB BLD-MCNC: 13.9 G/DL (ref 12.5–16.5)
HMPV RNA NPH QL NAA+NON-PROBE: NOT DETECTED
HPIV1 RNA NPH QL NAA+NON-PROBE: NOT DETECTED
HPIV2 RNA NPH QL NAA+NON-PROBE: NOT DETECTED
HPIV3 RNA NPH QL NAA+NON-PROBE: NOT DETECTED
HPIV4 RNA NPH QL NAA+NON-PROBE: NOT DETECTED
IMM GRANULOCYTES # BLD AUTO: 0.27 K/UL (ref 0–0.58)
IMM GRANULOCYTES NFR BLD: 2 % (ref 0–5)
LACTATE BLDV-SCNC: 3.3 MMOL/L (ref 0.5–2.2)
LACTATE BLDV-SCNC: 4 MMOL/L (ref 0.5–2.2)
LYMPHOCYTES NFR BLD: 0.47 K/UL (ref 1.5–4)
LYMPHOCYTES RELATIVE PERCENT: 3 % (ref 20–42)
M PNEUMO DNA NPH QL NAA+NON-PROBE: NOT DETECTED
MAGNESIUM SERPL-MCNC: 2 MG/DL (ref 1.6–2.6)
MCH RBC QN AUTO: 31.3 PG (ref 26–35)
MCHC RBC AUTO-ENTMCNC: 32.8 G/DL (ref 32–34.5)
MCV RBC AUTO: 95.5 FL (ref 80–99.9)
MONOCYTES NFR BLD: 0.33 K/UL (ref 0.1–0.95)
MONOCYTES NFR BLD: 2 % (ref 2–12)
NEUTROPHILS NFR BLD: 94 % (ref 43–80)
NEUTS SEG NFR BLD: 16.44 K/UL (ref 1.8–7.3)
PARTIAL THROMBOPLASTIN TIME: 174 SEC (ref 24.5–35.1)
PARTIAL THROMBOPLASTIN TIME: 83.2 SEC (ref 24.5–35.1)
PARTIAL THROMBOPLASTIN TIME: >240 SEC (ref 24.5–35.1)
PHOSPHATE SERPL-MCNC: 4.7 MG/DL (ref 2.5–4.5)
PLATELET # BLD AUTO: 134 K/UL (ref 130–450)
PMV BLD AUTO: 10.8 FL (ref 7–12)
POTASSIUM SERPL-SCNC: 4.3 MMOL/L (ref 3.5–5)
PROCALCITONIN SERPL-MCNC: 8.84 NG/ML (ref 0–0.08)
PROT SERPL-MCNC: 6.3 G/DL (ref 6.4–8.3)
RBC # BLD AUTO: 4.44 M/UL (ref 3.8–5.8)
RSV RNA NPH QL NAA+NON-PROBE: NOT DETECTED
RV+EV RNA NPH QL NAA+NON-PROBE: NOT DETECTED
SARS-COV-2 RNA NPH QL NAA+NON-PROBE: NOT DETECTED
SODIUM SERPL-SCNC: 134 MMOL/L (ref 132–146)
SPECIMEN DESCRIPTION: NORMAL
TME LAST DOSE: NORMAL H
TROPONIN I SERPL HS-MCNC: 172 NG/L (ref 0–11)
TROPONIN I SERPL HS-MCNC: 216 NG/L (ref 0–11)
TSH SERPL DL<=0.05 MIU/L-ACNC: 3.53 UIU/ML (ref 0.27–4.2)
VANCOMYCIN DOSE: NORMAL MG
VANCOMYCIN TROUGH SERPL-MCNC: 9.5 UG/ML (ref 5–16)
WBC OTHER # BLD: 17.6 K/UL (ref 4.5–11.5)

## 2024-02-19 PROCEDURE — C9113 INJ PANTOPRAZOLE SODIUM, VIA: HCPCS

## 2024-02-19 PROCEDURE — 83735 ASSAY OF MAGNESIUM: CPT

## 2024-02-19 PROCEDURE — A4216 STERILE WATER/SALINE, 10 ML: HCPCS

## 2024-02-19 PROCEDURE — 2500000003 HC RX 250 WO HCPCS: Performed by: STUDENT IN AN ORGANIZED HEALTH CARE EDUCATION/TRAINING PROGRAM

## 2024-02-19 PROCEDURE — 99222 1ST HOSP IP/OBS MODERATE 55: CPT | Performed by: INTERNAL MEDICINE

## 2024-02-19 PROCEDURE — 93010 ELECTROCARDIOGRAM REPORT: CPT | Performed by: INTERNAL MEDICINE

## 2024-02-19 PROCEDURE — 80202 ASSAY OF VANCOMYCIN: CPT

## 2024-02-19 PROCEDURE — 6360000002 HC RX W HCPCS

## 2024-02-19 PROCEDURE — 6360000002 HC RX W HCPCS: Performed by: STUDENT IN AN ORGANIZED HEALTH CARE EDUCATION/TRAINING PROGRAM

## 2024-02-19 PROCEDURE — 84100 ASSAY OF PHOSPHORUS: CPT

## 2024-02-19 PROCEDURE — 82533 TOTAL CORTISOL: CPT

## 2024-02-19 PROCEDURE — 93970 EXTREMITY STUDY: CPT

## 2024-02-19 PROCEDURE — 82962 GLUCOSE BLOOD TEST: CPT

## 2024-02-19 PROCEDURE — 2580000003 HC RX 258: Performed by: STUDENT IN AN ORGANIZED HEALTH CARE EDUCATION/TRAINING PROGRAM

## 2024-02-19 PROCEDURE — 82330 ASSAY OF CALCIUM: CPT

## 2024-02-19 PROCEDURE — 2000000000 HC ICU R&B

## 2024-02-19 PROCEDURE — 84443 ASSAY THYROID STIM HORMONE: CPT

## 2024-02-19 PROCEDURE — 6370000000 HC RX 637 (ALT 250 FOR IP)

## 2024-02-19 PROCEDURE — 80053 COMPREHEN METABOLIC PANEL: CPT

## 2024-02-19 PROCEDURE — 85730 THROMBOPLASTIN TIME PARTIAL: CPT

## 2024-02-19 PROCEDURE — 94660 CPAP INITIATION&MGMT: CPT

## 2024-02-19 PROCEDURE — 51702 INSERT TEMP BLADDER CATH: CPT

## 2024-02-19 PROCEDURE — 2580000003 HC RX 258: Performed by: INTERNAL MEDICINE

## 2024-02-19 PROCEDURE — 84484 ASSAY OF TROPONIN QUANT: CPT

## 2024-02-19 PROCEDURE — 87081 CULTURE SCREEN ONLY: CPT

## 2024-02-19 PROCEDURE — 93005 ELECTROCARDIOGRAM TRACING: CPT

## 2024-02-19 PROCEDURE — 83605 ASSAY OF LACTIC ACID: CPT

## 2024-02-19 PROCEDURE — 85025 COMPLETE CBC W/AUTO DIFF WBC: CPT

## 2024-02-19 PROCEDURE — 92610 EVALUATE SWALLOWING FUNCTION: CPT | Performed by: SPEECH-LANGUAGE PATHOLOGIST

## 2024-02-19 PROCEDURE — 2700000000 HC OXYGEN THERAPY PER DAY

## 2024-02-19 PROCEDURE — 2580000003 HC RX 258

## 2024-02-19 PROCEDURE — C8929 TTE W OR WO FOL WCON,DOPPLER: HCPCS

## 2024-02-19 PROCEDURE — 6360000004 HC RX CONTRAST MEDICATION

## 2024-02-19 PROCEDURE — 86140 C-REACTIVE PROTEIN: CPT

## 2024-02-19 PROCEDURE — 94640 AIRWAY INHALATION TREATMENT: CPT

## 2024-02-19 PROCEDURE — 6360000002 HC RX W HCPCS: Performed by: INTERNAL MEDICINE

## 2024-02-19 PROCEDURE — 99291 CRITICAL CARE FIRST HOUR: CPT | Performed by: INTERNAL MEDICINE

## 2024-02-19 PROCEDURE — 6370000000 HC RX 637 (ALT 250 FOR IP): Performed by: INTERNAL MEDICINE

## 2024-02-19 PROCEDURE — 36415 COLL VENOUS BLD VENIPUNCTURE: CPT

## 2024-02-19 RX ORDER — MIDODRINE HYDROCHLORIDE 5 MG/1
15 TABLET ORAL
Status: DISCONTINUED | OUTPATIENT
Start: 2024-02-19 | End: 2024-02-27

## 2024-02-19 RX ORDER — METRONIDAZOLE 500 MG/100ML
500 INJECTION, SOLUTION INTRAVENOUS EVERY 8 HOURS
Status: DISCONTINUED | OUTPATIENT
Start: 2024-02-19 | End: 2024-02-22

## 2024-02-19 RX ORDER — SODIUM CHLORIDE, SODIUM LACTATE, POTASSIUM CHLORIDE, CALCIUM CHLORIDE 600; 310; 30; 20 MG/100ML; MG/100ML; MG/100ML; MG/100ML
INJECTION, SOLUTION INTRAVENOUS CONTINUOUS
Status: DISCONTINUED | OUTPATIENT
Start: 2024-02-19 | End: 2024-02-22

## 2024-02-19 RX ORDER — ASPIRIN 81 MG/1
81 TABLET, CHEWABLE ORAL DAILY
Status: DISCONTINUED | OUTPATIENT
Start: 2024-02-19 | End: 2024-03-07 | Stop reason: HOSPADM

## 2024-02-19 RX ADMIN — CEFEPIME 2000 MG: 2 INJECTION, POWDER, FOR SOLUTION INTRAVENOUS at 13:08

## 2024-02-19 RX ADMIN — IPRATROPIUM BROMIDE AND ALBUTEROL SULFATE 1 DOSE: .5; 2.5 SOLUTION RESPIRATORY (INHALATION) at 12:23

## 2024-02-19 RX ADMIN — HYDROCORTISONE SODIUM SUCCINATE 100 MG: 100 INJECTION, POWDER, FOR SOLUTION INTRAMUSCULAR; INTRAVENOUS at 00:23

## 2024-02-19 RX ADMIN — BUDESONIDE 250 MCG: 0.25 SUSPENSION RESPIRATORY (INHALATION) at 04:22

## 2024-02-19 RX ADMIN — HYDROCORTISONE SODIUM SUCCINATE 50 MG: 100 INJECTION, POWDER, FOR SOLUTION INTRAMUSCULAR; INTRAVENOUS at 08:39

## 2024-02-19 RX ADMIN — METRONIDAZOLE 500 MG: 500 INJECTION, SOLUTION INTRAVENOUS at 13:06

## 2024-02-19 RX ADMIN — MIDODRINE HYDROCHLORIDE 15 MG: 5 TABLET ORAL at 12:49

## 2024-02-19 RX ADMIN — PIPERACILLIN AND TAZOBACTAM 3375 MG: 3; .375 INJECTION, POWDER, FOR SOLUTION INTRAVENOUS at 02:33

## 2024-02-19 RX ADMIN — SODIUM CHLORIDE 5 MCG/MIN: 9 INJECTION, SOLUTION INTRAVENOUS at 22:50

## 2024-02-19 RX ADMIN — METRONIDAZOLE 500 MG: 500 INJECTION, SOLUTION INTRAVENOUS at 21:36

## 2024-02-19 RX ADMIN — PANTOPRAZOLE SODIUM 40 MG: 40 INJECTION, POWDER, FOR SOLUTION INTRAVENOUS at 08:39

## 2024-02-19 RX ADMIN — SODIUM CHLORIDE, POTASSIUM CHLORIDE, SODIUM LACTATE AND CALCIUM CHLORIDE: 600; 310; 30; 20 INJECTION, SOLUTION INTRAVENOUS at 13:04

## 2024-02-19 RX ADMIN — HYDROCORTISONE SODIUM SUCCINATE 50 MG: 100 INJECTION, POWDER, FOR SOLUTION INTRAMUSCULAR; INTRAVENOUS at 17:04

## 2024-02-19 RX ADMIN — BUDESONIDE 250 MCG: 0.25 SUSPENSION RESPIRATORY (INHALATION) at 16:19

## 2024-02-19 RX ADMIN — ASPIRIN 81 MG CHEWABLE TABLET 81 MG: 81 TABLET CHEWABLE at 12:49

## 2024-02-19 RX ADMIN — MIDODRINE HYDROCHLORIDE 15 MG: 5 TABLET ORAL at 17:04

## 2024-02-19 RX ADMIN — Medication 12 UNITS/KG/HR: at 22:43

## 2024-02-19 RX ADMIN — VANCOMYCIN HYDROCHLORIDE 1000 MG: 1 INJECTION, POWDER, LYOPHILIZED, FOR SOLUTION INTRAVENOUS at 23:06

## 2024-02-19 RX ADMIN — PIPERACILLIN AND TAZOBACTAM 3375 MG: 3; .375 INJECTION, POWDER, FOR SOLUTION INTRAVENOUS at 09:58

## 2024-02-19 RX ADMIN — IPRATROPIUM BROMIDE AND ALBUTEROL SULFATE 1 DOSE: .5; 2.5 SOLUTION RESPIRATORY (INHALATION) at 16:19

## 2024-02-19 RX ADMIN — IPRATROPIUM BROMIDE AND ALBUTEROL SULFATE 1 DOSE: .5; 2.5 SOLUTION RESPIRATORY (INHALATION) at 04:22

## 2024-02-19 RX ADMIN — IPRATROPIUM BROMIDE AND ALBUTEROL SULFATE 1 DOSE: .5; 2.5 SOLUTION RESPIRATORY (INHALATION) at 08:30

## 2024-02-19 RX ADMIN — PERFLUTREN 1.5 ML: 6.52 INJECTION, SUSPENSION INTRAVENOUS at 10:29

## 2024-02-19 ASSESSMENT — PAIN SCALES - GENERAL
PAINLEVEL_OUTOF10: 0

## 2024-02-19 NOTE — ACP (ADVANCE CARE PLANNING)
Advance Care Planning   Healthcare Decision Maker:    Primary Decision Maker: Brynn Holloway - [facility] - [phone]      Today we documented Decision Maker(s) consistent with Legal Next of Kin hierarchy.           Electronically signed by Bere Posey RN-BC on 2/19/2024 at 8:06 AM

## 2024-02-19 NOTE — ED NOTES
Pt taken off bipap by Dr. Fink at this this time due to pt not being comfortable with bipap mask. Pt placed on NRB at this time.

## 2024-02-19 NOTE — ED NOTES
Radiology Procedure Waiver   Name: Kike Holloway  : 1945  MRN: [id number]    Date:  24    Time: 8:03 PM EST    Benefits of immediately proceeding with radiology exam(s) without pre-testing outweigh the risks or are not indicated as specified below and therefore the following is/are being waived:    [x] Benefits of immediate radiology exam(s) outweigh any risk.                                               OR    Pre-exam testing is not indicated for the following reason(s):  [] Pregnancy test   [] Patients LMP on-time and regular.   [] Patient had Tubal Ligation or has other Contraception Device.   [] Patient  is Menopausal or Premenarcheal.    [] Patient had Full or Partial Hysterectomy.    [] Protocol for CT contrast allegry   [] Patient has tolerated well previously   [] Patient does not have a true allergy    [] MRI Questionnaire     [x] BUN/Creatinine   [] Patient age w/no hx of renal dysfunction.   [] Patient on Dialysis.   [x] Recent Normal Labs.  Electronically signed by Mary Fink DO on 24 at 8:03 PM EST               Mary Fink DO  24

## 2024-02-19 NOTE — ED NOTES
RSI box removed by this RN in preparation for intubation. Etomidate 20mg and Rocuronium 100mg pulled up per verbal order. Dr. Ramirez, no longer wanted to intubate patient due to SpO2 improving with BiPAP. Etomidate and Rocuronium wasted and witness by KELLY Bonilla. Pharmacy made aware.

## 2024-02-19 NOTE — ED PROVIDER NOTES
acute/recent PE.        Non-plain film images such as CT, Xray, Ultrasound and MRI are read by the radiologist. Plain radiographic images are visualized and preliminarily interpreted by the ED Provider with the above-noted findings. Interpretation per the Radiologist below, if available at the time of this note are included below.      EKG reviewed and interpreted by me, TIME:  This EKG is signed by me, Mary Fink DO.     See ED course for EKG documentation.    All labs & imaging were reviewed and interpreted by me, see RESULTS. I have personally reviewed all laboratory and imaging results for this patient.       Are there any additional factors to consider that affect care (uninsured, homeless, illiterate, history from another source, etc.) (If yes, which ones).  No       This patient's ED course included: a personal history and physicial examination, re-evaluation prior to disposition, multiple bedside re-evaluations, IV medications, cardiac monitoring, continuous pulse oximetry, and complex medical decision making and emergency management    This patient has been closely monitored during their ED course.    Counseling:   The emergency provider has spoken with the patient and spouse/SO and discussed today’s results, in addition to providing specific details for the plan of care and counseling regarding the diagnosis and prognosis.  Questions are answered at this time and they are agreeable with the plan.    CONSULTS:   IP CONSULT TO CARDIOLOGY  IP CONSULT TO PULMONOLOGY  IP CONSULT TO INFECTIOUS DISEASES  IP CONSULT TO PHARMACY       Please see ED course for any additional MDM documentation.       CRITICAL CARE TIME (.cct)     Please note that the withdrawal or failure to initiate urgent interventions for this patient would likely result in a life threatening deterioration or permanent disability.   Accordingly this patient received 75 minutes of critical care time, excluding separately billable

## 2024-02-19 NOTE — H&P
[address] [address]                              HISTORY AND PHYSICAL    PATIENT NAME: SEAN MCCARTHY                     :        1945  MED REC NO:   [id number]                            ROOM:       [facility]  ACCOUNT NO:   [id number]                           ADMIT DATE: 2024  PROVIDER:     Macario Dolan DO    CHIEF COMPLAINT/HISTORY OF CHIEF COMPLAINT:  This is a pleasant  78-year-old white male who was admitted to [facility].  The  patient presented to the hospital here through the emergency room on  2024.  The patient presented to the hospital here from home with  what appeared to be severe respiratory distress.  The patient does have  a history of cardiac and respiratory issue, was unable to get pulse  oximeter with cyanosis present on presentation here.  The patient did  receive two DuoNeb treatments prior to arrival and also 125 mg of  Solu-Medrol.  On presentation here, the patient states he has been  coughing in the past several days, predominantly white foamy sputum.  He  denies any hemoptysis.  The patient states he had been more short of  breath over the past several days.  The patient denies any fever, chills  or vomiting.  Today, he felt more short of breath, at which time, he  presented to the hospital.  On presentation here in the emergency room,  molecular viral panel was negative.  Further imaging studies at this  time did show gallbladder wall thickening and a CTA did show evidence of  irregular filling defect in the pulmonary artery suggesting small  pulmonary embolism along with multilevel nodule in the right segment  with signs of right-sided heart failure.  The patient at this time was  seen and evaluated, treated in the emergency room, subsequently admitted  to the hospital to the intermediate care unit.  The patient was seen at  this time.  The patient was seen and examined

## 2024-02-19 NOTE — CARE COORDINATION
Case Management Assessment  Initial Evaluation    Date/Time of Evaluation: 2/19/2024 12:02 PM  Assessment Completed by: Bere Posey RN    If patient is discharged prior to next notation, then this note serves as note for discharge by case management.    Patient Name: Kike Holloway                   YOB: 1945  Diagnosis: Lactic acidosis [E87.20]  NSTEMI (non-ST elevated myocardial infarction) ([facility]) [I21.4]  Acute respiratory failure with hypoxia ([facility]) [J96.01]  Septic shock ([facility]) [A41.9, R65.21]  Pulmonary embolism, unspecified chronicity, unspecified pulmonary embolism type, unspecified whether acute cor pulmonale present ([facility]) [I26.99]                   Date / Time: 2/18/2024  7:38 PM    Patient Admission Status: Inpatient   Readmission Risk (Low < 19, Mod (19-27), High > 27): Readmission Risk Score: 13.9    Current PCP: Hermelindo Bedolla, DO  PCP verified by CM? Yes    Chart Reviewed: Yes      History Provided by: Patient  Patient Orientation: Alert and Oriented    Patient Cognition: Alert    Hospitalization in the last 30 days (Readmission):  No      Advance Directives:      Code Status: Limited   Patient's Primary Decision Maker is: Legal Next of Kin    Primary Decision Maker: Brynn Holloway - Spouse - [phone]    Discharge Planning:    Patient lives with: Spouse/Significant Other Type of Home: House  Primary Care Giver: Self  Patient Support Systems include: Spouse/Significant Other, Children, Family Members      Current services prior to admission: None            Current DME:  wheeled walker            Type of Home Care services:  None    ADLS  Prior functional level: Independent in ADLs/IADLs  Current functional level: Independent in ADLs/IADLs    Family can provide assistance at DC: Yes  Would you like Case Management to discuss the discharge plan with any other family members/significant others, and if so, who? No  Plans to Return to Present Housing: Yes  Other Identified

## 2024-02-20 LAB
ALBUMIN SERPL-MCNC: 3.3 G/DL (ref 3.5–5.2)
ALP SERPL-CCNC: 147 U/L (ref 40–129)
ALT SERPL-CCNC: 24 U/L (ref 0–40)
ANION GAP SERPL CALCULATED.3IONS-SCNC: 11 MMOL/L (ref 7–16)
AST SERPL-CCNC: 35 U/L (ref 0–39)
BASOPHILS # BLD: 0.02 K/UL (ref 0–0.2)
BASOPHILS NFR BLD: 0 % (ref 0–2)
BILIRUB DIRECT SERPL-MCNC: 0.9 MG/DL (ref 0–0.3)
BILIRUB INDIRECT SERPL-MCNC: 0.7 MG/DL (ref 0–1)
BILIRUB SERPL-MCNC: 1.6 MG/DL (ref 0–1.2)
BILIRUB SERPL-MCNC: 1.6 MG/DL (ref 0–1.2)
BUN SERPL-MCNC: 36 MG/DL (ref 6–23)
CALCIUM SERPL-MCNC: 8.1 MG/DL (ref 8.6–10.2)
CHLORIDE SERPL-SCNC: 111 MMOL/L (ref 98–107)
CO2 SERPL-SCNC: 20 MMOL/L (ref 22–29)
CREAT SERPL-MCNC: 1.5 MG/DL (ref 0.7–1.2)
ECHO AV AREA PEAK VELOCITY: 0.7 CM2
ECHO AV AREA PEAK VELOCITY: 0.8 CM2
ECHO AV AREA VTI: 0.6 CM2
ECHO AV AREA/BSA VTI: 0.3 CM2/M2
ECHO AV CUSP MM: 1.3 CM
ECHO AV MEAN GRADIENT: 12 MMHG
ECHO AV MEAN VELOCITY: 1.6 M/S
ECHO AV PEAK GRADIENT: 15 MMHG
ECHO AV PEAK GRADIENT: 22 MMHG
ECHO AV PEAK VELOCITY: 1.9 M/S
ECHO AV PEAK VELOCITY: 2.4 M/S
ECHO AV VTI: 44 CM
ECHO BSA: 1.75 M2
ECHO EST RA PRESSURE: 3 MMHG
ECHO LA DIAMETER INDEX: 2.41 CM/M2
ECHO LA DIAMETER: 4.2 CM
ECHO LA VOL A-L A2C: 55 ML (ref 18–58)
ECHO LA VOL A-L A4C: 39 ML (ref 18–58)
ECHO LA VOL MOD A2C: 52 ML (ref 18–58)
ECHO LA VOL MOD A4C: 40 ML (ref 18–58)
ECHO LA VOLUME AREA LENGTH: 48 ML
ECHO LA VOLUME INDEX A-L A2C: 32 ML/M2 (ref 16–34)
ECHO LA VOLUME INDEX A-L A4C: 22 ML/M2 (ref 16–34)
ECHO LA VOLUME INDEX AREA LENGTH: 28 ML/M2 (ref 16–34)
ECHO LA VOLUME INDEX MOD A2C: 30 ML/M2 (ref 16–34)
ECHO LA VOLUME INDEX MOD A4C: 23 ML/M2 (ref 16–34)
ECHO LV EDV A2C: 85 ML
ECHO LV EDV A4C: 72 ML
ECHO LV EDV BP: 81 ML (ref 67–155)
ECHO LV EDV INDEX A4C: 41 ML/M2
ECHO LV EDV INDEX BP: 47 ML/M2
ECHO LV EDV NDEX A2C: 49 ML/M2
ECHO LV EJECTION FRACTION A2C: 40 %
ECHO LV EJECTION FRACTION A4C: 26 %
ECHO LV EJECTION FRACTION BIPLANE: 32 % (ref 55–100)
ECHO LV ESV A2C: 51 ML
ECHO LV ESV A4C: 53 ML
ECHO LV ESV BP: 55 ML (ref 22–58)
ECHO LV ESV INDEX A2C: 29 ML/M2
ECHO LV ESV INDEX A4C: 30 ML/M2
ECHO LV ESV INDEX BP: 32 ML/M2
ECHO LV FRACTIONAL SHORTENING: 15 % (ref 28–44)
ECHO LV INTERNAL DIMENSION DIASTOLE INDEX: 2.24 CM/M2
ECHO LV INTERNAL DIMENSION DIASTOLIC: 3.9 CM (ref 4.2–5.9)
ECHO LV INTERNAL DIMENSION SYSTOLIC INDEX: 1.9 CM/M2
ECHO LV INTERNAL DIMENSION SYSTOLIC: 3.3 CM
ECHO LV IVSD: 1.2 CM (ref 0.6–1)
ECHO LV IVSS: 1.6 CM
ECHO LV MASS 2D: 159.3 G (ref 88–224)
ECHO LV MASS INDEX 2D: 91.5 G/M2 (ref 49–115)
ECHO LV POSTERIOR WALL DIASTOLIC: 1.2 CM (ref 0.6–1)
ECHO LV POSTERIOR WALL SYSTOLIC: 1.6 CM
ECHO LV RELATIVE WALL THICKNESS RATIO: 0.62
ECHO LVOT AREA: 2.8 CM2
ECHO LVOT AV VTI INDEX: 0.22
ECHO LVOT DIAM: 1.9 CM
ECHO LVOT MEAN GRADIENT: 1 MMHG
ECHO LVOT PEAK GRADIENT: 1 MMHG
ECHO LVOT PEAK VELOCITY: 0.5 M/S
ECHO LVOT STROKE VOLUME INDEX: 16 ML/M2
ECHO LVOT SV: 27.8 ML
ECHO LVOT VTI: 9.8 CM
ECHO MV "A" WAVE DURATION: 212.2 MSEC
ECHO MV A VELOCITY: 0.3 M/S
ECHO MV AREA PHT: 5.4 CM2
ECHO MV AREA VTI: 1.2 CM2
ECHO MV E DECELERATION TIME (DT): 194.8 MS
ECHO MV E VELOCITY: 1 M/S
ECHO MV E/A RATIO: 3.33
ECHO MV LVOT VTI INDEX: 2.3
ECHO MV MAX VELOCITY: 1 M/S
ECHO MV MEAN GRADIENT: 1 MMHG
ECHO MV MEAN VELOCITY: 0.5 M/S
ECHO MV PEAK GRADIENT: 4 MMHG
ECHO MV PRESSURE HALF TIME (PHT): 40.6 MS
ECHO MV VTI: 22.5 CM
ECHO PV MAX VELOCITY: 1.6 M/S
ECHO PV MEAN GRADIENT: 5 MMHG
ECHO PV MEAN VELOCITY: 1 M/S
ECHO PV PEAK GRADIENT: 10 MMHG
ECHO PV VTI: 29.4 CM
ECHO PVEIN A DURATION: 493.7 MS
ECHO PVEIN A VELOCITY: 0.3 M/S
ECHO PVEIN PEAK D VELOCITY: 0.5 M/S
ECHO PVEIN PEAK S VELOCITY: 0.2 M/S
ECHO PVEIN S/D RATIO: 0.4
ECHO RIGHT VENTRICULAR SYSTOLIC PRESSURE (RVSP): 31 MMHG
ECHO RV INTERNAL DIMENSION: 3.3 CM
ECHO RVOT PEAK GRADIENT: 1 MMHG
ECHO RVOT PEAK VELOCITY: 0.4 M/S
ECHO TV REGURGITANT MAX VELOCITY: 2.66 M/S
ECHO TV REGURGITANT PEAK GRADIENT: 28 MMHG
EKG ATRIAL RATE: 50 BPM
EKG Q-T INTERVAL: 540 MS
EKG QRS DURATION: 184 MS
EKG QTC CALCULATION (BAZETT): 583 MS
EKG R AXIS: -80 DEGREES
EKG T AXIS: 89 DEGREES
EKG VENTRICULAR RATE: 70 BPM
EOSINOPHIL # BLD: 0 K/UL (ref 0.05–0.5)
EOSINOPHILS RELATIVE PERCENT: 0 % (ref 0–6)
ERYTHROCYTE [DISTWIDTH] IN BLOOD BY AUTOMATED COUNT: 15.9 % (ref 11.5–15)
GFR SERPL CREATININE-BSD FRML MDRD: 45 ML/MIN/1.73M2
GLUCOSE SERPL-MCNC: 164 MG/DL (ref 74–99)
HCT VFR BLD AUTO: 36.4 % (ref 37–54)
HGB BLD-MCNC: 12.4 G/DL (ref 12.5–16.5)
IMM GRANULOCYTES # BLD AUTO: 0.33 K/UL (ref 0–0.58)
IMM GRANULOCYTES NFR BLD: 2 % (ref 0–5)
LACTATE BLDV-SCNC: 4.2 MMOL/L (ref 0.5–2.2)
LACTATE BLDV-SCNC: 4.4 MMOL/L (ref 0.5–2.2)
LDH SERPL-CCNC: 289 U/L (ref 135–225)
LYMPHOCYTES NFR BLD: 0.65 K/UL (ref 1.5–4)
LYMPHOCYTES RELATIVE PERCENT: 3 % (ref 20–42)
MAGNESIUM SERPL-MCNC: 2.3 MG/DL (ref 1.6–2.6)
MCH RBC QN AUTO: 31.5 PG (ref 26–35)
MCHC RBC AUTO-ENTMCNC: 34.1 G/DL (ref 32–34.5)
MCV RBC AUTO: 92.4 FL (ref 80–99.9)
MICROORGANISM SPEC CULT: NO GROWTH
MICROORGANISM SPEC CULT: NORMAL
MONOCYTES NFR BLD: 0.96 K/UL (ref 0.1–0.95)
MONOCYTES NFR BLD: 5 % (ref 2–12)
NEUTROPHILS NFR BLD: 90 % (ref 43–80)
NEUTS SEG NFR BLD: 17.37 K/UL (ref 1.8–7.3)
PARTIAL THROMBOPLASTIN TIME: 65.7 SEC (ref 24.5–35.1)
PHOSPHATE SERPL-MCNC: 3.3 MG/DL (ref 2.5–4.5)
PLATELET, FLUORESCENCE: 131 K/UL (ref 130–450)
PMV BLD AUTO: 10.5 FL (ref 7–12)
POTASSIUM SERPL-SCNC: 4.5 MMOL/L (ref 3.5–5)
PROT SERPL-MCNC: 6.1 G/DL (ref 6.4–8.3)
RBC # BLD AUTO: 3.94 M/UL (ref 3.8–5.8)
SODIUM SERPL-SCNC: 142 MMOL/L (ref 132–146)
SPECIMEN DESCRIPTION: NORMAL
SPECIMEN DESCRIPTION: NORMAL
WBC OTHER # BLD: 19.3 K/UL (ref 4.5–11.5)

## 2024-02-20 PROCEDURE — 85025 COMPLETE CBC W/AUTO DIFF WBC: CPT

## 2024-02-20 PROCEDURE — 2580000003 HC RX 258: Performed by: INTERNAL MEDICINE

## 2024-02-20 PROCEDURE — 2580000003 HC RX 258

## 2024-02-20 PROCEDURE — 97161 PT EVAL LOW COMPLEX 20 MIN: CPT | Performed by: PHYSICAL THERAPIST

## 2024-02-20 PROCEDURE — 6370000000 HC RX 637 (ALT 250 FOR IP)

## 2024-02-20 PROCEDURE — 83615 LACTATE (LD) (LDH) ENZYME: CPT

## 2024-02-20 PROCEDURE — 97116 GAIT TRAINING THERAPY: CPT | Performed by: PHYSICAL THERAPIST

## 2024-02-20 PROCEDURE — 2580000003 HC RX 258: Performed by: STUDENT IN AN ORGANIZED HEALTH CARE EDUCATION/TRAINING PROGRAM

## 2024-02-20 PROCEDURE — 6370000000 HC RX 637 (ALT 250 FOR IP): Performed by: INTERNAL MEDICINE

## 2024-02-20 PROCEDURE — 94660 CPAP INITIATION&MGMT: CPT

## 2024-02-20 PROCEDURE — 99291 CRITICAL CARE FIRST HOUR: CPT | Performed by: INTERNAL MEDICINE

## 2024-02-20 PROCEDURE — 94640 AIRWAY INHALATION TREATMENT: CPT

## 2024-02-20 PROCEDURE — 97530 THERAPEUTIC ACTIVITIES: CPT | Performed by: PHYSICAL THERAPIST

## 2024-02-20 PROCEDURE — 83735 ASSAY OF MAGNESIUM: CPT

## 2024-02-20 PROCEDURE — A4216 STERILE WATER/SALINE, 10 ML: HCPCS

## 2024-02-20 PROCEDURE — 86738 MYCOPLASMA ANTIBODY: CPT

## 2024-02-20 PROCEDURE — 85730 THROMBOPLASTIN TIME PARTIAL: CPT

## 2024-02-20 PROCEDURE — 6360000002 HC RX W HCPCS

## 2024-02-20 PROCEDURE — C9113 INJ PANTOPRAZOLE SODIUM, VIA: HCPCS

## 2024-02-20 PROCEDURE — 2700000000 HC OXYGEN THERAPY PER DAY

## 2024-02-20 PROCEDURE — 93306 TTE W/DOPPLER COMPLETE: CPT | Performed by: INTERNAL MEDICINE

## 2024-02-20 PROCEDURE — 6360000002 HC RX W HCPCS: Performed by: INTERNAL MEDICINE

## 2024-02-20 PROCEDURE — 2000000000 HC ICU R&B

## 2024-02-20 PROCEDURE — 80053 COMPREHEN METABOLIC PANEL: CPT

## 2024-02-20 PROCEDURE — 99232 SBSQ HOSP IP/OBS MODERATE 35: CPT | Performed by: INTERNAL MEDICINE

## 2024-02-20 PROCEDURE — 83605 ASSAY OF LACTIC ACID: CPT

## 2024-02-20 PROCEDURE — 84100 ASSAY OF PHOSPHORUS: CPT

## 2024-02-20 PROCEDURE — 2500000003 HC RX 250 WO HCPCS: Performed by: STUDENT IN AN ORGANIZED HEALTH CARE EDUCATION/TRAINING PROGRAM

## 2024-02-20 PROCEDURE — 82248 BILIRUBIN DIRECT: CPT

## 2024-02-20 PROCEDURE — 87040 BLOOD CULTURE FOR BACTERIA: CPT

## 2024-02-20 RX ORDER — DOPAMINE HYDROCHLORIDE 320 MG/100ML
1-20 INJECTION, SOLUTION INTRAVENOUS CONTINUOUS
Status: DISCONTINUED | OUTPATIENT
Start: 2024-02-20 | End: 2024-02-24

## 2024-02-20 RX ORDER — PANTOPRAZOLE SODIUM 40 MG/1
40 TABLET, DELAYED RELEASE ORAL
Status: DISCONTINUED | OUTPATIENT
Start: 2024-02-21 | End: 2024-02-23

## 2024-02-20 RX ADMIN — MIDODRINE HYDROCHLORIDE 15 MG: 5 TABLET ORAL at 08:34

## 2024-02-20 RX ADMIN — CEFEPIME 2000 MG: 2 INJECTION, POWDER, FOR SOLUTION INTRAVENOUS at 13:13

## 2024-02-20 RX ADMIN — MIDODRINE HYDROCHLORIDE 15 MG: 5 TABLET ORAL at 11:48

## 2024-02-20 RX ADMIN — VANCOMYCIN HYDROCHLORIDE 750 MG: 5 INJECTION, POWDER, LYOPHILIZED, FOR SOLUTION INTRAVENOUS at 23:41

## 2024-02-20 RX ADMIN — SODIUM CHLORIDE, POTASSIUM CHLORIDE, SODIUM LACTATE AND CALCIUM CHLORIDE: 600; 310; 30; 20 INJECTION, SOLUTION INTRAVENOUS at 05:09

## 2024-02-20 RX ADMIN — METRONIDAZOLE 500 MG: 500 INJECTION, SOLUTION INTRAVENOUS at 21:19

## 2024-02-20 RX ADMIN — CEFEPIME 2000 MG: 2 INJECTION, POWDER, FOR SOLUTION INTRAVENOUS at 00:56

## 2024-02-20 RX ADMIN — HYDROCORTISONE SODIUM SUCCINATE 50 MG: 100 INJECTION, POWDER, FOR SOLUTION INTRAMUSCULAR; INTRAVENOUS at 08:34

## 2024-02-20 RX ADMIN — SODIUM CHLORIDE 5 MCG/MIN: 9 INJECTION, SOLUTION INTRAVENOUS at 06:39

## 2024-02-20 RX ADMIN — MIDODRINE HYDROCHLORIDE 15 MG: 5 TABLET ORAL at 16:34

## 2024-02-20 RX ADMIN — METRONIDAZOLE 500 MG: 500 INJECTION, SOLUTION INTRAVENOUS at 05:09

## 2024-02-20 RX ADMIN — DOPAMINE HYDROCHLORIDE 2.5 MCG/KG/MIN: 320 INJECTION, SOLUTION INTRAVENOUS at 12:49

## 2024-02-20 RX ADMIN — IPRATROPIUM BROMIDE AND ALBUTEROL SULFATE 1 DOSE: .5; 2.5 SOLUTION RESPIRATORY (INHALATION) at 17:05

## 2024-02-20 RX ADMIN — BUDESONIDE 250 MCG: 0.25 SUSPENSION RESPIRATORY (INHALATION) at 04:18

## 2024-02-20 RX ADMIN — SODIUM CHLORIDE, POTASSIUM CHLORIDE, SODIUM LACTATE AND CALCIUM CHLORIDE: 600; 310; 30; 20 INJECTION, SOLUTION INTRAVENOUS at 20:41

## 2024-02-20 RX ADMIN — HYDROCORTISONE SODIUM SUCCINATE 50 MG: 100 INJECTION, POWDER, FOR SOLUTION INTRAMUSCULAR; INTRAVENOUS at 00:58

## 2024-02-20 RX ADMIN — BUDESONIDE 250 MCG: 0.25 SUSPENSION RESPIRATORY (INHALATION) at 17:06

## 2024-02-20 RX ADMIN — ASPIRIN 81 MG CHEWABLE TABLET 81 MG: 81 TABLET CHEWABLE at 08:34

## 2024-02-20 RX ADMIN — IPRATROPIUM BROMIDE AND ALBUTEROL SULFATE 1 DOSE: .5; 2.5 SOLUTION RESPIRATORY (INHALATION) at 04:18

## 2024-02-20 RX ADMIN — IPRATROPIUM BROMIDE AND ALBUTEROL SULFATE 1 DOSE: .5; 2.5 SOLUTION RESPIRATORY (INHALATION) at 14:13

## 2024-02-20 RX ADMIN — PANTOPRAZOLE SODIUM 40 MG: 40 INJECTION, POWDER, FOR SOLUTION INTRAVENOUS at 08:34

## 2024-02-20 RX ADMIN — METRONIDAZOLE 500 MG: 500 INJECTION, SOLUTION INTRAVENOUS at 13:12

## 2024-02-20 RX ADMIN — IPRATROPIUM BROMIDE AND ALBUTEROL SULFATE 1 DOSE: .5; 2.5 SOLUTION RESPIRATORY (INHALATION) at 09:45

## 2024-02-20 ASSESSMENT — PAIN SCALES - GENERAL
PAINLEVEL_OUTOF10: 0

## 2024-02-20 NOTE — CARE COORDINATION
2/20/2024 ICU, high flow oxygen at 3lnc, Dopamine gtt, Maxipime, LR, Solucortef, Flagyl, Protonix, Vanco. Pending PT/OT notes. Pt from home with plans to return. Has a wheeled walker at home, no oxygen. Lives in single story home with his wife. Pts wife will provide a ride at discharge. CM following. Electronically signed by JOSE Argueta on 2/20/2024 at 2:07 PM

## 2024-02-21 ENCOUNTER — APPOINTMENT (OUTPATIENT)
Dept: CT IMAGING | Age: 79
DRG: 871 | End: 2024-02-21
Payer: MEDICARE

## 2024-02-21 ENCOUNTER — APPOINTMENT (OUTPATIENT)
Dept: GENERAL RADIOLOGY | Age: 79
DRG: 871 | End: 2024-02-21
Payer: MEDICARE

## 2024-02-21 LAB
ACB COMPLEX DNA BLD POS QL NAA+NON-PROBE: NOT DETECTED
ALBUMIN SERPL-MCNC: 3.4 G/DL (ref 3.5–5.2)
ALP SERPL-CCNC: 137 U/L (ref 40–129)
ALT SERPL-CCNC: 36 U/L (ref 0–40)
ANION GAP SERPL CALCULATED.3IONS-SCNC: 12 MMOL/L (ref 7–16)
AST SERPL-CCNC: 48 U/L (ref 0–39)
B FRAGILIS DNA BLD POS QL NAA+NON-PROBE: NOT DETECTED
BASOPHILS # BLD: 0.02 K/UL (ref 0–0.2)
BASOPHILS NFR BLD: 0 % (ref 0–2)
BILIRUB SERPL-MCNC: 1.3 MG/DL (ref 0–1.2)
BIOFIRE TEST COMMENT: ABNORMAL
BUN SERPL-MCNC: 26 MG/DL (ref 6–23)
C ALBICANS DNA BLD POS QL NAA+NON-PROBE: NOT DETECTED
C AURIS DNA BLD POS QL NAA+NON-PROBE: NOT DETECTED
C GATTII+NEOFOR DNA BLD POS QL NAA+N-PRB: NOT DETECTED
C GLABRATA DNA BLD POS QL NAA+NON-PROBE: NOT DETECTED
C KRUSEI DNA BLD POS QL NAA+NON-PROBE: NOT DETECTED
C PARAP DNA BLD POS QL NAA+NON-PROBE: NOT DETECTED
C TROPICLS DNA BLD POS QL NAA+NON-PROBE: NOT DETECTED
CALCIUM SERPL-MCNC: 8.3 MG/DL (ref 8.6–10.2)
CHLORIDE SERPL-SCNC: 108 MMOL/L (ref 98–107)
CO2 SERPL-SCNC: 20 MMOL/L (ref 22–29)
CREAT SERPL-MCNC: 1.1 MG/DL (ref 0.7–1.2)
DATE LAST DOSE: NORMAL
E CLOAC COMP DNA BLD POS NAA+NON-PROBE: NOT DETECTED
E COLI DNA BLD POS QL NAA+NON-PROBE: NOT DETECTED
E FAECALIS DNA BLD POS QL NAA+NON-PROBE: NOT DETECTED
E FAECIUM DNA BLD POS QL NAA+NON-PROBE: NOT DETECTED
ENTEROBACTERALES DNA BLD POS NAA+N-PRB: NOT DETECTED
EOSINOPHIL # BLD: 0.01 K/UL (ref 0.05–0.5)
EOSINOPHILS RELATIVE PERCENT: 0 % (ref 0–6)
ERYTHROCYTE [DISTWIDTH] IN BLOOD BY AUTOMATED COUNT: 16 % (ref 11.5–15)
GFR SERPL CREATININE-BSD FRML MDRD: >60 ML/MIN/1.73M2
GLUCOSE SERPL-MCNC: 128 MG/DL (ref 74–99)
GP B STREP DNA BLD POS QL NAA+NON-PROBE: NOT DETECTED
HAEM INFLU DNA BLD POS QL NAA+NON-PROBE: NOT DETECTED
HCT VFR BLD AUTO: 37.4 % (ref 37–54)
HGB BLD-MCNC: 12.7 G/DL (ref 12.5–16.5)
IMM GRANULOCYTES # BLD AUTO: 0.21 K/UL (ref 0–0.58)
IMM GRANULOCYTES NFR BLD: 1 % (ref 0–5)
K OXYTOCA DNA BLD POS QL NAA+NON-PROBE: NOT DETECTED
KLEBSIELLA SP DNA BLD POS QL NAA+NON-PRB: NOT DETECTED
KLEBSIELLA SP DNA BLD POS QL NAA+NON-PRB: NOT DETECTED
L MONOCYTOG DNA BLD POS QL NAA+NON-PROBE: NOT DETECTED
LACTATE BLDV-SCNC: 2 MMOL/L (ref 0.5–2.2)
LYMPHOCYTES NFR BLD: 0.85 K/UL (ref 1.5–4)
LYMPHOCYTES RELATIVE PERCENT: 5 % (ref 20–42)
MAGNESIUM SERPL-MCNC: 2.3 MG/DL (ref 1.6–2.6)
MCH RBC QN AUTO: 31.3 PG (ref 26–35)
MCHC RBC AUTO-ENTMCNC: 34 G/DL (ref 32–34.5)
MCV RBC AUTO: 92.1 FL (ref 80–99.9)
MICROORGANISM SPEC CULT: ABNORMAL
MICROORGANISM/AGENT SPEC: ABNORMAL
MONOCYTES NFR BLD: 0.96 K/UL (ref 0.1–0.95)
MONOCYTES NFR BLD: 6 % (ref 2–12)
N MEN DNA BLD POS QL NAA+NON-PROBE: NOT DETECTED
NEUTROPHILS NFR BLD: 87 % (ref 43–80)
NEUTS SEG NFR BLD: 14.26 K/UL (ref 1.8–7.3)
P AERUGINOSA DNA BLD POS NAA+NON-PROBE: NOT DETECTED
PARTIAL THROMBOPLASTIN TIME: 49.3 SEC (ref 24.5–35.1)
PARTIAL THROMBOPLASTIN TIME: 50.4 SEC (ref 24.5–35.1)
PHOSPHATE SERPL-MCNC: 1.8 MG/DL (ref 2.5–4.5)
PLATELET # BLD AUTO: 140 K/UL (ref 130–450)
PMV BLD AUTO: 11 FL (ref 7–12)
POTASSIUM SERPL-SCNC: 3 MMOL/L (ref 3.5–5)
PROT SERPL-MCNC: 6.7 G/DL (ref 6.4–8.3)
PROTEUS SP DNA BLD POS QL NAA+NON-PROBE: NOT DETECTED
RBC # BLD AUTO: 4.06 M/UL (ref 3.8–5.8)
S AUREUS DNA BLD POS QL NAA+NON-PROBE: NOT DETECTED
S AUREUS+CONS DNA BLD POS NAA+NON-PROBE: DETECTED
S EPIDERMIDIS DNA BLD POS QL NAA+NON-PRB: NOT DETECTED
S LUGDUNENSIS DNA BLD POS QL NAA+NON-PRB: NOT DETECTED
S MALTOPHILIA DNA BLD POS QL NAA+NON-PRB: NOT DETECTED
S MARCESCENS DNA BLD POS NAA+NON-PROBE: NOT DETECTED
S PNEUM DNA BLD POS QL NAA+NON-PROBE: NOT DETECTED
S PYO DNA BLD POS QL NAA+NON-PROBE: NOT DETECTED
SALMONELLA DNA BLD POS QL NAA+NON-PROBE: NOT DETECTED
SERVICE CMNT-IMP: ABNORMAL
SODIUM SERPL-SCNC: 140 MMOL/L (ref 132–146)
SPECIMEN DESCRIPTION: ABNORMAL
STREPTOCOCCUS DNA BLD POS NAA+NON-PROBE: NOT DETECTED
TME LAST DOSE: NORMAL H
VANCOMYCIN DOSE: NORMAL MG
VANCOMYCIN TROUGH SERPL-MCNC: 7.7 UG/ML (ref 5–16)
WBC OTHER # BLD: 16.3 K/UL (ref 4.5–11.5)

## 2024-02-21 PROCEDURE — 85730 THROMBOPLASTIN TIME PARTIAL: CPT

## 2024-02-21 PROCEDURE — 71045 X-RAY EXAM CHEST 1 VIEW: CPT

## 2024-02-21 PROCEDURE — 36415 COLL VENOUS BLD VENIPUNCTURE: CPT

## 2024-02-21 PROCEDURE — 6360000002 HC RX W HCPCS

## 2024-02-21 PROCEDURE — 92526 ORAL FUNCTION THERAPY: CPT | Performed by: SPEECH-LANGUAGE PATHOLOGIST

## 2024-02-21 PROCEDURE — 6360000004 HC RX CONTRAST MEDICATION: Performed by: RADIOLOGY

## 2024-02-21 PROCEDURE — 84100 ASSAY OF PHOSPHORUS: CPT

## 2024-02-21 PROCEDURE — 36556 INSERT NON-TUNNEL CV CATH: CPT

## 2024-02-21 PROCEDURE — 2700000000 HC OXYGEN THERAPY PER DAY

## 2024-02-21 PROCEDURE — 99291 CRITICAL CARE FIRST HOUR: CPT | Performed by: INTERNAL MEDICINE

## 2024-02-21 PROCEDURE — 36556 INSERT NON-TUNNEL CV CATH: CPT | Performed by: INTERNAL MEDICINE

## 2024-02-21 PROCEDURE — 99232 SBSQ HOSP IP/OBS MODERATE 35: CPT | Performed by: INTERNAL MEDICINE

## 2024-02-21 PROCEDURE — 85025 COMPLETE CBC W/AUTO DIFF WBC: CPT

## 2024-02-21 PROCEDURE — 6360000002 HC RX W HCPCS: Performed by: STUDENT IN AN ORGANIZED HEALTH CARE EDUCATION/TRAINING PROGRAM

## 2024-02-21 PROCEDURE — 36592 COLLECT BLOOD FROM PICC: CPT

## 2024-02-21 PROCEDURE — 2580000003 HC RX 258

## 2024-02-21 PROCEDURE — 2000000000 HC ICU R&B

## 2024-02-21 PROCEDURE — 80202 ASSAY OF VANCOMYCIN: CPT

## 2024-02-21 PROCEDURE — 6370000000 HC RX 637 (ALT 250 FOR IP)

## 2024-02-21 PROCEDURE — 2580000003 HC RX 258: Performed by: INTERNAL MEDICINE

## 2024-02-21 PROCEDURE — 83735 ASSAY OF MAGNESIUM: CPT

## 2024-02-21 PROCEDURE — 83605 ASSAY OF LACTIC ACID: CPT

## 2024-02-21 PROCEDURE — 6370000000 HC RX 637 (ALT 250 FOR IP): Performed by: INTERNAL MEDICINE

## 2024-02-21 PROCEDURE — 6360000002 HC RX W HCPCS: Performed by: INTERNAL MEDICINE

## 2024-02-21 PROCEDURE — 71275 CT ANGIOGRAPHY CHEST: CPT

## 2024-02-21 PROCEDURE — 80053 COMPREHEN METABOLIC PANEL: CPT

## 2024-02-21 PROCEDURE — 94660 CPAP INITIATION&MGMT: CPT

## 2024-02-21 PROCEDURE — 97165 OT EVAL LOW COMPLEX 30 MIN: CPT

## 2024-02-21 PROCEDURE — 97535 SELF CARE MNGMENT TRAINING: CPT

## 2024-02-21 PROCEDURE — 2500000003 HC RX 250 WO HCPCS

## 2024-02-21 PROCEDURE — 94640 AIRWAY INHALATION TREATMENT: CPT

## 2024-02-21 RX ORDER — DOBUTAMINE HYDROCHLORIDE 400 MG/100ML
2.5-1 INJECTION INTRAVENOUS CONTINUOUS
Status: DISCONTINUED | OUTPATIENT
Start: 2024-02-21 | End: 2024-02-24

## 2024-02-21 RX ORDER — PHENAZOPYRIDINE HYDROCHLORIDE 100 MG/1
200 TABLET, FILM COATED ORAL ONCE
Status: COMPLETED | OUTPATIENT
Start: 2024-02-21 | End: 2024-02-21

## 2024-02-21 RX ORDER — LANOLIN ALCOHOL/MO/W.PET/CERES
3 CREAM (GRAM) TOPICAL NIGHTLY PRN
Status: DISCONTINUED | OUTPATIENT
Start: 2024-02-21 | End: 2024-03-07 | Stop reason: HOSPADM

## 2024-02-21 RX ORDER — POTASSIUM CHLORIDE 20 MEQ/1
40 TABLET, EXTENDED RELEASE ORAL ONCE
Status: COMPLETED | OUTPATIENT
Start: 2024-02-21 | End: 2024-02-21

## 2024-02-21 RX ADMIN — POTASSIUM PHOSPHATE, MONOBASIC POTASSIUM PHOSPHATE, DIBASIC 30 MMOL: 224; 236 INJECTION, SOLUTION, CONCENTRATE INTRAVENOUS at 08:01

## 2024-02-21 RX ADMIN — PHENAZOPYRIDINE 200 MG: 100 TABLET ORAL at 12:51

## 2024-02-21 RX ADMIN — Medication 12 UNITS/KG/HR: at 04:49

## 2024-02-21 RX ADMIN — IPRATROPIUM BROMIDE AND ALBUTEROL SULFATE 1 DOSE: .5; 2.5 SOLUTION RESPIRATORY (INHALATION) at 16:14

## 2024-02-21 RX ADMIN — IPRATROPIUM BROMIDE AND ALBUTEROL SULFATE 1 DOSE: .5; 2.5 SOLUTION RESPIRATORY (INHALATION) at 08:28

## 2024-02-21 RX ADMIN — CEFEPIME 2000 MG: 2 INJECTION, POWDER, FOR SOLUTION INTRAVENOUS at 02:03

## 2024-02-21 RX ADMIN — HEPARIN SODIUM 5590 UNITS: 1000 INJECTION INTRAVENOUS; SUBCUTANEOUS at 23:02

## 2024-02-21 RX ADMIN — SODIUM CHLORIDE, POTASSIUM CHLORIDE, SODIUM LACTATE AND CALCIUM CHLORIDE: 600; 310; 30; 20 INJECTION, SOLUTION INTRAVENOUS at 15:07

## 2024-02-21 RX ADMIN — MIDODRINE HYDROCHLORIDE 15 MG: 5 TABLET ORAL at 07:57

## 2024-02-21 RX ADMIN — ASPIRIN 81 MG CHEWABLE TABLET 81 MG: 81 TABLET CHEWABLE at 08:07

## 2024-02-21 RX ADMIN — METRONIDAZOLE 500 MG: 500 INJECTION, SOLUTION INTRAVENOUS at 05:02

## 2024-02-21 RX ADMIN — Medication 20 UNITS/KG/HR: at 23:02

## 2024-02-21 RX ADMIN — IOPAMIDOL 75 ML: 755 INJECTION, SOLUTION INTRAVENOUS at 14:47

## 2024-02-21 RX ADMIN — PANTOPRAZOLE SODIUM 40 MG: 40 TABLET, DELAYED RELEASE ORAL at 06:05

## 2024-02-21 RX ADMIN — MIDODRINE HYDROCHLORIDE 15 MG: 5 TABLET ORAL at 17:50

## 2024-02-21 RX ADMIN — POTASSIUM CHLORIDE 40 MEQ: 1500 TABLET, EXTENDED RELEASE ORAL at 07:57

## 2024-02-21 RX ADMIN — DOBUTAMINE IN DEXTROSE 2.5 MCG/KG/MIN: 400 INJECTION, SOLUTION INTRAVENOUS at 12:59

## 2024-02-21 RX ADMIN — MIDODRINE HYDROCHLORIDE 15 MG: 5 TABLET ORAL at 11:35

## 2024-02-21 RX ADMIN — BUDESONIDE 250 MCG: 0.25 SUSPENSION RESPIRATORY (INHALATION) at 04:29

## 2024-02-21 RX ADMIN — METRONIDAZOLE 500 MG: 500 INJECTION, SOLUTION INTRAVENOUS at 12:37

## 2024-02-21 RX ADMIN — IPRATROPIUM BROMIDE AND ALBUTEROL SULFATE 1 DOSE: .5; 2.5 SOLUTION RESPIRATORY (INHALATION) at 04:29

## 2024-02-21 RX ADMIN — VANCOMYCIN HYDROCHLORIDE 750 MG: 5 INJECTION, POWDER, LYOPHILIZED, FOR SOLUTION INTRAVENOUS at 23:20

## 2024-02-21 RX ADMIN — CEFEPIME 2000 MG: 2 INJECTION, POWDER, FOR SOLUTION INTRAVENOUS at 14:22

## 2024-02-21 RX ADMIN — METRONIDAZOLE 500 MG: 500 INJECTION, SOLUTION INTRAVENOUS at 21:19

## 2024-02-21 RX ADMIN — BUDESONIDE 250 MCG: 0.25 SUSPENSION RESPIRATORY (INHALATION) at 16:15

## 2024-02-21 RX ADMIN — HEPARIN SODIUM 5590 UNITS: 1000 INJECTION INTRAVENOUS; SUBCUTANEOUS at 07:00

## 2024-02-21 RX ADMIN — IPRATROPIUM BROMIDE AND ALBUTEROL SULFATE 1 DOSE: .5; 2.5 SOLUTION RESPIRATORY (INHALATION) at 12:16

## 2024-02-21 ASSESSMENT — PAIN DESCRIPTION - LOCATION: LOCATION: OTHER (COMMENT)

## 2024-02-21 ASSESSMENT — PAIN SCALES - GENERAL
PAINLEVEL_OUTOF10: 0
PAINLEVEL_OUTOF10: 5
PAINLEVEL_OUTOF10: 0

## 2024-02-21 ASSESSMENT — PAIN - FUNCTIONAL ASSESSMENT: PAIN_FUNCTIONAL_ASSESSMENT: ACTIVITIES ARE NOT PREVENTED

## 2024-02-21 NOTE — CARE COORDINATION
2/21/2024 ICU, Dopamine gtt, Heparin gtt, NC versus bipap. Maxipime, LR, Flagyl, KPO4, Vanco. [facility] referral (need orders) to [facility]- to review. Pt from home with plans to return. Has a wheeled walker at home, no oxygen. Lives in single story home with his wife. Pts wife will provide a ride at discharge. Cm to follow. Electronically signed by JOSE Argueta on 2/21/2024 at 9:12 AM      The Plan for Transition of Care is related to the following treatment goals: [facility]    The Patient and/or patient representative [place] wife was provided with a choice of provider and agrees   with the discharge plan. [x] Yes [] No    Freedom of choice list was provided with basic dialogue that supports the patient's individualized plan of care/goals, treatment preferences and shares the quality data associated with the providers. [x] Yes [] No      Electronically signed by JOSE rAgueta on 2/21/2024 at 9:15 AM

## 2024-02-22 LAB
ALBUMIN SERPL-MCNC: 3.2 G/DL (ref 3.5–5.2)
ALP SERPL-CCNC: 101 U/L (ref 40–129)
ALT SERPL-CCNC: 41 U/L (ref 0–40)
ANION GAP SERPL CALCULATED.3IONS-SCNC: 8 MMOL/L (ref 7–16)
AST SERPL-CCNC: 45 U/L (ref 0–39)
BASOPHILS # BLD: 0.02 K/UL (ref 0–0.2)
BASOPHILS NFR BLD: 0 % (ref 0–2)
BILIRUB SERPL-MCNC: 2.1 MG/DL (ref 0–1.2)
BUN SERPL-MCNC: 18 MG/DL (ref 6–23)
CALCIUM SERPL-MCNC: 8.1 MG/DL (ref 8.6–10.2)
CHLORIDE SERPL-SCNC: 107 MMOL/L (ref 98–107)
CO2 SERPL-SCNC: 21 MMOL/L (ref 22–29)
CREAT SERPL-MCNC: 0.9 MG/DL (ref 0.7–1.2)
EOSINOPHIL # BLD: 0.19 K/UL (ref 0.05–0.5)
EOSINOPHILS RELATIVE PERCENT: 2 % (ref 0–6)
ERYTHROCYTE [DISTWIDTH] IN BLOOD BY AUTOMATED COUNT: 16.1 % (ref 11.5–15)
GFR SERPL CREATININE-BSD FRML MDRD: >60 ML/MIN/1.73M2
GLUCOSE SERPL-MCNC: 85 MG/DL (ref 74–99)
HCT VFR BLD AUTO: 34.3 % (ref 37–54)
HGB BLD-MCNC: 11.5 G/DL (ref 12.5–16.5)
IMM GRANULOCYTES # BLD AUTO: 0.18 K/UL (ref 0–0.58)
IMM GRANULOCYTES NFR BLD: 2 % (ref 0–5)
LYMPHOCYTES NFR BLD: 0.92 K/UL (ref 1.5–4)
LYMPHOCYTES RELATIVE PERCENT: 9 % (ref 20–42)
MAGNESIUM SERPL-MCNC: 1.9 MG/DL (ref 1.6–2.6)
MCH RBC QN AUTO: 30.8 PG (ref 26–35)
MCHC RBC AUTO-ENTMCNC: 33.5 G/DL (ref 32–34.5)
MCV RBC AUTO: 92 FL (ref 80–99.9)
MONOCYTES NFR BLD: 0.58 K/UL (ref 0.1–0.95)
MONOCYTES NFR BLD: 5 % (ref 2–12)
NEUTROPHILS NFR BLD: 82 % (ref 43–80)
NEUTS SEG NFR BLD: 8.87 K/UL (ref 1.8–7.3)
PARTIAL THROMBOPLASTIN TIME: 103.2 SEC (ref 24.5–35.1)
PARTIAL THROMBOPLASTIN TIME: 201.9 SEC (ref 24.5–35.1)
PHOSPHATE SERPL-MCNC: 2 MG/DL (ref 2.5–4.5)
PLATELET, FLUORESCENCE: 126 K/UL (ref 130–450)
PMV BLD AUTO: 10.5 FL (ref 7–12)
POTASSIUM SERPL-SCNC: 3.6 MMOL/L (ref 3.5–5)
PROT SERPL-MCNC: 6 G/DL (ref 6.4–8.3)
RBC # BLD AUTO: 3.73 M/UL (ref 3.8–5.8)
SODIUM SERPL-SCNC: 136 MMOL/L (ref 132–146)
WBC OTHER # BLD: 10.8 K/UL (ref 4.5–11.5)

## 2024-02-22 PROCEDURE — 99291 CRITICAL CARE FIRST HOUR: CPT | Performed by: INTERNAL MEDICINE

## 2024-02-22 PROCEDURE — 83735 ASSAY OF MAGNESIUM: CPT

## 2024-02-22 PROCEDURE — 36415 COLL VENOUS BLD VENIPUNCTURE: CPT

## 2024-02-22 PROCEDURE — 36592 COLLECT BLOOD FROM PICC: CPT

## 2024-02-22 PROCEDURE — 94640 AIRWAY INHALATION TREATMENT: CPT

## 2024-02-22 PROCEDURE — 99232 SBSQ HOSP IP/OBS MODERATE 35: CPT | Performed by: INTERNAL MEDICINE

## 2024-02-22 PROCEDURE — 6360000002 HC RX W HCPCS: Performed by: INTERNAL MEDICINE

## 2024-02-22 PROCEDURE — 2000000000 HC ICU R&B

## 2024-02-22 PROCEDURE — 6360000002 HC RX W HCPCS: Performed by: STUDENT IN AN ORGANIZED HEALTH CARE EDUCATION/TRAINING PROGRAM

## 2024-02-22 PROCEDURE — 02HV33Z INSERTION OF INFUSION DEVICE INTO SUPERIOR VENA CAVA, PERCUTANEOUS APPROACH: ICD-10-PCS | Performed by: INTERNAL MEDICINE

## 2024-02-22 PROCEDURE — 2580000003 HC RX 258

## 2024-02-22 PROCEDURE — 94660 CPAP INITIATION&MGMT: CPT

## 2024-02-22 PROCEDURE — 6370000000 HC RX 637 (ALT 250 FOR IP)

## 2024-02-22 PROCEDURE — 80053 COMPREHEN METABOLIC PANEL: CPT

## 2024-02-22 PROCEDURE — 6370000000 HC RX 637 (ALT 250 FOR IP): Performed by: INTERNAL MEDICINE

## 2024-02-22 PROCEDURE — 6360000002 HC RX W HCPCS

## 2024-02-22 PROCEDURE — 85730 THROMBOPLASTIN TIME PARTIAL: CPT

## 2024-02-22 PROCEDURE — 2500000003 HC RX 250 WO HCPCS

## 2024-02-22 PROCEDURE — 97530 THERAPEUTIC ACTIVITIES: CPT | Performed by: PHYSICAL THERAPIST

## 2024-02-22 PROCEDURE — 84100 ASSAY OF PHOSPHORUS: CPT

## 2024-02-22 PROCEDURE — 97110 THERAPEUTIC EXERCISES: CPT | Performed by: PHYSICAL THERAPIST

## 2024-02-22 PROCEDURE — 2580000003 HC RX 258: Performed by: INTERNAL MEDICINE

## 2024-02-22 PROCEDURE — 85025 COMPLETE CBC W/AUTO DIFF WBC: CPT

## 2024-02-22 PROCEDURE — 2700000000 HC OXYGEN THERAPY PER DAY

## 2024-02-22 RX ORDER — FUROSEMIDE 10 MG/ML
20 INJECTION INTRAMUSCULAR; INTRAVENOUS 2 TIMES DAILY
Status: DISCONTINUED | OUTPATIENT
Start: 2024-02-22 | End: 2024-02-23

## 2024-02-22 RX ORDER — POTASSIUM CHLORIDE 29.8 MG/ML
20 INJECTION INTRAVENOUS ONCE
Status: COMPLETED | OUTPATIENT
Start: 2024-02-22 | End: 2024-02-22

## 2024-02-22 RX ORDER — MAGNESIUM SULFATE 1 G/100ML
1000 INJECTION INTRAVENOUS ONCE
Status: COMPLETED | OUTPATIENT
Start: 2024-02-22 | End: 2024-02-22

## 2024-02-22 RX ADMIN — MIDODRINE HYDROCHLORIDE 15 MG: 5 TABLET ORAL at 13:58

## 2024-02-22 RX ADMIN — IPRATROPIUM BROMIDE AND ALBUTEROL SULFATE 1 DOSE: .5; 2.5 SOLUTION RESPIRATORY (INHALATION) at 04:39

## 2024-02-22 RX ADMIN — MIDODRINE HYDROCHLORIDE 15 MG: 5 TABLET ORAL at 08:45

## 2024-02-22 RX ADMIN — ASPIRIN 81 MG CHEWABLE TABLET 81 MG: 81 TABLET CHEWABLE at 08:45

## 2024-02-22 RX ADMIN — IPRATROPIUM BROMIDE AND ALBUTEROL SULFATE 1 DOSE: .5; 2.5 SOLUTION RESPIRATORY (INHALATION) at 09:17

## 2024-02-22 RX ADMIN — METRONIDAZOLE 500 MG: 500 INJECTION, SOLUTION INTRAVENOUS at 04:36

## 2024-02-22 RX ADMIN — SODIUM CHLORIDE, POTASSIUM CHLORIDE, SODIUM LACTATE AND CALCIUM CHLORIDE: 600; 310; 30; 20 INJECTION, SOLUTION INTRAVENOUS at 02:57

## 2024-02-22 RX ADMIN — IPRATROPIUM BROMIDE AND ALBUTEROL SULFATE 1 DOSE: .5; 2.5 SOLUTION RESPIRATORY (INHALATION) at 16:55

## 2024-02-22 RX ADMIN — Medication 17 UNITS/KG/HR: at 05:58

## 2024-02-22 RX ADMIN — IPRATROPIUM BROMIDE AND ALBUTEROL SULFATE 1 DOSE: .5; 2.5 SOLUTION RESPIRATORY (INHALATION) at 13:34

## 2024-02-22 RX ADMIN — CEFEPIME 2000 MG: 2 INJECTION, POWDER, FOR SOLUTION INTRAVENOUS at 01:43

## 2024-02-22 RX ADMIN — MIDODRINE HYDROCHLORIDE 15 MG: 5 TABLET ORAL at 16:57

## 2024-02-22 RX ADMIN — FUROSEMIDE 20 MG: 10 INJECTION, SOLUTION INTRAMUSCULAR; INTRAVENOUS at 17:32

## 2024-02-22 RX ADMIN — PANTOPRAZOLE SODIUM 40 MG: 40 TABLET, DELAYED RELEASE ORAL at 06:04

## 2024-02-22 RX ADMIN — APIXABAN 10 MG: 5 TABLET, FILM COATED ORAL at 21:12

## 2024-02-22 RX ADMIN — SODIUM PHOSPHATE, MONOBASIC, MONOHYDRATE AND SODIUM PHOSPHATE, DIBASIC, ANHYDROUS 20 MMOL: 142; 276 INJECTION, SOLUTION INTRAVENOUS at 06:52

## 2024-02-22 RX ADMIN — MAGNESIUM SULFATE IN DEXTROSE 1000 MG: 10 INJECTION, SOLUTION INTRAVENOUS at 06:10

## 2024-02-22 RX ADMIN — POTASSIUM CHLORIDE 20 MEQ: 29.8 INJECTION, SOLUTION INTRAVENOUS at 06:15

## 2024-02-22 RX ADMIN — Medication 17 UNITS/KG/HR: at 09:12

## 2024-02-22 RX ADMIN — CEFEPIME 2000 MG: 2 INJECTION, POWDER, FOR SOLUTION INTRAVENOUS at 14:07

## 2024-02-22 RX ADMIN — BUDESONIDE 250 MCG: 0.25 SUSPENSION RESPIRATORY (INHALATION) at 16:55

## 2024-02-22 RX ADMIN — APIXABAN 10 MG: 5 TABLET, FILM COATED ORAL at 13:58

## 2024-02-22 RX ADMIN — BUDESONIDE 250 MCG: 0.25 SUSPENSION RESPIRATORY (INHALATION) at 04:39

## 2024-02-22 RX ADMIN — FUROSEMIDE 20 MG: 10 INJECTION, SOLUTION INTRAMUSCULAR; INTRAVENOUS at 13:58

## 2024-02-22 ASSESSMENT — PAIN SCALES - GENERAL
PAINLEVEL_OUTOF10: 0
PAINLEVEL_OUTOF10: 0

## 2024-02-22 NOTE — PROCEDURES
PROCEDURE: Right SC central venous catheter, U/S guided.    INDICATION:   Cardiogenic shock    PROCEDURE :   Emma Laura MD    CONSENT: Consent was obtained prior to the procedure. Indications, risks, and benefits were explained at length.  ?  PROCEDURE SUMMARY: A time-out was performed. The patient’s right acromioclavicular was prepped and draped in sterile fashion using chlorhexidine scrub. Anesthesia was achieved with 1% lidocaine. The right SC vein was accessed under ultrasound guidance using a finder needle and sheath. U/S was used. Venous blood was withdrawn and the sheath was advanced into the vein and the needle was withdrawn. A guidewire was advanced through the sheath. A small incision was made with a 10-blade scalpel and the sheath was exchanged for a dilator over a guidewire until appropriate dilation was obtained. The dilator was removed and an 8.5 Pashto central venous three-lumen catheter was advanced over the guidewire and secured into place.   At time of procedure completion, all ports aspirated and flushed properly. Post-procedure x-ray shows the tip of the catheter within the superior vena cava.    COMPLICATIONS:   NONE    ESTIMATED BLOOD LOSS:   2 ml        Electronically signed by Emma Laura MD on 2/22/2024 at 11:37 AM

## 2024-02-22 NOTE — CARE COORDINATION
2/22/2024 ICU, Dobutamine, Dopamine, Heparin gtt. Maxipime, Vanco, NaPO4, LR. [facility] referral (need orders) to Heidi with [place] can accept. Pt from home with plans to return. Has a wheeled walker at home, no oxygen. Lives in single story home with his wife. Pts wife will provide a ride at discharge. Code status Limited respiratory failure only- then ok to intubate. CM following. Electronically signed by Bere Posey RN-BC on 2/22/2024 at 9:34 AM

## 2024-02-23 ENCOUNTER — APPOINTMENT (OUTPATIENT)
Dept: CT IMAGING | Age: 79
DRG: 871 | End: 2024-02-23
Payer: MEDICARE

## 2024-02-23 ENCOUNTER — APPOINTMENT (OUTPATIENT)
Dept: ULTRASOUND IMAGING | Age: 79
DRG: 871 | End: 2024-02-23
Payer: MEDICARE

## 2024-02-23 ENCOUNTER — APPOINTMENT (OUTPATIENT)
Age: 79
DRG: 871 | End: 2024-02-23
Attending: INTERNAL MEDICINE
Payer: MEDICARE

## 2024-02-23 LAB
ALBUMIN SERPL-MCNC: 3 G/DL (ref 3.5–5.2)
ALP SERPL-CCNC: 80 U/L (ref 40–129)
ALT SERPL-CCNC: 31 U/L (ref 0–40)
AMMONIA PLAS-SCNC: <10 UMOL/L (ref 16–60)
ANION GAP SERPL CALCULATED.3IONS-SCNC: 11 MMOL/L (ref 7–16)
AST SERPL-CCNC: 30 U/L (ref 0–39)
B.E.: -2.3 MMOL/L (ref -3–3)
BASOPHILS # BLD: 0 K/UL (ref 0–0.2)
BASOPHILS # BLD: 0 K/UL (ref 0–0.2)
BASOPHILS NFR BLD: 0 % (ref 0–2)
BASOPHILS NFR BLD: 0 % (ref 0–2)
BILIRUB SERPL-MCNC: 2.7 MG/DL (ref 0–1.2)
BUN SERPL-MCNC: 14 MG/DL (ref 6–23)
CALCIUM SERPL-MCNC: 7.9 MG/DL (ref 8.6–10.2)
CHLORIDE SERPL-SCNC: 103 MMOL/L (ref 98–107)
CO2 SERPL-SCNC: 23 MMOL/L (ref 22–29)
COHB: 1.4 % (ref 0–1.5)
CREAT SERPL-MCNC: 1 MG/DL (ref 0.7–1.2)
CRITICAL: ABNORMAL
DATE ANALYZED: ABNORMAL
DATE LAST DOSE: NORMAL
DATE OF COLLECTION: ABNORMAL
ECHO AV MEAN GRADIENT: 16 MMHG
ECHO AV MEAN VELOCITY: 1.9 M/S
ECHO AV PEAK GRADIENT: 27 MMHG
ECHO AV PEAK VELOCITY: 2.6 M/S
ECHO AV VTI: 39.1 CM
ECHO BSA: 1.83 M2
ECHO TV REGURGITANT MAX VELOCITY: 3.21 M/S
ECHO TV REGURGITANT PEAK GRADIENT: 41 MMHG
EOSINOPHIL # BLD: 0 K/UL (ref 0.05–0.5)
EOSINOPHIL # BLD: 0.27 K/UL (ref 0.05–0.5)
EOSINOPHILS RELATIVE PERCENT: 0 % (ref 0–6)
EOSINOPHILS RELATIVE PERCENT: 2 % (ref 0–6)
ERYTHROCYTE [DISTWIDTH] IN BLOOD BY AUTOMATED COUNT: 15.6 % (ref 11.5–15)
ERYTHROCYTE [DISTWIDTH] IN BLOOD BY AUTOMATED COUNT: 15.8 % (ref 11.5–15)
GFR SERPL CREATININE-BSD FRML MDRD: >60 ML/MIN/1.73M2
GLUCOSE SERPL-MCNC: 86 MG/DL (ref 74–99)
HCO3: 20.1 MMOL/L (ref 22–26)
HCT VFR BLD AUTO: 34.4 % (ref 37–54)
HCT VFR BLD AUTO: 36.1 % (ref 37–54)
HGB BLD-MCNC: 11.6 G/DL (ref 12.5–16.5)
HGB BLD-MCNC: 12.5 G/DL (ref 12.5–16.5)
HHB: 40.8 % (ref 0–5)
INR PPP: 2.8
LAB: ABNORMAL
LACTATE BLDV-SCNC: 1.5 MMOL/L (ref 0.5–2.2)
LYMPHOCYTES NFR BLD: 0.28 K/UL (ref 1.5–4)
LYMPHOCYTES NFR BLD: 0.41 K/UL (ref 1.5–4)
LYMPHOCYTES RELATIVE PERCENT: 2 % (ref 20–42)
LYMPHOCYTES RELATIVE PERCENT: 3 % (ref 20–42)
Lab: 1033
M PNEUMO IGG SER QL IA: 3.15
MAGNESIUM SERPL-MCNC: 1.8 MG/DL (ref 1.6–2.6)
MCH RBC QN AUTO: 31.3 PG (ref 26–35)
MCH RBC QN AUTO: 31.8 PG (ref 26–35)
MCHC RBC AUTO-ENTMCNC: 33.7 G/DL (ref 32–34.5)
MCHC RBC AUTO-ENTMCNC: 34.6 G/DL (ref 32–34.5)
MCV RBC AUTO: 91.9 FL (ref 80–99.9)
MCV RBC AUTO: 92.7 FL (ref 80–99.9)
METHB: 0.3 % (ref 0–1.5)
MODE: ABNORMAL
MONOCYTES NFR BLD: 0.14 K/UL (ref 0.1–0.95)
MONOCYTES NFR BLD: 0.27 K/UL (ref 0.1–0.95)
MONOCYTES NFR BLD: 1 % (ref 2–12)
MONOCYTES NFR BLD: 2 % (ref 2–12)
NEUTROPHILS NFR BLD: 93 % (ref 43–80)
NEUTROPHILS NFR BLD: 97 % (ref 43–80)
NEUTS SEG NFR BLD: 12.65 K/UL (ref 1.8–7.3)
NEUTS SEG NFR BLD: 15.78 K/UL (ref 1.8–7.3)
O2 CONTENT: 11 ML/DL
O2 SATURATION: 58.5 % (ref 92–98.5)
O2HB: 57.5 % (ref 94–97)
OPERATOR ID: 274
PATIENT TEMP: 37 C
PCO2: 28.4 MMHG (ref 35–45)
PH BLOOD GAS: 7.47 (ref 7.35–7.45)
PHOSPHATE SERPL-MCNC: 2.1 MG/DL (ref 2.5–4.5)
PLATELET, FLUORESCENCE: 130 K/UL (ref 130–450)
PLATELET, FLUORESCENCE: 135 K/UL (ref 130–450)
PMV BLD AUTO: 10.2 FL (ref 7–12)
PMV BLD AUTO: 10.5 FL (ref 7–12)
PO2: 29.5 MMHG (ref 75–100)
POTASSIUM SERPL-SCNC: 3.9 MMOL/L (ref 3.5–5)
PROT SERPL-MCNC: 6 G/DL (ref 6.4–8.3)
PROTHROMBIN TIME: 31.3 SEC (ref 9.3–12.4)
RBC # BLD AUTO: 3.71 M/UL (ref 3.8–5.8)
RBC # BLD AUTO: 3.93 M/UL (ref 3.8–5.8)
RBC # BLD: ABNORMAL 10*6/UL
SODIUM SERPL-SCNC: 137 MMOL/L (ref 132–146)
SOURCE, BLOOD GAS: ABNORMAL
THB: 13.7 G/DL (ref 11.5–16.5)
TIME ANALYZED: 1040
TME LAST DOSE: NORMAL H
VANCOMYCIN DOSE: NORMAL MG
VANCOMYCIN TROUGH SERPL-MCNC: 6.5 UG/ML (ref 5–16)
WBC OTHER # BLD: 13.6 K/UL (ref 4.5–11.5)
WBC OTHER # BLD: 16.2 K/UL (ref 4.5–11.5)

## 2024-02-23 PROCEDURE — 83605 ASSAY OF LACTIC ACID: CPT

## 2024-02-23 PROCEDURE — 83735 ASSAY OF MAGNESIUM: CPT

## 2024-02-23 PROCEDURE — 2000000000 HC ICU R&B

## 2024-02-23 PROCEDURE — 82140 ASSAY OF AMMONIA: CPT

## 2024-02-23 PROCEDURE — 82805 BLOOD GASES W/O2 SATURATION: CPT

## 2024-02-23 PROCEDURE — 93321 DOPPLER ECHO F-UP/LMTD STD: CPT | Performed by: INTERNAL MEDICINE

## 2024-02-23 PROCEDURE — 97530 THERAPEUTIC ACTIVITIES: CPT | Performed by: PHYSICAL THERAPIST

## 2024-02-23 PROCEDURE — 93308 TTE F-UP OR LMTD: CPT | Performed by: INTERNAL MEDICINE

## 2024-02-23 PROCEDURE — 99291 CRITICAL CARE FIRST HOUR: CPT | Performed by: INTERNAL MEDICINE

## 2024-02-23 PROCEDURE — 93308 TTE F-UP OR LMTD: CPT

## 2024-02-23 PROCEDURE — 6360000002 HC RX W HCPCS

## 2024-02-23 PROCEDURE — 6360000002 HC RX W HCPCS: Performed by: INTERNAL MEDICINE

## 2024-02-23 PROCEDURE — 2580000003 HC RX 258: Performed by: INTERNAL MEDICINE

## 2024-02-23 PROCEDURE — 70450 CT HEAD/BRAIN W/O DYE: CPT

## 2024-02-23 PROCEDURE — 85610 PROTHROMBIN TIME: CPT

## 2024-02-23 PROCEDURE — 94640 AIRWAY INHALATION TREATMENT: CPT

## 2024-02-23 PROCEDURE — 97164 PT RE-EVAL EST PLAN CARE: CPT | Performed by: PHYSICAL THERAPIST

## 2024-02-23 PROCEDURE — 97110 THERAPEUTIC EXERCISES: CPT | Performed by: PHYSICAL THERAPIST

## 2024-02-23 PROCEDURE — 85025 COMPLETE CBC W/AUTO DIFF WBC: CPT

## 2024-02-23 PROCEDURE — 80202 ASSAY OF VANCOMYCIN: CPT

## 2024-02-23 PROCEDURE — 76705 ECHO EXAM OF ABDOMEN: CPT

## 2024-02-23 PROCEDURE — 99233 SBSQ HOSP IP/OBS HIGH 50: CPT | Performed by: INTERNAL MEDICINE

## 2024-02-23 PROCEDURE — 80053 COMPREHEN METABOLIC PANEL: CPT

## 2024-02-23 PROCEDURE — 2500000003 HC RX 250 WO HCPCS

## 2024-02-23 PROCEDURE — 2580000003 HC RX 258

## 2024-02-23 PROCEDURE — 93325 DOPPLER ECHO COLOR FLOW MAPG: CPT | Performed by: INTERNAL MEDICINE

## 2024-02-23 PROCEDURE — 6370000000 HC RX 637 (ALT 250 FOR IP)

## 2024-02-23 PROCEDURE — 6370000000 HC RX 637 (ALT 250 FOR IP): Performed by: INTERNAL MEDICINE

## 2024-02-23 PROCEDURE — 2700000000 HC OXYGEN THERAPY PER DAY

## 2024-02-23 PROCEDURE — 84100 ASSAY OF PHOSPHORUS: CPT

## 2024-02-23 RX ORDER — SODIUM CHLORIDE 0.9 % (FLUSH) 0.9 %
5-40 SYRINGE (ML) INJECTION 2 TIMES DAILY
Status: DISCONTINUED | OUTPATIENT
Start: 2024-02-23 | End: 2024-03-07 | Stop reason: HOSPADM

## 2024-02-23 RX ORDER — MAGNESIUM SULFATE IN WATER 40 MG/ML
2000 INJECTION, SOLUTION INTRAVENOUS ONCE
Status: COMPLETED | OUTPATIENT
Start: 2024-02-23 | End: 2024-02-23

## 2024-02-23 RX ORDER — FUROSEMIDE 10 MG/ML
20 INJECTION INTRAMUSCULAR; INTRAVENOUS DAILY
Status: DISCONTINUED | OUTPATIENT
Start: 2024-02-24 | End: 2024-02-25

## 2024-02-23 RX ORDER — SODIUM CHLORIDE 0.9 % (FLUSH) 0.9 %
5-40 SYRINGE (ML) INJECTION PRN
Status: DISCONTINUED | OUTPATIENT
Start: 2024-02-23 | End: 2024-03-07 | Stop reason: HOSPADM

## 2024-02-23 RX ORDER — LANSOPRAZOLE 30 MG/1
30 TABLET, ORALLY DISINTEGRATING, DELAYED RELEASE ORAL
Status: DISCONTINUED | OUTPATIENT
Start: 2024-02-24 | End: 2024-03-07 | Stop reason: HOSPADM

## 2024-02-23 RX ADMIN — Medication 10 ML: at 20:36

## 2024-02-23 RX ADMIN — BUDESONIDE 250 MCG: 0.25 SUSPENSION RESPIRATORY (INHALATION) at 05:19

## 2024-02-23 RX ADMIN — IPRATROPIUM BROMIDE AND ALBUTEROL SULFATE 1 DOSE: .5; 2.5 SOLUTION RESPIRATORY (INHALATION) at 15:35

## 2024-02-23 RX ADMIN — PANTOPRAZOLE SODIUM 40 MG: 40 TABLET, DELAYED RELEASE ORAL at 09:21

## 2024-02-23 RX ADMIN — SODIUM PHOSPHATE, MONOBASIC, MONOHYDRATE AND SODIUM PHOSPHATE, DIBASIC, ANHYDROUS 20 MMOL: 276; 142 INJECTION, SOLUTION INTRAVENOUS at 09:32

## 2024-02-23 RX ADMIN — MIDODRINE HYDROCHLORIDE 15 MG: 5 TABLET ORAL at 09:21

## 2024-02-23 RX ADMIN — BUDESONIDE 250 MCG: 0.25 SUSPENSION RESPIRATORY (INHALATION) at 19:47

## 2024-02-23 RX ADMIN — VANCOMYCIN HYDROCHLORIDE 1000 MG: 1 INJECTION, POWDER, LYOPHILIZED, FOR SOLUTION INTRAVENOUS at 14:56

## 2024-02-23 RX ADMIN — MIDODRINE HYDROCHLORIDE 15 MG: 5 TABLET ORAL at 16:45

## 2024-02-23 RX ADMIN — APIXABAN 10 MG: 5 TABLET, FILM COATED ORAL at 09:21

## 2024-02-23 RX ADMIN — MAGNESIUM SULFATE HEPTAHYDRATE 2000 MG: 40 INJECTION, SOLUTION INTRAVENOUS at 07:03

## 2024-02-23 RX ADMIN — IPRATROPIUM BROMIDE AND ALBUTEROL SULFATE 1 DOSE: .5; 2.5 SOLUTION RESPIRATORY (INHALATION) at 09:03

## 2024-02-23 RX ADMIN — IPRATROPIUM BROMIDE AND ALBUTEROL SULFATE 1 DOSE: .5; 2.5 SOLUTION RESPIRATORY (INHALATION) at 05:19

## 2024-02-23 RX ADMIN — CEFEPIME 2000 MG: 2 INJECTION, POWDER, FOR SOLUTION INTRAVENOUS at 01:13

## 2024-02-23 RX ADMIN — FUROSEMIDE 20 MG: 10 INJECTION, SOLUTION INTRAMUSCULAR; INTRAVENOUS at 09:21

## 2024-02-23 RX ADMIN — CEFEPIME 2000 MG: 2 INJECTION, POWDER, FOR SOLUTION INTRAVENOUS at 14:52

## 2024-02-23 RX ADMIN — ASPIRIN 81 MG CHEWABLE TABLET 81 MG: 81 TABLET CHEWABLE at 09:21

## 2024-02-23 RX ADMIN — APIXABAN 5 MG: 5 TABLET, FILM COATED ORAL at 20:36

## 2024-02-23 RX ADMIN — DOBUTAMINE IN DEXTROSE 5 MCG/KG/MIN: 400 INJECTION, SOLUTION INTRAVENOUS at 16:11

## 2024-02-23 RX ADMIN — IPRATROPIUM BROMIDE AND ALBUTEROL SULFATE 1 DOSE: .5; 2.5 SOLUTION RESPIRATORY (INHALATION) at 19:47

## 2024-02-23 RX ADMIN — SODIUM CHLORIDE, PRESERVATIVE FREE 10 ML: 5 INJECTION INTRAVENOUS at 15:20

## 2024-02-23 RX ADMIN — DOPAMINE HYDROCHLORIDE 2.5 MCG/KG/MIN: 320 INJECTION, SOLUTION INTRAVENOUS at 10:40

## 2024-02-23 NOTE — CARE COORDINATION
2/23/2024 ICU, 11 liters high flow oxygen, Dopamine and Dobutamine gtts. IV Lasix, KPO$. [facility] referral (need orders) to [facility] can accept. Pt from home with plans to return. Has a wheeled walker at home, no oxygen. Lives in single story home with his wife. Pts wife will provide a ride at discharge. CM to follow. Electronically signed by Bere Posey RN-BC on 2/23/2024 at 9:26 AM

## 2024-02-24 LAB
ALBUMIN SERPL-MCNC: 3 G/DL (ref 3.5–5.2)
ALP SERPL-CCNC: 86 U/L (ref 40–129)
ALT SERPL-CCNC: 24 U/L (ref 0–40)
ANION GAP SERPL CALCULATED.3IONS-SCNC: 10 MMOL/L (ref 7–16)
AST SERPL-CCNC: 23 U/L (ref 0–39)
BASOPHILS # BLD: 0 K/UL (ref 0–0.2)
BASOPHILS NFR BLD: 0 % (ref 0–2)
BILIRUB SERPL-MCNC: 2.5 MG/DL (ref 0–1.2)
BUN SERPL-MCNC: 17 MG/DL (ref 6–23)
CALCIUM SERPL-MCNC: 8 MG/DL (ref 8.6–10.2)
CHLORIDE SERPL-SCNC: 102 MMOL/L (ref 98–107)
CO2 SERPL-SCNC: 25 MMOL/L (ref 22–29)
CREAT SERPL-MCNC: 1 MG/DL (ref 0.7–1.2)
EOSINOPHIL # BLD: 0.13 K/UL (ref 0.05–0.5)
EOSINOPHILS RELATIVE PERCENT: 1 % (ref 0–6)
ERYTHROCYTE [DISTWIDTH] IN BLOOD BY AUTOMATED COUNT: 15.9 % (ref 11.5–15)
GFR SERPL CREATININE-BSD FRML MDRD: >60 ML/MIN/1.73M2
GLUCOSE SERPL-MCNC: 109 MG/DL (ref 74–99)
HCT VFR BLD AUTO: 34.1 % (ref 37–54)
HGB BLD-MCNC: 11.6 G/DL (ref 12.5–16.5)
LYMPHOCYTES NFR BLD: 0.26 K/UL (ref 1.5–4)
LYMPHOCYTES RELATIVE PERCENT: 2 % (ref 20–42)
MAGNESIUM SERPL-MCNC: 2 MG/DL (ref 1.6–2.6)
MCH RBC QN AUTO: 31.3 PG (ref 26–35)
MCHC RBC AUTO-ENTMCNC: 34 G/DL (ref 32–34.5)
MCV RBC AUTO: 91.9 FL (ref 80–99.9)
MICROORGANISM SPEC CULT: NORMAL
MONOCYTES NFR BLD: 0.13 K/UL (ref 0.1–0.95)
MONOCYTES NFR BLD: 1 % (ref 2–12)
NEUTROPHILS NFR BLD: 97 % (ref 43–80)
NEUTS SEG NFR BLD: 14.67 K/UL (ref 1.8–7.3)
PARTIAL THROMBOPLASTIN TIME: 33.4 SEC (ref 24.5–35.1)
PHOSPHATE SERPL-MCNC: 2.4 MG/DL (ref 2.5–4.5)
PLATELET, FLUORESCENCE: 129 K/UL (ref 130–450)
PMV BLD AUTO: 10.4 FL (ref 7–12)
POTASSIUM SERPL-SCNC: 3.8 MMOL/L (ref 3.5–5)
PROT SERPL-MCNC: 5.8 G/DL (ref 6.4–8.3)
RBC # BLD AUTO: 3.71 M/UL (ref 3.8–5.8)
RBC # BLD: ABNORMAL 10*6/UL
SERVICE CMNT-IMP: NORMAL
SODIUM SERPL-SCNC: 137 MMOL/L (ref 132–146)
SPECIMEN DESCRIPTION: NORMAL
WBC # BLD: ABNORMAL 10*3/UL
WBC OTHER # BLD: 15.2 K/UL (ref 4.5–11.5)

## 2024-02-24 PROCEDURE — 6360000002 HC RX W HCPCS

## 2024-02-24 PROCEDURE — 99233 SBSQ HOSP IP/OBS HIGH 50: CPT | Performed by: INTERNAL MEDICINE

## 2024-02-24 PROCEDURE — 2580000003 HC RX 258: Performed by: INTERNAL MEDICINE

## 2024-02-24 PROCEDURE — 2500000003 HC RX 250 WO HCPCS

## 2024-02-24 PROCEDURE — 6370000000 HC RX 637 (ALT 250 FOR IP)

## 2024-02-24 PROCEDURE — 83735 ASSAY OF MAGNESIUM: CPT

## 2024-02-24 PROCEDURE — 85025 COMPLETE CBC W/AUTO DIFF WBC: CPT

## 2024-02-24 PROCEDURE — 2580000003 HC RX 258

## 2024-02-24 PROCEDURE — 36592 COLLECT BLOOD FROM PICC: CPT

## 2024-02-24 PROCEDURE — 6370000000 HC RX 637 (ALT 250 FOR IP): Performed by: INTERNAL MEDICINE

## 2024-02-24 PROCEDURE — 2000000000 HC ICU R&B

## 2024-02-24 PROCEDURE — 80053 COMPREHEN METABOLIC PANEL: CPT

## 2024-02-24 PROCEDURE — 99291 CRITICAL CARE FIRST HOUR: CPT | Performed by: INTERNAL MEDICINE

## 2024-02-24 PROCEDURE — 84100 ASSAY OF PHOSPHORUS: CPT

## 2024-02-24 PROCEDURE — 2700000000 HC OXYGEN THERAPY PER DAY

## 2024-02-24 PROCEDURE — 94640 AIRWAY INHALATION TREATMENT: CPT

## 2024-02-24 PROCEDURE — 85730 THROMBOPLASTIN TIME PARTIAL: CPT

## 2024-02-24 PROCEDURE — 6360000002 HC RX W HCPCS: Performed by: INTERNAL MEDICINE

## 2024-02-24 RX ORDER — HEPARIN SODIUM 10000 [USP'U]/100ML
5-30 INJECTION, SOLUTION INTRAVENOUS CONTINUOUS
Status: DISCONTINUED | OUTPATIENT
Start: 2024-02-24 | End: 2024-03-02

## 2024-02-24 RX ORDER — HEPARIN SODIUM 1000 [USP'U]/ML
80 INJECTION, SOLUTION INTRAVENOUS; SUBCUTANEOUS PRN
Status: DISCONTINUED | OUTPATIENT
Start: 2024-02-24 | End: 2024-03-02

## 2024-02-24 RX ORDER — ACETAMINOPHEN 325 MG/1
650 TABLET ORAL EVERY 4 HOURS PRN
Status: DISCONTINUED | OUTPATIENT
Start: 2024-02-24 | End: 2024-03-07 | Stop reason: HOSPADM

## 2024-02-24 RX ORDER — POTASSIUM CHLORIDE 29.8 MG/ML
20 INJECTION INTRAVENOUS ONCE
Status: COMPLETED | OUTPATIENT
Start: 2024-02-24 | End: 2024-02-24

## 2024-02-24 RX ORDER — HEPARIN SODIUM 10000 [USP'U]/100ML
5-30 INJECTION, SOLUTION INTRAVENOUS CONTINUOUS
Status: DISCONTINUED | OUTPATIENT
Start: 2024-02-24 | End: 2024-02-24

## 2024-02-24 RX ORDER — DOPAMINE HYDROCHLORIDE 320 MG/100ML
1-20 INJECTION, SOLUTION INTRAVENOUS CONTINUOUS
Status: DISCONTINUED | OUTPATIENT
Start: 2024-02-24 | End: 2024-02-28

## 2024-02-24 RX ORDER — DOBUTAMINE HYDROCHLORIDE 400 MG/100ML
2.5-1 INJECTION INTRAVENOUS CONTINUOUS
Status: DISCONTINUED | OUTPATIENT
Start: 2024-02-24 | End: 2024-02-28

## 2024-02-24 RX ORDER — HEPARIN SODIUM 1000 [USP'U]/ML
40 INJECTION, SOLUTION INTRAVENOUS; SUBCUTANEOUS PRN
Status: DISCONTINUED | OUTPATIENT
Start: 2024-02-24 | End: 2024-03-02

## 2024-02-24 RX ADMIN — Medication 10 ML: at 08:56

## 2024-02-24 RX ADMIN — BUDESONIDE 250 MCG: 0.25 SUSPENSION RESPIRATORY (INHALATION) at 06:55

## 2024-02-24 RX ADMIN — LANSOPRAZOLE 30 MG: 30 TABLET, ORALLY DISINTEGRATING, DELAYED RELEASE ORAL at 07:21

## 2024-02-24 RX ADMIN — SODIUM PHOSPHATE, MONOBASIC, MONOHYDRATE AND SODIUM PHOSPHATE, DIBASIC, ANHYDROUS 15 MMOL: 142; 276 INJECTION, SOLUTION INTRAVENOUS at 06:38

## 2024-02-24 RX ADMIN — HEPARIN SODIUM AND DEXTROSE 18 UNITS/KG/HR: 10000; 5 INJECTION INTRAVENOUS at 21:02

## 2024-02-24 RX ADMIN — BUDESONIDE 250 MCG: 0.25 SUSPENSION RESPIRATORY (INHALATION) at 18:51

## 2024-02-24 RX ADMIN — ACETAMINOPHEN 650 MG: 325 TABLET ORAL at 00:44

## 2024-02-24 RX ADMIN — ASPIRIN 81 MG CHEWABLE TABLET 81 MG: 81 TABLET CHEWABLE at 08:54

## 2024-02-24 RX ADMIN — Medication 10 ML: at 20:55

## 2024-02-24 RX ADMIN — POTASSIUM CHLORIDE 20 MEQ: 29.8 INJECTION, SOLUTION INTRAVENOUS at 06:38

## 2024-02-24 RX ADMIN — MIDODRINE HYDROCHLORIDE 15 MG: 5 TABLET ORAL at 17:18

## 2024-02-24 RX ADMIN — IPRATROPIUM BROMIDE AND ALBUTEROL SULFATE 1 DOSE: .5; 2.5 SOLUTION RESPIRATORY (INHALATION) at 10:39

## 2024-02-24 RX ADMIN — IPRATROPIUM BROMIDE AND ALBUTEROL SULFATE 1 DOSE: .5; 2.5 SOLUTION RESPIRATORY (INHALATION) at 06:55

## 2024-02-24 RX ADMIN — CEFEPIME 2000 MG: 2 INJECTION, POWDER, FOR SOLUTION INTRAVENOUS at 00:56

## 2024-02-24 RX ADMIN — CEFEPIME 2000 MG: 2 INJECTION, POWDER, FOR SOLUTION INTRAVENOUS at 13:55

## 2024-02-24 RX ADMIN — MIDODRINE HYDROCHLORIDE 15 MG: 5 TABLET ORAL at 07:56

## 2024-02-24 RX ADMIN — HEPARIN SODIUM 5750 UNITS: 1000 INJECTION INTRAVENOUS; SUBCUTANEOUS at 22:12

## 2024-02-24 RX ADMIN — MIDODRINE HYDROCHLORIDE 15 MG: 5 TABLET ORAL at 11:57

## 2024-02-24 RX ADMIN — APIXABAN 5 MG: 5 TABLET, FILM COATED ORAL at 08:54

## 2024-02-24 RX ADMIN — ACETAMINOPHEN 650 MG: 325 TABLET ORAL at 06:46

## 2024-02-24 RX ADMIN — IPRATROPIUM BROMIDE AND ALBUTEROL SULFATE 1 DOSE: .5; 2.5 SOLUTION RESPIRATORY (INHALATION) at 18:50

## 2024-02-24 RX ADMIN — VANCOMYCIN HYDROCHLORIDE 1000 MG: 1 INJECTION, POWDER, LYOPHILIZED, FOR SOLUTION INTRAVENOUS at 08:54

## 2024-02-24 RX ADMIN — IPRATROPIUM BROMIDE AND ALBUTEROL SULFATE 1 DOSE: .5; 2.5 SOLUTION RESPIRATORY (INHALATION) at 14:04

## 2024-02-24 ASSESSMENT — PAIN SCALES - GENERAL
PAINLEVEL_OUTOF10: 0

## 2024-02-25 LAB
ALBUMIN SERPL-MCNC: 2.8 G/DL (ref 3.5–5.2)
ALP SERPL-CCNC: 103 U/L (ref 40–129)
ALT SERPL-CCNC: 20 U/L (ref 0–40)
ANION GAP SERPL CALCULATED.3IONS-SCNC: 10 MMOL/L (ref 7–16)
AST SERPL-CCNC: 22 U/L (ref 0–39)
BACTERIA URNS QL MICRO: ABNORMAL
BASOPHILS # BLD: 0.04 K/UL (ref 0–0.2)
BASOPHILS NFR BLD: 0 % (ref 0–2)
BILIRUB SERPL-MCNC: 1.3 MG/DL (ref 0–1.2)
BILIRUB UR QL STRIP: ABNORMAL
BUN SERPL-MCNC: 18 MG/DL (ref 6–23)
CALCIUM SERPL-MCNC: 7.6 MG/DL (ref 8.6–10.2)
CHLORIDE SERPL-SCNC: 104 MMOL/L (ref 98–107)
CLARITY UR: ABNORMAL
CO2 SERPL-SCNC: 22 MMOL/L (ref 22–29)
COLOR UR: ABNORMAL
CREAT SERPL-MCNC: 0.9 MG/DL (ref 0.7–1.2)
DATE LAST DOSE: NORMAL
EOSINOPHIL # BLD: 0.44 K/UL (ref 0.05–0.5)
EOSINOPHILS RELATIVE PERCENT: 5 % (ref 0–6)
ERYTHROCYTE [DISTWIDTH] IN BLOOD BY AUTOMATED COUNT: 15.8 % (ref 11.5–15)
GFR SERPL CREATININE-BSD FRML MDRD: >60 ML/MIN/1.73M2
GLUCOSE SERPL-MCNC: 122 MG/DL (ref 74–99)
GLUCOSE UR STRIP-MCNC: ABNORMAL MG/DL
HCT VFR BLD AUTO: 32.7 % (ref 37–54)
HGB BLD-MCNC: 11 G/DL (ref 12.5–16.5)
HGB UR QL STRIP.AUTO: ABNORMAL
IMM GRANULOCYTES # BLD AUTO: 0.31 K/UL (ref 0–0.58)
IMM GRANULOCYTES NFR BLD: 3 % (ref 0–5)
KETONES UR STRIP-MCNC: ABNORMAL MG/DL
LEUKOCYTE ESTERASE UR QL STRIP: ABNORMAL
LYMPHOCYTES NFR BLD: 0.79 K/UL (ref 1.5–4)
LYMPHOCYTES RELATIVE PERCENT: 8 % (ref 20–42)
MAGNESIUM SERPL-MCNC: 1.9 MG/DL (ref 1.6–2.6)
MCH RBC QN AUTO: 31 PG (ref 26–35)
MCHC RBC AUTO-ENTMCNC: 33.6 G/DL (ref 32–34.5)
MCV RBC AUTO: 92.1 FL (ref 80–99.9)
MICROORGANISM SPEC CULT: NORMAL
MICROORGANISM SPEC CULT: NORMAL
MONOCYTES NFR BLD: 0.47 K/UL (ref 0.1–0.95)
MONOCYTES NFR BLD: 5 % (ref 2–12)
NEUTROPHILS NFR BLD: 79 % (ref 43–80)
NEUTS SEG NFR BLD: 7.5 K/UL (ref 1.8–7.3)
NITRITE UR QL STRIP: ABNORMAL
PARTIAL THROMBOPLASTIN TIME: 125.5 SEC (ref 24.5–35.1)
PARTIAL THROMBOPLASTIN TIME: 165.4 SEC (ref 24.5–35.1)
PARTIAL THROMBOPLASTIN TIME: 68.1 SEC (ref 24.5–35.1)
PH UR STRIP: ABNORMAL [PH] (ref 5–9)
PHOSPHATE SERPL-MCNC: 2.3 MG/DL (ref 2.5–4.5)
PLATELET # BLD AUTO: 120 K/UL (ref 130–450)
PMV BLD AUTO: 10.9 FL (ref 7–12)
POTASSIUM SERPL-SCNC: 3.6 MMOL/L (ref 3.5–5)
PROT SERPL-MCNC: 5.6 G/DL (ref 6.4–8.3)
PROT UR STRIP-MCNC: ABNORMAL MG/DL
RBC # BLD AUTO: 3.55 M/UL (ref 3.8–5.8)
RBC #/AREA URNS HPF: ABNORMAL /HPF
SERVICE CMNT-IMP: NORMAL
SERVICE CMNT-IMP: NORMAL
SODIUM SERPL-SCNC: 136 MMOL/L (ref 132–146)
SP GR UR STRIP: ABNORMAL (ref 1–1.03)
SPECIMEN DESCRIPTION: NORMAL
SPECIMEN DESCRIPTION: NORMAL
TME LAST DOSE: NORMAL H
UROBILINOGEN UR STRIP-ACNC: ABNORMAL EU/DL (ref 0–1)
VANCOMYCIN DOSE: NORMAL MG
VANCOMYCIN SERPL-MCNC: 15.3 UG/ML (ref 5–40)
WBC #/AREA URNS HPF: ABNORMAL /HPF
WBC OTHER # BLD: 9.6 K/UL (ref 4.5–11.5)

## 2024-02-25 PROCEDURE — 36592 COLLECT BLOOD FROM PICC: CPT

## 2024-02-25 PROCEDURE — 87086 URINE CULTURE/COLONY COUNT: CPT

## 2024-02-25 PROCEDURE — 2580000003 HC RX 258

## 2024-02-25 PROCEDURE — 99233 SBSQ HOSP IP/OBS HIGH 50: CPT | Performed by: INTERNAL MEDICINE

## 2024-02-25 PROCEDURE — 85025 COMPLETE CBC W/AUTO DIFF WBC: CPT

## 2024-02-25 PROCEDURE — 80053 COMPREHEN METABOLIC PANEL: CPT

## 2024-02-25 PROCEDURE — 99291 CRITICAL CARE FIRST HOUR: CPT | Performed by: INTERNAL MEDICINE

## 2024-02-25 PROCEDURE — 6370000000 HC RX 637 (ALT 250 FOR IP)

## 2024-02-25 PROCEDURE — 81001 URINALYSIS AUTO W/SCOPE: CPT

## 2024-02-25 PROCEDURE — 6360000002 HC RX W HCPCS

## 2024-02-25 PROCEDURE — 2500000003 HC RX 250 WO HCPCS

## 2024-02-25 PROCEDURE — 6360000002 HC RX W HCPCS: Performed by: INTERNAL MEDICINE

## 2024-02-25 PROCEDURE — 80202 ASSAY OF VANCOMYCIN: CPT

## 2024-02-25 PROCEDURE — 2580000003 HC RX 258: Performed by: INTERNAL MEDICINE

## 2024-02-25 PROCEDURE — 6370000000 HC RX 637 (ALT 250 FOR IP): Performed by: INTERNAL MEDICINE

## 2024-02-25 PROCEDURE — 2700000000 HC OXYGEN THERAPY PER DAY

## 2024-02-25 PROCEDURE — 83735 ASSAY OF MAGNESIUM: CPT

## 2024-02-25 PROCEDURE — 84100 ASSAY OF PHOSPHORUS: CPT

## 2024-02-25 PROCEDURE — 85730 THROMBOPLASTIN TIME PARTIAL: CPT

## 2024-02-25 PROCEDURE — 2000000000 HC ICU R&B

## 2024-02-25 PROCEDURE — 94640 AIRWAY INHALATION TREATMENT: CPT

## 2024-02-25 RX ORDER — POTASSIUM CHLORIDE 29.8 MG/ML
20 INJECTION INTRAVENOUS ONCE
Status: COMPLETED | OUTPATIENT
Start: 2024-02-25 | End: 2024-02-25

## 2024-02-25 RX ORDER — MAGNESIUM SULFATE IN WATER 40 MG/ML
2000 INJECTION, SOLUTION INTRAVENOUS ONCE
Status: COMPLETED | OUTPATIENT
Start: 2024-02-25 | End: 2024-02-25

## 2024-02-25 RX ORDER — PHENAZOPYRIDINE HYDROCHLORIDE 100 MG/1
200 TABLET, FILM COATED ORAL
Status: DISCONTINUED | OUTPATIENT
Start: 2024-02-25 | End: 2024-03-01

## 2024-02-25 RX ORDER — FUROSEMIDE 10 MG/ML
20 INJECTION INTRAMUSCULAR; INTRAVENOUS DAILY
Status: DISCONTINUED | OUTPATIENT
Start: 2024-02-25 | End: 2024-03-03

## 2024-02-25 RX ORDER — MAGNESIUM SULFATE 1 G/100ML
1000 INJECTION INTRAVENOUS ONCE
Status: COMPLETED | OUTPATIENT
Start: 2024-02-25 | End: 2024-02-25

## 2024-02-25 RX ORDER — PHENAZOPYRIDINE HYDROCHLORIDE 100 MG/1
200 TABLET, FILM COATED ORAL
Status: DISCONTINUED | OUTPATIENT
Start: 2024-02-25 | End: 2024-02-25 | Stop reason: SDUPTHER

## 2024-02-25 RX ADMIN — IPRATROPIUM BROMIDE AND ALBUTEROL SULFATE 1 DOSE: .5; 2.5 SOLUTION RESPIRATORY (INHALATION) at 13:16

## 2024-02-25 RX ADMIN — MIDODRINE HYDROCHLORIDE 15 MG: 5 TABLET ORAL at 07:40

## 2024-02-25 RX ADMIN — MIDODRINE HYDROCHLORIDE 15 MG: 5 TABLET ORAL at 11:27

## 2024-02-25 RX ADMIN — MAGNESIUM SULFATE IN DEXTROSE 1000 MG: 10 INJECTION, SOLUTION INTRAVENOUS at 06:22

## 2024-02-25 RX ADMIN — VANCOMYCIN HYDROCHLORIDE 1000 MG: 1 INJECTION, POWDER, LYOPHILIZED, FOR SOLUTION INTRAVENOUS at 20:48

## 2024-02-25 RX ADMIN — VANCOMYCIN HYDROCHLORIDE 1000 MG: 1 INJECTION, POWDER, LYOPHILIZED, FOR SOLUTION INTRAVENOUS at 03:23

## 2024-02-25 RX ADMIN — BUDESONIDE 250 MCG: 0.25 SUSPENSION RESPIRATORY (INHALATION) at 04:42

## 2024-02-25 RX ADMIN — IPRATROPIUM BROMIDE AND ALBUTEROL SULFATE 1 DOSE: .5; 2.5 SOLUTION RESPIRATORY (INHALATION) at 16:25

## 2024-02-25 RX ADMIN — HEPARIN SODIUM AND DEXTROSE 16 UNITS/KG/HR: 10000; 5 INJECTION INTRAVENOUS at 21:13

## 2024-02-25 RX ADMIN — DOPAMINE HYDROCHLORIDE 5 MCG/KG/MIN: 320 INJECTION, SOLUTION INTRAVENOUS at 07:36

## 2024-02-25 RX ADMIN — ASPIRIN 81 MG CHEWABLE TABLET 81 MG: 81 TABLET CHEWABLE at 08:44

## 2024-02-25 RX ADMIN — Medication 10 ML: at 20:49

## 2024-02-25 RX ADMIN — IPRATROPIUM BROMIDE AND ALBUTEROL SULFATE 1 DOSE: .5; 2.5 SOLUTION RESPIRATORY (INHALATION) at 08:59

## 2024-02-25 RX ADMIN — IPRATROPIUM BROMIDE AND ALBUTEROL SULFATE 1 DOSE: .5; 2.5 SOLUTION RESPIRATORY (INHALATION) at 04:42

## 2024-02-25 RX ADMIN — ACETAMINOPHEN 650 MG: 325 TABLET ORAL at 01:40

## 2024-02-25 RX ADMIN — MAGNESIUM SULFATE HEPTAHYDRATE 2000 MG: 40 INJECTION, SOLUTION INTRAVENOUS at 12:12

## 2024-02-25 RX ADMIN — DOBUTAMINE HYDROCHLORIDE 5 MCG/KG/MIN: 400 INJECTION INTRAVENOUS at 07:40

## 2024-02-25 RX ADMIN — LANSOPRAZOLE 30 MG: 30 TABLET, ORALLY DISINTEGRATING, DELAYED RELEASE ORAL at 06:24

## 2024-02-25 RX ADMIN — CEFEPIME 2000 MG: 2 INJECTION, POWDER, FOR SOLUTION INTRAVENOUS at 01:31

## 2024-02-25 RX ADMIN — FUROSEMIDE 20 MG: 10 INJECTION, SOLUTION INTRAMUSCULAR; INTRAVENOUS at 12:08

## 2024-02-25 RX ADMIN — Medication 3 MG: at 20:56

## 2024-02-25 RX ADMIN — SODIUM PHOSPHATE, MONOBASIC, MONOHYDRATE AND SODIUM PHOSPHATE, DIBASIC, ANHYDROUS 20 MMOL: 276; 142 INJECTION, SOLUTION INTRAVENOUS at 06:19

## 2024-02-25 RX ADMIN — Medication 10 ML: at 08:44

## 2024-02-25 RX ADMIN — POTASSIUM BICARBONATE 40 MEQ: 782 TABLET, EFFERVESCENT ORAL at 12:08

## 2024-02-25 RX ADMIN — POTASSIUM CHLORIDE 20 MEQ: 29.8 INJECTION, SOLUTION INTRAVENOUS at 06:23

## 2024-02-25 RX ADMIN — PHENAZOPYRIDINE 200 MG: 100 TABLET ORAL at 16:38

## 2024-02-25 RX ADMIN — PHENAZOPYRIDINE 200 MG: 100 TABLET ORAL at 04:17

## 2024-02-25 RX ADMIN — BUDESONIDE 250 MCG: 0.25 SUSPENSION RESPIRATORY (INHALATION) at 16:25

## 2024-02-25 RX ADMIN — CEFEPIME 2000 MG: 2 INJECTION, POWDER, FOR SOLUTION INTRAVENOUS at 13:01

## 2024-02-25 RX ADMIN — MIDODRINE HYDROCHLORIDE 15 MG: 5 TABLET ORAL at 16:38

## 2024-02-25 RX ADMIN — PHENAZOPYRIDINE 200 MG: 100 TABLET ORAL at 11:27

## 2024-02-25 RX ADMIN — PHENAZOPYRIDINE 200 MG: 100 TABLET ORAL at 07:41

## 2024-02-25 ASSESSMENT — PAIN DESCRIPTION - LOCATION: LOCATION: HEAD

## 2024-02-25 ASSESSMENT — PAIN SCALES - GENERAL
PAINLEVEL_OUTOF10: 4
PAINLEVEL_OUTOF10: 5

## 2024-02-26 LAB
ALBUMIN SERPL-MCNC: 2.9 G/DL (ref 3.5–5.2)
ALP SERPL-CCNC: 80 U/L (ref 40–129)
ALT SERPL-CCNC: 17 U/L (ref 0–40)
ANION GAP SERPL CALCULATED.3IONS-SCNC: 9 MMOL/L (ref 7–16)
AST SERPL-CCNC: 20 U/L (ref 0–39)
BASOPHILS # BLD: 0.04 K/UL (ref 0–0.2)
BASOPHILS NFR BLD: 0 % (ref 0–2)
BILIRUB SERPL-MCNC: 1.2 MG/DL (ref 0–1.2)
BUN SERPL-MCNC: 14 MG/DL (ref 6–23)
CALCIUM SERPL-MCNC: 7.9 MG/DL (ref 8.6–10.2)
CHLORIDE SERPL-SCNC: 103 MMOL/L (ref 98–107)
CO2 SERPL-SCNC: 25 MMOL/L (ref 22–29)
CREAT SERPL-MCNC: 0.8 MG/DL (ref 0.7–1.2)
EOSINOPHIL # BLD: 0.38 K/UL (ref 0.05–0.5)
EOSINOPHILS RELATIVE PERCENT: 4 % (ref 0–6)
ERYTHROCYTE [DISTWIDTH] IN BLOOD BY AUTOMATED COUNT: 15.8 % (ref 11.5–15)
GFR SERPL CREATININE-BSD FRML MDRD: >60 ML/MIN/1.73M2
GLUCOSE SERPL-MCNC: 94 MG/DL (ref 74–99)
HCT VFR BLD AUTO: 31.2 % (ref 37–54)
HGB BLD-MCNC: 10.5 G/DL (ref 12.5–16.5)
IMM GRANULOCYTES # BLD AUTO: 0.23 K/UL (ref 0–0.58)
IMM GRANULOCYTES NFR BLD: 3 % (ref 0–5)
LYMPHOCYTES NFR BLD: 1.06 K/UL (ref 1.5–4)
LYMPHOCYTES RELATIVE PERCENT: 12 % (ref 20–42)
MAGNESIUM SERPL-MCNC: 2.1 MG/DL (ref 1.6–2.6)
MCH RBC QN AUTO: 31 PG (ref 26–35)
MCHC RBC AUTO-ENTMCNC: 33.7 G/DL (ref 32–34.5)
MCV RBC AUTO: 92 FL (ref 80–99.9)
MICROORGANISM SPEC CULT: NO GROWTH
MONOCYTES NFR BLD: 0.57 K/UL (ref 0.1–0.95)
MONOCYTES NFR BLD: 6 % (ref 2–12)
NEUTROPHILS NFR BLD: 74 % (ref 43–80)
NEUTS SEG NFR BLD: 6.62 K/UL (ref 1.8–7.3)
PARTIAL THROMBOPLASTIN TIME: 93.2 SEC (ref 24.5–35.1)
PARTIAL THROMBOPLASTIN TIME: 99.6 SEC (ref 24.5–35.1)
PHOSPHATE SERPL-MCNC: 2.1 MG/DL (ref 2.5–4.5)
PLATELET, FLUORESCENCE: 133 K/UL (ref 130–450)
PMV BLD AUTO: 10.4 FL (ref 7–12)
POTASSIUM SERPL-SCNC: 4.1 MMOL/L (ref 3.5–5)
PROT SERPL-MCNC: 5.7 G/DL (ref 6.4–8.3)
RBC # BLD AUTO: 3.39 M/UL (ref 3.8–5.8)
SODIUM SERPL-SCNC: 137 MMOL/L (ref 132–146)
SPECIMEN DESCRIPTION: NORMAL
WBC OTHER # BLD: 8.9 K/UL (ref 4.5–11.5)

## 2024-02-26 PROCEDURE — 94640 AIRWAY INHALATION TREATMENT: CPT

## 2024-02-26 PROCEDURE — 97530 THERAPEUTIC ACTIVITIES: CPT

## 2024-02-26 PROCEDURE — 2700000000 HC OXYGEN THERAPY PER DAY

## 2024-02-26 PROCEDURE — 6370000000 HC RX 637 (ALT 250 FOR IP): Performed by: INTERNAL MEDICINE

## 2024-02-26 PROCEDURE — 85730 THROMBOPLASTIN TIME PARTIAL: CPT

## 2024-02-26 PROCEDURE — 99233 SBSQ HOSP IP/OBS HIGH 50: CPT | Performed by: INTERNAL MEDICINE

## 2024-02-26 PROCEDURE — 36592 COLLECT BLOOD FROM PICC: CPT

## 2024-02-26 PROCEDURE — 85025 COMPLETE CBC W/AUTO DIFF WBC: CPT

## 2024-02-26 PROCEDURE — 2000000000 HC ICU R&B

## 2024-02-26 PROCEDURE — 80053 COMPREHEN METABOLIC PANEL: CPT

## 2024-02-26 PROCEDURE — 6360000002 HC RX W HCPCS

## 2024-02-26 PROCEDURE — 88112 CYTOPATH CELL ENHANCE TECH: CPT

## 2024-02-26 PROCEDURE — 36415 COLL VENOUS BLD VENIPUNCTURE: CPT

## 2024-02-26 PROCEDURE — 6370000000 HC RX 637 (ALT 250 FOR IP)

## 2024-02-26 PROCEDURE — 2500000003 HC RX 250 WO HCPCS

## 2024-02-26 PROCEDURE — 2580000003 HC RX 258: Performed by: INTERNAL MEDICINE

## 2024-02-26 PROCEDURE — 83735 ASSAY OF MAGNESIUM: CPT

## 2024-02-26 PROCEDURE — 2580000003 HC RX 258

## 2024-02-26 PROCEDURE — 97110 THERAPEUTIC EXERCISES: CPT

## 2024-02-26 PROCEDURE — 99291 CRITICAL CARE FIRST HOUR: CPT | Performed by: INTERNAL MEDICINE

## 2024-02-26 PROCEDURE — 6360000002 HC RX W HCPCS: Performed by: INTERNAL MEDICINE

## 2024-02-26 PROCEDURE — 84100 ASSAY OF PHOSPHORUS: CPT

## 2024-02-26 RX ADMIN — BUDESONIDE 250 MCG: 0.25 SUSPENSION RESPIRATORY (INHALATION) at 06:35

## 2024-02-26 RX ADMIN — PHENAZOPYRIDINE 200 MG: 100 TABLET ORAL at 17:09

## 2024-02-26 RX ADMIN — LANSOPRAZOLE 30 MG: 30 TABLET, ORALLY DISINTEGRATING, DELAYED RELEASE ORAL at 06:53

## 2024-02-26 RX ADMIN — IPRATROPIUM BROMIDE AND ALBUTEROL SULFATE 1 DOSE: .5; 2.5 SOLUTION RESPIRATORY (INHALATION) at 16:47

## 2024-02-26 RX ADMIN — ASPIRIN 81 MG CHEWABLE TABLET 81 MG: 81 TABLET CHEWABLE at 08:38

## 2024-02-26 RX ADMIN — POTASSIUM BICARBONATE 40 MEQ: 782 TABLET, EFFERVESCENT ORAL at 08:38

## 2024-02-26 RX ADMIN — PHENAZOPYRIDINE 200 MG: 100 TABLET ORAL at 08:38

## 2024-02-26 RX ADMIN — IPRATROPIUM BROMIDE AND ALBUTEROL SULFATE 1 DOSE: .5; 2.5 SOLUTION RESPIRATORY (INHALATION) at 09:50

## 2024-02-26 RX ADMIN — VANCOMYCIN HYDROCHLORIDE 1000 MG: 1 INJECTION, POWDER, LYOPHILIZED, FOR SOLUTION INTRAVENOUS at 13:22

## 2024-02-26 RX ADMIN — MIDODRINE HYDROCHLORIDE 15 MG: 5 TABLET ORAL at 08:38

## 2024-02-26 RX ADMIN — SODIUM PHOSPHATE, MONOBASIC, MONOHYDRATE AND SODIUM PHOSPHATE, DIBASIC, ANHYDROUS 30 MMOL: 276; 142 INJECTION, SOLUTION INTRAVENOUS at 06:53

## 2024-02-26 RX ADMIN — MIDODRINE HYDROCHLORIDE 15 MG: 5 TABLET ORAL at 17:09

## 2024-02-26 RX ADMIN — MIDODRINE HYDROCHLORIDE 15 MG: 5 TABLET ORAL at 11:56

## 2024-02-26 RX ADMIN — Medication 10 ML: at 21:49

## 2024-02-26 RX ADMIN — Medication 10 ML: at 08:44

## 2024-02-26 RX ADMIN — CEFEPIME 2000 MG: 2 INJECTION, POWDER, FOR SOLUTION INTRAVENOUS at 01:33

## 2024-02-26 RX ADMIN — IPRATROPIUM BROMIDE AND ALBUTEROL SULFATE 1 DOSE: .5; 2.5 SOLUTION RESPIRATORY (INHALATION) at 13:59

## 2024-02-26 RX ADMIN — IPRATROPIUM BROMIDE AND ALBUTEROL SULFATE 1 DOSE: .5; 2.5 SOLUTION RESPIRATORY (INHALATION) at 06:35

## 2024-02-26 RX ADMIN — BUDESONIDE 250 MCG: 0.25 SUSPENSION RESPIRATORY (INHALATION) at 16:47

## 2024-02-26 RX ADMIN — HEPARIN SODIUM AND DEXTROSE 16 UNITS/KG/HR: 10000; 5 INJECTION INTRAVENOUS at 12:02

## 2024-02-26 ASSESSMENT — PAIN SCALES - GENERAL: PAINLEVEL_OUTOF10: 0

## 2024-02-26 NOTE — CARE COORDINATION
2/26/2024 ICU, Dobutamine, Dopamine gtt, Heparin gtt. Lasix, Vanco, Maxipime IV. [facility]IAZ likely needed at discharge.Recommendations for [facility]-at discharge. Will need reviewed with family/pt closer to being out of icu. [facility] referral (need orders) to [facility]idi with [place] can accept. Pt from home with plans to return. Has a wheeled walker at home, no oxygen. Lives in single story home with his wife. Pts wife will provide a ride at discharge. Sunday- Code status Limited All NO's. Cm following. Electronically signed by Bere Posey RN-BC on 2/26/2024 at 12:49 PM

## 2024-02-27 ENCOUNTER — APPOINTMENT (OUTPATIENT)
Dept: GENERAL RADIOLOGY | Age: 79
DRG: 871 | End: 2024-02-27
Payer: MEDICARE

## 2024-02-27 ENCOUNTER — APPOINTMENT (OUTPATIENT)
Dept: ULTRASOUND IMAGING | Age: 79
DRG: 871 | End: 2024-02-27
Payer: MEDICARE

## 2024-02-27 ENCOUNTER — APPOINTMENT (OUTPATIENT)
Dept: CT IMAGING | Age: 79
DRG: 871 | End: 2024-02-27
Payer: MEDICARE

## 2024-02-27 LAB
ALBUMIN SERPL-MCNC: 3 G/DL (ref 3.5–5.2)
ALP SERPL-CCNC: 109 U/L (ref 40–129)
ALT SERPL-CCNC: 18 U/L (ref 0–40)
ANION GAP SERPL CALCULATED.3IONS-SCNC: 10 MMOL/L (ref 7–16)
AST SERPL-CCNC: 24 U/L (ref 0–39)
BASOPHILS # BLD: 0.08 K/UL (ref 0–0.2)
BASOPHILS NFR BLD: 1 % (ref 0–2)
BILIRUB SERPL-MCNC: 1.3 MG/DL (ref 0–1.2)
BUN SERPL-MCNC: 14 MG/DL (ref 6–23)
CALCIUM SERPL-MCNC: 8.2 MG/DL (ref 8.6–10.2)
CHLORIDE SERPL-SCNC: 100 MMOL/L (ref 98–107)
CO2 SERPL-SCNC: 24 MMOL/L (ref 22–29)
CREAT SERPL-MCNC: 0.9 MG/DL (ref 0.7–1.2)
EOSINOPHIL # BLD: 0.24 K/UL (ref 0.05–0.5)
EOSINOPHILS RELATIVE PERCENT: 3 % (ref 0–6)
ERYTHROCYTE [DISTWIDTH] IN BLOOD BY AUTOMATED COUNT: 15.5 % (ref 11.5–15)
GFR SERPL CREATININE-BSD FRML MDRD: >60 ML/MIN/1.73M2
GLUCOSE SERPL-MCNC: 93 MG/DL (ref 74–99)
HCT VFR BLD AUTO: 27.3 % (ref 37–54)
HCT VFR BLD AUTO: 28.6 % (ref 37–54)
HCT VFR BLD AUTO: 29 % (ref 37–54)
HGB BLD-MCNC: 10 G/DL (ref 12.5–16.5)
HGB BLD-MCNC: 9.4 G/DL (ref 12.5–16.5)
HGB BLD-MCNC: 9.7 G/DL (ref 12.5–16.5)
LYMPHOCYTES NFR BLD: 1.44 K/UL (ref 1.5–4)
LYMPHOCYTES RELATIVE PERCENT: 16 % (ref 20–42)
MAGNESIUM SERPL-MCNC: 2 MG/DL (ref 1.6–2.6)
MCH RBC QN AUTO: 31.4 PG (ref 26–35)
MCHC RBC AUTO-ENTMCNC: 34.5 G/DL (ref 32–34.5)
MCV RBC AUTO: 91.2 FL (ref 80–99.9)
MONOCYTES NFR BLD: 0.4 K/UL (ref 0.1–0.95)
MONOCYTES NFR BLD: 4 % (ref 2–12)
NEUTROPHILS NFR BLD: 76 % (ref 43–80)
NEUTS SEG NFR BLD: 6.94 K/UL (ref 1.8–7.3)
PARTIAL THROMBOPLASTIN TIME: 89.5 SEC (ref 24.5–35.1)
PHOSPHATE SERPL-MCNC: 2.7 MG/DL (ref 2.5–4.5)
PLATELET, FLUORESCENCE: 138 K/UL (ref 130–450)
PMV BLD AUTO: 10.4 FL (ref 7–12)
POTASSIUM SERPL-SCNC: 4.2 MMOL/L (ref 3.5–5)
PROT SERPL-MCNC: 5.9 G/DL (ref 6.4–8.3)
RBC # BLD AUTO: 3.18 M/UL (ref 3.8–5.8)
RBC # BLD: ABNORMAL 10*6/UL
SODIUM SERPL-SCNC: 134 MMOL/L (ref 132–146)
WBC OTHER # BLD: 9.1 K/UL (ref 4.5–11.5)

## 2024-02-27 PROCEDURE — 6370000000 HC RX 637 (ALT 250 FOR IP)

## 2024-02-27 PROCEDURE — 83735 ASSAY OF MAGNESIUM: CPT

## 2024-02-27 PROCEDURE — 85025 COMPLETE CBC W/AUTO DIFF WBC: CPT

## 2024-02-27 PROCEDURE — 94640 AIRWAY INHALATION TREATMENT: CPT

## 2024-02-27 PROCEDURE — 2700000000 HC OXYGEN THERAPY PER DAY

## 2024-02-27 PROCEDURE — 6360000002 HC RX W HCPCS

## 2024-02-27 PROCEDURE — 2580000003 HC RX 258: Performed by: INTERNAL MEDICINE

## 2024-02-27 PROCEDURE — 80053 COMPREHEN METABOLIC PANEL: CPT

## 2024-02-27 PROCEDURE — 71045 X-RAY EXAM CHEST 1 VIEW: CPT

## 2024-02-27 PROCEDURE — 73501 X-RAY EXAM HIP UNI 1 VIEW: CPT

## 2024-02-27 PROCEDURE — 84100 ASSAY OF PHOSPHORUS: CPT

## 2024-02-27 PROCEDURE — 85018 HEMOGLOBIN: CPT

## 2024-02-27 PROCEDURE — 85730 THROMBOPLASTIN TIME PARTIAL: CPT

## 2024-02-27 PROCEDURE — 74177 CT ABD & PELVIS W/CONTRAST: CPT

## 2024-02-27 PROCEDURE — 6360000004 HC RX CONTRAST MEDICATION: Performed by: RADIOLOGY

## 2024-02-27 PROCEDURE — 6360000002 HC RX W HCPCS: Performed by: INTERNAL MEDICINE

## 2024-02-27 PROCEDURE — 76882 US LMTD JT/FCL EVL NVASC XTR: CPT

## 2024-02-27 PROCEDURE — 6370000000 HC RX 637 (ALT 250 FOR IP): Performed by: INTERNAL MEDICINE

## 2024-02-27 PROCEDURE — 99233 SBSQ HOSP IP/OBS HIGH 50: CPT | Performed by: INTERNAL MEDICINE

## 2024-02-27 PROCEDURE — 85014 HEMATOCRIT: CPT

## 2024-02-27 PROCEDURE — 73700 CT LOWER EXTREMITY W/O DYE: CPT

## 2024-02-27 PROCEDURE — 97110 THERAPEUTIC EXERCISES: CPT

## 2024-02-27 PROCEDURE — 2000000000 HC ICU R&B

## 2024-02-27 RX ORDER — HYDROMORPHONE HYDROCHLORIDE 1 MG/ML
0.5 INJECTION, SOLUTION INTRAMUSCULAR; INTRAVENOUS; SUBCUTANEOUS ONCE
Status: COMPLETED | OUTPATIENT
Start: 2024-02-27 | End: 2024-02-27

## 2024-02-27 RX ORDER — DEXTROSE, SODIUM CHLORIDE, SODIUM LACTATE, POTASSIUM CHLORIDE, AND CALCIUM CHLORIDE 5; .6; .31; .03; .02 G/100ML; G/100ML; G/100ML; G/100ML; G/100ML
INJECTION, SOLUTION INTRAVENOUS CONTINUOUS
Status: DISCONTINUED | OUTPATIENT
Start: 2024-02-27 | End: 2024-03-03

## 2024-02-27 RX ORDER — FENTANYL CITRATE 0.05 MG/ML
50 INJECTION, SOLUTION INTRAMUSCULAR; INTRAVENOUS ONCE
Status: COMPLETED | OUTPATIENT
Start: 2024-02-27 | End: 2024-02-27

## 2024-02-27 RX ORDER — HYDROCODONE BITARTRATE AND ACETAMINOPHEN 5; 325 MG/1; MG/1
1 TABLET ORAL ONCE
Status: COMPLETED | OUTPATIENT
Start: 2024-02-27 | End: 2024-02-27

## 2024-02-27 RX ORDER — KETOROLAC TROMETHAMINE 15 MG/ML
15 INJECTION, SOLUTION INTRAMUSCULAR; INTRAVENOUS ONCE
Status: COMPLETED | OUTPATIENT
Start: 2024-02-27 | End: 2024-02-27

## 2024-02-27 RX ORDER — MECOBALAMIN 5000 MCG
10 TABLET,DISINTEGRATING ORAL ONCE
Status: COMPLETED | OUTPATIENT
Start: 2024-02-27 | End: 2024-02-27

## 2024-02-27 RX ORDER — HYDROMORPHONE HYDROCHLORIDE 1 MG/ML
1 INJECTION, SOLUTION INTRAMUSCULAR; INTRAVENOUS; SUBCUTANEOUS ONCE
Status: COMPLETED | OUTPATIENT
Start: 2024-02-27 | End: 2024-02-27

## 2024-02-27 RX ORDER — MORPHINE SULFATE 2 MG/ML
1 INJECTION, SOLUTION INTRAMUSCULAR; INTRAVENOUS EVERY 4 HOURS PRN
Status: DISCONTINUED | OUTPATIENT
Start: 2024-02-27 | End: 2024-02-28

## 2024-02-27 RX ORDER — SODIUM CHLORIDE, SODIUM LACTATE, POTASSIUM CHLORIDE, CALCIUM CHLORIDE 600; 310; 30; 20 MG/100ML; MG/100ML; MG/100ML; MG/100ML
INJECTION, SOLUTION INTRAVENOUS CONTINUOUS
Status: DISCONTINUED | OUTPATIENT
Start: 2024-02-27 | End: 2024-02-27

## 2024-02-27 RX ADMIN — SODIUM CHLORIDE, POTASSIUM CHLORIDE, SODIUM LACTATE AND CALCIUM CHLORIDE: 600; 310; 30; 20 INJECTION, SOLUTION INTRAVENOUS at 10:50

## 2024-02-27 RX ADMIN — BUDESONIDE 250 MCG: 0.25 SUSPENSION RESPIRATORY (INHALATION) at 18:09

## 2024-02-27 RX ADMIN — IOPAMIDOL 75 ML: 755 INJECTION, SOLUTION INTRAVENOUS at 11:08

## 2024-02-27 RX ADMIN — PHENAZOPYRIDINE 200 MG: 100 TABLET ORAL at 08:50

## 2024-02-27 RX ADMIN — POTASSIUM BICARBONATE 40 MEQ: 782 TABLET, EFFERVESCENT ORAL at 08:55

## 2024-02-27 RX ADMIN — IPRATROPIUM BROMIDE AND ALBUTEROL SULFATE 1 DOSE: .5; 2.5 SOLUTION RESPIRATORY (INHALATION) at 06:18

## 2024-02-27 RX ADMIN — SODIUM CHLORIDE, SODIUM LACTATE, POTASSIUM CHLORIDE, CALCIUM CHLORIDE AND DEXTROSE MONOHYDRATE: 5; 600; 310; 30; 20 INJECTION, SOLUTION INTRAVENOUS at 13:33

## 2024-02-27 RX ADMIN — FENTANYL CITRATE 50 MCG: 0.05 INJECTION, SOLUTION INTRAMUSCULAR; INTRAVENOUS at 04:15

## 2024-02-27 RX ADMIN — Medication 10 ML: at 20:56

## 2024-02-27 RX ADMIN — HYDROMORPHONE HYDROCHLORIDE 1 MG: 1 INJECTION, SOLUTION INTRAMUSCULAR; INTRAVENOUS; SUBCUTANEOUS at 10:45

## 2024-02-27 RX ADMIN — IPRATROPIUM BROMIDE AND ALBUTEROL SULFATE 1 DOSE: .5; 2.5 SOLUTION RESPIRATORY (INHALATION) at 15:22

## 2024-02-27 RX ADMIN — Medication 10 MG: at 02:47

## 2024-02-27 RX ADMIN — LANSOPRAZOLE 30 MG: 30 TABLET, ORALLY DISINTEGRATING, DELAYED RELEASE ORAL at 08:51

## 2024-02-27 RX ADMIN — BUDESONIDE 250 MCG: 0.25 SUSPENSION RESPIRATORY (INHALATION) at 06:18

## 2024-02-27 RX ADMIN — Medication 3 MG: at 20:56

## 2024-02-27 RX ADMIN — MORPHINE SULFATE 1 MG: 2 INJECTION, SOLUTION INTRAMUSCULAR; INTRAVENOUS at 20:56

## 2024-02-27 RX ADMIN — KETOROLAC TROMETHAMINE 15 MG: 15 INJECTION, SOLUTION INTRAMUSCULAR; INTRAVENOUS at 08:43

## 2024-02-27 RX ADMIN — IPRATROPIUM BROMIDE AND ALBUTEROL SULFATE 1 DOSE: .5; 2.5 SOLUTION RESPIRATORY (INHALATION) at 09:59

## 2024-02-27 RX ADMIN — HYDROMORPHONE HYDROCHLORIDE 0.5 MG: 1 INJECTION, SOLUTION INTRAMUSCULAR; INTRAVENOUS; SUBCUTANEOUS at 06:17

## 2024-02-27 RX ADMIN — FUROSEMIDE 20 MG: 10 INJECTION, SOLUTION INTRAMUSCULAR; INTRAVENOUS at 08:57

## 2024-02-27 RX ADMIN — Medication 3 MG: at 01:28

## 2024-02-27 RX ADMIN — VANCOMYCIN HYDROCHLORIDE 1000 MG: 1 INJECTION, POWDER, LYOPHILIZED, FOR SOLUTION INTRAVENOUS at 09:16

## 2024-02-27 RX ADMIN — HYDROCODONE BITARTRATE AND ACETAMINOPHEN 1 TABLET: 5; 325 TABLET ORAL at 02:47

## 2024-02-27 RX ADMIN — IPRATROPIUM BROMIDE AND ALBUTEROL SULFATE 1 DOSE: .5; 2.5 SOLUTION RESPIRATORY (INHALATION) at 18:09

## 2024-02-27 RX ADMIN — Medication 10 ML: at 09:06

## 2024-02-27 RX ADMIN — ASPIRIN 81 MG CHEWABLE TABLET 81 MG: 81 TABLET CHEWABLE at 08:51

## 2024-02-27 ASSESSMENT — PAIN DESCRIPTION - ORIENTATION
ORIENTATION: LEFT
ORIENTATION: LEFT
ORIENTATION: LEFT;UPPER
ORIENTATION: LEFT
ORIENTATION: LOWER

## 2024-02-27 ASSESSMENT — PAIN - FUNCTIONAL ASSESSMENT
PAIN_FUNCTIONAL_ASSESSMENT: ACTIVITIES ARE NOT PREVENTED

## 2024-02-27 ASSESSMENT — PAIN SCALES - GENERAL
PAINLEVEL_OUTOF10: 1
PAINLEVEL_OUTOF10: 5
PAINLEVEL_OUTOF10: 9
PAINLEVEL_OUTOF10: 4
PAINLEVEL_OUTOF10: 5
PAINLEVEL_OUTOF10: 4
PAINLEVEL_OUTOF10: 1
PAINLEVEL_OUTOF10: 9
PAINLEVEL_OUTOF10: 10
PAINLEVEL_OUTOF10: 5
PAINLEVEL_OUTOF10: 1
PAINLEVEL_OUTOF10: 8
PAINLEVEL_OUTOF10: 9
PAINLEVEL_OUTOF10: 6

## 2024-02-27 ASSESSMENT — PAIN DESCRIPTION - DESCRIPTORS
DESCRIPTORS: STABBING
DESCRIPTORS: STABBING
DESCRIPTORS: SHARP
DESCRIPTORS: SHARP
DESCRIPTORS: STABBING
DESCRIPTORS: ACHING;DISCOMFORT;SORE

## 2024-02-27 ASSESSMENT — PAIN DESCRIPTION - LOCATION
LOCATION: GROIN
LOCATION: GROIN
LOCATION: HIP
LOCATION: ABDOMEN;GROIN
LOCATION: GROIN;LEG
LOCATION: GROIN
LOCATION: HIP

## 2024-02-27 NOTE — CARE COORDINATION
2/27/2024 ICU, Dobutamine gtt. Recently off.. Lasix, Vanco. Pt with left hip pain- was on heparin gtt previously. Pt with hematoma, pain medication given as requested. SNF likely needed at discharge.  [facility] (N) referral to [facility]. PRECERT needed, Signed [facility] and [facility] if SNF at discharge. Alternate SNF choice [facility] (L), [facility]-([facility] off today) to follow.  IF HOME- [facility] referral (need orders) to [facility] can accept, she is following. Pt is limited all NO's code status. Wife at pts bedside. Cm to follow. Electronically signed by JOSE Argueta  on 2/27/2024 at 1:07 PM      The Plan for Transition of Care is related to the following treatment goals: SNF    The Patient and/or patient representative wife [place] was provided with a choice of provider and agrees   with the discharge plan. [x] Yes [] No    Freedom of choice list was provided with basic dialogue that supports the patient's individualized plan of care/goals, treatment preferences and shares the quality data associated with the providers. [x] Yes [] No      Electronically signed by JOSE Argueta on 2/27/2024 at 3:26 PM

## 2024-02-28 ENCOUNTER — APPOINTMENT (OUTPATIENT)
Dept: GENERAL RADIOLOGY | Age: 79
DRG: 871 | End: 2024-02-28
Payer: MEDICARE

## 2024-02-28 LAB
ALBUMIN SERPL-MCNC: 3 G/DL (ref 3.5–5.2)
ALP SERPL-CCNC: 94 U/L (ref 40–129)
ALT SERPL-CCNC: 23 U/L (ref 0–40)
ANION GAP SERPL CALCULATED.3IONS-SCNC: 9 MMOL/L (ref 7–16)
AST SERPL-CCNC: 45 U/L (ref 0–39)
BASOPHILS # BLD: 0 K/UL (ref 0–0.2)
BASOPHILS NFR BLD: 0 % (ref 0–2)
BILIRUB SERPL-MCNC: 1.3 MG/DL (ref 0–1.2)
BUN SERPL-MCNC: 12 MG/DL (ref 6–23)
CALCIUM SERPL-MCNC: 8.2 MG/DL (ref 8.6–10.2)
CHLORIDE SERPL-SCNC: 101 MMOL/L (ref 98–107)
CO2 SERPL-SCNC: 26 MMOL/L (ref 22–29)
CREAT SERPL-MCNC: 0.9 MG/DL (ref 0.7–1.2)
DATE LAST DOSE: ABNORMAL
EOSINOPHIL # BLD: 0.61 K/UL (ref 0.05–0.5)
EOSINOPHILS RELATIVE PERCENT: 4 % (ref 0–6)
ERYTHROCYTE [DISTWIDTH] IN BLOOD BY AUTOMATED COUNT: 15.6 % (ref 11.5–15)
GFR SERPL CREATININE-BSD FRML MDRD: >60 ML/MIN/1.73M2
GLUCOSE SERPL-MCNC: 104 MG/DL (ref 74–99)
HCT VFR BLD AUTO: 26.4 % (ref 37–54)
HCT VFR BLD AUTO: 27.3 % (ref 37–54)
HGB BLD-MCNC: 8.7 G/DL (ref 12.5–16.5)
HGB BLD-MCNC: 9.3 G/DL (ref 12.5–16.5)
LYMPHOCYTES NFR BLD: 0.74 K/UL (ref 1.5–4)
LYMPHOCYTES RELATIVE PERCENT: 5 % (ref 20–42)
MAGNESIUM SERPL-MCNC: 1.9 MG/DL (ref 1.6–2.6)
MCH RBC QN AUTO: 31.5 PG (ref 26–35)
MCHC RBC AUTO-ENTMCNC: 34.1 G/DL (ref 32–34.5)
MCV RBC AUTO: 92.5 FL (ref 80–99.9)
METAMYELOCYTES ABSOLUTE COUNT: 0.25 K/UL (ref 0–0.12)
METAMYELOCYTES: 2 % (ref 0–1)
MONOCYTES NFR BLD: 0.49 K/UL (ref 0.1–0.95)
MONOCYTES NFR BLD: 4 % (ref 2–12)
MYELOCYTES ABSOLUTE COUNT: 0.12 K/UL
MYELOCYTES: 1 %
NEUTROPHILS NFR BLD: 84 % (ref 43–80)
NEUTS SEG NFR BLD: 11.79 K/UL (ref 1.8–7.3)
NUCLEATED RED BLOOD CELLS: 1 PER 100 WBC
PARTIAL THROMBOPLASTIN TIME: 32.4 SEC (ref 24.5–35.1)
PHOSPHATE SERPL-MCNC: 2.7 MG/DL (ref 2.5–4.5)
PLATELET # BLD AUTO: 154 K/UL (ref 130–450)
PMV BLD AUTO: 10.8 FL (ref 7–12)
POTASSIUM SERPL-SCNC: 4.4 MMOL/L (ref 3.5–5)
PROT SERPL-MCNC: 6.1 G/DL (ref 6.4–8.3)
RBC # BLD AUTO: 2.95 M/UL (ref 3.8–5.8)
RBC # BLD: ABNORMAL 10*6/UL
SODIUM SERPL-SCNC: 136 MMOL/L (ref 132–146)
TME LAST DOSE: ABNORMAL H
VANCOMYCIN DOSE: ABNORMAL MG
VANCOMYCIN TROUGH SERPL-MCNC: 18 UG/ML (ref 5–16)
WBC OTHER # BLD: 14 K/UL (ref 4.5–11.5)

## 2024-02-28 PROCEDURE — 85730 THROMBOPLASTIN TIME PARTIAL: CPT

## 2024-02-28 PROCEDURE — 71045 X-RAY EXAM CHEST 1 VIEW: CPT

## 2024-02-28 PROCEDURE — 6370000000 HC RX 637 (ALT 250 FOR IP)

## 2024-02-28 PROCEDURE — 83735 ASSAY OF MAGNESIUM: CPT

## 2024-02-28 PROCEDURE — 94640 AIRWAY INHALATION TREATMENT: CPT

## 2024-02-28 PROCEDURE — 80202 ASSAY OF VANCOMYCIN: CPT

## 2024-02-28 PROCEDURE — 84100 ASSAY OF PHOSPHORUS: CPT

## 2024-02-28 PROCEDURE — 6360000002 HC RX W HCPCS: Performed by: INTERNAL MEDICINE

## 2024-02-28 PROCEDURE — 2580000003 HC RX 258: Performed by: INTERNAL MEDICINE

## 2024-02-28 PROCEDURE — 99233 SBSQ HOSP IP/OBS HIGH 50: CPT | Performed by: INTERNAL MEDICINE

## 2024-02-28 PROCEDURE — 6370000000 HC RX 637 (ALT 250 FOR IP): Performed by: INTERNAL MEDICINE

## 2024-02-28 PROCEDURE — 36592 COLLECT BLOOD FROM PICC: CPT

## 2024-02-28 PROCEDURE — 85018 HEMOGLOBIN: CPT

## 2024-02-28 PROCEDURE — 36415 COLL VENOUS BLD VENIPUNCTURE: CPT

## 2024-02-28 PROCEDURE — 6360000002 HC RX W HCPCS

## 2024-02-28 PROCEDURE — 2700000000 HC OXYGEN THERAPY PER DAY

## 2024-02-28 PROCEDURE — 85025 COMPLETE CBC W/AUTO DIFF WBC: CPT

## 2024-02-28 PROCEDURE — 80053 COMPREHEN METABOLIC PANEL: CPT

## 2024-02-28 PROCEDURE — 2000000000 HC ICU R&B

## 2024-02-28 PROCEDURE — 85014 HEMATOCRIT: CPT

## 2024-02-28 RX ORDER — HYDROMORPHONE HYDROCHLORIDE 1 MG/ML
0.5 INJECTION, SOLUTION INTRAMUSCULAR; INTRAVENOUS; SUBCUTANEOUS EVERY 4 HOURS PRN
Status: DISCONTINUED | OUTPATIENT
Start: 2024-02-28 | End: 2024-02-29

## 2024-02-28 RX ORDER — METOPROLOL SUCCINATE 25 MG/1
12.5 TABLET, EXTENDED RELEASE ORAL DAILY
Status: DISCONTINUED | OUTPATIENT
Start: 2024-02-28 | End: 2024-03-07 | Stop reason: HOSPADM

## 2024-02-28 RX ADMIN — FUROSEMIDE 20 MG: 10 INJECTION, SOLUTION INTRAMUSCULAR; INTRAVENOUS at 07:52

## 2024-02-28 RX ADMIN — METOPROLOL SUCCINATE 12.5 MG: 25 TABLET, EXTENDED RELEASE ORAL at 12:12

## 2024-02-28 RX ADMIN — POTASSIUM BICARBONATE 40 MEQ: 782 TABLET, EFFERVESCENT ORAL at 07:53

## 2024-02-28 RX ADMIN — CARBIDOPA AND LEVODOPA 0.5 TABLET: 25; 100 TABLET ORAL at 21:30

## 2024-02-28 RX ADMIN — PHENAZOPYRIDINE 200 MG: 100 TABLET ORAL at 07:52

## 2024-02-28 RX ADMIN — PHENAZOPYRIDINE 200 MG: 100 TABLET ORAL at 12:12

## 2024-02-28 RX ADMIN — BUDESONIDE 250 MCG: 0.25 SUSPENSION RESPIRATORY (INHALATION) at 17:35

## 2024-02-28 RX ADMIN — IPRATROPIUM BROMIDE AND ALBUTEROL SULFATE 1 DOSE: .5; 2.5 SOLUTION RESPIRATORY (INHALATION) at 07:03

## 2024-02-28 RX ADMIN — Medication 10 ML: at 21:34

## 2024-02-28 RX ADMIN — EMPAGLIFLOZIN 10 MG: 10 TABLET, FILM COATED ORAL at 15:16

## 2024-02-28 RX ADMIN — Medication 10 ML: at 07:53

## 2024-02-28 RX ADMIN — VANCOMYCIN HYDROCHLORIDE 1250 MG: 10 INJECTION, POWDER, LYOPHILIZED, FOR SOLUTION INTRAVENOUS at 21:33

## 2024-02-28 RX ADMIN — SODIUM CHLORIDE, SODIUM LACTATE, POTASSIUM CHLORIDE, CALCIUM CHLORIDE AND DEXTROSE MONOHYDRATE: 5; 600; 310; 30; 20 INJECTION, SOLUTION INTRAVENOUS at 10:03

## 2024-02-28 RX ADMIN — CARBIDOPA AND LEVODOPA 0.5 TABLET: 25; 100 TABLET ORAL at 15:15

## 2024-02-28 RX ADMIN — MORPHINE SULFATE 1 MG: 2 INJECTION, SOLUTION INTRAMUSCULAR; INTRAVENOUS at 08:13

## 2024-02-28 RX ADMIN — IPRATROPIUM BROMIDE AND ALBUTEROL SULFATE 1 DOSE: .5; 2.5 SOLUTION RESPIRATORY (INHALATION) at 17:34

## 2024-02-28 RX ADMIN — LANSOPRAZOLE 30 MG: 30 TABLET, ORALLY DISINTEGRATING, DELAYED RELEASE ORAL at 06:46

## 2024-02-28 RX ADMIN — HYDROMORPHONE HYDROCHLORIDE 0.5 MG: 1 INJECTION, SOLUTION INTRAMUSCULAR; INTRAVENOUS; SUBCUTANEOUS at 15:16

## 2024-02-28 RX ADMIN — VANCOMYCIN HYDROCHLORIDE 1000 MG: 1 INJECTION, POWDER, LYOPHILIZED, FOR SOLUTION INTRAVENOUS at 03:37

## 2024-02-28 RX ADMIN — IPRATROPIUM BROMIDE AND ALBUTEROL SULFATE 1 DOSE: .5; 2.5 SOLUTION RESPIRATORY (INHALATION) at 13:28

## 2024-02-28 RX ADMIN — MORPHINE SULFATE 1 MG: 2 INJECTION, SOLUTION INTRAMUSCULAR; INTRAVENOUS at 02:44

## 2024-02-28 RX ADMIN — BUDESONIDE 250 MCG: 0.25 SUSPENSION RESPIRATORY (INHALATION) at 07:05

## 2024-02-28 RX ADMIN — IPRATROPIUM BROMIDE AND ALBUTEROL SULFATE 1 DOSE: .5; 2.5 SOLUTION RESPIRATORY (INHALATION) at 10:06

## 2024-02-28 ASSESSMENT — PAIN SCALES - GENERAL
PAINLEVEL_OUTOF10: 10
PAINLEVEL_OUTOF10: 6
PAINLEVEL_OUTOF10: 8
PAINLEVEL_OUTOF10: 6
PAINLEVEL_OUTOF10: 0

## 2024-02-28 ASSESSMENT — PAIN DESCRIPTION - ORIENTATION
ORIENTATION: LEFT;LOWER
ORIENTATION: LOWER
ORIENTATION: LEFT;LOWER

## 2024-02-28 ASSESSMENT — PAIN DESCRIPTION - LOCATION
LOCATION: ABDOMEN;FLANK
LOCATION: ABDOMEN
LOCATION: ABDOMEN

## 2024-02-28 ASSESSMENT — PAIN DESCRIPTION - DESCRIPTORS
DESCRIPTORS: ACHING
DESCRIPTORS: ACHING
DESCRIPTORS: ACHING;JABBING;DISCOMFORT

## 2024-02-28 ASSESSMENT — PAIN - FUNCTIONAL ASSESSMENT
PAIN_FUNCTIONAL_ASSESSMENT: PREVENTS OR INTERFERES SOME ACTIVE ACTIVITIES AND ADLS
PAIN_FUNCTIONAL_ASSESSMENT: PREVENTS OR INTERFERES SOME ACTIVE ACTIVITIES AND ADLS

## 2024-02-28 NOTE — CARE COORDINATION
2/28/2024 ICU, 5 liters nc. IV LR, Lasix, Vanco. [facility] (N) referral to [facility]. PRECERT needed, Signed [facility] and [facility] if SNF at discharge. Alternate SNF choice [facility] (L), [facility] to follow.  IF HOME- [facility] referral (need orders) to [facility] can accept, she is following. Pt is limited all No's. Cm following. Electronically signed by Bere Posey RN-BC on 2/28/2024 at 3:09 PM

## 2024-02-29 ENCOUNTER — APPOINTMENT (OUTPATIENT)
Dept: GENERAL RADIOLOGY | Age: 79
DRG: 871 | End: 2024-02-29
Payer: MEDICARE

## 2024-02-29 PROBLEM — I50.43 ACUTE ON CHRONIC COMBINED SYSTOLIC AND DIASTOLIC CONGESTIVE HEART FAILURE (HCC): Status: ACTIVE | Noted: 2024-02-29

## 2024-02-29 PROBLEM — I25.5 ISCHEMIC CARDIOMYOPATHY: Status: ACTIVE | Noted: 2024-02-29

## 2024-02-29 LAB
ALBUMIN SERPL-MCNC: 3 G/DL (ref 3.5–5.2)
ALP SERPL-CCNC: 111 U/L (ref 40–129)
ALT SERPL-CCNC: 8 U/L (ref 0–40)
ANION GAP SERPL CALCULATED.3IONS-SCNC: 8 MMOL/L (ref 7–16)
AST SERPL-CCNC: 47 U/L (ref 0–39)
BASOPHILS # BLD: 0.08 K/UL (ref 0–0.2)
BASOPHILS NFR BLD: 1 % (ref 0–2)
BILIRUB SERPL-MCNC: 1.3 MG/DL (ref 0–1.2)
BUN SERPL-MCNC: 11 MG/DL (ref 6–23)
CALCIUM SERPL-MCNC: 8.6 MG/DL (ref 8.6–10.2)
CHLORIDE SERPL-SCNC: 103 MMOL/L (ref 98–107)
CO2 SERPL-SCNC: 26 MMOL/L (ref 22–29)
CREAT SERPL-MCNC: 1 MG/DL (ref 0.7–1.2)
EOSINOPHIL # BLD: 0.25 K/UL (ref 0.05–0.5)
EOSINOPHILS RELATIVE PERCENT: 2 % (ref 0–6)
ERYTHROCYTE [DISTWIDTH] IN BLOOD BY AUTOMATED COUNT: 15.9 % (ref 11.5–15)
GFR SERPL CREATININE-BSD FRML MDRD: >60 ML/MIN/1.73M2
GLUCOSE SERPL-MCNC: 97 MG/DL (ref 74–99)
HCT VFR BLD AUTO: 26.6 % (ref 37–54)
HGB BLD-MCNC: 8.9 G/DL (ref 12.5–16.5)
IMM GRANULOCYTES # BLD AUTO: 0.32 K/UL (ref 0–0.58)
IMM GRANULOCYTES NFR BLD: 2 % (ref 0–5)
LYMPHOCYTES NFR BLD: 1.12 K/UL (ref 1.5–4)
LYMPHOCYTES RELATIVE PERCENT: 7 % (ref 20–42)
MAGNESIUM SERPL-MCNC: 2 MG/DL (ref 1.6–2.6)
MCH RBC QN AUTO: 31.3 PG (ref 26–35)
MCHC RBC AUTO-ENTMCNC: 33.5 G/DL (ref 32–34.5)
MCV RBC AUTO: 93.7 FL (ref 80–99.9)
MONOCYTES NFR BLD: 0.56 K/UL (ref 0.1–0.95)
MONOCYTES NFR BLD: 4 % (ref 2–12)
NEUTROPHILS NFR BLD: 85 % (ref 43–80)
NEUTS SEG NFR BLD: 13.13 K/UL (ref 1.8–7.3)
NON-GYN CYTOLOGY REPORT: NORMAL
PHOSPHATE SERPL-MCNC: 2.6 MG/DL (ref 2.5–4.5)
PLATELET # BLD AUTO: 175 K/UL (ref 130–450)
PMV BLD AUTO: 10.1 FL (ref 7–12)
POTASSIUM SERPL-SCNC: 4.4 MMOL/L (ref 3.5–5)
PROCALCITONIN SERPL-MCNC: 0.4 NG/ML (ref 0–0.08)
PROT SERPL-MCNC: 6.4 G/DL (ref 6.4–8.3)
RBC # BLD AUTO: 2.84 M/UL (ref 3.8–5.8)
SODIUM SERPL-SCNC: 137 MMOL/L (ref 132–146)
WBC OTHER # BLD: 15.5 K/UL (ref 4.5–11.5)

## 2024-02-29 PROCEDURE — 6370000000 HC RX 637 (ALT 250 FOR IP): Performed by: INTERNAL MEDICINE

## 2024-02-29 PROCEDURE — 94640 AIRWAY INHALATION TREATMENT: CPT

## 2024-02-29 PROCEDURE — 6360000002 HC RX W HCPCS

## 2024-02-29 PROCEDURE — 6360000002 HC RX W HCPCS: Performed by: SPECIALIST

## 2024-02-29 PROCEDURE — 87205 SMEAR GRAM STAIN: CPT

## 2024-02-29 PROCEDURE — 87086 URINE CULTURE/COLONY COUNT: CPT

## 2024-02-29 PROCEDURE — 2700000000 HC OXYGEN THERAPY PER DAY

## 2024-02-29 PROCEDURE — 80053 COMPREHEN METABOLIC PANEL: CPT

## 2024-02-29 PROCEDURE — 99222 1ST HOSP IP/OBS MODERATE 55: CPT | Performed by: NURSE PRACTITIONER

## 2024-02-29 PROCEDURE — 84100 ASSAY OF PHOSPHORUS: CPT

## 2024-02-29 PROCEDURE — 83735 ASSAY OF MAGNESIUM: CPT

## 2024-02-29 PROCEDURE — 85025 COMPLETE CBC W/AUTO DIFF WBC: CPT

## 2024-02-29 PROCEDURE — 2580000003 HC RX 258: Performed by: INTERNAL MEDICINE

## 2024-02-29 PROCEDURE — 84145 PROCALCITONIN (PCT): CPT

## 2024-02-29 PROCEDURE — 2580000003 HC RX 258

## 2024-02-29 PROCEDURE — 6370000000 HC RX 637 (ALT 250 FOR IP)

## 2024-02-29 PROCEDURE — 87449 NOS EACH ORGANISM AG IA: CPT

## 2024-02-29 PROCEDURE — 36592 COLLECT BLOOD FROM PICC: CPT

## 2024-02-29 PROCEDURE — 87040 BLOOD CULTURE FOR BACTERIA: CPT

## 2024-02-29 PROCEDURE — 2000000000 HC ICU R&B

## 2024-02-29 PROCEDURE — 87070 CULTURE OTHR SPECIMN AEROBIC: CPT

## 2024-02-29 PROCEDURE — 71045 X-RAY EXAM CHEST 1 VIEW: CPT

## 2024-02-29 PROCEDURE — 6360000002 HC RX W HCPCS: Performed by: INTERNAL MEDICINE

## 2024-02-29 PROCEDURE — 2580000003 HC RX 258: Performed by: SPECIALIST

## 2024-02-29 PROCEDURE — 97110 THERAPEUTIC EXERCISES: CPT | Performed by: PHYSICAL THERAPIST

## 2024-02-29 PROCEDURE — 99233 SBSQ HOSP IP/OBS HIGH 50: CPT | Performed by: INTERNAL MEDICINE

## 2024-02-29 PROCEDURE — 97530 THERAPEUTIC ACTIVITIES: CPT | Performed by: PHYSICAL THERAPIST

## 2024-02-29 RX ORDER — MIDODRINE HYDROCHLORIDE 5 MG/1
10 TABLET ORAL
Status: DISCONTINUED | OUTPATIENT
Start: 2024-02-29 | End: 2024-03-02

## 2024-02-29 RX ORDER — TRAMADOL HYDROCHLORIDE 50 MG/1
50 TABLET ORAL EVERY 6 HOURS PRN
Status: DISCONTINUED | OUTPATIENT
Start: 2024-02-29 | End: 2024-03-07 | Stop reason: HOSPADM

## 2024-02-29 RX ORDER — POLYETHYLENE GLYCOL 3350 17 G/17G
17 POWDER, FOR SOLUTION ORAL DAILY
Status: DISCONTINUED | OUTPATIENT
Start: 2024-02-29 | End: 2024-03-07 | Stop reason: HOSPADM

## 2024-02-29 RX ORDER — DOCUSATE SODIUM 100 MG/1
100 CAPSULE, LIQUID FILLED ORAL DAILY
Status: DISCONTINUED | OUTPATIENT
Start: 2024-02-29 | End: 2024-03-07 | Stop reason: HOSPADM

## 2024-02-29 RX ORDER — FLUCONAZOLE 2 MG/ML
400 INJECTION, SOLUTION INTRAVENOUS EVERY 24 HOURS
Status: DISCONTINUED | OUTPATIENT
Start: 2024-02-29 | End: 2024-03-07 | Stop reason: HOSPADM

## 2024-02-29 RX ADMIN — CEFEPIME 2000 MG: 2 INJECTION, POWDER, FOR SOLUTION INTRAVENOUS at 08:20

## 2024-02-29 RX ADMIN — LANSOPRAZOLE 30 MG: 30 TABLET, ORALLY DISINTEGRATING, DELAYED RELEASE ORAL at 08:39

## 2024-02-29 RX ADMIN — Medication 10 ML: at 21:07

## 2024-02-29 RX ADMIN — POTASSIUM BICARBONATE 40 MEQ: 782 TABLET, EFFERVESCENT ORAL at 08:32

## 2024-02-29 RX ADMIN — IPRATROPIUM BROMIDE AND ALBUTEROL SULFATE 1 DOSE: .5; 2.5 SOLUTION RESPIRATORY (INHALATION) at 04:56

## 2024-02-29 RX ADMIN — FUROSEMIDE 20 MG: 10 INJECTION, SOLUTION INTRAMUSCULAR; INTRAVENOUS at 08:32

## 2024-02-29 RX ADMIN — BUDESONIDE 250 MCG: 0.25 SUSPENSION RESPIRATORY (INHALATION) at 17:11

## 2024-02-29 RX ADMIN — MIDODRINE HYDROCHLORIDE 10 MG: 5 TABLET ORAL at 16:42

## 2024-02-29 RX ADMIN — MEROPENEM 1000 MG: 1 INJECTION, POWDER, FOR SOLUTION INTRAVENOUS at 21:10

## 2024-02-29 RX ADMIN — FLUCONAZOLE IN SODIUM CHLORIDE 400 MG: 2 INJECTION, SOLUTION INTRAVENOUS at 16:45

## 2024-02-29 RX ADMIN — PHENAZOPYRIDINE 200 MG: 100 TABLET ORAL at 16:42

## 2024-02-29 RX ADMIN — Medication 10 ML: at 08:40

## 2024-02-29 RX ADMIN — EMPAGLIFLOZIN 10 MG: 10 TABLET, FILM COATED ORAL at 09:44

## 2024-02-29 RX ADMIN — ACETAMINOPHEN 650 MG: 325 TABLET ORAL at 17:18

## 2024-02-29 RX ADMIN — PHENAZOPYRIDINE 200 MG: 100 TABLET ORAL at 12:05

## 2024-02-29 RX ADMIN — PHENAZOPYRIDINE 200 MG: 100 TABLET ORAL at 08:25

## 2024-02-29 RX ADMIN — TRAMADOL HYDROCHLORIDE 50 MG: 50 TABLET ORAL at 19:08

## 2024-02-29 RX ADMIN — ACETAMINOPHEN 650 MG: 325 TABLET ORAL at 07:33

## 2024-02-29 RX ADMIN — VANCOMYCIN HYDROCHLORIDE 1250 MG: 10 INJECTION, POWDER, LYOPHILIZED, FOR SOLUTION INTRAVENOUS at 21:20

## 2024-02-29 RX ADMIN — BUDESONIDE 250 MCG: 0.25 SUSPENSION RESPIRATORY (INHALATION) at 04:57

## 2024-02-29 RX ADMIN — HYDROMORPHONE HYDROCHLORIDE 0.5 MG: 1 INJECTION, SOLUTION INTRAMUSCULAR; INTRAVENOUS; SUBCUTANEOUS at 05:17

## 2024-02-29 RX ADMIN — DOCUSATE SODIUM 100 MG: 100 CAPSULE, LIQUID FILLED ORAL at 08:32

## 2024-02-29 RX ADMIN — CARBIDOPA AND LEVODOPA 0.5 TABLET: 25; 100 TABLET ORAL at 13:54

## 2024-02-29 RX ADMIN — IPRATROPIUM BROMIDE AND ALBUTEROL SULFATE 1 DOSE: .5; 2.5 SOLUTION RESPIRATORY (INHALATION) at 09:54

## 2024-02-29 RX ADMIN — SODIUM CHLORIDE, SODIUM LACTATE, POTASSIUM CHLORIDE, CALCIUM CHLORIDE AND DEXTROSE MONOHYDRATE: 5; 600; 310; 30; 20 INJECTION, SOLUTION INTRAVENOUS at 05:32

## 2024-02-29 RX ADMIN — CARBIDOPA AND LEVODOPA 0.5 TABLET: 25; 100 TABLET ORAL at 08:31

## 2024-02-29 RX ADMIN — ACETAMINOPHEN 650 MG: 325 TABLET ORAL at 12:05

## 2024-02-29 RX ADMIN — MEROPENEM 1000 MG: 1 INJECTION, POWDER, FOR SOLUTION INTRAVENOUS at 13:18

## 2024-02-29 RX ADMIN — POLYETHYLENE GLYCOL 3350 17 G: 17 POWDER, FOR SOLUTION ORAL at 08:31

## 2024-02-29 RX ADMIN — IPRATROPIUM BROMIDE AND ALBUTEROL SULFATE 1 DOSE: .5; 2.5 SOLUTION RESPIRATORY (INHALATION) at 17:11

## 2024-02-29 RX ADMIN — IPRATROPIUM BROMIDE AND ALBUTEROL SULFATE 1 DOSE: .5; 2.5 SOLUTION RESPIRATORY (INHALATION) at 13:22

## 2024-02-29 RX ADMIN — CARBIDOPA AND LEVODOPA 0.5 TABLET: 25; 100 TABLET ORAL at 21:14

## 2024-02-29 RX ADMIN — METOPROLOL SUCCINATE 12.5 MG: 25 TABLET, EXTENDED RELEASE ORAL at 08:32

## 2024-02-29 ASSESSMENT — PAIN DESCRIPTION - LOCATION
LOCATION: GROIN
LOCATION: ABDOMEN

## 2024-02-29 ASSESSMENT — PAIN SCALES - GENERAL
PAINLEVEL_OUTOF10: 4
PAINLEVEL_OUTOF10: 2
PAINLEVEL_OUTOF10: 3
PAINLEVEL_OUTOF10: 6
PAINLEVEL_OUTOF10: 6
PAINLEVEL_OUTOF10: 4

## 2024-02-29 ASSESSMENT — PAIN DESCRIPTION - ORIENTATION
ORIENTATION: LEFT
ORIENTATION: LEFT;LOWER

## 2024-02-29 ASSESSMENT — PAIN - FUNCTIONAL ASSESSMENT
PAIN_FUNCTIONAL_ASSESSMENT: PREVENTS OR INTERFERES WITH MANY ACTIVE NOT PASSIVE ACTIVITIES
PAIN_FUNCTIONAL_ASSESSMENT: ACTIVITIES ARE NOT PREVENTED

## 2024-02-29 ASSESSMENT — PAIN DESCRIPTION - DESCRIPTORS
DESCRIPTORS: ACHING;DISCOMFORT
DESCRIPTORS: ACHING

## 2024-02-29 NOTE — CONSULTS
Inpatient CARDIOLOGY Consultation        Reason for Consult:  CAD    Date of Consultation: 2/19/2024    Patient previously known to Dr. Coates      HISTORY OF PRESENT ILLNESS: 78 year old with Hx of Coronary artery disease, ischemic cardiomyopathy, and history of bicuspid aortic valve, S/P CABG and aortic valve replacement surgery in 8/2014, high grade AV block, S/P pacemaker insertion in 2014 and pulse generator in 8/2022, Atrial fibrillation presented to ED for 'severe respiratory distress\"        Cardiology consulted for CAD and demand ischemia.    Denies CP or heart racing.  No palpitations or dizzy. Compliant with medications.Family at bed side    REVIEW OF SYSTEMS:   Review of rest of 12 systems negative except as mentioned in HPI.      Please note: past medical records were reviewed per electronic medical record (EMR) - see detailed reports under Past Medical/ Surgical History.       Past Medical History:    Past Medical History:   Diagnosis Date    Aortic stenosis, mild     Aortic valve sclerosis     moderate    Atrial fibrillation (HCC) 2014    CAD (coronary artery disease)     CHF (congestive heart failure) ([facility])     COPD (chronic obstructive pulmonary disease) ([facility])     Encounter for diabetic foot exam ([facility]) 01/08/2024    Gallbladder attack     History of tobacco use 09/17/2015    Hyperlipidemia     Hypertension     Mild mitral regurgitation     Prostate cancer ([facility])     44 treatments of radiation    PVD (peripheral vascular disease) with claudication ([facility]) 09/17/2015    RBBB     Tobacco abuse        Past Surgical History:    Past Surgical History:   Procedure Laterality Date    APPENDECTOMY      CARDIAC CATHETERIZATION  08/08/2014    DR Coates-pt. states had triple bypass \"5 years ago\"    COLONOSCOPY      CORONARY ARTERY BYPASS GRAFT  08/11/2014    CABGx3 LIMA-LAD, SVG-D1, SVG-OM1, AVR Dr. Tinsley    
   Consult Note            Date:2/19/2024        Patient Name:Kike Holloway     YOB: 1945     Age:78 y.o.    Inpatient consult to Infectious Diseases  Consult performed by: Bushra Martinez MD  Consult ordered by: Alexander Lopez APRN - YO          Chief Complaint     Chief Complaint   Patient presents with    Respiratory Distress     Pt comes to the ED with c/o severe respiratory distress from home. Pt has hx of cardiac and respiratory issues- unable to get pulse oximeter and cyanosis present. 125mg of solumedrol present and 2 duoneb PTA by EMS.           History Obtained From   patient, spouse    History of Present Illness   Kike Holloway is a 78 y.o. male who  has a past medical history of Aortic stenosis, mild, Aortic valve sclerosis, Atrial fibrillation ([facility]), CAD (coronary artery disease), CHF (congestive heart failure) ([facility]), COPD (chronic obstructive pulmonary disease) ([facility]), Encounter for diabetic foot exam ([facility]), Gallbladder attack, History of tobacco use, Hyperlipidemia, Hypertension, Mild mitral regurgitation, Prostate cancer ([facility]), PVD (peripheral vascular disease) with claudication ([facility]), RBBB, and Tobacco abuse.   Pt is known to ID/[facility]  Last seen on 9/24/2019 for Legionella pneumonia     Pt presents on 2/18/2024 with   Chief Complaint   Patient presents with    Respiratory Distress     Pt comes to the ED with c/o severe respiratory distress from home. Pt has hx of cardiac and respiratory issues- unable to get pulse oximeter and cyanosis present. 125mg of solumedrol present and 2 duoneb PTA by EMS.      Afebrile bipap hypotensive wbc17.5->17.6  cr1.3->1.5   Covid neg blood cx   From home with sob no f/c/n/v/d/  On atbx/pressors   wife present reviewed labs   Pt had dental works about a month or so   Echo was done   US GALLBLADDER RUQ    Result Date: 2/19/2024  Gallbladder wall thickening present, otherwise unremarkable.     CTA PULMONARY W CONTRAST    Result Date: 2/18/2024  1. 
Assessment and Plan  Patient is a 78 y.o. male with the following medical Problems:     Septic shock  Sepsis  Acute exacerbation of heart failure with reduced ejection fraction (estimated EF 50%  Acute metabolic alkalosis  Acute hypoxic respiratory failure  Lactic acidosis  NSTEMI.  Acute kidney injury  Leukocytosis with left shift    Most recent hemodynamics reviewed, continue to monitor  Transfuse for hemoglobin< 7, and signs of bleeding  Patient received vancomycin and cefepime in ED  Continue with cefepime, vancomycin, and Flagyl  Levophed drip ordered  Blood cultures x 2 ordered, results pending  Urine culture ordered, results pending  Respiratory panel ordered, results pending  COVID-19 rapid negative  Patient on continuous BiPAP  Patient n.p.o. at this time  Oxygen protocol initiated  DuoNeb ordered 4 times daily  Budesonide ordered  Stress dose Solu-Cortef ordered  CTA pulmonary with contrast showed irregular filling defect in the left main pulmonary artery could represent acute/recent pulmonary embolism.  Arthrosclerotic changes of the thoracic aorta.  Previous endovascular repair for aneurysm.  Advanced as emphysematous changes lung parenchyma.  Suspicious mild lobulated nodule in the superior segment of the right lower lobe measuring 10 mm.  Suggest correlation with PET/CT scan.  Suspicious linear spiculated scar in the posterior segment of the right upper lobe.  Findings of right-sided failure with prominent right atrium and reflux of contrast from the right atrium into the inferior vena cava/hepatic veins.  Chest x-ray showed mild increased interstitial markings may represent mild pulmonary edema.  Repeat ABG in one 1 hour post intubation and again in the morning  Lactic acid 4.6->5.3, repeat lactic and continue to trend  repeat troponin  Patient received loading dose of aspirin rectally in ED  Heparin drip ordered due to elevated troponin  EKG ordered for the morning  Cardiology consulted, 
situation/question specific  During encounter there was no surrogate medical decision-maker  Outcome of goals of care meeting:   Continue current medical management  Reviewed CODE STATUS with patient  Emotional support  Code status Limited no to all options  Advanced Directives: no POA or living will in epic  Surrogate/Legal NOK:  Brynn Holloway (spouse) [phone]  Tam Holloway (child) [phone]  Wicho Holloway (child) [phone]    Spiritual assessment: no spiritual distress identified  Bereavement and grief: to be determined  Referrals to: none today  SUBJECTIVE:     Current medical issues leading to Palliative Medicine involvement include   Active Hospital Problems    Diagnosis Date Noted    Septic shock (HCC) [A41.9, R65.21] 02/19/2024    Pulmonary embolism (HCC) [I26.99] 02/19/2024       Details of Conversation:    Chart reviewed.  Patient seen at the bedside, alert and oriented x 4.  Patient able to partake in meaningful conversation and able to make decisions for himself.  Update provided by bedside RN and notified that patient does have intermittent confusion.  No family present at the bedside.  Introduced self and the role of palliative medicine.  Discussed patient's current condition and comorbidities.  Discussed goals of care and reviewed CODE STATUS.  Patient confirms CODE STATUS of limited no to all options as he does not want heroic measures if he goes into cardiopulmonary arrest.  He would like to continue current treatment in hopes for improvements.  Support provided.  Symptom management per ICU team at this time.  Attempted to reach patient's spouse, Virginia.  No answer, LVM.  Will check back in patient's room later today as able to touch base with family.  Palliative medicine will continue to follow.    Addendum:  Call received from patient's spouse, Virginia.  Introduced self and the role of palliative medicine.  Reiterated conversation noted above.  Virginia states that they would like to 
the wing until he is out of the ICU, medically stable and ready for discharge. He would not be a good candidate for straight cathing due to concern for worsening hematuria in the setting of heparin. Best to maintain wing in this case  PSA remains stable   Creatinine stable   Will follow       Electronically signed by NURY Nieto CNP on 2/26/2024 at 9:57 AM  [facility]     I agree with the assessment and plan of MARCELLA Nieto. I personally evaluated the patient and made any changes to reflect my impression and plan.   Will need follow-up with Dr. Macario Flaherty at some point in the office.  Hematuria very likely secondary to use of heparin.    Julio Cesar Flaherty MD  2/26/2024   
depression    Physical Exam:   Vital Signs:  /78   Pulse (!) 107   Temp (!) 101.5 °F (38.6 °C) (Bladder)   Resp 27   Ht 1.676 m (5' 6\")   Wt 69.9 kg (154 lb)   SpO2 (!) 87%   BMI 24.86 kg/m²     Input/Output:  No intake/output data recorded.    Oxygen requirements: Continuous BiPAP       General appearance: Fragile elderly appearing man, not in pain or distress, in no respiratory distress    HEENT: Atraumatic/normocephalic, EOMI, DANK, pharynx clear, dry mucosa, redness of the uvula appreciated,   Neck: Supple, no jugular venous distension, lymphadenopathy, thyromegaly or carotid bruits  Chest: Equal normal breath sounds, no wheezing, no crackles and no tenderness over ribs   Cardiovascular: Normal S1 , S2, regular rate and rhythm, no murmur, rub or gallop  Abdomen: Normal sounds present, soft, lax with no tenderness, no hepatosplenomegaly, and no masses  Extremities: No edema. Pulses are equally present.   Skin: intact, no rashes on visualized skin  Neurologic: Alert and oriented x 3, No focal deficit     Investigations:  Labs, radiological imaging and cardiac work up were personally reviewed        ICU STAFF PHYSICIAN NOTE OF PERSONAL INVOLVEMENT IN CARE  As the attending physician, I certify that I personally reviewed the patient's history and personally examined the patient to confirm the physical findings described above, and that I reviewed the relevant imaging studies and available reports.  I also discussed the differential diagnosis and all of the proposed management plans with the patient and individuals accompanying the patient to this visit.  They had the opportunity to ask questions about the proposed management plans and to have those questions answered.    This patient has a high probability of sudden, clinically significant deterioration, which requires the highest level of physician preparedness to intervene urgently.  I managed/supervised life or organ supporting interventions that

## 2024-02-29 NOTE — CARE COORDINATION
2/29/2024 ICU, 6 lnc, wbc 15.5. Vanco, Lasix, Dilaudid.  [facility] (N) referral to [facility]. PRECERT needed, Signed GR[facility] and [facility] if SNF at discharge. Alternate SNF choice [facility] (L), [facility] to follow.  IF HOME- C referral (need orders) to [facility] can accept, she is following. Palliative care consulted. Electronically signed by Bere Posey RN-BC on 2/29/2024 at 10:20 AM

## 2024-03-01 ENCOUNTER — APPOINTMENT (OUTPATIENT)
Dept: CT IMAGING | Age: 79
DRG: 871 | End: 2024-03-01
Payer: MEDICARE

## 2024-03-01 ENCOUNTER — APPOINTMENT (OUTPATIENT)
Dept: GENERAL RADIOLOGY | Age: 79
DRG: 871 | End: 2024-03-01
Payer: MEDICARE

## 2024-03-01 PROBLEM — Z51.5 PALLIATIVE CARE ENCOUNTER: Status: ACTIVE | Noted: 2024-03-01

## 2024-03-01 LAB
ALBUMIN SERPL-MCNC: 3.1 G/DL (ref 3.5–5.2)
ALP SERPL-CCNC: 108 U/L (ref 40–129)
ALT SERPL-CCNC: 6 U/L (ref 0–40)
ANION GAP SERPL CALCULATED.3IONS-SCNC: 14 MMOL/L (ref 7–16)
AST SERPL-CCNC: 39 U/L (ref 0–39)
BASOPHILS # BLD: 0 K/UL (ref 0–0.2)
BASOPHILS NFR BLD: 0 % (ref 0–2)
BILIRUB SERPL-MCNC: 1.8 MG/DL (ref 0–1.2)
BUN SERPL-MCNC: 16 MG/DL (ref 6–23)
CALCIUM SERPL-MCNC: 8.3 MG/DL (ref 8.6–10.2)
CHLORIDE SERPL-SCNC: 103 MMOL/L (ref 98–107)
CO2 SERPL-SCNC: 21 MMOL/L (ref 22–29)
CREAT SERPL-MCNC: 1.2 MG/DL (ref 0.7–1.2)
EOSINOPHIL # BLD: 0.14 K/UL (ref 0.05–0.5)
EOSINOPHILS RELATIVE PERCENT: 1 % (ref 0–6)
ERYTHROCYTE [DISTWIDTH] IN BLOOD BY AUTOMATED COUNT: 16.5 % (ref 11.5–15)
GFR SERPL CREATININE-BSD FRML MDRD: >60 ML/MIN/1.73M2
GLUCOSE SERPL-MCNC: 80 MG/DL (ref 74–99)
HCT VFR BLD AUTO: 28.6 % (ref 37–54)
HGB BLD-MCNC: 9.3 G/DL (ref 12.5–16.5)
LYMPHOCYTES NFR BLD: 0.43 K/UL (ref 1.5–4)
LYMPHOCYTES RELATIVE PERCENT: 3 % (ref 20–42)
MAGNESIUM SERPL-MCNC: 1.9 MG/DL (ref 1.6–2.6)
MCH RBC QN AUTO: 31.4 PG (ref 26–35)
MCHC RBC AUTO-ENTMCNC: 32.5 G/DL (ref 32–34.5)
MCV RBC AUTO: 96.6 FL (ref 80–99.9)
METAMYELOCYTES ABSOLUTE COUNT: 0.29 K/UL (ref 0–0.12)
METAMYELOCYTES: 2 % (ref 0–1)
MICROORGANISM SPEC CULT: NO GROWTH
MICROORGANISM SPEC CULT: NORMAL
MICROORGANISM/AGENT SPEC: NORMAL
MONOCYTES NFR BLD: 0.14 K/UL (ref 0.1–0.95)
MONOCYTES NFR BLD: 1 % (ref 2–12)
NEUTROPHILS NFR BLD: 94 % (ref 43–80)
NEUTS SEG NFR BLD: 15.79 K/UL (ref 1.8–7.3)
PHOSPHATE SERPL-MCNC: 3 MG/DL (ref 2.5–4.5)
PLATELET # BLD AUTO: 190 K/UL (ref 130–450)
PMV BLD AUTO: 10.6 FL (ref 7–12)
POTASSIUM SERPL-SCNC: 4.9 MMOL/L (ref 3.5–5)
PROT SERPL-MCNC: 6.4 G/DL (ref 6.4–8.3)
RBC # BLD AUTO: 2.96 M/UL (ref 3.8–5.8)
RBC # BLD: ABNORMAL 10*6/UL
SODIUM SERPL-SCNC: 138 MMOL/L (ref 132–146)
SPECIMEN DESCRIPTION: NORMAL
SPECIMEN DESCRIPTION: NORMAL
WBC OTHER # BLD: 16.8 K/UL (ref 4.5–11.5)

## 2024-03-01 PROCEDURE — 2580000003 HC RX 258: Performed by: SPECIALIST

## 2024-03-01 PROCEDURE — 80053 COMPREHEN METABOLIC PANEL: CPT

## 2024-03-01 PROCEDURE — 83735 ASSAY OF MAGNESIUM: CPT

## 2024-03-01 PROCEDURE — 99231 SBSQ HOSP IP/OBS SF/LOW 25: CPT | Performed by: NURSE PRACTITIONER

## 2024-03-01 PROCEDURE — 99233 SBSQ HOSP IP/OBS HIGH 50: CPT | Performed by: INTERNAL MEDICINE

## 2024-03-01 PROCEDURE — 6360000002 HC RX W HCPCS

## 2024-03-01 PROCEDURE — 2700000000 HC OXYGEN THERAPY PER DAY

## 2024-03-01 PROCEDURE — 6360000002 HC RX W HCPCS: Performed by: INTERNAL MEDICINE

## 2024-03-01 PROCEDURE — 94640 AIRWAY INHALATION TREATMENT: CPT

## 2024-03-01 PROCEDURE — 6360000002 HC RX W HCPCS: Performed by: SPECIALIST

## 2024-03-01 PROCEDURE — 84100 ASSAY OF PHOSPHORUS: CPT

## 2024-03-01 PROCEDURE — 6370000000 HC RX 637 (ALT 250 FOR IP): Performed by: INTERNAL MEDICINE

## 2024-03-01 PROCEDURE — 6370000000 HC RX 637 (ALT 250 FOR IP)

## 2024-03-01 PROCEDURE — 97110 THERAPEUTIC EXERCISES: CPT | Performed by: PHYSICAL THERAPIST

## 2024-03-01 PROCEDURE — 97530 THERAPEUTIC ACTIVITIES: CPT | Performed by: PHYSICAL THERAPIST

## 2024-03-01 PROCEDURE — 85025 COMPLETE CBC W/AUTO DIFF WBC: CPT

## 2024-03-01 PROCEDURE — 97110 THERAPEUTIC EXERCISES: CPT

## 2024-03-01 PROCEDURE — 71045 X-RAY EXAM CHEST 1 VIEW: CPT

## 2024-03-01 PROCEDURE — 2580000003 HC RX 258: Performed by: INTERNAL MEDICINE

## 2024-03-01 PROCEDURE — 2000000000 HC ICU R&B

## 2024-03-01 PROCEDURE — 74176 CT ABD & PELVIS W/O CONTRAST: CPT

## 2024-03-01 RX ORDER — MORPHINE SULFATE 2 MG/ML
INJECTION, SOLUTION INTRAMUSCULAR; INTRAVENOUS
Status: DISPENSED
Start: 2024-03-01 | End: 2024-03-01

## 2024-03-01 RX ORDER — MORPHINE SULFATE 2 MG/ML
1 INJECTION, SOLUTION INTRAMUSCULAR; INTRAVENOUS ONCE
Status: COMPLETED | OUTPATIENT
Start: 2024-03-01 | End: 2024-03-01

## 2024-03-01 RX ORDER — MAGNESIUM SULFATE 1 G/100ML
1000 INJECTION INTRAVENOUS ONCE
Status: COMPLETED | OUTPATIENT
Start: 2024-03-01 | End: 2024-03-01

## 2024-03-01 RX ADMIN — CARBIDOPA AND LEVODOPA 0.5 TABLET: 25; 100 TABLET ORAL at 13:59

## 2024-03-01 RX ADMIN — MEROPENEM 1000 MG: 1 INJECTION, POWDER, FOR SOLUTION INTRAVENOUS at 16:12

## 2024-03-01 RX ADMIN — MIDODRINE HYDROCHLORIDE 10 MG: 5 TABLET ORAL at 12:20

## 2024-03-01 RX ADMIN — DAPTOMYCIN 500 MG: 500 INJECTION, POWDER, LYOPHILIZED, FOR SOLUTION INTRAVENOUS at 12:16

## 2024-03-01 RX ADMIN — MIDODRINE HYDROCHLORIDE 10 MG: 5 TABLET ORAL at 17:04

## 2024-03-01 RX ADMIN — POLYETHYLENE GLYCOL 3350 17 G: 17 POWDER, FOR SOLUTION ORAL at 08:41

## 2024-03-01 RX ADMIN — SODIUM CHLORIDE, SODIUM LACTATE, POTASSIUM CHLORIDE, CALCIUM CHLORIDE AND DEXTROSE MONOHYDRATE: 5; 600; 310; 30; 20 INJECTION, SOLUTION INTRAVENOUS at 11:26

## 2024-03-01 RX ADMIN — BUDESONIDE 250 MCG: 0.25 SUSPENSION RESPIRATORY (INHALATION) at 03:34

## 2024-03-01 RX ADMIN — BUDESONIDE 250 MCG: 0.25 SUSPENSION RESPIRATORY (INHALATION) at 16:04

## 2024-03-01 RX ADMIN — TRAMADOL HYDROCHLORIDE 50 MG: 50 TABLET ORAL at 01:46

## 2024-03-01 RX ADMIN — IPRATROPIUM BROMIDE AND ALBUTEROL SULFATE 1 DOSE: .5; 2.5 SOLUTION RESPIRATORY (INHALATION) at 08:42

## 2024-03-01 RX ADMIN — EMPAGLIFLOZIN 10 MG: 10 TABLET, FILM COATED ORAL at 08:42

## 2024-03-01 RX ADMIN — Medication 10 ML: at 08:45

## 2024-03-01 RX ADMIN — IPRATROPIUM BROMIDE AND ALBUTEROL SULFATE 1 DOSE: .5; 2.5 SOLUTION RESPIRATORY (INHALATION) at 05:33

## 2024-03-01 RX ADMIN — LANSOPRAZOLE 30 MG: 30 TABLET, ORALLY DISINTEGRATING, DELAYED RELEASE ORAL at 06:50

## 2024-03-01 RX ADMIN — MORPHINE SULFATE 1 MG: 2 INJECTION, SOLUTION INTRAMUSCULAR; INTRAVENOUS at 03:41

## 2024-03-01 RX ADMIN — ACETAMINOPHEN 650 MG: 325 TABLET ORAL at 05:37

## 2024-03-01 RX ADMIN — MAGNESIUM SULFATE IN DEXTROSE 1000 MG: 10 INJECTION, SOLUTION INTRAVENOUS at 06:48

## 2024-03-01 RX ADMIN — MEROPENEM 1000 MG: 1 INJECTION, POWDER, FOR SOLUTION INTRAVENOUS at 04:22

## 2024-03-01 RX ADMIN — IPRATROPIUM BROMIDE AND ALBUTEROL SULFATE 1 DOSE: .5; 2.5 SOLUTION RESPIRATORY (INHALATION) at 12:46

## 2024-03-01 RX ADMIN — ACETAMINOPHEN 650 MG: 325 TABLET ORAL at 20:50

## 2024-03-01 RX ADMIN — CARBIDOPA AND LEVODOPA 0.5 TABLET: 25; 100 TABLET ORAL at 08:42

## 2024-03-01 RX ADMIN — ACETAMINOPHEN 650 MG: 325 TABLET ORAL at 01:45

## 2024-03-01 RX ADMIN — MIDODRINE HYDROCHLORIDE 10 MG: 5 TABLET ORAL at 08:41

## 2024-03-01 RX ADMIN — IPRATROPIUM BROMIDE AND ALBUTEROL SULFATE 1 DOSE: .5; 2.5 SOLUTION RESPIRATORY (INHALATION) at 03:33

## 2024-03-01 RX ADMIN — FLUCONAZOLE IN SODIUM CHLORIDE 400 MG: 2 INJECTION, SOLUTION INTRAVENOUS at 17:06

## 2024-03-01 RX ADMIN — Medication 10 ML: at 20:51

## 2024-03-01 RX ADMIN — CARBIDOPA AND LEVODOPA 0.5 TABLET: 25; 100 TABLET ORAL at 20:50

## 2024-03-01 RX ADMIN — IPRATROPIUM BROMIDE AND ALBUTEROL SULFATE 1 DOSE: .5; 2.5 SOLUTION RESPIRATORY (INHALATION) at 16:04

## 2024-03-01 RX ADMIN — DOCUSATE SODIUM 100 MG: 100 CAPSULE, LIQUID FILLED ORAL at 08:42

## 2024-03-01 ASSESSMENT — PAIN DESCRIPTION - LOCATION
LOCATION: GROIN
LOCATION: GROIN

## 2024-03-01 ASSESSMENT — PAIN SCALES - GENERAL
PAINLEVEL_OUTOF10: 6
PAINLEVEL_OUTOF10: 8

## 2024-03-01 ASSESSMENT — PAIN DESCRIPTION - ORIENTATION
ORIENTATION: LEFT;LOWER
ORIENTATION: LEFT

## 2024-03-01 ASSESSMENT — PAIN DESCRIPTION - DESCRIPTORS
DESCRIPTORS: ACHING
DESCRIPTORS: ACHING

## 2024-03-01 NOTE — CARE COORDINATION
3/1/2024 ICU, Cubicin, Diflucan, Jesus, 2lnc. [facility] (N) referral to [facility]. PRECERT needed, Signed [facility] and [facility] if SNF at discharge. Alternate SNF choice [facility] (L), [facility] to follow.  IF HOME- [facility] referral (need orders) to [facility] can accept, she is following. Palliative care consulted. Cm to follow.   Electronically signed by Bere Posey RN-BC on 3/1/2024 at 2:59 PM

## 2024-03-02 ENCOUNTER — APPOINTMENT (OUTPATIENT)
Dept: GENERAL RADIOLOGY | Age: 79
DRG: 871 | End: 2024-03-02
Payer: MEDICARE

## 2024-03-02 LAB
1,3 BETA GLUCAN SER-MCNC: <31 PG/ML
1,3 BETA-D-GLUCAN INTERP: NEGATIVE
ALBUMIN SERPL-MCNC: 2.7 G/DL (ref 3.5–5.2)
ALP SERPL-CCNC: 127 U/L (ref 40–129)
ALT SERPL-CCNC: 12 U/L (ref 0–40)
ANION GAP SERPL CALCULATED.3IONS-SCNC: 13 MMOL/L (ref 7–16)
AST SERPL-CCNC: 31 U/L (ref 0–39)
ATYPICAL LYMPHOCYTE ABSOLUTE COUNT: 0.14 K/UL (ref 0–0.46)
ATYPICAL LYMPHOCYTES: 1 % (ref 0–4)
BASOPHILS # BLD: 0 K/UL (ref 0–0.2)
BASOPHILS NFR BLD: 0 % (ref 0–2)
BILIRUB SERPL-MCNC: 1.4 MG/DL (ref 0–1.2)
BUN SERPL-MCNC: 19 MG/DL (ref 6–23)
CALCIUM SERPL-MCNC: 8.4 MG/DL (ref 8.6–10.2)
CHLORIDE SERPL-SCNC: 103 MMOL/L (ref 98–107)
CK SERPL-CCNC: 228 U/L (ref 20–200)
CO2 SERPL-SCNC: 20 MMOL/L (ref 22–29)
CORTIS SERPL-MCNC: 16.2 UG/DL (ref 2.7–18.4)
CORTISOL COLLECTION INFO: NORMAL
CREAT SERPL-MCNC: 1.2 MG/DL (ref 0.7–1.2)
EOSINOPHIL # BLD: 0.14 K/UL (ref 0.05–0.5)
EOSINOPHILS RELATIVE PERCENT: 1 % (ref 0–6)
ERYTHROCYTE [DISTWIDTH] IN BLOOD BY AUTOMATED COUNT: 16.7 % (ref 11.5–15)
GFR SERPL CREATININE-BSD FRML MDRD: >60 ML/MIN/1.73M2
GLUCOSE SERPL-MCNC: 96 MG/DL (ref 74–99)
HCT VFR BLD AUTO: 28 % (ref 37–54)
HGB BLD-MCNC: 9.1 G/DL (ref 12.5–16.5)
LYMPHOCYTES NFR BLD: 2.12 K/UL (ref 1.5–4)
LYMPHOCYTES RELATIVE PERCENT: 13 % (ref 20–42)
MAGNESIUM SERPL-MCNC: 2.5 MG/DL (ref 1.6–2.6)
MCH RBC QN AUTO: 31.3 PG (ref 26–35)
MCHC RBC AUTO-ENTMCNC: 32.5 G/DL (ref 32–34.5)
MCV RBC AUTO: 96.2 FL (ref 80–99.9)
MONOCYTES NFR BLD: 0.42 K/UL (ref 0.1–0.95)
MONOCYTES NFR BLD: 3 % (ref 2–12)
MYELOCYTES ABSOLUTE COUNT: 0.28 K/UL
MYELOCYTES: 2 %
NEUTROPHILS NFR BLD: 81 % (ref 43–80)
NEUTS SEG NFR BLD: 12.99 K/UL (ref 1.8–7.3)
NUCLEATED RED BLOOD CELLS: 1 PER 100 WBC
PHOSPHATE SERPL-MCNC: 3.4 MG/DL (ref 2.5–4.5)
PLATELET # BLD AUTO: 271 K/UL (ref 130–450)
PMV BLD AUTO: 10.9 FL (ref 7–12)
POTASSIUM SERPL-SCNC: 4.9 MMOL/L (ref 3.5–5)
PROT SERPL-MCNC: 6.1 G/DL (ref 6.4–8.3)
RBC # BLD AUTO: 2.91 M/UL (ref 3.8–5.8)
RBC # BLD: ABNORMAL 10*6/UL
SODIUM SERPL-SCNC: 136 MMOL/L (ref 132–146)
WBC OTHER # BLD: 16.1 K/UL (ref 4.5–11.5)

## 2024-03-02 PROCEDURE — 6370000000 HC RX 637 (ALT 250 FOR IP)

## 2024-03-02 PROCEDURE — 2580000003 HC RX 258: Performed by: SPECIALIST

## 2024-03-02 PROCEDURE — 99233 SBSQ HOSP IP/OBS HIGH 50: CPT | Performed by: INTERNAL MEDICINE

## 2024-03-02 PROCEDURE — 6370000000 HC RX 637 (ALT 250 FOR IP): Performed by: INTERNAL MEDICINE

## 2024-03-02 PROCEDURE — 82550 ASSAY OF CK (CPK): CPT

## 2024-03-02 PROCEDURE — 2580000003 HC RX 258: Performed by: INTERNAL MEDICINE

## 2024-03-02 PROCEDURE — 6360000002 HC RX W HCPCS: Performed by: INTERNAL MEDICINE

## 2024-03-02 PROCEDURE — 99291 CRITICAL CARE FIRST HOUR: CPT | Performed by: INTERNAL MEDICINE

## 2024-03-02 PROCEDURE — 6360000002 HC RX W HCPCS

## 2024-03-02 PROCEDURE — 2000000000 HC ICU R&B

## 2024-03-02 PROCEDURE — 71045 X-RAY EXAM CHEST 1 VIEW: CPT

## 2024-03-02 PROCEDURE — 36592 COLLECT BLOOD FROM PICC: CPT

## 2024-03-02 PROCEDURE — 6360000002 HC RX W HCPCS: Performed by: SPECIALIST

## 2024-03-02 PROCEDURE — 97530 THERAPEUTIC ACTIVITIES: CPT

## 2024-03-02 PROCEDURE — 83735 ASSAY OF MAGNESIUM: CPT

## 2024-03-02 PROCEDURE — 97110 THERAPEUTIC EXERCISES: CPT

## 2024-03-02 PROCEDURE — 84100 ASSAY OF PHOSPHORUS: CPT

## 2024-03-02 PROCEDURE — 85025 COMPLETE CBC W/AUTO DIFF WBC: CPT

## 2024-03-02 PROCEDURE — 82533 TOTAL CORTISOL: CPT

## 2024-03-02 PROCEDURE — 2700000000 HC OXYGEN THERAPY PER DAY

## 2024-03-02 PROCEDURE — 94640 AIRWAY INHALATION TREATMENT: CPT

## 2024-03-02 PROCEDURE — 80053 COMPREHEN METABOLIC PANEL: CPT

## 2024-03-02 RX ORDER — MIDODRINE HYDROCHLORIDE 5 MG/1
15 TABLET ORAL
Status: DISCONTINUED | OUTPATIENT
Start: 2024-03-02 | End: 2024-03-07 | Stop reason: HOSPADM

## 2024-03-02 RX ORDER — HEPARIN SODIUM 5000 [USP'U]/ML
5000 INJECTION, SOLUTION INTRAVENOUS; SUBCUTANEOUS EVERY 8 HOURS SCHEDULED
Status: DISCONTINUED | OUTPATIENT
Start: 2024-03-02 | End: 2024-03-07 | Stop reason: HOSPADM

## 2024-03-02 RX ORDER — FLUDROCORTISONE ACETATE 0.1 MG/1
0.05 TABLET ORAL DAILY
Status: DISCONTINUED | OUTPATIENT
Start: 2024-03-02 | End: 2024-03-02

## 2024-03-02 RX ADMIN — MIDODRINE HYDROCHLORIDE 15 MG: 5 TABLET ORAL at 16:37

## 2024-03-02 RX ADMIN — MEROPENEM 1000 MG: 1 INJECTION, POWDER, FOR SOLUTION INTRAVENOUS at 04:32

## 2024-03-02 RX ADMIN — HEPARIN SODIUM 5000 UNITS: 5000 INJECTION INTRAVENOUS; SUBCUTANEOUS at 13:33

## 2024-03-02 RX ADMIN — IPRATROPIUM BROMIDE AND ALBUTEROL SULFATE 1 DOSE: .5; 2.5 SOLUTION RESPIRATORY (INHALATION) at 12:56

## 2024-03-02 RX ADMIN — MIDODRINE HYDROCHLORIDE 10 MG: 5 TABLET ORAL at 09:00

## 2024-03-02 RX ADMIN — FUROSEMIDE 20 MG: 10 INJECTION, SOLUTION INTRAMUSCULAR; INTRAVENOUS at 09:01

## 2024-03-02 RX ADMIN — BUDESONIDE 250 MCG: 0.25 SUSPENSION RESPIRATORY (INHALATION) at 06:20

## 2024-03-02 RX ADMIN — ACETAMINOPHEN 650 MG: 325 TABLET ORAL at 18:04

## 2024-03-02 RX ADMIN — EMPAGLIFLOZIN 10 MG: 10 TABLET, FILM COATED ORAL at 09:00

## 2024-03-02 RX ADMIN — HEPARIN SODIUM 5000 UNITS: 5000 INJECTION INTRAVENOUS; SUBCUTANEOUS at 21:40

## 2024-03-02 RX ADMIN — FLUDROCORTISONE ACETATE 0.05 MG: 0.1 TABLET ORAL at 09:15

## 2024-03-02 RX ADMIN — FLUCONAZOLE IN SODIUM CHLORIDE 400 MG: 2 INJECTION, SOLUTION INTRAVENOUS at 16:36

## 2024-03-02 RX ADMIN — CARBIDOPA AND LEVODOPA 0.5 TABLET: 25; 100 TABLET ORAL at 21:40

## 2024-03-02 RX ADMIN — IPRATROPIUM BROMIDE AND ALBUTEROL SULFATE 1 DOSE: .5; 2.5 SOLUTION RESPIRATORY (INHALATION) at 06:20

## 2024-03-02 RX ADMIN — IPRATROPIUM BROMIDE AND ALBUTEROL SULFATE 1 DOSE: .5; 2.5 SOLUTION RESPIRATORY (INHALATION) at 09:26

## 2024-03-02 RX ADMIN — MEROPENEM 1000 MG: 1 INJECTION, POWDER, FOR SOLUTION INTRAVENOUS at 16:37

## 2024-03-02 RX ADMIN — IPRATROPIUM BROMIDE AND ALBUTEROL SULFATE 1 DOSE: .5; 2.5 SOLUTION RESPIRATORY (INHALATION) at 17:15

## 2024-03-02 RX ADMIN — BUDESONIDE 250 MCG: 0.25 SUSPENSION RESPIRATORY (INHALATION) at 17:15

## 2024-03-02 RX ADMIN — CARBIDOPA AND LEVODOPA 0.5 TABLET: 25; 100 TABLET ORAL at 09:01

## 2024-03-02 RX ADMIN — Medication 10 ML: at 09:02

## 2024-03-02 RX ADMIN — LANSOPRAZOLE 30 MG: 30 TABLET, ORALLY DISINTEGRATING, DELAYED RELEASE ORAL at 06:12

## 2024-03-02 RX ADMIN — SODIUM CHLORIDE, SODIUM LACTATE, POTASSIUM CHLORIDE, CALCIUM CHLORIDE AND DEXTROSE MONOHYDRATE: 5; 600; 310; 30; 20 INJECTION, SOLUTION INTRAVENOUS at 18:51

## 2024-03-02 RX ADMIN — Medication 10 ML: at 21:40

## 2024-03-02 RX ADMIN — METOPROLOL SUCCINATE 12.5 MG: 25 TABLET, EXTENDED RELEASE ORAL at 09:01

## 2024-03-02 RX ADMIN — DAPTOMYCIN 500 MG: 500 INJECTION, POWDER, LYOPHILIZED, FOR SOLUTION INTRAVENOUS at 13:33

## 2024-03-02 ASSESSMENT — PAIN SCALES - GENERAL
PAINLEVEL_OUTOF10: 0

## 2024-03-03 LAB
ALBUMIN SERPL-MCNC: 2.5 G/DL (ref 3.5–5.2)
ALP SERPL-CCNC: 106 U/L (ref 40–129)
ALT SERPL-CCNC: 6 U/L (ref 0–40)
ANION GAP SERPL CALCULATED.3IONS-SCNC: 9 MMOL/L (ref 7–16)
AST SERPL-CCNC: 28 U/L (ref 0–39)
BASOPHILS # BLD: 0.14 K/UL (ref 0–0.2)
BASOPHILS NFR BLD: 1 % (ref 0–2)
BILIRUB SERPL-MCNC: 1.3 MG/DL (ref 0–1.2)
BUN SERPL-MCNC: 19 MG/DL (ref 6–23)
CALCIUM SERPL-MCNC: 8.1 MG/DL (ref 8.6–10.2)
CHLORIDE SERPL-SCNC: 102 MMOL/L (ref 98–107)
CO2 SERPL-SCNC: 24 MMOL/L (ref 22–29)
CREAT SERPL-MCNC: 1.2 MG/DL (ref 0.7–1.2)
EOSINOPHIL # BLD: 0 K/UL (ref 0.05–0.5)
EOSINOPHILS RELATIVE PERCENT: 0 % (ref 0–6)
ERYTHROCYTE [DISTWIDTH] IN BLOOD BY AUTOMATED COUNT: 17.1 % (ref 11.5–15)
GFR SERPL CREATININE-BSD FRML MDRD: >60 ML/MIN/1.73M2
GLUCOSE SERPL-MCNC: 105 MG/DL (ref 74–99)
HCT VFR BLD AUTO: 26.7 % (ref 37–54)
HGB BLD-MCNC: 8.6 G/DL (ref 12.5–16.5)
LYMPHOCYTES NFR BLD: 0.68 K/UL (ref 1.5–4)
LYMPHOCYTES RELATIVE PERCENT: 4 % (ref 20–42)
MAGNESIUM SERPL-MCNC: 2.3 MG/DL (ref 1.6–2.6)
MCH RBC QN AUTO: 30.8 PG (ref 26–35)
MCHC RBC AUTO-ENTMCNC: 32.2 G/DL (ref 32–34.5)
MCV RBC AUTO: 95.7 FL (ref 80–99.9)
MONOCYTES NFR BLD: 0.54 K/UL (ref 0.1–0.95)
MONOCYTES NFR BLD: 4 % (ref 2–12)
NEUTROPHILS NFR BLD: 91 % (ref 43–80)
NEUTS SEG NFR BLD: 13.84 K/UL (ref 1.8–7.3)
NUCLEATED RED BLOOD CELLS: 4 PER 100 WBC
PHOSPHATE SERPL-MCNC: 2.3 MG/DL (ref 2.5–4.5)
PLATELET # BLD AUTO: 285 K/UL (ref 130–450)
PMV BLD AUTO: 10.3 FL (ref 7–12)
POTASSIUM SERPL-SCNC: 3.8 MMOL/L (ref 3.5–5)
PROT SERPL-MCNC: 6.3 G/DL (ref 6.4–8.3)
RBC # BLD AUTO: 2.79 M/UL (ref 3.8–5.8)
RBC # BLD: ABNORMAL 10*6/UL
SODIUM SERPL-SCNC: 135 MMOL/L (ref 132–146)
WBC OTHER # BLD: 15.2 K/UL (ref 4.5–11.5)

## 2024-03-03 PROCEDURE — 2580000003 HC RX 258: Performed by: SPECIALIST

## 2024-03-03 PROCEDURE — 6370000000 HC RX 637 (ALT 250 FOR IP): Performed by: INTERNAL MEDICINE

## 2024-03-03 PROCEDURE — 6360000002 HC RX W HCPCS: Performed by: INTERNAL MEDICINE

## 2024-03-03 PROCEDURE — 83735 ASSAY OF MAGNESIUM: CPT

## 2024-03-03 PROCEDURE — 36592 COLLECT BLOOD FROM PICC: CPT

## 2024-03-03 PROCEDURE — 6370000000 HC RX 637 (ALT 250 FOR IP)

## 2024-03-03 PROCEDURE — 94640 AIRWAY INHALATION TREATMENT: CPT

## 2024-03-03 PROCEDURE — 2500000003 HC RX 250 WO HCPCS

## 2024-03-03 PROCEDURE — 99233 SBSQ HOSP IP/OBS HIGH 50: CPT | Performed by: INTERNAL MEDICINE

## 2024-03-03 PROCEDURE — 97530 THERAPEUTIC ACTIVITIES: CPT

## 2024-03-03 PROCEDURE — 2000000000 HC ICU R&B

## 2024-03-03 PROCEDURE — 2580000003 HC RX 258: Performed by: INTERNAL MEDICINE

## 2024-03-03 PROCEDURE — 97110 THERAPEUTIC EXERCISES: CPT

## 2024-03-03 PROCEDURE — 2700000000 HC OXYGEN THERAPY PER DAY

## 2024-03-03 PROCEDURE — 6360000002 HC RX W HCPCS

## 2024-03-03 PROCEDURE — 85025 COMPLETE CBC W/AUTO DIFF WBC: CPT

## 2024-03-03 PROCEDURE — 2580000003 HC RX 258

## 2024-03-03 PROCEDURE — 99291 CRITICAL CARE FIRST HOUR: CPT | Performed by: INTERNAL MEDICINE

## 2024-03-03 PROCEDURE — 80053 COMPREHEN METABOLIC PANEL: CPT

## 2024-03-03 PROCEDURE — 84100 ASSAY OF PHOSPHORUS: CPT

## 2024-03-03 PROCEDURE — 6360000002 HC RX W HCPCS: Performed by: SPECIALIST

## 2024-03-03 RX ORDER — FUROSEMIDE 40 MG/1
40 TABLET ORAL DAILY
Status: DISCONTINUED | OUTPATIENT
Start: 2024-03-03 | End: 2024-03-07 | Stop reason: HOSPADM

## 2024-03-03 RX ADMIN — IPRATROPIUM BROMIDE AND ALBUTEROL SULFATE 1 DOSE: .5; 2.5 SOLUTION RESPIRATORY (INHALATION) at 18:25

## 2024-03-03 RX ADMIN — HEPARIN SODIUM 5000 UNITS: 5000 INJECTION INTRAVENOUS; SUBCUTANEOUS at 05:44

## 2024-03-03 RX ADMIN — POTASSIUM PHOSPHATE, MONOBASIC POTASSIUM PHOSPHATE, DIBASIC 10 MMOL: 224; 236 INJECTION, SOLUTION, CONCENTRATE INTRAVENOUS at 06:56

## 2024-03-03 RX ADMIN — MIDODRINE HYDROCHLORIDE 15 MG: 5 TABLET ORAL at 17:00

## 2024-03-03 RX ADMIN — EMPAGLIFLOZIN 10 MG: 10 TABLET, FILM COATED ORAL at 08:35

## 2024-03-03 RX ADMIN — Medication 3 MG: at 23:08

## 2024-03-03 RX ADMIN — IPRATROPIUM BROMIDE AND ALBUTEROL SULFATE 1 DOSE: .5; 2.5 SOLUTION RESPIRATORY (INHALATION) at 10:14

## 2024-03-03 RX ADMIN — FLUCONAZOLE IN SODIUM CHLORIDE 400 MG: 2 INJECTION, SOLUTION INTRAVENOUS at 17:11

## 2024-03-03 RX ADMIN — FUROSEMIDE 20 MG: 10 INJECTION, SOLUTION INTRAMUSCULAR; INTRAVENOUS at 08:36

## 2024-03-03 RX ADMIN — MEROPENEM 1000 MG: 1 INJECTION, POWDER, FOR SOLUTION INTRAVENOUS at 04:20

## 2024-03-03 RX ADMIN — METOPROLOL SUCCINATE 12.5 MG: 25 TABLET, EXTENDED RELEASE ORAL at 08:35

## 2024-03-03 RX ADMIN — HEPARIN SODIUM 5000 UNITS: 5000 INJECTION INTRAVENOUS; SUBCUTANEOUS at 14:13

## 2024-03-03 RX ADMIN — DAPTOMYCIN 500 MG: 500 INJECTION, POWDER, LYOPHILIZED, FOR SOLUTION INTRAVENOUS at 12:20

## 2024-03-03 RX ADMIN — Medication 10 ML: at 08:36

## 2024-03-03 RX ADMIN — BUDESONIDE 250 MCG: 0.25 SUSPENSION RESPIRATORY (INHALATION) at 18:25

## 2024-03-03 RX ADMIN — MIDODRINE HYDROCHLORIDE 15 MG: 5 TABLET ORAL at 12:18

## 2024-03-03 RX ADMIN — MIDODRINE HYDROCHLORIDE 15 MG: 5 TABLET ORAL at 08:36

## 2024-03-03 RX ADMIN — IPRATROPIUM BROMIDE AND ALBUTEROL SULFATE 1 DOSE: .5; 2.5 SOLUTION RESPIRATORY (INHALATION) at 06:56

## 2024-03-03 RX ADMIN — IPRATROPIUM BROMIDE AND ALBUTEROL SULFATE 1 DOSE: .5; 2.5 SOLUTION RESPIRATORY (INHALATION) at 13:55

## 2024-03-03 RX ADMIN — LANSOPRAZOLE 30 MG: 30 TABLET, ORALLY DISINTEGRATING, DELAYED RELEASE ORAL at 05:55

## 2024-03-03 RX ADMIN — Medication 10 ML: at 21:30

## 2024-03-03 RX ADMIN — MEROPENEM 1000 MG: 1 INJECTION, POWDER, FOR SOLUTION INTRAVENOUS at 17:10

## 2024-03-03 RX ADMIN — HEPARIN SODIUM 5000 UNITS: 5000 INJECTION INTRAVENOUS; SUBCUTANEOUS at 21:30

## 2024-03-03 RX ADMIN — CARBIDOPA AND LEVODOPA 0.5 TABLET: 25; 100 TABLET ORAL at 08:35

## 2024-03-03 RX ADMIN — BUDESONIDE 250 MCG: 0.25 SUSPENSION RESPIRATORY (INHALATION) at 06:57

## 2024-03-03 RX ADMIN — ASPIRIN 81 MG CHEWABLE TABLET 81 MG: 81 TABLET CHEWABLE at 08:35

## 2024-03-03 ASSESSMENT — PAIN SCALES - GENERAL
PAINLEVEL_OUTOF10: 0

## 2024-03-04 PROBLEM — J96.01 ACUTE RESPIRATORY FAILURE WITH HYPOXIA (HCC): Status: ACTIVE | Noted: 2024-03-04

## 2024-03-04 PROBLEM — I21.4 NSTEMI (NON-ST ELEVATED MYOCARDIAL INFARCTION) (HCC): Status: ACTIVE | Noted: 2024-03-04

## 2024-03-04 LAB
ALBUMIN SERPL-MCNC: 2.8 G/DL (ref 3.5–5.2)
ALP SERPL-CCNC: 171 U/L (ref 40–129)
ALT SERPL-CCNC: 13 U/L (ref 0–40)
ANION GAP SERPL CALCULATED.3IONS-SCNC: 16 MMOL/L (ref 7–16)
AST SERPL-CCNC: 30 U/L (ref 0–39)
BASOPHILS # BLD: 0 K/UL (ref 0–0.2)
BASOPHILS NFR BLD: 0 % (ref 0–2)
BILIRUB SERPL-MCNC: 1.4 MG/DL (ref 0–1.2)
BUN SERPL-MCNC: 20 MG/DL (ref 6–23)
CALCIUM SERPL-MCNC: 8 MG/DL (ref 8.6–10.2)
CHLORIDE SERPL-SCNC: 103 MMOL/L (ref 98–107)
CO2 SERPL-SCNC: 21 MMOL/L (ref 22–29)
CREAT SERPL-MCNC: 1.2 MG/DL (ref 0.7–1.2)
EOSINOPHIL # BLD: 0.23 K/UL (ref 0.05–0.5)
EOSINOPHILS RELATIVE PERCENT: 2 % (ref 0–6)
ERYTHROCYTE [DISTWIDTH] IN BLOOD BY AUTOMATED COUNT: 17.6 % (ref 11.5–15)
GFR SERPL CREATININE-BSD FRML MDRD: 59 ML/MIN/1.73M2
GLUCOSE SERPL-MCNC: 92 MG/DL (ref 74–99)
HCT VFR BLD AUTO: 26.6 % (ref 37–54)
HGB BLD-MCNC: 8.6 G/DL (ref 12.5–16.5)
LYMPHOCYTES NFR BLD: 1.17 K/UL (ref 1.5–4)
LYMPHOCYTES RELATIVE PERCENT: 9 % (ref 20–42)
MAGNESIUM SERPL-MCNC: 2.3 MG/DL (ref 1.6–2.6)
MCH RBC QN AUTO: 31.3 PG (ref 26–35)
MCHC RBC AUTO-ENTMCNC: 32.3 G/DL (ref 32–34.5)
MCV RBC AUTO: 96.7 FL (ref 80–99.9)
METAMYELOCYTES ABSOLUTE COUNT: 0.58 K/UL (ref 0–0.12)
METAMYELOCYTES: 4 % (ref 0–1)
MONOCYTES NFR BLD: 0.47 K/UL (ref 0.1–0.95)
MONOCYTES NFR BLD: 4 % (ref 2–12)
MYELOCYTES ABSOLUTE COUNT: 0.47 K/UL
MYELOCYTES: 4 %
NEUTROPHILS NFR BLD: 78 % (ref 43–80)
NEUTS SEG NFR BLD: 10.38 K/UL (ref 1.8–7.3)
NUCLEATED RED BLOOD CELLS: 8 PER 100 WBC
PHOSPHATE SERPL-MCNC: 3 MG/DL (ref 2.5–4.5)
PLATELET # BLD AUTO: 305 K/UL (ref 130–450)
PMV BLD AUTO: 10.3 FL (ref 7–12)
POTASSIUM SERPL-SCNC: 3.9 MMOL/L (ref 3.5–5)
PROT SERPL-MCNC: 6.4 G/DL (ref 6.4–8.3)
RBC # BLD AUTO: 2.75 M/UL (ref 3.8–5.8)
RBC # BLD: ABNORMAL 10*6/UL
SODIUM SERPL-SCNC: 140 MMOL/L (ref 132–146)
WBC OTHER # BLD: 13.3 K/UL (ref 4.5–11.5)

## 2024-03-04 PROCEDURE — 97530 THERAPEUTIC ACTIVITIES: CPT | Performed by: PHYSICAL THERAPIST

## 2024-03-04 PROCEDURE — 99291 CRITICAL CARE FIRST HOUR: CPT | Performed by: INTERNAL MEDICINE

## 2024-03-04 PROCEDURE — 6360000002 HC RX W HCPCS: Performed by: SPECIALIST

## 2024-03-04 PROCEDURE — 6360000002 HC RX W HCPCS

## 2024-03-04 PROCEDURE — 2580000003 HC RX 258: Performed by: SPECIALIST

## 2024-03-04 PROCEDURE — 2700000000 HC OXYGEN THERAPY PER DAY

## 2024-03-04 PROCEDURE — 36592 COLLECT BLOOD FROM PICC: CPT

## 2024-03-04 PROCEDURE — 6370000000 HC RX 637 (ALT 250 FOR IP): Performed by: INTERNAL MEDICINE

## 2024-03-04 PROCEDURE — 97535 SELF CARE MNGMENT TRAINING: CPT

## 2024-03-04 PROCEDURE — 99233 SBSQ HOSP IP/OBS HIGH 50: CPT | Performed by: INTERNAL MEDICINE

## 2024-03-04 PROCEDURE — 6370000000 HC RX 637 (ALT 250 FOR IP)

## 2024-03-04 PROCEDURE — 85025 COMPLETE CBC W/AUTO DIFF WBC: CPT

## 2024-03-04 PROCEDURE — 2000000000 HC ICU R&B

## 2024-03-04 PROCEDURE — 97110 THERAPEUTIC EXERCISES: CPT | Performed by: PHYSICAL THERAPIST

## 2024-03-04 PROCEDURE — 94640 AIRWAY INHALATION TREATMENT: CPT

## 2024-03-04 PROCEDURE — 80053 COMPREHEN METABOLIC PANEL: CPT

## 2024-03-04 PROCEDURE — 84100 ASSAY OF PHOSPHORUS: CPT

## 2024-03-04 PROCEDURE — 2580000003 HC RX 258: Performed by: INTERNAL MEDICINE

## 2024-03-04 PROCEDURE — 99231 SBSQ HOSP IP/OBS SF/LOW 25: CPT | Performed by: NURSE PRACTITIONER

## 2024-03-04 PROCEDURE — 6360000002 HC RX W HCPCS: Performed by: INTERNAL MEDICINE

## 2024-03-04 PROCEDURE — 83735 ASSAY OF MAGNESIUM: CPT

## 2024-03-04 PROCEDURE — 97110 THERAPEUTIC EXERCISES: CPT

## 2024-03-04 RX ORDER — POTASSIUM CHLORIDE 7.45 MG/ML
10 INJECTION INTRAVENOUS ONCE
Status: COMPLETED | OUTPATIENT
Start: 2024-03-04 | End: 2024-03-04

## 2024-03-04 RX ADMIN — HYALURONIDASE (HUMAN RECOMBINANT) 150 UNITS: 150 INJECTION, SOLUTION SUBCUTANEOUS at 07:27

## 2024-03-04 RX ADMIN — HEPARIN SODIUM 5000 UNITS: 5000 INJECTION INTRAVENOUS; SUBCUTANEOUS at 21:38

## 2024-03-04 RX ADMIN — MEROPENEM 1000 MG: 1 INJECTION, POWDER, FOR SOLUTION INTRAVENOUS at 04:28

## 2024-03-04 RX ADMIN — MIDODRINE HYDROCHLORIDE 15 MG: 5 TABLET ORAL at 18:00

## 2024-03-04 RX ADMIN — MEROPENEM 1000 MG: 1 INJECTION, POWDER, FOR SOLUTION INTRAVENOUS at 18:05

## 2024-03-04 RX ADMIN — EMPAGLIFLOZIN 10 MG: 10 TABLET, FILM COATED ORAL at 08:21

## 2024-03-04 RX ADMIN — FUROSEMIDE 40 MG: 40 TABLET ORAL at 08:20

## 2024-03-04 RX ADMIN — BUDESONIDE 250 MCG: 0.25 SUSPENSION RESPIRATORY (INHALATION) at 16:10

## 2024-03-04 RX ADMIN — METOPROLOL SUCCINATE 12.5 MG: 25 TABLET, EXTENDED RELEASE ORAL at 08:20

## 2024-03-04 RX ADMIN — Medication 10 ML: at 21:38

## 2024-03-04 RX ADMIN — HEPARIN SODIUM 5000 UNITS: 5000 INJECTION INTRAVENOUS; SUBCUTANEOUS at 14:28

## 2024-03-04 RX ADMIN — MIDODRINE HYDROCHLORIDE 15 MG: 5 TABLET ORAL at 11:58

## 2024-03-04 RX ADMIN — LANSOPRAZOLE 30 MG: 30 TABLET, ORALLY DISINTEGRATING, DELAYED RELEASE ORAL at 05:38

## 2024-03-04 RX ADMIN — FLUCONAZOLE IN SODIUM CHLORIDE 400 MG: 2 INJECTION, SOLUTION INTRAVENOUS at 18:06

## 2024-03-04 RX ADMIN — MIDODRINE HYDROCHLORIDE 15 MG: 5 TABLET ORAL at 08:20

## 2024-03-04 RX ADMIN — IPRATROPIUM BROMIDE AND ALBUTEROL SULFATE 1 DOSE: .5; 2.5 SOLUTION RESPIRATORY (INHALATION) at 04:17

## 2024-03-04 RX ADMIN — HEPARIN SODIUM 5000 UNITS: 5000 INJECTION INTRAVENOUS; SUBCUTANEOUS at 05:38

## 2024-03-04 RX ADMIN — IPRATROPIUM BROMIDE AND ALBUTEROL SULFATE 1 DOSE: .5; 2.5 SOLUTION RESPIRATORY (INHALATION) at 12:28

## 2024-03-04 RX ADMIN — IPRATROPIUM BROMIDE AND ALBUTEROL SULFATE 1 DOSE: .5; 2.5 SOLUTION RESPIRATORY (INHALATION) at 16:09

## 2024-03-04 RX ADMIN — DAPTOMYCIN 500 MG: 500 INJECTION, POWDER, LYOPHILIZED, FOR SOLUTION INTRAVENOUS at 12:09

## 2024-03-04 RX ADMIN — IPRATROPIUM BROMIDE AND ALBUTEROL SULFATE 1 DOSE: .5; 2.5 SOLUTION RESPIRATORY (INHALATION) at 08:22

## 2024-03-04 RX ADMIN — POTASSIUM CHLORIDE 10 MEQ: 7.46 INJECTION, SOLUTION INTRAVENOUS at 06:12

## 2024-03-04 RX ADMIN — BUDESONIDE 250 MCG: 0.25 SUSPENSION RESPIRATORY (INHALATION) at 04:17

## 2024-03-04 RX ADMIN — ASPIRIN 81 MG CHEWABLE TABLET 81 MG: 81 TABLET CHEWABLE at 08:20

## 2024-03-04 RX ADMIN — Medication 10 ML: at 08:24

## 2024-03-04 ASSESSMENT — PAIN SCALES - GENERAL
PAINLEVEL_OUTOF10: 0

## 2024-03-04 NOTE — CARE COORDINATION
3/4/2024 ICU, 2lnc, Cubicin, Merrem, Diflucan. CM met with pt and his wife Virginia. Reviewed the hospice order and they were previously choiced over the weekend and HOV met with them. Pt and wife to speak to son Gabriel this evening. Explained if interested in Hospice at [facility]- room and board would be $285.00 out of pocket per day. Possibly,  can use his VA Benefits- if he is service connected and the VA may be willing to cover the room and board cost at a VA approved facility. Pt/family said if Hospice decided they would want to only go Home with hospice. IF SNF: [facility] (N) referral to [facility]. PRECERT needed, Signed [facility] and [facility] if SNF at discharge. Alternate SNF choice [facility] (L), [facility] to follow.  IF HOME- C referral (need orders) to [facility] can accept, she is following. Palliative care consulted, Hospice care following. Electronically signed by Bere Posey RN-BC on 3/4/2024 at 12:36 PM

## 2024-03-05 ENCOUNTER — APPOINTMENT (OUTPATIENT)
Age: 79
DRG: 871 | End: 2024-03-05
Attending: INTERNAL MEDICINE
Payer: MEDICARE

## 2024-03-05 LAB
ABSOLUTE PLASMA CELLS: 0.36 K/UL
ALBUMIN SERPL-MCNC: 2.9 G/DL (ref 3.5–5.2)
ALP SERPL-CCNC: 113 U/L (ref 40–129)
ALT SERPL-CCNC: 17 U/L (ref 0–40)
ANION GAP SERPL CALCULATED.3IONS-SCNC: 16 MMOL/L (ref 7–16)
AST SERPL-CCNC: 33 U/L (ref 0–39)
BASOPHILS # BLD: 0 K/UL (ref 0–0.2)
BASOPHILS NFR BLD: 0 % (ref 0–2)
BILIRUB SERPL-MCNC: 1.5 MG/DL (ref 0–1.2)
BUN SERPL-MCNC: 20 MG/DL (ref 6–23)
CALCIUM SERPL-MCNC: 8.2 MG/DL (ref 8.6–10.2)
CHLORIDE SERPL-SCNC: 105 MMOL/L (ref 98–107)
CO2 SERPL-SCNC: 22 MMOL/L (ref 22–29)
CREAT SERPL-MCNC: 1.3 MG/DL (ref 0.7–1.2)
ECHO BSA: 1.82 M2
EOSINOPHIL # BLD: 0.36 K/UL (ref 0.05–0.5)
EOSINOPHILS RELATIVE PERCENT: 3 % (ref 0–6)
ERYTHROCYTE [DISTWIDTH] IN BLOOD BY AUTOMATED COUNT: 18.1 % (ref 11.5–15)
GFR SERPL CREATININE-BSD FRML MDRD: 57 ML/MIN/1.73M2
GLUCOSE SERPL-MCNC: 90 MG/DL (ref 74–99)
HCT VFR BLD AUTO: 27.5 % (ref 37–54)
HGB BLD-MCNC: 9.2 G/DL (ref 12.5–16.5)
LYMPHOCYTES NFR BLD: 1.68 K/UL (ref 1.5–4)
LYMPHOCYTES RELATIVE PERCENT: 12 % (ref 20–42)
MAGNESIUM SERPL-MCNC: 2.3 MG/DL (ref 1.6–2.6)
MCH RBC QN AUTO: 32.3 PG (ref 26–35)
MCHC RBC AUTO-ENTMCNC: 33.5 G/DL (ref 32–34.5)
MCV RBC AUTO: 96.5 FL (ref 80–99.9)
MICROORGANISM SPEC CULT: NORMAL
MICROORGANISM SPEC CULT: NORMAL
MONOCYTES NFR BLD: 0.84 K/UL (ref 0.1–0.95)
MONOCYTES NFR BLD: 6 % (ref 2–12)
MYELOCYTES ABSOLUTE COUNT: 0.12 K/UL
MYELOCYTES: 1 %
NEUTROPHILS NFR BLD: 75 % (ref 43–80)
NEUTS SEG NFR BLD: 10.32 K/UL (ref 1.8–7.3)
NUCLEATED RED BLOOD CELLS: 4 PER 100 WBC
PHOSPHATE SERPL-MCNC: 2.4 MG/DL (ref 2.5–4.5)
PLASMA CELLS: 3 %
PLATELET # BLD AUTO: 341 K/UL (ref 130–450)
PMV BLD AUTO: 10.2 FL (ref 7–12)
POTASSIUM SERPL-SCNC: 3.6 MMOL/L (ref 3.5–5)
PROMYELOCYTES ABSOLUTE COUNT: 0.12 K/UL
PROMYELOCYTES: 1 %
PROT SERPL-MCNC: 7 G/DL (ref 6.4–8.3)
RBC # BLD AUTO: 2.85 M/UL (ref 3.8–5.8)
RBC # BLD: ABNORMAL 10*6/UL
SERVICE CMNT-IMP: NORMAL
SERVICE CMNT-IMP: NORMAL
SODIUM SERPL-SCNC: 143 MMOL/L (ref 132–146)
SPECIMEN DESCRIPTION: NORMAL
SPECIMEN DESCRIPTION: NORMAL
WBC OTHER # BLD: 13.8 K/UL (ref 4.5–11.5)

## 2024-03-05 PROCEDURE — 6360000002 HC RX W HCPCS

## 2024-03-05 PROCEDURE — 2700000000 HC OXYGEN THERAPY PER DAY

## 2024-03-05 PROCEDURE — 2500000003 HC RX 250 WO HCPCS

## 2024-03-05 PROCEDURE — 6360000002 HC RX W HCPCS: Performed by: INTERNAL MEDICINE

## 2024-03-05 PROCEDURE — 80053 COMPREHEN METABOLIC PANEL: CPT

## 2024-03-05 PROCEDURE — 6370000000 HC RX 637 (ALT 250 FOR IP)

## 2024-03-05 PROCEDURE — 2580000003 HC RX 258: Performed by: SPECIALIST

## 2024-03-05 PROCEDURE — 85025 COMPLETE CBC W/AUTO DIFF WBC: CPT

## 2024-03-05 PROCEDURE — 99233 SBSQ HOSP IP/OBS HIGH 50: CPT | Performed by: INTERNAL MEDICINE

## 2024-03-05 PROCEDURE — 2580000003 HC RX 258: Performed by: INTERNAL MEDICINE

## 2024-03-05 PROCEDURE — 6370000000 HC RX 637 (ALT 250 FOR IP): Performed by: INTERNAL MEDICINE

## 2024-03-05 PROCEDURE — 83735 ASSAY OF MAGNESIUM: CPT

## 2024-03-05 PROCEDURE — 94761 N-INVAS EAR/PLS OXIMETRY MLT: CPT

## 2024-03-05 PROCEDURE — 99291 CRITICAL CARE FIRST HOUR: CPT | Performed by: INTERNAL MEDICINE

## 2024-03-05 PROCEDURE — 6360000002 HC RX W HCPCS: Performed by: SPECIALIST

## 2024-03-05 PROCEDURE — 93308 TTE F-UP OR LMTD: CPT

## 2024-03-05 PROCEDURE — 94640 AIRWAY INHALATION TREATMENT: CPT

## 2024-03-05 PROCEDURE — 97530 THERAPEUTIC ACTIVITIES: CPT

## 2024-03-05 PROCEDURE — 97110 THERAPEUTIC EXERCISES: CPT

## 2024-03-05 PROCEDURE — 2060000000 HC ICU INTERMEDIATE R&B

## 2024-03-05 PROCEDURE — 2580000003 HC RX 258

## 2024-03-05 PROCEDURE — 84100 ASSAY OF PHOSPHORUS: CPT

## 2024-03-05 RX ADMIN — LANSOPRAZOLE 30 MG: 30 TABLET, ORALLY DISINTEGRATING, DELAYED RELEASE ORAL at 05:32

## 2024-03-05 RX ADMIN — POTASSIUM PHOSPHATE 20 MMOL: 236; 224 INJECTION, SOLUTION INTRAVENOUS at 06:36

## 2024-03-05 RX ADMIN — BUDESONIDE 250 MCG: 0.25 SUSPENSION RESPIRATORY (INHALATION) at 04:29

## 2024-03-05 RX ADMIN — IPRATROPIUM BROMIDE AND ALBUTEROL SULFATE 1 DOSE: .5; 2.5 SOLUTION RESPIRATORY (INHALATION) at 04:28

## 2024-03-05 RX ADMIN — IPRATROPIUM BROMIDE AND ALBUTEROL SULFATE 1 DOSE: .5; 2.5 SOLUTION RESPIRATORY (INHALATION) at 22:08

## 2024-03-05 RX ADMIN — METOPROLOL SUCCINATE 12.5 MG: 25 TABLET, EXTENDED RELEASE ORAL at 08:11

## 2024-03-05 RX ADMIN — FUROSEMIDE 40 MG: 40 TABLET ORAL at 08:15

## 2024-03-05 RX ADMIN — BUDESONIDE 250 MCG: 0.25 SUSPENSION RESPIRATORY (INHALATION) at 18:31

## 2024-03-05 RX ADMIN — HEPARIN SODIUM 5000 UNITS: 5000 INJECTION INTRAVENOUS; SUBCUTANEOUS at 14:08

## 2024-03-05 RX ADMIN — ASPIRIN 81 MG CHEWABLE TABLET 81 MG: 81 TABLET CHEWABLE at 08:11

## 2024-03-05 RX ADMIN — FLUCONAZOLE IN SODIUM CHLORIDE 400 MG: 2 INJECTION, SOLUTION INTRAVENOUS at 19:12

## 2024-03-05 RX ADMIN — MIDODRINE HYDROCHLORIDE 15 MG: 5 TABLET ORAL at 08:15

## 2024-03-05 RX ADMIN — HEPARIN SODIUM 5000 UNITS: 5000 INJECTION INTRAVENOUS; SUBCUTANEOUS at 05:31

## 2024-03-05 RX ADMIN — MEROPENEM 1000 MG: 1 INJECTION, POWDER, FOR SOLUTION INTRAVENOUS at 21:28

## 2024-03-05 RX ADMIN — DAPTOMYCIN 500 MG: 500 INJECTION, POWDER, LYOPHILIZED, FOR SOLUTION INTRAVENOUS at 12:19

## 2024-03-05 RX ADMIN — HEPARIN SODIUM 5000 UNITS: 5000 INJECTION INTRAVENOUS; SUBCUTANEOUS at 21:30

## 2024-03-05 RX ADMIN — IPRATROPIUM BROMIDE AND ALBUTEROL SULFATE 1 DOSE: .5; 2.5 SOLUTION RESPIRATORY (INHALATION) at 18:31

## 2024-03-05 RX ADMIN — MIDODRINE HYDROCHLORIDE 15 MG: 5 TABLET ORAL at 19:08

## 2024-03-05 RX ADMIN — Medication 10 ML: at 09:00

## 2024-03-05 RX ADMIN — Medication 10 ML: at 21:30

## 2024-03-05 RX ADMIN — MIDODRINE HYDROCHLORIDE 15 MG: 5 TABLET ORAL at 12:19

## 2024-03-05 RX ADMIN — IPRATROPIUM BROMIDE AND ALBUTEROL SULFATE 1 DOSE: .5; 2.5 SOLUTION RESPIRATORY (INHALATION) at 09:28

## 2024-03-05 RX ADMIN — MEROPENEM 1000 MG: 1 INJECTION, POWDER, FOR SOLUTION INTRAVENOUS at 04:01

## 2024-03-05 ASSESSMENT — PAIN SCALES - GENERAL
PAINLEVEL_OUTOF10: 0

## 2024-03-05 NOTE — DISCHARGE INSTR - COC
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    Acute bacterial sinusitis J01.90, B96.89    Tobacco abuse Z72.0    Fatigue R53.83    History of prostate cancer Z85.46    Septic shock ([facility]) A41.9, R65.21    Pulmonary embolism ([facility]) I26.99    Acute on chronic combined systolic and diastolic congestive heart failure ([facility]) I50.43    Ischemic cardiomyopathy I25.5    Palliative care encounter Z51.5    Acute respiratory failure with hypoxia ([facility]) J96.01    NSTEMI (non-ST elevated myocardial infarction) ([facility]) I21.4       Isolation/Infection:   Isolation            No Isolation          Patient Infection Status       None to display                     Nurse Assessment:  Last Vital Signs: /74   Pulse 89   Temp 99.4 °F (37.4 °C) (Axillary)   Resp 29   Ht 1.651 m (5' 5\")   Wt 72.9 kg (160 lb 11.2 oz)   SpO2 100%   BMI 26.74 kg/m²     Last documented pain score (0-10 scale): Pain Level: 0  Last Weight:   Wt Readings from Last 1 Encounters:   03/05/24 72.9 kg (160 lb 11.2 oz)     Mental Status:  oriented and alert    IV Access:  - None    Nursing Mobility/ADLs:  Walking   Assisted  Transfer  Assisted  Bathing  Assisted  Dressing  Assisted  Toileting  Assisted  Feeding  Independent  Med Admin  Assisted  Med Delivery   crushed    Wound Care Documentation and Therapy:  Wound 02/19/24 Nose Redness (Active)   Wound Cleansed Cleansed with saline 02/22/24 1200   Dressing/Treatment Open to air 03/05/24 0800   Number of days: 15        Elimination:  Continence:   Bowel: Yes  Bladder:   Urinary Catheter: Insertion Date: 2/18/2024    Colostomy/Ileostomy/Ileal Conduit: No       Date of Last BM:

## 2024-03-05 NOTE — CARE COORDINATION
3/5/2024 [place] [facility] [place]. Cm met with pt/wife (Virginia).  Discharge plans to SNF-[facility] (N) to start the PRECERT- pt requesting skilled at discharge. No more hospice needs at this time. FLOR updated [facility], and Lani with Palliative Med, Trixie with Hospice made aware. SNF  PRECERT started, Needs Signed [facility]IE and HENS at discharge. CM/SS assigned to follow. Electronically signed by Bere Posey RN-BC on 3/5/2024 at 11:42 AM

## 2024-03-05 NOTE — CARE COORDINATION
PRECERT obtained from [facility], [place] [facility] [place]......Electronically signed by JOSE Argueta on 3/5/2024 at 3:18 PM

## 2024-03-06 PROCEDURE — 6370000000 HC RX 637 (ALT 250 FOR IP): Performed by: INTERNAL MEDICINE

## 2024-03-06 PROCEDURE — 2060000000 HC ICU INTERMEDIATE R&B

## 2024-03-06 PROCEDURE — 97530 THERAPEUTIC ACTIVITIES: CPT

## 2024-03-06 PROCEDURE — 2580000003 HC RX 258: Performed by: INTERNAL MEDICINE

## 2024-03-06 PROCEDURE — 6370000000 HC RX 637 (ALT 250 FOR IP)

## 2024-03-06 PROCEDURE — 94640 AIRWAY INHALATION TREATMENT: CPT

## 2024-03-06 PROCEDURE — 2700000000 HC OXYGEN THERAPY PER DAY

## 2024-03-06 PROCEDURE — 99231 SBSQ HOSP IP/OBS SF/LOW 25: CPT | Performed by: NURSE PRACTITIONER

## 2024-03-06 PROCEDURE — 6360000002 HC RX W HCPCS

## 2024-03-06 PROCEDURE — 6360000002 HC RX W HCPCS: Performed by: SPECIALIST

## 2024-03-06 PROCEDURE — 99233 SBSQ HOSP IP/OBS HIGH 50: CPT | Performed by: INTERNAL MEDICINE

## 2024-03-06 PROCEDURE — 97110 THERAPEUTIC EXERCISES: CPT

## 2024-03-06 PROCEDURE — 2580000003 HC RX 258: Performed by: SPECIALIST

## 2024-03-06 PROCEDURE — 97535 SELF CARE MNGMENT TRAINING: CPT

## 2024-03-06 PROCEDURE — 6360000002 HC RX W HCPCS: Performed by: INTERNAL MEDICINE

## 2024-03-06 RX ORDER — FUROSEMIDE 40 MG/1
40 TABLET ORAL DAILY
Qty: 60 TABLET | Refills: 3 | DISCHARGE
Start: 2024-03-07

## 2024-03-06 RX ORDER — POLYETHYLENE GLYCOL 3350 17 G/17G
17 POWDER, FOR SOLUTION ORAL DAILY
Qty: 527 G | Refills: 1 | DISCHARGE
Start: 2024-03-07 | End: 2024-05-08

## 2024-03-06 RX ORDER — ASPIRIN 81 MG/1
81 TABLET, CHEWABLE ORAL DAILY
Qty: 30 TABLET | Refills: 3 | DISCHARGE
Start: 2024-03-07

## 2024-03-06 RX ORDER — METOPROLOL SUCCINATE 25 MG/1
12.5 TABLET, EXTENDED RELEASE ORAL DAILY
Qty: 30 TABLET | Refills: 3 | DISCHARGE
Start: 2024-03-07

## 2024-03-06 RX ORDER — MIDODRINE HYDROCHLORIDE 5 MG/1
15 TABLET ORAL
Qty: 90 TABLET | Refills: 3 | DISCHARGE
Start: 2024-03-06

## 2024-03-06 RX ORDER — PSEUDOEPHEDRINE HCL 30 MG
100 TABLET ORAL DAILY
DISCHARGE
Start: 2024-03-07

## 2024-03-06 RX ORDER — LANSOPRAZOLE 30 MG/1
30 TABLET, ORALLY DISINTEGRATING, DELAYED RELEASE ORAL
Qty: 30 TABLET | DISCHARGE
Start: 2024-03-07

## 2024-03-06 RX ADMIN — DAPTOMYCIN 500 MG: 500 INJECTION, POWDER, LYOPHILIZED, FOR SOLUTION INTRAVENOUS at 13:21

## 2024-03-06 RX ADMIN — FUROSEMIDE 40 MG: 40 TABLET ORAL at 08:27

## 2024-03-06 RX ADMIN — FLUCONAZOLE IN SODIUM CHLORIDE 400 MG: 2 INJECTION, SOLUTION INTRAVENOUS at 17:23

## 2024-03-06 RX ADMIN — ASPIRIN 81 MG CHEWABLE TABLET 81 MG: 81 TABLET CHEWABLE at 08:27

## 2024-03-06 RX ADMIN — BUDESONIDE 250 MCG: 0.25 SUSPENSION RESPIRATORY (INHALATION) at 18:04

## 2024-03-06 RX ADMIN — Medication 10 ML: at 21:49

## 2024-03-06 RX ADMIN — METOPROLOL SUCCINATE 12.5 MG: 25 TABLET, EXTENDED RELEASE ORAL at 08:27

## 2024-03-06 RX ADMIN — LANSOPRAZOLE 30 MG: 30 TABLET, ORALLY DISINTEGRATING, DELAYED RELEASE ORAL at 06:28

## 2024-03-06 RX ADMIN — BUDESONIDE 250 MCG: 0.25 SUSPENSION RESPIRATORY (INHALATION) at 06:04

## 2024-03-06 RX ADMIN — IPRATROPIUM BROMIDE AND ALBUTEROL SULFATE 1 DOSE: .5; 2.5 SOLUTION RESPIRATORY (INHALATION) at 13:52

## 2024-03-06 RX ADMIN — IPRATROPIUM BROMIDE AND ALBUTEROL SULFATE 1 DOSE: .5; 2.5 SOLUTION RESPIRATORY (INHALATION) at 09:31

## 2024-03-06 RX ADMIN — HEPARIN SODIUM 5000 UNITS: 5000 INJECTION INTRAVENOUS; SUBCUTANEOUS at 21:44

## 2024-03-06 RX ADMIN — MIDODRINE HYDROCHLORIDE 15 MG: 5 TABLET ORAL at 13:43

## 2024-03-06 RX ADMIN — MEROPENEM 1000 MG: 1 INJECTION, POWDER, FOR SOLUTION INTRAVENOUS at 21:48

## 2024-03-06 RX ADMIN — MIDODRINE HYDROCHLORIDE 15 MG: 5 TABLET ORAL at 17:24

## 2024-03-06 RX ADMIN — Medication 10 ML: at 10:14

## 2024-03-06 RX ADMIN — HEPARIN SODIUM 5000 UNITS: 5000 INJECTION INTRAVENOUS; SUBCUTANEOUS at 15:01

## 2024-03-06 RX ADMIN — HEPARIN SODIUM 5000 UNITS: 5000 INJECTION INTRAVENOUS; SUBCUTANEOUS at 06:28

## 2024-03-06 RX ADMIN — MEROPENEM 1000 MG: 1 INJECTION, POWDER, FOR SOLUTION INTRAVENOUS at 10:14

## 2024-03-06 RX ADMIN — MIDODRINE HYDROCHLORIDE 15 MG: 5 TABLET ORAL at 08:27

## 2024-03-06 RX ADMIN — IPRATROPIUM BROMIDE AND ALBUTEROL SULFATE 1 DOSE: .5; 2.5 SOLUTION RESPIRATORY (INHALATION) at 06:05

## 2024-03-06 RX ADMIN — SALINE NASAL SPRAY 1 SPRAY: 1.5 SOLUTION NASAL at 13:43

## 2024-03-06 RX ADMIN — IPRATROPIUM BROMIDE AND ALBUTEROL SULFATE 1 DOSE: .5; 2.5 SOLUTION RESPIRATORY (INHALATION) at 18:03

## 2024-03-06 NOTE — CARE COORDINATION
3/6/2024 1430 CM Note:  Pt is being discharged to [facility] [place] and per [facility] PRECERT was obtained 3/5.  GRACIE is signed, [facility] scheduled to transport via stretcher at 1900.  Ambulance form completed and placed with the soft chart.  Pt's wife aware of d/c and time.  [facility] liaison notified of  time.  Electronically signed by Nerissa Yuan RN on 3/6/2024 at 2:48 PM

## 2024-03-07 VITALS
RESPIRATION RATE: 18 BRPM | BODY MASS INDEX: 25.23 KG/M2 | SYSTOLIC BLOOD PRESSURE: 97 MMHG | WEIGHT: 151.46 LBS | HEART RATE: 88 BPM | OXYGEN SATURATION: 96 % | TEMPERATURE: 97.7 F | DIASTOLIC BLOOD PRESSURE: 63 MMHG | HEIGHT: 65 IN

## 2024-03-07 NOTE — PROGRESS NOTES
[facility]        CARDIOLOGY                 INPATIENT PROGRESS NOTE          PATIENT SEEN IN FOLLOW UP FOR: CAD    Hospital Day: 3     Kike Holloway is a 78 year old patient known to Dr. Coates    Hx of Coronary artery disease, ischemic cardiomyopathy, and history of bicuspid aortic valve, S/P CABG and aortic valve replacement surgery in 8/2014, high grade AV block, S/P pacemaker insertion in 2014 and pulse generator in 8/2022, Atrial fibrillation presented to ED for 'severe respiratory distress\"     SUBJECTIVE: Denies any chest pain or SOB.  No orthopnea.  No distress.    ROS: Review of rest of 10 systems negative except as mentioned above    OBJECTIVE: No acute distress.  See Assessment     Diagnostics:       Telemetry: Reviewed      Echo 7/20/023   Summary   Left ventricle is normal in size .   Normal left ventricle wall thickness.   Septal motion consistent with post open heart state / paced rhythm .   There is also apical akinesis and distal septal and distal inferior wall dyskinesis.   Ejection fraction is visually estimated at 45-50%.   There is doppler evidence of stage III diastolic dysfunction.   Pacer wire visualized in right ventricle / right atrium.   Dilated right ventricle.   Right ventricle global systolic function is normal .   Interatrial septum appears intact.   Agitated saline injected for shunt evaluation with no evidence of shunt   noted at the level of the interatrial septum without or with valsalva.   Normal sized left atrium.   Dilated right atrium.   Mild mitral annular calcification.   Mild mitral regurgitation.   There is a bioprosthetic aortic valve which is well seated with a mean   gradient of 10.43 mmHg and no regurgitation.   Severe tricuspid regurgitation.   Mild to moderate pulmonary hypertension.   Aortic root is sclerotic and calcified.   Dilated Inferior Vena Cava      Intake/Output Summary (Last 24 hours) at 2/20/2024 8974  Last data filed at 
      [facility] Cardiology Inpatient Progress Note    Patient is a 78 y.o. male of Hermelindo Bedolla DO seen in hospital follow up.     Chief complaint: CAD    HPI: Some SOB. No CP.     Patient Active Problem List   Diagnosis    Hyperlipidemia with target LDL less than 100    BPH (benign prostatic hyperplasia)    ECG abnormal    Abnormal nuclear stress test    AS (aortic stenosis)    SOB (shortness of breath)    RBBB (right bundle branch block)    Anxiety    CAD (coronary artery disease)    Pulmonary emphysema (HCC)    Permanent atrial fibrillation (HCC)    AV block, Mobitz II    AV block    H/O bicuspid aortic valve    AI (aortic insufficiency)    S/P AVR (aortic valve replacement)    Anticoagulated on Coumadin    AAA (abdominal aortic aneurysm) without rupture (HCC)    Elevated liver enzymes    History of tobacco use    S/P CABG x 3    H/O aortic valve replacement with porcine valve    Aortic valve disease    Pure hypercholesterolemia    Normally functioning cardiac pacemaker present    History of smoking 30 or more pack years    Hx of CABG    PAF (paroxysmal atrial fibrillation) (HCC)    Anticoagulated by anticoagulation treatment    S/P AAA repair using bifurcation graft    Bilateral leg weakness    Chronic systolic congestive heart failure (HCC)    PVD (peripheral vascular disease) (HCC)    Stage 3a chronic kidney disease (HCC)    Pulmonary fibrosis (HCC)    Dyslipidemia    Essential hypertension    Primary insomnia    Cough in adult    OA (ocular albinism) (HCC)    Acute bacterial sinusitis    Tobacco abuse    Fatigue    History of prostate cancer    Septic shock (HCC)    Pulmonary embolism (HCC)    Acute on chronic combined systolic and diastolic congestive heart failure (HCC)    Ischemic cardiomyopathy    Palliative care encounter     Allergies   Allergen Reactions    Pneumovax [Pneumococcal Polysaccharide Vaccine] Dermatitis     Current Facility-Administered Medications   Medication Dose Route Frequency 
    2/27/2024 12:11 PM  Kike Holloway  [id number]    Subjective:    Remains in the ICU  Having left-sided hip pain today states it is worse with movement and palpation. Started suddenly today   Images are currently pending  Wing with phil urine   His wife is present     Review of Systems  Constitutional: No fever or chills   Respiratory: +shortness of breath   Cardiovascular: negative for chest pain and dyspnea  Gastrointestinal: negative for abdominal pain, diarrhea, nausea and vomiting   : See above  Derm: negative for rash and skin lesion(s)  Neurological: negative for seizures and tremors  Musculoskeletal: Negative    Psychiatric: Negative   All other reviews are negative      Scheduled Meds:   phenazopyridine  200 mg Oral TID WC    furosemide  20 mg IntraVENous Daily    potassium bicarb-citric acid  40 mEq Oral Daily    [Held by provider] apixaban  5 mg Oral BID    vancomycin  1,000 mg IntraVENous Q18H    sodium chloride flush  5-40 mL IntraVENous BID    lansoprazole  30 mg Oral QAM AC    aspirin  81 mg Oral Daily    budesonide  250 mcg Nebulization BID    ipratropium 0.5 mg-albuterol 2.5 mg  1 Dose Inhalation 4x Daily RT       Objective:  Vitals:    02/27/24 1115   BP: (!) 135/114   Pulse: 84   Resp: 15   Temp:    SpO2: 96%         Allergies: Pneumovax [pneumococcal polysaccharide vaccine]    General Appearance: alert and oriented to person, place and time and in no acute distress  Skin: no rash or erythema  Head: normocephalic and atraumatic  Pulmonary/Chest: normal air movement, no respiratory distress  Abdomen: soft, non-tender, non-distended  Genitourinary: wing with phil urine   Extremities: no cyanosis, clubbing or edema         Labs:     Recent Labs     02/27/24  0507      K 4.2      CO2 24   BUN 14   CREATININE 0.9   GLUCOSE 93   CALCIUM 8.2*       Lab Results   Component Value Date/Time    HGB 10.0 02/27/2024 05:07 AM    HCT 29.0 02/27/2024 05:07 AM    HCT 47.0 04/11/2023 10:16 AM 
    INPATIENT CARDIOLOGY FOLLOW-UP    Name: Kike Holloway    Age: 78 y.o.    Date of Admission: 2/18/2024  7:38 PM    Date of Service: 2/26/2024    Primary Cardiologist: Dr Coates    Chief Complaint: Follow-up for CAD    Interim History:  Remains on high flow nasal cannula weaned to 5 L.  Denies chest pain or shortness of breath.  Still on dopamine and dobutamine.    Review of Systems:   Negative except as described above    Problem List:  Patient Active Problem List   Diagnosis    Hyperlipidemia with target LDL less than 100    BPH (benign prostatic hyperplasia)    ECG abnormal    Abnormal nuclear stress test    AS (aortic stenosis)    SOB (shortness of breath)    RBBB (right bundle branch block)    Anxiety    CAD (coronary artery disease)    Pulmonary emphysema (HCC)    AV block, Mobitz II    AV block, 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    Pure hypercholesterolemia    S/P cardiac pacemaker procedure    History of smoking 30 or more pack years    PAF (paroxysmal atrial fibrillation) (HCC)    Anticoagulated by anticoagulation treatment    S/P AAA repair using bifurcation graft    Bilateral leg weakness    Chronic systolic congestive heart failure (HCC)    PVD (peripheral vascular disease) (HCC)    Stage 3a chronic kidney disease (HCC)    Pulmonary fibrosis (HCC)    Dyslipidemia    Essential hypertension    Primary insomnia    Cough in adult    OA (ocular albinism) (HCC)    Acute bacterial sinusitis    Tobacco abuse    Fatigue    History of prostate cancer    Septic shock (HCC)    Pulmonary embolism (HCC)       Current Medications:    Current Facility-Administered Medications:     sodium phosphate 30 mmol in sodium 
    NAME: Kike Holloway  MR:  [id number]  :   1945  Admit Date:  2024    Elements of this note, were copied and pasted from Previous. Updates have been made where noted and reflect current exam and medical decision making from the DOS of this encounter.  CHIEF COMPLAINT       Chief Complaint   Patient presents with    Respiratory Distress     Pt comes to the ED with c/o severe respiratory distress from home. Pt has hx of cardiac and respiratory issues- unable to get pulse oximeter and cyanosis present. 125mg of solumedrol present and 2 duoneb PTA by EMS.      HISTORY OF PRESENT ILLNESS     Kike Holloway is a 78 y.o. male admitted on 2024 and has  has a past medical history of Aortic stenosis, mild, Aortic valve sclerosis, Atrial fibrillation ([facility]), CAD (coronary artery disease), CHF (congestive heart failure) ([facility]), COPD (chronic obstructive pulmonary disease) ([facility]), Encounter for diabetic foot exam ([facility]), Gallbladder attack, History of blood transfusion, History of tobacco use, Hx of blood clots, Hyperlipidemia, Hypertension, Mild mitral regurgitation, Prostate cancer ([facility]), PVD (peripheral vascular disease) with claudication ([facility]), RBBB, and Tobacco abuse.     This is a face to face encounter   24 wife present pt is tired on o2     in bed tired no appetite   wife present pt in bed seems tired has pain left side s/p ct this am nurse present    wife present on o2 hematuria appetite fair     Patient is tolerating medications. No reported adverse drug reactions.  Available labs, imaging studies, microbiologic studies have been reviewed with pt and family if present.  Assessment & Plan     Admitted for   Lactic acidosis [E87.20]  NSTEMI (non-ST elevated myocardial infarction) ([facility]) [I21.4]  Acute respiratory failure with hypoxia ([facility]) [J96.01]  Septic shock ([facility]) [A41.9, R65.21]  Pulmonary embolism, unspecified chronicity, unspecified pulmonary embolism type, unspecified 
    NAME: Kike Holloway  MR:  [id number]  :   1945  Admit Date:  2024    Elements of this note, were copied and pasted from Previous. Updates have been made where noted and reflect current exam and medical decision making from the DOS of this encounter.  CHIEF COMPLAINT       Chief Complaint   Patient presents with    Respiratory Distress     Pt comes to the ED with c/o severe respiratory distress from home. Pt has hx of cardiac and respiratory issues- unable to get pulse oximeter and cyanosis present. 125mg of solumedrol present and 2 duoneb PTA by EMS.      HISTORY OF PRESENT ILLNESS     Kike Holloway is a 78 y.o. male admitted on 2024 and has  has a past medical history of Aortic stenosis, mild, Aortic valve sclerosis, Atrial fibrillation ([facility]), CAD (coronary artery disease), CHF (congestive heart failure) ([facility]), COPD (chronic obstructive pulmonary disease) ([facility]), Encounter for diabetic foot exam ([facility]), Gallbladder attack, History of blood transfusion, History of tobacco use, Hx of blood clots, Hyperlipidemia, Hypertension, Mild mitral regurgitation, Prostate cancer ([facility]), PVD (peripheral vascular disease) with claudication ([facility]), RBBB, and Tobacco abuse.     This is a face to face encounter   24  wife present asking about hospice  3/2 wife present pt in bed has pain left le low grade fevers [id number]  2l grey phlegm       wife present pt is tired on o2     in bed tired no appetite   wife present pt in bed seems tired has pain left side s/p ct this am nurse present    wife present on o2 hematuria appetite fair     Patient is tolerating medications. No reported adverse drug reactions.  Available labs, imaging studies, microbiologic studies have been reviewed with pt and family if present.  Assessment & Plan     Admitted for   Lactic acidosis [E87.20]  NSTEMI (non-ST elevated myocardial infarction) ([facility]) [I21.4]  Acute respiratory failure with hypoxia ([facility]) [J96.01]  Septic 
    NAME: Kike Holloway  MR:  [id number]  :   1945  Admit Date:  2024    Elements of this note, were copied and pasted from Previous. Updates have been made where noted and reflect current exam and medical decision making from the DOS of this encounter.  CHIEF COMPLAINT       Chief Complaint   Patient presents with    Respiratory Distress     Pt comes to the ED with c/o severe respiratory distress from home. Pt has hx of cardiac and respiratory issues- unable to get pulse oximeter and cyanosis present. 125mg of solumedrol present and 2 duoneb PTA by EMS.      HISTORY OF PRESENT ILLNESS     Kike Holloway is a 78 y.o. male admitted on 2024 and has  has a past medical history of Aortic stenosis, mild, Aortic valve sclerosis, Atrial fibrillation (HCC), CAD (coronary artery disease), CHF (congestive heart failure) (HCC), COPD (chronic obstructive pulmonary disease) (HCC), Encounter for diabetic foot exam ([facility]), Gallbladder attack, History of blood transfusion, History of tobacco use, Hx of blood clots, Hyperlipidemia, Hypertension, Mild mitral regurgitation, Prostate cancer (HCC), PVD (peripheral vascular disease) with claudication ([facility]), RBBB, and Tobacco abuse.     This is a face to face encounter   24 wife present on o2 has no c/o   3/5  in chair working with pt on RA pt decided on rehab  3/4 saw this am still thinking about hospice  3/3 wife present asking about hospice  3/2 wife present pt in bed has pain left le low grade fevers [id number]  2l grey phlegm       wife present pt is tired on o2     in bed tired no appetite   wife present pt in bed seems tired has pain left side s/p ct this am nurse present    wife present on o2 hematuria appetite fair     Patient is tolerating medications. No reported adverse drug reactions.  Available labs, imaging studies, microbiologic studies have been reviewed with pt and family if present.  Assessment & Plan     Admitted for   Lactic 
    NAME: Kike Holloway  MR:  [id number]  :   1945  Admit Date:  2024    Elements of this note, were copied and pasted from Previous. Updates have been made where noted and reflect current exam and medical decision making from the DOS of this encounter.  CHIEF COMPLAINT       Chief Complaint   Patient presents with    Respiratory Distress     Pt comes to the ED with c/o severe respiratory distress from home. Pt has hx of cardiac and respiratory issues- unable to get pulse oximeter and cyanosis present. 125mg of solumedrol present and 2 duoneb PTA by EMS.      HISTORY OF PRESENT ILLNESS     Kike Holloway is a 78 y.o. male admitted on 2024 and has  has a past medical history of Aortic stenosis, mild, Aortic valve sclerosis, Atrial fibrillation ([facility]), CAD (coronary artery disease), CHF (congestive heart failure) ([facility]), COPD (chronic obstructive pulmonary disease) ([facility]), Encounter for diabetic foot exam ([facility]), Gallbladder attack, History of blood transfusion, History of tobacco use, Hx of blood clots, Hyperlipidemia, Hypertension, Mild mitral regurgitation, Prostate cancer ([facility]), PVD (peripheral vascular disease) with claudication ([facility]), RBBB, and Tobacco abuse.     This is a face to face encounter   24 wife present pt in bed seems tired has pain left side s/p ct this am nurse present    wife present on o2 hematuria appetite fair     Patient is tolerating medications. No reported adverse drug reactions.  Available labs, imaging studies, microbiologic studies have been reviewed with pt and family if present.  Assessment & Plan     Admitted for   Lactic acidosis [E87.20]  NSTEMI (non-ST elevated myocardial infarction) ([facility]) [I21.4]  Acute respiratory failure with hypoxia ([facility]) [J96.01]  Septic shock ([facility]) [A41.9, R65.21]  Pulmonary embolism, unspecified chronicity, unspecified pulmonary embolism type, unspecified whether acute cor pulmonale present ([facility]) [I26.99]  ID following for   Has left 
    OCCUPATIONAL THERAPY BEDSIDE TREATMENT NOTE  [facility]  [address] [place]    Date:3/1/2024  Patient Name: Kike Holloway \"Ed\"  MRN: [id number]  : 1945  Room: [place] [address]         Evaluating OT: Alexander Tejeda OTR/L; #[id number]      Referring Provider and Specific Provider Orders/Date:      24 104   OT eval and treat  Start:  24,   End:  24 104,   ONE TIME,   Standing Count:  1 Occurrences,   R         Sun, Emma Nieto MD       Placement Recommendation:  Subacute Rehab       Diagnosis:   1. Pulmonary embolism, unspecified chronicity, unspecified pulmonary embolism type, unspecified whether acute cor pulmonale present ([facility])    2. Septic shock ([facility])    3. Acute respiratory failure with hypoxia ([facility])    4. Lactic acidosis    5. NSTEMI (non-ST elevated myocardial infarction) ([facility])    6. Lung nodule    7. Elevated bilirubin         Surgery: None       Pertinent Medical History:       Past Medical History        Past Medical History:   Diagnosis Date    Aortic stenosis, mild      Aortic valve sclerosis       moderate    Atrial fibrillation ([facility])     CAD (coronary artery disease)      CHF (congestive heart failure) ([facility])      COPD (chronic obstructive pulmonary disease) ([facility])      Encounter for diabetic foot exam ([facility]) 2024    Gallbladder attack      History of tobacco use 2015    Hyperlipidemia      Hypertension      Mild mitral regurgitation      Prostate cancer ([facility])       44 treatments of radiation    PVD (peripheral vascular disease) with claudication ([facility]) 2015    RBBB      Tobacco abuse              Past Surgical History         Past Surgical History:   Procedure Laterality Date    APPENDECTOMY        CARDIAC CATHETERIZATION   2014     DR Coates-pt. states had triple bypass \"5 years ago\"    COLONOSCOPY        CORONARY ARTERY BYPASS GRAFT   2014     CABGx3 LIMA-LAD, SVG-D1, 
    OCCUPATIONAL THERAPY BEDSIDE TREATMENT NOTE  [facility]  [address] [place]. [place]    Date:3/6/2024  Patient Name: Kike Holloway \"Ed\"  MRN: [id number]  : 1945  Room: [address] [place] [address]       Evaluating OT: Alexander Tejeda OTR/L; #[id number]      Referring Provider and Specific Provider Orders/Date:      24 104   OT eval and treat  Start:  24,   End:  24 104,   ONE TIME,   Standing Count:  1 Occurrences,   R         Sun, Emma Nieto MD       Placement Recommendation:  Subacute Rehab       Diagnosis:   1. Pulmonary embolism, unspecified chronicity, unspecified pulmonary embolism type, unspecified whether acute cor pulmonale present ([facility])    2. Septic shock ([facility])    3. Acute respiratory failure with hypoxia ([facility])    4. Lactic acidosis    5. NSTEMI (non-ST elevated myocardial infarction) ([facility])    6. Lung nodule    7. Elevated bilirubin         Surgery: None       Pertinent Medical History:       Past Medical History           Past Medical History:   Diagnosis Date    Aortic stenosis, mild      Aortic valve sclerosis       moderate    Atrial fibrillation ([facility])     CAD (coronary artery disease)      CHF (congestive heart failure) ([facility])      COPD (chronic obstructive pulmonary disease) ([facility])      Encounter for diabetic foot exam ([facility]) 2024    Gallbladder attack      History of tobacco use 2015    Hyperlipidemia      Hypertension      Mild mitral regurgitation      Prostate cancer ([facility])       44 treatments of radiation    PVD (peripheral vascular disease) with claudication ([facility]) 2015    RBBB      Tobacco abuse              Past Surgical History             Past Surgical History:   Procedure Laterality Date    APPENDECTOMY        CARDIAC CATHETERIZATION   2014     DR Coates-pt. states had triple bypass \"5 years ago\"    COLONOSCOPY        CORONARY ARTERY BYPASS GRAFT   2014     CABGx3 LIMA-LAD, SVG-D1, 
   Progress Note  Admit Date:  2/18/2024  7:38 PM  Patient Name:Kike Holloway     YOB: 1945     Age:78 y.o.    FACE TO FACE ENCOUNTER  02/21/24    Assessment/PLAN        Hospital Problems             Last Modified POA    * (Principal) Septic shock (HCC) 2/19/2024 Yes    Pulmonary embolism (HCC) 2/19/2024 Yes     ID FOLLOWING FOR  Leukocytosis  Cons bacteremia has pacer will need f/u blood cx cont vanco   Echo +/-RIAZ  Elevated procal     vancomycin (VANCOCIN) 750 mg in sodium chloride 0.9 % 250 mL IVPB, Q24H  ceFEPIme (MAXIPIME) 2,000 mg in sodium chloride 0.9 % 100 mL IVPB ([id number]), Q12H  metroNIDAZOLE (FLAGYL) 500 mg in 0.9% NaCl 100 mL IVPB premix, Q8H     Suggest RIAZ    Subjective     Review of Systems     Interval History Status:    DOS  2/21/2024 afebrile nad no f/c breathing better 3L cr1.1 wbc16.3   2/20 on o2 feels anxious did not sleep c/o constipation     Medications   Scheduled Meds:    vancomycin  750 mg IntraVENous Q24H    pantoprazole  40 mg Oral QAM AC    cefepime  2,000 mg IntraVENous Q12H    metroNIDAZOLE  500 mg IntraVENous Q8H    midodrine  15 mg Oral TID WC    aspirin  81 mg Oral Daily    budesonide  250 mcg Nebulization BID    ipratropium 0.5 mg-albuterol 2.5 mg  1 Dose Inhalation 4x Daily RT     Continuous Infusions:    DOBUTamine 2.5 mcg/kg/min (02/21/24 1531)    DOPamine 2.5 mcg/kg/min (02/21/24 1531)    lactated ringers IV soln 75 mL/hr at 02/21/24 1531    norepinephrine Stopped (02/20/24 1011)    heparin (PORCINE) Infusion Stopped (02/21/24 1034)     PRN Meds: heparin (porcine), heparin (porcine)    Physical Examination      Vitals:  BP (!) 89/67   Pulse 75   Temp 98.8 °F (37.1 °C) (Core)   Resp 23   Ht 1.676 m (5' 5.98\")   Wt 71.6 kg (157 lb 12.8 oz)   SpO2 93%   BMI 25.48 kg/m²     Body Habitus: normal/healthy weight   General Appearance:  no acute distress  Eyes: PERRLA, EOMI  HENT:  normocephalic atraumatic lips cyanotic   Neck: supple, no 
   Progress Note  Admit Date:  2/18/2024  7:38 PM  Patient Name:Kike Holloway     YOB: 1945     Age:78 y.o.    FACE TO FACE ENCOUNTER  02/25/24    Assessment/PLAN        Hospital Problems             Last Modified POA    * (Principal) Septic shock (HCC) 2/19/2024 Yes    Pulmonary embolism (HCC) 2/19/2024 Yes   ID FOLLOWING FOR  Leukocytosis better   Cons bacteremia has pacer will need f/u blood cx cont vanco   F/u blood cx ngtd   Echo +/-RIAZ  Elevated procal     vancomycin (VANCOCIN) 750 mg in sodium chloride 0.9 % 250 mL IVPB, Q24H  ceFEPIme (MAXIPIME) 2,000 mg in sodium chloride 0.9 % 100 mL IVPB ([id number]), Q12H  Recommend RIAZ    Subjective     Review of Systems     Interval History Status:    DOS  2/25/2024 in chair feels cold slept better   2/21 afebrile nad no f/c breathing better 3L cr1.1 wbc16.3   2/20 on o2 feels anxious did not sleep c/o constipation     Medications   Scheduled Meds:    phenazopyridine  200 mg Oral TID WC    furosemide  20 mg IntraVENous Daily    potassium bicarb-citric acid  40 mEq Oral Daily    [Held by provider] apixaban  5 mg Oral BID    vancomycin  1,000 mg IntraVENous Q18H    sodium chloride flush  5-40 mL IntraVENous BID    lansoprazole  30 mg Oral QAM AC    cefepime  2,000 mg IntraVENous Q12H    midodrine  15 mg Oral TID WC    aspirin  81 mg Oral Daily    budesonide  250 mcg Nebulization BID    ipratropium 0.5 mg-albuterol 2.5 mg  1 Dose Inhalation 4x Daily RT     Continuous Infusions:    DOBUTamine 5 mcg/kg/min (02/25/24 1420)    DOPamine 4 mcg/kg/min (02/25/24 1420)    heparin (PORCINE) Infusion 16 Units/kg/hr (02/25/24 1420)     PRN Meds: acetaminophen, heparin (porcine), heparin (porcine), perflutren lipid microspheres, sodium chloride flush, melatonin    Physical Examination      Vitals:  /76   Pulse 86   Temp 97.4 °F (36.3 °C) (Axillary)   Resp 24   Ht 1.651 m (5' 5\")   Wt 71.3 kg (157 lb 3.2 oz)   SpO2 97%   BMI 26.16 kg/m²     Body Habitus: 
  Palliative Care Department  [phone]  Palliative Care Progress Note  Provider NURY Root CNP     Kike Holloway  [id number]  Hospital Day: 13  Date of Initial Consult: 2/29/2024  Referring Provider: Tim Carl APRN - CNP  Palliative Medicine was consulted for assistance with: Goals of care, psychosocial distress, overwhelming symptoms    HPI:   Kike Holloway is a 78 y.o. with a medical history of mild aortic stenosis, aortic valve sclerosis, atrial fibrillation, CAD, CHF, COPD, history of tobacco use, history of blood clots, hyperlipidemia, hypertension, mild mitral regurgitation, prostate cancer, PVD, who was admitted on 2/18/2024 from home with a CHIEF COMPLAINT of respiratory distress. CTA did show evidence of irregular filling defect in the pulmonary artery suggesting small pulmonary embolism along with multilevel nodule in the right segment with signs of right-sided heart failure. Per ED note patient stated he had SOB over the last few days and has started having chest pain and chest tightness.  Ultimately the patient's spouse called EMS to seek treatment at [facility].  Patient has a history of PVD and per ED note patient appeared to have poor circulation with cyanosis discoloration of hands, feet, nose and patient stated that it was worse.  Patient found to have retroperitoneal hematoma in the iliopsoas area.  Patient has a history of HFrEF, LVEF 35-40% with right lower lobe pulmonary embolism.  Palliative medicine consulted for further assistance.    ASSESSMENT/PLAN:     Pertinent Hospital Diagnoses     Septic shock  Elevated troponin  CAD  Acute hypoxemic respiratory failure  Pulmonary embolism  Left iliopsoas hematoma  History of PVD  COPD      Palliative Care Encounter / Counseling Regarding Goals of Care  Please see detailed goals of care discussion as below  At this time, Kike Holloway, Does have capacity for medical decision-making.  Capacity is time limited and 
  Palliative Care Department  [phone]  Palliative Care Progress Note  Provider NURY Root CNP     Kike Holloway  [id number]  Hospital Day: 18  Date of Initial Consult: 2/29/2024  Referring Provider: Tim Carl APRN - CNP  Palliative Medicine was consulted for assistance with: Goals of care, psychosocial distress, overwhelming symptoms    HPI:   Kike Holloway is a 78 y.o. with a medical history of mild aortic stenosis, aortic valve sclerosis, atrial fibrillation, CAD, CHF, COPD, history of tobacco use, history of blood clots, hyperlipidemia, hypertension, mild mitral regurgitation, prostate cancer, PVD, who was admitted on 2/18/2024 from home with a CHIEF COMPLAINT of respiratory distress. CTA did show evidence of irregular filling defect in the pulmonary artery suggesting small pulmonary embolism along with multilevel nodule in the right segment with signs of right-sided heart failure. Per ED note patient stated he had SOB over the last few days and has started having chest pain and chest tightness.  Ultimately the patient's spouse called EMS to seek treatment at [facility].  Patient has a history of PVD and per ED note patient appeared to have poor circulation with cyanosis discoloration of hands, feet, nose and patient stated that it was worse.  Patient found to have retroperitoneal hematoma in the iliopsoas area.  Patient has a history of HFrEF, LVEF 35-40% with right lower lobe pulmonary embolism.  Palliative medicine consulted for further assistance.    ASSESSMENT/PLAN:     Pertinent Hospital Diagnoses     Septic shock  Elevated troponin  CAD  Acute hypoxemic respiratory failure  Pulmonary embolism  Left iliopsoas hematoma  History of PVD  COPD      Palliative Care Encounter / Counseling Regarding Goals of Care  Please see detailed goals of care discussion as below  At this time, Kike Holloway, Does have capacity for medical decision-making.  Capacity is time limited and 
  Palliative Care Department  [phone]  Palliative Care Progress Note  Provider NURY Root CNP     Kike Holloway  [id number]  Hospital Day: 16  Date of Initial Consult: 2/29/2024  Referring Provider: Tim Carl APRN - CNP  Palliative Medicine was consulted for assistance with: Goals of care, psychosocial distress, overwhelming symptoms    HPI:   Kike Holloway is a 78 y.o. with a medical history of mild aortic stenosis, aortic valve sclerosis, atrial fibrillation, CAD, CHF, COPD, history of tobacco use, history of blood clots, hyperlipidemia, hypertension, mild mitral regurgitation, prostate cancer, PVD, who was admitted on 2/18/2024 from home with a CHIEF COMPLAINT of respiratory distress. CTA did show evidence of irregular filling defect in the pulmonary artery suggesting small pulmonary embolism along with multilevel nodule in the right segment with signs of right-sided heart failure. Per ED note patient stated he had SOB over the last few days and has started having chest pain and chest tightness.  Ultimately the patient's spouse called EMS to seek treatment at [facility].  Patient has a history of PVD and per ED note patient appeared to have poor circulation with cyanosis discoloration of hands, feet, nose and patient stated that it was worse.  Patient found to have retroperitoneal hematoma in the iliopsoas area.  Patient has a history of HFrEF, LVEF 35-40% with right lower lobe pulmonary embolism.  Palliative medicine consulted for further assistance.    ASSESSMENT/PLAN:     Pertinent Hospital Diagnoses     Septic shock  Elevated troponin  CAD  Acute hypoxemic respiratory failure  Pulmonary embolism  Left iliopsoas hematoma  History of PVD  COPD      Palliative Care Encounter / Counseling Regarding Goals of Care  Please see detailed goals of care discussion as below  At this time, Kike Holloway, Does have capacity for medical decision-making.  Capacity is time limited and 
  Pharmacy Note - Renal Dosing    Cefepime 1000mg Q12h for treatment of Urinary tract infection. Per [facility] Renal Dose Adjustment Policy, cefepime will be changed to 2000mg load followed by 2000mg Q24h extended infusion        Please call with any questions.    Thank you,    Eileen Peterson, [facility]   
  Physician Progress Note      PATIENT:               SEAN MCCARTHY  CSN #:                  [id number]  :                       1945  ADMIT DATE:       2024 7:38 PM  DISCH DATE:  RESPONDING  PROVIDER #:        Macario Dolan DO          QUERY TEXT:    Dear ,    Patient admitted with sepsis with shock/pneumonia.  If possible, please   document and clarify in the progress notes and discharge summary if you are   evaluating and/or treating any of the following:    The medical record reflects the following:  Risk Factors: sepsis with shock, CAD, hx CABG, pacer, valve replacement, COPD,   CKD, PVD, A fib  Clinical Indicators: troponin 120, 185, 216, 172; per H&P: \"Elevated troponin   suggesting demand ischemia\"; per Pulmonary consult : \"NSTEMI\"; per   Cardiology consult : \"Elevated Troponin  - Likely demand ischemia from   hypoxia, hypotension and Renal failure, no CP. EKG noted. Echo pending\"  Treatment: ICU monitoring, Cardiology and Pulmonary consults, BIPAP/O2, EKG,   serial troponin, Echo    Thank You,  Kylee Bosch RN, BSN, CDS  Clinical Documentation Improvement Specialist  [phone]  Options provided:  -- NSTEMI  -- Type 2 MI  -- Demand Ischemia with MI  -- Demand Ischemia only, no MI  -- Other - I will add my own diagnosis  -- Disagree - Not applicable / Not valid  -- Disagree - Clinically unable to determine / Unknown  -- Refer to Clinical Documentation Reviewer    PROVIDER RESPONSE TEXT:    This patient has an NSTEMI.    Query created by: Kylee Bosch on 2024 10:52 AM      Electronically signed by:  Macario Dolan DO 2024 4:29 PM          
  Physician Progress Note      PATIENT:               SEAN MCCARTHY  CSN #:                  [id number]  :                       1945  ADMIT DATE:       2024 7:38 PM  DISCH DATE:  RESPONDING  PROVIDER #:        Jeet Sharpe DO        QUERY TEXT:    Type of Encephalopathy: Please provide further specificity, if known.    Clinical indicators include: chronic kidney disease, septic, bacteremia,   celeste, infection, fevers, ckd, encephalopathy  Options provided:  -- Anoxic/hypoxic encephalopathy  -- Metabolic encephalopathy  -- Toxic encephalopathy  -- Hepatic encephalopathy  -- Hypertensive encephalopathy  -- Other - I will add my own diagnosis  -- Disagree - Not applicable / Not valid  -- Disagree - Clinically Unable to determine / Unknown        PROVIDER RESPONSE TEXT:    The patient has anoxic/hypoxic encephalopathy.      Electronically signed by:  Jeet Sharpe DO 3/5/2024 4:23 PM          
  SPEECH/LANGUAGE PATHOLOGY  CLINICAL ASSESSMENT OF SWALLOWING FUNCTION   and PLAN OF CARE    PATIENT NAME:  Kike Holloway  (male)     MRN:  [id number]    :  1945  (78 y.o.)  STATUS:  Inpatient: Room IC11/11-    TODAY'S DATE:  2024  REFERRING PROVIDER:      SLP swallowing-dysphagia evaluation and treatment  Start:  24 1245,   End:  24 1245,   ONE TIME,   Standing Count:  1 Occurrences,   R       Emma Garsia MD   REASON FOR REFERRAL:eval swallowing/advance diet    EVALUATING THERAPIST: ANIL You                 RESULTS:    DYSPHAGIA DIAGNOSIS:   Clinical indicators of functional oropharyngeal swallow for age/premorbid functioning      DIET RECOMMENDATIONS:  Regular consistency solids (IDDSI level 7) with  thin liquids (IDDSI level 0)     FEEDING RECOMMENDATIONS:     Assistance level:  Set-up is required for all oral intake      Compensatory strategies recommended: slow rate       Discussed recommendations with nursing and/or faxed report to referring provider: Yes    SPEECH THERAPY  PLAN OF CARE   The dysphagia POC is established based on physician order, dysphagia diagnosis and results of clinical assessment     Meal time assessment for 1-2 sessions to provide diet modification and compensatory strategy implementation to determine if fatigue/decreased endurance during meal compromises swallow function      Conditions Requiring Skilled Therapeutic Intervention for dysphagia:    Incoordination of swallow/breathing pattern due to compromised respiratory system    Specific dysphagia interventions to include:     compensatory swallowing strategies to improve airway protection and swallow function.  Training in positioning for improved integrity of swallow  energy conservation training to decrease potential for aspiration associated with compromised swallow/breathing sequence    Specific instructions for next treatment:  development and training of compensatory swallow 
 Current HR 86, /85, provider notified. Verbal order received to discontinue dobutamine infusion.  
.4 Eyes Skin Assessment     NAME:  Kike Holloway  YOB: 1945  MEDICAL RECORD NUMBER:  [id number]    The patient is being assessed for  Transfer to New Unit    I agree that at least one RN has performed a thorough Head to Toe Skin Assessment on the patient. ALL assessment sites listed below have been assessed.      Areas assessed by both nurses:    Head, Face, Ears, Shoulders, Back, Chest, Arms, Elbows, Hands, Sacrum. Buttock, Coccyx, Ischium, Legs. Feet and Heels, and Under Medical Devices         Does the Patient have a Wound? No noted wound(s)       Raheem Prevention initiated by RN: Yes  Wound Care Orders initiated by RN: No    Pressure Injury (Stage 3,4, Unstageable, DTI, NWPT, and Complex wounds) if present, place Wound referral order by RN under : No    New Ostomies, if present place, Ostomy referral order under : No     Nurse 1 eSignature: Electronically signed by Antonette Bryson RN on 3/5/24 at 5:57 PM EST    **SHARE this note so that the co-signing nurse can place an eSignature**    Nurse 2 eSignature: Electronically signed by Scott Estrada RN on 3/5/24 at 6:57 PM EST    
10 mEq of potassium chloride ordered for this pt. Started in the Peripheral IV at 0612. At 0618 pt called this RN into the room for pain at IV site. IV site was red and cool to the touch. This RN immediately stopped all all IV fluids. Pharmacy was called and advised to put a cold compress on the site and advised a medication to help. Called NP to order med, NP advised pharmacy order med for accuracy. Awaiting arrival of med to administer. This RN marked the redness with a skin marker, removed the IV and placed a new IV in the opposite arm. IV fluid restarted and monitored in the room for several minutes to assess for signs of infiltration. No signs of infiltration with new IV.     Alison Lane RN'  3/4/2024    
1215- Patient transported to CT and then to US via monitored bed accompanied by RNx2.     1240- Patient returns to ICU-11. Please see flowsheets for vitals. Patient tolerated transport well.  
2/28/2024 8:15 AM  Service: Urology  Group: [facility] (Gonzalez/Reynold)    Kkie Holloway  [id number]    Subjective: Temperature is 99.7  Patient is awake and alert no abdominal pain    Review of Systems  Respiratory: negative  Cardiovascular: negative  Gastrointestinal: negative  Hematologic/lymphatic: negative  Musculoskeletal:negative  Neurological: negative  Endocrine: negative    Scheduled Meds:   metoprolol succinate  12.5 mg Oral Daily    phenazopyridine  200 mg Oral TID WC    furosemide  20 mg IntraVENous Daily    potassium bicarb-citric acid  40 mEq Oral Daily    [Held by provider] apixaban  5 mg Oral BID    vancomycin  1,000 mg IntraVENous Q18H    sodium chloride flush  5-40 mL IntraVENous BID    lansoprazole  30 mg Oral QAM AC    [Held by provider] aspirin  81 mg Oral Daily    budesonide  250 mcg Nebulization BID    ipratropium 0.5 mg-albuterol 2.5 mg  1 Dose Inhalation 4x Daily RT       Objective:  Vitals:    02/28/24 0813   BP:    Pulse:    Resp: 25   Temp:    SpO2:          Allergies: Pneumovax [pneumococcal polysaccharide vaccine]    General Appearance: alert and oriented to person, place and time and in no acute distress  Skin: no rash or erythema  Head: normocephalic and atraumatic  Pulmonary/Chest: clear to auscultation bilaterally- no wheezes, rales or rhonchi, normal air movement, no respiratory distress and no chest wall tenderness  Abdomen: soft, non-tender, non-distended, normal bowel sounds, no masses or organomegaly and no inguinal adenopathy  Genitalia: No penile or scrotal swelling or masses.  He is uncircumcised extremities: no cyanosis, clubbing or edema and Stoney's sign negative bilaterally      Courtney well positioned and draining clear urine.    Labs:     Recent Labs     02/28/24  0406      K 4.4      CO2 26   BUN 12   CREATININE 0.9   GLUCOSE 104*   CALCIUM 8.2*       Lab Results   Component Value Date/Time    HGB 9.3 02/28/2024 04:06 AM    HCT 27.3 02/28/2024 04:06 AM    
4 Eyes Skin Assessment     NAME:  Kike Holloway  YOB: 1945  MEDICAL RECORD NUMBER:  [id number]    The patient is being assessed for  Admission    I agree that at least one RN has performed a thorough Head to Toe Skin Assessment on the patient. ALL assessment sites listed below have been assessed.      Areas assessed by both nurses:    Head, Face, Ears, Shoulders, Back, Chest, Arms, Elbows, Hands, Sacrum. Buttock, Coccyx, Ischium, Legs. Feet and Heels, and Under Medical Devices     Noted to have blanchable redness on buttocks-preventative mepilex applied. Also has blanchable maroon-ness under BIPAP mask   Small umbilical hernia   Ears/feet/nailbeds are cyanotic  Vascular discoloration to bilateral shins.   Heels are dry, cracked, flaky, and purple but blanchable.          Does the Patient have a Wound? No noted wound(s)       Raheem Prevention initiated by RN: Yes  Wound Care Orders initiated by RN: Yes    Pressure Injury (Stage 3,4, Unstageable, DTI, NWPT, and Complex wounds) if present, place Wound referral order by RN under : No    New Ostomies, if present place, Ostomy referral order under : No     Nurse 1 eSignature: Electronically signed by Madonna June RN on 2/19/24 at 1:56 AM EST    **SHARE this note so that the co-signing nurse can place an eSignature**    Nurse 2 eSignature: Electronically signed by George Lozano RN on 2/19/24 at 2:02 AM EST    
Antibiotic Extended Infusion Policy     This patient is on medication that requires renal, weight, and/or indication dose adjustment.      Date Body Weight IBW  Adjusted BW SCr  CrCl Dialysis status BMI   2/19/2024 65.8 kg (145 lb) Ideal body weight: 63.8 kg (140 lb 10.5 oz)  Adjusted ideal body weight: 64.6 kg (142 lb 6.3 oz) Serum creatinine: 1.5 mg/dL (H) 02/19/24 0459  Estimated creatinine clearance: 35 mL/min (A) N/a Body mass index is 23.4 kg/m².       Pharmacy has dose-adjusted the following medication(s):    Ordered Medication: Cefepime 1000mg q8h     Order Changed/converted to: Cefepime 2000mg q12h    These changes were made per protocol according to the [facility]   Automatic Extended Infusion Dose Adjustment Policy.     *Please note this dose may need readjusted if patient's condition changes.    Please contact pharmacy with any questions regarding these changes.    Kirsty Krause, PharmD.  2/19/2024 12:33 PM    [facility]: [phone]    
Antibiotic Extended Infusion Policy     This patient is on medication that requires renal, weight, and/or indication dose adjustment.      Date Body Weight IBW  Adjusted BW SCr  CrCl Dialysis status BMI   3/1/2024 69.6 kg (153 lb 8 oz) Ideal body weight: 61.5 kg (135 lb 9.3 oz)  Adjusted ideal body weight: 64.8 kg (142 lb 12 oz) Serum creatinine: 1.2 mg/dL 03/01/24 0434  Estimated creatinine clearance: 43 mL/min N/a Body mass index is 25.54 kg/m².       Pharmacy has dose-adjusted the following medication(s):    Ordered Medication: Meropenem 1000mg q8h     Order Changed/converted to: Meropenem 1000mg q12h    These changes were made per protocol according to the [facility]   Automatic Extended Infusion Dose Adjustment Policy.     *Please note this dose may need readjusted if patient's condition changes.    Please contact pharmacy with any questions regarding these changes.    Gracie Miller RPH  3/1/2024  8:00 AM   
Assessment and Plan  Patient is a 78 y.o. male with the following medical Problems:     Septic shock  Cardiogenic shock  Suspected left pulmonary embolism  Acute exacerbation of heart failure with reduced ejection fraction (estimated EF 35-40%%)  Acute metabolic alkalosis  Acute hypoxic respiratory failure  Lactic acidosis  NSTEMI.  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  Atrial fibrillation with intermittent RVR  AAA  Coronary artery disease ( s/p CABG in 2014 - No CP -Resume home meds when BP stable )  Valvular heart disease with mitral regurgitation and tricuspid regurgitation  COPD without acute exacerbation  Severe protein calorie manage    Most recent hemodynamics reviewed, continue to monitor  Transfuse for hemoglobin< 7, and signs of bleeding  Patient received vancomycin and cefepime in ED  Continue with cefepime, vancomycin, and Flagyl  Low-dose dopamine and dobutamine  Blood cultures are +ve X1.  Repeated cultures are ordered  Urine culture ordered, results pending  Respiratory panel -ve  COVID-19 rapid negative  Patient BiPAP PRN ,.  While sleeping and napping  Oxygen protocol initiated  DuoNeb ordered 4 times daily  Budesonide ordered  CTA pulmonary with contrast showed irregular filling defect in the left main pulmonary artery could represent acute/recent pulmonary embolism.  Arthrosclerotic changes of the thoracic aorta.  Previous endovascular repair for aneurysm.  Advanced as emphysematous changes lung parenchyma.  Suspicious mild lobulated nodule in the superior segment of the right lower lobe measuring 10 mm.  Suggest correlation with PET/CT scan.  Suspicious linear spiculated scar in the posterior segment of the right upper lobe.  Findings of right-sided failure with prominent right atrium and reflux of contrast from the right atrium into the inferior vena cava/hepatic 
Chart reviewed. Patient worked with therapies yesterday. He is deciding between continuing with therapies and treatment vs hospice care. Will await decision. Palliative medicine will continue to follow.   
Comprehensive Nutrition Assessment    Type and Reason for Visit:  Initial, Positive Nutrition Screen (MST 2)    Nutrition Recommendations/Plan:   Continue Current Diet  Begin ONS (high harini/high protein) BID     Malnutrition Assessment:  Malnutrition Status:  At risk for malnutrition (Comment) (d/t chronic respiratory demands d/t COPD) (02/20/24 1029)    Context:  Chronic Illness     Findings of the 6 clinical characteristics of malnutrition:  Energy Intake:  Mild decrease in energy intake (Comment) (poor po intake prior to admission)  Weight Loss:  No significant weight loss     Body Fat Loss:  Mild body fat loss Orbital, Triceps   Muscle Mass Loss:  Mild muscle mass loss Clavicles (pectoralis & deltoids), Temples (temporalis)  Fluid Accumulation:  Mild Extremities   Strength:  Not Performed    Nutrition Assessment:    Pt admit for septic shick, CHF exacerbation, Respiratory failure, NSTEMI, RIGO and leucocytosis. PMHx: COPD, Afib, Pacemaker, CABG, CAD, CHF, HLD, HTN, Prostate cancer, PVD, RBBB. Pt consult for MST 2. Mild Malnutrition identified. Pt reports good appetite eating % of meals. Will provide ONS BID for extra protein. Continue Inpatient monitroing, f/up per policy.    Nutrition Related Findings:    A/O x4, abd wdl, bowel sounds x4 hypoactive, BLLE edema trace, MAP 75, Chloride 11, CO2 20, elevated BUN/Creatinine, GFR 45, Ca+ 8.1, 40L/min O2, Norepinephrine 5mcg/min, ALK PHOS 147, Bili 1.6 Wound Type: Open Wounds (redness on nose)       Current Nutrition Intake & Therapies:    Average Meal Intake: %  Average Supplements Intake: None Ordered  ADULT DIET; Regular; No Added Salt (3-4 gm)    Anthropometric Measures:  Height: 167.6 cm (5' 5.98\")  Ideal Body Weight (IBW): 142 lbs (65 kg)    Admission Body Weight: 70.6 kg (155 lb 10.3 oz)  Current Body Weight: 70.6 kg (155 lb 10.3 oz), 109.6 % IBW. Weight Source: Bed Scale (02/20/24)  Current BMI (kg/m2): 25.1  Usual Body Weight: 68 kg (149 lb 14.6 
Comprehensive Nutrition Assessment    Type and Reason for Visit:  Reassess    Nutrition Recommendations/Plan:   Pt with ongoing poor oral intake, declining ONS as no longer interested.   Recommend alternate route of nutrition support,  if desired per goals of care (noted palliative consulted; hospice following) ; consult RD for recommendations.      Malnutrition Assessment:  Malnutrition Status:  Moderate malnutrition (02/26/24 1324)    Context:  Chronic Illness     Findings of the 6 clinical characteristics of malnutrition:  Energy Intake:  Mild decrease in energy intake (Comment) (Poor PO PTA)  Weight Loss:  No significant weight loss     Body Fat Loss:  Severe body fat loss (mod-severe) Orbital, Triceps, Buccal region   Muscle Mass Loss:  Severe muscle mass loss (mod severe) Temples (temporalis), Clavicles (pectoralis & deltoids)  Fluid Accumulation:  Unable to assess Extremities   Strength:  Not Performed    Nutrition Assessment:    Pt at high nutritional risk d/t inadequate oral intake. Now declining ONS, expressed he has not interest in them or eating. Lunch meal untouched at bedside. Pt admitted with septic shock, CHF exacerbation; respiratory failure; NSTEMi; RIGO; Leukocytosis. Pt meets criteria for malnutrition. Consideration for alternate route of nutrition support if desired per goals of care; consult RD for recommendations.    Nutrition Related Findings:    A/Ox 3; disoriented x 1; + I/Os;abd round, active BS, loss of appetite; trace edema; + muscle/fat wasting. Wound Type: Open Wounds (redness on nose)       Current Nutrition Intake & Therapies:    Average Meal Intake: 1-25%, 0% (No interest / desire in eating.)  Average Supplements Intake: Unable to assess (Declines ONS, numerous at bedside untouched. Declines additional/ alternate ONS)  ADULT DIET; Regular; No Added Salt (3-4 gm)  ADULT ORAL NUTRITION SUPPLEMENT; Breakfast, Lunch, Dinner; Standard High Calorie/High Protein Oral Supplement  ADULT 
Comprehensive Nutrition Assessment    Type and Reason for Visit:  Reassess    Nutrition Recommendations/Plan:   Recommend trial Magic Cup BID (290 kcal, 9 gm pro per 4oz) to optimize nutrient intake at this time. Can increase to TID if accepting.   Continue standard ONS TID (350 kcal, 20 gm pro per 8 oz) at this time; pt without appetite/desire for foods but willing to drink ONS.   Continue current diet - encourage oral intake as tolerated.     Malnutrition Assessment:  Malnutrition Status:  Moderate malnutrition (02/26/24 1324)    Context:  Chronic Illness     Findings of the 6 clinical characteristics of malnutrition:  Energy Intake:  Mild decrease in energy intake (Comment) (Poor PO PTA)  Weight Loss:  No significant weight loss     Body Fat Loss:  Mild body fat loss Orbital, Triceps, Buccal region   Muscle Mass Loss:  Mild muscle mass loss Temples (temporalis), Clavicles (pectoralis & deltoids)  Fluid Accumulation:  Unable to assess Extremities   Strength:  Not Performed    Nutrition Assessment:    Pt remains at nutritional risk d/t inadequate intakes. Pt describes as food not \"looking or tasting good,\" has consumed 2 Ensure today, states he does not like but know he needs to drink for nutrition. Pt admitted with septic shock; CHF exacerbation, respiratory failure, NSTEMIi, RIGO, leucocytosis. Pt does meet criteria for protein calorie malnutrition.  Pt accepting of ice cream/milkshakes; agreeable to trial Magic Cup with meals to optimize nutrient intake at this time. Will continue to monitor.    Nutrition Related Findings:    A/O x4; +5 LNC o2; +I/Os; abd round, soft, active BS, trace edema; + mild fat/muscle wasting; hypoPO4 noted. Wound Type: Open Wounds (redness on nose)       Current Nutrition Intake & Therapies:    Average Meal Intake: 0%  Average Supplements Intake: % (x 2 per pt)  ADULT DIET; Regular; No Added Salt (3-4 gm)  ADULT ORAL NUTRITION SUPPLEMENT; Breakfast, Lunch, Dinner; Standard High 
Consult received and chart reviewed. Visit made to the bedside. Wife Virginia at the bedside. Pt is alert and oriented x3. Hospice philosophy and services discussed. All questions answered. Pt would like to speak with his sons. HOTV will continue to follow. Updates provided to bedside nurse.     Electronically signed by Luh Puente RN on 3/3/2024 at 3:03 PM  [phone]  
HOSPICE OF [facility]    Met patient and his wife at bedside. Patient confirms he is still undecided about Hospice services. All Hospice questions answered. The patient has spoken with 2 of his sons but wishes to talk with his eldest son about Hospice care. The eldest son will be visiting him at the hospital this evening. The patient is leaning toward continuing with therapies. He was able to get out of bed today and per his wife \"it lifted his spirits.\"hospitals will follow up with patient tomorrow.      Electronically signed by Trixie Glass RN on 3/4/2024 at 4:14 PM  [phone]: [phone]  
HOSPICE [facility]    Updated that patient and family decline Hospice services. HOTV will sign off. Please re consult if need arises.     Electronically signed by Trixie Glass RN on 3/5/2024 at 12:02 PM  [phone]: [phone]  
Internal Medicine Progress Note    [facility]    Macario Dolan D.O., MARCOS Sharpe D.O., MARCOS Thomas D.O.       Yamilet Bee, MSN, APRN, NP-C  Jose Suresh, MSN, APRN-CNP  Tim Carl, MSN, APRN-CNP     Primary Care Physician: Hermelindo Bedolla DO   Admitting Physician:  Macario Dolan DO  Admission date and time: 2/18/2024  7:38 PM    Room:  [place] [address]  Admitting diagnosis: Lactic acidosis [E87.20]  NSTEMI (non-ST elevated myocardial infarction) ([facility]) [I21.4]  Acute respiratory failure with hypoxia ([facility]) [J96.01]  Septic shock ([facility]) [A41.9, R65.21]  Pulmonary embolism, unspecified chronicity, unspecified pulmonary embolism type, unspecified whether acute cor pulmonale present ([facility]) [I26.99]    Patient Name: Kike Holloway  MRN: [id number]    Date of Service: 3/1/2024     Subjective:  Kike is a 78 y.o. male who was seen and examined today,3/1/2024, at the bedside.  Patient currently resting comfortably today.  Blood pressure is somewhat improved back on midodrine.  Patient has significant elevated temperature last evening and is currently on a cooling blanket.  Multiple antibiotics are being given and the patient has been seen by palliative care.  Consideration towards repeating CT    Review of System:   Constitutional:   Still confused but slightly more oriented today  HEENT:   Denies ear pain, sore throat, sinus or eye problems.  Nasal cannula oxygen in place.  Periorbital cyanosis is identified.  Cardiovascular:   Denies any chest pain, irregular heartbeats, or palpitations.   Respiratory:   Shortness of breath seems stable.  No accessory muscle usage is identified.  Gastrointestinal:   Decreased appetite with decreased oral intake.  Genitourinary:    Denies any urgency, frequency, hematuria. Voiding with use of a Courtney catheter.    Extremities:   Third spacing with edema  Neurology:    Somnolent 
Internal Medicine Progress Note    [facility]    Macario Dolan D.O., QIANI.                    Jeet Sharpe D.O., MARCOS Thomas D.O.       Yamilet Bee, MSN, APRN, NP-C  Jose Suresh, MSN, APRN-CNP  Tim Carl, MSN, APRN-CNP     Primary Care Physician: Hermelindo Bedolla DO   Admitting Physician:  Macario Dolan DO  Admission date and time: 2/18/2024  7:38 PM    Room:  [place] [address]  Admitting diagnosis: Lactic acidosis [E87.20]  NSTEMI (non-ST elevated myocardial infarction) ([facility]) [I21.4]  Acute respiratory failure with hypoxia ([facility]) [J96.01]  Septic shock ([facility]) [A41.9, R65.21]  Pulmonary embolism, unspecified chronicity, unspecified pulmonary embolism type, unspecified whether acute cor pulmonale present ([facility]) [I26.99]    Patient Name: Kike Holloway  MRN: [id number]    Date of Service: 2/29/2024     Subjective:  Kike is a 78 y.o. male who was seen and examined today,2/29/2024, at the bedside.  Patient resting comfortably at present time but seems somewhat somnolent.  The patient is currently off dopamine and dobutamine but according to the nursing he seems more somnolent when he is off this medication.  I have discussed the condition with Dr. Calderon and he has instituted Sinemet at this time in hopes of raising his intrinsic dopamine level.  Medication will be added for constipation.  He still complaining of left-sided pain    Review of System:   Constitutional:   Still confused but slightly more oriented today  HEENT:   Denies ear pain, sore throat, sinus or eye problems.  Nasal cannula oxygen in place.  Periorbital cyanosis is identified.  Cardiovascular:   Denies any chest pain, irregular heartbeats, or palpitations.   Respiratory:   Shortness of breath seems stable.  No accessory muscle usage is identified.  Gastrointestinal:   Decreased appetite with decreased oral intake.  Genitourinary:    Denies any urgency, frequency, 
Internal Medicine Progress Note    [facility]    Macario Dolan D.O., QIANI.                    Jeet Sharpe D.O., MARCOS Thomas D.O.       Yamilet Bee, MSN, APRN, NP-C  Jose Suresh, MSN, APRN-CNP  Tim Carl, MSN, APRN-CNP     Primary Care Physician: Hermelindo Bedolla DO   Admitting Physician:  Macario Dolan DO  Admission date and time: 2/18/2024  7:38 PM    Room:  [place] [address]  Admitting diagnosis: Lactic acidosis [E87.20]  NSTEMI (non-ST elevated myocardial infarction) ([facility]) [I21.4]  Acute respiratory failure with hypoxia ([facility]) [J96.01]  Septic shock ([facility]) [A41.9, R65.21]  Pulmonary embolism, unspecified chronicity, unspecified pulmonary embolism type, unspecified whether acute cor pulmonale present ([facility]) [I26.99]    Patient Name: Kike Holloway  MRN: [id number]    Date of Service: 2/23/2024     Subjective:  Kike is a 78 y.o. male who was seen and examined today,2/23/2024, at the bedside.  Kike was evaluated in the presence of his wife today.  Nursing reports increased confusion today and this is appreciated during my examination as well.  He is unable to provide meaningful answers during my examination.    Review of System:   Very limited in his current condition.  Appears rather ill.    Physical Exam:  I/O this shift:  In: -   Out: 1450 [Urine:1450]    Intake/Output Summary (Last 24 hours) at 2/23/2024 1432  Last data filed at 2/23/2024 1157  Gross per 24 hour   Intake 2106.16 ml   Output 5425 ml   Net -3318.84 ml   I/O last 3 completed shifts:  In: 3450.9 [P.O.:240; I.V.:2168.2; IV Piggyback:1042.7]  Out: 4500 [Urine:4500]  Patient Vitals for the past 96 hrs (Last 3 readings):   Weight   02/23/24 1030 73 kg (161 lb)   02/23/24 0630 73.1 kg (161 lb 1.6 oz)   02/22/24 0200 72.8 kg (160 lb 9.6 oz)     Vital Signs:   Blood pressure 122/78, pulse (!) 122, temperature 98.3 °F (36.8 °C), temperature source Axillary, resp. 
Internal Medicine Progress Note    [facility]    Macario Dolan D.O., QIANI.                    Jeet Sharpe D.O., MARCOS Thomas D.O.       Yamilet Bee, MSN, APRN, NP-C  Jose Suresh, MSN, APRN-CNP  Tim Carl, MSN, APRN-CNP     Primary Care Physician: Hermelindo Bedolla DO   Admitting Physician:  Macario Dolan DO  Admission date and time: 2/18/2024  7:38 PM    Room:  [place] [address]  Admitting diagnosis: Lactic acidosis [E87.20]  NSTEMI (non-ST elevated myocardial infarction) ([facility]) [I21.4]  Acute respiratory failure with hypoxia ([facility]) [J96.01]  Septic shock ([facility]) [A41.9, R65.21]  Pulmonary embolism, unspecified chronicity, unspecified pulmonary embolism type, unspecified whether acute cor pulmonale present ([facility]) [I26.99]    Patient Name: Kike Holloway  MRN: [id number]    Date of Service: 2/21/2024     Subjective:  Kike is a 78 y.o. male who was seen and examined today,2/21/2024, at the bedside.  Patient resting comfortably at the present time.  Had been on the BiPAP intermittently last evening.  Currently switched over to nasal cannula at 5 L.  Blood pressure response to the present time.  Being maintained on dopamine and heparin    No family present during my examination.    Review of System:   Constitutional:   Denies fever or chills, weight loss or gain, fatigue or malaise.  HEENT:   Denies ear pain, sore throat, sinus or eye problems.  Cardiovascular:   Denies any chest pain, irregular heartbeats, or palpitations.   Respiratory:   Improved shortness of breath  Gastrointestinal:   Denies nausea, vomiting, diarrhea, or constipation.  Denies any abdominal pain.  Genitourinary:    Denies any urgency, frequency, hematuria. Voiding  without difficulty.  Extremities:   Denies lower extremity swelling, edema or cyanosis.   Neurology:    Denies any headache or focal neurological deficits, Denies generalized weakness or memory 
Internal Medicine Progress Note    [facility]    Macario Dolan D.O., SHARIFA.                    Jeet Sharpe D.O., MARCOS Thomas D.O.       Yamilet Bee, MSN, APRN, NP-C  Jose Suresh, MSN, APRN-CNP  Tim Carl, MSN, APRN-CNP     Primary Care Physician: Hermelindo Bedolla DO   Admitting Physician:  Macario Dolan DO  Admission date and time: 2/18/2024  7:38 PM    Room:  [place] [address]  Admitting diagnosis: Lactic acidosis [E87.20]  NSTEMI (non-ST elevated myocardial infarction) ([facility]) [I21.4]  Acute respiratory failure with hypoxia ([facility]) [J96.01]  Septic shock ([facility]) [A41.9, R65.21]  Pulmonary embolism, unspecified chronicity, unspecified pulmonary embolism type, unspecified whether acute cor pulmonale present ([facility]) [I26.99]    Patient Name: Kike Holloway  MRN: [id number]    Date of Service: 2/22/2024     Subjective:  Kike is a 78 y.o. male who was seen and examined today,2/22/2024, at the bedside.  Patient continued to improve.  Currently out of bed sitting up in a chair wearing supplemental oxygen.  Patient inquired about the medication which he is on.  Patient maintained on heparin at that time along with dobutamine and dopamine.  Patient complains of fatigue denies chest pain.  Breathing seems to be improved    No family present during my examination.    Review of System:   Constitutional:   Denies fever or chills, weight loss or gain, fatigue or malaise.  HEENT:   Denies ear pain, sore throat, sinus or eye problems.  Cardiovascular:   Denies any chest pain, irregular heartbeats, or palpitations.   Respiratory:   Improved shortness of breath  Gastrointestinal:   Denies nausea, vomiting, diarrhea, or constipation.  Denies any abdominal pain.  Genitourinary:    Denies any urgency, frequency, hematuria. Voiding  without difficulty.  Extremities:   Denies lower extremity swelling, edema or cyanosis.   Neurology:    Denies any 
Internal Medicine Progress Note    [facility]    Macario Dolan D.O., SHARIFA.                    Jeet Sharpe D.O., MARCOS Thomas D.O.       Yamilet Bee, MSN, APRN, NP-C  Jose Suresh, MSN, APRN-CNP  Tim Carl, MSN, APRN-CNP     Primary Care Physician: Hermelindo Bedolla DO   Admitting Physician:  Macario Dolan DO  Admission date and time: 2/18/2024  7:38 PM    Room:  [place] [address]  Admitting diagnosis: Lactic acidosis [E87.20]  NSTEMI (non-ST elevated myocardial infarction) ([facility]) [I21.4]  Acute respiratory failure with hypoxia ([facility]) [J96.01]  Septic shock ([facility]) [A41.9, R65.21]  Pulmonary embolism, unspecified chronicity, unspecified pulmonary embolism type, unspecified whether acute cor pulmonale present ([facility]) [I26.99]    Patient Name: Kike Holloway  MRN: [id number]    Date of Service: 3/4/2024     Subjective:  Kike is a 78 y.o. male who was seen and examined today,3/4/2024, at the bedside.  Kike seems to be resting comfortably during my examination.  His mentation continues to improve and I believe he is approaching his baseline from the standpoint.  He remains profoundly weak and deconditioned which has resulted in significant frustration.  He remains very willing to work with the therapy teams.    Review of System:   Constitutional:   Frustrated.  Remains profoundly weak and deconditioned.  HEENT:   Denies ear pain, sore throat, sinus or eye problems.  Nasal cannula oxygen in place.  Cardiovascular:   Denies any chest pain, irregular heartbeats, or palpitations.   Respiratory:   Subjective shortness of breath.  He seems to panic when nasal cannula oxygen is removed.  There is no coughing paroxysms during my examination.  Gastrointestinal:   Increasing appetite but with minimal oral intake.  Genitourinary:    Denies any urgency, frequency, hematuria. Voiding with use of a Courtney catheter.    Extremities:   Persistent third 
Internal Medicine Progress Note    [facility]    Macario Dolan D.O., SHARIFA.                    Jeet Sharpe D.O., MARCOS Thomas D.O.       Yamilet Bee, MSN, APRN, NP-C  Jose Suresh, MSN, APRN-CNP  Tim Carl, MSN, APRN-CNP     Primary Care Physician: Hermelindo Bedolla DO   Admitting Physician:  Macario Dolan DO  Admission date and time: 2/18/2024  7:38 PM    Room:  [place] [address]  Admitting diagnosis: Lactic acidosis [E87.20]  NSTEMI (non-ST elevated myocardial infarction) ([facility]) [I21.4]  Acute respiratory failure with hypoxia ([facility]) [J96.01]  Septic shock ([facility]) [A41.9, R65.21]  Pulmonary embolism, unspecified chronicity, unspecified pulmonary embolism type, unspecified whether acute cor pulmonale present ([facility]) [I26.99]    Patient Name: Kike Holloway  MRN: [id number]    Date of Service: 2/24/2024     Subjective:  Kike is a 78 y.o. male who was seen and examined today,2/24/2024, at the bedside.  Kike is much more awake and interactive during my examination today.  He was rather confused when I evaluated him in the presence of his wife yesterday.  CT scan of the head was normal.  He appreciates his confusion yesterday.  He is oriented to person, place, and time.  Multiple subspecialists continue to follow.    Review of System:   Constitutional:   Debilitating malaise and fatigue.  HEENT:   Denies ear pain, sore throat, sinus or eye problems.  Nasal cannula oxygen in place  Cardiovascular:   Denies any chest pain, irregular heartbeats, or palpitations.   Respiratory:   Shortness of breath with simple active exertion.  No significant coughing or sputum production.  No hemoptysis  Gastrointestinal:   Decreased appetite and therefore decreased oral intake.  No overt abdominal pain  Genitourinary:    Denies any urgency, frequency, hematuria. Voiding with use of a Courtney catheter.  Extremities:   Edematous  Neurology:    Denies any 
Internal Medicine Progress Note    [facility]    Macario Dolan D.O., SHARIFA.                    Jeet Sharpe D.O., MARCOS Thomas D.O.       Yamilet Bee, MSN, APRN, NP-C  Jose Suresh, MSN, APRN-CNP  Tim Carl, MSN, APRN-CNP     Primary Care Physician: Hermelindo Bedolla DO   Admitting Physician:  Macario Dolan DO  Admission date and time: 2/18/2024  7:38 PM    Room:  [place] [address]  Admitting diagnosis: Lactic acidosis [E87.20]  NSTEMI (non-ST elevated myocardial infarction) ([facility]) [I21.4]  Acute respiratory failure with hypoxia ([facility]) [J96.01]  Septic shock ([facility]) [A41.9, R65.21]  Pulmonary embolism, unspecified chronicity, unspecified pulmonary embolism type, unspecified whether acute cor pulmonale present ([facility]) [I26.99]    Patient Name: Kike Holloway  MRN: [id number]    Date of Service: 2/27/2024     Subjective:  Kike is a 78 y.o. male who was seen and examined today,2/27/2024, at the bedside.  Kike was evaluated while being transported down to CT scan.  I then spoke with his wife in the room.  He developed sudden onset abdominal/hip pain this morning.  Initial images returned negative.  Multiple subspecialist continue to follow with several issues at play.  The patient was in significant pain during my examination.    Review of System:   Constitutional:   Significant discomfort related to the groin/abdominal/hip region  HEENT:   Denies ear pain, sore throat, sinus or eye problems.  Nasal cannula oxygen in place  Cardiovascular:   Denies any chest pain, irregular heartbeats, or palpitations.   Respiratory:   Persistent but stable shortness of breath.  No shortness of breath is noticeable with exertion.  Gastrointestinal:   Increasing appetite with use to oral intake.  No overt abdominal pain or discomfort.  Genitourinary:    Denies any urgency, frequency, hematuria. Voiding with use of a Courtney catheter.  Extremities:   Third 
Internal Medicine Progress Note    [facility]    Macario Dolan D.O., SHARIFA.                    Jeet Sharpe D.O., MARCOS Thomas D.O.       Yamilet Bee, MSN, APRN, NP-C  Jose Suresh, MSN, APRN-CNP  Tim Carl, MSN, APRN-CNP     Primary Care Physician: Hermelindo Bedolla DO   Admitting Physician:  Macario Dolan DO  Admission date and time: 2/18/2024  7:38 PM    Room:  [place] [address]  Admitting diagnosis: Lactic acidosis [E87.20]  NSTEMI (non-ST elevated myocardial infarction) ([facility]) [I21.4]  Acute respiratory failure with hypoxia ([facility]) [J96.01]  Septic shock ([facility]) [A41.9, R65.21]  Pulmonary embolism, unspecified chronicity, unspecified pulmonary embolism type, unspecified whether acute cor pulmonale present ([facility]) [I26.99]    Patient Name: Kike Holloway  MRN: [id number]    Date of Service: 2/28/2024     Subjective:  Kike is a 78 y.o. male who was seen and examined today,2/28/2024, at the bedside.  Kike was evaluated in the presence of his son.  Kike is markedly confused on my examination today and is unable to answer questions accordingly.  This is very similar to the confusion that developed last time vasopressor/inotropic support was discontinued.  Events and results of yesterday's identification of hematoma have been reviewed.    Review of System:   Constitutional:   Markedly confused and obtunded during my examination today  HEENT:   Denies ear pain, sore throat, sinus or eye problems.  Nasal cannula oxygen in place.  Periorbital cyanosis is identified.  Cardiovascular:   Denies any chest pain, irregular heartbeats, or palpitations.   Respiratory:   Shortness of breath seems stable.  No accessory muscle usage is identified.  Gastrointestinal:   Decreased appetite with decreased oral intake.  Genitourinary:    Denies any urgency, frequency, hematuria. Voiding with use of a Courtney catheter.  Hematuria has 
Internal Medicine Progress Note    [facility]    Macario Dolan D.O., SHARIFA.                    Jeet Sharpe D.O., MARCOS Thomas D.O.       Yamilet Bee, MSN, APRN, NP-C  Jose Suresh, MSN, APRN-CNP  Tim Carl, MSN, APRN-CNP     Primary Care Physician: Hermelindo Bedolla DO   Admitting Physician:  Macario Dolan DO  Admission date and time: 2/18/2024  7:38 PM    Room:  [place] [address]  Admitting diagnosis: Lactic acidosis [E87.20]  NSTEMI (non-ST elevated myocardial infarction) ([facility]) [I21.4]  Acute respiratory failure with hypoxia ([facility]) [J96.01]  Septic shock ([facility]) [A41.9, R65.21]  Pulmonary embolism, unspecified chronicity, unspecified pulmonary embolism type, unspecified whether acute cor pulmonale present ([facility]) [I26.99]    Patient Name: Kike Holloway  MRN: [id number]    Date of Service: 2/26/2024     Subjective:  Kike is a 78 y.o. male who was seen and examined today,2/26/2024, at the bedside.  Kike states that he is feeling better today and is anxious to become increasingly more ambulatory.  He remains on vasopressor/inotropic support.  I discussed the case extensively yesterday with Dr. Garsia.     Review of System:   Constitutional:   Some improvement in his malaise and fatigue.  HEENT:   Denies ear pain, sore throat, sinus or eye problems.  Nasal cannula oxygen in place  Cardiovascular:   Denies any chest pain, irregular heartbeats, or palpitations.   Respiratory:   Persistent but stable shortness of breath.  No shortness of breath is noticeable with exertion.  Gastrointestinal:   Increasing appetite with use to oral intake.  No overt abdominal pain or discomfort.  Genitourinary:    Denies any urgency, frequency, hematuria. Voiding with use of a Courtney catheter.  Extremities:   Third spacing with edema  Neurology:    Denies any headache or focal neurological deficits, profound weakness and deconditioning.    Psch: 
Patient wife notified of patient transport to [facility]  
Patients /77. Intensivist notified. Midodrine order discontinued.   
Per request, [facility] outsourced patient transportation with ETA of 2245.  
Pharmacy Consultation Note  (Antibiotic Dosing and Monitoring)    Initial consult date: 02/18/24  Consulting physician/provider: Alexander Lopez, NURY - CNP  Drug: Vancomycin  Indication: Bloodstream Infection    Age/  Gender Height Weight IBW  Allergy Information   78 y.o./male @FLOW(11:first:1)@ @FLOW[14:FIRST:1@     Ideal body weight: 63.8 kg (140 lb 10.5 oz)  Adjusted ideal body weight: 66.2 kg (145 lb 15.9 oz)   Pneumovax [pneumococcal polysaccharide vaccine]      Renal Function:  Recent Labs     02/18/24 1955   BUN 22   CREATININE 1.3*     No intake or output data in the 24 hours ending 02/18/24 6872    Vancomycin Monitoring:  Trough:  No results for input(s): \"VANCOTROUGH\" in the last 72 hours.  Random:  No results for input(s): \"VANCORANDOM\" in the last 72 hours.    Vancomycin Administration Times:  Recent vancomycin administrations                     vancomycin (VANCOCIN) 1,500 mg in sodium chloride 0.9 % 250 mL IVPB (mg) 1,500 mg New Bag 02/18/24 2315                    Assessment:  Patient is a 78 y.o. male who has been initiated on vancomycin  Estimated Creatinine Clearance: 41 mL/min (A) (based on SCr of 1.3 mg/dL (H)).  To dose vancomycin, pharmacy will be utilizing [employer] calculation software for goal AUC/LISSETTE 400-600 mg/L-hr (predicted AUC/LISSETTE = 501, Tr =14.1 mcg/mL)    Plan:  Will continue vancomycin 1000 mg IV every 24 hours  Will check vancomycin levels when appropriate  Will continue to monitor renal function   Pharmacy to follow      Harmeet Owen RPH 2/18/2024 11:52 PM    DARCY: [phone]  SEY: [phone]  SJW: [phone]   
Pharmacy Consultation Note  (Antibiotic Dosing and Monitoring)    Initial consult date: 2/18/2024  Consulting physician/provider: NURY Mcnally-CNP  Drug: Vancomycin  Indication: Bloodstream Infection    Age/  Gender Height Weight IBW  Allergy Information   78 y.o./male 167.6 cm (5' 6\") 69.9 kg (154 lb)     Ideal body weight: 63.8 kg (140 lb 10.5 oz)  Adjusted ideal body weight: 64.6 kg (142 lb 6.3 oz)   Pneumovax [pneumococcal polysaccharide vaccine]      Renal Function:  Recent Labs     02/18/24 1955 02/19/24  0459   BUN 22 27*   CREATININE 1.3* 1.5*       Intake/Output Summary (Last 24 hours) at 2/19/2024 1320  Last data filed at 2/19/2024 1138  Gross per 24 hour   Intake 594.41 ml   Output 550 ml   Net 44.41 ml       Vancomycin Monitoring:  Trough:  No results for input(s): \"VANCOTROUGH\" in the last 72 hours.  Random:  No results for input(s): \"VANCORANDOM\" in the last 72 hours.    Vancomycin Administration Times:  Recent vancomycin administrations                     vancomycin (VANCOCIN) 1,500 mg in sodium chloride 0.9 % 250 mL IVPB (mg) 1,500 mg New Bag 02/18/24 5697                    Assessment:  Patient is a 78 y.o. male who has been initiated on vancomycin  Estimated Creatinine Clearance: 35 mL/min (A) (based on SCr of 1.5 mg/dL (H)).  Baseline SCr ~ 1 mg/dL  To dose vancomycin, pharmacy will be utilizing [employer] calculation software for goal AUC/LISSETTE 400-600 mg/L-hr     Plan:  Will continue vancomycin 1000 mg IV every 24 hours (predicted AUC/LISSETTE = 501, Tr =14.1 mcg/mL)  SCr increased from baseline, therefore, will check level prior to tonight's dose and hold dose if level > 20 mcg/mL  Will continue to monitor renal function   Pharmacy to follow    Gracie Miller, PharmD, BCPS 2/19/2024 1:20 PM   Ext: [phone]    SJW: [phone]    
Pharmacy Consultation Note  (Antibiotic Dosing and Monitoring)    Initial consult date: 2/18/2024  Consulting physician/provider: RONAK Mcnally  Drug: Vancomycin  Indication: Bloodstream Infection    Age/  Gender Height Weight IBW  Allergy Information   78 y.o./male 167.6 cm (5' 6\") 69.9 kg (154 lb)     Ideal body weight: 63.8 kg (140 lb 10.5 oz)  Adjusted ideal body weight: 66.5 kg (146 lb 10.8 oz)   Pneumovax [pneumococcal polysaccharide vaccine]      Renal Function:  Recent Labs     02/18/24  1955 02/19/24  0459 02/20/24  0441   BUN 22 27* 36*   CREATININE 1.3* 1.5* 1.5*         Intake/Output Summary (Last 24 hours) at 2/20/2024 0827  Last data filed at 2/20/2024 0639  Gross per 24 hour   Intake 2411.43 ml   Output 1600 ml   Net 811.43 ml         Vancomycin Monitoring:  Trough:    Recent Labs     02/19/24  2211   VANCOTROUGH 9.5     Random:  No results for input(s): \"VANCORANDOM\" in the last 72 hours.    Recent vancomycin administrations                     vancomycin (VANCOCIN) 1,000 mg in sodium chloride 0.9 % 250 mL IVPB ([id number]) (mg) 1,000 mg New Bag 02/19/24 2306    vancomycin (VANCOCIN) 1,500 mg in sodium chloride 0.9 % 250 mL IVPB (mg) 1,500 mg New Bag 02/18/24 2315               Assessment:  Patient is a 78 y.o. male who has been initiated on vancomycin  Estimated Creatinine Clearance: 35 mL/min (A) (based on SCr of 1.5 mg/dL (H)).  Baseline SCr ~ 1 mg/dL  To dose vancomycin, pharmacy will be utilizing [employer] calculation software for goal AUC/LISSETTE 400-600 mg/L-hr   2/19: Random level = 9.5 mcg/mL. AUC/LISSETTE 594. Estimated trough = 17.7 mcg/mL    Plan:  Will reduce dose to vancomycin 750 mg IV every 24 hours (predicted AUC/LISSETTE = 460, Tr =13.8 mcg/mL)  Will continue to monitor renal function   Pharmacy to follow    Gracie Miller PharmD, BCPS 2/20/2024 8:27 AM   Ext: [phone]    SJW: [phone]    
Pharmacy Consultation Note  (Antibiotic Dosing and Monitoring)    Initial consult date: 2/18/2024  Consulting physician/provider: RONAK Mcnally  Drug: Vancomycin  Indication: Bloodstream Infection    Age/  Gender Height Weight IBW  Allergy Information   78 y.o./male 167.6 cm (5' 6\") 69.9 kg (154 lb)     Ideal body weight: 63.8 kg (140 lb 9.2 oz)  Adjusted ideal body weight: 66.9 kg (147 lb 7.4 oz)   Pneumovax [pneumococcal polysaccharide vaccine]      Renal Function:  Recent Labs     02/19/24  0459 02/20/24  0441 02/21/24  0457   BUN 27* 36* 26*   CREATININE 1.5* 1.5* 1.1         Intake/Output Summary (Last 24 hours) at 2/21/2024 1455  Last data filed at 2/21/2024 0609  Gross per 24 hour   Intake 2131.3 ml   Output 1650 ml   Net 481.3 ml         Vancomycin Monitoring:  Trough:    Recent Labs     02/19/24  2211   VANCOTROUGH 9.5       Random:  No results for input(s): \"VANCORANDOM\" in the last 72 hours.    Recent vancomycin administrations                     vancomycin (VANCOCIN) 750 mg in sodium chloride 0.9 % 250 mL IVPB (mg) 750 mg New Bag 02/20/24 2341    vancomycin (VANCOCIN) 1,000 mg in sodium chloride 0.9 % 250 mL IVPB ([id number]) (mg) 1,000 mg New Bag 02/19/24 2306    vancomycin (VANCOCIN) 1,500 mg in sodium chloride 0.9 % 250 mL IVPB (mg) 1,500 mg New Bag 02/18/24 2315             Assessment:  Patient is a 78 y.o. male who has been initiated on vancomycin  Estimated Creatinine Clearance: 48 mL/min (based on SCr of 1.1 mg/dL).  Baseline SCr ~ 1 mg/dL  To dose vancomycin, pharmacy will be utilizing [employer] calculation software for goal AUC/LISSETTE 400-600 mg/L-hr   2/19: Random level = 9.5 mcg/mL. AUC/LISSETTE 594. Estimated trough = 17.7 mcg/mL    Plan:  Continue vancomycin 750 mg IV every 24 hours (predicted AUC/LISSETTE = 460, Tr =13.8 mcg/mL)  Trough tonight @ 2200. HOLD dose for level > 20 mcg/mL  Will continue to monitor renal function   Pharmacy to follow    Nedra WootenD, BCPS 2/21/2024 2:55 PM 
Pharmacy Consultation Note  (Antibiotic Dosing and Monitoring)    Initial consult date: 2/18/2024  Consulting physician/provider: RONAK Mcnally  Drug: Vancomycin  Indication: Bloodstream Infection    Age/  Gender Height Weight IBW  Allergy Information   78 y.o./male 167.6 cm (5' 6\") 69.9 kg (154 lb)     Ideal body weight: 63.8 kg (140 lb 9.2 oz)  Adjusted ideal body weight: 67.4 kg (148 lb 9.3 oz)   Pneumovax [pneumococcal polysaccharide vaccine]      Renal Function:  Recent Labs     02/20/24  0441 02/21/24  0457 02/22/24  0430   BUN 36* 26* 18   CREATININE 1.5* 1.1 0.9         Intake/Output Summary (Last 24 hours) at 2/22/2024 0751  Last data filed at 2/22/2024 0301  Gross per 24 hour   Intake 2658.82 ml   Output 1440 ml   Net 1218.82 ml         Vancomycin Monitoring:  Trough:    Recent Labs     02/19/24  2211 02/21/24  2208   VANCOTROUGH 9.5 7.7       Random:  No results for input(s): \"VANCORANDOM\" in the last 72 hours.    Recent vancomycin administrations                     vancomycin (VANCOCIN) 750 mg in sodium chloride 0.9 % 250 mL IVPB (mg) 750 mg New Bag 02/21/24 2320     750 mg New Bag 02/20/24 2341    vancomycin (VANCOCIN) 1,000 mg in sodium chloride 0.9 % 250 mL IVPB ([id number]) (mg) 1,000 mg New Bag 02/19/24 2306             Assessment:  Patient is a 78 y.o. male who has been initiated on vancomycin  Estimated Creatinine Clearance: 59 mL/min (based on SCr of 0.9 mg/dL).  Baseline SCr ~ 1 mg/dL  To dose vancomycin, pharmacy will be utilizing [employer] calculation software for goal AUC/LISSETTE 400-600 mg/L-hr   2/19: Random level = 9.5 mcg/mL. AUC/LISSETTE 594. Estimated trough = 17.7 mcg/mL  2/21: Trough = 7.7 mcg/mL. AUC/LISSETTE < 400 mg/L.hr    Plan:  Increase dose to vancomycin 1000 mg IV every 18 hours (predicted AUC/LISSETTE = 450, Tr =11.5 mcg/mL)  Level tomorrow @ 0430. HOLD dose if level > 20 mcg/mL  Will continue to monitor renal function   Pharmacy to follow    Gracie Miller, PharmD, BCPS 2/22/2024 7:51 AM 
Pharmacy Consultation Note  (Antibiotic Dosing and Monitoring)    Initial consult date: 2/18/2024, re-consulted 2/23  Consulting physician/provider: Dr. Garsia  Drug: Vancomycin  Indication: Sepsis    Age/  Gender Height Weight IBW  Allergy Information   78 y.o./male 167.6 cm (5' 6\") 69.9 kg (154 lb)     Ideal body weight: 61.5 kg (135 lb 9.3 oz)  Adjusted ideal body weight: 62.8 kg (138 lb 6.2 oz)   Pneumovax [pneumococcal polysaccharide vaccine]      Renal Function:  Recent Labs     02/27/24  0507 02/28/24  0406 02/29/24  0408   BUN 14 12 11   CREATININE 0.9 0.9 1.0         Intake/Output Summary (Last 24 hours) at 2/29/2024 1452  Last data filed at 2/29/2024 1400  Gross per 24 hour   Intake 2168.08 ml   Output 1225 ml   Net 943.08 ml         Vancomycin Monitoring:  Trough:    Recent Labs     02/28/24  0214   VANCOTROUGH 18.0*       Random:  No results for input(s): \"VANCORANDOM\" in the last 72 hours.    Recent vancomycin administrations                     vancomycin (VANCOCIN) 1,250 mg in sodium chloride 0.9 % 250 mL IVPB (mg) 1,250 mg New Bag 02/28/24 2133    vancomycin (VANCOCIN) 1,000 mg in sodium chloride 0.9 % 250 mL IVPB ([id number]) (mg) 1,000 mg New Bag 02/28/24 0337     1,000 mg New Bag 02/27/24 0916               Assessment:  Patient is a 78 y.o. male who has been initiated on vancomycin  Estimated Creatinine Clearance: 51 mL/min (based on SCr of 1 mg/dL).  Baseline SCr ~ 1 mg/dL  To dose vancomycin, pharmacy will be utilizing [employer] calculation software for goal AUC/LISSETTE 400-600 mg/L-hr   2/19: Random level = 9.5 mcg/mL. AUC/LISSETTE 594. Estimated trough = 17.7 mcg/mL  2/21: Trough = 7.7 mcg/mL. AUC/LISSETTE < 400 mg/L.hr  2/22: Vancomycin discontinued  2/23: Vancomycin restarted  2/25: Level @ 1258 = 15.3 mcg/mL, predicted AUC/LISSETTE 441 and trough 11.3 mcg/mL  2/28: Trough = 18 mcg/mL. AUC/LISSETTE 574 mg/L.hr. Est true trough is 16.8 mcg/mL    Plan:  Vancomycin 1250 mg IV every 24 hours (est AUC/LISSETTE 551, est 
Pharmacy Consultation Note  (Antibiotic Dosing and Monitoring)    Initial consult date: 2/18/2024, re-consulted 2/23  Consulting physician/provider: Dr. Garsia  Drug: Vancomycin  Indication: Sepsis    Age/  Gender Height Weight IBW  Allergy Information   78 y.o./male 167.6 cm (5' 6\") 69.9 kg (154 lb)     Ideal body weight: 61.5 kg (135 lb 9.3 oz)  Adjusted ideal body weight: 63 kg (138 lb 12.6 oz)   Pneumovax [pneumococcal polysaccharide vaccine]      Renal Function:  Recent Labs     02/26/24  0431 02/27/24  0507 02/28/24  0406   BUN 14 14 12   CREATININE 0.8 0.9 0.9         Intake/Output Summary (Last 24 hours) at 2/28/2024 0836  Last data filed at 2/28/2024 0517  Gross per 24 hour   Intake 770.17 ml   Output 2030 ml   Net -1259.83 ml         Vancomycin Monitoring:  Trough:    Recent Labs     02/28/24  0214   VANCOTROUGH 18.0*       Random:    Recent Labs     02/25/24  1258   VANCORANDOM 15.3       Recent vancomycin administrations                     vancomycin (VANCOCIN) 1,000 mg in sodium chloride 0.9 % 250 mL IVPB ([id number]) (mg) 1,000 mg New Bag 02/28/24 0337     1,000 mg New Bag 02/27/24 0916     1,000 mg New Bag 02/26/24 1322     1,000 mg New Bag 02/25/24 2048             Assessment:  Patient is a 78 y.o. male who has been initiated on vancomycin  Estimated Creatinine Clearance: 57 mL/min (based on SCr of 0.9 mg/dL).  Baseline SCr ~ 1 mg/dL  To dose vancomycin, pharmacy will be utilizing [employer] calculation software for goal AUC/LISSETTE 400-600 mg/L-hr   2/19: Random level = 9.5 mcg/mL. AUC/LISSETTE 594. Estimated trough = 17.7 mcg/mL  2/21: Trough = 7.7 mcg/mL. AUC/LISSETTE < 400 mg/L.hr  2/22: Vancomycin discontinued  2/23: Vancomycin restarted  2/25: Level @ 1258 = 15.3 mcg/mL, predicted AUC/LISSETTE 441 and trough 11.3 mcg/mL  2/28: Trough = 18 mcg/mL. AUC/LISSETTE 574 mg/L.hr. Est true trough is 16.8 mcg/mL    Plan:  Adjust dose to Vancomycin 1250 mg IV every 24 hours (est AUC/LISSETTE 551, est Trough 15)  Repeat level when 
Pharmacy Consultation Note  (Antibiotic Dosing and Monitoring)    Initial consult date: 2/18/2024, re-consulted 2/23  Consulting physician/provider: Dr. Garsia  Drug: Vancomycin  Indication: Sepsis    Age/  Gender Height Weight IBW  Allergy Information   78 y.o./male 167.6 cm (5' 6\") 69.9 kg (154 lb)     Ideal body weight: 61.5 kg (135 lb 9.3 oz)  Adjusted ideal body weight: 63.5 kg (140 lb 1.1 oz)   Pneumovax [pneumococcal polysaccharide vaccine]      Renal Function:  Recent Labs     02/25/24  0417 02/26/24  0431 02/27/24  0507   BUN 18 14 14   CREATININE 0.9 0.8 0.9         Intake/Output Summary (Last 24 hours) at 2/27/2024 1445  Last data filed at 2/27/2024 1400  Gross per 24 hour   Intake 1033.01 ml   Output 1885 ml   Net -851.99 ml         Vancomycin Monitoring:  Trough:  No results for input(s): \"VANCOTROUGH\" in the last 72 hours.    Random:    Recent Labs     02/25/24  1258   VANCORANDOM 15.3       Recent vancomycin administrations                     vancomycin (VANCOCIN) 1,000 mg in sodium chloride 0.9 % 250 mL IVPB ([id number]) (mg) 1,000 mg New Bag 02/27/24 0916     1,000 mg New Bag 02/26/24 1322     1,000 mg New Bag 02/25/24 2048     1,000 mg New Bag  0323             Assessment:  Patient is a 78 y.o. male who has been initiated on vancomycin  Estimated Creatinine Clearance: 57 mL/min (based on SCr of 0.9 mg/dL).  Baseline SCr ~ 1 mg/dL  To dose vancomycin, pharmacy will be utilizing [employer] calculation software for goal AUC/LISSETTE 400-600 mg/L-hr   2/19: Random level = 9.5 mcg/mL. AUC/LISSETTE 594. Estimated trough = 17.7 mcg/mL  2/21: Trough = 7.7 mcg/mL. AUC/LISSETTE < 400 mg/L.hr  2/22: Vancomycin discontinued  2/23: Vancomycin restarted  2/25: Level @ 1258 = 15.3 mcg/mL, predicted AUC/LISSETTE 441 and trough 11.3 mcg/mL      Plan:  Continue Vancomycin 1000 mg IV every 18 hours   Repeat level tomorrow @ 0200. HOLD dose if level > 20 mcg/mL  Will continue to monitor renal function   Pharmacy to follow    Gracie Miller, 
Pharmacy Consultation Note  (Antibiotic Dosing and Monitoring)    Initial consult date: 2/18/2024, re-consulted 2/23  Consulting physician/provider: Dr. Garsia  Drug: Vancomycin  Indication: Sepsis    Age/  Gender Height Weight IBW  Allergy Information   78 y.o./male 167.6 cm (5' 6\") 69.9 kg (154 lb)     Ideal body weight: 61.5 kg (135 lb 9.3 oz)  Adjusted ideal body weight: 64.8 kg (142 lb 12 oz)   Pneumovax [pneumococcal polysaccharide vaccine]      Renal Function:  Recent Labs     02/28/24  0406 02/29/24  0408 03/01/24  0434   BUN 12 11 16   CREATININE 0.9 1.0 1.2         Intake/Output Summary (Last 24 hours) at 3/1/2024 0805  Last data filed at 3/1/2024 0609  Gross per 24 hour   Intake 2483.79 ml   Output 900 ml   Net 1583.79 ml         Vancomycin Monitoring:  Trough:    Recent Labs     02/28/24  0214   VANCOTROUGH 18.0*       Random:  No results for input(s): \"VANCORANDOM\" in the last 72 hours.    Recent vancomycin administrations                     vancomycin (VANCOCIN) 1,250 mg in sodium chloride 0.9 % 250 mL IVPB (mg) 1,250 mg New Bag 02/29/24 2120     1,250 mg New Bag 02/28/24 2133    vancomycin (VANCOCIN) 1,000 mg in sodium chloride 0.9 % 250 mL IVPB ([id number]) (mg) 1,000 mg New Bag 02/28/24 0337     1,000 mg New Bag 02/27/24 0916             Assessment:  Patient is a 78 y.o. male who has been initiated on vancomycin  Estimated Creatinine Clearance: 43 mL/min (based on SCr of 1.2 mg/dL).  Baseline SCr ~ 1 mg/dL  To dose vancomycin, pharmacy will be utilizing [employer] calculation software for goal AUC/LISSETTE 400-600 mg/L-hr   2/19: Random level = 9.5 mcg/mL. AUC/LISSETTE 594. Estimated trough = 17.7 mcg/mL  2/21: Trough = 7.7 mcg/mL. AUC/LISSETTE < 400 mg/L.hr  2/22: Vancomycin discontinued  2/23: Vancomycin restarted  2/25: Level @ 1258 = 15.3 mcg/mL, predicted AUC/LISSETTE 441 and trough 11.3 mcg/mL  2/28: Trough = 18 mcg/mL. AUC/LISSETTE 574 mg/L.hr. Est true trough is 16.8 mcg/mL. Dose was adjusted  3/1: SCr increased to 
Pharmacy Consultation Note  (Antibiotic Dosing and Monitoring)    Initial consult date: 2/18/2024, re-consulted 2/23  Consulting physician/provider: Dr. Garsia  Drug: Vancomycin  Indication: Sepsis    Age/  Gender Height Weight IBW  Allergy Information   78 y.o./male 167.6 cm (5' 6\") 69.9 kg (154 lb)     Ideal body weight: 61.5 kg (135 lb 9.3 oz)  Adjusted ideal body weight: 65.4 kg (144 lb 3.7 oz)   Pneumovax [pneumococcal polysaccharide vaccine]      Renal Function:  Recent Labs     02/23/24  0435 02/24/24  0430 02/25/24  0417   BUN 14 17 18   CREATININE 1.0 1.0 0.9         Intake/Output Summary (Last 24 hours) at 2/25/2024 1518  Last data filed at 2/25/2024 1420  Gross per 24 hour   Intake 2372.76 ml   Output 1800 ml   Net 572.76 ml         Vancomycin Monitoring:  Trough:    Recent Labs     02/23/24  0435   VANCOTROUGH 6.5       Random:    Recent Labs     02/25/24  1258   VANCORANDOM 15.3       Vancomycin Administration Times:  Recent vancomycin administrations                     vancomycin (VANCOCIN) 1,000 mg in sodium chloride 0.9 % 250 mL IVPB ([id number]) (mg) 1,000 mg New Bag 02/25/24 0323     1,000 mg New Bag 02/24/24 0854     1,000 mg New Bag 02/23/24 1456                  Assessment:  Patient is a 78 y.o. male who has been initiated on vancomycin  Estimated Creatinine Clearance: 57 mL/min (based on SCr of 0.9 mg/dL).  Baseline SCr ~ 1 mg/dL  To dose vancomycin, pharmacy will be utilizing [employer] calculation software for goal AUC/LISSETTE 400-600 mg/L-hr   2/19: Random level = 9.5 mcg/mL. AUC/LISSETTE 594. Estimated trough = 17.7 mcg/mL  2/21: Trough = 7.7 mcg/mL. AUC/LISSETTE < 400 mg/L.hr  2/22: Vancomycin discontinued  2/23: Vancomycin restarted  2/25: Level @ 1258 = 15.3 mcg/mL, predicted AUC/LISSETTE 441 and trough 11.3 mcg/mL    Plan:  Continue Vancomycin 1000 mg IV every 18 hours   Repeat level when appropriate  Will continue to monitor renal function   Pharmacy to follow    Pallavi Bertok, NedraD, BCPS 2/25/2024 
Pharmacy Consultation Note  (Antibiotic Dosing and Monitoring)    Initial consult date: 2/18/2024, re-consulted 2/23  Consulting physician/provider: Dr. Garsia  Drug: Vancomycin  Indication: Sepsis    Age/  Gender Height Weight IBW  Allergy Information   78 y.o./male 167.6 cm (5' 6\") 69.9 kg (154 lb)     Ideal body weight: 61.5 kg (135 lb 9.3 oz)  Adjusted ideal body weight: 65.6 kg (144 lb 10.7 oz)   Pneumovax [pneumococcal polysaccharide vaccine]      Renal Function:  Recent Labs     02/24/24  0430 02/25/24  0417 02/26/24  0431   BUN 17 18 14   CREATININE 1.0 0.9 0.8         Intake/Output Summary (Last 24 hours) at 2/26/2024 1042  Last data filed at 2/26/2024 0848  Gross per 24 hour   Intake 2128.11 ml   Output 3075 ml   Net -946.89 ml         Vancomycin Monitoring:  Trough:  No results for input(s): \"VANCOTROUGH\" in the last 72 hours.    Random:    Recent Labs     02/25/24  1258   VANCORANDOM 15.3       Recent vancomycin administrations                     vancomycin (VANCOCIN) 1,000 mg in sodium chloride 0.9 % 250 mL IVPB ([id number]) (mg) 1,000 mg New Bag 02/25/24 2048     1,000 mg New Bag  0323     1,000 mg New Bag 02/24/24 0854     1,000 mg New Bag 02/23/24 1456             Assessment:  Patient is a 78 y.o. male who has been initiated on vancomycin  Estimated Creatinine Clearance: 64 mL/min (based on SCr of 0.8 mg/dL).  Baseline SCr ~ 1 mg/dL  To dose vancomycin, pharmacy will be utilizing [employer] calculation software for goal AUC/LISSETTE 400-600 mg/L-hr   2/19: Random level = 9.5 mcg/mL. AUC/LISSETTE 594. Estimated trough = 17.7 mcg/mL  2/21: Trough = 7.7 mcg/mL. AUC/LISSETTE < 400 mg/L.hr  2/22: Vancomycin discontinued  2/23: Vancomycin restarted  2/25: Level @ 1258 = 15.3 mcg/mL, predicted AUC/LISSETTE 441 and trough 11.3 mcg/mL      Plan:  Continue Vancomycin 1000 mg IV every 18 hours   Repeat level when appropriate  Will continue to monitor renal function   Pharmacy to follow    Gracie Miller PharmD, BCPS 2/26/2024 10:43 
Pharmacy Consultation Note  (Antibiotic Dosing and Monitoring)    Initial consult date: 2/18/2024, re-consulted 2/23  Consulting physician/provider: Dr. Garsia  Drug: Vancomycin  Indication: Sepsis    Age/  Gender Height Weight IBW  Allergy Information   78 y.o./male 167.6 cm (5' 6\") 69.9 kg (154 lb)     Ideal body weight: 61.5 kg (135 lb 9.3 oz)  Adjusted ideal body weight: 65.7 kg (144 lb 12.6 oz)   Pneumovax [pneumococcal polysaccharide vaccine]      Renal Function:  Recent Labs     02/22/24  0430 02/23/24  0435 02/24/24  0430   BUN 18 14 17   CREATININE 0.9 1.0 1.0         Intake/Output Summary (Last 24 hours) at 2/24/2024 1228  Last data filed at 2/24/2024 1106  Gross per 24 hour   Intake 2150.49 ml   Output 1325 ml   Net 825.49 ml         Vancomycin Monitoring:  Trough:    Recent Labs     02/21/24  2208 02/23/24  0435   VANCOTROUGH 7.7 6.5       Random:  No results for input(s): \"VANCORANDOM\" in the last 72 hours.    Vancomycin Administration Times:  Recent vancomycin administrations                     vancomycin (VANCOCIN) 1,000 mg in sodium chloride 0.9 % 250 mL IVPB ([id number]) (mg) 1,000 mg New Bag 02/24/24 0854     1,000 mg New Bag 02/23/24 1456    vancomycin (VANCOCIN) 750 mg in sodium chloride 0.9 % 250 mL IVPB (mg) 750 mg New Bag 02/21/24 2320                      Assessment:  Patient is a 78 y.o. male who has been initiated on vancomycin  Estimated Creatinine Clearance: 51 mL/min (based on SCr of 1 mg/dL).  Baseline SCr ~ 1 mg/dL  To dose vancomycin, pharmacy will be utilizing [employer] calculation software for goal AUC/LISSETTE 400-600 mg/L-hr   2/19: Random level = 9.5 mcg/mL. AUC/LISSETTE 594. Estimated trough = 17.7 mcg/mL  2/21: Trough = 7.7 mcg/mL. AUC/LISSETTE < 400 mg/L.hr  2/22: Vancomycin discontinued  2/23: Vancomycin restarted    Plan:  Vancomycin 1000 mg IV every 18 hours (predicted AUC/LISSETTE = 450, Tr =11.5 mcg/mL)  Random level tomorrow @ 1200  Will continue to monitor renal function   Pharmacy to 
Pharmacy Consultation Note  (Antibiotic Dosing and Monitoring)    Initial consult date: 2/18/2024, re-consulted 2/23  Consulting physician/provider: Dr. Garsia  Drug: Vancomycin  Indication: Sepsis    Age/  Gender Height Weight IBW  Allergy Information   78 y.o./male 167.6 cm (5' 6\") 69.9 kg (154 lb)     Ideal body weight: 61.5 kg (135 lb 9.3 oz)  Adjusted ideal body weight: 66.1 kg (145 lb 12 oz)   Pneumovax [pneumococcal polysaccharide vaccine]      Renal Function:  Recent Labs     02/21/24  0457 02/22/24  0430 02/23/24  0435   BUN 26* 18 14   CREATININE 1.1 0.9 1.0         Intake/Output Summary (Last 24 hours) at 2/23/2024 1414  Last data filed at 2/23/2024 1157  Gross per 24 hour   Intake 2106.16 ml   Output 5425 ml   Net -3318.84 ml         Vancomycin Monitoring:  Trough:    Recent Labs     02/21/24  2208 02/23/24  0435   VANCOTROUGH 7.7 6.5       Random:  No results for input(s): \"VANCORANDOM\" in the last 72 hours.    Recent vancomycin administrations                     vancomycin (VANCOCIN) 750 mg in sodium chloride 0.9 % 250 mL IVPB (mg) 750 mg New Bag 02/21/24 2320     750 mg New Bag 02/20/24 2341             Assessment:  Patient is a 78 y.o. male who has been initiated on vancomycin  Estimated Creatinine Clearance: 51 mL/min (based on SCr of 1 mg/dL).  Baseline SCr ~ 1 mg/dL  To dose vancomycin, pharmacy will be utilizing [employer] calculation software for goal AUC/LISSETTE 400-600 mg/L-hr   2/19: Random level = 9.5 mcg/mL. AUC/LISSETTE 594. Estimated trough = 17.7 mcg/mL  2/21: Trough = 7.7 mcg/mL. AUC/LISSETTE < 400 mg/L.hr  2/22: Vancomycin discontinued  2/23: Vancomycin restarted    Plan:  Restart vancomycin 1000 mg IV every 18 hours (predicted AUC/LISSETTE = 450, Tr =11.5 mcg/mL)  Will continue to monitor renal function   Pharmacy to follow    Gracie Miller, PharmD, BCPS 2/23/2024 2:14 PM   Ext: [phone]    SJW: [phone]      
Physical Therapy Treatment Note/Plan of Care    Room #:  IC11/IC11-01  Patient Name: Kike Holloway  YOB: 1945  MRN: [id number]    Date of Service: 3/2/2024     Tentative placement recommendation: Subacute Rehab  Equipment recommendation: To be determined      Evaluating Physical Therapist: Sherrie Aguilar, PT #[id number]      Specific Provider Orders/Date/Referring Provider : 02/20/24 1045    PT eval and treat  Start:  02/20/24 1045,   End:  02/20/24 1045,   ONE TIME,   Standing Count:  1 Occurrences,   R       Emma Garsia MD     Admitting Diagnosis:   Lactic acidosis [E87.20]  NSTEMI (non-ST elevated myocardial infarction) ([facility]) [I21.4]  Acute respiratory failure with hypoxia ([facility]) [J96.01]  Septic shock ([facility]) [A41.9, R65.21]  Pulmonary embolism, unspecified chronicity, unspecified pulmonary embolism type, unspecified whether acute cor pulmonale present ([facility]) [I26.99]      respiratory distress  Surgery: none  Visit Diagnoses         Codes    Acute respiratory failure with hypoxia (HCC)     J96.01    Lactic acidosis     E87.20    NSTEMI (non-ST elevated myocardial infarction) (HCC)     I21.4    Lung nodule     R91.1    Elevated bilirubin     R17    Heart failure, unspecified HF chronicity, unspecified heart failure type (HCC)     I50.9            Patient Active Problem List   Diagnosis    Hyperlipidemia with target LDL less than 100    BPH (benign prostatic hyperplasia)    ECG abnormal    Abnormal nuclear stress test    AS (aortic stenosis)    SOB (shortness of breath)    RBBB (right bundle branch block)    Anxiety    CAD (coronary artery disease)    Pulmonary emphysema (HCC)    Permanent atrial fibrillation (HCC)    AV block, Mobitz II    AV block    H/O bicuspid aortic valve    AI (aortic insufficiency)    S/P AVR (aortic valve replacement)    Anticoagulated on Coumadin    AAA (abdominal aortic aneurysm) without rupture (HCC)    Elevated liver enzymes    History of tobacco use    S/P 
Physical Therapy Treatment Note/Plan of Care    Room #:  IC11/IC11-01  Patient Name: Kike Holloway  YOB: 1945  MRN: [id number]    Date of Service: 2/22/2024     Tentative placement recommendation: Home with Home Health Physical Therapy  Equipment recommendation: To be determined      Evaluating Physical Therapist: Sherrie Aguilar, PT #[id number]      Specific Provider Orders/Date/Referring Provider : 02/20/24 1045    PT eval and treat  Start:  02/20/24 1045,   End:  02/20/24 1045,   ONE TIME,   Standing Count:  1 Occurrences,   R       Emma Garsia MD     Admitting Diagnosis:   Lactic acidosis [E87.20]  NSTEMI (non-ST elevated myocardial infarction) (HCC) [I21.4]  Acute respiratory failure with hypoxia (HCC) [J96.01]  Septic shock (HCC) [A41.9, R65.21]  Pulmonary embolism, unspecified chronicity, unspecified pulmonary embolism type, unspecified whether acute cor pulmonale present (HCC) [I26.99]      respiratory distress  Surgery: none  Visit Diagnoses         Codes    Acute respiratory failure with hypoxia (HCC)     J96.01    Lactic acidosis     E87.20    NSTEMI (non-ST elevated myocardial infarction) (HCC)     I21.4    Lung nodule     R91.1    Elevated bilirubin     R17            Patient Active Problem List   Diagnosis    Hyperlipidemia with target LDL less than 100    BPH (benign prostatic hyperplasia)    ECG abnormal    Abnormal nuclear stress test    AS (aortic stenosis)    SOB (shortness of breath)    RBBB (right bundle branch block)    Anxiety    CAD (coronary artery disease)    Pulmonary emphysema (HCC)    AV block, Mobitz II    AV block, 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    
Physical Therapy Treatment Note/Plan of Care    Room #:  IC11/IC11-01  Patient Name: Kike Holloway  YOB: 1945  MRN: [id number]    Date of Service: 3/1/2024     Tentative placement recommendation: Subacute Rehab  Equipment recommendation: To be determined      Evaluating Physical Therapist: Sherrie Aguilar, PT #[id number]      Specific Provider Orders/Date/Referring Provider : 02/20/24 1045    PT eval and treat  Start:  02/20/24 1045,   End:  02/20/24 1045,   ONE TIME,   Standing Count:  1 Occurrences,   R       Emma Garsia MD     Admitting Diagnosis:   Lactic acidosis [E87.20]  NSTEMI (non-ST elevated myocardial infarction) ([facility]) [I21.4]  Acute respiratory failure with hypoxia ([facility]) [J96.01]  Septic shock ([facility]) [A41.9, R65.21]  Pulmonary embolism, unspecified chronicity, unspecified pulmonary embolism type, unspecified whether acute cor pulmonale present ([facility]) [I26.99]      respiratory distress  Surgery: none  Visit Diagnoses         Codes    Acute respiratory failure with hypoxia (HCC)     J96.01    Lactic acidosis     E87.20    NSTEMI (non-ST elevated myocardial infarction) (HCC)     I21.4    Lung nodule     R91.1    Elevated bilirubin     R17    Heart failure, unspecified HF chronicity, unspecified heart failure type (HCC)     I50.9            Patient Active Problem List   Diagnosis    Hyperlipidemia with target LDL less than 100    BPH (benign prostatic hyperplasia)    ECG abnormal    Abnormal nuclear stress test    AS (aortic stenosis)    SOB (shortness of breath)    RBBB (right bundle branch block)    Anxiety    CAD (coronary artery disease)    Pulmonary emphysema (HCC)    Permanent atrial fibrillation (HCC)    AV block, Mobitz II    AV block    H/O bicuspid aortic valve    AI (aortic insufficiency)    S/P AVR (aortic valve replacement)    Anticoagulated on Coumadin    AAA (abdominal aortic aneurysm) without rupture (HCC)    Elevated liver enzymes    History of tobacco use    S/P 
Physical Therapy Treatment Note/Plan of Care    Room #:  IC11/IC11-01  Patient Name: Kike Holloway  YOB: 1945  MRN: [id number]    Date of Service: 3/5/2024     Tentative placement recommendation: Subacute Rehab  Equipment recommendation: To be determined      Evaluating Physical Therapist: Sherrie Aguilar, PT #[id number]      Specific Provider Orders/Date/Referring Provider : 02/20/24 1045    PT eval and treat  Start:  02/20/24 1045,   End:  02/20/24 1045,   ONE TIME,   Standing Count:  1 Occurrences,   R       Emma Garsia MD     Admitting Diagnosis:   Lactic acidosis [E87.20]  NSTEMI (non-ST elevated myocardial infarction) ([facility]) [I21.4]  Acute respiratory failure with hypoxia ([facility]) [J96.01]  Septic shock ([facility]) [A41.9, R65.21]  Pulmonary embolism, unspecified chronicity, unspecified pulmonary embolism type, unspecified whether acute cor pulmonale present ([facility]) [I26.99]      respiratory distress  Surgery: none  Visit Diagnoses         Codes    Lactic acidosis     E87.20    Lung nodule     R91.1    Elevated bilirubin     R17    Heart failure, unspecified HF chronicity, unspecified heart failure type ([facility])     I50.9            Patient Active Problem List   Diagnosis    Hyperlipidemia with target LDL less than 100    BPH (benign prostatic hyperplasia)    ECG abnormal    Abnormal nuclear stress test    AS (aortic stenosis)    SOB (shortness of breath)    RBBB (right bundle branch block)    Anxiety    CAD (coronary artery disease)    Pulmonary emphysema (HCC)    Permanent atrial fibrillation (HCC)    AV block, Mobitz II    AV block    H/O bicuspid aortic valve    AI (aortic insufficiency)    S/P AVR (aortic valve replacement)    Anticoagulated on Coumadin    AAA (abdominal aortic aneurysm) without rupture (HCC)    Elevated liver enzymes    History of tobacco use    S/P CABG x 3    H/O aortic valve replacement with porcine valve    Aortic valve disease    Pure hypercholesterolemia    Normally 
Physical Therapy Treatment Note/Plan of Care    Room #:  IC11/IC11-01  Patient Name: Kike Holloway  YOB: 1945  MRN: [id number]    Date of Service: 2/23/2024     Tentative placement recommendation: Subacute Rehab  Equipment recommendation: To be determined      Evaluating Physical Therapist: Sherrie Aguilar, PT #[id number]      Specific Provider Orders/Date/Referring Provider : 02/20/24 1045    PT eval and treat  Start:  02/20/24 1045,   End:  02/20/24 1045,   ONE TIME,   Standing Count:  1 Occurrences,   R       Emma Garsia MD     Admitting Diagnosis:   Lactic acidosis [E87.20]  NSTEMI (non-ST elevated myocardial infarction) ([facility]) [I21.4]  Acute respiratory failure with hypoxia ([facility]) [J96.01]  Septic shock ([facility]) [A41.9, R65.21]  Pulmonary embolism, unspecified chronicity, unspecified pulmonary embolism type, unspecified whether acute cor pulmonale present ([facility]) [I26.99]      respiratory distress  Surgery: none  Visit Diagnoses         Codes    Acute respiratory failure with hypoxia ([facility])     J96.01    Lactic acidosis     E87.20    NSTEMI (non-ST elevated myocardial infarction) ([facility])     I21.4    Lung nodule     R91.1    Elevated bilirubin     R17    Heart failure, unspecified HF chronicity, unspecified heart failure type (HCC)     I50.9            Patient Active Problem List   Diagnosis    Hyperlipidemia with target LDL less than 100    BPH (benign prostatic hyperplasia)    ECG abnormal    Abnormal nuclear stress test    AS (aortic stenosis)    SOB (shortness of breath)    RBBB (right bundle branch block)    Anxiety    CAD (coronary artery disease)    Pulmonary emphysema (HCC)    AV block, Mobitz II    AV block, 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 
Physical Therapy Treatment Note/Plan of Care    Room #:  IC11/IC11-01  Patient Name: Kike Holloway  YOB: 1945  MRN: [id number]    Date of Service: 2/29/2024     Tentative placement recommendation: Subacute Rehab  Equipment recommendation: To be determined      Evaluating Physical Therapist: Sherrie Aguilar, PT #[id number]      Specific Provider Orders/Date/Referring Provider : 02/20/24 1045    PT eval and treat  Start:  02/20/24 1045,   End:  02/20/24 1045,   ONE TIME,   Standing Count:  1 Occurrences,   R       Emma Garsia MD     Admitting Diagnosis:   Lactic acidosis [E87.20]  NSTEMI (non-ST elevated myocardial infarction) ([facility]) [I21.4]  Acute respiratory failure with hypoxia ([facility]) [J96.01]  Septic shock ([facility]) [A41.9, R65.21]  Pulmonary embolism, unspecified chronicity, unspecified pulmonary embolism type, unspecified whether acute cor pulmonale present ([facility]) [I26.99]      respiratory distress  Surgery: none  Visit Diagnoses         Codes    Acute respiratory failure with hypoxia ([facility])     J96.01    Lactic acidosis     E87.20    NSTEMI (non-ST elevated myocardial infarction) ([facility])     I21.4    Lung nodule     R91.1    Elevated bilirubin     R17    Heart failure, unspecified HF chronicity, unspecified heart failure type (HCC)     I50.9            Patient Active Problem List   Diagnosis    Hyperlipidemia with target LDL less than 100    BPH (benign prostatic hyperplasia)    ECG abnormal    Abnormal nuclear stress test    AS (aortic stenosis)    SOB (shortness of breath)    RBBB (right bundle branch block)    Anxiety    CAD (coronary artery disease)    Pulmonary emphysema (HCC)    AV block, Mobitz II    AV block, 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 
Pulmonary/Critical Care Progress Note    We are following patient for acute hypoxemic respiratory failure, LVEF 35 to 40%, pneumonia, retroperitoneal hematoma on the left secondary to bleeding from a heparin drip used to treat a pulmonary embolism    SUBJECTIVE:  March 2, 2024  Patient continues to be awake and interactive.  He has been weaned off dopamine.  Blood pressure continues to fluctuate.  He is 1 L negative.  He has no fever, chills, rigors.  Hemoglobin has been stable.    March 3, 2024  Patient remains awake and interactive.  Oxygen requirement has been maintained at 2 L/min.  He is 686 negative.  Echo with a bubble study was ordered to rule out hepatopulmonary syndrome.  Patient is running low-grade fever.  Patient remains weak and debilitated.  Patient continues to be tachypneic with a respiratory rate in the 30s at times.    MEDICATIONS:   heparin (porcine)  5,000 Units SubCUTAneous 3 times per day    carbidopa-levodopa  0.5 tablet Oral BID    midodrine  15 mg Oral TID WC    meropenem  1,000 mg IntraVENous Q12H    DAPTOmycin (CUBICIN) 500 mg in sodium chloride 0.9 % 50 mL IVPB  500 mg IntraVENous Q24H    polyethylene glycol  17 g Oral Daily    docusate sodium  100 mg Oral Daily    fluconazole  400 mg IntraVENous Q24H    metoprolol succinate  12.5 mg Oral Daily    empagliflozin  10 mg Oral Daily    furosemide  20 mg IntraVENous Daily    sodium chloride flush  5-40 mL IntraVENous BID    lansoprazole  30 mg Oral QAM AC    aspirin  81 mg Oral Daily    budesonide  250 mcg Nebulization BID    ipratropium 0.5 mg-albuterol 2.5 mg  1 Dose Inhalation 4x Daily RT      dextrose 5% in lactated ringers 30 mL/hr at 03/03/24 0704     traMADol, acetaminophen, perflutren lipid microspheres, sodium chloride flush, melatonin      REVIEW OF SYSTEMS:  Constitutional: Denies fever, weight loss, night sweats, and fatigue  Skin: Denies pigmentation, dark lesions, and rashes   HEENT: Denies hearing loss, tinnitus, ear drainage, 
Pulmonary/Critical Care Progress Note    We are following patient for acute hypoxemic respiratory failure, LVEF 35 to 40%, pneumonia, retroperitoneal hematoma on the left secondary to bleeding from a heparin drip used to treat a pulmonary embolism    SUBJECTIVE:  March 2, 2024  Patient continues to be awake and interactive.  He has been weaned off dopamine.  Blood pressure continues to fluctuate.  He is 1 L negative.  He has no fever, chills, rigors.  Hemoglobin has been stable.    March 3, 2024  Patient remains awake and interactive.  Oxygen requirement has been maintained at 2 L/min.  He is 686 negative.  Echo with a bubble study was ordered to rule out hepatopulmonary syndrome.  Patient is running low-grade fever.  Patient remains weak and debilitated.  Patient continues to be tachypneic with a respiratory rate in the 30s at times.    March 4, 2024  Patient has been weaned off supplemental oxygen.  He continues to be weak and debilitated.  Echo with a bubble study is pending.  White blood cells count is improving.  Blood pressure has much improved.  Patient is encouraged today to participate with physical therapy.  Oral intake has been poor.    March 5, 2024  Patient remains weak and debilitated however he continues to slowly improve.  He has been weaned off supplemental oxygen.  White blood cell count has slightly increased.  Echocardiography with a bubble study is still pending.  Patient has been participating with physical therapy.    March 5, 2024:  Patient had an uneventful night.  He continues to intermittently require supplemental oxygen and currently on 2 L nasal cannula.  Repeated echocardiography confirmed intrapulmonary shunt.  Patient scheduled to be discharged to a rehab facility.    MEDICATIONS:   furosemide  40 mg Oral Daily    heparin (porcine)  5,000 Units SubCUTAneous 3 times per day    midodrine  15 mg Oral TID WC    meropenem  1,000 mg IntraVENous Q12H    DAPTOmycin (CUBICIN) 500 mg in sodium 
Pulmonary/Critical Care Progress Note    We are following patient for acute hypoxemic respiratory failure, LVEF 35 to 40%, pneumonia, retroperitoneal hematoma on the left secondary to bleeding from a heparin drip used to treat a pulmonary embolism    SUBJECTIVE:  Patient continues to be awake and interactive.  He has been weaned off dopamine.  Blood pressure continues to fluctuate.  He is 1 L negative.  He has no fever, chills, rigors.  Hemoglobin has been stable.    MEDICATIONS:   fludrocortisone  0.05 mg Oral Daily    meropenem  1,000 mg IntraVENous Q12H    DAPTOmycin (CUBICIN) 500 mg in sodium chloride 0.9 % 50 mL IVPB  500 mg IntraVENous Q24H    polyethylene glycol  17 g Oral Daily    docusate sodium  100 mg Oral Daily    midodrine  10 mg Oral TID WC    fluconazole  400 mg IntraVENous Q24H    metoprolol succinate  12.5 mg Oral Daily    empagliflozin  10 mg Oral Daily    carbidopa-levodopa  0.5 tablet Oral TID    furosemide  20 mg IntraVENous Daily    [Held by provider] apixaban  5 mg Oral BID    sodium chloride flush  5-40 mL IntraVENous BID    lansoprazole  30 mg Oral QAM AC    [Held by provider] aspirin  81 mg Oral Daily    budesonide  250 mcg Nebulization BID    ipratropium 0.5 mg-albuterol 2.5 mg  1 Dose Inhalation 4x Daily RT      dextrose 5% in lactated ringers 30 mL/hr at 03/02/24 1105    [Held by provider] heparin (PORCINE) Infusion Stopped (02/27/24 0611)     traMADol, acetaminophen, [Held by provider] heparin (porcine), [Held by provider] heparin (porcine), perflutren lipid microspheres, sodium chloride flush, melatonin      REVIEW OF SYSTEMS:  Constitutional: Denies fever, weight loss, night sweats, and fatigue  Skin: Denies pigmentation, dark lesions, and rashes   HEENT: Denies hearing loss, tinnitus, ear drainage, epistaxis, sore throat, and hoarseness.  Cardiovascular: Denies palpitations, chest pain, and chest pressure.  Respiratory: Denies cough, dyspnea at rest, hemoptysis, apnea, and 
Pulmonary/Critical Care Progress Note    We are following patient for acute hypoxemic respiratory failure, retroperitoneal hematoma on the left secondary to bleed from a heparin drip which was used to treat a pulmonary embolism, left ventricular dysfunction with LVEF 35 to 40%, increasing bilateral lower lobe infiltrates probably consistent with pneumonia    SUBJECTIVE:  The patient is still very uncomfortable when he moves his left leg consistent with a retroperitoneal hematoma.  The patient has become constipated and groggy probably secondary to narcotic pain medication.  Therefore, we will discontinue all narcotics and use tramadol 50 mg p.o. every 6 hours as needed pain.    Additionally, we have given the patient some Sinemet to increase dopamine levels and have started empagliflozin for his underlying heart failure and arteriosclerotic cardiovascular disease.    The patient's fever has been creeping up and on chest x-ray, there are increasing infiltrates probably secondary to retained secretions poor cough and stasis.  Therefore, we will change the antibiotics to include meropenem along with the vancomycin he was receiving before.    Patient's oral intake is poor so that we will need to give him at least some small amounts of IV fluid in the form of D5 lactated Ringer's at 30 cc/h.  Aerosolized bronchodilators and inhaled steroids will continue.  We will also obviously continue to hold any and all anticoagulation.  Small doses of diuretics may be useful.    MEDICATIONS:   polyethylene glycol  17 g Oral Daily    docusate sodium  100 mg Oral Daily    meropenem  1,000 mg IntraVENous Q8H    midodrine  10 mg Oral TID WC    metoprolol succinate  12.5 mg Oral Daily    vancomycin  1,250 mg IntraVENous Q24H    empagliflozin  10 mg Oral Daily    carbidopa-levodopa  0.5 tablet Oral TID    phenazopyridine  200 mg Oral TID WC    furosemide  20 mg IntraVENous Daily    potassium bicarb-citric acid  40 mEq Oral Daily    [Held 
Pulmonary/Critical Care Progress Note    We are following patient for patient's severe pain from yesterday is improving but it is still troublesome to him.  He is probably a bit depressed and needs to move around more than he has.    His clot burden is not large enough to require vena cava filter placement.    However, with a left ventricular ejection fraction of 35 to 40%, it is worth starting an SGLT2 inhibitor in the form of empagliflozin 10 mg/day with careful attention to the patient's blood sugar and blood pressure.    SUBJECTIVE:  Saturations remain adequate between 96 and 99% on 5 L nasal cannula.  Will check a chest x-ray.    The patient is running low-grade fevers in the 99.5 range which may very well be secondary to the hematoma in the iliopsoas region of the retroperitoneal area.    The patient's dopamine and dobutamine have been stopped and it is the opinion of some that the patient becomes depressed when these are removed.  It is certainly possible because of the dopaminergic receptors in his brain that become activated and improve the patient's spirits and outlook.  I have had a long discussion with the clinical pharmacist about a off label trial of a dopamine preparation such as Sinemet to see whether this might help the patient's mood and spirits.  We will give this a 24-hour window and observe closely.    MEDICATIONS:   metoprolol succinate  12.5 mg Oral Daily    vancomycin  1,250 mg IntraVENous Q24H    empagliflozin  10 mg Oral Daily    phenazopyridine  200 mg Oral TID WC    furosemide  20 mg IntraVENous Daily    potassium bicarb-citric acid  40 mEq Oral Daily    [Held by provider] apixaban  5 mg Oral BID    sodium chloride flush  5-40 mL IntraVENous BID    lansoprazole  30 mg Oral QAM AC    [Held by provider] aspirin  81 mg Oral Daily    budesonide  250 mcg Nebulization BID    ipratropium 0.5 mg-albuterol 2.5 mg  1 Dose Inhalation 4x Daily RT      dextrose 5% in lactated ringers 50 mL/hr at 
Reid veraggradha applied for patient's comfort.   
Report called to Duong Metz.  
SPEECH LANGUAGE PATHOLOGY  DAILY PROGRESS NOTE      PATIENT NAME:  Kike Holloway      :  1945          TODAY'S DATE:  2024 ROOM:  [place] [address]    Current Diet: ADULT DIET; Regular; No Added Salt (3-4 gm)  ADULT ORAL NUTRITION SUPPLEMENT; Breakfast, Lunch, Dinner; Standard High Calorie/High Protein Oral Supplement    Patient seen for dysphagia therapy patient able to recall energy conservation strategies and implements during the meal.  Patient family and nursing report good intake with no issues.  Takes pills dissolved in liquids and this is longstanding.      Recommendation: no further dysphagia therapy warranted at this time will sign off       CPT code(s) 06442  dysphagia tx  Total minutes :  10 minutes    Nerissa Ford MSCCC/SLP  Speech Language Pathologist  [id number]     
This  attempted a length of stay visit and found the patient sleeping. This  will follow up as needed.  
Upon assessment, patient exhibiting signs of pain and distress, yelling out and grasping for items to hold on to. Patient is alert and oriented but frequently drifts to sleep during conversation. Patient states pain is to left hip area and is unable to rate on a scale of 1-10. Patient describes pain as sharp and is frequently distracted from conversation when he experiences the pain sensation. Pain appears to be intermittent-patient is calm and resting one moment, then yelling out and squirming in bed the next. Provider notified. Order received for one time dose IV analgesia. Ultrasound notified of STAT order-states they will be here \"as soon as possible\"  
  Edematous  Neurology:    Denies any headache or focal neurological deficits, profound weakness and deconditioning.  Less confusion overall  Psch:   Anxious regarding ongoing medical issues.  Musculoskeletal:    Denies  myalgias, joint complaints or back pain.   Integumentary:   Denies any rashes, ulcers, or excoriations.  Denies bruising.  Hematologic/Lymphatic:  Denies bruising or bleeding.     Physical Exam:  I/O this shift:  In: 433.2 [P.O.:200; I.V.:113.8; IV Piggyback:119.4]  Out: -     Intake/Output Summary (Last 24 hours) at 2/25/2024 0958  Last data filed at 2/25/2024 0800  Gross per 24 hour   Intake 2155.17 ml   Output 1050 ml   Net 1105.17 ml   I/O last 3 completed shifts:  In: 2490.1 [P.O.:360; I.V.:613.6; IV Piggyback:1516.5]  Out: 1775 [Urine:1775]  Patient Vitals for the past 96 hrs (Last 3 readings):   Weight   02/25/24 0600 71.3 kg (157 lb 3.2 oz)   02/24/24 0638 71.9 kg (158 lb 9.6 oz)   02/23/24 1030 73 kg (161 lb)     Vital Signs:   Blood pressure 108/66, pulse 85, temperature 98.7 °F (37.1 °C), temperature source Axillary, resp. rate 24, height 1.651 m (5' 5\"), weight 71.3 kg (157 lb 3.2 oz), SpO2 91 %.    General appearance:  Much more awake and alert today.  Answering questions accordingly.  Less confused.  Head:  Normocephalic. No masses, lesions or tenderness.  Eyes:  PERRLA.  EOMI.  Sclera clear.  Perioral cyanosis.  ENT:  Ears normal. Mucosa normal.  Nasal cannula oxygen is in place at 5 L.  Neck:    Supple. Trachea midline. No thyromegaly. No JVD. No bruits.  Heart:    Atrial fibrillation with an underlying systolic murmur.  Lungs:    Symmetrical. Clear to auscultation bilaterally.  No wheezes. No rhonchi. No rales.  Abdomen:   Soft. Non-tender. Non-distended. Bowel sounds positive. No organomegaly or masses.  No pain on palpation.  Extremities:    Peripheral pulses present.  No peripheral edema.  No ulcers. No cyanosis. No clubbing.  Neurologic:    Awake and alert.  He is answering all 
(VANCOCIN) 1,000 mg in sodium chloride 0.9 % 250 mL IVPB ([id number]), Q18H           DISPOSITION:  REVIEW OF SYSTEMS     As stated above      PHYSICAL EXAMINATION    BP (!) 120/99   Pulse 79   Temp 98 °F (36.7 °C) (Oral)   Resp 27   Ht 1.651 m (5' 5\")   Wt 71.8 kg (158 lb 4.8 oz)   SpO2 95%   BMI 26.34 kg/m²   Temp  Av.9 °F (36.6 °C)  Min: 97.4 °F (36.3 °C)  Max: 98.4 °F (36.9 °C)  CONSTITUTIONAL:  IN BED no apparent distress   ENT:  Normocephalic, atraumatic,     LUNGS:  No increased work of breathing, cdec  to auscultation bilaterally, no crackles or wheezing  CARDIOVASCULAR:  Normal apical impulse, regular rate and rhythm, normal S1 and S2,  no murmur noted  ABDOMEN:  Normal bowel sounds, soft, non-distended, non-tender  MUSCULOSKELETAL:  There is no redness, warmth, or swelling  BLE.  Full range of motion     NEUROLOGIC:  Awake, alert   Cranial nerves II-XII are grossly intact.     SKIN:  Normal skin color, texture, turgor and no rashes  Lines:          Central Venous Catheter:  CVC Triple Lumen 24 Right Subclavian (Active)   $ Central line insertion $ Yes 24 1800   Central Line Being Utilized Yes 24 08   Criteria for Appropriate Use Hemodynamically unstable, requiring monitoring lines, vasopressors, or volume resuscitation 24 08   Site Assessment Clean, dry & intact 24   Phlebitis Assessment No symptoms 24 08   Infiltration Assessment 0 24 1200   Color/Movement/Sensation Capillary refill less than 3 sec 24 1200   Proximal Lumen Color/Status White;Infusing 24 08   Medial Lumen Status Blue;Infusing 24   Distal Lumen Color/Status Brown;Infusing 24   Line Care Connections checked and tightened 24   Alcohol Cap Used Yes 24 08   Date of Last Dressing Change 24 08   Dressing Type Transparent w/CHG gel 24 08   Dressing Status New dressing applied 24 08   Dressing 
acute cor pulmonale present (HCC) [I26.99]  ID following for   Has left hip/side  pain ?fluid vs hepatoma  Ct  Large mixed density collection in the left iliac fossa with fluid -fluid   level. This may be contiguous with the left common iliac vein. Differential   considerations include a large retroperitoneal hematoma, or less likely   abscess given the appearance of the adjacent left common iliac vessel.   Fevers follow  Leukocytosis   Cons bacteremia  pacer in situ   Blood cx  ngtd   Elevated prpcal   Cont atbx   B pleural effusion   Leukocytosis better      vancomycin (VANCOCIN) 1,000 mg in sodium chloride 0.9 % 250 mL IVPB ([id number]), Q18H 14 days from       Meropenem   Aded antifungal     DISPOSITION:  REVIEW OF SYSTEMS     As stated above      PHYSICAL EXAMINATION    /67   Pulse (!) 102   Temp (!) 100.8 °F (38.2 °C) (Bladder)   Resp 29   Ht 1.651 m (5' 5\")   Wt 64.7 kg (142 lb 9.6 oz)   SpO2 100%   BMI 23.73 kg/m²   Temp  Av.4 °F (38 °C)  Min: 100 °F (37.8 °C)  Max: 100.8 °F (38.2 °C)  CONSTITUTIONAL:   no apparent distress   ENT:  Normocephalic, atraumatic,     LUNGS:  No increased work of breathing, dec to auscultation bilaterall   CARDIOVASCULAR  regular rate and rhythm, normal S1 and S   ABDOMEN:  Normal bowel sounds, soft, non-distended, non-tender  MUSCULOSKELETAL:   Dec range of motion     NEUROLOGIC:  Awake, alert        SKIN:  bruised no rashes  Lines:          Central Venous Catheter:  CVC Triple Lumen 24 Right Subclavian (Active)   $ Central line insertion $ Yes 24 1800   Central Line Being Utilized Yes 24 08   Criteria for Appropriate Use Hemodynamically unstable, requiring monitoring lines, vasopressors, or volume resuscitation 24 08   Site Assessment Clean, dry & intact 24 08   Phlebitis Assessment No symptoms 24   Infiltration Assessment 0 24 1200   Color/Movement/Sensation Capillary refill less than 3 sec 24 
cyanotic   Neck: supple, no masses  Cardiovascular: normal heart sounds, regular rate, regular rhythm  Respiratory: dec breath sounds, no respiratory distress, no accessory muscle use on o2   Abdominal: normal bowel sounds, soft, non-tender,  non-distended  Musculoskeletal: no skeletal abnormalities, normal ROM no edema   Skin: warm, dry, normal turgor, normal color no rash   Neurological: nerves II - XII grossly normal and symmetric  Psychology: mood normal, affect normal pleasant     Labs/Imaging/Diagnostics   Labs:  Recent Labs     02/23/24 0435 02/23/24 1208 02/24/24 0430   WBC 13.6* 16.2* 15.2*   RBC 3.71* 3.93 3.71*   HGB 11.6* 12.5 11.6*   HCT 34.4* 36.1* 34.1*   MCV 92.7 91.9 91.9   RDW 15.8* 15.6* 15.9*     Recent Labs     02/22/24 0430 02/23/24  0435 02/24/24 0430    137 137   K 3.6 3.9 3.8    103 102   CO2 21* 23 25   BUN 18 14 17   CREATININE 0.9 1.0 1.0   GLUCOSE 85 86 109*   PHOS 2.0* 2.1* 2.4*   MG 1.9 1.8 2.0     Recent Labs     02/23/24  1208   PROTIME 31.3*   INR 2.8     Recent Labs     02/21/24 2208 02/22/24  0430 02/22/24  1116   APTT 50.4* 201.9* 103.2*     Recent Labs     02/22/24  0430 02/23/24  0435 02/24/24  0430   AST 45* 30 23   ALT 41* 31 24   BILITOT 2.1* 2.7* 2.5*   ALKPHOS 101 80 86     Recent Labs     02/22/24  0430 02/23/24  0435 02/23/24  1208 02/24/24  0430   INR  --   --  2.8  --    PROTIME  --   --  31.3*  --    AST 45* 30  --  23   ALT 41* 31  --  24     Lab Results   Component Value Date/Time    CHOL 134 01/05/2024 11:37 AM    CHOL 123 11/28/2022 12:00 PM    TRIG 53 01/05/2024 11:37 AM    HDL 50 01/05/2024 11:37 AM    LDLCALC 75 11/28/2022 12:00 PM     No results found for: \"VITD25\"    Results       Procedure Component Value Units Date/Time    Culture, Blood 1 [[id number]] Collected: 02/20/24 1251    Order Status: Completed Specimen: Blood Updated: 02/23/24 1617     Specimen Description .BLOOD     Special Requests          Culture NO GROWTH 3 DAYS    Culture, 
embolism, unspecified chronicity, unspecified pulmonary embolism type, unspecified whether acute cor pulmonale present (HCC) [I26.99]  ID following for   Has left hip/side   Large left ileo psoas/retroperitoneal hematoma      Fevers  blood cx ngtd   Leukocytosis wbc16. follow  Cons bacteremia  pacer in situ 2 weeks from    Blood cx ,  ngtd   Has pacer     Elevated procal   B pleural effusion   Leukocytosis better   Urine cx ngtd     meropenem (MERREM) 1,000 mg in sodium chloride 0.9 % 100 mL IVPB ([id number]), Q12H day3  DAPTOmycin (CUBICIN) 500 mg in sodium chloride 0.9 % 50 mL IVPB, Q24H day2  fluconazole (DIFLUCAN) in 0.9 % sodium chloride IVPB 400 mg, Q24H day3         DISPOSITION:  REVIEW OF SYSTEMS     As stated above      PHYSICAL EXAMINATION    BP (!) 98/57   Pulse 92   Temp 98.3 °F (36.8 °C) (Bladder)   Resp 29   Ht 1.651 m (5' 5\")   Wt 69.9 kg (154 lb)   SpO2 100%   BMI 25.63 kg/m²   Temp  Av °F (37.2 °C)  Min: 97.8 °F (36.6 °C)  Max: 100.5 °F (38.1 °C)  CONSTITUTIONAL:   no apparent distress   ENT:  Normocephalic, atraumatic,     LUNGS:  No increased work of breathing, dec to auscultation bilaterally ant   CARDIOVASCULAR  regular rate and rhythm, normal S1 and S   ABDOMEN:  Normal bowel sounds, soft, non-distended, non-tender  MUSCULOSKELETAL:   Dec range of motion  dec  edema   NEUROLOGIC:  Awake, alert   tired  SKIN:  bruised no rashes        Central Venous Catheter:  CVC Triple Lumen 24 Right Subclavian (Active)   $ Central line insertion $ Yes 24 1800   Central Line Being Utilized Yes 24 06   Criteria for Appropriate Use Hemodynamically unstable, requiring monitoring lines, vasopressors, or volume resuscitation 24 0600   Site Assessment Clean, dry & intact 24 0600   Phlebitis Assessment No symptoms 24 0600   Infiltration Assessment 0 24 1200   Color/Movement/Sensation Capillary refill less than 3 sec 24 1200   Proximal Lumen 
hypoxia  Gastrointestinal:   Increasing appetite but with minimal oral intake.  Genitourinary:    Denies any urgency, frequency, hematuria. Voiding with use of a Courtney catheter.    Extremities:   Third spacing seems to be improving.  Neurology:    Awake and alert.  Oriented x 3.  Mentation remains stable and at his baseline.  Psch:   Depressed and anxious regarding ongoing medical issues.  Musculoskeletal:    Denies  myalgias, joint complaints or back pain.   Integumentary:   Denies any rashes, ulcers, or excoriations.  Denies bruising.  Hematologic/Lymphatic:  Denies bruising or bleeding.     Physical Exam:  No intake/output data recorded.    Intake/Output Summary (Last 24 hours) at 3/5/2024 1135  Last data filed at 3/5/2024 0400  Gross per 24 hour   Intake 1228.32 ml   Output 1975 ml   Net -746.68 ml   I/O last 3 completed shifts:  In: 1465.1 [P.O.:620; IV Piggyback:845.1]  Out: 2525 [Urine:2525]  Patient Vitals for the past 96 hrs (Last 3 readings):   Weight   03/05/24 0400 72.9 kg (160 lb 11.2 oz)   03/04/24 0500 71.9 kg (158 lb 9.6 oz)   03/03/24 0400 73.6 kg (162 lb 4.8 oz)     Vital Signs:   Blood pressure 120/74, pulse 89, temperature 99.4 °F (37.4 °C), temperature source Axillary, resp. rate 29, height 1.651 m (5' 5\"), weight 72.9 kg (160 lb 11.2 oz), SpO2 100 %.    General appearance:  Awake and alert.  Seated at the bedside chair.  Head:  Normocephalic. No masses, lesions or tenderness.  Eyes:  PERRLA.  EOMI.  Sclera clear.  Perioral cyanosis.  ENT:  Ears normal. Mucosa normal.  Nasal cannula oxygen is in place at 2 L.  Neck:    Supple. Trachea midline. No thyromegaly. No JVD. No bruits.  Heart:    Current rate control, underlying systolic murmur.  Lungs:    Symmetrical. Clear to auscultation bilaterally.  No wheezes. No rhonchi. No rales.  Some accessory muscle usage identified after working with the therapy teams.  Abdomen:   Soft. Non-tender. Non-distended. Bowel sounds positive. No organomegaly or 
per 24 hour   Intake 2507.85 ml   Output 1650 ml   Net 857.85 ml       Labs:   CBC:   Recent Labs     02/19/24 0459 02/20/24 0441 02/21/24 0457   WBC 17.6* 19.3* 16.3*   HGB 13.9 12.4* 12.7   HCT 42.4 36.4* 37.4     --  140     BMP:   Recent Labs     02/20/24 0441 02/21/24 0457    140   K 4.5 3.0*   CO2 20* 20*   BUN 36* 26*   CREATININE 1.5* 1.1   LABGLOM 45* >60   CALCIUM 8.1* 8.3*     Mag:   Recent Labs     02/20/24 0441 02/21/24 0457   MG 2.3 2.3     Phos:   Recent Labs     02/20/24 0441 02/21/24 0457   PHOS 3.3 1.8*     TSH:   Recent Labs     02/19/24  0013   TSH 3.53     HgA1c:     BNP: No results for input(s): \"BNP\" in the last 72 hours.  PT/INR:   Recent Labs     02/18/24 1955   PROTIME 20.3*   INR 1.8     APTT:  Recent Labs     02/20/24 0441 02/21/24 0457   APTT 65.7* 49.3*     CARDIAC ENZYMES:  Recent Labs     02/18/24  2100 02/19/24  0013 02/19/24 0459   TROPHS 185* 216* 172*     FASTING LIPID PANEL:  Lab Results   Component Value Date/Time    CHOL 134 01/05/2024 11:37 AM    CHOL 123 11/28/2022 12:00 PM    HDL 50 01/05/2024 11:37 AM    LDLCALC 75 11/28/2022 12:00 PM    TRIG 53 01/05/2024 11:37 AM     LIVER PROFILE:  Recent Labs     02/20/24 0441 02/21/24 0457   AST 35 48*   ALT 24 36   LABALBU 3.3* 3.4*       Current Inpatient Medications:   potassium phosphate IVPB (CENTRAL LINE)  30 mmol IntraVENous Once    potassium chloride  40 mEq Oral Once    vancomycin  750 mg IntraVENous Q24H    pantoprazole  40 mg Oral QAM AC    cefepime  2,000 mg IntraVENous Q12H    metroNIDAZOLE  500 mg IntraVENous Q8H    midodrine  15 mg Oral TID WC    aspirin  81 mg Oral Daily    budesonide  250 mcg Nebulization BID    ipratropium 0.5 mg-albuterol 2.5 mg  1 Dose Inhalation 4x Daily RT       IV Infusions (if any):   DOPamine 2.5 mcg/kg/min (02/21/24 0609)    lactated ringers IV soln 75 mL/hr at 02/21/24 0609    norepinephrine Stopped (02/20/24 1011)    heparin (PORCINE) Infusion 16 Units/kg/hr 
  Site Assessment Clean, dry & intact 03/02/24 0600   Phlebitis Assessment No symptoms 03/02/24 0600   Infiltration Assessment 0 02/23/24 1200   Color/Movement/Sensation Capillary refill less than 3 sec 02/23/24 1200   Proximal Lumen Color/Status White;Normal saline locked 03/02/24 0600   Medial Lumen Status Blue;Normal saline locked 03/02/24 0600   Distal Lumen Color/Status Brown;Infusing 03/02/24 0600   Line Care Connections checked and tightened 03/02/24 0600   Alcohol Cap Used Yes 03/02/24 0600   Date of Last Dressing Change 02/26/24 03/02/24 0600   Dressing Type Transparent w/CHG gel 03/02/24 0600   Dressing Status Clean, dry & intact 03/02/24 0600   Dressing Intervention Other (Comment) 03/02/24 0600      Peripheral Intravenous Line:  Peripheral IV 02/18/24 Right Antecubital (Active)   Site Assessment Intact 03/02/24 0600   Line Status Normal saline locked;Intermittent infusions;Capped 03/02/24 0600   Line Care Connections checked and tightened 03/02/24 0600   Phlebitis Assessment No symptoms 03/02/24 0600   Infiltration Assessment 0 03/02/24 0600   Alcohol Cap Used Yes 03/02/24 0600   Dressing Status Clean, dry & intact 03/02/24 0600   Dressing Type Transparent 03/02/24 0600   Dressing Intervention Other (Comment) 03/02/24 0600      Drain(s):  Urinary Catheter 02/18/24 Courtney-Temperature (Active)   $ Urethral catheter insertion $ Not inserted for procedure 02/19/24 0100   Catheter Indications Need for fluid volume management of the critically ill patient in a critical care setting 03/02/24 0400   Site Assessment No urethral drainage 03/02/24 0400   Urine Color Orange 03/02/24 0400   Urine Appearance Clear 03/02/24 0400   Collection Container Standard 03/02/24 0400   Securement Method Securing device (Describe) 03/02/24 0400   Catheter Care  Soap and water 03/02/24 0600   Catheter Best Practices  Drainage tube clipped to bed;Catheter secured to thigh;Tamper seal intact;Bag below bladder;Bag not on floor;Lack of 
02/22/24 1621     Specimen Description .BLOOD     Special Requests          Culture NO GROWTH 2 DAYS    Culture, MRSA, Screening [[id number]] Collected: 02/19/24 0827    Order Status: Completed Specimen: Nares Updated: 02/20/24 1402     Specimen Description .NARES     Culture NEGATIVE FOR: METHICILLIN RESISTANT STAPHYLOCOCCUS AUREUS    Culture, Urine [[id number]] Collected: 02/18/24 2235    Order Status: Completed Specimen: Urine, clean catch Updated: 02/20/24 0924     Specimen Description .CLEAN CATCH URINE     Culture NO GROWTH    Culture, Blood 1 [[id number]] Collected: 02/18/24 1955    Order Status: Completed Specimen: Blood Updated: 02/23/24 0406     Specimen Description .BLOOD .ARM     Special Requests          Culture NO GROWTH 4 DAYS    Culture, Blood 2 [[id number]]  (Abnormal) Collected: 02/18/24 1955    Order Status: Completed Specimen: Blood Updated: 02/21/24 1211     Specimen Description .BLOOD .ARM     Special Requests          [employer] test comment ANAEROBIC BLD BOTTLE     Direct Exam DIRECT GRAM STAIN FROM BOTTLE: GRAM POSITIVE COCCI IN CLUSTERS     Comment: Direct Gram Stain from bottle result called to and read back by: Shanelle Buckley RN on   2/20/24 at 0811           Culture STAPHYLOCOCCUS SPECIES, COAGULASE NEGATIVE A single positive blood culture of coagulase negative Staphylocci, diphtheroids,micrococci, Cutibacterium, viridans Streptocci, Bacillus, or Lactobacillus species should be interpreted with caution and viewed as a likely skin contaminant.       Candida auris Not Detected     Candida albicans Not Detected     Candida glabrata Not Detected     Candida krusei Not Detected     Candida parapsilosis Not Detected     Candida tropicalis Not Detected     Cryptococcus neoformans/gattii Not Detected     Acinetobacter calcoaceticus-baumannii complex Not Detected     Escherichia Coli Not Detected     Enterobacter Cloacae Complex Not Detected     Enterobacterales Not Detected     Klebsiella oxytoca 
Date/Time    CHOL 134 2024 11:37 AM    CHOL 123 2022 12:00 PM    HDL 50 2024 11:37 AM    LDLCALC 75 2022 12:00 PM    TRIG 53 2024 11:37 AM     LIVER PROFILE:  Recent Labs     24  0430 24   AST 45* 30   ALT 41* 31   LABALBU 3.2* 3.0*       Current Inpatient Medications:   sodium phosphate IVPB (CENTRAL line)  20 mmol IntraVENous Once    magnesium sulfate  2,000 mg IntraVENous Once    apixaban  10 mg Oral BID    Followed by    [START ON 2024] apixaban  5 mg Oral BID    furosemide  20 mg IntraVENous BID    pantoprazole  40 mg Oral QAM AC    cefepime  2,000 mg IntraVENous Q12H    midodrine  15 mg Oral TID WC    aspirin  81 mg Oral Daily    budesonide  250 mcg Nebulization BID    ipratropium 0.5 mg-albuterol 2.5 mg  1 Dose Inhalation 4x Daily RT       IV Infusions (if any):   DOBUTamine 2.5 mcg/kg/min (24)    DOPamine 2.5 mcg/kg/min (24)         PHYSICAL EXAM:     CONSTITUTIONAL:   /74   Pulse 83   Temp 98.3 °F (36.8 °C) (Axillary)   Resp 21   Ht 1.676 m (5' 5.98\")   Wt 73.1 kg (161 lb 1.6 oz)   SpO2 96%   BMI 26.01 kg/m²   Pulse  Av.5  Min: 79  Max: 110  Systolic (24hrs), Av , Min:97 , Max:123    Diastolic (24hrs), Av, Min:66, Max:85        CONST:  Well developed, appears stated age. Awake, alert and no apparent distress.   HEENT:   Head- Normocephalic  Eyes- Conjunctivae pink, no icterus  Throat- Oral mucosa moist  Neck-  No stridor, no jugular venous distention. No carotid bruit.    CHEST: Chest symmetrical and non-tender to palpation. No accessory muscle use  RESPIRATORY: Lung sounds - Few rhonchi  CARDIOVASCULAR:     Heart Inspection- pacer site OK  Heart Ausculation- Irregular rate, 2-2/6 systolic murmur. No s3   EXT: + lower extremity edema. Distal pulses palpable   ABDOMEN: Soft,  Bowel sounds present.   MS: N/A   : Deferred  RECTAL: Deferred  SKIN: Warm and dry   NEURO / PSYCH: Oriented to person, place; Good 
Status: Sent Specimen: Nares Updated: 02/19/24 0929    Culture, Urine [[id number]] Collected: 02/18/24 2235    Order Status: Completed Specimen: Urine, clean catch Updated: 02/20/24 0924     Specimen Description .CLEAN CATCH URINE     Culture NO GROWTH    Culture, Blood 1 [[id number]] Collected: 02/18/24 1955    Order Status: Completed Specimen: Blood Updated: 02/20/24 0406     Specimen Description .BLOOD .ARM     Special Requests          Culture NO GROWTH 1 DAY    Culture, Blood 2 [[id number]]  (Abnormal) Collected: 02/18/24 1955    Order Status: Completed Specimen: Blood Updated: 02/20/24 0944     Specimen Description .BLOOD .ARM     Special Requests          [employer] test comment ANAEROBIC BLD BOTTLE     Direct Exam DIRECT GRAM STAIN FROM BOTTLE: GRAM POSITIVE COCCI IN CLUSTERS     Comment: Direct Gram Stain from bottle result called to and read back by: Shanelle Buckley RN on   2/20/24 at 0811           Culture CULTURE IN PROGRESS     Joleen auris Not Detected     Candida albicans Not Detected     Candida glabrata Not Detected     Candida krusei Not Detected     Candida parapsilosis Not Detected     Candida tropicalis Not Detected     Cryptococcus neoformans/gattii Not Detected     Acinetobacter calcoaceticus-baumannii complex Not Detected     Escherichia Coli Not Detected     Enterobacter Cloacae Complex Not Detected     Enterobacterales Not Detected     Klebsiella oxytoca Not Detected     Klebsiella aerogenes Not Detected     Klebsiella pneumoniae group Not Detected     Pseudomonas aeruginosa Not Detected     Proteus spp Not Detected     Salmonella species Not Detected     SERRATIA MARCESCENS Not Detected     Stenotrophomonas maltophilia Not Detected     Haemophilus influenzae Not Detected     Listeria monocytogenes Not Detected     Bacteroides fragilis Not Detected     Neisseria Meningitidis, NMNI Not Detected     Staphylococcus spp DETECTED     Staphylococcus Aureus Not Detected     Staphylococcus epidermidis 
device if needed and Cues for hand placement, technique and safety. Provide stabilization to prevent fall   -Gait: Gait training, Standing activities to improve: base of support, weight shift, weight bearing , and Pregait training to emphasize: Safety, O2 line management, and pacing   -Endurance: Utilize Supervised activities to increase level of endurance to allow for safe functional mobility including transfers and gait  and Use graduated activities to promote good breathing techniques and provide support and education to maximize respiratory function  -Stairs: Stair training with instruction on proper technique and hand placement on rail  -Instruction in independent management of O2 line    PT long term treatment goals are located in below grid    Patient and or family understand(s) diagnosis, prognosis, and plan of care.    Frequency of treatments: Patient will be seen  daily.         Prior Level of Function: Patient ambulated independently    Rehab Potential: good   for baseline    Past medical history:   Past Medical History:   Diagnosis Date    Aortic stenosis, mild     Aortic valve sclerosis     moderate    Atrial fibrillation ([facility]) 2014    CAD (coronary artery disease)     CHF (congestive heart failure) ([facility])     COPD (chronic obstructive pulmonary disease) ([facility])     Encounter for diabetic foot exam ([facility]) 01/08/2024    Gallbladder attack     History of blood transfusion     History of tobacco use 09/17/2015    Hx of blood clots     Hyperlipidemia     Hypertension     Mild mitral regurgitation     Prostate cancer ([facility])     44 treatments of radiation    PVD (peripheral vascular disease) with claudication ([facility]) 09/17/2015    RBBB     Tobacco abuse      Past Surgical History:   Procedure Laterality Date    APPENDECTOMY      CARDIAC CATHETERIZATION  08/08/2014    DR Coates-pt. states had triple bypass \"5 years ago\"    COLONOSCOPY      CORONARY ARTERY BYPASS GRAFT  08/11/2014    CABGx3 LIMA-LAD, SVG-D1, SVG-OM1, 
normal. Muscular strength intact. Gait not assessed.  Integumentary:  No rashes  Skin normal color and texture.  Genitalia/Breast:  Deferred    Medication:  Scheduled Meds:   vancomycin  750 mg IntraVENous Q24H    [START ON 2/21/2024] pantoprazole  40 mg Oral QAM AC    cefepime  2,000 mg IntraVENous Q12H    metroNIDAZOLE  500 mg IntraVENous Q8H    midodrine  15 mg Oral TID WC    aspirin  81 mg Oral Daily    budesonide  250 mcg Nebulization BID    ipratropium 0.5 mg-albuterol 2.5 mg  1 Dose Inhalation 4x Daily RT     Continuous Infusions:   DOPamine 2.5 mcg/kg/min (02/20/24 1249)    lactated ringers IV soln 75 mL/hr at 02/20/24 1026    norepinephrine Stopped (02/20/24 1011)    heparin (PORCINE) Infusion 12 Units/kg/hr (02/20/24 1026)       Objective Data:  Recent Labs     02/18/24 1955 02/19/24 0459 02/20/24  0441   WBC 17.5* 17.6* 19.3*   RBC 4.62 4.44 3.94   HGB 14.6 13.9 12.4*   HCT 43.3 42.4 36.4*   MCV 93.7 95.5 92.4   MCH 31.6 31.3 31.5   MCHC 33.7 32.8 34.1   RDW 15.7* 15.8* 15.9*    134  --    MPV 9.8 10.8 10.5     Recent Labs     02/18/24 1955 02/19/24 0459 02/20/24  0441    134 142   K 4.1 4.3 4.5    102 111*   CO2 17* 15* 20*   BUN 22 27* 36*   CREATININE 1.3* 1.5* 1.5*   GLUCOSE 98 178* 164*   CALCIUM 8.4* 8.0* 8.1*   PROT 7.2 6.3* 6.1*   LABALBU 3.9 3.5 3.3*   BILITOT 3.5* 2.9* 1.6*   ALKPHOS 121 107 147*   AST 24 33 35   ALT 20 22 24     Lab Results   Component Value Date    TROPONINI 0.03 09/16/2019    TROPONINI <0.01 01/16/2018    TROPONINI <0.01 01/16/2018          Wound Documentation:   Incision 07/16/18 Groin Right (Active)   Number of days: 2045       Incision 07/16/18 Groin Left (Active)   Number of days: 2045       Wound 02/19/24 Nose Redness (Active)   Wound Cleansed Cleansed with saline 02/20/24 0800   Dressing/Treatment Open to air 02/20/24 0800   Number of days: 1       Assessment:    Shock--septic vs cardiogenic, possibly secondary to underlying pneumonia with 
normal. Speech is not rapid and/or pressured.  Musculoskeletal:   Spine ROM normal. Muscular strength intact. Gait not assessed.  Integumentary:  No rashes  Skin normal color and texture.  Genitalia/Breast:  Voiding with use of a catheter.  Hematuria has resolved.    Medication:  Scheduled Meds:   furosemide  40 mg Oral Daily    heparin (porcine)  5,000 Units SubCUTAneous 3 times per day    midodrine  15 mg Oral TID WC    meropenem  1,000 mg IntraVENous Q12H    DAPTOmycin (CUBICIN) 500 mg in sodium chloride 0.9 % 50 mL IVPB  500 mg IntraVENous Q24H    polyethylene glycol  17 g Oral Daily    docusate sodium  100 mg Oral Daily    fluconazole  400 mg IntraVENous Q24H    metoprolol succinate  12.5 mg Oral Daily    empagliflozin  10 mg Oral Daily    sodium chloride flush  5-40 mL IntraVENous BID    lansoprazole  30 mg Oral QAM AC    aspirin  81 mg Oral Daily    budesonide  250 mcg Nebulization BID    ipratropium 0.5 mg-albuterol 2.5 mg  1 Dose Inhalation 4x Daily RT     Continuous Infusions:        Objective Data:  Recent Labs     03/01/24 0434 03/02/24 0420 03/03/24 0422   WBC 16.8* 16.1* 15.2*   RBC 2.96* 2.91* 2.79*   HGB 9.3* 9.1* 8.6*   HCT 28.6* 28.0* 26.7*   MCV 96.6 96.2 95.7   MCH 31.4 31.3 30.8   MCHC 32.5 32.5 32.2   RDW 16.5* 16.7* 17.1*    271 285   MPV 10.6 10.9 10.3     Recent Labs     03/01/24 0434 03/02/24 0420 03/03/24 0422    136 135   K 4.9 4.9 3.8    103 102   CO2 21* 20* 24   BUN 16 19 19   CREATININE 1.2 1.2 1.2   GLUCOSE 80 96 105*   CALCIUM 8.3* 8.4* 8.1*   PROT 6.4 6.1* 6.3*   LABALBU 3.1* 2.7* 2.5*   BILITOT 1.8* 1.4* 1.3*   ALKPHOS 108 127 106   AST 39 31 28   ALT 6 12 6     Lab Results   Component Value Date    TROPONINI 0.03 09/16/2019    TROPONINI <0.01 01/16/2018    TROPONINI <0.01 01/16/2018          Wound Documentation:   Incision 07/16/18 Groin Right (Active)   Number of days: 2045       Incision 07/16/18 Groin Left (Active)   Number of days: 2045       Wound 
transfer of weight onto lower extremities and use of gait device if needed and Cues for hand placement, technique and safety. Provide stabilization to prevent fall   -Gait: Gait training, Standing activities to improve: base of support, weight shift, weight bearing , and Pregait training to emphasize: Safety, O2 line management, and pacing   -Endurance: Utilize Supervised activities to increase level of endurance to allow for safe functional mobility including transfers and gait  and Use graduated activities to promote good breathing techniques and provide support and education to maximize respiratory function  -Stairs: Stair training with instruction on proper technique and hand placement on rail  -Instruction in independent management of O2 line    PT long term treatment goals are located in below grid    Patient and or family understand(s) diagnosis, prognosis, and plan of care.    Frequency of treatments: Patient will be seen  daily.         Prior Level of Function: Patient ambulated independently    Rehab Potential: good   for baseline    Past medical history:   Past Medical History:   Diagnosis Date    Aortic stenosis, mild     Aortic valve sclerosis     moderate    Atrial fibrillation ([facility]) 2014    CAD (coronary artery disease)     CHF (congestive heart failure) ([facility])     COPD (chronic obstructive pulmonary disease) ([facility])     Encounter for diabetic foot exam ([facility]) 01/08/2024    Gallbladder attack     History of blood transfusion     History of tobacco use 09/17/2015    Hx of blood clots     Hyperlipidemia     Hypertension     Mild mitral regurgitation     Prostate cancer ([facility])     44 treatments of radiation    PVD (peripheral vascular disease) with claudication ([facility]) 09/17/2015    RBBB     Tobacco abuse      Past Surgical History:   Procedure Laterality Date    APPENDECTOMY      CARDIAC CATHETERIZATION  08/08/2014    DR Coates-pt. states had triple bypass \"5 years ago\"    COLONOSCOPY      CORONARY ARTERY BYPASS 
   NE VASCULAR EMBOLIZATION OR OCCLUSION ARTERIAL RS&I N/A 07/16/2018    ENDOVASCULAR  AORTIC ANEURYSM REPAIR , ABDOMINAL AORTIC ANEURYSM, WITH FINISTRATED GRAFT performed by Tu Chacon MD at [facility] [place] OR    TONSILLECTOMY         SUBJECTIVE:    Precautions:    , falls and O2 ,      Imaging results: Echo (TTE) complete (PRN contrast/bubble/strain/3D)    Result Date: 2/20/2024    Image quality is suboptimal. Contrast used: Definity.   Left Ventricle: Mildly reduced left ventricular systolic function with EF of 35 - 40%.    Vascular duplex lower extremity venous bilateral    Result Date: 2/19/2024  EXAMINATION: DUPLEX VENOUS ULTRASOUND OF THE BILATERAL LOWER EXTREMITIES2/19/2024 1:03 pm      No evidence of DVT in either lower extremity.     US GALLBLADDER RUQ    Result Date: 2/19/2024  EXAMINATION: RIGHT UPPER QUADRANT ULTRASOUND 2/18/2024 11:50 pm      Gallbladder wall thickening present, otherwise unremarkable.     CTA PULMONARY W CONTRAST    Result Date: 2/18/2024  EXAMINATION: CTA OF THE CHEST 2/18/2024 9:11 pm    1.  Irregular filling defect in the left main pulmonary artery can represent a acute/recent pulmonary embolus. 2.  Atherosclerotic changes with ectasia and tortuosity of the thoracic aorta.  Previous endovascular repair for aneurysm the abdominal aorta not fully covered on this study. 3.  Advanced emphysematous changes lung parenchyma. 4.  Suspicious mild lobulated nodule in the superior segment of the right lower lobe measuring 10 mm.  Findings there retention can require correlation with histopathology.  Consider correlation with PET-CT scan as an alternative to short-term follow-up study and prior an interventional procedure. 5.  Suspicious linear spiculated scar in the posterior segment of the right upper lobe. 6.  Findings for right-sided failure with prominent right atrium and reflux of contrast from the right atrium into the inferior vena cava/hepatic veins. Preliminary report given to  
Cleansed with saline 02/20/24 0800   Dressing/Treatment Open to air 02/20/24 0800   Number of days: 1       Assessment:    Multifactorial shock including septic as well as cardiogenic with Staphylococcus bacteremia  Elevated troponin in the setting of known coronary artery disease  Cardiac dysrhythmia consistent with atrial fibrillation  Acute hypoxemic respiratory failure with small insignificant pulmonary embolism with possible community-acquired pneumonia.  Left iliopsoas hematoma  Chronic kidney disease stage II  History of peripheral vascular disease with status post endovascular repair of abdominal aortic aneurysm.  Nonoxygen dependent COPD with chronic respiratory failure  Severe protein malnourishment      Plan:     Repeat CT shows stable hematoma  Continue antibiotics  Check cortisol level.  Add low-dose Florinef 0.05 mg.  Blood pressure improved on midodrine  Attempt to increase activity  Oxygen currently at 2 L  Reviewed pain medications    Greater than 40 minutes of critical care time was spent with the patient. This includes chart review, , and discussion with those consultants involved in the patient's care.     More than 50% of my  time was spent at the bedside counseling/coordinating care with the patient and/or family with face to face contact.  This time was spent reviewing notes and laboratory data as well as instructing and counseling the patient. Time I spent with the family or surrogate(s) is included only if the patient was incapable of providing the necessary information or participating in medical decisions. I also discussed the differential diagnosis and all of the proposed management plans with the patient and individuals accompanying the patient.    Macario Dolan DO, F.A.C.O.I.  3/2/2024  1:42 PM    
He continues to be on dopamine.  Kidney function is improving.  White blood cells count is improving.  Patient creatinine has normalized and he is scheduled to undergo a CTA of the chest to rule out PE.  He has no fever, chills, rigors.  Repeated cultures are pending.  Lactate is clearing up.    February 22, 2024:.  Patient is awake and communicating.  He remains on dopamine and dobutamine.  He had an uneventful night.  He has no fever, chills, rigors..  He is more awake and interactive.  Creatinine is improving.  Cultures have been negative to date except for 1 blood culture that is likely contamination.  Patient has been ambulating with assistant.  His oral intake has been good.  Overall he is feeling better however he continues to require dopamine and dobutamine for cardiogenic shock.  He remains hypoxemic requiring 10 L high flow oxygen.  Diuresis was initiated today.  CTA of the chest showed filling defect that remains suspicious for pulmonary embolism.  Patient will be restarted on Eliquis    February 23, 2024: Patient was awake and communicating today but he has bluish nose and lips.  He felt cold.  White blood cells count has slightly increased.  Ultrasound gallbladder on February 19, 2024 showed gallbladder wall thickening.  He continues to diurese very well.  Dopamine and dobutamine with resume as patient mixed venous saturation dropped.  He is 1.8 L negative.  Patient remains on 11 L high flow nasal cannula.    February 24, 2024: Patient is much better today and more awake and communicating.  Bilirubin is trending down.  Patient is able to converse fully.  CT scan of the brain showed no evidence of acute bleeding.  Patient has hematuria and was to be switched to heparin drip from Eliquis.  His oxygen requirement has improved and patient is currently on 9 L nasal cannula.  He remains on 5 mcg/kg/min of dopamine and 5 mcg/kg/min of dobutamine.    Past Medical History:  Past Medical History:   Diagnosis Date 
Moderate Assist with wheeled walker to improve balance, verbal cues for walker sequence and safety.   Supervision    Balance Sitting:     Static: fair +    Dynamic: fair   Standing: fair - with wheeled walker  Sitting:     Static: good     Dynamic: good   Standing: good  with wheeled walker   Activity Tolerance fair   good    Visual/  Perceptual Glasses: Readers                Hand Dominance: Right     AROM (PROM) Strength Additional Info:  Goal:   RUE  WFL 4-/5 good  and wfl FMC/dexterity noted during ADL tasks   Improve overall RUE strength  for participation in functional tasks   LUE WFL 4-/5 good  and wfl FMC/dexterity noted during ADL tasks   Improve overall LUE strength  for participation in functional tasks      Vitals:  HR at rest: 89 bpm HR with activity: 90 bpm HR at end of session: 85 bpm   SpO2 at rest: 94% SpO2 with activity: 90% SpO2 at end of session: 96%     Hearing: WFL   Sensation:  No c/o numbness or tingling  Tone: WFL   Edema: None    Comments: Nursing approved therapy session. Upon arrival the patient was supine with HOB elevated.  At end of session, patient was seated in bedside chair with call light and phone within reach, all lines and tubes intact.  Overall patient demonstrated decreased independence and safety during completion of ADL/functional transfer/mobility tasks.  Pt would benefit from continued skilled OT to increase safety and independence with completion of ADL/IADL tasks for functional independence and quality of life.    Treatment: OT treatment provided this date includes:   Instruction/training on safety and adapted techniques for completion of ADLs   Instruction/training on safe functional mobility/transfer techniques   Instruction/training on energy conservation/work simplification for completion of ADLs   Instruction/training on proper positioning/alignment to prevent contractures    Neuromuscular Reeducation to facilitate balance/righting reactions for increased 
Warm and dry   NEURO / PSYCH: Oriented to person, place; Good mood           IMPRESSION / RECOMMENDATIONS:     Septic shock (HCC) - Per ID consultants.       Hypotension  - On Midodrine - Wean inotropic support as his BP tolerates    Elevated Troponin - Likely demand ischemia from hypoxia, hypotension and Renal failure, no CP. EKG noted.        Coronary artery disease - s/p CABG in 2014 - No CP -Resume home meds when BP stable      Chronic HFrEF - GDMT limited due to low BP .     Ischemic cardiomyopathy - EF 35-40%. Resume home meds when BP stable      S/p AVR - Bicuspid valve (For aortic stenosis and aortic regurgitation) - 2014.  Likely LFLG severe prosthetic AV stenosis - Out-pt Valve clinic referral for TAVR w/u.     History of high grade AV block - s/p dual chamber Pacemaker. s/p PGR 8/2022.     Atrial fibrillation - Diagnosed in 08/2014; recurrences on Pacemaker interrogation 09/05/2017. On heparin for now - transition to OAC prior to discharge.     VHD - MR, TR     Hyperlipidemia: On low dose Atorvastatin. History of elevated liver enzymes with higher dose of statin.     Abdominal aortic aneurysm - status post endovascular repair 07/16/2018.     Severe emphysema     Prostate cancer - S/P radiation therapy.              Above d/w him       Electronically signed by Lindsay Cast MD on 2/22/2024 at 7:41 AM  [facility]  
03/04/24 0824    lansoprazole (PREVACID SOLUTAB) disintegrating tablet 30 mg, 30 mg, Oral, QAM AC, Kylee Beckwith APRN - CNP, 30 mg at 03/04/24 0538    melatonin tablet 3 mg, 3 mg, Oral, Nightly PRN, Kylee Beckwith APRN - CNP, 3 mg at 03/03/24 2308    aspirin chewable tablet 81 mg, 81 mg, Oral, Daily, Emma Garsia MD, 81 mg at 03/04/24 0820    budesonide (PULMICORT) nebulizer suspension 250 mcg, 250 mcg, Nebulization, BID, Alexander Lopez APRN - CNP, 250 mcg at 03/04/24 0417    ipratropium 0.5 mg-albuterol 2.5 mg (DUONEB) nebulizer solution 1 Dose, 1 Dose, Inhalation, 4x Daily RT, Alexander Lopez APRN - CNP, 1 Dose at 03/04/24 1228  DIAGNOSTIC RESULTS     Results       Procedure Component Value Units Date/Time    Culture, Respiratory [[id number]] Collected: 02/29/24 1215    Order Status: Completed Specimen: Sputum Expectorated Updated: 03/02/24 1006     Specimen Description .EXPECTORATED SPUTUM     Direct Exam FEW Polymorphonuclear leukocytes      NO EPITHELIAL CELLS      RARE GRAM NEGATIVE RODS     Culture NORMAL RESPIRATORY NNEKA    Culture, Blood 1 [[id number]] Collected: 02/29/24 0809    Order Status: Completed Specimen: Blood Updated: 03/04/24 1135     Specimen Description .BLOOD .ARM     Special Requests          Culture NO GROWTH 4 DAYS    Culture, Blood 2 [[id number]] Collected: 02/29/24 0748    Order Status: Completed Specimen: Blood Updated: 03/04/24 1137     Specimen Description .BLOOD     Special Requests          Culture NO GROWTH 4 DAYS    Culture, Urine [[id number]] Collected: 02/29/24 0739    Order Status: Completed Specimen: Urine, indwelling catheter Updated: 03/01/24 0730     Specimen Description .INDWELLING CATH URINE     Culture NO GROWTH    Culture, Urine [[id number]] Collected: 02/25/24 0213    Order Status: Completed Specimen: Urine, clean catch Updated: 02/26/24 1150     Specimen Description .CLEAN CATCH URINE .INDWELLING CATH URINE     Culture NO GROWTH    Culture, 
04:20 AM     Cardiac Enzymes:   Lab Results   Component Value Date    CKTOTAL 228 (H) 03/02/2024    CKTOTAL 168 09/21/2019    CKTOTAL 119 01/16/2018    CKMB 3.9 01/16/2018    CKMB 4.2 01/16/2018    CKMB 3.3 08/09/2014    TROPHS 172 (H) 02/19/2024    TROPHS 216 (H) 02/19/2024    TROPHS 185 (H) 02/18/2024      PT/INR:    Lab Results   Component Value Date/Time    PROTIME 31.3 02/23/2024 12:08 PM    INR 2.8 02/23/2024 12:08 PM     TSH:    Lab Results   Component Value Date/Time    TSH 3.53 02/19/2024 12:13 AM     Rhythm Strip: atrial fibrillation.    Cath 8/8/2014: Severe three vessel coronary artery disease. Normal left ventricular size with basal inferior wall akinesis with an estimated ejection fraction of 50%. Systemic hypertension. Elevated left ventricular end diastolic pressure consistent with LV dysfunction. Mild calcific aortic stenosis and mild aortic regurgitation. High grade ostial narrowing of the left vertebral basilar artery with no significant disease of both carotid arteries. Mobitz II second degree AV block and intermittent third degree AV block for which the patient had a temporary transvenous pacemaker inserted.      Regadenoson SPECT MPI 3/2021  Gated SPECT left ventricular ejection fraction was calculated to be 69%, with normal myocardial thickening and wall motion.  Electrocardiographically non diagnostic because of the pace rhythm.  Myocardial perfusion imaging was normal.    Overall left ventricular systolic function was normal without regional wall motion abnormalities.  4.   Low risk general pharmacologic stress test.    TTE 2/19/24 CY:     Image quality is suboptimal. Contrast used: Definity.    Left Ventricle: Mildly reduced left ventricular systolic function with EF of 35 - 40%. Apical akinesis, distal septal and inferobasal dyskinesis, abnormal septal motion, mild global hypokinesis. Left ventricle size is normal. Moderate concentric hypertrophy. Stage III diastolic sysfunction.    Right 
breaks provided 2* to decreased endurance/tolerance.  Ex's completed when pt seated in bedside chair.     Hearing: WFL   Sensation:  No c/o numbness or tingling  Tone: WFL   Edema: None noted     Comments: Patient cleared by nursing staff.  Upon arrival pt seated in bedside chair.   Pt agreeable to OT tx session. Wife present, however stepped OOR during session.  Pt educated with regards to   grooming tasks, B UE ROM ex's, deep breathing techniques,  ECT's.  At end of session pt seated in bedside chair  with all lines and tubes intact, call light within reach. Overall, pt demonstrated decreased independence and safety during completion of ADL/functional transfers/mobility tasks. Pt would benefit from continued skilled OT to increase safety and independence with completion of ADL/IADL tasks for functional independence and quality of life.      Pt required cues and education as noted above for safe facilitation and completion of tasks. Therapist provided skilled monitoring of patient's response during treatment session. Prior to and at the end of session, environmental modifications /O2 and IV line management completed for patients safety and efficiency of treatment session.     Overall, patient demonstrates moderate difficulties with completion of BADLs and IADLs. Factors contributing to these difficulties include decreased endurance, and generalized weakness. As noted above, patient likely to benefit from further OT intervention to increase independence, safety, and overall quality of life.     Treatment:     ADL completion: Self-care retraining for the above-mentioned ADLs; training on proper hand placement, safety technique, sequencing, and energy conservation techniques. Grooming/shaving with electric clippers when seated in chair.  Postural Balance: Sitting balance retraining to improve righting reactions with postural changes during ADLs.  Therapeutic Ex's: To increase B UE strength/ROM and endurance required 
interpretation.     XR CHEST PORTABLE    Result Date: 2/18/2024  EXAMINATION: ONE XRAY VIEW OF THE CHEST 2/18/2024 8:05 pm      Mild increased interstitial markings may represent mild pulmonary edema.       Social history: Patient lives with spouse in a ranch home  with 2 steps, without rail  to enter home.   Walk in shower  , grab bars     Equipment owned: Wheeled Walker and Shower chair,       AM-PAC Basic Mobility        AM-PAC Basic Mobility - Inpatient   How much help is needed turning from your back to your side while in a flat bed without using bedrails?: A Lot  How much help is needed moving from lying on your back to sitting on the side of a flat bed without using bedrails?: A Lot  How much help is needed moving to and from a bed to a chair?: A Lot  How much help is needed standing up from a chair using your arms?: A Lot  How much help is needed walking in hospital room?: Total  How much help is needed climbing 3-5 steps with a railing?: Total  AM-PAC Inpatient Mobility Raw Score : 10  AM-PAC Inpatient T-Scale Score : 32.29  Mobility Inpatient CMS 0-100% Score: 76.75  Mobility Inpatient CMS G-Code Modifier : CL    Nursing cleared patient for PT treatment.      OBJECTIVE:   Initial Evaluation  Date: 2/20/2024 Reassessment Date:  2/26/2024     Short Term/ Long Term   Goals   Was pt agreeable to Eval/treatment? Yes Yes To be met in 5 days   Pain level   0/10    Not stated    Bed Mobility  Using rails and head of bed elevated:       Rolling: Minimal assist of 1    Supine to sit: Minimal assist of 1    Sit to supine: Not assessed     Scooting: Minimal assist of 1   Using rails and head of bed elevated:      Rolling: Not assessed    Supine to sit: Moderate assist of 1   Sit to supine: Not assessed patient in chair   Scooting: Minimal assist of 1 time given for patient to perform more for self.    Rolling: Minimal assist of 1    Supine to sit: Minimal assist of 1    Sit to supine: Minimal assist of 1    Scooting: 
the 2021 CKD-EPI equation.  Careful clinical correlation is recommended, particularly when comparing to results   calculated using previous equations.  The CKD-EPI equation is less accurate in patients with extremes of muscle mass, extra-renal   metabolism of creatine, excessive creatine ingestion, or following therapy that affects   renal tubular secretion.     03/02/2024 >60 >60 mL/min/1.73m2 Final     Comment:           These results are not intended for use in patients <18 years of age.        eGFR results are calculated without a race factor using the 2021 CKD-EPI equation.  Careful clinical correlation is recommended, particularly when comparing to results   calculated using previous equations.  The CKD-EPI equation is less accurate in patients with extremes of muscle mass, extra-renal   metabolism of creatine, excessive creatine ingestion, or following therapy that affects   renal tubular secretion.       GFR    Date Value Ref Range Status   08/22/2022 >60  Final   04/06/2022 >60  Final   07/28/2021 >60  Final     Magnesium   Date Value Ref Range Status   03/04/2024 2.3 1.6 - 2.6 mg/dL Final   03/03/2024 2.3 1.6 - 2.6 mg/dL Final   03/02/2024 2.5 1.6 - 2.6 mg/dL Final     Phosphorus   Date Value Ref Range Status   03/04/2024 3.0 2.5 - 4.5 mg/dL Final   03/03/2024 2.3 (L) 2.5 - 4.5 mg/dL Final   03/02/2024 3.4 2.5 - 4.5 mg/dL Final     No results for input(s): \"PH\", \"PO2\", \"PCO2\", \"HCO3\", \"BE\", \"O2SAT\" in the last 72 hours.    RADIOLOGY:  XR CHEST PORTABLE   Final Result   1. Improved bilateral infiltrates and/or atelectasis.   2. Resolution of the left pleural effusion.         CT ABDOMEN PELVIS WO CONTRAST Additional Contrast? None   Final Result   Large left ileo psoas/retroperitoneal hematoma is unchanged in size from   02/27/2024.      Mild interval enlargement of the bilateral pleural effusions with bibasilar   atelectasis.      Stable infrarenal abdominal aortic aneurysm with aorto 
assessed    Scooting: Not assessed     Using rails and head of bed elevated:      Rolling: Moderate assist of 1   Supine to sit: Moderate assist of 1   Sit to supine: Moderate assist of 1   Scooting: Moderate assist of 1    Rolling: Minimal assist of 1    Supine to sit: Minimal assist of 1    Sit to supine: Minimal assist of 1    Scooting: Minimal assist of 1     Transfers Sit to stand: Minimal assist of 1 Cues for hand placement and safety  Sit to stand: Not assessed     Sit to stand: Moderate assist of 1  see assessment  Sit to stand: Moderate assist of 1     Ambulation     1 x 20 feet, 1 x 40 feet, 2 x 90 feet using  wheeled walker with Minimal assist of 1   for O2 line management  and cues for pacing and pursed lip breathing NA 3-5 feet using  wheeled walker with Maximal assist of 1      > 25 feet using  wheeled walker with Moderate assist of 1      ROM Within functional limits     Increase range of motion 10% of affected joints    Strength BUE:  refer to OT eval  RLE:  4-/5  LLE:  4-/5   Increase strength in affected mm groups by 1/3 grade   Balance Sitting EOB:  good    Dynamic Standing:  fair +wheeled walker  Sitting EOB: fair+   Dynamic Standing: fair minus wheeled walker Sitting EOB: fair-  Dynamic Standing: fair- with WW  Sitting EOB:  fair    Dynamic Standing: fair- with WW       Patient is Alert & Oriented x person and not asked this session  and follows directions    Sensation: Patient  denies numbness/tingling   Edema: no   Endurance: fair -    Vitals:  0-2 liters high flow nasal cannula   Blood Pressure at rest  Blood Pressure during session     Heart Rate at rest  Heart Rate during session     SPO2 at rest % difficulty getting o2 reading  SPO2 during session 98%      Patient education  Patient educated on role of Physical Therapy, risks of immobility, safety and plan of care,  importance of mobility while in hospital , purse lip breathing, ankle pumps, quad set and glut set for edema control, blood 
septal motion, mild global hypokinesis. Left ventricle size is normal. Moderate concentric hypertrophy. Stage III diastolic sysfunction.    Right Ventricle: Right ventricle size is normal. pacer lead present in the right ventricle.    Aortic Valve: Not well visualized. Bioprosthetic valve. No gross evidence of vegetations. 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 AV area 0.7cm2 by Vmax, 0.6cm2 by VTI; DLI 0.22.    Mitral Valve: Mild regurgitation.    Tricuspid Valve: Moderate regurgitation, RVSP calculated at 31mHg.    Left Atrium: Left atrium is mildly dilated.    No gross indication of endocarditis - Valves and Pacer leads not well visualized.    Prior study done in 7/2023.    Stress test:    Regadenoson SPECT MPI 3/2021  Gated SPECT left ventricular ejection fraction was calculated to be 69%, with normal myocardial thickening and wall motion.     Impression:    Electrocardiographically non diagnostic because of the pace rhythm.  Myocardial perfusion imaging was normal.    Overall left ventricular systolic function was normal without regional wall motion abnormalities.  4.   Low risk general pharmacologic stress test.    Cardiac catheterization:   8/8/14: Cardiac catheterization: Severe three vessel coronary artery disease. Normal left ventricular size with basal inferior wall akinesis with an estimated ejection fraction of 50%. Systemic hypertension. Elevated left ventricular end diastolic pressure consistent with LV dysfunction. Mild calcific aortic stenosis and mild aortic regurgitation. High grade ostial narrowing of the left vertebral basilar artery with no significant disease of both carotid arteries. Mobitz II second degree AV block and intermittent third degree AV block for which the patient had a temporary transvenous pacemaker inserted.         Last available device interrogation 10/2022    [employer] implanted 8/2022  11.5 years battery 
septal motion, mild global hypokinesis. Left ventricle size is normal. Moderate concentric hypertrophy. Stage III diastolic sysfunction.    Right Ventricle: Right ventricle size is normal. pacer lead present in the right ventricle.    Aortic Valve: Not well visualized. Bioprosthetic valve. No gross evidence of vegetations. 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 AV area 0.7cm2 by Vmax, 0.6cm2 by VTI; DLI 0.22.    Mitral Valve: Mild regurgitation.    Tricuspid Valve: Moderate regurgitation, RVSP calculated at 31mHg.    Left Atrium: Left atrium is mildly dilated.    No gross indication of endocarditis - Valves and Pacer leads not well visualized.    Prior study done in 7/2023.    Stress test:    Regadenoson SPECT MPI 3/2021  Gated SPECT left ventricular ejection fraction was calculated to be 69%, with normal myocardial thickening and wall motion.     Impression:    Electrocardiographically non diagnostic because of the pace rhythm.  Myocardial perfusion imaging was normal.    Overall left ventricular systolic function was normal without regional wall motion abnormalities.  4.   Low risk general pharmacologic stress test.    Cardiac catheterization:   8/8/14: Cardiac catheterization: Severe three vessel coronary artery disease. Normal left ventricular size with basal inferior wall akinesis with an estimated ejection fraction of 50%. Systemic hypertension. Elevated left ventricular end diastolic pressure consistent with LV dysfunction. Mild calcific aortic stenosis and mild aortic regurgitation. High grade ostial narrowing of the left vertebral basilar artery with no significant disease of both carotid arteries. Mobitz II second degree AV block and intermittent third degree AV block for which the patient had a temporary transvenous pacemaker inserted.         Last available device interrogation 10/2022    [employer] implanted 8/2022  11.5 years battery 
(abdominal aortic aneurysm) without rupture (HCC)    Elevated liver enzymes    History of tobacco use    S/P CABG x 3    H/O aortic valve replacement with porcine valve    Aortic valve disease    Pure hypercholesterolemia    Normally functioning cardiac pacemaker present    History of smoking 30 or more pack years    Hx of CABG    PAF (paroxysmal atrial fibrillation) (HCC)    Anticoagulated by anticoagulation treatment    S/P AAA repair using bifurcation graft    Bilateral leg weakness    Chronic systolic congestive heart failure (HCC)    PVD (peripheral vascular disease) (HCC)    Stage 3a chronic kidney disease (HCC)    Pulmonary fibrosis (HCC)    Dyslipidemia    Essential hypertension    Primary insomnia    Cough in adult    OA (ocular albinism) (HCC)    Acute bacterial sinusitis    Tobacco abuse    Fatigue    History of prostate cancer    Septic shock (HCC)    Pulmonary embolism (HCC)    Acute on chronic combined systolic and diastolic congestive heart failure (HCC)    Ischemic cardiomyopathy    Palliative care encounter    Acute respiratory failure with hypoxia (HCC)    NSTEMI (non-ST elevated myocardial infarction) (HCC)     1. Elevated troponin/CAD-CABG:     Hx of CABG 2014 LIMA-LAD, V-D2, V-OM1.    BB/statin. No ASA or anticoagulation due to possible retroperitoneal hematoma.    2. VHD: Hx of bicuspid AV post AVR with 23 mm Medtronic Mosaic Ultra at time of CABG.    Possible prosthetic AV stenosis, reassess at time of eventual RIAZ.    3. Acute on chronic systolic CHF:     EF 35-40%.     Off pressors currently.    BB/IV lasix/SGLT2.  Hypervolemic on examination.  1.7 L negative so far.  Will give a dose of 40 IV Lasix x 1.  Monitor urine output closely.    Eventual additional guideline medications as tolerated by hemodynamics and renal function.     4. Permanent Afib: Typically on Eliquis, held due to possible retroperitoneal hematoma.    5. Pacer: Abigail [employer]. Interrogation as above.     6. ID 
(paroxysmal atrial fibrillation) (HCC)    Anticoagulated by anticoagulation treatment    S/P AAA repair using bifurcation graft    Bilateral leg weakness    Chronic systolic congestive heart failure (HCC)    PVD (peripheral vascular disease) (HCC)    Stage 3a chronic kidney disease (HCC)    Pulmonary fibrosis (HCC)    Dyslipidemia    Essential hypertension    Primary insomnia    Cough in adult    OA (ocular albinism) (HCC)    Acute bacterial sinusitis    Tobacco abuse    Fatigue    History of prostate cancer    Septic shock (HCC)    Pulmonary embolism (HCC)    Acute on chronic combined systolic and diastolic congestive heart failure (HCC)    Ischemic cardiomyopathy    Palliative care encounter     1. Elevated troponin/CAD-CABG:     Hx of CABG 2014 LIMA-LAD, V-D2, V-OM1.    BB/statin. No ASA or anticoagulation due to possible retroperitoneal hematoma.    2. VHD: Hx of bicuspid AV post AVR with 23 mm Medtronic Mosaic Ultra at time of CABG.    Possible prosthetic AV stenosis, reassess at time of eventual RIAZ.    3. Acute on chronic systolic CHF:     EF 35-40%.     Requiring supportive medications to maintain BP.     BB/IV lasix/SGLT2.     Eventual additional guideline medications as tolerated by hemodynamics and renal function.     4. Permanent Afib: Typically on Eliquis, held due to possible retroperitoneal hematoma.    5. Pacer: [employer]. Interrogation as above.     6. ID issues/Septic shock/Coagulase-negative Staph bacteremia/Fevers/Pneumonia: Per ID.    Concern for device infection or prosthetic valve endocarditis with bacteremia and persistent fevers    RIAZ when more stable.    7. Acute respiratory failure: Per primary service.     8. Possible retroperitoneal hematoma:     Left iliopsoas fluid collection/mixed density collection in the left iliac fossa possibly contiguous with left common iliac vein possible retroperitoneal hematoma.    ASA/eliquis held.     9. Acute on CKD: Follow labs.     10. Anemia: 
AC, Emma Garsia MD, 40 mg at 02/23/24 0921    ceFEPIme (MAXIPIME) 2,000 mg in sodium chloride 0.9 % 100 mL IVPB ([id number]), 2,000 mg, IntraVENous, Q12H, Emma Garsia MD, Stopped at 02/23/24 0515    midodrine (PROAMATINE) tablet 15 mg, 15 mg, Oral, TID WC, Emma Garsia MD, 15 mg at 02/23/24 0921    aspirin chewable tablet 81 mg, 81 mg, Oral, Daily, Emma Garsia MD, 81 mg at 02/23/24 0921    budesonide (PULMICORT) nebulizer suspension 250 mcg, 250 mcg, Nebulization, BID, Alexander Lopez APRN - CNP, 250 mcg at 02/23/24 0519    ipratropium 0.5 mg-albuterol 2.5 mg (DUONEB) nebulizer solution 1 Dose, 1 Dose, Inhalation, 4x Daily RT, Alexander Lopez APRN - CNP, 1 Dose at 02/23/24 0903    Review of Systems:   General: Positive fever.  Denies weight gain, denies loss of appetite,, chills, night sweats.  HEENT: denies headaches, dizziness, head trauma, visual changes, eye pain, tinnitus, nosebleeds, hoarseness or throat pain    Respiratory: denies chest pain, dyspnea, cough and hemoptysis  Cardiovascular: denies orthopnea, paroxysmal nocturnal dyspnea, leg swelling, and previous heart attack.    Gastrointestinal: denies pain, nausea vomiting, diarrhea, constipation, melena or bleeding.  Genitourinary: denies hematuria, frequency, urgency or dysuria  Neurology: denies syncope, seizures, paralysis, paraesthesia   Endocrine: denies polyuria, polydipsia, skin or hair changes, and heat or cold intolerance  Musculoskeletal: denies joint pain, swelling, arthritis or myalgia  Hematologic: denies bleeding, adenopathy and easy bruising  Skin: denies rashes and skin discoloration  Psychiatry: denies depression    Physical Exam:   Vital Signs:  BP (!) 136/90   Pulse (!) 111   Temp 98.3 °F (36.8 °C) (Axillary)   Resp (!) 34   Ht 1.651 m (5' 5\")   Wt 73 kg (161 lb)   SpO2 97%   BMI 26.79 kg/m²     Input/Output:  In: 2106.2 [P.O.:240; I.V.:1315.7]  Out: 9811 
Staph bacteremia  Persistent fevers  Acute hypoxic respiratory failure high flow nasal cannula  RIGO/CKD creatinine 1.5-1.0 improved  Hematuria  Lactic acidosis resolved  Anemia Hgb 11.6 mild  Encephalopathy  Vaguely described possible PE on limited quality CTA  Severe emphysema  AAA endovascular repair 2018    RECOMMENDATIONS:    On dopamine and dobutamine per intensivist recommend weaning and discontinuing as tolerated  Continue gentle diuresis monitor strict I's and O's  Concern for device infection or prosthetic valve endocarditis with bacteremia and persistent fevers, respiratory status currently precluding safe RIAZ, may need RIAZ when respiratory status improved  Aortic valve gradients suboptimally assessed on current echo, will need to be reevaluated  Interrogate pacemaker, has not been checked since 2022 and does not do remote transmissions  Consider holding aspirin and/or apixaban given hematuria  On high-dose midodrine  Optimize GDMT when blood pressure improved  Aggressive risk factor modification  Further care per primary service and consultants    Discussed with patient and spouse at the bedside    Nima Doran MD, [facility]    NOTE: This report was transcribed using voice recognition software. Every effort was made to ensure accuracy; however, inadvertent computerized transcription errors may be present.   
paced  100% AT/AF burden      ----------------------------------------------------------------------------------------------------------------------------------------------------------------  IMPRESSION:  Elevated hs-cTnT: 120-216 ng/L and downtrending likely acute myocardial injury  Coronary artery disease s/p CABG 2014 LIMA-LAD, V-D2, V-OM1  Bicuspid AV with AS/AR s/p bioprosthetic AVR 23 mm Medtronic Mosaic ultra at time of CABG  Possible prosthetic AV stenosis  Acute on chronic heart failure with reduced EF proBNP 5800 net -1.2 L  Ischemic cardiomyopathy EF 35-40%.  Previously 45-50  High-grade AV block s/p dual-chamber PPM 8/2014, generator change 2022 [employer]  Permanent AF on apixaban  Mild MR, moderate TR  Septic shock  Coagulase-negative Staph bacteremia  Fevers improved  Acute hypoxic respiratory failure high flow nasal cannula  Pneumonia  RIGO/CKD creatinine 1.5-0.9 improved  Hematuria  Lactic acidosis resolved  Left iliopsoas fluid collection/mixed density collection in the left iliac fossa possibly contiguous with left common iliac vein possible retroperitoneal hematoma  Acute blood loss anemia Hgb 12.7-8.9  Encephalopathy waxes and wanes  Vaguely described possible PE on limited quality CTA  Severe emphysema  AAA endovascular repair 2018    RECOMMENDATIONS:    Hold anticoagulants given above findings of intramuscular and possibly retroperitoneal hematoma  Hold aspirin for now as well  Off dopamine/dobutamine  Continue gentle diuresis monitor strict I's and O's  Concern for device infection or prosthetic valve endocarditis with bacteremia and persistent fevers, respiratory status currently precluding safe RIAZ, may need RIAZ when respiratory status improved and off hemodynamic support  RIAZ when more stable  Aortic valve gradients suboptimally assessed on current echo, will need to be reevaluated  Pacemaker interrogation reviewed, normal function  Was on high-dose midodrine, now discontinued as 
within reach,  all lines and tubes intact, nursing notified.      Patient would benefit from continued skilled Physical Therapy to improve functional independence and quality of life.         Patient's/ family goals   home    Time in  1445  Time out  1529    Total Treatment Time  24 minutes    Evaluation time includes thorough review of current medical information, gathering information on past medical history/social history and prior level of function, completion of standardized testing/informal observation of tasks, assessment of data, and development of Plan of care and goals.     CPT codes:  Low Complexity PT evaluation (50020)  Therapeutic activities (09833)   14 minutes  1 unit(s)  Gait Training (92803) 10 minutes 1 unit(s)    Sherrie Aguilar, PT   
Espinoza ROJAS, [facility]    NOTE: This report was transcribed using voice recognition software. Every effort was made to ensure accuracy; however, inadvertent computerized transcription errors may be present.   
and prior an interventional procedure.      5.  Suspicious linear spiculated scar in the posterior segment of the right   upper lobe.      6.  Findings for right-sided failure with prominent right atrium and reflux   of contrast from the right atrium into the inferior vena cava/hepatic veins.      Preliminary report given to Dr. Callejas, ER physician in [place] at the   time of the interpretation.         XR CHEST PORTABLE   Final Result   Mild increased interstitial markings may represent mild pulmonary edema.         XR CHEST PORTABLE    (Results Pending)           PROBLEM LIST:  Principal Problem:    Septic shock (HCC)  Active Problems:    Permanent atrial fibrillation (HCC)    AV block    Normally functioning cardiac pacemaker present    Hx of CABG    Pulmonary embolism (HCC)    Acute on chronic combined systolic and diastolic congestive heart failure (HCC)    Ischemic cardiomyopathy    Palliative care encounter  Resolved Problems:    * No resolved hospital problems. *      IMPRESSION:  Bilateral pneumonia, slightly better today  Pulmonary emboli currently not being treated secondary to retroperitoneal bleeding  Depression, improved  Left retroperitoneal hematoma unchanged in size over the last 10 days  5.  Right upper lobe scar status post aortic valve replacement 15 to 20 years ago  6.  History of coronary artery disease with bypass graft surgery remotely done  7.  Previous urinary tract infection hypernatremia improving with D5W  8.  Suspected hepatopulmonary syndrome        PLAN:  Continue antibiotics per ID  Continue Sinemet to increase brain dopamine levels  Continue SGLT2 inhibitor empagliflozin  Small doses of diuretics but these made to be held because the patient tends to be hypotensive  Mobilize out of bed when blood pressure improves and pain is less  Labs, chest x-ray in a.m.  7 .inhaled bronchodilators and inhaled steroids    ATTESTATION:  ICU Staff Physician note of personal involvement in 
failure: Per primary service.     8. Possible retroperitoneal hematoma:     Left iliopsoas fluid collection/mixed density collection in the left iliac fossa possibly contiguous with left common iliac vein possible retroperitoneal hematoma.    ASA/eliquis held.     9. Acute on CKD: Follow labs.     10. Anemia: Follow labs.     11. Possible PE: Possible PE described on limited quality 2/21/2024 CTA.    12. COPD/Severe emphysema    13. Hx of AAA endovascular repair 2018    14. Encephalopathy waxes and wanes    Available external charts reviewed.   Available imaging and evaluations independently reviewed.   Interviewed and discussed patient with available family.  Discussed case with referring service and non-cardiology consultants.     Greater than 50 minutes was spent counseling the patient, reviewing the rationale for the above recommendations and reviewing the patient's current medication list, problem list and results of all previously ordered testing.    Peter Sethi MD, [facility]     NOTE: This report was transcribed using voice recognition software. Every effort was made to ensure accuracy; however, inadvertent computerized transcription errors may be present.  
gradients suboptimally assessed on current echo, will need to be reevaluated  Pacemaker interrogation reviewed, normal function  Was on high-dose midodrine, discontinued as blood pressure improved; now back on midodrine per primary service  Maintain electrolyte replacement  Optimize GDMT now that off of inotropes/vasopressors start low-dose metoprolol succinate; hold for now given hypotension  Empagliflozin resumed by intensivist  Aggressive risk factor modification  Further care per primary service and consultants  Prognosis appears guarded    Nima Doran MD, [facility]    NOTE: This report was transcribed using voice recognition software. Every effort was made to ensure accuracy; however, inadvertent computerized transcription errors may be present.   
management and evaluation added up to greater than 50% of the total time spent with this patient, including shared/split visits, should this note reflect this event.      CRITICAL CARE TIME:  31 minutes    Electronically signed by Bandar Escobar MD on 2/26/2024 at 2:37 PM          
masses  Extremities: No edema. Pulses are equally present.   Skin: intact, no rashes on visualized skin  Neurologic: Alert and oriented x 3, No focal deficit     Investigations:  Labs, radiological imaging and cardiac work up were personally reviewed        ICU STAFF PHYSICIAN NOTE OF PERSONAL INVOLVEMENT IN CARE  As the attending physician, I certify that I personally reviewed the patient's history and personally examined the patient to confirm the physical findings described above, and that I reviewed the relevant imaging studies and available reports.  I also discussed the differential diagnosis and all of the proposed management plans with the patient and individuals accompanying the patient to this visit.  They had the opportunity to ask questions about the proposed management plans and to have those questions answered.    This patient has a high probability of sudden, clinically significant deterioration, which requires the highest level of physician preparedness to intervene urgently.  I managed/supervised life or organ supporting interventions that required frequent physician assessment.  I devoted my full attention to the direct care of this patient for the amount of time indicated below.  Time I spent with family or surrogate(s) is included only if the patient was incapable of providing the necessary information or participating in medical decision-making.  Time devoted to teaching and to any procedures I billed separately is not included.  Critical Care Time: 32 minutes      Electronically signed by Emma Garsia MD on 2/20/2024 at 12:06 PM  
tomorrow if the pain subsides from his retroperitoneal hematoma  Check labs and x-ray  Continue with hypotonic fluids for hypernatremia  H&H every 6 hours x 4    ATTESTATION:  ICU Staff Physician note of personal involvement in Care  As the attending physician, I certify that I personally reviewed the patient’s history and personnally examined the patient to confirm the physical findings described above,  And that I reviewed the relevant imaging studies and available reports.  I also discussed the differential diagnosis and all of the proposed management plans with the patient and individuals accompanying the patient to this visit.  They had the opportunity to ask questions about the proposed management plans and to have those questions answered.     This patient has a high probability of sudden, clinically significant deterioration, which requires the highest level of physician preparedness to intervene urgently.  I managed/supervised life or organ supporting interventions that required frequent physician assessment.   I devoted my full attention to the direct care of this patient for the amount of time indicated below.  Time I spent with the family or surrogate(s) is included only if the patient was incapable of providing the necessary information or participating in medical decisions - Time devoted to teaching and to any procedures I billed separately is not included.    My time caring for this patient including history, physical examination, and medical management and evaluation added up to greater than 50% of the total time spent with this patient, including shared/split visits, should this note reflect this event.      CRITICAL CARE TIME:  46 minutes    Electronically signed by Bandar Escobar MD on 2/27/2024 at 3:27 PM          
<0.3 04/24/2023     Lab Results   Component Value Date    PROCAL 8.84 (H) 02/18/2024    PROCAL 3.28 (H) 09/18/2019     Recent Labs     02/26/24  0431 02/27/24  0507 02/28/24  0406   AST 20 24 45*   ALT 17 18 23       MRSA Culture Only   Date Value Ref Range Status   07/09/2018 Methicillin resistant Staph aureus not isolated  Final       Radiology:  CT HEAD WO CONTRAST    Result Date: 2/23/2024  No acute intracranial abnormality.     US GALLBLADDER RUQ    Result Date: 2/23/2024  Gallbladder wall thickening.  No stones, sludge or pericholecystic fluid. Normal common bile duct.     CTA CHEST W CONTRAST    Result Date: 2/21/2024  1.  Persistent and increased size of the filling defect of the left main pulmonary artery since CTA chest of February 18, which are suspicious for a pulmonary embolus. 2.  However due right-sided heart failure dynamics there is incomplete filling of the more distal branches of the pulmonary artery circulation which causes flow related artifacts. 3.  Recommended repeat examination with different time of excision of the images, targeted to the ascending thoracic aorta and not to the pulmonary artery circulation.  This will allow better overall opacification of the entire pulmonary circulation for this patient.     XR CHEST PORTABLE    Result Date: 2/21/2024  Right-sided MediPort catheter in position with the tip at the level of the bryan. No pneumothorax.     Vascular duplex lower extremity venous bilateral    Result Date: 2/19/2024  No evidence of DVT in either lower extremity.     Imaging and labs were reviewed per medical records.     Thank you for involving me in the care of Kike Holloway I will continue to follow. Please do not hesitate to call [phone] for any questions or concerns.    Electronically signed by Bushra Martinez MD on 2/28/2024 at 5:52 PM     
Time: 31 minutes      Electronically signed by Emma Garsia MD on 2/22/2024 at 11:42 AM  
this study. 3.  Advanced emphysematous changes lung parenchyma. 4.  Suspicious mild lobulated nodule in the superior segment of the right lower lobe measuring 10 mm.  Findings there retention can require correlation with histopathology.  Consider correlation with PET-CT scan as an alternative to short-term follow-up study and prior an interventional procedure. 5.  Suspicious linear spiculated scar in the posterior segment of the right upper lobe. 6.  Findings for right-sided failure with prominent right atrium and reflux of contrast from the right atrium into the inferior vena cava/hepatic veins. Preliminary report given to Dr. Callejas,  physician in [facility] at the time of the interpretation.     XR CHEST PORTABLE    Result Date: 2/18/2024  EXAMINATION: ONE XRAY VIEW OF THE CHEST 2/18/2024 8:05 pm COMPARISON: 10 January 2024 HISTORY: ORDERING SYSTEM PROVIDED HISTORY: resp distress TECHNOLOGIST PROVIDED HISTORY: Reason for exam:->resp distress FINDINGS: Single upright AP portable chest demonstrates previous CABG procedure.  There is a dual-chamber cardiac pacemaker in place.  There is no evidence of a pneumothorax.  There are mild increased interstitial markings present.     Mild increased interstitial markings may represent mild pulmonary edema.             Labs, imaging, and medical records/notes were personally reviewed.    Electronically signed by Bushra Martinez MD on 2/22/2024 at 9:24 AM             
below.  Time I spent with family or surrogate(s) is included only if the patient was incapable of providing the necessary information or participating in medical decision-making.  Time devoted to teaching and to any procedures I billed separately is not included.  Critical Care Time: 31 minutes      Electronically signed by Emma Garsia MD on 2/25/2024 at 11:35 AM  
minutes    Electronically signed by Emma Garsia MD on 3/5/2024 at 12:04 PM

## 2024-03-07 NOTE — PLAN OF CARE
Problem: Cardiovascular - Adult  Goal: Maintains optimal cardiac output and hemodynamic stability  Outcome: Progressing     Problem: Cardiovascular - Adult  Goal: Absence of cardiac dysrhythmias or at baseline  Outcome: Progressing     Problem: Pain  Goal: Verbalizes/displays adequate comfort level or baseline comfort level  Outcome: Progressing     Problem: Nutrition Deficit:  Goal: Optimize nutritional status  Outcome: Progressing     Problem: Chronic Conditions and Co-morbidities  Goal: Patient's chronic conditions and co-morbidity symptoms are monitored and maintained or improved  Outcome: Progressing     
  Problem: Discharge Planning  Goal: Discharge to home or other facility with appropriate resources  2/22/2024 1449 by Katie Burks RN  Outcome: Progressing     Problem: Safety - Adult  Goal: Free from fall injury  2/22/2024 1449 by Katie Burks RN  Outcome: Progressing     Problem: ABCDS Injury Assessment  Goal: Absence of physical injury  2/22/2024 1449 by Katie Burks RN  Outcome: Progressing     Problem: Pain  Goal: Verbalizes/displays adequate comfort level or baseline comfort level  2/22/2024 1449 by Katie Burks RN  Outcome: Progressing     
  Problem: Discharge Planning  Goal: Discharge to home or other facility with appropriate resources  2/24/2024 0119 by Vilma Cain RN  Outcome: Progressing     Problem: Safety - Adult  Goal: Free from fall injury  2/24/2024 1440 by KATHYA MARINO  Outcome: Progressing  2/24/2024 0119 by Vilma Cain RN  Outcome: Progressing     Problem: ABCDS Injury Assessment  Goal: Absence of physical injury  2/24/2024 1440 by KATHYA MARINO  Outcome: Progressing  2/24/2024 0119 by Vilma Cain RN  Outcome: Progressing     Problem: Respiratory - Adult  Goal: Achieves optimal ventilation and oxygenation  2/24/2024 1440 by KATHYA MARINO  Outcome: Progressing  2/24/2024 0119 by Vilma Cain RN  Outcome: Progressing     Problem: Cardiovascular - Adult  Goal: Maintains optimal cardiac output and hemodynamic stability  2/24/2024 1440 by KATHYA MARINO  Outcome: Progressing  2/24/2024 0119 by Vilma Cain RN  Outcome: Progressing  Goal: Absence of cardiac dysrhythmias or at baseline  Outcome: Progressing     Problem: Pain  Goal: Verbalizes/displays adequate comfort level or baseline comfort level  2/24/2024 1440 by KATHYA MARINO  Outcome: Progressing  2/24/2024 0119 by Vilma Cain RN  Outcome: Progressing  Flowsheets (Taken 2/23/2024 1600 by Gabriel Morley RN)  Verbalizes/displays adequate comfort level or baseline comfort level: Encourage patient to monitor pain and request assistance     Problem: Nutrition Deficit:  Goal: Optimize nutritional status  Outcome: Progressing     Problem: Chronic Conditions and Co-morbidities  Goal: Patient's chronic conditions and co-morbidity symptoms are monitored and maintained or improved  2/24/2024 1440 by KATHYA MARINO  Outcome: Progressing  2/24/2024 0119 by Vilma Cain RN  Outcome: Progressing     Problem: Skin/Tissue Integrity  Goal: Absence of new skin breakdown  Description: 1.  Monitor for areas of redness and/or skin breakdown  2.  Assess 
  Problem: Discharge Planning  Goal: Discharge to home or other facility with appropriate resources  2/26/2024 0059 by Vilma Cain RN  Outcome: Progressing    Problem: Safety - Adult  Goal: Free from fall injury  2/26/2024 0059 by Vilma Cain RN  Outcome: Progressing       Problem: ABCDS Injury Assessment  Goal: Absence of physical injury  2/26/2024 0059 by Vilma Cain RN  Outcome: Progressing       Problem: Respiratory - Adult  Goal: Achieves optimal ventilation and oxygenation  2/26/2024 0059 by Vilma Cain RN  Outcome: Progressing       Problem: Cardiovascular - Adult  Goal: Maintains optimal cardiac output and hemodynamic stability  2/26/2024 0059 by Vilma Cain RN  Outcome: Progressing           Problem: Pain  Goal: Verbalizes/displays adequate comfort level or baseline comfort level  2/26/2024 0059 by Vilma Cain RN  Outcome: Progressing       Problem: Nutrition Deficit:  Goal: Optimize nutritional status  2/26/2024 0059 by Vilma Cain RN  Outcome: Progressing    Problem: Chronic Conditions and Co-morbidities  Goal: Patient's chronic conditions and co-morbidity symptoms are monitored and maintained or improved  2/26/2024 0059 by Vilma Cain RN  Outcome: Progressing       Problem: Skin/Tissue Integrity  Goal: Absence of new skin breakdown  Description: 1.  Monitor for areas of redness and/or skin breakdown  2.  Assess vascular access sites hourly  3.  Every 4-6 hours minimum:  Change oxygen saturation probe site  4.  Every 4-6 hours:  If on nasal continuous positive airway pressure, respiratory therapy assess nares and determine need for appliance change or resting period.  2/26/2024 0059 by Vilma Cain RN  Outcome: Progressing    Problem: Neurosensory - Adult  Goal: Achieves maximal functionality and self care  2/26/2024 0059 by Vilma Cain RN  Outcome: Progressing       Problem: Skin/Tissue Integrity - Adult  Goal: Skin integrity remains 
  Problem: Discharge Planning  Goal: Discharge to home or other facility with appropriate resources  Outcome: Progressing     Problem: Safety - Adult  Goal: Free from fall injury  2/25/2024 0031 by Vilma Cain RN  Outcome: Progressing    Problem: ABCDS Injury Assessment  Goal: Absence of physical injury  2/25/2024 0031 by Vilma Cain RN  Outcome: Progressing       Problem: Respiratory - Adult  Goal: Achieves optimal ventilation and oxygenation  2/25/2024 0031 by Vilma Cain RN  Outcome: Progressing    Problem: Cardiovascular - Adult  Goal: Maintains optimal cardiac output and hemodynamic stability  2/25/2024 0031 by Vilma Cain RN  Outcome: Progressing    Goal: Absence of cardiac dysrhythmias or at baseline  2/25/2024 0031 by Vilma Cain RN  Outcome: Progressing       Problem: Pain  Goal: Verbalizes/displays adequate comfort level or baseline comfort level  2/25/2024 0031 by Vilma Cain RN  Outcome: Progressing    Problem: Nutrition Deficit:  Goal: Optimize nutritional status  2/25/2024 0031 by Vilma Cain RN  Outcome: Progressing    Problem: Chronic Conditions and Co-morbidities  Goal: Patient's chronic conditions and co-morbidity symptoms are monitored and maintained or improved  2/25/2024 0031 by Vilma Cain RN  Outcome: Progressing       Problem: Skin/Tissue Integrity  Goal: Absence of new skin breakdown  Description: 1.  Monitor for areas of redness and/or skin breakdown  2.  Assess vascular access sites hourly  3.  Every 4-6 hours minimum:  Change oxygen saturation probe site  4.  Every 4-6 hours:  If on nasal continuous positive airway pressure, respiratory therapy assess nares and determine need for appliance change or resting period.  2/25/2024 0031 by Vilma Cain RN  Outcome: Progressing    Problem: Neurosensory - Adult  Goal: Achieves maximal functionality and self care  2/25/2024 0031 by Vilma Cain RN  Outcome: Progressing    Problem: 
  Problem: Discharge Planning  Goal: Discharge to home or other facility with appropriate resources  Outcome: Progressing     Problem: Safety - Adult  Goal: Free from fall injury  Outcome: Progressing     Problem: ABCDS Injury Assessment  Goal: Absence of physical injury  Outcome: Progressing     Problem: Respiratory - Adult  Goal: Achieves optimal ventilation and oxygenation  Outcome: Progressing     Problem: Cardiovascular - Adult  Goal: Maintains optimal cardiac output and hemodynamic stability  2/22/2024 0050 by Charlotte Sin RN  Outcome: Progressing     Problem: Cardiovascular - Adult  Goal: Absence of cardiac dysrhythmias or at baseline  2/22/2024 0050 by Charlotte Sin RN  Outcome: Progressing     Problem: Pain  Goal: Verbalizes/displays adequate comfort level or baseline comfort level  2/22/2024 0050 by Charlotte Sin RN  Outcome: Progressing     Problem: Nutrition Deficit:  Goal: Optimize nutritional status  2/22/2024 0050 by Charlotte Sin RN  Outcome: Progressing     Problem: Chronic Conditions and Co-morbidities  Goal: Patient's chronic conditions and co-morbidity symptoms are monitored and maintained or improved  2/22/2024 0050 by Charlotte Sin RN  Outcome: Progressing     Problem: Skin/Tissue Integrity  Goal: Absence of new skin breakdown  Description: 1.  Monitor for areas of redness and/or skin breakdown  2.  Assess vascular access sites hourly  3.  Every 4-6 hours minimum:  Change oxygen saturation probe site  4.  Every 4-6 hours:  If on nasal continuous positive airway pressure, respiratory therapy assess nares and determine need for appliance change or resting period.  Outcome: Progressing     
  Problem: Discharge Planning  Goal: Discharge to home or other facility with appropriate resources  Outcome: Progressing     Problem: Safety - Adult  Goal: Free from fall injury  Outcome: Progressing     Problem: ABCDS Injury Assessment  Goal: Absence of physical injury  Outcome: Progressing     Problem: Respiratory - Adult  Goal: Achieves optimal ventilation and oxygenation  Outcome: Progressing     Problem: Cardiovascular - Adult  Goal: Maintains optimal cardiac output and hemodynamic stability  Outcome: Progressing     Problem: Cardiovascular - Adult  Goal: Absence of cardiac dysrhythmias or at baseline  Outcome: Progressing     Problem: Pain  Goal: Verbalizes/displays adequate comfort level or baseline comfort level  Outcome: Progressing  Flowsheets (Taken 2/26/2024 1200)  Verbalizes/displays adequate comfort level or baseline comfort level: Encourage patient to monitor pain and request assistance     Problem: Nutrition Deficit:  Goal: Optimize nutritional status  Outcome: Progressing  Flowsheets (Taken 2/26/2024 1319 by Xiomara Medeiros RD)  Nutrient intake appropriate for improving, restoring, or maintaining nutritional needs: Recommend appropriate diets, oral nutritional supplements, and vitamin/mineral supplements     Problem: Chronic Conditions and Co-morbidities  Goal: Patient's chronic conditions and co-morbidity symptoms are monitored and maintained or improved  Outcome: Progressing     Problem: Skin/Tissue Integrity  Goal: Absence of new skin breakdown  Description: 1.  Monitor for areas of redness and/or skin breakdown  2.  Assess vascular access sites hourly  3.  Every 4-6 hours minimum:  Change oxygen saturation probe site  4.  Every 4-6 hours:  If on nasal continuous positive airway pressure, respiratory therapy assess nares and determine need for appliance change or resting period.  Outcome: Progressing     Problem: Neurosensory - Adult  Goal: Achieves maximal functionality and self care  Outcome: 
  Problem: Discharge Planning  Goal: Discharge to home or other facility with appropriate resources  Outcome: Progressing     Problem: Safety - Adult  Goal: Free from fall injury  Outcome: Progressing     Problem: ABCDS Injury Assessment  Goal: Absence of physical injury  Outcome: Progressing     Problem: Respiratory - Adult  Goal: Achieves optimal ventilation and oxygenation  Outcome: Progressing     Problem: Cardiovascular - Adult  Goal: Maintains optimal cardiac output and hemodynamic stability  Outcome: Progressing     Problem: Pain  Goal: Verbalizes/displays adequate comfort level or baseline comfort level  Outcome: Progressing     
  Problem: Discharge Planning  Goal: Discharge to home or other facility with appropriate resources  Outcome: Progressing     Problem: Safety - Adult  Goal: Free from fall injury  Outcome: Progressing     Problem: ABCDS Injury Assessment  Goal: Absence of physical injury  Outcome: Progressing     Problem: Respiratory - Adult  Goal: Achieves optimal ventilation and oxygenation  Outcome: Progressing     Problem: Cardiovascular - Adult  Goal: Maintains optimal cardiac output and hemodynamic stability  Outcome: Progressing  Goal: Absence of cardiac dysrhythmias or at baseline  Outcome: Progressing     Problem: Neurosensory - Adult  Goal: Achieves maximal functionality and self care  Outcome: Progressing     Problem: Skin/Tissue Integrity - Adult  Goal: Skin integrity remains intact  Outcome: Progressing     Problem: Musculoskeletal - Adult  Goal: Return mobility to safest level of function  Outcome: Progressing  Goal: Maintain proper alignment of affected body part  Outcome: Progressing     Problem: Nutrition Deficit:  Goal: Optimize nutritional status  Recent Flowsheet Documentation  Taken 3/4/2024 1305 by Xiomara Medeiros RD  Nutrient intake appropriate for improving, restoring, or maintaining nutritional needs: Assess nutritional status and recommend course of action     
  Problem: Discharge Planning  Goal: Discharge to home or other facility with appropriate resources  Outcome: Progressing     Problem: Safety - Adult  Goal: Free from fall injury  Outcome: Progressing     Problem: ABCDS Injury Assessment  Goal: Absence of physical injury  Outcome: Progressing     Problem: Respiratory - Adult  Goal: Achieves optimal ventilation and oxygenation  Outcome: Progressing     Problem: Cardiovascular - Adult  Goal: Maintains optimal cardiac output and hemodynamic stability  Outcome: Progressing  Goal: Absence of cardiac dysrhythmias or at baseline  Outcome: Progressing     Problem: Pain  Goal: Verbalizes/displays adequate comfort level or baseline comfort level  Outcome: Progressing     Problem: Nutrition Deficit:  Goal: Optimize nutritional status  Outcome: Progressing     Problem: Chronic Conditions and Co-morbidities  Goal: Patient's chronic conditions and co-morbidity symptoms are monitored and maintained or improved  Outcome: Progressing     Problem: Skin/Tissue Integrity  Goal: Absence of new skin breakdown  Description: 1.  Monitor for areas of redness and/or skin breakdown  2.  Assess vascular access sites hourly  3.  Every 4-6 hours minimum:  Change oxygen saturation probe site  4.  Every 4-6 hours:  If on nasal continuous positive airway pressure, respiratory therapy assess nares and determine need for appliance change or resting period.  Outcome: Progressing     Problem: Neurosensory - Adult  Goal: Achieves maximal functionality and self care  Outcome: Progressing     Problem: Skin/Tissue Integrity - Adult  Goal: Skin integrity remains intact  Outcome: Progressing     Problem: Musculoskeletal - Adult  Goal: Return mobility to safest level of function  Outcome: Progressing  Goal: Maintain proper alignment of affected body part  Outcome: Progressing  Goal: Return ADL status to a safe level of function  Outcome: Progressing     Problem: Genitourinary - Adult  Goal: Urinary catheter 
  Problem: Discharge Planning  Goal: Discharge to home or other facility with appropriate resources  Outcome: Progressing     Problem: Safety - Adult  Goal: Free from fall injury  Outcome: Progressing     Problem: ABCDS Injury Assessment  Goal: Absence of physical injury  Outcome: Progressing     Problem: Respiratory - Adult  Goal: Achieves optimal ventilation and oxygenation  Outcome: Progressing     Problem: Cardiovascular - Adult  Goal: Maintains optimal cardiac output and hemodynamic stability  Outcome: Progressing  Goal: Absence of cardiac dysrhythmias or at baseline  Outcome: Progressing     Problem: Pain  Goal: Verbalizes/displays adequate comfort level or baseline comfort level  Outcome: Progressing     Problem: Nutrition Deficit:  Goal: Optimize nutritional status  Outcome: Progressing     Problem: Chronic Conditions and Co-morbidities  Goal: Patient's chronic conditions and co-morbidity symptoms are monitored and maintained or improved  Outcome: Progressing     Problem: Skin/Tissue Integrity  Goal: Absence of new skin breakdown  Description: 1.  Monitor for areas of redness and/or skin breakdown  2.  Assess vascular access sites hourly  3.  Every 4-6 hours minimum:  Change oxygen saturation probe site  4.  Every 4-6 hours:  If on nasal continuous positive airway pressure, respiratory therapy assess nares and determine need for appliance change or resting period.  Outcome: Progressing     Problem: Neurosensory - Adult  Goal: Achieves maximal functionality and self care  Outcome: Progressing     Problem: Skin/Tissue Integrity - Adult  Goal: Skin integrity remains intact  Outcome: Progressing     Problem: Musculoskeletal - Adult  Goal: Return mobility to safest level of function  Outcome: Progressing  Goal: Maintain proper alignment of affected body part  Outcome: Progressing  Goal: Return ADL status to a safe level of function  Outcome: Progressing     Problem: Genitourinary - Adult  Goal: Urinary catheter 
  Problem: Discharge Planning  Goal: Discharge to home or other facility with appropriate resources  Outcome: Progressing  Flowsheets (Taken 3/6/2024 1930)  Discharge to home or other facility with appropriate resources: Identify barriers to discharge with patient and caregiver     Problem: Safety - Adult  Goal: Free from fall injury  Outcome: Progressing     Problem: ABCDS Injury Assessment  Goal: Absence of physical injury  Outcome: Progressing     Problem: Respiratory - Adult  Goal: Achieves optimal ventilation and oxygenation  Outcome: Progressing     Problem: Cardiovascular - Adult  Goal: Maintains optimal cardiac output and hemodynamic stability  Outcome: Progressing  Goal: Absence of cardiac dysrhythmias or at baseline  Outcome: Progressing     Problem: Pain  Goal: Verbalizes/displays adequate comfort level or baseline comfort level  Outcome: Progressing     Problem: Nutrition Deficit:  Goal: Optimize nutritional status  Outcome: Progressing     Problem: Chronic Conditions and Co-morbidities  Goal: Patient's chronic conditions and co-morbidity symptoms are monitored and maintained or improved  Outcome: Progressing  Flowsheets (Taken 3/6/2024 1930)  Care Plan - Patient's Chronic Conditions and Co-Morbidity Symptoms are Monitored and Maintained or Improved: Monitor and assess patient's chronic conditions and comorbid symptoms for stability, deterioration, or improvement     Problem: Skin/Tissue Integrity  Goal: Absence of new skin breakdown  Description: 1.  Monitor for areas of redness and/or skin breakdown  2.  Assess vascular access sites hourly  3.  Every 4-6 hours minimum:  Change oxygen saturation probe site  4.  Every 4-6 hours:  If on nasal continuous positive airway pressure, respiratory therapy assess nares and determine need for appliance change or resting period.  Outcome: Progressing     Problem: Neurosensory - Adult  Goal: Achieves maximal functionality and self care  Outcome: Progressing   
  Problem: Nutrition Deficit:  Goal: Optimize nutritional status  Outcome: Progressing     
  Problem: Pain  Goal: Verbalizes/displays adequate comfort level or baseline comfort level  Outcome: Not Progressing     Problem: Nutrition Deficit:  Goal: Optimize nutritional status  Outcome: Not Progressing     Problem: Gastrointestinal - Adult  Goal: Maintains adequate nutritional intake  Outcome: Not Progressing     Problem: Discharge Planning  Goal: Discharge to home or other facility with appropriate resources  Outcome: Progressing     Problem: Safety - Adult  Goal: Free from fall injury  Outcome: Progressing     Problem: ABCDS Injury Assessment  Goal: Absence of physical injury  Outcome: Progressing     Problem: Respiratory - Adult  Goal: Achieves optimal ventilation and oxygenation  Outcome: Progressing     Problem: Cardiovascular - Adult  Goal: Maintains optimal cardiac output and hemodynamic stability  Outcome: Progressing  Goal: Absence of cardiac dysrhythmias or at baseline  Outcome: Progressing     Problem: Chronic Conditions and Co-morbidities  Goal: Patient's chronic conditions and co-morbidity symptoms are monitored and maintained or improved  Outcome: Progressing     Problem: Skin/Tissue Integrity  Goal: Absence of new skin breakdown  Description: 1.  Monitor for areas of redness and/or skin breakdown  2.  Assess vascular access sites hourly  3.  Every 4-6 hours minimum:  Change oxygen saturation probe site  4.  Every 4-6 hours:  If on nasal continuous positive airway pressure, respiratory therapy assess nares and determine need for appliance change or resting period.  Outcome: Progressing     Problem: Neurosensory - Adult  Goal: Achieves maximal functionality and self care  Outcome: Progressing     Problem: Skin/Tissue Integrity - Adult  Goal: Skin integrity remains intact  Outcome: Progressing     Problem: Musculoskeletal - Adult  Goal: Return mobility to safest level of function  Outcome: Progressing  Goal: Maintain proper alignment of affected body part  Outcome: Progressing  Goal: Return ADL 
  Problem: Respiratory - Adult  Goal: Achieves optimal ventilation and oxygenation  Outcome: Progressing     Problem: Cardiovascular - Adult  Goal: Maintains optimal cardiac output and hemodynamic stability  Outcome: Progressing  Goal: Absence of cardiac dysrhythmias or at baseline  Outcome: Progressing     
  Problem: Safety - Adult  Goal: Free from fall injury  2/29/2024 1401 by John Gatica RN  Outcome: Progressing  2/29/2024 0210 by Vilma Cain RN  Outcome: Progressing     Problem: ABCDS Injury Assessment  Goal: Absence of physical injury  2/29/2024 1401 by John Gatica RN  Outcome: Progressing  2/29/2024 0210 by Vilma Cain RN  Outcome: Progressing     Problem: Respiratory - Adult  Goal: Achieves optimal ventilation and oxygenation  2/29/2024 1401 by John Gatica RN  Outcome: Progressing  2/29/2024 0210 by Vilma Cain RN  Outcome: Progressing     Problem: Cardiovascular - Adult  Goal: Maintains optimal cardiac output and hemodynamic stability  2/29/2024 1401 by John Gatica RN  Outcome: Progressing  2/29/2024 0210 by Vilma Cain RN  Outcome: Progressing  Goal: Absence of cardiac dysrhythmias or at baseline  2/29/2024 1401 by John Gatica RN  Outcome: Progressing  2/29/2024 0210 by Vilma Cain RN  Outcome: Progressing     Problem: Neurosensory - Adult  Goal: Achieves maximal functionality and self care  2/29/2024 1401 by John Gatica RN  Outcome: Progressing  2/29/2024 0210 by Vilma Cain RN  Outcome: Progressing     Problem: Skin/Tissue Integrity - Adult  Goal: Skin integrity remains intact  2/29/2024 1401 by John Gatica RN  Outcome: Progressing  2/29/2024 0210 by Vilma Cain RN  Outcome: Progressing     Problem: Musculoskeletal - Adult  Goal: Return mobility to safest level of function  2/29/2024 1401 by John Gatica RN  Outcome: Progressing  2/29/2024 0210 by Vilma Cain RN  Outcome: Progressing  Goal: Maintain proper alignment of affected body part  2/29/2024 1401 by John Gatica RN  Outcome: Progressing  2/29/2024 0210 by Vilma Cain RN  Outcome: Progressing  Goal: Return ADL status to a safe level of function  2/29/2024 1401 by John Gatica RN  Outcome: Progressing  2/29/2024 0210 by Vilma Cain RN  Outcome: 
  Problem: Safety - Adult  Goal: Free from fall injury  3/2/2024 0210 by Kasey Cline RN  Outcome: Progressing     Problem: ABCDS Injury Assessment  Goal: Absence of physical injury  3/2/2024 0210 by Kasey Cline RN  Outcome: Progressing     Problem: Skin/Tissue Integrity - Adult  Goal: Skin integrity remains intact  3/2/2024 0210 by Kasey Cline RN  Outcome: Progressing     
  Problem: Safety - Adult  Goal: Free from fall injury  3/3/2024 2232 by Alison Lane RN  Outcome: Progressing  3/3/2024 1951 by Kimberly Wilder RN  Outcome: Progressing     Problem: ABCDS Injury Assessment  Goal: Absence of physical injury  3/3/2024 2232 by Alison Lane RN  Outcome: Progressing  3/3/2024 1951 by Kimberly Wilder RN  Outcome: Progressing     Problem: Respiratory - Adult  Goal: Achieves optimal ventilation and oxygenation  3/3/2024 2232 by Alison Lane RN  Outcome: Progressing  3/3/2024 1951 by Kimberly Wilder RN  Outcome: Progressing     Problem: Pain  Goal: Verbalizes/displays adequate comfort level or baseline comfort level  3/3/2024 2232 by Alison Lane RN  Outcome: Progressing  3/3/2024 1951 by Kimberly Wilder RN  Outcome: Progressing     
  Problem: Safety - Adult  Goal: Free from fall injury  3/4/2024 2334 by Alison Lane RN  Outcome: Progressing  3/4/2024 1928 by Kimberly Wilder RN  Outcome: Progressing     Problem: ABCDS Injury Assessment  Goal: Absence of physical injury  3/4/2024 2334 by Alison Lane RN  Outcome: Progressing  3/4/2024 1928 by Kimberly Wilder RN  Outcome: Progressing     Problem: Pain  Goal: Verbalizes/displays adequate comfort level or baseline comfort level  Outcome: Progressing     
  Problem: Safety - Adult  Goal: Free from fall injury  3/5/2024 0934 by Pantera Flannery RN  Outcome: Progressing     Problem: Respiratory - Adult  Goal: Achieves optimal ventilation and oxygenation  3/5/2024 0934 by Pantera Flannery RN  Outcome: Progressing     Problem: Cardiovascular - Adult  Goal: Maintains optimal cardiac output and hemodynamic stability  3/5/2024 0934 by Pantera Flannery, RN  Outcome: Progressing     Problem: Cardiovascular - Adult  Goal: Absence of cardiac dysrhythmias or at baseline  3/5/2024 0934 by Pantera Flannery, RN  Outcome: Progressing     Problem: Musculoskeletal - Adult  Goal: Return mobility to safest level of function  3/5/2024 0934 by Pantera Flannery RN  Outcome: Progressing     Problem: Genitourinary - Adult  Goal: Urinary catheter remains patent  3/5/2024 0934 by Pantera Flannery RN  Outcome: Progressing     Problem: Metabolic/Fluid and Electrolytes - Adult  Goal: Electrolytes maintained within normal limits  3/5/2024 0934 by Pantera Flannery, RN  Outcome: Progressing     
  Problem: Safety - Adult  Goal: Free from fall injury  Outcome: Progressing     Problem: ABCDS Injury Assessment  Goal: Absence of physical injury  Outcome: Progressing     Problem: Cardiovascular - Adult  Goal: Maintains optimal cardiac output and hemodynamic stability  Outcome: Progressing     Problem: Chronic Conditions and Co-morbidities  Goal: Patient's chronic conditions and co-morbidity symptoms are monitored and maintained or improved  Outcome: Progressing     
  Problem: Safety - Adult  Goal: Free from fall injury  Outcome: Progressing     Problem: ABCDS Injury Assessment  Goal: Absence of physical injury  Outcome: Progressing     Problem: Respiratory - Adult  Goal: Achieves optimal ventilation and oxygenation  Outcome: Progressing     Problem: Cardiovascular - Adult  Goal: Maintains optimal cardiac output and hemodynamic stability  Outcome: Progressing  Goal: Absence of cardiac dysrhythmias or at baseline  Outcome: Progressing     Problem: Pain  Goal: Verbalizes/displays adequate comfort level or baseline comfort level  Outcome: Progressing     Problem: Nutrition Deficit:  Goal: Optimize nutritional status  Outcome: Progressing     Problem: Chronic Conditions and Co-morbidities  Goal: Patient's chronic conditions and co-morbidity symptoms are monitored and maintained or improved  Outcome: Progressing     Problem: Skin/Tissue Integrity  Goal: Absence of new skin breakdown  Description: 1.  Monitor for areas of redness and/or skin breakdown  2.  Assess vascular access sites hourly  3.  Every 4-6 hours minimum:  Change oxygen saturation probe site  4.  Every 4-6 hours:  If on nasal continuous positive airway pressure, respiratory therapy assess nares and determine need for appliance change or resting period.  Outcome: Progressing     Problem: Neurosensory - Adult  Goal: Achieves maximal functionality and self care  Outcome: Progressing     Problem: Skin/Tissue Integrity - Adult  Goal: Skin integrity remains intact  Outcome: Progressing     Problem: Musculoskeletal - Adult  Goal: Return mobility to safest level of function  Outcome: Progressing  Goal: Maintain proper alignment of affected body part  Outcome: Progressing  Goal: Return ADL status to a safe level of function  Outcome: Progressing     Problem: Genitourinary - Adult  Goal: Urinary catheter remains patent  Outcome: Progressing     Problem: Infection - Adult  Goal: Absence of infection during 
  Problem: Safety - Adult  Goal: Free from fall injury  Outcome: Progressing     Problem: ABCDS Injury Assessment  Goal: Absence of physical injury  Outcome: Progressing     Problem: Respiratory - Adult  Goal: Achieves optimal ventilation and oxygenation  Outcome: Progressing     Problem: Pain  Goal: Verbalizes/displays adequate comfort level or baseline comfort level  Outcome: Progressing     
demonstrate effective coping strategies  Description: INTERVENTIONS:  1. Assist patient/family to identify coping skills, available support systems and cultural and spiritual values  2. Provide emotional support, including active listening and acknowledgement of concerns of patient and caregivers  3. Reduce environmental stimuli, as able  4. Instruct patient/family in relaxation techniques, as appropriate  5. Assess for spiritual pain/suffering and initiate Spiritual Care, Psychosocial Clinical Specialist consults as needed  Outcome: Progressing     Problem: Gastrointestinal - Adult  Goal: Maintains adequate nutritional intake  Outcome: Progressing     
skin breakdown  Description: 1.  Monitor for areas of redness and/or skin breakdown  2.  Assess vascular access sites hourly  3.  Every 4-6 hours minimum:  Change oxygen saturation probe site  4.  Every 4-6 hours:  If on nasal continuous positive airway pressure, respiratory therapy assess nares and determine need for appliance change or resting period.  2/25/2024 1106 by KATHYA MARINO  Outcome: Progressing  2/25/2024 0031 by Vilma Cain RN  Outcome: Progressing     Problem: Neurosensory - Adult  Goal: Achieves maximal functionality and self care  2/25/2024 1106 by KATHYA MARINO  Outcome: Progressing  2/25/2024 0031 by Vilma Cain RN  Outcome: Progressing     Problem: Skin/Tissue Integrity - Adult  Goal: Skin integrity remains intact  2/25/2024 1106 by KATHYA MARINO  Outcome: Progressing  2/25/2024 0031 by Vilma Cain RN  Outcome: Progressing     Problem: Musculoskeletal - Adult  Goal: Return mobility to safest level of function  2/25/2024 1106 by KATHYA MARINO  Outcome: Progressing  2/25/2024 0031 by Vilma Cain RN  Outcome: Progressing     Problem: Musculoskeletal - Adult  Goal: Maintain proper alignment of affected body part  2/25/2024 1106 by KATHYA MARINO  Outcome: Progressing  2/25/2024 0031 by Vilma Cain RN  Outcome: Progressing     Problem: Musculoskeletal - Adult  Goal: Return ADL status to a safe level of function  2/25/2024 1106 by KATHYA MARINO  Outcome: Progressing  2/25/2024 0031 by Vilma Cain RN  Outcome: Progressing     Problem: Genitourinary - Adult  Goal: Urinary catheter remains patent  2/25/2024 1106 by KATHYA MARINO  Outcome: Progressing  2/25/2024 0031 by Vilma Cain RN  Outcome: Progressing     Problem: Infection - Adult  Goal: Absence of infection during hospitalization  2/25/2024 1106 by KATHYA MARINO  Outcome: Progressing  2/25/2024 0031 by Vilma Cain RN  Outcome: Progressing     Problem: Metabolic/Fluid and Electrolytes -

## 2024-03-07 NOTE — DISCHARGE SUMMARY
[place] [address]                            DISCHARGE SUMMARY      PATIENT NAME: SEAN MCCARTHY               : 1945  MED REC NO: [id number]                        ROOM: 0618  ACCOUNT NO: [id number]                       ADMIT DATE: 2024  PROVIDER: Macario Dolan DO      ADMITTING DIAGNOSES:  Multifocal shock including sepsis as well as cardiogenic with Staphylococcus bacteremia secondary to his elevated troponin in the setting of coronary artery disease with demand ischemia; cardiac dysrhythmia consistent with atrial fibrillation; acute hypoxemic respiratory failure with small and significant pulmonary embolism with possible community-acquired pneumonia; left iliopsoas hematoma with anticoagulant, on hold; chronic kidney disease, stage 2; history of peripheral arterial disease, status post endovascular repair of abdominal aortic aneurysm; non-oxygen dependent chronic obstructive pulmonary disease with chronic respiratory failure; severe protein malnutrition.  No complication.  No operation.    Consultation was obtained with the following; Palliative Care; Urology, Dr. Flaherty; Critical Care, Dr. Garsia; Dr. Martinez from Infectious Disease; and Cardiology, [facility].    ADMITTING PHYSICIAN:  Dr. Dolan and Dr. Sharpe.    CHIEF COMPLAINT AND HISTORY OF CHIEF COMPLAINT:  This is an 78-year-old white male, who was admitted to [facility]. The patient presented to the hospital here on 2024.  The patient was admitted to the hospital through the emergency room on 2024.  The patient presented to the hospital from home with what appeared to be severe respiratory distress.  The patient does have a history of cardiac and respiratory issues and was unable to get a pulse oximeter on presentation.  The patient did receive 2 DuoNeb treatments.  The patient at that time did appear to be

## 2024-03-19 ENCOUNTER — OUTSIDE SERVICES (OUTPATIENT)
Dept: INTERNAL MEDICINE CLINIC | Age: 79
End: 2024-03-19
Payer: MEDICARE

## 2024-03-19 DIAGNOSIS — E78.2 MIXED HYPERLIPIDEMIA: ICD-10-CM

## 2024-03-19 DIAGNOSIS — Z74.1 NEED FOR ASSISTANCE WITH PERSONAL CARE: ICD-10-CM

## 2024-03-19 DIAGNOSIS — R26.2 DIFFICULTY IN WALKING, NOT ELSEWHERE CLASSIFIED: ICD-10-CM

## 2024-03-19 DIAGNOSIS — E11.9 TYPE 2 DIABETES MELLITUS WITHOUT COMPLICATION, UNSPECIFIED WHETHER LONG TERM INSULIN USE (HCC): ICD-10-CM

## 2024-03-19 DIAGNOSIS — I26.99 OTHER PULMONARY EMBOLISM WITHOUT ACUTE COR PULMONALE, UNSPECIFIED CHRONICITY (HCC): ICD-10-CM

## 2024-03-19 DIAGNOSIS — E78.5 DYSLIPIDEMIA: ICD-10-CM

## 2024-03-19 DIAGNOSIS — J18.9 COMMUNITY ACQUIRED PNEUMONIA, UNSPECIFIED LATERALITY: Primary | ICD-10-CM

## 2024-03-19 DIAGNOSIS — J44.9 CHRONIC OBSTRUCTIVE PULMONARY DISEASE, UNSPECIFIED COPD TYPE (HCC): ICD-10-CM

## 2024-03-19 DIAGNOSIS — I10 ESSENTIAL (PRIMARY) HYPERTENSION: ICD-10-CM

## 2024-03-19 DIAGNOSIS — I48.0 PAROXYSMAL ATRIAL FIBRILLATION (HCC): ICD-10-CM

## 2024-03-19 DIAGNOSIS — M62.81 MUSCLE WEAKNESS (GENERALIZED): ICD-10-CM

## 2024-03-19 DIAGNOSIS — J43.8 OTHER EMPHYSEMA (HCC): ICD-10-CM

## 2024-03-19 PROCEDURE — 99306 1ST NF CARE HIGH MDM 50: CPT | Performed by: INTERNAL MEDICINE

## 2024-03-20 DIAGNOSIS — I10 ESSENTIAL HYPERTENSION: ICD-10-CM

## 2024-03-20 RX ORDER — ATORVASTATIN CALCIUM 20 MG/1
20 TABLET, FILM COATED ORAL DAILY
Qty: 90 TABLET | Refills: 0 | Status: SHIPPED | OUTPATIENT
Start: 2024-03-20

## 2024-03-20 RX ORDER — LISINOPRIL 2.5 MG/1
TABLET ORAL
Qty: 90 TABLET | Refills: 3 | OUTPATIENT
Start: 2024-03-20

## 2024-03-20 NOTE — TELEPHONE ENCOUNTER
Last Appointment:  2/12/2024  Future Appointments   Date Time Provider Department Center   4/10/2024 10:00 AM Rufina Ibarra, APRN - CNP ACC Pulm [facility]   4/30/2024 10:40 AM [facility] Pulliam[facility] -   5/7/2024  1:00 PM Tu Chacon MD [facility]/MED [facility]   7/9/2024 10:00 AM Hermelindo Bedolla, DO [facility]

## 2024-03-31 ENCOUNTER — HOSPITAL ENCOUNTER (INPATIENT)
Age: 79
LOS: 5 days | Discharge: NURSING, REHAB OR LONG-TERM CARE | End: 2024-04-05
Attending: EMERGENCY MEDICINE | Admitting: INTERNAL MEDICINE
Payer: MEDICARE

## 2024-03-31 ENCOUNTER — APPOINTMENT (OUTPATIENT)
Dept: CT IMAGING | Age: 79
End: 2024-03-31
Payer: MEDICARE

## 2024-03-31 DIAGNOSIS — K92.2 GASTROINTESTINAL HEMORRHAGE, UNSPECIFIED GASTROINTESTINAL HEMORRHAGE TYPE: Primary | ICD-10-CM

## 2024-03-31 LAB
ABO + RH BLD: NORMAL
ALBUMIN SERPL-MCNC: 2.9 G/DL (ref 3.5–5.2)
ALP SERPL-CCNC: 101 U/L (ref 40–129)
ALT SERPL-CCNC: 7 U/L (ref 0–40)
ANION GAP SERPL CALCULATED.3IONS-SCNC: 9 MMOL/L (ref 7–16)
ARM BAND NUMBER: NORMAL
AST SERPL-CCNC: 27 U/L (ref 0–39)
BASOPHILS # BLD: 0.11 K/UL (ref 0–0.2)
BASOPHILS NFR BLD: 1 % (ref 0–2)
BILIRUB SERPL-MCNC: 1 MG/DL (ref 0–1.2)
BLOOD BANK SAMPLE EXPIRATION: NORMAL
BLOOD GROUP ANTIBODIES SERPL: NEGATIVE
BUN SERPL-MCNC: 18 MG/DL (ref 6–23)
CALCIUM SERPL-MCNC: 8.4 MG/DL (ref 8.6–10.2)
CHLORIDE SERPL-SCNC: 101 MMOL/L (ref 98–107)
CO2 SERPL-SCNC: 24 MMOL/L (ref 22–29)
CREAT SERPL-MCNC: 1.2 MG/DL (ref 0.7–1.2)
EOSINOPHIL # BLD: 0.54 K/UL (ref 0.05–0.5)
EOSINOPHILS RELATIVE PERCENT: 6 % (ref 0–6)
ERYTHROCYTE [DISTWIDTH] IN BLOOD BY AUTOMATED COUNT: 16.5 % (ref 11.5–15)
GFR SERPL CREATININE-BSD FRML MDRD: 59 ML/MIN/1.73M2
GLUCOSE SERPL-MCNC: 104 MG/DL (ref 74–99)
HCT VFR BLD AUTO: 34 % (ref 37–54)
HGB BLD-MCNC: 10.7 G/DL (ref 12.5–16.5)
IMM GRANULOCYTES # BLD AUTO: 0.09 K/UL (ref 0–0.58)
IMM GRANULOCYTES NFR BLD: 1 % (ref 0–5)
INR PPP: 2.1
LACTATE BLDV-SCNC: 1.6 MMOL/L (ref 0.5–2.2)
LIPASE SERPL-CCNC: 29 U/L (ref 13–60)
LYMPHOCYTES NFR BLD: 1.75 K/UL (ref 1.5–4)
LYMPHOCYTES RELATIVE PERCENT: 20 % (ref 20–42)
MCH RBC QN AUTO: 30.9 PG (ref 26–35)
MCHC RBC AUTO-ENTMCNC: 31.5 G/DL (ref 32–34.5)
MCV RBC AUTO: 98.3 FL (ref 80–99.9)
MONOCYTES NFR BLD: 0.54 K/UL (ref 0.1–0.95)
MONOCYTES NFR BLD: 6 % (ref 2–12)
NEUTROPHILS NFR BLD: 66 % (ref 43–80)
NEUTS SEG NFR BLD: 5.84 K/UL (ref 1.8–7.3)
PARTIAL THROMBOPLASTIN TIME: 39.3 SEC (ref 24.5–35.1)
PLATELET # BLD AUTO: 323 K/UL (ref 130–450)
PMV BLD AUTO: 9.5 FL (ref 7–12)
POTASSIUM SERPL-SCNC: 4.4 MMOL/L (ref 3.5–5)
PROT SERPL-MCNC: 7.4 G/DL (ref 6.4–8.3)
PROTHROMBIN TIME: 24.2 SEC (ref 9.3–12.4)
RBC # BLD AUTO: 3.46 M/UL (ref 3.8–5.8)
SODIUM SERPL-SCNC: 134 MMOL/L (ref 132–146)
TROPONIN I SERPL HS-MCNC: 37 NG/L (ref 0–11)
TROPONIN I SERPL HS-MCNC: 40 NG/L (ref 0–11)
WBC OTHER # BLD: 8.9 K/UL (ref 4.5–11.5)

## 2024-03-31 PROCEDURE — 2580000003 HC RX 258

## 2024-03-31 PROCEDURE — 85610 PROTHROMBIN TIME: CPT

## 2024-03-31 PROCEDURE — 85025 COMPLETE CBC W/AUTO DIFF WBC: CPT

## 2024-03-31 PROCEDURE — C9113 INJ PANTOPRAZOLE SODIUM, VIA: HCPCS

## 2024-03-31 PROCEDURE — 86850 RBC ANTIBODY SCREEN: CPT

## 2024-03-31 PROCEDURE — 96365 THER/PROPH/DIAG IV INF INIT: CPT

## 2024-03-31 PROCEDURE — 86900 BLOOD TYPING SEROLOGIC ABO: CPT

## 2024-03-31 PROCEDURE — 99285 EMERGENCY DEPT VISIT HI MDM: CPT

## 2024-03-31 PROCEDURE — 80053 COMPREHEN METABOLIC PANEL: CPT

## 2024-03-31 PROCEDURE — 6360000004 HC RX CONTRAST MEDICATION: Performed by: RADIOLOGY

## 2024-03-31 PROCEDURE — 6360000002 HC RX W HCPCS

## 2024-03-31 PROCEDURE — 86901 BLOOD TYPING SEROLOGIC RH(D): CPT

## 2024-03-31 PROCEDURE — 83605 ASSAY OF LACTIC ACID: CPT

## 2024-03-31 PROCEDURE — 93005 ELECTROCARDIOGRAM TRACING: CPT | Performed by: EMERGENCY MEDICINE

## 2024-03-31 PROCEDURE — 82306 VITAMIN D 25 HYDROXY: CPT

## 2024-03-31 PROCEDURE — 96361 HYDRATE IV INFUSION ADD-ON: CPT

## 2024-03-31 PROCEDURE — 83690 ASSAY OF LIPASE: CPT

## 2024-03-31 PROCEDURE — 2060000000 HC ICU INTERMEDIATE R&B

## 2024-03-31 PROCEDURE — 85730 THROMBOPLASTIN TIME PARTIAL: CPT

## 2024-03-31 PROCEDURE — 84484 ASSAY OF TROPONIN QUANT: CPT

## 2024-03-31 PROCEDURE — 74174 CTA ABD&PLVS W/CONTRAST: CPT

## 2024-03-31 RX ORDER — 0.9 % SODIUM CHLORIDE 0.9 %
1000 INTRAVENOUS SOLUTION INTRAVENOUS ONCE
Status: COMPLETED | OUTPATIENT
Start: 2024-03-31 | End: 2024-03-31

## 2024-03-31 RX ADMIN — IOPAMIDOL 70 ML: 755 INJECTION, SOLUTION INTRAVENOUS at 21:41

## 2024-03-31 RX ADMIN — PANTOPRAZOLE SODIUM 80 MG: 40 INJECTION, POWDER, FOR SOLUTION INTRAVENOUS at 22:12

## 2024-03-31 RX ADMIN — SODIUM CHLORIDE 1000 ML: 9 INJECTION, SOLUTION INTRAVENOUS at 20:20

## 2024-04-01 LAB
25(OH)D3 SERPL-MCNC: 11.6 NG/ML (ref 30–100)
ALBUMIN SERPL-MCNC: 2.3 G/DL (ref 3.5–5.2)
ALP SERPL-CCNC: 84 U/L (ref 40–129)
ALT SERPL-CCNC: 8 U/L (ref 0–40)
ANION GAP SERPL CALCULATED.3IONS-SCNC: 9 MMOL/L (ref 7–16)
AST SERPL-CCNC: 21 U/L (ref 0–39)
BASOPHILS # BLD: 0.1 K/UL (ref 0–0.2)
BASOPHILS NFR BLD: 1 % (ref 0–2)
BILIRUB SERPL-MCNC: 0.9 MG/DL (ref 0–1.2)
BUN SERPL-MCNC: 14 MG/DL (ref 6–23)
CALCIUM SERPL-MCNC: 7.5 MG/DL (ref 8.6–10.2)
CHLORIDE SERPL-SCNC: 107 MMOL/L (ref 98–107)
CHOLEST SERPL-MCNC: 83 MG/DL
CO2 SERPL-SCNC: 21 MMOL/L (ref 22–29)
CREAT SERPL-MCNC: 1 MG/DL (ref 0.7–1.2)
D DIMER: 847 NG/ML DDU (ref 0–232)
EKG ATRIAL RATE: 70 BPM
EKG ATRIAL RATE: 93 BPM
EKG Q-T INTERVAL: 482 MS
EKG Q-T INTERVAL: 486 MS
EKG QRS DURATION: 184 MS
EKG QRS DURATION: 184 MS
EKG QTC CALCULATION (BAZETT): 520 MS
EKG QTC CALCULATION (BAZETT): 524 MS
EKG R AXIS: -78 DEGREES
EKG R AXIS: -80 DEGREES
EKG T AXIS: 88 DEGREES
EKG T AXIS: 94 DEGREES
EKG VENTRICULAR RATE: 70 BPM
EKG VENTRICULAR RATE: 70 BPM
EOSINOPHIL # BLD: 0.55 K/UL (ref 0.05–0.5)
EOSINOPHILS RELATIVE PERCENT: 8 % (ref 0–6)
ERYTHROCYTE [DISTWIDTH] IN BLOOD BY AUTOMATED COUNT: 16.6 % (ref 11.5–15)
FERRITIN SERPL-MCNC: 493 NG/ML
FOLATE SERPL-MCNC: 3.5 NG/ML (ref 4.8–24.2)
GFR SERPL CREATININE-BSD FRML MDRD: 73 ML/MIN/1.73M2
GLUCOSE SERPL-MCNC: 84 MG/DL (ref 74–99)
HCT VFR BLD AUTO: 26.5 % (ref 37–54)
HCT VFR BLD AUTO: 28.7 % (ref 37–54)
HCT VFR BLD AUTO: 29.7 % (ref 37–54)
HCT VFR BLD AUTO: 30.7 % (ref 37–54)
HDLC SERPL-MCNC: 23 MG/DL
HGB BLD-MCNC: 8.7 G/DL (ref 12.5–16.5)
HGB BLD-MCNC: 9.4 G/DL (ref 12.5–16.5)
HGB BLD-MCNC: 9.6 G/DL (ref 12.5–16.5)
HGB BLD-MCNC: 9.8 G/DL (ref 12.5–16.5)
IMM GRANULOCYTES # BLD AUTO: 0.09 K/UL (ref 0–0.58)
IMM GRANULOCYTES NFR BLD: 1 % (ref 0–5)
IRON SATN MFR SERPL: 28 % (ref 20–55)
IRON SERPL-MCNC: 44 UG/DL (ref 59–158)
LDLC SERPL CALC-MCNC: 44 MG/DL
LYMPHOCYTES NFR BLD: 1.22 K/UL (ref 1.5–4)
LYMPHOCYTES RELATIVE PERCENT: 17 % (ref 20–42)
MAGNESIUM SERPL-MCNC: 1.6 MG/DL (ref 1.6–2.6)
MCH RBC QN AUTO: 31.6 PG (ref 26–35)
MCHC RBC AUTO-ENTMCNC: 31.9 G/DL (ref 32–34.5)
MCV RBC AUTO: 99 FL (ref 80–99.9)
MONOCYTES NFR BLD: 0.44 K/UL (ref 0.1–0.95)
MONOCYTES NFR BLD: 6 % (ref 2–12)
NEUTROPHILS NFR BLD: 67 % (ref 43–80)
NEUTS SEG NFR BLD: 4.79 K/UL (ref 1.8–7.3)
PLATELET # BLD AUTO: 272 K/UL (ref 130–450)
PMV BLD AUTO: 9.6 FL (ref 7–12)
POTASSIUM SERPL-SCNC: 3.4 MMOL/L (ref 3.5–5)
PROT SERPL-MCNC: 6.3 G/DL (ref 6.4–8.3)
RBC # BLD AUTO: 3.1 M/UL (ref 3.8–5.8)
SODIUM SERPL-SCNC: 137 MMOL/L (ref 132–146)
T4 FREE SERPL-MCNC: 0.8 NG/DL (ref 0.9–1.7)
TIBC SERPL-MCNC: 159 UG/DL (ref 250–450)
TRIGL SERPL-MCNC: 79 MG/DL
TROPONIN I SERPL HS-MCNC: 30 NG/L (ref 0–11)
TSH SERPL DL<=0.05 MIU/L-ACNC: 3.21 UIU/ML (ref 0.27–4.2)
VIT B12 SERPL-MCNC: 475 PG/ML (ref 211–946)
VLDLC SERPL CALC-MCNC: 16 MG/DL
WBC OTHER # BLD: 7.2 K/UL (ref 4.5–11.5)

## 2024-04-01 PROCEDURE — 84443 ASSAY THYROID STIM HORMONE: CPT

## 2024-04-01 PROCEDURE — 2580000003 HC RX 258

## 2024-04-01 PROCEDURE — 80061 LIPID PANEL: CPT

## 2024-04-01 PROCEDURE — 84484 ASSAY OF TROPONIN QUANT: CPT

## 2024-04-01 PROCEDURE — 85379 FIBRIN DEGRADATION QUANT: CPT

## 2024-04-01 PROCEDURE — 83540 ASSAY OF IRON: CPT

## 2024-04-01 PROCEDURE — 82746 ASSAY OF FOLIC ACID SERUM: CPT

## 2024-04-01 PROCEDURE — A4216 STERILE WATER/SALINE, 10 ML: HCPCS

## 2024-04-01 PROCEDURE — 6370000000 HC RX 637 (ALT 250 FOR IP)

## 2024-04-01 PROCEDURE — 6360000002 HC RX W HCPCS

## 2024-04-01 PROCEDURE — 93010 ELECTROCARDIOGRAM REPORT: CPT | Performed by: INTERNAL MEDICINE

## 2024-04-01 PROCEDURE — 80053 COMPREHEN METABOLIC PANEL: CPT

## 2024-04-01 PROCEDURE — 82607 VITAMIN B-12: CPT

## 2024-04-01 PROCEDURE — 2580000003 HC RX 258: Performed by: NURSE PRACTITIONER

## 2024-04-01 PROCEDURE — 6360000002 HC RX W HCPCS: Performed by: NURSE PRACTITIONER

## 2024-04-01 PROCEDURE — 85014 HEMATOCRIT: CPT

## 2024-04-01 PROCEDURE — C9113 INJ PANTOPRAZOLE SODIUM, VIA: HCPCS

## 2024-04-01 PROCEDURE — 83735 ASSAY OF MAGNESIUM: CPT

## 2024-04-01 PROCEDURE — 85018 HEMOGLOBIN: CPT

## 2024-04-01 PROCEDURE — 85025 COMPLETE CBC W/AUTO DIFF WBC: CPT

## 2024-04-01 PROCEDURE — 36415 COLL VENOUS BLD VENIPUNCTURE: CPT

## 2024-04-01 PROCEDURE — 2060000000 HC ICU INTERMEDIATE R&B

## 2024-04-01 PROCEDURE — 82728 ASSAY OF FERRITIN: CPT

## 2024-04-01 PROCEDURE — 83550 IRON BINDING TEST: CPT

## 2024-04-01 PROCEDURE — 6370000000 HC RX 637 (ALT 250 FOR IP): Performed by: HOSPITALIST

## 2024-04-01 PROCEDURE — 93005 ELECTROCARDIOGRAM TRACING: CPT

## 2024-04-01 PROCEDURE — 94640 AIRWAY INHALATION TREATMENT: CPT

## 2024-04-01 PROCEDURE — 2700000000 HC OXYGEN THERAPY PER DAY

## 2024-04-01 PROCEDURE — 84439 ASSAY OF FREE THYROXINE: CPT

## 2024-04-01 RX ORDER — SODIUM CHLORIDE AND POTASSIUM CHLORIDE 300; 900 MG/100ML; MG/100ML
INJECTION, SOLUTION INTRAVENOUS CONTINUOUS
Status: DISCONTINUED | OUTPATIENT
Start: 2024-04-01 | End: 2024-04-03

## 2024-04-01 RX ORDER — ACETAMINOPHEN 650 MG/1
650 SUPPOSITORY RECTAL EVERY 6 HOURS PRN
Status: DISCONTINUED | OUTPATIENT
Start: 2024-04-01 | End: 2024-04-05 | Stop reason: HOSPADM

## 2024-04-01 RX ORDER — ONDANSETRON 2 MG/ML
4 INJECTION INTRAMUSCULAR; INTRAVENOUS EVERY 6 HOURS PRN
Status: DISCONTINUED | OUTPATIENT
Start: 2024-04-01 | End: 2024-04-01

## 2024-04-01 RX ORDER — BUDESONIDE 0.5 MG/2ML
500 INHALANT ORAL
Status: DISCONTINUED | OUTPATIENT
Start: 2024-04-01 | End: 2024-04-05 | Stop reason: HOSPADM

## 2024-04-01 RX ORDER — POTASSIUM CHLORIDE 20 MEQ/1
40 TABLET, EXTENDED RELEASE ORAL PRN
Status: DISCONTINUED | OUTPATIENT
Start: 2024-04-01 | End: 2024-04-05 | Stop reason: HOSPADM

## 2024-04-01 RX ORDER — POTASSIUM CHLORIDE 7.45 MG/ML
10 INJECTION INTRAVENOUS PRN
Status: DISCONTINUED | OUTPATIENT
Start: 2024-04-01 | End: 2024-04-05 | Stop reason: HOSPADM

## 2024-04-01 RX ORDER — MAGNESIUM SULFATE 1 G/100ML
1000 INJECTION INTRAVENOUS PRN
Status: DISCONTINUED | OUTPATIENT
Start: 2024-04-01 | End: 2024-04-05 | Stop reason: HOSPADM

## 2024-04-01 RX ORDER — ACETAMINOPHEN 325 MG/1
650 TABLET ORAL EVERY 6 HOURS PRN
Status: DISCONTINUED | OUTPATIENT
Start: 2024-04-01 | End: 2024-04-05 | Stop reason: HOSPADM

## 2024-04-01 RX ORDER — SODIUM CHLORIDE 9 MG/ML
INJECTION, SOLUTION INTRAVENOUS CONTINUOUS
Status: DISCONTINUED | OUTPATIENT
Start: 2024-04-01 | End: 2024-04-01

## 2024-04-01 RX ORDER — ATORVASTATIN CALCIUM 20 MG/1
20 TABLET, FILM COATED ORAL NIGHTLY
Status: DISCONTINUED | OUTPATIENT
Start: 2024-04-01 | End: 2024-04-05 | Stop reason: HOSPADM

## 2024-04-01 RX ORDER — SODIUM CHLORIDE 9 MG/ML
INJECTION, SOLUTION INTRAVENOUS PRN
Status: DISCONTINUED | OUTPATIENT
Start: 2024-04-01 | End: 2024-04-05 | Stop reason: HOSPADM

## 2024-04-01 RX ORDER — METOPROLOL SUCCINATE 25 MG/1
12.5 TABLET, EXTENDED RELEASE ORAL DAILY
Status: DISCONTINUED | OUTPATIENT
Start: 2024-04-01 | End: 2024-04-05 | Stop reason: HOSPADM

## 2024-04-01 RX ORDER — ASPIRIN 81 MG/1
81 TABLET, CHEWABLE ORAL DAILY
Status: DISCONTINUED | OUTPATIENT
Start: 2024-04-01 | End: 2024-04-05 | Stop reason: HOSPADM

## 2024-04-01 RX ORDER — ONDANSETRON 4 MG/1
4 TABLET, ORALLY DISINTEGRATING ORAL EVERY 8 HOURS PRN
Status: DISCONTINUED | OUTPATIENT
Start: 2024-04-01 | End: 2024-04-01

## 2024-04-01 RX ORDER — SODIUM CHLORIDE 0.9 % (FLUSH) 0.9 %
5-40 SYRINGE (ML) INJECTION EVERY 12 HOURS SCHEDULED
Status: DISCONTINUED | OUTPATIENT
Start: 2024-04-01 | End: 2024-04-05 | Stop reason: HOSPADM

## 2024-04-01 RX ORDER — METRONIDAZOLE 500 MG/100ML
500 INJECTION, SOLUTION INTRAVENOUS EVERY 8 HOURS
Status: DISCONTINUED | OUTPATIENT
Start: 2024-04-01 | End: 2024-04-05 | Stop reason: HOSPADM

## 2024-04-01 RX ORDER — MIDODRINE HYDROCHLORIDE 5 MG/1
15 TABLET ORAL
Status: DISCONTINUED | OUTPATIENT
Start: 2024-04-01 | End: 2024-04-05 | Stop reason: HOSPADM

## 2024-04-01 RX ORDER — ARFORMOTEROL TARTRATE 15 UG/2ML
15 SOLUTION RESPIRATORY (INHALATION)
Status: DISCONTINUED | OUTPATIENT
Start: 2024-04-01 | End: 2024-04-05 | Stop reason: HOSPADM

## 2024-04-01 RX ORDER — PROCHLORPERAZINE EDISYLATE 5 MG/ML
5 INJECTION INTRAMUSCULAR; INTRAVENOUS EVERY 6 HOURS PRN
Status: DISCONTINUED | OUTPATIENT
Start: 2024-04-01 | End: 2024-04-05 | Stop reason: HOSPADM

## 2024-04-01 RX ORDER — SODIUM CHLORIDE 0.9 % (FLUSH) 0.9 %
5-40 SYRINGE (ML) INJECTION PRN
Status: DISCONTINUED | OUTPATIENT
Start: 2024-04-01 | End: 2024-04-05 | Stop reason: HOSPADM

## 2024-04-01 RX ORDER — FUROSEMIDE 40 MG/1
40 TABLET ORAL DAILY
Status: DISCONTINUED | OUTPATIENT
Start: 2024-04-01 | End: 2024-04-05 | Stop reason: HOSPADM

## 2024-04-01 RX ADMIN — Medication 10 ML: at 22:52

## 2024-04-01 RX ADMIN — Medication 10 ML: at 08:37

## 2024-04-01 RX ADMIN — SODIUM CHLORIDE: 900 INJECTION, SOLUTION INTRAVENOUS at 03:38

## 2024-04-01 RX ADMIN — ARFORMOTEROL TARTRATE 15 MCG: 15 SOLUTION RESPIRATORY (INHALATION) at 18:20

## 2024-04-01 RX ADMIN — MIDODRINE HYDROCHLORIDE 15 MG: 5 TABLET ORAL at 14:18

## 2024-04-01 RX ADMIN — METRONIDAZOLE 500 MG: 500 INJECTION, SOLUTION INTRAVENOUS at 22:51

## 2024-04-01 RX ADMIN — METRONIDAZOLE 500 MG: 500 INJECTION, SOLUTION INTRAVENOUS at 08:50

## 2024-04-01 RX ADMIN — MIDODRINE HYDROCHLORIDE 15 MG: 5 TABLET ORAL at 18:00

## 2024-04-01 RX ADMIN — BUDESONIDE INHALATION 500 MCG: 0.5 SUSPENSION RESPIRATORY (INHALATION) at 05:13

## 2024-04-01 RX ADMIN — POLYETHYLENE GLYCOL-3350 AND ELECTROLYTES 4000 ML: 236; 6.74; 5.86; 2.97; 22.74 POWDER, FOR SOLUTION ORAL at 15:38

## 2024-04-01 RX ADMIN — POTASSIUM CHLORIDE AND SODIUM CHLORIDE: 900; 300 INJECTION, SOLUTION INTRAVENOUS at 08:47

## 2024-04-01 RX ADMIN — PANTOPRAZOLE SODIUM 40 MG: 40 INJECTION, POWDER, FOR SOLUTION INTRAVENOUS at 08:51

## 2024-04-01 RX ADMIN — MIDODRINE HYDROCHLORIDE 15 MG: 5 TABLET ORAL at 08:53

## 2024-04-01 RX ADMIN — ARFORMOTEROL TARTRATE 15 MCG: 15 SOLUTION RESPIRATORY (INHALATION) at 05:12

## 2024-04-01 RX ADMIN — METRONIDAZOLE 500 MG: 500 INJECTION, SOLUTION INTRAVENOUS at 14:27

## 2024-04-01 RX ADMIN — PANTOPRAZOLE SODIUM 40 MG: 40 INJECTION, POWDER, FOR SOLUTION INTRAVENOUS at 20:35

## 2024-04-01 RX ADMIN — METOPROLOL SUCCINATE 12.5 MG: 25 TABLET, FILM COATED, EXTENDED RELEASE ORAL at 08:54

## 2024-04-01 RX ADMIN — ATORVASTATIN CALCIUM 20 MG: 20 TABLET, FILM COATED ORAL at 20:32

## 2024-04-01 RX ADMIN — WATER 1000 MG: 1 INJECTION INTRAMUSCULAR; INTRAVENOUS; SUBCUTANEOUS at 08:48

## 2024-04-01 RX ADMIN — BUDESONIDE INHALATION 500 MCG: 0.5 SUSPENSION RESPIRATORY (INHALATION) at 18:20

## 2024-04-01 NOTE — ED NOTES
While performing hands on care, multiple large blood clots found in pt brief, notified physician, repeat H&H ordered per Dr Gallo

## 2024-04-01 NOTE — H&P
Department of Internal Medicine  History and Physical Examination     Primary Care Physician: Hermelindo Bedolla DO   Admitting Physician:  Macario Dolan DO  Admission date and time: 3/31/2024  8:05 PM    Room:  10/10  Admitting diagnosis: GI bleed [K92.2]    Patient Name: Kike Holloway  MRN: [id number]    Date of Service: 4/1/2024     Chief Complaint:  Rectal bleeding    HISTORY OF PRESENT ILLNESS:    Kike Holloway is an 78-year-old male patient presented [facility] after having bloody bowel movements yesterday.  Patient states that he had passage of blood and clots with stooling.  ER workup was remarkable for the presence of overt blood on examination and acute on chronic anemia.  Metabolic panel was essentially unremarkable.  Troponins were assessed x 2 and were elevated but with flat trajectory suggesting demand ischemia.  CTA abdomen pelvis showed suspicious findings for proctitis with mass not excluded, bilateral hydroureteronephrosis with possible bladder distention and redemonstration of the left psoas muscle hematoma that is mildly improved.  Case was discussed with ER physician patient was excepted for admission overnight by the nocturnist.    Kike seen examined at bedside today.  States that he has had a bowel movement since admission but he was not aware if it was bloody or not.  He denies any pain in the abdomen or flanks.  Denies any acute urinary complaints.  There is intermittent nausea at times but no vomiting or hematemesis.  He denies any chest pain, palpitations or fluttering.  He denies any shortness of breath, cough or URI complaints.  We discussed admission to the hospital, GI consultation and further management he is agreeable.      PAST MEDICAL Hx:  Past Medical History:   Diagnosis Date    Aortic stenosis, mild     Aortic valve sclerosis     moderate    Atrial fibrillation (HCC) 2014    CAD (coronary artery disease)     CHF (congestive heart failure) (HCC)     COPD (chronic  MYELOPCT 1 03/05/2024 03:46 AM    MYELOPCT 4.3 09/24/2019 07:01 AM    BASOPCT 1 04/01/2024 05:49 AM    MONOSABS 0.44 04/01/2024 05:49 AM    LYMPHSABS 1.22 04/01/2024 05:49 AM    EOSABS 0.55 04/01/2024 05:49 AM    BASOSABS 0.10 04/01/2024 05:49 AM     BMP:    Lab Results   Component Value Date/Time     03/31/2024 08:20 PM    K 4.4 03/31/2024 08:20 PM    K 3.7 09/16/2019 08:40 PM     03/31/2024 08:20 PM    CO2 24 03/31/2024 08:20 PM    BUN 18 03/31/2024 08:20 PM    LABALBU 2.9 03/31/2024 08:20 PM    CREATININE 1.2 03/31/2024 08:20 PM    CALCIUM 8.4 03/31/2024 08:20 PM    GFRAA >60 08/22/2022 12:30 PM    LABGLOM 59 03/31/2024 08:20 PM    GLUCOSE 104 03/31/2024 08:20 PM      Latest Reference Range & Units 02/19/24 00:13 02/19/24 04:59 03/31/24 20:20 03/31/24 22:02   Troponin, High Sensitivity 0 - 11 ng/L 216 (H) 172 (H) 40 (H) 37 (H)   (H): Data is abnormally high    ASSESSMENT:  Acute on chronic anemia secondary to GI bleeding with proctitis, complicated by chronic anticoagulation with Eliquis  Coronary artery disease with mildly elevated troponin, significantly decreased from most recent assessment  Atrial fibrillation, paroxysmal, on chronic Eliquis  Recently diagnosed small insignificant pulmonary embolism   Recent left iliopsoas hematoma  Chronic kidney disease stage IIIa  History of peripheral vascular disease with status post endovascular repair of abdominal aortic aneurysm.  Nonoxygen dependent COPD with chronic respiratory failure  Severe protein malnourishment    PLAN:  Kike Holloway is an 78-year-old male who presents to [facility] for evaluation of rectal bleeding.  Imaging suggestive of proctitis.  GI has been asked to provide consultation.  Will cover with empiric antimicrobials, fluids, symptomatic and supportive care.  Protonix twice daily as blood was bright red and not melanotic.  Hold anticoagulation and antiplatelet therapy despite recently diagnosed possible pulmonary embolism.  Reassess

## 2024-04-01 NOTE — PROGRESS NOTES
.4 Eyes Skin Assessment     NAME:  Kike Holloway  YOB: 1945  MEDICAL RECORD NUMBER:  [id number]    The patient is being assessed for  Admission    I agree that at least one RN has performed a thorough Head to Toe Skin Assessment on the patient. ALL assessment sites listed below have been assessed.      Areas assessed by both nurses:    Head, Face, Ears, Shoulders, Back, Chest, Arms, Elbows, Hands, Sacrum. Buttock, Coccyx, Ischium, Legs. Feet and Heels, and Under Medical Devices         Does the Patient have a Wound? Yes wound(s) were present on assessment. LDA wound assessment was Initiated and completed by RN Pt has a reddened area on his buttocks near rectal area.       Raheem Prevention initiated by RN: Yes  Wound Care Orders initiated by RN: No    Pressure Injury (Stage 3,4, Unstageable, DTI, NWPT, and Complex wounds) if present, place Wound referral order by RN under : No    New Ostomies, if present place, Ostomy referral order under : No     Nurse 1 eSignature: Electronically signed by Antonette Bryson RN on 4/1/24 at 11:48 AM EDT    **SHARE this note so that the co-signing nurse can place an eSignature**    Nurse 2 eSignature: {Esignature:[id number]}

## 2024-04-01 NOTE — CONSULTS
CONSULT  Chan Moran M.D.  [facility]  Dr. Yomi Webb M.D.,  Dr. Jamel Escobar M.D.,  Dr. Harmeet Chin D.O.,  Dr. Blu Peñaloza D.O. ,  Dr. Harvey Lino M.D.        Kike Holloway  78 y.o.  male      Re: \"GI bleed\"  Requesting physician: NURY Quan/Dr. Dolan  Date:7:44 AM 4/1/2024        HPI: 78-year-old male patient seen in the hospital for above described issue.  Patient presents to the emergency department yesterday with chief complaint of rectal bleeding from a nursing home.  In the emergency department blood clots are reported.  Patient apparently was recently discharged from the hospital on 6 March after being treated for sepsis with septic shock.  Patient also has past medical history of oral anticoagulation, arctic stenosis, atrial fibrillation, coronary artery disease, CHF, COPD, anemia, hyperlipidemia, hypertension, history of prostate cancer with radiation treatment, PVD.  On presentation patient was noted to be anemic with hemoglobin of 10.7 decreasing to 8.7 earlier today however repeat hemoglobin without transfusion was found to be 9.8.  Patient himself denies abdominal pain.  He denies nausea vomiting.  He denies hematemesis emesis of coffee-ground material.  Patient does not think he had any black or tarry stool.  According to the patient most recent colonoscopy has been performed 3 to 4 years ago and from patient description appears to have been done in our office and oral.  Patient cannot provide however much details in regards to findings of the procedure.      Information sources:   -Patient  -medical record  -health care team    PMHx:  Past Medical History:   Diagnosis Date    Aortic stenosis, mild     Aortic valve sclerosis     moderate    Atrial fibrillation (HCC) 2014    CAD (coronary artery disease)     CHF (congestive heart failure) ([facility])     COPD (chronic obstructive pulmonary disease) ([facility])     Encounter for diabetic foot exam ([facility]) 01/08/2024    Gallbladder  fibrillation/CAD/CHF/COPD/etc.  -Per admitting/pertinent consultants    Above has been discussed with the patient and all questions answered to his satisfaction.  Thank you for the opportunity to see this patient in consultation    Chan Moran MD  4/1/2024  7:44 AM    NOTE:  This report was transcribed using voice recognition software.  Every effort was made to ensure accuracy; however, inadvertent computerized transcription errors may be present.    Agree with above.  Pt with hematochezia, abnormal CTA of rectum with proctitis R/O mass.  Pt has H/O of prostate cancer with radiation in the past.  Currently stable with no signs of hemorrhagic shock.  INR 2.1.  Plan for prep today and colonoscopy tomorrow if INR improved.  Consider EGD pending clinical course.  Our service will follow.    Harmeet Chin D.O.  4/1/24

## 2024-04-01 NOTE — ED PROVIDER NOTES
[facility] EMERGENCY DEPARTMENT  EMERGENCY DEPARTMENT ENCOUNTER        Pt Name: Kike Holloway  MRN: [id number]  Birthdate 1945  Date of evaluation: 3/31/2024  Provider: Luis Fernando Gallo DO  PCP: Hermelindo Bedolla DO  Note Started: 8:13 PM EDT 3/31/24    CHIEF COMPLAINT       Chief Complaint   Patient presents with    Rectal Bleeding     X3 large stools. Reported clots. On thinners.       HISTORY OF PRESENT ILLNESS: 1 or more Elements   History From: PATIENT     Limitations to history : None    Kike Holloway is a 78 y.o. male currently on Eliquis with a pacemaker, CKD, AAA repair, CABG arriving from nursing facility after they reported at least 3 episodes of blood clots in the stool today.  Patient states he has had diarrhea for 3 days.  Patient was discharged from [facility] on 3/6/2024 after he was admitted for sepsis and subsequent cardiogenic ischemia with elevated troponin level.      Nursing Notes were all reviewed and agreed with or any disagreements were addressed in the HPI.    REVIEW OF SYSTEMS :      Review of Systems    POSITIVE (+): melena  NEGATIVE (-): fevers, chills, nausea, vomiting, diarrhea, constipation, shortness of breath, chest pain, abdominal pain      SURGICAL HISTORY     Past Surgical History:   Procedure Laterality Date    APPENDECTOMY      CARDIAC CATHETERIZATION  08/08/2014    DR Coates-pt. states had triple bypass \"5 years ago\"    COLONOSCOPY      CORONARY ARTERY BYPASS GRAFT  08/11/2014    CABGx3 LIMA-LAD, SVG-D1, SVG-OM1, AVR Dr. Tinsley    ENDOSCOPY, COLON, DIAGNOSTIC      PACEMAKER INSERTION Left 08/22/2022    [employer] Dual PPM-GR    PACEMAKER PLACEMENT Left 08/12/2014    Dr. Cast-[employer]    ME VASCULAR EMBOLIZATION OR OCCLUSION ARTERIAL RS&I N/A 07/16/2018    ENDOVASCULAR  AORTIC ANEURYSM REPAIR , ABDOMINAL AORTIC ANEURYSM, WITH FINISTRATED GRAFT performed by Tu Chacon MD [facility] [place]    TONSILLECTOMY          CURRENTMEDICATIONS       Previous Medications    ALBUTEROL SULFATE HFA (PROVENTIL HFA) 108 (90 BASE) MCG/ACT INHALER    Inhale 2 puffs into the lungs every 6 hours as needed for Wheezing or Shortness of Breath    APIXABAN (ELIQUIS) 5 MG TABS TABLET    TAKE 1 TABLET TWICE A DAY    ASPIRIN 81 MG CHEWABLE TABLET    Take 1 tablet by mouth daily    ATORVASTATIN (LIPITOR) 20 MG TABLET    TAKE 1 TABLET BY MOUTH ONCE  DAILY    DOCUSATE SODIUM (COLACE, DULCOLAX) 100 MG CAPS    Take 100 mg by mouth daily    EMPAGLIFLOZIN (JARDIANCE) 10 MG TABLET    Take 1 tablet by mouth daily    FLUTICASONE-UMECLIDIN-VILANT (TRELEGY ELLIPTA) 100-62.5-25 MCG/ACT AEPB INHALER    Inhale 1 puff into the lungs daily    FUROSEMIDE (LASIX) 40 MG TABLET    Take 1 tablet by mouth daily    GUAIFENESIN-DEXTROMETHORPHAN (ROBITUSSIN DM) 100-10 MG/5ML SYRUP    Take 5 mLs by mouth 3 times daily as needed for Cough    HANDICAP PLACARD MISC    by Does not apply route It is my medical opinion that Kike Holloway requires a disability parking placard for the following reasons:  He cannot walk 200 feet without stopping to rest.  Duration of need: 5 years    LANSOPRAZOLE (PREVACID SOLUTAB) 30 MG DISINTEGRATING TABLET    Take 1 tablet by mouth every morning (before breakfast)    METOPROLOL SUCCINATE (TOPROL XL) 25 MG EXTENDED RELEASE TABLET    Take 0.5 tablets by mouth daily    MIDODRINE (PROAMATINE) 5 MG TABLET    Take 3 tablets by mouth 3 times daily (with meals)    NITROGLYCERIN (NITROSTAT) 0.3 MG SL TABLET    Place 1 tablet under the tongue every 5 minutes as needed for Chest pain    POLYETHYLENE GLYCOL (GLYCOLAX) 17 G PACKET    Take 1 packet by mouth daily    SODIUM CHLORIDE (OCEAN, BABY AYR) 0.65 % NASAL SPRAY    1 spray by Nasal route as needed for Congestion       ALLERGIES     Pneumovax [pneumococcal polysaccharide vaccine]    FAMILYHISTORY       Family History   Problem Relation Age of Onset    Heart Attack Brother     Stroke Father         SOCIAL

## 2024-04-02 ENCOUNTER — ANESTHESIA (OUTPATIENT)
Dept: ENDOSCOPY | Age: 79
End: 2024-04-02
Payer: MEDICARE

## 2024-04-02 ENCOUNTER — ANESTHESIA EVENT (OUTPATIENT)
Dept: ENDOSCOPY | Age: 79
End: 2024-04-02
Payer: MEDICARE

## 2024-04-02 LAB
ALBUMIN SERPL-MCNC: 2.7 G/DL (ref 3.5–5.2)
ALP SERPL-CCNC: 92 U/L (ref 40–129)
ALT SERPL-CCNC: 7 U/L (ref 0–40)
ANION GAP SERPL CALCULATED.3IONS-SCNC: 10 MMOL/L (ref 7–16)
AST SERPL-CCNC: 21 U/L (ref 0–39)
BASOPHILS # BLD: 0.1 K/UL (ref 0–0.2)
BASOPHILS NFR BLD: 1 % (ref 0–2)
BILIRUB SERPL-MCNC: 0.7 MG/DL (ref 0–1.2)
BUN SERPL-MCNC: 10 MG/DL (ref 6–23)
CALCIUM SERPL-MCNC: 8.6 MG/DL (ref 8.6–10.2)
CHLORIDE SERPL-SCNC: 107 MMOL/L (ref 98–107)
CO2 SERPL-SCNC: 22 MMOL/L (ref 22–29)
CREAT SERPL-MCNC: 1 MG/DL (ref 0.7–1.2)
EOSINOPHIL # BLD: 0.57 K/UL (ref 0.05–0.5)
EOSINOPHILS RELATIVE PERCENT: 7 % (ref 0–6)
ERYTHROCYTE [DISTWIDTH] IN BLOOD BY AUTOMATED COUNT: 16.8 % (ref 11.5–15)
GFR SERPL CREATININE-BSD FRML MDRD: 73 ML/MIN/1.73M2
GLUCOSE SERPL-MCNC: 89 MG/DL (ref 74–99)
HCT VFR BLD AUTO: 29.4 % (ref 37–54)
HCT VFR BLD AUTO: 30 % (ref 37–54)
HGB BLD-MCNC: 9.4 G/DL (ref 12.5–16.5)
HGB BLD-MCNC: 9.7 G/DL (ref 12.5–16.5)
IMM GRANULOCYTES # BLD AUTO: 0.06 K/UL (ref 0–0.58)
IMM GRANULOCYTES NFR BLD: 1 % (ref 0–5)
LYMPHOCYTES NFR BLD: 1.25 K/UL (ref 1.5–4)
LYMPHOCYTES RELATIVE PERCENT: 15 % (ref 20–42)
MCH RBC QN AUTO: 31.8 PG (ref 26–35)
MCHC RBC AUTO-ENTMCNC: 32 G/DL (ref 32–34.5)
MCV RBC AUTO: 99.3 FL (ref 80–99.9)
MONOCYTES NFR BLD: 0.54 K/UL (ref 0.1–0.95)
MONOCYTES NFR BLD: 7 % (ref 2–12)
NEUTROPHILS NFR BLD: 69 % (ref 43–80)
NEUTS SEG NFR BLD: 5.68 K/UL (ref 1.8–7.3)
PLATELET # BLD AUTO: 272 K/UL (ref 130–450)
PMV BLD AUTO: 9.8 FL (ref 7–12)
POTASSIUM SERPL-SCNC: 4.3 MMOL/L (ref 3.5–5)
PROT SERPL-MCNC: 6.8 G/DL (ref 6.4–8.3)
RBC # BLD AUTO: 2.96 M/UL (ref 3.8–5.8)
SODIUM SERPL-SCNC: 139 MMOL/L (ref 132–146)
WBC OTHER # BLD: 8.2 K/UL (ref 4.5–11.5)

## 2024-04-02 PROCEDURE — 7100000011 HC PHASE II RECOVERY - ADDTL 15 MIN: Performed by: INTERNAL MEDICINE

## 2024-04-02 PROCEDURE — 3609027000 HC COLONOSCOPY: Performed by: INTERNAL MEDICINE

## 2024-04-02 PROCEDURE — 2580000003 HC RX 258

## 2024-04-02 PROCEDURE — 97530 THERAPEUTIC ACTIVITIES: CPT | Performed by: PHYSICAL THERAPIST

## 2024-04-02 PROCEDURE — 7100000010 HC PHASE II RECOVERY - FIRST 15 MIN: Performed by: INTERNAL MEDICINE

## 2024-04-02 PROCEDURE — 97110 THERAPEUTIC EXERCISES: CPT | Performed by: PHYSICAL THERAPIST

## 2024-04-02 PROCEDURE — 6360000002 HC RX W HCPCS: Performed by: NURSE PRACTITIONER

## 2024-04-02 PROCEDURE — 85014 HEMATOCRIT: CPT

## 2024-04-02 PROCEDURE — 2060000000 HC ICU INTERMEDIATE R&B

## 2024-04-02 PROCEDURE — 6360000002 HC RX W HCPCS: Performed by: NURSE ANESTHETIST, CERTIFIED REGISTERED

## 2024-04-02 PROCEDURE — 85025 COMPLETE CBC W/AUTO DIFF WBC: CPT

## 2024-04-02 PROCEDURE — 6360000002 HC RX W HCPCS

## 2024-04-02 PROCEDURE — 80053 COMPREHEN METABOLIC PANEL: CPT

## 2024-04-02 PROCEDURE — 94640 AIRWAY INHALATION TREATMENT: CPT

## 2024-04-02 PROCEDURE — 3700000001 HC ADD 15 MINUTES (ANESTHESIA): Performed by: INTERNAL MEDICINE

## 2024-04-02 PROCEDURE — 2709999900 HC NON-CHARGEABLE SUPPLY: Performed by: INTERNAL MEDICINE

## 2024-04-02 PROCEDURE — 2580000003 HC RX 258: Performed by: NURSE ANESTHETIST, CERTIFIED REGISTERED

## 2024-04-02 PROCEDURE — 85018 HEMOGLOBIN: CPT

## 2024-04-02 PROCEDURE — 6370000000 HC RX 637 (ALT 250 FOR IP)

## 2024-04-02 PROCEDURE — 36415 COLL VENOUS BLD VENIPUNCTURE: CPT

## 2024-04-02 PROCEDURE — 97161 PT EVAL LOW COMPLEX 20 MIN: CPT | Performed by: PHYSICAL THERAPIST

## 2024-04-02 PROCEDURE — A4216 STERILE WATER/SALINE, 10 ML: HCPCS

## 2024-04-02 PROCEDURE — C9113 INJ PANTOPRAZOLE SODIUM, VIA: HCPCS

## 2024-04-02 PROCEDURE — 0DJD8ZZ INSPECTION OF LOWER INTESTINAL TRACT, VIA NATURAL OR ARTIFICIAL OPENING ENDOSCOPIC: ICD-10-PCS | Performed by: INTERNAL MEDICINE

## 2024-04-02 PROCEDURE — 2580000003 HC RX 258: Performed by: NURSE PRACTITIONER

## 2024-04-02 PROCEDURE — 2700000000 HC OXYGEN THERAPY PER DAY

## 2024-04-02 PROCEDURE — 3700000000 HC ANESTHESIA ATTENDED CARE: Performed by: INTERNAL MEDICINE

## 2024-04-02 RX ORDER — PROPOFOL 10 MG/ML
INJECTION, EMULSION INTRAVENOUS PRN
Status: DISCONTINUED | OUTPATIENT
Start: 2024-04-02 | End: 2024-04-02 | Stop reason: SDUPTHER

## 2024-04-02 RX ORDER — SODIUM CHLORIDE 9 MG/ML
INJECTION, SOLUTION INTRAVENOUS CONTINUOUS PRN
Status: DISCONTINUED | OUTPATIENT
Start: 2024-04-02 | End: 2024-04-02 | Stop reason: SDUPTHER

## 2024-04-02 RX ADMIN — POTASSIUM CHLORIDE AND SODIUM CHLORIDE: 900; 300 INJECTION, SOLUTION INTRAVENOUS at 17:03

## 2024-04-02 RX ADMIN — ARFORMOTEROL TARTRATE 15 MCG: 15 SOLUTION RESPIRATORY (INHALATION) at 06:46

## 2024-04-02 RX ADMIN — METOPROLOL SUCCINATE 12.5 MG: 25 TABLET, FILM COATED, EXTENDED RELEASE ORAL at 10:27

## 2024-04-02 RX ADMIN — MIDODRINE HYDROCHLORIDE 15 MG: 5 TABLET ORAL at 16:57

## 2024-04-02 RX ADMIN — MIDODRINE HYDROCHLORIDE 15 MG: 5 TABLET ORAL at 08:27

## 2024-04-02 RX ADMIN — PROPOFOL 50 MG: 10 INJECTION, EMULSION INTRAVENOUS at 14:29

## 2024-04-02 RX ADMIN — Medication 10 ML: at 21:52

## 2024-04-02 RX ADMIN — PROPOFOL 25 MG: 10 INJECTION, EMULSION INTRAVENOUS at 14:35

## 2024-04-02 RX ADMIN — BUDESONIDE INHALATION 500 MCG: 0.5 SUSPENSION RESPIRATORY (INHALATION) at 19:53

## 2024-04-02 RX ADMIN — PANTOPRAZOLE SODIUM 40 MG: 40 INJECTION, POWDER, FOR SOLUTION INTRAVENOUS at 21:48

## 2024-04-02 RX ADMIN — POTASSIUM CHLORIDE AND SODIUM CHLORIDE: 900; 300 INJECTION, SOLUTION INTRAVENOUS at 02:14

## 2024-04-02 RX ADMIN — BUDESONIDE INHALATION 500 MCG: 0.5 SUSPENSION RESPIRATORY (INHALATION) at 06:46

## 2024-04-02 RX ADMIN — ARFORMOTEROL TARTRATE 15 MCG: 15 SOLUTION RESPIRATORY (INHALATION) at 19:53

## 2024-04-02 RX ADMIN — WATER 1000 MG: 1 INJECTION INTRAMUSCULAR; INTRAVENOUS; SUBCUTANEOUS at 06:05

## 2024-04-02 RX ADMIN — SODIUM CHLORIDE: 900 INJECTION, SOLUTION INTRAVENOUS at 14:24

## 2024-04-02 RX ADMIN — PROPOFOL 25 MG: 10 INJECTION, EMULSION INTRAVENOUS at 14:31

## 2024-04-02 RX ADMIN — PANTOPRAZOLE SODIUM 40 MG: 40 INJECTION, POWDER, FOR SOLUTION INTRAVENOUS at 06:06

## 2024-04-02 RX ADMIN — Medication 10 ML: at 08:28

## 2024-04-02 RX ADMIN — METRONIDAZOLE 500 MG: 500 INJECTION, SOLUTION INTRAVENOUS at 17:11

## 2024-04-02 RX ADMIN — METRONIDAZOLE 500 MG: 500 INJECTION, SOLUTION INTRAVENOUS at 06:05

## 2024-04-02 RX ADMIN — METRONIDAZOLE 500 MG: 500 INJECTION, SOLUTION INTRAVENOUS at 23:23

## 2024-04-02 RX ADMIN — ATORVASTATIN CALCIUM 20 MG: 20 TABLET, FILM COATED ORAL at 21:48

## 2024-04-02 ASSESSMENT — ENCOUNTER SYMPTOMS
DYSPNEA ACTIVITY LEVEL: AFTER AMBULATING 1 FLIGHT OF STAIRS
SHORTNESS OF BREATH: 1

## 2024-04-02 NOTE — PLAN OF CARE
Problem: Safety - Adult  Goal: Free from fall injury  Outcome: Progressing     Problem: Skin/Tissue Integrity  Goal: Absence of new skin breakdown  Description: 1.  Monitor for areas of redness and/or skin breakdown  2.  Assess vascular access sites hourly  3.  Every 4-6 hours minimum:  Change oxygen saturation probe site  4.  Every 4-6 hours:  If on nasal continuous positive airway pressure, respiratory therapy assess nares and determine need for appliance change or resting period.  Outcome: Progressing     Problem: Discharge Planning  Goal: Discharge to home or other facility with appropriate resources  Recent Flowsheet Documentation  Taken 4/1/2024 1148 by Antonette Bryson RN  Discharge to home or other facility with appropriate resources:   Identify barriers to discharge with patient and caregiver   Arrange for needed discharge resources and transportation as appropriate   Identify discharge learning needs (meds, wound care, etc)     Problem: Chronic Conditions and Co-morbidities  Goal: Patient's chronic conditions and co-morbidity symptoms are monitored and maintained or improved  Recent Flowsheet Documentation  Taken 4/1/2024 1148 by Antonette Bryson RN  Care Plan - Patient's Chronic Conditions and Co-Morbidity Symptoms are Monitored and Maintained or Improved: Monitor and assess patient's chronic conditions and comorbid symptoms for stability, deterioration, or improvement

## 2024-04-02 NOTE — OP NOTE
Operative Note      Patient: Kike Holloway  YOB: 1945  MRN: [id number]    Date of Procedure: 4/2/2024    Pre-Op Diagnosis Codes:     * Anemia, unspecified type [D64.9]    Post-Op Diagnosis: Actinic rectal ulcers, diverticulosis coli.       Procedure(s):  COLONOSCOPY    Surgeon(s):  Ryley Moeller MD    Assistant:   Surgical Assistant: Britany Thibodeaux RN    Anesthesia: Monitor Anesthesia Care    Estimated Blood Loss (mL): None    Complications: None    Specimens:   * No specimens in log *    Implants:  * No implants in log *      Drains:   External Urinary Catheter (Active)   Site Assessment Clean,dry & intact 04/01/24 1148   Placement Initiated 04/01/24 1148   Catheter Care Catheter/Wick replaced;Suction Canister/Tubing changed 04/01/24 1148   Perineal Care Yes 04/01/24 1148   Suction 40 mmgHg continuous 04/01/24 1148   Urine Color Yellow 04/01/24 1148   Output (mL) 200 mL 04/01/24 1148       [REMOVED] Urinary Catheter 02/18/24 Courtney-Temperature (Removed)   $ Urethral catheter insertion $ Not inserted for procedure 02/19/24 0100   Catheter Indications Urinary retention (acute or chronic), continuous bladder irrigation or bladder outlet obstruction 03/06/24 2153   Site Assessment No urethral drainage 03/06/24 2153   Urine Color Lani 03/06/24 2153   Urine Appearance Clear 03/06/24 2153   Collection Container Standard 03/06/24 2153   Securement Method Securing device (Describe) 03/06/24 2153   Catheter Care  Perineal wipes 03/06/24 2153   Catheter Best Practices  Drainage bag less than half full;Lack of dependent loop in tubing;Bag not on floor;Bag below bladder;Tamper seal intact;Catheter secured to thigh;Drainage tube clipped to bed 03/06/24 2153   Status Patent 03/06/24 2153   Output (mL) 300 mL 03/06/24 2153       Findings: Actinic rectal ulcers, diverticulosis coli.        Detailed Description of Procedure:   78-year-old male patient, with GI blood loss anemia.  History of cancer of  09-Oct-2017 19:00

## 2024-04-02 NOTE — ACP (ADVANCE CARE PLANNING)
Advance Care Planning   Healthcare Decision Maker:    [place] Decision Maker: [place] [facility] [phone]    .  Today we documented Decision Maker(s) consistent with Legal Next of Kin hierarchy.

## 2024-04-02 NOTE — PROGRESS NOTES
Physical Therapy  Physical Therapy Initial Evaluation/Plan of Care    Room #:  0637/0637-01  Patient Name: Kike Holloway  YOB: 1945  MRN: [id number]    Date of Service: 4/2/2024     Tentative placement recommendation: Subacute Rehab  Equipment recommendation: To be determined      Evaluating Physical Therapist: Matthew James PT, DPT #[id number]      Specific Provider Orders/Date/Referring Provider :     04/01/24 0645    PT eval and treat  Start:  04/01/24 0645,   End:  04/01/24 0645,   ONE TIME,   Standing Count:  1 Occurrences,   R       Jose Suresh, APRN - CNP Acknowledge New    Admitting Diagnosis:   GI bleed [K92.2]  Gastrointestinal hemorrhage, unspecified gastrointestinal hemorrhage type [K92.2]      Surgery: none      Patient Active Problem List   Diagnosis    Hyperlipidemia with target LDL less than 100    BPH (benign prostatic hyperplasia)    ECG abnormal    Abnormal nuclear stress test    AS (aortic stenosis)    SOB (shortness of breath)    RBBB (right bundle branch block)    Anxiety    CAD (coronary artery disease)    Pulmonary emphysema (HCC)    Permanent atrial fibrillation (HCC)    AV block, Mobitz II    AV block    H/O bicuspid aortic valve    AI (aortic insufficiency)    S/P AVR (aortic valve replacement)    Anticoagulated on Coumadin    AAA (abdominal aortic aneurysm) without rupture (HCC)    Elevated liver enzymes    History of tobacco use    S/P CABG x 3    H/O aortic valve replacement with porcine valve    Aortic valve disease    Pure hypercholesterolemia    Normally functioning cardiac pacemaker present    History of smoking 30 or more pack years    Hx of CABG    PAF (paroxysmal atrial fibrillation) (HCC)    Anticoagulated by anticoagulation treatment    S/P AAA repair using bifurcation graft    Bilateral leg weakness    Chronic systolic congestive heart failure (HCC)    PVD (peripheral vascular disease) (HCC)    Stage 3a chronic kidney disease (HCC)    Pulmonary fibrosis (HCC)

## 2024-04-02 NOTE — ANESTHESIA PRE PROCEDURE
0757 04/02/24 0805   BP: 97/75 105/66 104/69    Pulse: 71 62 92 70   Resp: 18 16 26    Temp: 36.1 °C (97 °F) 36.5 °C (97.7 °F) 36.4 °C (97.6 °F)    TempSrc: Oral Oral Oral    SpO2: 99% 95%     Weight:       Height:                                                  BP Readings from Last 3 Encounters:   04/02/24 104/69   03/06/24 97/63   02/12/24 102/70       NPO Status:  >8 HRS                                                                               BMI:   Wt Readings from Last 3 Encounters:   03/31/24 61.2 kg (135 lb)   03/06/24 68.7 kg (151 lb 7.3 oz)   02/12/24 69.9 kg (154 lb)     Body mass index is 22.47 kg/m².    CBC:   Lab Results   Component Value Date/Time    WBC 7.2 04/01/2024 05:49 AM    RBC 3.10 04/01/2024 05:49 AM    HGB 9.4 04/01/2024 10:04 PM    HCT 28.7 04/01/2024 10:04 PM    HCT 47.0 04/11/2023 10:16 AM    MCV 99.0 04/01/2024 05:49 AM    RDW 16.6 04/01/2024 05:49 AM     04/01/2024 05:49 AM       CMP:   Lab Results   Component Value Date/Time     04/01/2024 05:49 AM    K 3.4 04/01/2024 05:49 AM    K 3.7 09/16/2019 08:40 PM     04/01/2024 05:49 AM    CO2 21 04/01/2024 05:49 AM    BUN 14 04/01/2024 05:49 AM    CREATININE 1.0 04/01/2024 05:49 AM    GFRAA >60 08/22/2022 12:30 PM    LABGLOM 73 04/01/2024 05:49 AM    GLUCOSE 84 04/01/2024 05:49 AM    PROT 6.3 04/01/2024 05:49 AM    CALCIUM 7.5 04/01/2024 05:49 AM    BILITOT 0.9 04/01/2024 05:49 AM    ALKPHOS 84 04/01/2024 05:49 AM    AST 21 04/01/2024 05:49 AM    ALT 8 04/01/2024 05:49 AM       POC Tests: No results for input(s): \"POCGLU\", \"POCNA\", \"POCK\", \"POCCL\", \"POCBUN\", \"POCHEMO\", \"POCHCT\" in the last 72 hours.    Coags:   Lab Results   Component Value Date/Time    PROTIME 24.2 03/31/2024 08:20 PM    INR 2.1 03/31/2024 08:20 PM    APTT 39.3 03/31/2024 08:20 PM    APTT 41.1 05/16/2023 10:31 AM       HCG (If Applicable): No results found for: \"PREGTESTUR\", \"PREGSERUM\", \"HCG\", \"HCGQUANT\"     ABGs: No results found for: \"PHART\",

## 2024-04-02 NOTE — PROGRESS NOTES
Internal Medicine Progress Note    [facility]    Macario Dolan D.O., MARCOS Sharpe D.O., MARCOS Thomas D.O.    Yamilet Bee, MSN, APRN, NP-C  Jose Suresh, MSN, APRN-CNP  Tim Carl, MSN, APRN, NP-C     Primary Care Physician: Hermelindo Bedolla DO   Admitting Physician:  Macario Dolan DO  Admission date and time: 3/31/2024  8:05 PM    Room:  [address] [place] [address]  Admitting diagnosis: GI bleed [K92.2]  Gastrointestinal hemorrhage, unspecified gastrointestinal hemorrhage type [K92.2]    Patient Name: Kike Holloway  MRN: [id number]    Date of Service: 4/2/2024     Subjective:  Kike is a 78 y.o. male who was seen and examined today,4/2/2024, at the bedside.  He has had increased stooling with the institution of the bowel regimen prior to colonoscopy.  He does not report any overt blood loss however.  We have reviewed the results of the workup and plan of care moving forward.  He voices no new symptoms or concerns. No family present during my examination.    Review of systems:  Constitutional:   Positive for fatigue and malaise , - fever/chills  HEENT:   Denies ear pain, sore throat, sinus or eye problems.  Cardiovascular:   Denies any chest pain, irregular heartbeats, or palpitations.   Respiratory:   - dyspnea at rest, - dyspnea on exertion, - coughing, - sputum, - hemoptysis  Gastrointestinal:   - nausea, -vomiting. + diarrhea consistent with bowel prep without any overt blood loss reported, - constipation. + poor appetite and poor intake. - abdominal pain.  Genitourinary:    Denies any urgency, frequency, hematuria. Voiding  without difficulty.  Extremities:   Denies lower extremity swelling, edema or cyanosis.   Neurology:    Denies any headache or focal neurological deficits, positive for generalized weakness and fatigue without focal component  Psch:   Denies being anxious or depressed.  Musculoskeletal:   contact.  This time was spent reviewing notes and laboratory data as well as instructing and counseling the patient. Time I spent with the family or surrogate(s) is included only if the patient was incapable of providing the necessary information or participating in medical decisions. I also discussed the differential diagnosis and all of the proposed management plans with the patient and individuals accompanying the patient.    Edward requires this high level of physician care and nursing on the IMC/Telemetry unit due the complexity of decision management and chance of rapid decline or death.  Continued cardiac monitoring and higher level of nursing are required. I am readily available for any further decision-making and intervention.     NURY Huffman - CNP  4/2/2024  12:55 PM

## 2024-04-02 NOTE — DISCHARGE INSTR - COC
Continuity of Care Form    Patient Name: Kike Holloway   :  1945  MRN:  [id number]    Admit date:  3/31/2024  Discharge date:  24    Code Status Order: Limited   Advance Directives:     Admitting Physician:  Macario Dolan DO  PCP: Hermelindo Bedolla DO    Discharging Nurse: KELLY Villanueva  Discharging Hospital Unit/Room#: 0637/0637-01  Discharging Unit Phone Number: [phone]    Emergency Contact:   Extended Emergency Contact Information  Primary Emergency Contact: Brynn Holloway  Address: [address] [place] [address]  Home Phone: [phone]  Mobile Phone: [phone]  Relation: Spouse  Secondary Emergency Contact: Tam Holloway  Mobile Phone: [phone]  Relation: Child  Preferred language: English   needed? No    Past Surgical History:  Past Surgical History:   Procedure Laterality Date    APPENDECTOMY      CARDIAC CATHETERIZATION  2014    DR Coates-pt. states had triple bypass \"5 years ago\"    COLONOSCOPY      CORONARY ARTERY BYPASS GRAFT  2014    CABGx3 LIMA-LAD, SVG-D1, SVG-OM1, AVR Dr. Tinsley    ENDOSCOPY, COLON, DIAGNOSTIC      PACEMAKER INSERTION Left 2022    [employer] Dual PPM-GR    PACEMAKER PLACEMENT Left 2014    Dr. Csat-Dual Chamber-"[employer] Scientific    MN VASCULAR EMBOLIZATION OR OCCLUSION ARTERIAL RS&I N/A 2018    ENDOVASCULAR  AORTIC ANEURYSM REPAIR , ABDOMINAL AORTIC ANEURYSM, WITH FINISTRATED GRAFT [place] [facility] [place]    TONSILLECTOMY         Immunization History:   Immunization History   Administered Date(s) Administered    COVID-19, MODERNA BLUE border, Primary or Immunocompromised, (age 12y+), IM, 100 mcg/0.5mL 2021, 2021, 2021    COVID-19, PFIZER Bivalent, DO NOT Dilute, (age 12y+), IM, 30 mcg/0.3 mL 10/11/2022    COVID-19, PFIZER, ( formula), (age 12y+), IM, 30mcg/0.3mL 10/27/2023    Influenza Vaccine, unspecified formulation 10/29/2013,  Z95.828, Z86.79    Bilateral leg weakness R29.898    Chronic systolic congestive heart failure ([facility]) I50.22    PVD (peripheral vascular disease) ([facility]) I73.9    Stage 3a chronic kidney disease ([facility]) N18.31    Pulmonary fibrosis ([facility]) J84.10    Dyslipidemia E78.5    Essential hypertension I10    Primary insomnia F51.01    Cough in adult R05.9    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    Acute bacterial sinusitis J01.90, B96.89    Tobacco abuse Z72.0    Fatigue R53.83    History of prostate cancer Z85.46    Septic shock ([facility]) A41.9, R65.21    Pulmonary embolism ([facility]) I26.99    Acute on chronic combined systolic and diastolic congestive heart failure ([facility]) I50.43    Ischemic cardiomyopathy I25.5    Palliative care encounter Z51.5    Acute respiratory failure with hypoxia ([facility]) J96.01    NSTEMI (non-ST elevated myocardial infarction) ([facility]) I21.4    GI bleed K92.2       Isolation/Infection:   Isolation            No Isolation          Patient Infection Status       None to display                     Nurse Assessment:  Last Vital Signs: /69   Pulse 70   Temp 97.6 °F (36.4 °C) (Oral)   Resp 26   Ht 1.651 m (5' 5\")   Wt 61.2 kg (135 lb)   SpO2 95%   BMI 22.47 kg/m²     Last documented pain score (0-10 scale):    Last Weight:   Wt Readings from Last 1 Encounters:   03/31/24 61.2 kg (135 lb)     Mental Status:  oriented and alert    IV Access:  - None    Nursing Mobility/ADLs:  Walking   Assisted  Transfer  Assisted  Bathing  Assisted  Dressing  Assisted  Toileting  Assisted  Feeding  Assisted  Med Admin  Assisted  Med Delivery   whole    Wound Care Documentation and Therapy:  Wound 02/19/24 Nose Redness (Active)   Dressing Status Other (Comment) 04/02/24 0800   Wound Cleansed Cleansed with saline 02/22/24 1200   Dressing/Treatment Open to air 03/05/24 1200   Wound Assessment Pink/red 04/02/24 0030   Drainage Amount None (dry) 04/02/24 0800   Odor None 04/02/24 0800   Number of days: 43

## 2024-04-02 NOTE — ANESTHESIA POSTPROCEDURE EVALUATION
Department of Anesthesiology  Postprocedure Note    Patient: Kike Holloway  MRN: [id number]  YOB: 1945  Date of evaluation: 4/2/2024    Procedure Summary       Date: 04/02/24 Room / Location: [place] [address] / [facility]    Anesthesia Start: 1424 Anesthesia Stop: 1449    Procedure: COLONOSCOPY Diagnosis:       Anemia, unspecified type      (Anemia, unspecified type [D64.9])    Surgeons: Ryley Moeller MD Responsible Provider: George Dejesus MD    Anesthesia Type: MAC ASA Status: 4            Anesthesia Type: MAC    Emilee Phase I:      Emilee Phase II: Emilee Score: 9    Anesthesia Post Evaluation    Patient location during evaluation: PACU  Patient participation: complete - patient participated  Level of consciousness: awake  Pain score: 0  Airway patency: patent  Nausea & Vomiting: no vomiting and no nausea  Cardiovascular status: hemodynamically stable  Respiratory status: acceptable  Hydration status: stable  Pain management: adequate        No notable events documented.

## 2024-04-02 NOTE — PROGRESS NOTES
Date:2024  Patient Name: Kike Holloway  MRN: [id number]  : 1945  ROOM #: ENDO POOL ROOM/NONE    Occupational Therapy order received, chart reviewed and evaluation attempted this date. Patient is unavailable for OT evaluation due to being off the unit for a colonoscopy.     Will attempt OT evaluation at a later time. Thank you.   Maris Flower OTR/L #[id number]

## 2024-04-02 NOTE — CARE COORDINATION
Case Management Assessment  Initial Evaluation    Date/Time of Evaluation: 4/2/2024 11:21 AM  Assessment Completed by: Atiya Case RN    If patient is discharged prior to next notation, then this note serves as note for discharge by case management.    Patient Name: Kike Holloway                   YOB: 1945  Diagnosis: GI bleed [K92.2]  Gastrointestinal hemorrhage, unspecified gastrointestinal hemorrhage type [K92.2]                   Date / Time: 3/31/2024  8:05 PM    Patient Admission Status: Inpatient   Readmission Risk (Low < 19, Mod (19-27), High > 27): Readmission Risk Score: 22.3    Current PCP: Hermelindo Bedolla, DO  PCP verified by CM? Yes    Chart Reviewed: Yes      History Provided by: Patient  Patient Orientation: Alert and Oriented    Patient Cognition: Alert    Hospitalization in the last 30 days (Readmission):  yes    Readmission Assessment  Number of Days since last admission?: 8-30 days  Previous Disposition: SNF  Who is being Interviewed: Patient  What was the patient's/caregiver's perception as to why they think they needed to return back to the hospital?: Other (Comment) (bloody stools)  Did you visit your Primary Care Physician after you left the hospital, before you returned this time?: Yes  Did you see a specialist, such as Cardiac, Pulmonary, Orthopedic Physician, etc. after you left the hospital?: No  Who advised the patient to return to the hospital?: Physician  Does the patient report anything that got in the way of taking their medications?: No  In our efforts to provide the best possible care to you and others like you, can you think of anything that we could have done to help you after you left the hospital the first time, so that you might not have needed to return so soon?: Other (Comment) (none)      Advance Directives:      Code Status: Limited   Patient's Primary Decision Maker is: Legal Next of Kin    Primary Decision Maker: Brynn Holloway - Spouse -  [phone]    Discharge Planning:    Patient lives with: SNF  Type of Home: SNF   Primary Care Giver: Other (Comment) (came from SNF)  Patient Support Systems include: Spouse/Significant Other, Children, Family Members     ADLS  Prior functional level: Assistance with the following:, Mobility  Current functional level: Assistance with the following:, Mobility        Family can provide assistance at DC: No  Would you like Case Management to discuss the discharge plan with any other family members/significant others, and if so, who? No  Plans to Return to Present Housing: Yes  Other Identified Issues/Barriers to RETURNING to current housing: none     Financial    Payor: [facility] MEDICARE / Plan: [facility] MEDICARE COMPLETE / Product Type: *No Product type* /     Does insurance require precert for SNF: Yes      [id number] - [place], [place] - [address] - P [phone] - F [phone]  [address] [facility] [address]  Phone: [phone] Fax: [phone]    [facility] [address] -  [phone] - F [phone]  [address] [address]  Phone: [phone] Fax: [phone]      Notes:    Factors facilitating achievement of predicted outcomes: Family support and Caregiver support    Barriers to discharge: none     Additional Case Management Notes: 4-2-Cm note: met with pt for transition of care, pt was sent in from [facility] due to bloody stools, pt unsure if he needs to go back to finish rehab at [facility], awaiting PT/OT recommendations. Pt can return to [facility] at [facility] , PRECERT required. Cm following . Electronically signed by Atiya Case RN on 4/2/2024 at 11:28 AM      The Plan for Transition of Care is related to the following treatment goals of GI bleed [K92.2]  Gastrointestinal hemorrhage, unspecified gastrointestinal hemorrhage type [K92.2]      Atiya Case RN  Case Management Department  Ph: [phone] Fax:

## 2024-04-03 LAB
ALBUMIN SERPL-MCNC: 3 G/DL (ref 3.5–5.2)
ALP SERPL-CCNC: 95 U/L (ref 40–129)
ALT SERPL-CCNC: 8 U/L (ref 0–40)
ANION GAP SERPL CALCULATED.3IONS-SCNC: 11 MMOL/L (ref 7–16)
AST SERPL-CCNC: 21 U/L (ref 0–39)
BASOPHILS # BLD: 0.12 K/UL (ref 0–0.2)
BASOPHILS NFR BLD: 1 % (ref 0–2)
BILIRUB SERPL-MCNC: 0.7 MG/DL (ref 0–1.2)
BUN SERPL-MCNC: 9 MG/DL (ref 6–23)
CALCIUM SERPL-MCNC: 8.6 MG/DL (ref 8.6–10.2)
CHLORIDE SERPL-SCNC: 107 MMOL/L (ref 98–107)
CO2 SERPL-SCNC: 20 MMOL/L (ref 22–29)
CREAT SERPL-MCNC: 1 MG/DL (ref 0.7–1.2)
EOSINOPHIL # BLD: 0.69 K/UL (ref 0.05–0.5)
EOSINOPHILS RELATIVE PERCENT: 8 % (ref 0–6)
ERYTHROCYTE [DISTWIDTH] IN BLOOD BY AUTOMATED COUNT: 16.9 % (ref 11.5–15)
GFR SERPL CREATININE-BSD FRML MDRD: 73 ML/MIN/1.73M2
GLUCOSE SERPL-MCNC: 101 MG/DL (ref 74–99)
HCT VFR BLD AUTO: 29.3 % (ref 37–54)
HCT VFR BLD AUTO: 31.5 % (ref 37–54)
HGB BLD-MCNC: 10.1 G/DL (ref 12.5–16.5)
HGB BLD-MCNC: 9.1 G/DL (ref 12.5–16.5)
IMM GRANULOCYTES # BLD AUTO: 0.07 K/UL (ref 0–0.58)
IMM GRANULOCYTES NFR BLD: 1 % (ref 0–5)
LYMPHOCYTES NFR BLD: 1.66 K/UL (ref 1.5–4)
LYMPHOCYTES RELATIVE PERCENT: 18 % (ref 20–42)
MCH RBC QN AUTO: 32 PG (ref 26–35)
MCHC RBC AUTO-ENTMCNC: 32.1 G/DL (ref 32–34.5)
MCV RBC AUTO: 99.7 FL (ref 80–99.9)
MONOCYTES NFR BLD: 0.63 K/UL (ref 0.1–0.95)
MONOCYTES NFR BLD: 7 % (ref 2–12)
NEUTROPHILS NFR BLD: 65 % (ref 43–80)
NEUTS SEG NFR BLD: 5.85 K/UL (ref 1.8–7.3)
PLATELET # BLD AUTO: 290 K/UL (ref 130–450)
PMV BLD AUTO: 9.6 FL (ref 7–12)
POTASSIUM SERPL-SCNC: 4 MMOL/L (ref 3.5–5)
PROT SERPL-MCNC: 7.7 G/DL (ref 6.4–8.3)
RBC # BLD AUTO: 3.16 M/UL (ref 3.8–5.8)
SODIUM SERPL-SCNC: 138 MMOL/L (ref 132–146)
WBC OTHER # BLD: 9 K/UL (ref 4.5–11.5)

## 2024-04-03 PROCEDURE — 6360000002 HC RX W HCPCS

## 2024-04-03 PROCEDURE — 2580000003 HC RX 258: Performed by: NURSE PRACTITIONER

## 2024-04-03 PROCEDURE — 97535 SELF CARE MNGMENT TRAINING: CPT

## 2024-04-03 PROCEDURE — C9113 INJ PANTOPRAZOLE SODIUM, VIA: HCPCS

## 2024-04-03 PROCEDURE — 2580000003 HC RX 258

## 2024-04-03 PROCEDURE — 6370000000 HC RX 637 (ALT 250 FOR IP)

## 2024-04-03 PROCEDURE — A4216 STERILE WATER/SALINE, 10 ML: HCPCS

## 2024-04-03 PROCEDURE — 6360000002 HC RX W HCPCS: Performed by: NURSE PRACTITIONER

## 2024-04-03 PROCEDURE — 97530 THERAPEUTIC ACTIVITIES: CPT

## 2024-04-03 PROCEDURE — 97165 OT EVAL LOW COMPLEX 30 MIN: CPT

## 2024-04-03 PROCEDURE — 85014 HEMATOCRIT: CPT

## 2024-04-03 PROCEDURE — 94640 AIRWAY INHALATION TREATMENT: CPT

## 2024-04-03 PROCEDURE — 80053 COMPREHEN METABOLIC PANEL: CPT

## 2024-04-03 PROCEDURE — 2700000000 HC OXYGEN THERAPY PER DAY

## 2024-04-03 PROCEDURE — 85025 COMPLETE CBC W/AUTO DIFF WBC: CPT

## 2024-04-03 PROCEDURE — 2060000000 HC ICU INTERMEDIATE R&B

## 2024-04-03 PROCEDURE — 97530 THERAPEUTIC ACTIVITIES: CPT | Performed by: PHYSICAL THERAPIST

## 2024-04-03 PROCEDURE — 85018 HEMOGLOBIN: CPT

## 2024-04-03 PROCEDURE — 36415 COLL VENOUS BLD VENIPUNCTURE: CPT

## 2024-04-03 RX ADMIN — METRONIDAZOLE 500 MG: 500 INJECTION, SOLUTION INTRAVENOUS at 23:06

## 2024-04-03 RX ADMIN — ARFORMOTEROL TARTRATE 15 MCG: 15 SOLUTION RESPIRATORY (INHALATION) at 18:41

## 2024-04-03 RX ADMIN — PANTOPRAZOLE SODIUM 40 MG: 40 INJECTION, POWDER, FOR SOLUTION INTRAVENOUS at 20:44

## 2024-04-03 RX ADMIN — BUDESONIDE INHALATION 500 MCG: 0.5 SUSPENSION RESPIRATORY (INHALATION) at 07:00

## 2024-04-03 RX ADMIN — ATORVASTATIN CALCIUM 20 MG: 20 TABLET, FILM COATED ORAL at 20:44

## 2024-04-03 RX ADMIN — MIDODRINE HYDROCHLORIDE 15 MG: 5 TABLET ORAL at 18:31

## 2024-04-03 RX ADMIN — Medication 10 ML: at 08:51

## 2024-04-03 RX ADMIN — MIDODRINE HYDROCHLORIDE 15 MG: 5 TABLET ORAL at 08:51

## 2024-04-03 RX ADMIN — WATER 1000 MG: 1 INJECTION INTRAMUSCULAR; INTRAVENOUS; SUBCUTANEOUS at 05:57

## 2024-04-03 RX ADMIN — METRONIDAZOLE 500 MG: 500 INJECTION, SOLUTION INTRAVENOUS at 14:30

## 2024-04-03 RX ADMIN — METOPROLOL SUCCINATE 12.5 MG: 25 TABLET, FILM COATED, EXTENDED RELEASE ORAL at 08:51

## 2024-04-03 RX ADMIN — METRONIDAZOLE 500 MG: 500 INJECTION, SOLUTION INTRAVENOUS at 05:57

## 2024-04-03 RX ADMIN — PANTOPRAZOLE SODIUM 40 MG: 40 INJECTION, POWDER, FOR SOLUTION INTRAVENOUS at 08:51

## 2024-04-03 RX ADMIN — MIDODRINE HYDROCHLORIDE 15 MG: 5 TABLET ORAL at 12:05

## 2024-04-03 RX ADMIN — BUDESONIDE INHALATION 500 MCG: 0.5 SUSPENSION RESPIRATORY (INHALATION) at 18:41

## 2024-04-03 RX ADMIN — ARFORMOTEROL TARTRATE 15 MCG: 15 SOLUTION RESPIRATORY (INHALATION) at 07:00

## 2024-04-03 NOTE — PROGRESS NOTES
Status Other (Comment) 04/02/24 0800   Wound Cleansed Cleansed with saline 02/22/24 1200   Dressing/Treatment Open to air 03/05/24 1200   Wound Assessment Pink/red 04/02/24 0030   Drainage Amount None (dry) 04/02/24 0800   Odor None 04/02/24 0800   Number of days: 43       Assessment:  Acute on chronic anemia secondary to GI bleeding with proctitis, complicated by chronic anticoagulation with Eliquis  Coronary artery disease with mildly elevated troponin, significantly decreased from most recent assessment  Atrial fibrillation, paroxysmal, on chronic Eliquis  Recently diagnosed small insignificant pulmonary embolism   Recent left iliopsoas hematoma  Chronic kidney disease stage IIIa  History of peripheral vascular disease with status post endovascular repair of abdominal aortic aneurysm.  Nonoxygen dependent COPD with chronic respiratory failure  Severe protein malnourishment    Plan:   Supplemental oxygen  Continue Brovana and Pulmicort aerosol treatments  Antibiotic therapy Rocephin and Flagyl  Colonoscopy on March 2, 2024-Actinic rectal ulcers and diverticulosis coli  Hemoglobin hematocrit remaining stable  Check bladder scan due to distended bladder on CAT scan  Appreciate numerous subspecialist input    More than 50% of my  time was spent at the bedside counseling/coordinating care with the patient and/or family with face to face contact.  This time was spent reviewing notes and laboratory data as well as instructing and counseling the patient. Time I spent with the family or surrogate(s) is included only if the patient was incapable of providing the necessary information or participating in medical decisions. I also discussed the differential diagnosis and all of the proposed management plans with the patient and individuals accompanying the patient.    Edward requires this high level of physician care and nursing on the IMC/Telemetry unit due the complexity of decision management and chance of rapid decline  or death.  Continued cardiac monitoring and higher level of nursing are required. I am readily available for any further decision-making and intervention.     The patient was seen, examined and then discussed with Dr. Dolan.      NURY Quan - CNP  4/3/2024  12:36 PM        I saw and evaluated the patient. I agree with the findings and the plan of care as documented in Tim ARRINGTON-CNP note.    Macario Dolan DO, FACOI  1:21 PM  4/3/2024

## 2024-04-03 NOTE — PROGRESS NOTES
Diagnosis    Hyperlipidemia with target LDL less than 100    BPH (benign prostatic hyperplasia)    ECG abnormal    Abnormal nuclear stress test    AS (aortic stenosis)    SOB (shortness of breath)    RBBB (right bundle branch block)    Anxiety    CAD (coronary artery disease)    Pulmonary emphysema (HCC)    Permanent atrial fibrillation (HCC)    AV block, Mobitz II    AV block    H/O bicuspid aortic valve    AI (aortic insufficiency)    S/P AVR (aortic valve replacement)    Anticoagulated on Coumadin    AAA (abdominal aortic aneurysm) without rupture (HCC)    Elevated liver enzymes    History of tobacco use    S/P CABG x 3    H/O aortic valve replacement with porcine valve    Aortic valve disease    Pure hypercholesterolemia    Normally functioning cardiac pacemaker present    History of smoking 30 or more pack years    Hx of CABG    PAF (paroxysmal atrial fibrillation) (HCC)    Anticoagulated by anticoagulation treatment    S/P AAA repair using bifurcation graft    Bilateral leg weakness    Chronic systolic congestive heart failure (HCC)    PVD (peripheral vascular disease) (HCC)    Stage 3a chronic kidney disease (HCC)    Pulmonary fibrosis (HCC)    Dyslipidemia    Essential hypertension    Primary insomnia    Cough in adult    OA (ocular albinism) (HCC)    Acute bacterial sinusitis    Tobacco abuse    Fatigue    History of prostate cancer    Septic shock (HCC)    Pulmonary embolism (HCC)    Acute on chronic combined systolic and diastolic congestive heart failure (HCC)    Ischemic cardiomyopathy    Palliative care encounter    Acute respiratory failure with hypoxia (HCC)    NSTEMI (non-ST elevated myocardial infarction) (HCC)    GI bleed     1.  GI bleed/anemia  -Colonoscopy 4/2/2024 revealing diverticular disease and postradiation ulcerations in the rectum  -Okay to restart oral anticoagulation from GI POV, however recurrent rectal bleeding can be expected, however with history of atrial fibrillation as well

## 2024-04-03 NOTE — PROGRESS NOTES
Physical Therapy  Physical Therapy Treatment Note/Plan of Care    Room #:  0637/0637-01  Patient Name: Kike Holloway  YOB: 1945  MRN: [id number]    Date of Service: 4/3/2024     Tentative placement recommendation: Subacute Rehab  Equipment recommendation: To be determined      Evaluating Physical Therapist: Matthew James PT, DPT #[id number]      Specific Provider Orders/Date/Referring Provider :     04/01/24 0645    PT eval and treat  Start:  04/01/24 0645,   End:  04/01/24 0645,   ONE TIME,   Standing Count:  1 Occurrences,   R       Jose Suresh, APRN - CNP Acknowledge New    Admitting Diagnosis:   GI bleed [K92.2]  Gastrointestinal hemorrhage, unspecified gastrointestinal hemorrhage type [K92.2]      Surgery: none      Patient Active Problem List   Diagnosis    Hyperlipidemia with target LDL less than 100    BPH (benign prostatic hyperplasia)    ECG abnormal    Abnormal nuclear stress test    AS (aortic stenosis)    SOB (shortness of breath)    RBBB (right bundle branch block)    Anxiety    CAD (coronary artery disease)    Pulmonary emphysema (HCC)    Permanent atrial fibrillation (HCC)    AV block, Mobitz II    AV block    H/O bicuspid aortic valve    AI (aortic insufficiency)    S/P AVR (aortic valve replacement)    Anticoagulated on Coumadin    AAA (abdominal aortic aneurysm) without rupture (HCC)    Elevated liver enzymes    History of tobacco use    S/P CABG x 3    H/O aortic valve replacement with porcine valve    Aortic valve disease    Pure hypercholesterolemia    Normally functioning cardiac pacemaker present    History of smoking 30 or more pack years    Hx of CABG    PAF (paroxysmal atrial fibrillation) (HCC)    Anticoagulated by anticoagulation treatment    S/P AAA repair using bifurcation graft    Bilateral leg weakness    Chronic systolic congestive heart failure (HCC)    PVD (peripheral vascular disease) (HCC)    Stage 3a chronic kidney disease (HCC)    Pulmonary fibrosis (HCC)     safety , positioning for skin integrity and comfort, and proper use/technique of incentive spirometer     Patient response to education:   Pt verbalized understanding Pt demonstrated skill Pt requires further education in this area   Yes Partial Yes      Treatment:  Patient practiced and was instructed/facilitated in the following treatment: Patient Sat edge of bed 10 minutes with Supervision  to increase dynamic sitting balance and activity tolerance. Pt performed bed mobility, transfers, ambulation in room, instruction for PLB.      Therapeutic Exercises:  not performed      At end of session, patient in chair with spouse present call light and phone within reach,  all lines and tubes intact, nursing notified.      Patient would benefit from continued skilled Physical Therapy to improve functional independence and quality of life.         Patient's/ family goals   rehab    Time in    1610  Time out  1650    Total Treatment Time  40 minutes      CPT codes:  Therapeutic activities (75988)   40 minutes  3 unit(s)    Matthew James, PT

## 2024-04-03 NOTE — PROGRESS NOTES
Entered the room to turn off alaris pump alarm. Educated patient and asked if he could straighten his arm. He said he will not and wanted to know why he was being bothered. Advised him why the pump was beeping, he refused to straighten his arm. Instead said he was ready to \"throw the machine out the window and would just bend his arm when I left\" saying this was the worst customer service ever. I asked if he would like me to pause his iv until he woke up. I spoke to his wife who was in the room and turned off the pump and flushed the iv. Also unable to perform ordered bladder scan at this time but did let wife and patient know we can do it when he is ready

## 2024-04-03 NOTE — PLAN OF CARE
Problem: Discharge Planning  Goal: Discharge to home or other facility with appropriate resources  Outcome: Progressing     Problem: Safety - Adult  Goal: Free from fall injury  4/3/2024 1210 by Natalia Martinez RN  Outcome: Progressing     Problem: Skin/Tissue Integrity  Goal: Absence of new skin breakdown  Description: 1.  Monitor for areas of redness and/or skin breakdown  2.  Assess vascular access sites hourly  3.  Every 4-6 hours minimum:  Change oxygen saturation probe site  4.  Every 4-6 hours:  If on nasal continuous positive airway pressure, respiratory therapy assess nares and determine need for appliance change or resting period.  Outcome: Progressing

## 2024-04-03 NOTE — PROGRESS NOTES
seated rest break in between each walk.   Modified Iron    Balance Sitting:     Static: good     Dynamic: fair   Standing: fair  with wheeled walker  Sitting:     Static: good     Dynamic: good   Standing: good  with wheeled walker   Activity Tolerance fair - 5L NC O2  good    Visual/  Perceptual Glasses: Readers                Hand Dominance: Right     AROM (PROM) Strength Additional Info:  Goal:   RUE  WFL 4-/5 good  and wfl FMC/dexterity noted during ADL tasks   Improve overall RUE strength  for participation in functional tasks   LUE WFL 4-/5 good  and wfl FMC/dexterity noted during ADL tasks   Improve overall LUE strength  for participation in functional tasks     Activity SpO2%: 92%    Hearing: WFL   Sensation:  No c/o numbness or tingling  Tone: WFL   Edema: None    Comments: Nursing approved therapy session. Upon arrival the patient was supine with HOB elevated.  At end of session, patient was seated in bedside chair with call light and phone within reach, all lines and tubes intact.  Overall patient demonstrated decreased independence and safety during completion of ADL/functional transfer/mobility tasks.  Pt would benefit from continued skilled OT to increase safety and independence with completion of ADL/IADL tasks for functional independence and quality of life.    Treatment: OT treatment provided this date includes:   Instruction/training on safety and adapted techniques for completion of ADLs   Instruction/training on safe functional mobility/transfer techniques   Instruction/training on energy conservation/work simplification for completion of ADLs   Instruction/training on proper positioning/alignment to prevent contractures    Neuromuscular Reeducation to facilitate balance/righting reactions for increased function with ADLs tasks    Treatment:      Functional transfers: Facilitated transfers to/from chair with cues for body alignment, safety and hand placement.  ADL completion:

## 2024-04-04 LAB
ALBUMIN SERPL-MCNC: 2.8 G/DL (ref 3.5–5.2)
ALP SERPL-CCNC: 85 U/L (ref 40–129)
ALT SERPL-CCNC: 7 U/L (ref 0–40)
ANION GAP SERPL CALCULATED.3IONS-SCNC: 13 MMOL/L (ref 7–16)
AST SERPL-CCNC: 19 U/L (ref 0–39)
BASOPHILS # BLD: 0.09 K/UL (ref 0–0.2)
BASOPHILS NFR BLD: 1 % (ref 0–2)
BILIRUB SERPL-MCNC: 0.8 MG/DL (ref 0–1.2)
BUN SERPL-MCNC: 8 MG/DL (ref 6–23)
CALCIUM SERPL-MCNC: 8.5 MG/DL (ref 8.6–10.2)
CHLORIDE SERPL-SCNC: 105 MMOL/L (ref 98–107)
CO2 SERPL-SCNC: 19 MMOL/L (ref 22–29)
CREAT SERPL-MCNC: 1.1 MG/DL (ref 0.7–1.2)
EOSINOPHIL # BLD: 0.49 K/UL (ref 0.05–0.5)
EOSINOPHILS RELATIVE PERCENT: 7 % (ref 0–6)
ERYTHROCYTE [DISTWIDTH] IN BLOOD BY AUTOMATED COUNT: 16.9 % (ref 11.5–15)
GFR SERPL CREATININE-BSD FRML MDRD: 67 ML/MIN/1.73M2
GLUCOSE SERPL-MCNC: 89 MG/DL (ref 74–99)
HCT VFR BLD AUTO: 27.3 % (ref 37–54)
HGB BLD-MCNC: 8.8 G/DL (ref 12.5–16.5)
IMM GRANULOCYTES # BLD AUTO: 0.09 K/UL (ref 0–0.58)
IMM GRANULOCYTES NFR BLD: 1 % (ref 0–5)
LYMPHOCYTES NFR BLD: 1.71 K/UL (ref 1.5–4)
LYMPHOCYTES RELATIVE PERCENT: 25 % (ref 20–42)
MCH RBC QN AUTO: 31.9 PG (ref 26–35)
MCHC RBC AUTO-ENTMCNC: 32.2 G/DL (ref 32–34.5)
MCV RBC AUTO: 98.9 FL (ref 80–99.9)
MONOCYTES NFR BLD: 0.56 K/UL (ref 0.1–0.95)
MONOCYTES NFR BLD: 8 % (ref 2–12)
NEUTROPHILS NFR BLD: 57 % (ref 43–80)
NEUTS SEG NFR BLD: 3.9 K/UL (ref 1.8–7.3)
PLATELET # BLD AUTO: 230 K/UL (ref 130–450)
PMV BLD AUTO: 9.9 FL (ref 7–12)
POTASSIUM SERPL-SCNC: 3.8 MMOL/L (ref 3.5–5)
PROT SERPL-MCNC: 6.7 G/DL (ref 6.4–8.3)
RBC # BLD AUTO: 2.76 M/UL (ref 3.8–5.8)
SODIUM SERPL-SCNC: 137 MMOL/L (ref 132–146)
WBC OTHER # BLD: 6.8 K/UL (ref 4.5–11.5)

## 2024-04-04 PROCEDURE — 6370000000 HC RX 637 (ALT 250 FOR IP)

## 2024-04-04 PROCEDURE — 94640 AIRWAY INHALATION TREATMENT: CPT

## 2024-04-04 PROCEDURE — 6360000002 HC RX W HCPCS

## 2024-04-04 PROCEDURE — 97110 THERAPEUTIC EXERCISES: CPT | Performed by: PHYSICAL THERAPIST

## 2024-04-04 PROCEDURE — 2580000003 HC RX 258: Performed by: NURSE PRACTITIONER

## 2024-04-04 PROCEDURE — 6370000000 HC RX 637 (ALT 250 FOR IP): Performed by: NURSE PRACTITIONER

## 2024-04-04 PROCEDURE — A4216 STERILE WATER/SALINE, 10 ML: HCPCS

## 2024-04-04 PROCEDURE — 2060000000 HC ICU INTERMEDIATE R&B

## 2024-04-04 PROCEDURE — 97530 THERAPEUTIC ACTIVITIES: CPT | Performed by: PHYSICAL THERAPIST

## 2024-04-04 PROCEDURE — 80053 COMPREHEN METABOLIC PANEL: CPT

## 2024-04-04 PROCEDURE — 6360000002 HC RX W HCPCS: Performed by: NURSE PRACTITIONER

## 2024-04-04 PROCEDURE — C9113 INJ PANTOPRAZOLE SODIUM, VIA: HCPCS

## 2024-04-04 PROCEDURE — 2580000003 HC RX 258

## 2024-04-04 PROCEDURE — 85025 COMPLETE CBC W/AUTO DIFF WBC: CPT

## 2024-04-04 PROCEDURE — 36415 COLL VENOUS BLD VENIPUNCTURE: CPT

## 2024-04-04 PROCEDURE — 6370000000 HC RX 637 (ALT 250 FOR IP): Performed by: UROLOGY

## 2024-04-04 RX ORDER — POLYETHYLENE GLYCOL 3350 17 G/17G
17 POWDER, FOR SOLUTION ORAL DAILY
Status: DISCONTINUED | OUTPATIENT
Start: 2024-04-04 | End: 2024-04-05 | Stop reason: HOSPADM

## 2024-04-04 RX ORDER — TAMSULOSIN HYDROCHLORIDE 0.4 MG/1
0.4 CAPSULE ORAL DAILY
Status: DISCONTINUED | OUTPATIENT
Start: 2024-04-04 | End: 2024-04-05 | Stop reason: HOSPADM

## 2024-04-04 RX ORDER — DOCUSATE SODIUM 100 MG/1
100 CAPSULE, LIQUID FILLED ORAL DAILY
Status: DISCONTINUED | OUTPATIENT
Start: 2024-04-04 | End: 2024-04-04

## 2024-04-04 RX ADMIN — Medication 10 ML: at 09:23

## 2024-04-04 RX ADMIN — Medication 10 ML: at 21:28

## 2024-04-04 RX ADMIN — POLYETHYLENE GLYCOL 3350 17 G: 17 POWDER, FOR SOLUTION ORAL at 14:08

## 2024-04-04 RX ADMIN — METOPROLOL SUCCINATE 12.5 MG: 25 TABLET, FILM COATED, EXTENDED RELEASE ORAL at 09:21

## 2024-04-04 RX ADMIN — MIDODRINE HYDROCHLORIDE 15 MG: 5 TABLET ORAL at 18:00

## 2024-04-04 RX ADMIN — ARFORMOTEROL TARTRATE 15 MCG: 15 SOLUTION RESPIRATORY (INHALATION) at 18:50

## 2024-04-04 RX ADMIN — BUDESONIDE INHALATION 500 MCG: 0.5 SUSPENSION RESPIRATORY (INHALATION) at 18:50

## 2024-04-04 RX ADMIN — PANTOPRAZOLE SODIUM 40 MG: 40 INJECTION, POWDER, FOR SOLUTION INTRAVENOUS at 21:27

## 2024-04-04 RX ADMIN — MIDODRINE HYDROCHLORIDE 15 MG: 5 TABLET ORAL at 14:42

## 2024-04-04 RX ADMIN — PANTOPRAZOLE SODIUM 40 MG: 40 INJECTION, POWDER, FOR SOLUTION INTRAVENOUS at 09:18

## 2024-04-04 RX ADMIN — BUDESONIDE INHALATION 500 MCG: 0.5 SUSPENSION RESPIRATORY (INHALATION) at 05:22

## 2024-04-04 RX ADMIN — WATER 1000 MG: 1 INJECTION INTRAMUSCULAR; INTRAVENOUS; SUBCUTANEOUS at 06:29

## 2024-04-04 RX ADMIN — Medication 10 ML: at 00:12

## 2024-04-04 RX ADMIN — ARFORMOTEROL TARTRATE 15 MCG: 15 SOLUTION RESPIRATORY (INHALATION) at 05:22

## 2024-04-04 RX ADMIN — METRONIDAZOLE 500 MG: 500 INJECTION, SOLUTION INTRAVENOUS at 14:57

## 2024-04-04 RX ADMIN — MIDODRINE HYDROCHLORIDE 15 MG: 5 TABLET ORAL at 09:17

## 2024-04-04 RX ADMIN — METRONIDAZOLE 500 MG: 500 INJECTION, SOLUTION INTRAVENOUS at 06:34

## 2024-04-04 RX ADMIN — METRONIDAZOLE 500 MG: 500 INJECTION, SOLUTION INTRAVENOUS at 21:36

## 2024-04-04 RX ADMIN — TAMSULOSIN HYDROCHLORIDE 0.4 MG: 0.4 CAPSULE ORAL at 09:55

## 2024-04-04 RX ADMIN — APIXABAN 5 MG: 5 TABLET, FILM COATED ORAL at 21:27

## 2024-04-04 RX ADMIN — ATORVASTATIN CALCIUM 20 MG: 20 TABLET, FILM COATED ORAL at 21:38

## 2024-04-04 NOTE — CONSULTS
[facility]  [address] [address]  [phone]  FAX [phone]        REASON FOR CONSULTATION:      Urinary retention, difficult Courtney catheter insertion, history of prostate cancer    HISTORY OF PRESENT ILLNESS:      The patient is a 78 y.o. male patient who presents with GI bleed.  Found to have an ulcer.  History of prostate cancer status post radiation therapy in 2015.  Has been following with his primary care physician he states his PSA has been undetectable since then.  Found to have urinary retention with postvoid residual greater than 700 cc and numerous attempts at Courtney catheterization including in the emergency room as well as on the floor were unsuccessful.      Past Medical History:   Diagnosis Date    Aortic stenosis, mild     Aortic valve sclerosis     moderate    Atrial fibrillation ([facility]) 2014    CAD (coronary artery disease)     CHF (congestive heart failure) ([facility])     COPD (chronic obstructive pulmonary disease) ([facility])     Encounter for diabetic foot exam ([facility]) 01/08/2024    Gallbladder attack     History of blood transfusion     History of tobacco use 09/17/2015    Hx of blood clots     Hyperlipidemia     Hypertension     Mild mitral regurgitation     Prostate cancer ([facility])     44 treatments of radiation    PVD (peripheral vascular disease) with claudication ([facility]) 09/17/2015    RBBB     Tobacco abuse          Past Surgical History:   Procedure Laterality Date    APPENDECTOMY      CARDIAC CATHETERIZATION  08/08/2014    DR Coates-pt. states had triple bypass \"5 years ago\"    COLONOSCOPY      COLONOSCOPY N/A 4/2/2024    COLONOSCOPY performed by Ryley Moeller MD at [facility] ENDOSCOPY    CORONARY ARTERY BYPASS GRAFT  08/11/2014    CABGx3 LIMA-LAD, SVG-D1, SVG-OM1, AVR Dr. Tinsley    ENDOSCOPY, COLON, DIAGNOSTIC      PACEMAKER INSERTION Left 08/22/2022    [employer] Dual PPM-GR    PACEMAKER PLACEMENT Left 08/12/2014    Dr. Cast-Dual [employer]     Duration of need: 5 years    Allergies:    Pneumovax [pneumococcal polysaccharide vaccine]    Social History:    reports that he quit smoking about 6 years ago. His smoking use included cigarettes. He started smoking about 59 years ago. He has a 25.0 pack-year smoking history. He has never used smokeless tobacco. He reports current alcohol use. He reports that he does not use drugs.  Was a friend of Jose Gillespie in [place]    Family History:   Non-contributory to this Urological problem  family history includes Heart Attack in his brother; Stroke in his father.    REVIEW OF SYSTEMS:  Generalized weakness  Respiratory: negative for cough and hemoptysis, shortness of breath  Cardiovascular: negative for chest pain and dyspnea  Gastrointestinal: negative for abdominal pain, diarrhea, nausea and vomiting  Derm: negative for rash and skin lesion(s)  Neurological: negative for seizures and tremors  Endocrine: negative for diabetic symptoms including polydipsia and polyuria  : As above in the HPI, otherwise negative  All other systems negative    PHYSICAL EXAM:    Vitals:  /68   Pulse 70   Temp 97.7 °F (36.5 °C) (Axillary)   Resp 18   Ht 1.651 m (5' 5\")   Wt 61.2 kg (135 lb)   SpO2 94%   BMI 22.47 kg/m²     General:  Awake, alert, oriented X 3.  Well developed, well nourished, well groomed.  No apparent distress.  HEENT:  Normocephalic, atraumatic.  oxygen via nasal cannula  Lungs:  No audible wheezing.  Respirations symmetric and non-labored, oxygen via     LABS:    Lab Results   Component Value Date    WBC 6.8 04/04/2024    HGB 8.8 (L) 04/04/2024    HCT 27.3 (L) 04/04/2024    MCV 98.9 04/04/2024     04/04/2024       Lab Results   Component Value Date    CREATININE 1.1 04/04/2024       Lab Results   Component Value Date    PSA 0.29 01/05/2024    PSA 0.16 02/13/2023    PSA 0.13 04/06/2022       Lab Results   Component Value Date    LABURIN Growth not present 09/17/2019       Lab Results   Component Value

## 2024-04-04 NOTE — CARE COORDINATION
4-4- cm note: pt working with PT, he is requiring 4l nc , pending PRECERT to return to [facility]. Electronically signed by Atiya Case RN on 4/4/2024 at 3:00 PM

## 2024-04-04 NOTE — PROGRESS NOTES
RN bladder scanned patient, despite patient voiding throughout the night he had 778ml in his bladder. RN notified  and attempted to place wing. RN unsuccessful with wing placement, urology consulted. Uro cart on unit for urology to place wing.

## 2024-04-04 NOTE — PROGRESS NOTES
Internal Medicine Progress Note    [facility]    Macario Dolan D.O., MARCOS Sharpe D.O., MARCOS Thomas D.O.    Yamilet Bee, MSN, APRN, NP-C  Jose Suresh, MSN, APRN-CNP  Tim Carl, MSN, APRN, NP-C     Primary Care Physician: Hermelindo Bedolla DO   Admitting Physician:  Macario Dolan DO  Admission date and time: 3/31/2024  8:05 PM    Room:  [address] [place] [address]  Admitting diagnosis: GI bleed [K92.2]  Gastrointestinal hemorrhage, unspecified gastrointestinal hemorrhage type [K92.2]    Patient Name: Kike Holloway  MRN: [id number]    Date of Service: 4/4/2024     Subjective:  Kike is a 78 y.o. male who was seen and examined today,4/4/2024, at the bedside.  Patient resting comfortably in bed with his wife at the bedside.  Remains on 4 L oxygen nasal cannula.  Nursing staff was unable to place Courtney catheter yesterday due to urinary retention.  Urology was consulted and Courtney catheter was placed.  Currently awaiting precertification for placement.    Review of systems:  Constitutional:   Positive for fatigue and malaise , - fever/chills  HEENT:   Denies ear pain, sore throat, sinus or eye problems.  Cardiovascular:   Denies any chest pain, irregular heartbeats, or palpitations.   Respiratory:   - dyspnea at rest, - dyspnea on exertion, - coughing, - sputum, - hemoptysis  Gastrointestinal:   - nausea, -vomiting. + Improving diarrhea, - constipation. + poor appetite and poor intake. - abdominal pain.  Genitourinary:    Courtney catheter due to retention  Extremities:   Denies lower extremity swelling, edema or cyanosis.   Neurology:    Denies any headache or focal neurological deficits, positive for generalized weakness and fatigue without focal component  Psch:   Denies being anxious or depressed.  Musculoskeletal:    Denies  myalgias, joint complaints or back pain.   Integumentary:   Denies any rashes, ulcers, or  Documentation:   Wound 02/19/24 Nose Redness (Active)   Dressing Status Other (Comment) 04/02/24 0800   Wound Cleansed Cleansed with saline 02/22/24 1200   Dressing/Treatment Open to air 03/05/24 1200   Wound Assessment Pink/red 04/02/24 0030   Drainage Amount None (dry) 04/02/24 0800   Odor None 04/02/24 0800   Number of days: 43       Assessment:  Acute on chronic anemia secondary to GI bleeding with proctitis, complicated by chronic anticoagulation with Eliquis  Coronary artery disease with mildly elevated troponin, significantly decreased from most recent assessment  Atrial fibrillation, paroxysmal, on chronic Eliquis  Recently diagnosed small insignificant pulmonary embolism   Recent left iliopsoas hematoma  Chronic kidney disease stage IIIa  History of peripheral vascular disease with status post endovascular repair of abdominal aortic aneurysm.  Nonoxygen dependent COPD with chronic respiratory failure  Severe protein malnourishment    Plan:   Supplemental oxygen  Continue Brovana and Pulmicort aerosol treatments  Antibiotic therapy Rocephin and Flagyl  Colonoscopy on March 2, 2024-[facility] rectal ulcers and diverticulosis coli  Hemoglobin hematocrit remaining stable  Courtney catheter due to retention  Appreciate numerous subspecialist input  Awaiting precertification for skilled nursing placement    More than 50% of my  time was spent at the bedside counseling/coordinating care with the patient and/or family with face to face contact.  This time was spent reviewing notes and laboratory data as well as instructing and counseling the patient. Time I spent with the family or surrogate(s) is included only if the patient was incapable of providing the necessary information or participating in medical decisions. I also discussed the differential diagnosis and all of the proposed management plans with the patient and individuals accompanying the patient.    Edward requires this high level of physician care and nursing

## 2024-04-04 NOTE — PLAN OF CARE
Problem: Discharge Planning  Goal: Discharge to home or other facility with appropriate resources  4/3/2024 1210 by Natalia Martinez RN  Outcome: Progressing     Problem: Safety - Adult  Goal: Free from fall injury  4/3/2024 2300 by Chico Abreu RN  Outcome: Progressing  4/3/2024 1210 by Natalia Martinez RN  Outcome: Progressing     Problem: Chronic Conditions and Co-morbidities  Goal: Patient's chronic conditions and co-morbidity symptoms are monitored and maintained or improved  4/3/2024 1210 by Natalia Martinez RN  Outcome: Progressing     Problem: Skin/Tissue Integrity  Goal: Absence of new skin breakdown  Description: 1.  Monitor for areas of redness and/or skin breakdown  2.  Assess vascular access sites hourly  3.  Every 4-6 hours minimum:  Change oxygen saturation probe site  4.  Every 4-6 hours:  If on nasal continuous positive airway pressure, respiratory therapy assess nares and determine need for appliance change or resting period.  4/3/2024 1210 by Natalia Martinez RN  Outcome: Progressing     Problem: ABCDS Injury Assessment  Goal: Absence of physical injury  4/3/2024 1210 by Natalia Martinez RN  Outcome: Progressing

## 2024-04-04 NOTE — PROGRESS NOTES
Physical Therapy  Physical Therapy Treatment Note/Plan of Care    Room #:  0637/0637-01  Patient Name: Kike Holloway  YOB: 1945  MRN: [id number]    Date of Service: 4/4/2024     Tentative placement recommendation: Subacute Rehab  Equipment recommendation: To be determined      Evaluating Physical Therapist: Matthew James PT, DPT #[id number]      Specific Provider Orders/Date/Referring Provider :     04/01/24 0645    PT eval and treat  Start:  04/01/24 0645,   End:  04/01/24 0645,   ONE TIME,   Standing Count:  1 Occurrences,   R       Jose Suresh, APRN - CNP Acknowledge New    Admitting Diagnosis:   GI bleed [K92.2]  Gastrointestinal hemorrhage, unspecified gastrointestinal hemorrhage type [K92.2]      Surgery: none      Patient Active Problem List   Diagnosis    Hyperlipidemia with target LDL less than 100    BPH (benign prostatic hyperplasia)    ECG abnormal    Abnormal nuclear stress test    AS (aortic stenosis)    SOB (shortness of breath)    RBBB (right bundle branch block)    Anxiety    CAD (coronary artery disease)    Pulmonary emphysema (HCC)    Permanent atrial fibrillation (HCC)    AV block, Mobitz II    AV block    H/O bicuspid aortic valve    AI (aortic insufficiency)    S/P AVR (aortic valve replacement)    Anticoagulated on Coumadin    AAA (abdominal aortic aneurysm) without rupture (HCC)    Elevated liver enzymes    History of tobacco use    S/P CABG x 3    H/O aortic valve replacement with porcine valve    Aortic valve disease    Pure hypercholesterolemia    Normally functioning cardiac pacemaker present    History of smoking 30 or more pack years    Hx of CABG    PAF (paroxysmal atrial fibrillation) (HCC)    Anticoagulated by anticoagulation treatment    S/P AAA repair using bifurcation graft    Bilateral leg weakness    Chronic systolic congestive heart failure (HCC)    PVD (peripheral vascular disease) (HCC)    Stage 3a chronic kidney disease (HCC)    Pulmonary fibrosis (HCC)     to proper height for the patient., safety , O2 line management and safety , positioning for skin integrity and comfort, and proper use/technique of incentive spirometer     Patient response to education:   Pt verbalized understanding Pt demonstrated skill Pt requires further education in this area   Yes Partial Yes      Treatment:  Patient practiced and was instructed/facilitated in the following treatment: Patient Sat edge of bed 10 minutes with Supervision  to increase dynamic sitting balance and activity tolerance. Pt performed bed mobility, transfers, ambulation in room, instruction for PLB, seated exercises.     Therapeutic Exercises:  ankle pumps, heel raises, long arc quad, and seated marching  15 reps    At end of session, patient in chair with spouse present call light and phone within reach,  all lines and tubes intact, nursing notified.      Patient would benefit from continued skilled Physical Therapy to improve functional independence and quality of life.         Patient's/ family goals   rehab    Time in    1102  Time out  1125    Total Treatment Time  23 minutes      CPT codes:  Therapeutic activities (37455)   15 minutes  1 unit(s)  Therapeutic exercises (95043)   8 minutes  1 unit(s)    Matthew James, PT

## 2024-04-05 VITALS
OXYGEN SATURATION: 100 % | BODY MASS INDEX: 22.49 KG/M2 | TEMPERATURE: 97.8 F | HEART RATE: 86 BPM | WEIGHT: 135 LBS | HEIGHT: 65 IN | RESPIRATION RATE: 20 BRPM | DIASTOLIC BLOOD PRESSURE: 65 MMHG | SYSTOLIC BLOOD PRESSURE: 105 MMHG

## 2024-04-05 PROBLEM — E43 SEVERE PROTEIN-CALORIE MALNUTRITION (HCC): Chronic | Status: ACTIVE | Noted: 2024-04-05

## 2024-04-05 LAB
ALBUMIN SERPL-MCNC: 2.8 G/DL (ref 3.5–5.2)
ALP SERPL-CCNC: 89 U/L (ref 40–129)
ALT SERPL-CCNC: 7 U/L (ref 0–40)
ANION GAP SERPL CALCULATED.3IONS-SCNC: 13 MMOL/L (ref 7–16)
AST SERPL-CCNC: 18 U/L (ref 0–39)
BASOPHILS # BLD: 0.07 K/UL (ref 0–0.2)
BASOPHILS NFR BLD: 1 % (ref 0–2)
BILIRUB SERPL-MCNC: 0.6 MG/DL (ref 0–1.2)
BUN SERPL-MCNC: 7 MG/DL (ref 6–23)
CALCIUM SERPL-MCNC: 8.2 MG/DL (ref 8.6–10.2)
CHLORIDE SERPL-SCNC: 106 MMOL/L (ref 98–107)
CO2 SERPL-SCNC: 19 MMOL/L (ref 22–29)
CREAT SERPL-MCNC: 1.1 MG/DL (ref 0.7–1.2)
EOSINOPHIL # BLD: 0.49 K/UL (ref 0.05–0.5)
EOSINOPHILS RELATIVE PERCENT: 6 % (ref 0–6)
ERYTHROCYTE [DISTWIDTH] IN BLOOD BY AUTOMATED COUNT: 16.9 % (ref 11.5–15)
GFR SERPL CREATININE-BSD FRML MDRD: 71 ML/MIN/1.73M2
GLUCOSE SERPL-MCNC: 117 MG/DL (ref 74–99)
HCT VFR BLD AUTO: 28.5 % (ref 37–54)
HGB BLD-MCNC: 9.2 G/DL (ref 12.5–16.5)
IMM GRANULOCYTES # BLD AUTO: 0.06 K/UL (ref 0–0.58)
IMM GRANULOCYTES NFR BLD: 1 % (ref 0–5)
LYMPHOCYTES NFR BLD: 1.71 K/UL (ref 1.5–4)
LYMPHOCYTES RELATIVE PERCENT: 21 % (ref 20–42)
MAGNESIUM SERPL-MCNC: 1.6 MG/DL (ref 1.6–2.6)
MCH RBC QN AUTO: 31.6 PG (ref 26–35)
MCHC RBC AUTO-ENTMCNC: 32.3 G/DL (ref 32–34.5)
MCV RBC AUTO: 97.9 FL (ref 80–99.9)
MONOCYTES NFR BLD: 0.61 K/UL (ref 0.1–0.95)
MONOCYTES NFR BLD: 7 % (ref 2–12)
NEUTROPHILS NFR BLD: 65 % (ref 43–80)
NEUTS SEG NFR BLD: 5.32 K/UL (ref 1.8–7.3)
PLATELET # BLD AUTO: 215 K/UL (ref 130–450)
PMV BLD AUTO: 9.5 FL (ref 7–12)
POTASSIUM SERPL-SCNC: 3.2 MMOL/L (ref 3.5–5)
PROT SERPL-MCNC: 6.9 G/DL (ref 6.4–8.3)
RBC # BLD AUTO: 2.91 M/UL (ref 3.8–5.8)
SODIUM SERPL-SCNC: 138 MMOL/L (ref 132–146)
WBC OTHER # BLD: 8.3 K/UL (ref 4.5–11.5)

## 2024-04-05 PROCEDURE — 6360000002 HC RX W HCPCS

## 2024-04-05 PROCEDURE — 94640 AIRWAY INHALATION TREATMENT: CPT

## 2024-04-05 PROCEDURE — 97110 THERAPEUTIC EXERCISES: CPT

## 2024-04-05 PROCEDURE — 6370000000 HC RX 637 (ALT 250 FOR IP)

## 2024-04-05 PROCEDURE — 83735 ASSAY OF MAGNESIUM: CPT

## 2024-04-05 PROCEDURE — 6370000000 HC RX 637 (ALT 250 FOR IP): Performed by: UROLOGY

## 2024-04-05 PROCEDURE — 2700000000 HC OXYGEN THERAPY PER DAY

## 2024-04-05 PROCEDURE — 6370000000 HC RX 637 (ALT 250 FOR IP): Performed by: NURSE PRACTITIONER

## 2024-04-05 PROCEDURE — 80053 COMPREHEN METABOLIC PANEL: CPT

## 2024-04-05 PROCEDURE — 2580000003 HC RX 258

## 2024-04-05 PROCEDURE — A4216 STERILE WATER/SALINE, 10 ML: HCPCS

## 2024-04-05 PROCEDURE — C9113 INJ PANTOPRAZOLE SODIUM, VIA: HCPCS

## 2024-04-05 PROCEDURE — 85025 COMPLETE CBC W/AUTO DIFF WBC: CPT

## 2024-04-05 PROCEDURE — 36415 COLL VENOUS BLD VENIPUNCTURE: CPT

## 2024-04-05 PROCEDURE — 97530 THERAPEUTIC ACTIVITIES: CPT

## 2024-04-05 PROCEDURE — 6370000000 HC RX 637 (ALT 250 FOR IP): Performed by: INTERNAL MEDICINE

## 2024-04-05 PROCEDURE — 6360000002 HC RX W HCPCS: Performed by: NURSE PRACTITIONER

## 2024-04-05 PROCEDURE — 2580000003 HC RX 258: Performed by: NURSE PRACTITIONER

## 2024-04-05 RX ORDER — TAMSULOSIN HYDROCHLORIDE 0.4 MG/1
0.4 CAPSULE ORAL DAILY
Qty: 30 CAPSULE | Refills: 0 | DISCHARGE
Start: 2024-04-06

## 2024-04-05 RX ADMIN — BUDESONIDE INHALATION 500 MCG: 0.5 SUSPENSION RESPIRATORY (INHALATION) at 06:29

## 2024-04-05 RX ADMIN — POTASSIUM BICARBONATE 40 MEQ: 782 TABLET, EFFERVESCENT ORAL at 11:45

## 2024-04-05 RX ADMIN — DOCUSATE SODIUM 100 MG: 50 LIQUID ORAL at 09:00

## 2024-04-05 RX ADMIN — METOPROLOL SUCCINATE 12.5 MG: 25 TABLET, FILM COATED, EXTENDED RELEASE ORAL at 08:57

## 2024-04-05 RX ADMIN — ARFORMOTEROL TARTRATE 15 MCG: 15 SOLUTION RESPIRATORY (INHALATION) at 06:29

## 2024-04-05 RX ADMIN — Medication 10 ML: at 08:58

## 2024-04-05 RX ADMIN — METRONIDAZOLE 500 MG: 500 INJECTION, SOLUTION INTRAVENOUS at 06:15

## 2024-04-05 RX ADMIN — MIDODRINE HYDROCHLORIDE 15 MG: 5 TABLET ORAL at 11:47

## 2024-04-05 RX ADMIN — APIXABAN 5 MG: 5 TABLET, FILM COATED ORAL at 08:57

## 2024-04-05 RX ADMIN — POLYETHYLENE GLYCOL 3350 17 G: 17 POWDER, FOR SOLUTION ORAL at 08:59

## 2024-04-05 RX ADMIN — WATER 1000 MG: 1 INJECTION INTRAMUSCULAR; INTRAVENOUS; SUBCUTANEOUS at 06:10

## 2024-04-05 RX ADMIN — MIDODRINE HYDROCHLORIDE 15 MG: 5 TABLET ORAL at 08:56

## 2024-04-05 RX ADMIN — TAMSULOSIN HYDROCHLORIDE 0.4 MG: 0.4 CAPSULE ORAL at 08:57

## 2024-04-05 RX ADMIN — FUROSEMIDE 40 MG: 40 TABLET ORAL at 08:57

## 2024-04-05 RX ADMIN — PANTOPRAZOLE SODIUM 40 MG: 40 INJECTION, POWDER, FOR SOLUTION INTRAVENOUS at 08:55

## 2024-04-05 NOTE — PROGRESS NOTES
4/5/2024 10:41 AM  Service: Urology  Group: [facility] ([facility])    Kike Holloway  [id number]    Subjective: He is afebrile  Tolerating the Courtney well  Plan is for him to go back to rehab today    Review of Systems  Respiratory: negative  Cardiovascular: negative  Gastrointestinal: negative  Hematologic/lymphatic: negative  Musculoskeletal:negative  Neurological: negative  Endocrine: negative    Scheduled Meds:   tamsulosin  0.4 mg Oral Daily    polyethylene glycol  17 g Oral Daily    docusate  100 mg Oral Daily    atorvastatin  20 mg Oral Nightly    metoprolol succinate  12.5 mg Oral Daily    midodrine  15 mg Oral TID WC    arformoterol tartrate  15 mcg Nebulization BID RT    budesonide  500 mcg Nebulization BID RT    pantoprazole (PROTONIX) 40 mg in sodium chloride (PF) 0.9 % 10 mL injection  40 mg IntraVENous Q12H    apixaban  5 mg Oral BID    [Held by provider] aspirin  81 mg Oral Daily    furosemide  40 mg Oral Daily    sodium chloride flush  5-40 mL IntraVENous 2 times per day    cefTRIAXone (ROCEPHIN) IV  1,000 mg IntraVENous Q24H    metroNIDAZOLE  500 mg IntraVENous Q8H       Objective:  Vitals:    04/05/24 0857   BP: 114/85   Pulse: 84   Resp:    Temp:    SpO2:          Allergies: Pneumovax [pneumococcal polysaccharide vaccine]    General Appearance: alert and oriented to person, place and time and in no acute distress he is thin and frail looking  Skin: no rash or erythema  Head: normocephalic and atraumatic  Pulmonary/Chest: clear to auscultation bilaterally- no wheezes, rales or rhonchi, normal air movement, no respiratory distress and no chest wall tenderness  Abdomen: soft, non-tender, non-distended, normal bowel sounds, no masses or organomegaly and no inguinal adenopathy  Genitalia: No penile or scrotal swelling or masses. Extremities: no cyanosis, clubbing or edema and Stoney's sign negative bilaterally      Labs:     Recent Labs     04/05/24  0747      K 3.2*      CO2 19*   BUN 7

## 2024-04-05 NOTE — CARE COORDINATION
4-5-Cm note: Precert obtained to return to [facility] is scheduled to transport pt via wheelchair at 2:30 PM (dr wiggins). Pt's wife in room and aware of transport time. Facility and RN aware as well. Electronically signed by Atiya Case RN on 4/5/2024 at 9:38 AM

## 2024-04-05 NOTE — PROGRESS NOTES
This  attempted to visit with the patient for a length of stay visit and found the patient sleeping. This  left literature of the attempted visit. Spiritual [facility] will follow up as requested.    geri Rodriguez [place], [place] [address]

## 2024-04-05 NOTE — PLAN OF CARE
Problem: Discharge Planning  Goal: Discharge to home or other facility with appropriate resources  Outcome: Progressing     Problem: Safety - Adult  Goal: Free from fall injury  Outcome: Progressing     Problem: Chronic Conditions and Co-morbidities  Goal: Patient's chronic conditions and co-morbidity symptoms are monitored and maintained or improved  Outcome: Progressing     Problem: Skin/Tissue Integrity  Goal: Absence of new skin breakdown  Description: 1.  Monitor for areas of redness and/or skin breakdown  2.  Assess vascular access sites hourly  3.  Every 4-6 hours minimum:  Change oxygen saturation probe site  4.  Every 4-6 hours:  If on nasal continuous positive airway pressure, respiratory therapy assess nares and determine need for appliance change or resting period.  Outcome: Progressing     Problem: ABCDS Injury Assessment  Goal: Absence of physical injury  Outcome: Progressing

## 2024-04-05 NOTE — PROGRESS NOTES
Physical Therapy  Physical Therapy Treatment Note/Plan of Care    Room #:  0637/0637-01  Patient Name: Kike Holloway  YOB: 1945  MRN: [id number]    Date of Service: 4/5/2024     Tentative placement recommendation: Subacute Rehab  Equipment recommendation: To be determined      Evaluating Physical Therapist: Matthew James PT, DPT #[id number]      Specific Provider Orders/Date/Referring Provider :     04/01/24 0645    PT eval and treat  Start:  04/01/24 0645,   End:  04/01/24 0645,   ONE TIME,   Standing Count:  1 Occurrences,   R       Jose Suresh, APRN - CNP Acknowledge New    Admitting Diagnosis:   GI bleed [K92.2]  Gastrointestinal hemorrhage, unspecified gastrointestinal hemorrhage type [K92.2]      Surgery: none      Patient Active Problem List   Diagnosis    Hyperlipidemia with target LDL less than 100    BPH (benign prostatic hyperplasia)    ECG abnormal    Abnormal nuclear stress test    AS (aortic stenosis)    SOB (shortness of breath)    RBBB (right bundle branch block)    Anxiety    CAD (coronary artery disease)    Pulmonary emphysema (HCC)    Permanent atrial fibrillation (HCC)    AV block, Mobitz II    AV block    H/O bicuspid aortic valve    AI (aortic insufficiency)    S/P AVR (aortic valve replacement)    Anticoagulated on Coumadin    AAA (abdominal aortic aneurysm) without rupture (HCC)    Elevated liver enzymes    History of tobacco use    S/P CABG x 3    H/O aortic valve replacement with porcine valve    Aortic valve disease    Pure hypercholesterolemia    Normally functioning cardiac pacemaker present    History of smoking 30 or more pack years    Hx of CABG    PAF (paroxysmal atrial fibrillation) (HCC)    Anticoagulated by anticoagulation treatment    S/P AAA repair using bifurcation graft    Bilateral leg weakness    Chronic systolic congestive heart failure (HCC)    PVD (peripheral vascular disease) (HCC)    Stage 3a chronic kidney disease (HCC)    Pulmonary fibrosis (HCC)

## 2024-04-05 NOTE — PROGRESS NOTES
Comprehensive Nutrition Assessment    Type and Reason for Visit:  Initial, Positive Nutrition Screen (LOS)    Nutrition Recommendations/Plan:   Continue Current Diet  Begin ONS (high calorie/high protein) BID     Malnutrition Assessment:  Malnutrition Status:  Severe malnutrition (04/05/24 0859)    Context:  Chronic Illness     Findings of the 6 clinical characteristics of malnutrition:  Energy Intake:  Mild decrease in energy intake (Comment) (Poor PO intake prior to admission)  Weight Loss:  Greater than 7.5% over 3 months (-10.8% in three months)     Body Fat Loss:  Severe body fat loss (mod-severe) Orbital, Triceps, Buccal region   Muscle Mass Loss:  Severe muscle mass loss (mod-severe) Temples (temporalis), Clavicles (pectoralis & deltoids)  Fluid Accumulation:  Mild Extremities   Strength:  Not Performed    Nutrition Assessment:    Pt admit for GI bleed, anemia and urinary retention. Colonoscopy revealed actinic rectal ulcers and diverticulosis coli. PMHx: Afib, NSTEMI, CAD, COPD, CHF, HLD, HTN, Blood clots, RBB, PVD, Prostate cancer,  CHF,  intake inconsistent. Pt had multiple hospital stays since January 2024. Pt meet criteria for malnutrition. Will provide ONS BID, continue inpatient monitoring, f/up per policy.    Nutrition Related Findings:    A/O x4, I/O +470 since admit, abd wdl, bowel sounds x4 active, trace edema, K+ 3.2, Ca+ 8.2, Troponin 30, Hgb 9.2, Hct 28.5, D-Dimer 847, 4L O2 Wound Type: Skin Tears (nose)       Current Nutrition Intake & Therapies:    Average Meal Intake: 26-50%  Average Supplements Intake: None Ordered  ADULT DIET; Regular  ADULT ORAL NUTRITION SUPPLEMENT; Lunch, Dinner; Standard High Calorie/High Protein Oral Supplement    Anthropometric Measures:  Height: 165.1 cm (5' 5\")  Ideal Body Weight (IBW): 136 lbs (62 kg)    Admission Body Weight: 61.2 kg (134 lb 14.7 oz)  Current Body Weight: 61.2 kg (134 lb 14.7 oz), 99.2 % IBW. Weight Source: Stated (03/31/24)  Current BMI

## 2024-04-08 NOTE — DISCHARGE SUMMARY
[place] [id number]                            DISCHARGE SUMMARY      PATIENT NAME: SEAN MCCARTHY               : 1945  MED REC NO: [id number]                        ROOM: 0637  ACCOUNT NO: [id number]                       ADMIT DATE: 2024  PROVIDER: Macario Dolan DO      ADMITTING DIAGNOSIS:  At this time:  Acute on chronic anemia secondary to gastrointestinal bleed with proctitis complicated by chronic anticoagulant therapy with Eliquis.    SECONDARY DISCHARGE DIAGNOSES:    1. Coronary artery disease with mildly elevated troponin, significant decrease from most recent admission.  2. Paroxymal atrial fibrillation, on chronic Eliquis therapy.  3. Recently diagnosed small insignificant pulmonary embolism.  4. Recent left iliopsoas hematoma.  5. Chronic kidney disease, stage 3A.  6. History of peripheral arterial disease, status post endovascular repair of abdominal aortic aneurysm.  7. Oxygen dependent chronic obstructive pulmonary disease with chronic respiratory failure.  8. Severe protein malnutrition.    COMPLICATION:  Urinary retention requiring placement of Courtney catheter.    OPERATION PERFORMED:  Upper GI panendoscopy.    CONSULTATIONS OBTAINED:  With Dr. Flaherty and Dr. Lerner.    ADMITTING PHYSICIANS:  Dr. Dolan and Dr. Sharpe.    CHIEF COMPLAINT AND HISTORY OF CHIEF COMPLAINT:  78-year-old white male who was admitted to [facility].  The patient presented to the hospital here on 2024.  The patient presented to the hospital after having a bloody bowel movement.  The patient states he has been on heparin.   In the past seeing blood and clots which have been in stool. The patient at this time presented here with finding suggesting possible gastrointestinal bleed. My exam did show proctitis.  The patient underwent further imaging at which time mass cannot be excluded.  He was admitted to

## 2024-04-09 ENCOUNTER — OUTSIDE SERVICES (OUTPATIENT)
Dept: INTERNAL MEDICINE CLINIC | Age: 79
End: 2024-04-09

## 2024-04-09 DIAGNOSIS — N13.9 OBSTRUCTIVE AND REFLUX UROPATHY, UNSPECIFIED: ICD-10-CM

## 2024-04-09 DIAGNOSIS — I48.0 PAROXYSMAL ATRIAL FIBRILLATION (HCC): ICD-10-CM

## 2024-04-09 DIAGNOSIS — I26.99 OTHER PULMONARY EMBOLISM WITHOUT ACUTE COR PULMONALE, UNSPECIFIED CHRONICITY (HCC): ICD-10-CM

## 2024-04-09 DIAGNOSIS — K57.91 DIVERTICULOSIS OF INTESTINE, PART UNSPECIFIED, WITHOUT PERFORATION OR ABSCESS WITH BLEEDING: Primary | ICD-10-CM

## 2024-04-09 DIAGNOSIS — J44.9 CHRONIC OBSTRUCTIVE PULMONARY DISEASE, UNSPECIFIED COPD TYPE (HCC): ICD-10-CM

## 2024-04-09 DIAGNOSIS — Z74.1 NEED FOR ASSISTANCE WITH PERSONAL CARE: ICD-10-CM

## 2024-04-09 DIAGNOSIS — E11.9 TYPE 2 DIABETES MELLITUS WITHOUT COMPLICATION, UNSPECIFIED WHETHER LONG TERM INSULIN USE (HCC): ICD-10-CM

## 2024-04-09 DIAGNOSIS — R26.2 DIFFICULTY IN WALKING, NOT ELSEWHERE CLASSIFIED: ICD-10-CM

## 2024-04-09 DIAGNOSIS — E78.2 MIXED HYPERLIPIDEMIA: ICD-10-CM

## 2024-04-09 DIAGNOSIS — M62.81 MUSCLE WEAKNESS (GENERALIZED): ICD-10-CM

## 2024-04-09 DIAGNOSIS — I10 ESSENTIAL (PRIMARY) HYPERTENSION: ICD-10-CM

## 2024-04-09 DIAGNOSIS — K51.219: ICD-10-CM

## 2024-04-09 DIAGNOSIS — J43.8 OTHER EMPHYSEMA (HCC): ICD-10-CM

## 2024-04-10 VITALS
TEMPERATURE: 97.6 F | DIASTOLIC BLOOD PRESSURE: 72 MMHG | SYSTOLIC BLOOD PRESSURE: 126 MMHG | OXYGEN SATURATION: 99 % | WEIGHT: 138 LBS | HEART RATE: 77 BPM | BODY MASS INDEX: 22.96 KG/M2 | RESPIRATION RATE: 18 BRPM

## 2024-04-10 VITALS
DIASTOLIC BLOOD PRESSURE: 76 MMHG | BODY MASS INDEX: 21.97 KG/M2 | TEMPERATURE: 97.9 F | SYSTOLIC BLOOD PRESSURE: 114 MMHG | HEART RATE: 75 BPM | OXYGEN SATURATION: 93 % | WEIGHT: 132 LBS | RESPIRATION RATE: 17 BRPM

## 2024-04-10 RX ORDER — BISACODYL 10 MG
10 SUPPOSITORY, RECTAL RECTAL DAILY PRN
COMMUNITY

## 2024-04-10 NOTE — PROGRESS NOTES
No current facility-administered medications for this visit.       Current Meds: Refer to nursing home record    PMH:    Medical Problems: reviewed and updated       Diagnosis Date    Aortic stenosis, mild     Aortic valve sclerosis     moderate    Atrial fibrillation ([facility]) 2014    CAD (coronary artery disease)     CHF (congestive heart failure) ([facility])     COPD (chronic obstructive pulmonary disease) ([facility])     Encounter for diabetic foot exam ([facility]) 01/08/2024    Gallbladder attack     History of blood transfusion     History of tobacco use 09/17/2015    Hx of blood clots     Hyperlipidemia     Hypertension     Mild mitral regurgitation     Prostate cancer (HCC)     44 treatments of radiation    PVD (peripheral vascular disease) with claudication ([facility]) 09/17/2015    RBBB     Tobacco abuse         Surgical Hx: reviewed and updated   Past Surgical History:   Procedure Laterality Date    APPENDECTOMY      CARDIAC CATHETERIZATION  08/08/2014    DR Coates-pt. states had triple bypass \"5 years ago\"    COLONOSCOPY      COLONOSCOPY N/A 4/2/2024    COLONOSCOPY performed by Ryley Moeller MD at [facility] ENDOSCOPY    CORONARY ARTERY BYPASS GRAFT  08/11/2014    CABGx3 LIMA-LAD, SVG-D1, SVG-OM1, AVR Dr. Tinsley    ENDOSCOPY, COLON, DIAGNOSTIC      PACEMAKER INSERTION Left 08/22/2022    [employer] Dual PPM-GR    PACEMAKER PLACEMENT Left 08/12/2014    Dr. Cast-Dual Chamber-[employer]    UT VASCULAR EMBOLIZATION OR OCCLUSION ARTERIAL RS&I N/A 07/16/2018    ENDOVASCULAR  AORTIC ANEURYSM REPAIR , ABDOMINAL AORTIC ANEURYSM, WITH FINISTRATED GRAFT performed by Tu Chacon MD at [facility] OR    TONSILLECTOMY          FH: reviewed and updated       Problem Relation Age of Onset    Heart Attack Brother     Stroke Father         SH: reviewed and updated    reports that he quit smoking about 6 years ago. His smoking use included cigarettes. He started smoking about 59 years ago. He has a 25.0 pack-year smoking history. He has never

## 2024-04-10 NOTE — PROGRESS NOTES
Tamsulosin 0.4 mg daily  4. Other pulmonary embolism without acute cor pulmonale, unspecified chronicity (HCC)  5. Paroxysmal atrial fibrillation (HCC)  Comments:  Continue Eliquis 5 mg BID  6. Essential (primary) hypertension  Comments:  Controlled, continue metoprolol 25 mg daily  7. Mixed hyperlipidemia  Comments:  Continue atorvastatin 20 mg daily  8. Type 2 diabetes mellitus without complication, unspecified whether long term insulin use (HCC)  Comments:  Continue empagliflozin 10 mg daily  9. Chronic obstructive pulmonary disease, unspecified COPD type (HCC)  Comments:  Continue Trelegy Ellipta, 1 puff daily  10. Other emphysema (HCC)  11. Muscle weakness (generalized)  12. Need for assistance with personal care  13. Difficulty in walking, not elsewhere classified  Comments:  Continue PT/OT       Yonatan CLAIRE  am scribing for Dr. Awadalla Alonna Pratt

## 2024-04-16 ENCOUNTER — OUTSIDE SERVICES (OUTPATIENT)
Dept: INTERNAL MEDICINE CLINIC | Age: 79
End: 2024-04-16
Payer: MEDICARE

## 2024-04-16 DIAGNOSIS — K57.91 DIVERTICULOSIS OF INTESTINE, PART UNSPECIFIED, WITHOUT PERFORATION OR ABSCESS WITH BLEEDING: Primary | ICD-10-CM

## 2024-04-16 DIAGNOSIS — Z74.1 NEED FOR ASSISTANCE WITH PERSONAL CARE: ICD-10-CM

## 2024-04-16 DIAGNOSIS — J44.9 CHRONIC OBSTRUCTIVE PULMONARY DISEASE, UNSPECIFIED COPD TYPE (HCC): ICD-10-CM

## 2024-04-16 DIAGNOSIS — J43.8 OTHER EMPHYSEMA (HCC): ICD-10-CM

## 2024-04-16 DIAGNOSIS — I10 ESSENTIAL (PRIMARY) HYPERTENSION: ICD-10-CM

## 2024-04-16 DIAGNOSIS — E78.2 MIXED HYPERLIPIDEMIA: ICD-10-CM

## 2024-04-16 DIAGNOSIS — I26.99 OTHER PULMONARY EMBOLISM WITHOUT ACUTE COR PULMONALE, UNSPECIFIED CHRONICITY (HCC): ICD-10-CM

## 2024-04-16 DIAGNOSIS — N13.9 OBSTRUCTIVE AND REFLUX UROPATHY, UNSPECIFIED: ICD-10-CM

## 2024-04-16 DIAGNOSIS — K51.219: ICD-10-CM

## 2024-04-16 DIAGNOSIS — E11.9 TYPE 2 DIABETES MELLITUS WITHOUT COMPLICATION, UNSPECIFIED WHETHER LONG TERM INSULIN USE (HCC): ICD-10-CM

## 2024-04-16 DIAGNOSIS — R26.2 DIFFICULTY IN WALKING, NOT ELSEWHERE CLASSIFIED: ICD-10-CM

## 2024-04-16 DIAGNOSIS — I48.0 PAROXYSMAL ATRIAL FIBRILLATION (HCC): ICD-10-CM

## 2024-04-16 PROCEDURE — 99309 SBSQ NF CARE MODERATE MDM 30: CPT | Performed by: INTERNAL MEDICINE

## 2024-04-30 ENCOUNTER — OUTSIDE SERVICES (OUTPATIENT)
Dept: INTERNAL MEDICINE CLINIC | Age: 79
End: 2024-04-30
Payer: MEDICARE

## 2024-04-30 DIAGNOSIS — I26.99 OTHER PULMONARY EMBOLISM WITHOUT ACUTE COR PULMONALE, UNSPECIFIED CHRONICITY (HCC): ICD-10-CM

## 2024-04-30 DIAGNOSIS — N13.9 OBSTRUCTIVE AND REFLUX UROPATHY, UNSPECIFIED: ICD-10-CM

## 2024-04-30 DIAGNOSIS — Z74.1 NEED FOR ASSISTANCE WITH PERSONAL CARE: ICD-10-CM

## 2024-04-30 DIAGNOSIS — I10 ESSENTIAL (PRIMARY) HYPERTENSION: ICD-10-CM

## 2024-04-30 DIAGNOSIS — R26.2 DIFFICULTY IN WALKING, NOT ELSEWHERE CLASSIFIED: ICD-10-CM

## 2024-04-30 DIAGNOSIS — E11.9 TYPE 2 DIABETES MELLITUS WITHOUT COMPLICATION, UNSPECIFIED WHETHER LONG TERM INSULIN USE (HCC): ICD-10-CM

## 2024-04-30 DIAGNOSIS — K57.91 DIVERTICULOSIS OF INTESTINE, PART UNSPECIFIED, WITHOUT PERFORATION OR ABSCESS WITH BLEEDING: Primary | ICD-10-CM

## 2024-04-30 DIAGNOSIS — K51.219: ICD-10-CM

## 2024-04-30 DIAGNOSIS — I48.0 PAROXYSMAL ATRIAL FIBRILLATION (HCC): ICD-10-CM

## 2024-04-30 DIAGNOSIS — J43.8 OTHER EMPHYSEMA (HCC): ICD-10-CM

## 2024-04-30 DIAGNOSIS — E78.2 MIXED HYPERLIPIDEMIA: ICD-10-CM

## 2024-04-30 DIAGNOSIS — J44.9 CHRONIC OBSTRUCTIVE PULMONARY DISEASE, UNSPECIFIED COPD TYPE (HCC): ICD-10-CM

## 2024-04-30 PROCEDURE — 99309 SBSQ NF CARE MODERATE MDM 30: CPT | Performed by: INTERNAL MEDICINE

## 2024-05-02 ENCOUNTER — TELEPHONE (OUTPATIENT)
Dept: FAMILY MEDICINE CLINIC | Age: 79
End: 2024-05-02

## 2024-05-02 DIAGNOSIS — I50.22 CHRONIC SYSTOLIC CONGESTIVE HEART FAILURE (HCC): ICD-10-CM

## 2024-05-06 RX ORDER — METOPROLOL SUCCINATE 25 MG/1
12.5 TABLET, EXTENDED RELEASE ORAL DAILY
Qty: 30 TABLET | Refills: 3 | Status: SHIPPED | OUTPATIENT
Start: 2024-05-06

## 2024-05-07 ENCOUNTER — OFFICE VISIT (OUTPATIENT)
Dept: VASCULAR SURGERY | Age: 79
End: 2024-05-07

## 2024-05-07 VITALS — HEART RATE: 70 BPM | SYSTOLIC BLOOD PRESSURE: 104 MMHG | DIASTOLIC BLOOD PRESSURE: 66 MMHG

## 2024-05-07 DIAGNOSIS — Z95.828 HISTORY OF ENDOVASCULAR STENT GRAFT FOR ABDOMINAL AORTIC ANEURYSM (AAA): Primary | ICD-10-CM

## 2024-05-07 RX ORDER — POTASSIUM CHLORIDE 1.5 G/1.58G
40 POWDER, FOR SOLUTION ORAL 2 TIMES DAILY
COMMUNITY

## 2024-05-07 NOTE — PROGRESS NOTES
Weakness:   No [x]/Yes []  Neurologic:             Numbness:   No [x]/Yes []      Paralysis:   No [x]/Yes []                       Headaches:   No [x]/Yes []  Hematologic, lymphatic:   Anemia:   No [x]/Yes []              Bleeding or bruising:  No [x]/Yes []              Fevers or chills: No [x]/Yes []  Endocrine:             Temp intolerance:   No [x]/Yes []                       Polydipsia, polyuria:  No [x]/Yes []  Skin:              Rash:    No [x]/Yes []      Ulcers:   No [x]/Yes []              Abnorm pigment: No [x]/Yes []  :              Frequency/urgency:  No [x]/Yes []      Hematuria:    No [x]/Yes []                      Incontinence:    No [x]/Yes []    PHYSICAL EXAM:  Vitals:    05/07/24 1314   BP: 104/66   Pulse: 70     General Appearance: alert and oriented to person, place and time, in no acute distress, frail appearing  Neurologic: no cranial nerve deficit, speech normal  Head: normocephalic and atraumatic  Eyes: extraocular eye movements intact, conjunctivae normal  ENT: external ear and ear canal normal bilaterally, nose without deformity  Pulmonary/Chest: normal air movement, no respiratory distress, 2L NC O2  Cardiovascular: normal rate, regular rhythm  Abdomen: non-distended, no masses  Musculoskeletal: no joint deformity or tenderness  Extremities: no leg edema bilaterally, venous stasis/cyanotic skin changes present. No ulcers  Skin: warm and dry, no rash or erythema    PULSE EXAM      Right      Left   Brachial     Radial 2 2   Femoral 2 2   Popliteal     Dorsalis Pedis 2 2   Posterior Tibial     (3=normal, 2=diminished, 1=barely palpable, 4=widened)    RADIOLOGY: CTA 4/2024 reviewed demonstrating fenestrated stent graft is in good position without evidence of endoleak.    ABIs from 2023 reviewed and normal    Problem List Items Addressed This Visit    None  Visit Diagnoses       History of endovascular stent graft for abdominal aortic aneurysm (AAA)    -  Primary    Relevant

## 2024-05-08 ENCOUNTER — TELEPHONE (OUTPATIENT)
Dept: FAMILY MEDICINE CLINIC | Age: 79
End: 2024-05-08

## 2024-05-08 DIAGNOSIS — M54.50 LUMBAR SPINE PAIN: Primary | ICD-10-CM

## 2024-05-08 NOTE — TELEPHONE ENCOUNTER
[facility] called and states pt had a fall yesterday and is now complaining of a 7/10 back pain. Would like an x-ray placed and would like to know if he should continue with PT or not. Spoke with Dr. Casarez and he states he wants the pt to hold off on PT and to get the x-ray, spoke with [facility] and told them, also spoke with pt's wife and notified her that order was in the chart and they were going to [place] to get the imaging done.     Electronically signed by DEVON WEBER MA on 5/8/24 at 2:37 PM EDT

## 2024-05-10 NOTE — TELEPHONE ENCOUNTER
Pt called inquiring about xray results. Please advise.     Electronically signed by DEVON WEBER MA on 5/10/24 at 1:01 PM EDT

## 2024-05-13 VITALS
SYSTOLIC BLOOD PRESSURE: 124 MMHG | OXYGEN SATURATION: 96 % | BODY MASS INDEX: 21.97 KG/M2 | DIASTOLIC BLOOD PRESSURE: 68 MMHG | RESPIRATION RATE: 18 BRPM | TEMPERATURE: 98 F | HEART RATE: 82 BPM | WEIGHT: 132 LBS

## 2024-05-13 ASSESSMENT — ENCOUNTER SYMPTOMS
DIARRHEA: 0
ABDOMINAL PAIN: 0
NAUSEA: 0
COUGH: 0
SHORTNESS OF BREATH: 0
VOMITING: 0

## 2024-05-13 NOTE — TELEPHONE ENCOUNTER
Ree from [facility] [phone] called and wanted to inform provider patient is not actively taking his lasix, potassium or furosemide.

## 2024-05-13 NOTE — PROGRESS NOTES
Tenderness: There is no abdominal tenderness.      Comments: No organomegaly   Genitourinary:     Comments: Courtney catheter present  Musculoskeletal:         General: No tenderness. Normal range of motion.      Cervical back: Neck supple. No tenderness.      Right lower leg: No edema.      Left lower leg: No edema.      Comments: No clubbing, No cyanosis   Skin:     General: Skin is warm and dry.   Neurological:      General: No focal deficit present.      Mental Status: He is alert and oriented to person, place, and time.      Motor: Weakness present.      Gait: Gait abnormal.         Assessment & Plan:  1. Diverticulosis of intestine, part unspecified, without perforation or abscess with bleeding  2. 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 Obstructive and reflux uropathy, unspecified  Comments:  Courtney catheter present, continue tamsulosin 0.4 mg daily  4. Other pulmonary embolism without acute cor pulmonale, unspecified chronicity (HCC)  5. Paroxysmal atrial fibrillation (HCC)  Comments:  Continue Eliquis 5 ng BID  6. Essential (primary) hypertension  Comments:  Controlled, continue metoprolol 25 mg daily  7. Mixed hyperlipidemia  Comments:  Continue atorvastatin 20 mg daily  8. Type 2 diabetes mellitus without complication, unspecified whether long term insulin use (HCC)  Comments:  Continue empagliflozin 10 mg daily  9. Chronic obstructive pulmonary disease, unspecified COPD type (HCC)  Comments:  Continue Trelegy, Elipta 1 puff daily  10. Other emphysema (HCC)  11. Need for assistance with personal care  12. Difficulty in walking, not elsewhere classified  Comments:  Continue PT/OT     Continue current management    Yonatan CLAIRE  am scribing for Dr. Awadalla Alonna Pratt I, Bahaa A Awadalla, MD, personally performed the services described in this documentation as scribed by Yonatan Ingram and it is both accurate and complete

## 2024-05-14 ENCOUNTER — OFFICE VISIT (OUTPATIENT)
Dept: FAMILY MEDICINE CLINIC | Age: 79
End: 2024-05-14
Payer: MEDICARE

## 2024-05-14 VITALS
HEIGHT: 65 IN | BODY MASS INDEX: 19.79 KG/M2 | DIASTOLIC BLOOD PRESSURE: 70 MMHG | SYSTOLIC BLOOD PRESSURE: 118 MMHG | RESPIRATION RATE: 20 BRPM | OXYGEN SATURATION: 100 % | TEMPERATURE: 97 F | HEART RATE: 50 BPM | WEIGHT: 118.8 LBS

## 2024-05-14 DIAGNOSIS — Z79.01 ANTICOAGULATED BY ANTICOAGULATION TREATMENT: ICD-10-CM

## 2024-05-14 DIAGNOSIS — I73.9 PVD (PERIPHERAL VASCULAR DISEASE) (HCC): ICD-10-CM

## 2024-05-14 DIAGNOSIS — J43.2 CENTRILOBULAR EMPHYSEMA (HCC): Primary | ICD-10-CM

## 2024-05-14 DIAGNOSIS — J84.10 PULMONARY FIBROSIS (HCC): ICD-10-CM

## 2024-05-14 DIAGNOSIS — I48.21 PERMANENT ATRIAL FIBRILLATION (HCC): ICD-10-CM

## 2024-05-14 DIAGNOSIS — Z86.79 S/P AAA REPAIR USING BIFURCATION GRAFT: ICD-10-CM

## 2024-05-14 DIAGNOSIS — I35.0 NONRHEUMATIC AORTIC VALVE STENOSIS: ICD-10-CM

## 2024-05-14 DIAGNOSIS — Z95.828 S/P AAA REPAIR USING BIFURCATION GRAFT: ICD-10-CM

## 2024-05-14 DIAGNOSIS — I25.2 H/O NON-ST ELEVATION MYOCARDIAL INFARCTION (NSTEMI): ICD-10-CM

## 2024-05-14 DIAGNOSIS — I50.22 CHRONIC SYSTOLIC CONGESTIVE HEART FAILURE (HCC): ICD-10-CM

## 2024-05-14 DIAGNOSIS — Z86.711 HISTORY OF PULMONARY EMBOLISM: ICD-10-CM

## 2024-05-14 DIAGNOSIS — I71.40 ABDOMINAL AORTIC ANEURYSM (AAA) WITHOUT RUPTURE, UNSPECIFIED PART (HCC): ICD-10-CM

## 2024-05-14 DIAGNOSIS — E43 SEVERE PROTEIN-CALORIE MALNUTRITION (HCC): Chronic | ICD-10-CM

## 2024-05-14 DIAGNOSIS — Z95.3 H/O AORTIC VALVE REPLACEMENT WITH PORCINE VALVE: ICD-10-CM

## 2024-05-14 DIAGNOSIS — N18.31 STAGE 3A CHRONIC KIDNEY DISEASE (HCC): ICD-10-CM

## 2024-05-14 DIAGNOSIS — E70.319 OA (OCULAR ALBINISM) (HCC): ICD-10-CM

## 2024-05-14 PROBLEM — J96.01 ACUTE RESPIRATORY FAILURE WITH HYPOXIA (HCC): Status: RESOLVED | Noted: 2024-03-04 | Resolved: 2024-05-14

## 2024-05-14 PROBLEM — A41.9 SEPTIC SHOCK (HCC): Status: RESOLVED | Noted: 2024-02-19 | Resolved: 2024-05-14

## 2024-05-14 PROBLEM — R65.21 SEPTIC SHOCK (HCC): Status: RESOLVED | Noted: 2024-02-19 | Resolved: 2024-05-14

## 2024-05-14 PROBLEM — I48.0 PAF (PAROXYSMAL ATRIAL FIBRILLATION) (HCC): Status: RESOLVED | Noted: 2017-09-05 | Resolved: 2024-05-14

## 2024-05-14 PROBLEM — I50.43 ACUTE ON CHRONIC COMBINED SYSTOLIC AND DIASTOLIC CONGESTIVE HEART FAILURE (HCC): Status: RESOLVED | Noted: 2024-02-29 | Resolved: 2024-05-14

## 2024-05-14 PROBLEM — I26.99 PULMONARY EMBOLISM (HCC): Status: RESOLVED | Noted: 2024-02-19 | Resolved: 2024-05-14

## 2024-05-14 PROCEDURE — G8420 CALC BMI NORM PARAMETERS: HCPCS | Performed by: FAMILY MEDICINE

## 2024-05-14 PROCEDURE — 3074F SYST BP LT 130 MM HG: CPT | Performed by: FAMILY MEDICINE

## 2024-05-14 PROCEDURE — 1036F TOBACCO NON-USER: CPT | Performed by: FAMILY MEDICINE

## 2024-05-14 PROCEDURE — 99215 OFFICE O/P EST HI 40 MIN: CPT | Performed by: FAMILY MEDICINE

## 2024-05-14 PROCEDURE — 3023F SPIROM DOC REV: CPT | Performed by: FAMILY MEDICINE

## 2024-05-14 PROCEDURE — 3078F DIAST BP <80 MM HG: CPT | Performed by: FAMILY MEDICINE

## 2024-05-14 PROCEDURE — 1123F ACP DISCUSS/DSCN MKR DOCD: CPT | Performed by: FAMILY MEDICINE

## 2024-05-14 PROCEDURE — G8427 DOCREV CUR MEDS BY ELIG CLIN: HCPCS | Performed by: FAMILY MEDICINE

## 2024-05-14 RX ORDER — MIDODRINE HYDROCHLORIDE 5 MG/1
15 TABLET ORAL
Qty: 270 TABLET | Refills: 1 | Status: SHIPPED | OUTPATIENT
Start: 2024-05-14

## 2024-05-14 RX ORDER — LANSOPRAZOLE 30 MG/1
30 CAPSULE, DELAYED RELEASE ORAL DAILY
Qty: 90 CAPSULE | Refills: 1 | Status: SHIPPED | OUTPATIENT
Start: 2024-05-14

## 2024-05-14 SDOH — ECONOMIC STABILITY: INCOME INSECURITY: HOW HARD IS IT FOR YOU TO PAY FOR THE VERY BASICS LIKE FOOD, HOUSING, MEDICAL CARE, AND HEATING?: NOT HARD AT ALL

## 2024-05-14 SDOH — ECONOMIC STABILITY: FOOD INSECURITY: WITHIN THE PAST 12 MONTHS, YOU WORRIED THAT YOUR FOOD WOULD RUN OUT BEFORE YOU GOT MONEY TO BUY MORE.: NEVER TRUE

## 2024-05-14 SDOH — ECONOMIC STABILITY: FOOD INSECURITY: WITHIN THE PAST 12 MONTHS, THE FOOD YOU BOUGHT JUST DIDN'T LAST AND YOU DIDN'T HAVE MONEY TO GET MORE.: NEVER TRUE

## 2024-05-14 ASSESSMENT — ENCOUNTER SYMPTOMS
BLURRED VISION: 0
ABDOMINAL PAIN: 0
SINUS PRESSURE: 0
WHEEZING: 0
GASTROINTESTINAL NEGATIVE: 1
SWOLLEN GLANDS: 0
DIARRHEA: 0
COLOR CHANGE: 1
CONSTIPATION: 0
SINUS PAIN: 0
COUGH: 0
VOICE CHANGE: 0
TROUBLE SWALLOWING: 0
APNEA: 0
EYE PAIN: 0
EYE REDNESS: 0
ABDOMINAL DISTENTION: 0
STRIDOR: 0
BACK PAIN: 0
NAUSEA: 0
ORTHOPNEA: 0
ANAL BLEEDING: 0
SORE THROAT: 0
EYE DISCHARGE: 0
CHOKING: 0
RHINORRHEA: 0
PHOTOPHOBIA: 0
EYE ITCHING: 0
SPUTUM PRODUCTION: 0
VOMITING: 0
BLOOD IN STOOL: 0
ALLERGIC/IMMUNOLOGIC NEGATIVE: 1
FACIAL SWELLING: 0
HEMOPTYSIS: 0

## 2024-05-14 NOTE — PROGRESS NOTES
triple bypass \"5 years ago\"    COLONOSCOPY      COLONOSCOPY N/A 2024    COLONOSCOPY performed by Ryley Moeller MD at [facility] ENDOSCOPY    CORONARY ARTERY BYPASS GRAFT  2014    CABGx3 LIMA-LAD, SVG-D1, SVG-OM1, AVR Dr. Tinsley    ENDOSCOPY, COLON, DIAGNOSTIC      PACEMAKER INSERTION Left 2022    [employer] Dual PPM-GR    PACEMAKER PLACEMENT Left 2014    Dr. Cast-Dual Chamber-[employer]    [place] VASCULAR EMBOLIZATION OR OCCLUSION ARTERIAL RS&I N/A 2018    ENDOVASCULAR  AORTIC ANEURYSM REPAIR , ABDOMINAL AORTIC ANEURYSM, WITH FINISTRATED GRAFT performed [place] [facility]acon [place] [facility] [place]    TONSILLECTOMY         Past Family Hx:  Reviewed with patient      Problem Relation Age of Onset    Heart Attack Brother     Stroke Father        Social Hx:  Reviewed with patient  Social History     Tobacco Use    Smoking status: Former     Current packs/day: 0.00     Average packs/day: 0.5 packs/day for 53.0 years (25.0 ttl pk-yrs)     Types: Cigarettes     Start date: 1964     Quit date: 2017     Years since quittin.9    Smokeless tobacco: Never   Substance Use Topics    Alcohol use: Yes     Alcohol/week: 0.0 standard drinks of alcohol     Comment: rare;  drinks 1 cup of coffee daily & occ [employer]/Tea       OBJECTIVE  /70   Pulse 50   Temp 97 °F (36.1 °C)   Resp 20   Ht 1.651 m (5' 5\")   Wt 53.9 kg (118 lb 12.8 oz)   SpO2 100%   BMI 19.77 kg/m²     Problem List:  Kike does not have any pertinent problems on file.    PHYS EX:  Physical Exam  Vitals and nursing note reviewed.   Constitutional:       General: He is not in acute distress.     Appearance: Normal appearance. He is well-developed. He is not ill-appearing, toxic-appearing or diaphoretic.      Interventions: He is not intubated.  HENT:      Head: Normocephalic and atraumatic.      Right Ear: External ear normal.      Left Ear: External ear normal.      Nose: No congestion or rhinorrhea.      Mouth/Throat:

## 2024-05-21 DIAGNOSIS — E78.5 DYSLIPIDEMIA: ICD-10-CM

## 2024-05-22 RX ORDER — ATORVASTATIN CALCIUM 20 MG/1
20 TABLET, FILM COATED ORAL DAILY
Qty: 90 TABLET | Refills: 1 | Status: ON HOLD | OUTPATIENT
Start: 2024-05-22

## 2024-05-22 NOTE — TELEPHONE ENCOUNTER
Last Appointment:  5/14/2024  Future Appointments   Date Time Provider Department Center   6/12/2024  3:45 PM Hermelindo Bedolla DO [facility]   7/9/2024 10:00 AM Hermelindo Bedolla DO [facility]   5/27/2025  8:30 AM MATIAS CARTER US [facility]   5/27/2025  9:00 AM Tu Chacon MD VASC/MED [facility]

## 2024-05-24 ENCOUNTER — HOSPITAL ENCOUNTER (INPATIENT)
Age: 79
LOS: 7 days | Discharge: HOME HEALTH | End: 2024-05-31
Attending: EMERGENCY MEDICINE | Admitting: INTERNAL MEDICINE
Payer: MEDICARE

## 2024-05-24 ENCOUNTER — APPOINTMENT (OUTPATIENT)
Dept: GENERAL RADIOLOGY | Age: 79
End: 2024-05-24
Payer: MEDICARE

## 2024-05-24 ENCOUNTER — APPOINTMENT (OUTPATIENT)
Dept: CT IMAGING | Age: 79
End: 2024-05-24
Payer: MEDICARE

## 2024-05-24 DIAGNOSIS — Z85.46 HISTORY OF PROSTATE CANCER: ICD-10-CM

## 2024-05-24 DIAGNOSIS — R41.0 DISORIENTATION: ICD-10-CM

## 2024-05-24 DIAGNOSIS — N30.00 ACUTE CYSTITIS WITHOUT HEMATURIA: ICD-10-CM

## 2024-05-24 DIAGNOSIS — R53.83 OTHER FATIGUE: ICD-10-CM

## 2024-05-24 DIAGNOSIS — N39.0 URINARY TRACT INFECTION WITHOUT HEMATURIA, SITE UNSPECIFIED: Primary | ICD-10-CM

## 2024-05-24 LAB
ALBUMIN SERPL-MCNC: 3.4 G/DL (ref 3.5–5.2)
ALP SERPL-CCNC: 108 U/L (ref 40–129)
ALT SERPL-CCNC: 16 U/L (ref 0–40)
ANION GAP SERPL CALCULATED.3IONS-SCNC: 14 MMOL/L (ref 7–16)
AST SERPL-CCNC: 31 U/L (ref 0–39)
BACTERIA URNS QL MICRO: ABNORMAL
BASOPHILS # BLD: 0.07 K/UL (ref 0–0.2)
BASOPHILS NFR BLD: 1 % (ref 0–2)
BILIRUB SERPL-MCNC: 1.2 MG/DL (ref 0–1.2)
BILIRUB UR QL STRIP: NEGATIVE
BUN SERPL-MCNC: 26 MG/DL (ref 6–23)
CALCIUM SERPL-MCNC: 9.4 MG/DL (ref 8.6–10.2)
CHLORIDE SERPL-SCNC: 100 MMOL/L (ref 98–107)
CLARITY UR: ABNORMAL
CO2 SERPL-SCNC: 22 MMOL/L (ref 22–29)
COLOR UR: YELLOW
CREAT SERPL-MCNC: 1.2 MG/DL (ref 0.7–1.2)
EOSINOPHIL # BLD: 0.1 K/UL (ref 0.05–0.5)
EOSINOPHILS RELATIVE PERCENT: 1 % (ref 0–6)
ERYTHROCYTE [DISTWIDTH] IN BLOOD BY AUTOMATED COUNT: 16.3 % (ref 11.5–15)
GFR, ESTIMATED: 62 ML/MIN/1.73M2
GLUCOSE SERPL-MCNC: 93 MG/DL (ref 74–99)
GLUCOSE UR STRIP-MCNC: 500 MG/DL
HCT VFR BLD AUTO: 40.1 % (ref 37–54)
HGB BLD-MCNC: 12.9 G/DL (ref 12.5–16.5)
HGB UR QL STRIP.AUTO: ABNORMAL
IMM GRANULOCYTES # BLD AUTO: 0.03 K/UL (ref 0–0.58)
IMM GRANULOCYTES NFR BLD: 0 % (ref 0–5)
INFLUENZA A BY PCR: NOT DETECTED
INFLUENZA B BY PCR: NOT DETECTED
KETONES UR STRIP-MCNC: ABNORMAL MG/DL
LEUKOCYTE ESTERASE UR QL STRIP: ABNORMAL
LYMPHOCYTES NFR BLD: 1.37 K/UL (ref 1.5–4)
LYMPHOCYTES RELATIVE PERCENT: 19 % (ref 20–42)
MCH RBC QN AUTO: 29.4 PG (ref 26–35)
MCHC RBC AUTO-ENTMCNC: 32.2 G/DL (ref 32–34.5)
MCV RBC AUTO: 91.3 FL (ref 80–99.9)
MONOCYTES NFR BLD: 0.46 K/UL (ref 0.1–0.95)
MONOCYTES NFR BLD: 6 % (ref 2–12)
NEUTROPHILS NFR BLD: 72 % (ref 43–80)
NEUTS SEG NFR BLD: 5.33 K/UL (ref 1.8–7.3)
NITRITE UR QL STRIP: POSITIVE
PH UR STRIP: 5.5 [PH] (ref 5–9)
PLATELET # BLD AUTO: 237 K/UL (ref 130–450)
PMV BLD AUTO: 9.4 FL (ref 7–12)
POTASSIUM SERPL-SCNC: 4.7 MMOL/L (ref 3.5–5)
PROT SERPL-MCNC: 8.2 G/DL (ref 6.4–8.3)
PROT UR STRIP-MCNC: 100 MG/DL
RBC # BLD AUTO: 4.39 M/UL (ref 3.8–5.8)
RBC #/AREA URNS HPF: ABNORMAL /HPF
SARS-COV-2 RDRP RESP QL NAA+PROBE: NOT DETECTED
SODIUM SERPL-SCNC: 136 MMOL/L (ref 132–146)
SP GR UR STRIP: 1.02 (ref 1–1.03)
SPECIMEN DESCRIPTION: NORMAL
T4 FREE SERPL-MCNC: 0.9 NG/DL (ref 0.9–1.7)
TROPONIN I SERPL HS-MCNC: 35 NG/L (ref 0–11)
TSH SERPL DL<=0.05 MIU/L-ACNC: 2.01 UIU/ML (ref 0.27–4.2)
UROBILINOGEN UR STRIP-ACNC: 0.2 EU/DL (ref 0–1)
WBC #/AREA URNS HPF: ABNORMAL /HPF
WBC OTHER # BLD: 7.4 K/UL (ref 4.5–11.5)

## 2024-05-24 PROCEDURE — 84443 ASSAY THYROID STIM HORMONE: CPT

## 2024-05-24 PROCEDURE — 70450 CT HEAD/BRAIN W/O DYE: CPT

## 2024-05-24 PROCEDURE — 87086 URINE CULTURE/COLONY COUNT: CPT

## 2024-05-24 PROCEDURE — 87899 AGENT NOS ASSAY W/OPTIC: CPT

## 2024-05-24 PROCEDURE — 71046 X-RAY EXAM CHEST 2 VIEWS: CPT

## 2024-05-24 PROCEDURE — 87502 INFLUENZA DNA AMP PROBE: CPT

## 2024-05-24 PROCEDURE — 99285 EMERGENCY DEPT VISIT HI MDM: CPT

## 2024-05-24 PROCEDURE — 1200000000 HC SEMI PRIVATE

## 2024-05-24 PROCEDURE — 84145 PROCALCITONIN (PCT): CPT

## 2024-05-24 PROCEDURE — 84439 ASSAY OF FREE THYROXINE: CPT

## 2024-05-24 PROCEDURE — 84484 ASSAY OF TROPONIN QUANT: CPT

## 2024-05-24 PROCEDURE — 87635 SARS-COV-2 COVID-19 AMP PRB: CPT

## 2024-05-24 PROCEDURE — 80053 COMPREHEN METABOLIC PANEL: CPT

## 2024-05-24 PROCEDURE — 81001 URINALYSIS AUTO W/SCOPE: CPT

## 2024-05-24 PROCEDURE — 87088 URINE BACTERIA CULTURE: CPT

## 2024-05-24 PROCEDURE — 87449 NOS EACH ORGANISM AG IA: CPT

## 2024-05-24 PROCEDURE — 85025 COMPLETE CBC W/AUTO DIFF WBC: CPT

## 2024-05-24 RX ORDER — ATORVASTATIN CALCIUM 20 MG/1
20 TABLET, FILM COATED ORAL NIGHTLY
Status: DISCONTINUED | OUTPATIENT
Start: 2024-05-25 | End: 2024-05-31 | Stop reason: HOSPADM

## 2024-05-24 RX ORDER — MIDODRINE HYDROCHLORIDE 5 MG/1
15 TABLET ORAL
Status: DISCONTINUED | OUTPATIENT
Start: 2024-05-25 | End: 2024-05-31 | Stop reason: HOSPADM

## 2024-05-24 RX ORDER — BUDESONIDE 0.5 MG/2ML
0.5 INHALANT ORAL
Status: DISCONTINUED | OUTPATIENT
Start: 2024-05-24 | End: 2024-05-31 | Stop reason: HOSPADM

## 2024-05-24 RX ORDER — DOCUSATE SODIUM 100 MG/1
100 CAPSULE, LIQUID FILLED ORAL DAILY
Status: DISCONTINUED | OUTPATIENT
Start: 2024-05-25 | End: 2024-05-31 | Stop reason: HOSPADM

## 2024-05-24 RX ORDER — PANTOPRAZOLE SODIUM 40 MG/1
40 TABLET, DELAYED RELEASE ORAL
Status: DISCONTINUED | OUTPATIENT
Start: 2024-05-25 | End: 2024-05-25 | Stop reason: ALTCHOICE

## 2024-05-24 RX ORDER — IPRATROPIUM BROMIDE AND ALBUTEROL SULFATE 2.5; .5 MG/3ML; MG/3ML
1 SOLUTION RESPIRATORY (INHALATION) EVERY 4 HOURS PRN
Status: DISCONTINUED | OUTPATIENT
Start: 2024-05-24 | End: 2024-05-31 | Stop reason: HOSPADM

## 2024-05-24 RX ORDER — ARFORMOTEROL TARTRATE 15 UG/2ML
15 SOLUTION RESPIRATORY (INHALATION)
Status: DISCONTINUED | OUTPATIENT
Start: 2024-05-24 | End: 2024-05-31 | Stop reason: HOSPADM

## 2024-05-24 RX ORDER — METOPROLOL SUCCINATE 25 MG/1
12.5 TABLET, EXTENDED RELEASE ORAL DAILY
Status: DISCONTINUED | OUTPATIENT
Start: 2024-05-25 | End: 2024-05-29

## 2024-05-24 NOTE — ED PROVIDER NOTES
[facility] EMERGENCY DEPARTMENT  EMERGENCY DEPARTMENT ENCOUNTER        Pt Name: Kike Holloway  MRN: [id number]  Birthdate 1945  Date of evaluation: 5/24/2024  Provider: Eric Carnes DO  PCP: Hermelindo Bedolla DO  Note Started: 5:48 PM EDT 5/24/24    CHIEF COMPLAINT       Chief Complaint   Patient presents with    Fatigue     Sent in by family from home for weakness       HISTORY OF PRESENT ILLNESS: 1 or more Elements   History From: patient    Limitations to history : None    Kike Holloway is a 78 y.o. male who presents to the ED for evaluation of generalized fatigue.  Family states that he is more weaker than usual.  They reported this to EMS.  Patient denies any pain.  No chest pain or shortness of breath.  No associated nausea, vomiting, or diarrhea.  No fevers reported.  EMS did not report any recent falls or injuries.  During my exam I did notice that the patient did have left lower extremity weakness.  He states that is new but it has been present for the past several days.  Is not exactly sure when it started.  Denies any other focal weakness and just feels tired all over.  Patient is on Eliquis for history of atrial fibrillation.    Nursing Notes were all reviewed and agreed with or any disagreements were addressed in the HPI.      REVIEW OF EXTERNAL NOTE :        REVIEW OF SYSTEMS :           Positives and Pertinent negatives as per HPI.     SURGICAL HISTORY     Past Surgical History:   Procedure Laterality Date    APPENDECTOMY      CARDIAC CATHETERIZATION  08/08/2014    DR Coates-pt. states had triple bypass \"5 years ago\"    COLONOSCOPY      COLONOSCOPY N/A 4/2/2024    COLONOSCOPY performed by Ryley Moeller MD at [facility] ENDOSCOPY    CORONARY ARTERY BYPASS GRAFT  08/11/2014    CABGx3 LIMA-LAD, SVG-D1, SVG-OM1, AVR Dr. Tinsley    ENDOSCOPY, COLON, DIAGNOSTIC      PACEMAKER INSERTION Left 08/22/2022    [employer] Dual PPM-GR    PACEMAKER PLACEMENT Left  Patient denies any pain.  No chest pain or shortness of breath.  No associated nausea, vomiting, or diarrhea.  No fevers reported.  EMS did not report any recent falls or injuries.  During my exam I did notice that the patient did have left lower extremity weakness.  He states that is new but it has been present for the past several days.  Is not exactly sure when it started. Differential diagnoses included but not limited to UTI, COVID, influenza, CVA, intracranial hemorrhage, electrolyte derangement, anemia. Workup in the ED consisted of appropriate labs and imaging.  The following labs were evaluated interpreted by myself.  CBC shows no evidence of leukocytosis or anemia.  CMP shows no electrolyte abnormalities or evidence of renal insufficiency.  Liver functions grossly unremarkable.  Patient's troponin is 35 which is near his previous baseline values.  TSH and T4 unremarkable.  Patient's urinalysis was positive for signs of infection including positive nitrite as well as moderate leukocyte esterase and numerous white blood cells.  He was started on Rocephin in the ED.  Urine culture is pending.  COVID and influenza swabs are negative.  CT of the head was obtained and interpreted by the radiologist as showing no acute intracranial abnormality.  Chest x-ray showed diminished volume/bilateral pleural effusion since March study of 2024. Patient requires continued workup and management of their symptoms and will be admitted to the hospital for further evaluation and treatment.  Discussed case with medicine who agrees admit to medical service    CONSULTS: (Who and What was discussed)  medicine      I am the Primary Clinician of Record.    FINAL IMPRESSION      1. Urinary tract infection without hematuria, site unspecified    2. Disorientation    3. Other fatigue          DISPOSITION/PLAN     DISPOSITION Decision To Admit 05/24/2024 10:56:58 PM      PATIENT REFERRED TO:  No follow-up provider specified.    DISCHARGE

## 2024-05-25 ENCOUNTER — APPOINTMENT (OUTPATIENT)
Dept: CT IMAGING | Age: 79
End: 2024-05-25
Payer: MEDICARE

## 2024-05-25 ENCOUNTER — APPOINTMENT (OUTPATIENT)
Dept: ULTRASOUND IMAGING | Age: 79
End: 2024-05-25
Payer: MEDICARE

## 2024-05-25 LAB
25(OH)D3 SERPL-MCNC: 20.2 NG/ML (ref 30–100)
ALBUMIN SERPL-MCNC: 3.4 G/DL (ref 3.5–5.2)
ALP SERPL-CCNC: 111 U/L (ref 40–129)
ALT SERPL-CCNC: 14 U/L (ref 0–40)
ANION GAP SERPL CALCULATED.3IONS-SCNC: 19 MMOL/L (ref 7–16)
AST SERPL-CCNC: 27 U/L (ref 0–39)
BASOPHILS # BLD: 0.07 K/UL (ref 0–0.2)
BASOPHILS NFR BLD: 1 % (ref 0–2)
BILIRUB SERPL-MCNC: 1.1 MG/DL (ref 0–1.2)
BNP SERPL-MCNC: 5320 PG/ML (ref 0–450)
BUN SERPL-MCNC: 26 MG/DL (ref 6–23)
CALCIUM SERPL-MCNC: 9.6 MG/DL (ref 8.6–10.2)
CHLORIDE SERPL-SCNC: 100 MMOL/L (ref 98–107)
CHOLEST SERPL-MCNC: 109 MG/DL
CO2 SERPL-SCNC: 18 MMOL/L (ref 22–29)
CREAT SERPL-MCNC: 1.2 MG/DL (ref 0.7–1.2)
EOSINOPHIL # BLD: 0.05 K/UL (ref 0.05–0.5)
EOSINOPHILS RELATIVE PERCENT: 1 % (ref 0–6)
ERYTHROCYTE [DISTWIDTH] IN BLOOD BY AUTOMATED COUNT: 16.4 % (ref 11.5–15)
FOLATE SERPL-MCNC: 7.7 NG/ML (ref 4.8–24.2)
GFR, ESTIMATED: 61 ML/MIN/1.73M2
GLUCOSE SERPL-MCNC: 96 MG/DL (ref 74–99)
HBA1C MFR BLD: 5.3 % (ref 4–5.6)
HCT VFR BLD AUTO: 40.7 % (ref 37–54)
HDLC SERPL-MCNC: 31 MG/DL
HGB BLD-MCNC: 13.2 G/DL (ref 12.5–16.5)
IMM GRANULOCYTES # BLD AUTO: <0.03 K/UL (ref 0–0.58)
IMM GRANULOCYTES NFR BLD: 0 % (ref 0–5)
IRON SATN MFR SERPL: 13 % (ref 20–55)
IRON SERPL-MCNC: 34 UG/DL (ref 59–158)
L PNEUMO1 AG UR QL IA.RAPID: NEGATIVE
LDLC SERPL CALC-MCNC: 60 MG/DL
LYMPHOCYTES NFR BLD: 1.48 K/UL (ref 1.5–4)
LYMPHOCYTES RELATIVE PERCENT: 17 % (ref 20–42)
MCH RBC QN AUTO: 29.7 PG (ref 26–35)
MCHC RBC AUTO-ENTMCNC: 32.4 G/DL (ref 32–34.5)
MCV RBC AUTO: 91.5 FL (ref 80–99.9)
MONOCYTES NFR BLD: 0.34 K/UL (ref 0.1–0.95)
MONOCYTES NFR BLD: 4 % (ref 2–12)
NEUTROPHILS NFR BLD: 77 % (ref 43–80)
NEUTS SEG NFR BLD: 6.58 K/UL (ref 1.8–7.3)
PLATELET # BLD AUTO: 235 K/UL (ref 130–450)
PMV BLD AUTO: 9.5 FL (ref 7–12)
POTASSIUM SERPL-SCNC: 4.5 MMOL/L (ref 3.5–5)
PROCALCITONIN SERPL-MCNC: 0.64 NG/ML (ref 0–0.08)
PROT SERPL-MCNC: 8.8 G/DL (ref 6.4–8.3)
RBC # BLD AUTO: 4.45 M/UL (ref 3.8–5.8)
S PNEUM AG SPEC QL: NEGATIVE
SODIUM SERPL-SCNC: 137 MMOL/L (ref 132–146)
SPECIMEN SOURCE: NORMAL
T4 FREE SERPL-MCNC: 0.9 NG/DL (ref 0.9–1.7)
TIBC SERPL-MCNC: 266 UG/DL (ref 250–450)
TRIGL SERPL-MCNC: 88 MG/DL
TROPONIN I SERPL HS-MCNC: 34 NG/L (ref 0–11)
TROPONIN I SERPL HS-MCNC: 34 NG/L (ref 0–11)
TROPONIN I SERPL HS-MCNC: 57 NG/L (ref 0–11)
TSH SERPL DL<=0.05 MIU/L-ACNC: 2.22 UIU/ML (ref 0.27–4.2)
VIT B12 SERPL-MCNC: 443 PG/ML (ref 211–946)
VLDLC SERPL CALC-MCNC: 18 MG/DL
WBC OTHER # BLD: 8.5 K/UL (ref 4.5–11.5)

## 2024-05-25 PROCEDURE — 83880 ASSAY OF NATRIURETIC PEPTIDE: CPT

## 2024-05-25 PROCEDURE — 83550 IRON BINDING TEST: CPT

## 2024-05-25 PROCEDURE — 82306 VITAMIN D 25 HYDROXY: CPT

## 2024-05-25 PROCEDURE — 94640 AIRWAY INHALATION TREATMENT: CPT

## 2024-05-25 PROCEDURE — 74177 CT ABD & PELVIS W/CONTRAST: CPT

## 2024-05-25 PROCEDURE — 83036 HEMOGLOBIN GLYCOSYLATED A1C: CPT

## 2024-05-25 PROCEDURE — 80053 COMPREHEN METABOLIC PANEL: CPT

## 2024-05-25 PROCEDURE — 36415 COLL VENOUS BLD VENIPUNCTURE: CPT

## 2024-05-25 PROCEDURE — 93970 EXTREMITY STUDY: CPT

## 2024-05-25 PROCEDURE — 80061 LIPID PANEL: CPT

## 2024-05-25 PROCEDURE — 1200000000 HC SEMI PRIVATE

## 2024-05-25 PROCEDURE — 83540 ASSAY OF IRON: CPT

## 2024-05-25 PROCEDURE — 2580000003 HC RX 258

## 2024-05-25 PROCEDURE — 82607 VITAMIN B-12: CPT

## 2024-05-25 PROCEDURE — 84439 ASSAY OF FREE THYROXINE: CPT

## 2024-05-25 PROCEDURE — 6370000000 HC RX 637 (ALT 250 FOR IP)

## 2024-05-25 PROCEDURE — 84484 ASSAY OF TROPONIN QUANT: CPT

## 2024-05-25 PROCEDURE — 6370000000 HC RX 637 (ALT 250 FOR IP): Performed by: INTERNAL MEDICINE

## 2024-05-25 PROCEDURE — 99223 1ST HOSP IP/OBS HIGH 75: CPT | Performed by: INTERNAL MEDICINE

## 2024-05-25 PROCEDURE — 2580000003 HC RX 258: Performed by: EMERGENCY MEDICINE

## 2024-05-25 PROCEDURE — 6360000002 HC RX W HCPCS

## 2024-05-25 PROCEDURE — 6360000002 HC RX W HCPCS: Performed by: EMERGENCY MEDICINE

## 2024-05-25 PROCEDURE — 82746 ASSAY OF FOLIC ACID SERUM: CPT

## 2024-05-25 PROCEDURE — 93005 ELECTROCARDIOGRAM TRACING: CPT

## 2024-05-25 PROCEDURE — 6360000004 HC RX CONTRAST MEDICATION: Performed by: RADIOLOGY

## 2024-05-25 PROCEDURE — 84443 ASSAY THYROID STIM HORMONE: CPT

## 2024-05-25 PROCEDURE — 85025 COMPLETE CBC W/AUTO DIFF WBC: CPT

## 2024-05-25 RX ORDER — LANSOPRAZOLE 30 MG/1
30 TABLET, ORALLY DISINTEGRATING, DELAYED RELEASE ORAL
Status: DISCONTINUED | OUTPATIENT
Start: 2024-05-25 | End: 2024-05-31 | Stop reason: HOSPADM

## 2024-05-25 RX ORDER — ACETAMINOPHEN 325 MG/1
650 TABLET ORAL EVERY 4 HOURS PRN
Status: DISCONTINUED | OUTPATIENT
Start: 2024-05-25 | End: 2024-05-31 | Stop reason: HOSPADM

## 2024-05-25 RX ORDER — SODIUM CHLORIDE 9 MG/ML
INJECTION, SOLUTION INTRAVENOUS CONTINUOUS
Status: DISCONTINUED | OUTPATIENT
Start: 2024-05-25 | End: 2024-05-29

## 2024-05-25 RX ORDER — ACETAMINOPHEN 650 MG/1
650 SUPPOSITORY RECTAL EVERY 4 HOURS PRN
Status: DISCONTINUED | OUTPATIENT
Start: 2024-05-25 | End: 2024-05-31 | Stop reason: HOSPADM

## 2024-05-25 RX ADMIN — APIXABAN 2.5 MG: 5 TABLET, FILM COATED ORAL at 19:28

## 2024-05-25 RX ADMIN — ACETAMINOPHEN 650 MG: 325 TABLET ORAL at 08:15

## 2024-05-25 RX ADMIN — METOPROLOL SUCCINATE 12.5 MG: 25 TABLET, EXTENDED RELEASE ORAL at 08:10

## 2024-05-25 RX ADMIN — LANSOPRAZOLE 30 MG: 30 TABLET, ORALLY DISINTEGRATING ORAL at 06:04

## 2024-05-25 RX ADMIN — ACETAMINOPHEN 650 MG: 650 SUPPOSITORY RECTAL at 01:43

## 2024-05-25 RX ADMIN — MIDODRINE HYDROCHLORIDE 15 MG: 5 TABLET ORAL at 13:23

## 2024-05-25 RX ADMIN — EMPAGLIFLOZIN 10 MG: 10 TABLET, FILM COATED ORAL at 08:15

## 2024-05-25 RX ADMIN — ACETAMINOPHEN 650 MG: 325 TABLET ORAL at 19:27

## 2024-05-25 RX ADMIN — APIXABAN 2.5 MG: 5 TABLET, FILM COATED ORAL at 08:15

## 2024-05-25 RX ADMIN — ARFORMOTEROL TARTRATE 15 MCG: 15 SOLUTION RESPIRATORY (INHALATION) at 06:15

## 2024-05-25 RX ADMIN — SODIUM CHLORIDE: 9 INJECTION, SOLUTION INTRAVENOUS at 01:49

## 2024-05-25 RX ADMIN — MIDODRINE HYDROCHLORIDE 15 MG: 5 TABLET ORAL at 16:57

## 2024-05-25 RX ADMIN — IOPAMIDOL 75 ML: 755 INJECTION, SOLUTION INTRAVENOUS at 12:26

## 2024-05-25 RX ADMIN — ATORVASTATIN CALCIUM 20 MG: 20 TABLET, FILM COATED ORAL at 19:28

## 2024-05-25 RX ADMIN — CEFTRIAXONE 1000 MG: 1 INJECTION, POWDER, FOR SOLUTION INTRAMUSCULAR; INTRAVENOUS at 01:34

## 2024-05-25 RX ADMIN — ARFORMOTEROL TARTRATE 15 MCG: 15 SOLUTION RESPIRATORY (INHALATION) at 16:11

## 2024-05-25 RX ADMIN — BUDESONIDE INHALATION 500 MCG: 0.5 SUSPENSION RESPIRATORY (INHALATION) at 16:11

## 2024-05-25 RX ADMIN — SODIUM CHLORIDE: 9 INJECTION, SOLUTION INTRAVENOUS at 20:09

## 2024-05-25 RX ADMIN — BUDESONIDE INHALATION 500 MCG: 0.5 SUSPENSION RESPIRATORY (INHALATION) at 06:15

## 2024-05-25 RX ADMIN — IPRATROPIUM BROMIDE AND ALBUTEROL SULFATE 1 DOSE: .5; 2.5 SOLUTION RESPIRATORY (INHALATION) at 06:15

## 2024-05-25 NOTE — PROGRESS NOTES
Internal Medicine Consult Note    [facility]    Macario Dolan D.O., REKHAOKarolynI.                    Jeet Sharpe D.O., QIANI.                             Lincoln Thomas D.O.     Yamilet Bee, MSN, APRN, NP-C  Jose Suresh, MSN, APRN-CNP  Tim Carl, MSN, APRN-CNP  Leila Sanders, MSN APRN-CNP  Madonna June, MSN. APRN-NP-C     Primary Care Physician: Hermelindo Bedolla DO   Admitting Physician:  Macario Dolan DO  Admission date and time: 5/24/2024  5:25 PM    Room:  [address] [place] [address]  Admitting diagnosis: UTI (urinary tract infection) [N39.0]  Disorientation [R41.0]  Urinary tract infection without hematuria, site unspecified [N39.0]  Other fatigue [R53.83]    Patient Name: Kike Holloway  MRN: [id number]    Date of Service: 5/25/2024     Subjective:  Kike is a 78 y.o. male who was seen and examined today,5/25/2024, at the bedside.  Patient appeared to be ill with multiple complaints.  Patient did have placement of a Courtney catheter today and emptying of his bladder.  Patient does complain of some left flank pain along with pain in his left leg the patient has been home for approximately 3 weeks from rehab and does have evidence of a urinary tract infection since Tuesday.  Patient also complains some chest pain.  He does appear to be weak and debilitated    Wife was present during my examination.    Review of System:   Constitutional:   Appears chronically ill  HEENT:   Denies ear pain, sore throat, sinus or eye problems.  Cardiovascular:   Denies any chest pain, irregular heartbeats, or palpitations.   Respiratory:   Denies shortness of breath, coughing, sputum production, hemoptysis, or wheezing.  Gastrointestinal:   Left flank pain poor appetite.  Genitourinary:    Complains of urinary frequency  Extremities:   Denies lower extremity swelling, edema or cyanosis.  Ambulatory dysfunction with pain in left leg  Neurology:    Denies any headache or focal neurological deficits,  texture.  Genitalia/Breast:  Deferred    Medication:  Scheduled Meds:   lansoprazole  30 mg Oral QAM AC    budesonide  0.5 mg Nebulization BID RT    arformoterol tartrate  15 mcg Nebulization BID RT    [START ON 5/26/2024] cefTRIAXone (ROCEPHIN) IV  1,000 mg IntraVENous Q24H    apixaban  2.5 mg Oral BID    atorvastatin  20 mg Oral Nightly    docusate sodium  100 mg Oral Daily    empagliflozin  10 mg Oral Daily    metoprolol succinate  12.5 mg Oral Daily    midodrine  15 mg Oral TID WC     Continuous Infusions:   sodium chloride 50 mL/hr at 05/25/24 1027       Objective Data:  Recent Labs     05/24/24  1805 05/25/24  0548   WBC 7.4 8.5   RBC 4.39 4.45   HGB 12.9 13.2   HCT 40.1 40.7   MCV 91.3 91.5   MCH 29.4 29.7   MCHC 32.2 32.4   RDW 16.3* 16.4*    235   MPV 9.4 9.5     Recent Labs     05/24/24  1805 05/25/24  0548    137   K 4.7 4.5    100   CO2 22 18*   BUN 26* 26*   CREATININE 1.2 1.2   GLUCOSE 93 96   CALCIUM 9.4 9.6   BILITOT 1.2 1.1   ALKPHOS 108 111   AST 31 27   ALT 16 14   ALBUMIN 3.4* 3.4*     Lab Results   Component Value Date    TROPONINI 0.03 09/16/2019    TROPONINI <0.01 01/16/2018    TROPONINI <0.01 01/16/2018          Wound Documentation:   Wound 02/19/24 Nose Redness (Active)   Number of days: 96       Assessment:    Sepsis secondary urinary tract infection with associated tachycardia and altered mental status  Urinary tract infection due to urinary retention  Persistent atrial fibrillation on Eliquis  Coronary artery disease with history aortic stenosis and aortic valve sclerosis and demand ischemia  History of congestive heart failure with systolic dysfunction  Chronic obstructive pulm disease with chronic oxygen use  Hyperlipidemia  Essential hypertension  History of prostate cancer 44 treatment radiation  Peripheral vascular disease with claudication  Recent left retroperitoneal hematoma      Plan:       Cardiology consult for evaluation of elevated troponin and cardiac

## 2024-05-25 NOTE — CARE COORDINATION
Case Management Assessment  Initial Evaluation    Date/Time of Evaluation: 5/25/2024 4:27 PM  Assessment Completed by: MICHAEL Wilkinson    If patient is discharged prior to next notation, then this note serves as note for discharge by case management.    Patient Name: Kike Holloway                   YOB: 1945  Diagnosis: UTI (urinary tract infection) [N39.0]  Disorientation [R41.0]  Urinary tract infection without hematuria, site unspecified [N39.0]  Other fatigue [R53.83]                   Date / Time: 5/24/2024  5:25 PM    Patient Admission Status: Inpatient   Readmission Risk (Low < 19, Mod (19-27), High > 27): Readmission Risk Score: 18.7    Current PCP: Hermelindo Bedolla, DO  PCP verified by CM?      Chart Reviewed: Yes      History Provided by: Child/Family, Medical Record  Patient Orientation: Alert and Oriented, Person    Patient Cognition: Alert    Hospitalization in the last 30 days (Readmission):  No    If yes, Readmission Assessment in  Navigator will be completed.    Advance Directives:      Code Status: Limited   Patient's Primary Decision Maker is: Legal Next of Kin    Primary Decision Maker: Brynn Holloway - Spouse - [phone]    Discharge Planning:    Patient lives with: Spouse/Significant Other Type of Home: House  Primary Care Giver: Spouse  Patient Support Systems include: Family Members, Spouse/Significant Other   Current Financial resources:    Current community resources:    Current services prior to admission: Oxygen Therapy, Home Care            Current DME:              Type of Home Care services:  PT    ADLS  Prior functional level: Assistance with the following:  Current functional level: Assistance with the following:    PT AM-PAC:   /24  OT AM-PAC:   /24    Family can provide assistance at DC:  (TBD)  Would you like Case Management to discuss the discharge plan with any other family members/significant others, and if so, who? Yes  Plans to Return to  to consider SNF placement. Jesu stated his step mom and pt would need to make that decision together. Per Jesu, his step mom will be at hospital in AM to visit pt. If pt and family choose SNF route, pt will need choiced and will require PRECERT, HENS, GRACIE & 7000 be completed prior to dc. SW to continue to follow for dc planning/needs.     Regina Fuller, MICHAEL  Case Management Department

## 2024-05-25 NOTE — H&P
Internal Medicine Consult Note    [facility]    Macario Dolan D.O., REKHAOKarolynI.                    Jeet Sharpe D.O., REKHAOKarolynI.                             Lincoln Thomas D.O.     Yamilet Bee, MSN, APRN, NP-C  Jose Suresh, MSN, APRN-CNP  Tim Carl, MSN, APRN-CNP  Leila Sanders, MSN, APRN-CNP  Madonna June, MSN, APRN-CNP     Department of Internal Medicine  History and Physical    PCP: Dr. Bedolla   Admitting Physician: Dr. Dolan  Consultants: None      CHIEF COMPLAINT:  altered mental status    HISTORY OF PRESENT ILLNESS:    [place] is Kike's wife. He had slept for twelve hours yesterday and today she notes he has been weak, disoriented in the sense that she was talking to him at home and he was \"just staring through me\". She notes that he was recently here for respiratory failure and in the ICU. I remember taking care of him as an RN there and note that his skin discoloration is chronic from his PVD. She notes that he does have home PT and nursing services come to the house but there was only one day left of nursing services. He follows with Dr. Coates for cardiology and takes Jardiance for his heart failure management not for endocrine purposes. Kike himself did not offer up any specific complaints but did not know where we were or what the date was.     PAST MEDICAL Hx:  Past Medical History:   Diagnosis Date    Aortic stenosis, mild     Aortic valve sclerosis     moderate    Atrial fibrillation ([facility]) 2014    CAD (coronary artery disease)     CHF (congestive heart failure) ([facility])     COPD (chronic obstructive pulmonary disease) ([facility])     Encounter for diabetic foot exam ([facility]) 01/08/2024    Gallbladder attack     History of blood transfusion     History of tobacco use 09/17/2015    Hx of blood clots     Hyperlipidemia     Hypertension     Mild mitral regurgitation     Prostate cancer ([facility])     44 treatments of radiation    PVD (peripheral vascular disease) with

## 2024-05-25 NOTE — CONSULTS
CHIEF COMPLAINT: Chest pain    HISTORY OF PRESENT ILLNESS: Patient is a 78 y.o. male seen at the request of Hermelindo Bedolla DO and followed at our office by Dr. Coates.    Consulted for chest pain.    Patient is a poor historian.    Currently denies CP. Some SOB.     Past Medical History:   Diagnosis Date    Aortic stenosis, mild     Aortic valve sclerosis     moderate    Atrial fibrillation (HCC) 2014    CAD (coronary artery disease)     CHF (congestive heart failure) (HCC)     COPD (chronic obstructive pulmonary disease) (HCC)     Encounter for diabetic foot exam (HCC) 01/08/2024    Gallbladder attack     History of blood transfusion     History of tobacco use 09/17/2015    Hx of blood clots     Hyperlipidemia     Hypertension     Mild mitral regurgitation     Prostate cancer (HCC)     44 treatments of radiation    PVD (peripheral vascular disease) with claudication (HCC) 09/17/2015    RBBB     Tobacco abuse        Patient Active Problem List   Diagnosis    Hyperlipidemia with target LDL less than 100    BPH (benign prostatic hyperplasia)    ECG abnormal    Abnormal nuclear stress test    AS (aortic stenosis)    SOB (shortness of breath)    RBBB (right bundle branch block)    Anxiety    CAD (coronary artery disease)    Pulmonary emphysema (HCC)    Permanent atrial fibrillation (HCC)    AV block, Mobitz II    AV block    H/O bicuspid aortic valve    AI (aortic insufficiency)    S/P AVR (aortic valve replacement)    Anticoagulated on Coumadin    AAA (abdominal aortic aneurysm) without rupture (HCC)    Elevated liver enzymes    History of tobacco use    S/P CABG x 3    H/O aortic valve replacement with porcine valve    Aortic valve disease    Pure hypercholesterolemia    Normally functioning cardiac pacemaker present    History of smoking 30 or more pack years    Hx of CABG    Anticoagulated by anticoagulation treatment    S/P AAA repair using bifurcation graft    Bilateral leg weakness    Chronic systolic

## 2024-05-25 NOTE — ACP (ADVANCE CARE PLANNING)
Advance Care Planning   Healthcare Decision Maker:    Primary Decision Maker: [place] - [facility] - [phone]      Today we documented Decision Maker(s) consistent with Legal Next of Kin hierarchy.

## 2024-05-26 PROBLEM — E43 SEVERE PROTEIN-CALORIE MALNUTRITION (HCC): Status: ACTIVE | Noted: 2024-04-05

## 2024-05-26 LAB
ALBUMIN SERPL-MCNC: 2.7 G/DL (ref 3.5–5.2)
ALP SERPL-CCNC: 87 U/L (ref 40–129)
ALT SERPL-CCNC: 12 U/L (ref 0–40)
ANION GAP SERPL CALCULATED.3IONS-SCNC: 13 MMOL/L (ref 7–16)
AST SERPL-CCNC: 28 U/L (ref 0–39)
BASOPHILS # BLD: 0.07 K/UL (ref 0–0.2)
BASOPHILS NFR BLD: 1 % (ref 0–2)
BILIRUB SERPL-MCNC: 0.7 MG/DL (ref 0–1.2)
BUN SERPL-MCNC: 27 MG/DL (ref 6–23)
CALCIUM SERPL-MCNC: 8.6 MG/DL (ref 8.6–10.2)
CHLORIDE SERPL-SCNC: 102 MMOL/L (ref 98–107)
CO2 SERPL-SCNC: 19 MMOL/L (ref 22–29)
CREAT SERPL-MCNC: 1.3 MG/DL (ref 0.7–1.2)
EOSINOPHIL # BLD: 0.22 K/UL (ref 0.05–0.5)
EOSINOPHILS RELATIVE PERCENT: 2 % (ref 0–6)
ERYTHROCYTE [DISTWIDTH] IN BLOOD BY AUTOMATED COUNT: 16.2 % (ref 11.5–15)
GFR, ESTIMATED: 56 ML/MIN/1.73M2
GLUCOSE SERPL-MCNC: 105 MG/DL (ref 74–99)
HCT VFR BLD AUTO: 34.3 % (ref 37–54)
HGB BLD-MCNC: 10.9 G/DL (ref 12.5–16.5)
IMM GRANULOCYTES # BLD AUTO: 0.04 K/UL (ref 0–0.58)
IMM GRANULOCYTES NFR BLD: 0 % (ref 0–5)
LYMPHOCYTES NFR BLD: 1.44 K/UL (ref 1.5–4)
LYMPHOCYTES RELATIVE PERCENT: 13 % (ref 20–42)
MCH RBC QN AUTO: 29.3 PG (ref 26–35)
MCHC RBC AUTO-ENTMCNC: 31.8 G/DL (ref 32–34.5)
MCV RBC AUTO: 92.2 FL (ref 80–99.9)
MONOCYTES NFR BLD: 0.62 K/UL (ref 0.1–0.95)
MONOCYTES NFR BLD: 6 % (ref 2–12)
NEUTROPHILS NFR BLD: 78 % (ref 43–80)
NEUTS SEG NFR BLD: 8.65 K/UL (ref 1.8–7.3)
PLATELET # BLD AUTO: 196 K/UL (ref 130–450)
PMV BLD AUTO: 9.8 FL (ref 7–12)
POTASSIUM SERPL-SCNC: 3.9 MMOL/L (ref 3.5–5)
PROT SERPL-MCNC: 7.2 G/DL (ref 6.4–8.3)
RBC # BLD AUTO: 3.72 M/UL (ref 3.8–5.8)
SODIUM SERPL-SCNC: 134 MMOL/L (ref 132–146)
WBC OTHER # BLD: 11 K/UL (ref 4.5–11.5)

## 2024-05-26 PROCEDURE — 94640 AIRWAY INHALATION TREATMENT: CPT

## 2024-05-26 PROCEDURE — 1200000000 HC SEMI PRIVATE

## 2024-05-26 PROCEDURE — 80053 COMPREHEN METABOLIC PANEL: CPT

## 2024-05-26 PROCEDURE — 99233 SBSQ HOSP IP/OBS HIGH 50: CPT | Performed by: INTERNAL MEDICINE

## 2024-05-26 PROCEDURE — 85025 COMPLETE CBC W/AUTO DIFF WBC: CPT

## 2024-05-26 PROCEDURE — 36415 COLL VENOUS BLD VENIPUNCTURE: CPT

## 2024-05-26 PROCEDURE — 97161 PT EVAL LOW COMPLEX 20 MIN: CPT | Performed by: PHYSICAL THERAPIST

## 2024-05-26 PROCEDURE — 6370000000 HC RX 637 (ALT 250 FOR IP)

## 2024-05-26 PROCEDURE — 6360000002 HC RX W HCPCS

## 2024-05-26 PROCEDURE — 97530 THERAPEUTIC ACTIVITIES: CPT | Performed by: PHYSICAL THERAPIST

## 2024-05-26 PROCEDURE — 2580000003 HC RX 258

## 2024-05-26 RX ADMIN — ATORVASTATIN CALCIUM 20 MG: 20 TABLET, FILM COATED ORAL at 21:27

## 2024-05-26 RX ADMIN — APIXABAN 2.5 MG: 5 TABLET, FILM COATED ORAL at 21:30

## 2024-05-26 RX ADMIN — MIDODRINE HYDROCHLORIDE 15 MG: 5 TABLET ORAL at 17:19

## 2024-05-26 RX ADMIN — CEFTRIAXONE 1000 MG: 1 INJECTION, POWDER, FOR SOLUTION INTRAMUSCULAR; INTRAVENOUS at 02:03

## 2024-05-26 RX ADMIN — EMPAGLIFLOZIN 10 MG: 10 TABLET, FILM COATED ORAL at 08:21

## 2024-05-26 RX ADMIN — ARFORMOTEROL TARTRATE 15 MCG: 15 SOLUTION RESPIRATORY (INHALATION) at 06:09

## 2024-05-26 RX ADMIN — MIDODRINE HYDROCHLORIDE 15 MG: 5 TABLET ORAL at 12:59

## 2024-05-26 RX ADMIN — MIDODRINE HYDROCHLORIDE 15 MG: 5 TABLET ORAL at 08:20

## 2024-05-26 RX ADMIN — BUDESONIDE INHALATION 500 MCG: 0.5 SUSPENSION RESPIRATORY (INHALATION) at 18:11

## 2024-05-26 RX ADMIN — METOPROLOL SUCCINATE 12.5 MG: 25 TABLET, EXTENDED RELEASE ORAL at 08:21

## 2024-05-26 RX ADMIN — BUDESONIDE INHALATION 500 MCG: 0.5 SUSPENSION RESPIRATORY (INHALATION) at 06:09

## 2024-05-26 RX ADMIN — DOCUSATE SODIUM 100 MG: 100 CAPSULE, LIQUID FILLED ORAL at 08:21

## 2024-05-26 RX ADMIN — ARFORMOTEROL TARTRATE 15 MCG: 15 SOLUTION RESPIRATORY (INHALATION) at 18:11

## 2024-05-26 RX ADMIN — LANSOPRAZOLE 30 MG: 30 TABLET, ORALLY DISINTEGRATING ORAL at 06:15

## 2024-05-26 RX ADMIN — APIXABAN 2.5 MG: 5 TABLET, FILM COATED ORAL at 08:21

## 2024-05-26 NOTE — PROGRESS NOTES
primary service.      8. CKD: Follow labs.      9. Anemia: Follow labs.      10. Hx of AAA endovascular repair 2018     11. Encephalopathy/Change of MS    Available external charts reviewed.   Available imaging and evaluations independently reviewed.   Interviewed and discussed patient with available family.  Discussed case with referring service and non-cardiology consultants.     Greater than 50 minutes was spent counseling the patient, reviewing the rationale for the above recommendations and reviewing the patient's current medication list, problem list and results of all previously ordered testing.    Martinez Jaime D.O.  Cardiologist  Cardiology, [facility]

## 2024-05-26 NOTE — PROGRESS NOTES
Internal Medicine Consult Note    [facility]    Macario Dolan D.O., REKHAOKarolynI.                    Jeet Sharpe D.O., REKHAOKarolynI.                             Lincoln Thomas D.O.     Yamilet Bee, MSN, APRN, NP-C  Jose Suresh, MSN, APRN-CNP  iTm Carl, MSN, APRN-CNP  Leila Sanders, MSN APRN-CNP  Madonna June, MSN. APRN-NP-C     Primary Care Physician: Hermelindo Bedolla DO   Admitting Physician:  Macario Dolan DO  Admission date and time: 5/24/2024  5:25 PM    Room:  [address] [place] [address]  Admitting diagnosis: UTI (urinary tract infection) [N39.0]  Disorientation [R41.0]  Urinary tract infection without hematuria, site unspecified [N39.0]  Other fatigue [R53.83]    Patient Name: Kike Holloway  MRN: [id number]    Date of Service: 5/26/2024     Subjective:  Kike is a 78 y.o. male who was seen and examined today,5/26/2024, at the bedside.  Patient clinically improved today.  Discussed results of CT findings.  Evidence of urinary tract infection is present.  Patient much more alert and responsive interactive      No one present during my examination.    Review of System:   Constitutional:   Appears chronically ill.  Appears more animated today with interaction  HEENT:   Denies ear pain, sore throat, sinus or eye problems.  Cardiovascular:   Denies any chest pain, irregular heartbeats, or palpitations.   Respiratory:   Denies shortness of breath, coughing, sputum production, hemoptysis, or wheezing.  Gastrointestinal:   Left flank pain poor appetite== improved  Genitourinary:    Complains of urinary frequency  Extremities:   Denies lower extremity swelling, edema or cyanosis.  Ambulatory dysfunction with pain in left leg  Neurology:    Denies any headache or focal neurological deficits, Denies generalized weakness or memory difficulty.   Psch:   Denies being anxious or depressed.  Musculoskeletal:    Denies  myalgias, joint complaints or back pain.   Integumentary:   Denies any

## 2024-05-26 NOTE — PROGRESS NOTES
Physical Therapy    Physical Therapy Initial Evaluation/Plan of Care    Room #:  0421/0421-02  Patient Name: Kike Holloway  YOB: 1945  MRN: [id number]    Date of Service: 5/26/2024     Tentative placement recommendation: Subacute Rehab  Equipment recommendation: Patient has needed equipment       Evaluating Physical Therapist: Willem Oneill, PT  #[id number]      Specific Provider Orders/Date/Referring Provider :     PT eval and treat  Start:  05/25/24 1100,   End:  05/25/24 1100,   ONE TIME,   Standing Count:  1 Occurrences,   R       Tim Carl, APRN - CNP    Admitting Diagnosis:   UTI (urinary tract infection) [N39.0]  Disorientation [R41.0]  Urinary tract infection without hematuria, site unspecified [N39.0]  Other fatigue [R53.83]    Admitted with    above, sepsis  Surgery: none  Visit Diagnoses         Codes    Disorientation     R41.0            Patient Active Problem List   Diagnosis    Hyperlipidemia with target LDL less than 100    BPH (benign prostatic hyperplasia)    ECG abnormal    Abnormal nuclear stress test    AS (aortic stenosis)    SOB (shortness of breath)    RBBB (right bundle branch block)    Anxiety    CAD (coronary artery disease)    Pulmonary emphysema (HCC)    Permanent atrial fibrillation (HCC)    AV block, Mobitz II    AV block    H/O bicuspid aortic valve    AI (aortic insufficiency)    S/P AVR (aortic valve replacement)    Anticoagulated on Coumadin    AAA (abdominal aortic aneurysm) without rupture (HCC)    Elevated liver enzymes    History of tobacco use    S/P CABG x 3    H/O aortic valve replacement with porcine valve    Aortic valve disease    Pure hypercholesterolemia    Normally functioning cardiac pacemaker present    History of smoking 30 or more pack years    Hx of CABG    Anticoagulated by anticoagulation treatment    S/P AAA repair using bifurcation graft    Bilateral leg weakness    Chronic systolic congestive heart failure (HCC)    PVD (peripheral vascular

## 2024-05-26 NOTE — PROGRESS NOTES
Comprehensive Nutrition Assessment    Type and Reason for Visit:  Initial, Positive Nutrition Screen (MST 3)    Nutrition Recommendations/Plan:   Continue Current Diet  Will discontinue ONS, as pt refuses ONS of any kind  Continue to monitor & encourage intake     Malnutrition Assessment:  Malnutrition Status:  Severe malnutrition (05/26/24 1942)    Context:  Acute Illness     Findings of the 6 clinical characteristics of malnutrition:  Energy Intake:  50% or less of estimated energy requirements for 5 or more days  Weight Loss:  Greater than 7.5% over 3 months (21.4% wt loss in the last 3 months)     Body Fat Loss:  Moderate body fat loss Orbital, Buccal region   Muscle Mass Loss:  Moderate muscle mass loss Temples (temporalis), Clavicles (pectoralis & deltoids)  Fluid Accumulation:  No significant fluid accumulation     Strength:  Not Performed    Nutrition Assessment:    Pt admit for UTI, weakness, AMS. PMHx: Afib, NSTEMI, CAD, COPD, CHF, HLD, HTN, Blood clots, RBB, PVD, Prostate cancer, CHF, pulmonary emphisema, Hx of CABG. Pt had multiple hospital stays since January 2024. Pt meet criteria for severe malnutrition. Pt's wife at bedside, reports pt refuses ONS of any kind. PO intake ~26-50% meals. Will continue inpatient monitoring.    Nutrition Related Findings:    Oriented/disoriented at times, I/O +935mL since admit, abd soft, NT, bowel sounds x4 active, Last BM 5/26 per emr, no edema, BUN 27, Cr 1.3, GFR 56, BGlu , Hgb 10.9, Hct 34.3, e'lytes WNL. Loss of appetite, 21.4% wt loss in last 3 months. Wound Type: None       Current Nutrition Intake & Therapies:    Average Meal Intake: 26-50%  Average Supplements Intake: Refusing to take  ADULT DIET; Regular; Low Sodium (2 gm)  ADULT ORAL NUTRITION SUPPLEMENT; Breakfast, Lunch, Dinner, HS Snack; Clear Liquid Oral Supplement    Anthropometric Measures:  Height: 167.6 cm (5' 5.98\")  Ideal Body Weight (IBW): 142 lbs (65 kg)    Admission Body Weight: 53.5 kg

## 2024-05-27 PROBLEM — A41.9 SEPSIS (HCC): Status: ACTIVE | Noted: 2024-05-27

## 2024-05-27 LAB
ALBUMIN SERPL-MCNC: 2.7 G/DL (ref 3.5–5.2)
ALP SERPL-CCNC: 86 U/L (ref 40–129)
ALT SERPL-CCNC: 15 U/L (ref 0–40)
ANION GAP SERPL CALCULATED.3IONS-SCNC: 12 MMOL/L (ref 7–16)
AST SERPL-CCNC: 36 U/L (ref 0–39)
BASOPHILS # BLD: 0.04 K/UL (ref 0–0.2)
BASOPHILS NFR BLD: 1 % (ref 0–2)
BILIRUB SERPL-MCNC: 0.5 MG/DL (ref 0–1.2)
BUN SERPL-MCNC: 22 MG/DL (ref 6–23)
CALCIUM SERPL-MCNC: 8.3 MG/DL (ref 8.6–10.2)
CHLORIDE SERPL-SCNC: 106 MMOL/L (ref 98–107)
CO2 SERPL-SCNC: 19 MMOL/L (ref 22–29)
CREAT SERPL-MCNC: 1.1 MG/DL (ref 0.7–1.2)
EOSINOPHIL # BLD: 0.36 K/UL (ref 0.05–0.5)
EOSINOPHILS RELATIVE PERCENT: 5 % (ref 0–6)
ERYTHROCYTE [DISTWIDTH] IN BLOOD BY AUTOMATED COUNT: 15.9 % (ref 11.5–15)
GFR, ESTIMATED: 68 ML/MIN/1.73M2
GLUCOSE SERPL-MCNC: 89 MG/DL (ref 74–99)
HCT VFR BLD AUTO: 31 % (ref 37–54)
HGB BLD-MCNC: 10 G/DL (ref 12.5–16.5)
IMM GRANULOCYTES # BLD AUTO: 0.03 K/UL (ref 0–0.58)
IMM GRANULOCYTES NFR BLD: 0 % (ref 0–5)
LYMPHOCYTES NFR BLD: 1.2 K/UL (ref 1.5–4)
LYMPHOCYTES RELATIVE PERCENT: 18 % (ref 20–42)
MAGNESIUM SERPL-MCNC: 2 MG/DL (ref 1.6–2.6)
MCH RBC QN AUTO: 29.4 PG (ref 26–35)
MCHC RBC AUTO-ENTMCNC: 32.3 G/DL (ref 32–34.5)
MCV RBC AUTO: 91.2 FL (ref 80–99.9)
MICROORGANISM SPEC CULT: ABNORMAL
MICROORGANISM SPEC CULT: ABNORMAL
MONOCYTES NFR BLD: 0.51 K/UL (ref 0.1–0.95)
MONOCYTES NFR BLD: 8 % (ref 2–12)
NEUTROPHILS NFR BLD: 68 % (ref 43–80)
NEUTS SEG NFR BLD: 4.62 K/UL (ref 1.8–7.3)
PLATELET # BLD AUTO: 180 K/UL (ref 130–450)
PMV BLD AUTO: 10.2 FL (ref 7–12)
POTASSIUM SERPL-SCNC: 3.4 MMOL/L (ref 3.5–5)
PROCALCITONIN SERPL-MCNC: 14.38 NG/ML (ref 0–0.08)
PROT SERPL-MCNC: 6.6 G/DL (ref 6.4–8.3)
RBC # BLD AUTO: 3.4 M/UL (ref 3.8–5.8)
SODIUM SERPL-SCNC: 137 MMOL/L (ref 132–146)
SPECIMEN DESCRIPTION: ABNORMAL
WBC OTHER # BLD: 6.8 K/UL (ref 4.5–11.5)

## 2024-05-27 PROCEDURE — 36415 COLL VENOUS BLD VENIPUNCTURE: CPT

## 2024-05-27 PROCEDURE — 2580000003 HC RX 258

## 2024-05-27 PROCEDURE — 2580000003 HC RX 258: Performed by: NURSE PRACTITIONER

## 2024-05-27 PROCEDURE — P9047 ALBUMIN (HUMAN), 25%, 50ML: HCPCS | Performed by: NURSE PRACTITIONER

## 2024-05-27 PROCEDURE — 85025 COMPLETE CBC W/AUTO DIFF WBC: CPT

## 2024-05-27 PROCEDURE — 97165 OT EVAL LOW COMPLEX 30 MIN: CPT | Performed by: OCCUPATIONAL THERAPIST

## 2024-05-27 PROCEDURE — 94640 AIRWAY INHALATION TREATMENT: CPT

## 2024-05-27 PROCEDURE — 97530 THERAPEUTIC ACTIVITIES: CPT | Performed by: OCCUPATIONAL THERAPIST

## 2024-05-27 PROCEDURE — 80053 COMPREHEN METABOLIC PANEL: CPT

## 2024-05-27 PROCEDURE — 84145 PROCALCITONIN (PCT): CPT

## 2024-05-27 PROCEDURE — 99233 SBSQ HOSP IP/OBS HIGH 50: CPT | Performed by: INTERNAL MEDICINE

## 2024-05-27 PROCEDURE — 6370000000 HC RX 637 (ALT 250 FOR IP): Performed by: NURSE PRACTITIONER

## 2024-05-27 PROCEDURE — 6360000002 HC RX W HCPCS: Performed by: NURSE PRACTITIONER

## 2024-05-27 PROCEDURE — 6360000002 HC RX W HCPCS

## 2024-05-27 PROCEDURE — 87040 BLOOD CULTURE FOR BACTERIA: CPT

## 2024-05-27 PROCEDURE — 6370000000 HC RX 637 (ALT 250 FOR IP)

## 2024-05-27 PROCEDURE — 1200000000 HC SEMI PRIVATE

## 2024-05-27 PROCEDURE — 83735 ASSAY OF MAGNESIUM: CPT

## 2024-05-27 RX ORDER — ALBUMIN (HUMAN) 12.5 G/50ML
25 SOLUTION INTRAVENOUS EVERY 4 HOURS
Status: COMPLETED | OUTPATIENT
Start: 2024-05-27 | End: 2024-05-27

## 2024-05-27 RX ORDER — POTASSIUM CHLORIDE 20 MEQ/1
40 TABLET, EXTENDED RELEASE ORAL PRN
Status: DISCONTINUED | OUTPATIENT
Start: 2024-05-27 | End: 2024-05-31 | Stop reason: HOSPADM

## 2024-05-27 RX ORDER — POTASSIUM CHLORIDE 7.45 MG/ML
10 INJECTION INTRAVENOUS PRN
Status: DISCONTINUED | OUTPATIENT
Start: 2024-05-27 | End: 2024-05-31 | Stop reason: HOSPADM

## 2024-05-27 RX ORDER — MAGNESIUM SULFATE 1 G/100ML
1000 INJECTION INTRAVENOUS PRN
Status: DISCONTINUED | OUTPATIENT
Start: 2024-05-27 | End: 2024-05-31 | Stop reason: HOSPADM

## 2024-05-27 RX ADMIN — APIXABAN 2.5 MG: 5 TABLET, FILM COATED ORAL at 09:33

## 2024-05-27 RX ADMIN — APIXABAN 2.5 MG: 5 TABLET, FILM COATED ORAL at 20:04

## 2024-05-27 RX ADMIN — MIDODRINE HYDROCHLORIDE 15 MG: 5 TABLET ORAL at 09:34

## 2024-05-27 RX ADMIN — SODIUM CHLORIDE: 9 INJECTION, SOLUTION INTRAVENOUS at 09:52

## 2024-05-27 RX ADMIN — EMPAGLIFLOZIN 10 MG: 10 TABLET, FILM COATED ORAL at 09:34

## 2024-05-27 RX ADMIN — POTASSIUM CHLORIDE 40 MEQ: 1500 TABLET, EXTENDED RELEASE ORAL at 16:52

## 2024-05-27 RX ADMIN — MIDODRINE HYDROCHLORIDE 15 MG: 5 TABLET ORAL at 12:33

## 2024-05-27 RX ADMIN — MIDODRINE HYDROCHLORIDE 15 MG: 5 TABLET ORAL at 16:53

## 2024-05-27 RX ADMIN — METOPROLOL SUCCINATE 12.5 MG: 25 TABLET, EXTENDED RELEASE ORAL at 11:16

## 2024-05-27 RX ADMIN — CEFTRIAXONE 1000 MG: 1 INJECTION, POWDER, FOR SOLUTION INTRAMUSCULAR; INTRAVENOUS at 00:25

## 2024-05-27 RX ADMIN — ARFORMOTEROL TARTRATE 15 MCG: 15 SOLUTION RESPIRATORY (INHALATION) at 18:12

## 2024-05-27 RX ADMIN — MEROPENEM 1000 MG: 1 INJECTION, POWDER, FOR SOLUTION INTRAVENOUS at 12:29

## 2024-05-27 RX ADMIN — ARFORMOTEROL TARTRATE 15 MCG: 15 SOLUTION RESPIRATORY (INHALATION) at 06:47

## 2024-05-27 RX ADMIN — ALBUMIN (HUMAN) 25 G: 0.25 INJECTION, SOLUTION INTRAVENOUS at 09:52

## 2024-05-27 RX ADMIN — BUDESONIDE INHALATION 500 MCG: 0.5 SUSPENSION RESPIRATORY (INHALATION) at 18:12

## 2024-05-27 RX ADMIN — BUDESONIDE INHALATION 500 MCG: 0.5 SUSPENSION RESPIRATORY (INHALATION) at 06:47

## 2024-05-27 RX ADMIN — LANSOPRAZOLE 30 MG: 30 TABLET, ORALLY DISINTEGRATING ORAL at 06:24

## 2024-05-27 RX ADMIN — MEROPENEM 1000 MG: 1 INJECTION, POWDER, FOR SOLUTION INTRAVENOUS at 20:06

## 2024-05-27 RX ADMIN — ATORVASTATIN CALCIUM 20 MG: 20 TABLET, FILM COATED ORAL at 20:04

## 2024-05-27 RX ADMIN — ALBUMIN (HUMAN) 25 G: 0.25 INJECTION, SOLUTION INTRAVENOUS at 17:08

## 2024-05-27 NOTE — CONSULTS
INPATIENT CONSULTATION RECORD FOR  5/27/2024      [facility]  [address]  [place] [id number] [phone]                                                                                                                         REASON FOR CONSULTATION:      Left renal changes. Ischemic areas of superior pole     HISTORY OF PRESENT ILLNESS:      The patient is a 78 y.o. male patient who presents with altered mental status.  He denies any previous urological issues.  He does have an extremely extensive vascular history with aortic stent grafting going from the thoracic aorta all the way down to the bifurcation and beyond.  We were consulted for abnormal changes on CAT scan.  This does show some ischemic changes to the superior pole.  He is not having any pain at this time.  He denies any gross hematuria.  He has never had renal surgery in the past.  He is unsure of the vascular surgeon who has done all of his stent grafting.  He denies any voiding issues at this time no fevers or chills.      Past Medical History:   Diagnosis Date    Aortic stenosis, mild     Aortic valve sclerosis     moderate    Atrial fibrillation ([facility]) 2014    CAD (coronary artery disease)     CHF (congestive heart failure) ([facility])     COPD (chronic obstructive pulmonary disease) ([facility])     Encounter for diabetic foot exam ([facility]) 01/08/2024    Gallbladder attack     History of blood transfusion     History of tobacco use 09/17/2015    Hx of blood clots     Hyperlipidemia     Hypertension     Mild mitral regurgitation     Prostate cancer ([facility])     44 treatments of radiation    PVD (peripheral vascular disease) with claudication ([facility]) 09/17/2015    RBBB     Tobacco abuse          Past Surgical History:   Procedure Laterality Date    APPENDECTOMY      CARDIAC CATHETERIZATION  08/08/2014    DR Coates-pt. states had triple bypass \"5 years ago\"    COLONOSCOPY      COLONOSCOPY N/A 4/2/2024    COLONOSCOPY performed by Sunni  Abdominal pain

## 2024-05-27 NOTE — PROGRESS NOTES
Internal Medicine Consult Note    [facility]    Macario Dolan D.O., REKHAOKarolynI.                    Jeet Sharpe D.O., REKHAOKarolynI.                             Lincoln Thomas D.O.     Yamilet Bee, MSN, APRN, NP-C  Jose Suresh, MSN, APRN-CNP  Tim Carl, MSN, APRN-CNP  Leila Sanders, MSN APRN-CNP  Madonna June, MSN. APRN-NP-C     Primary Care Physician: Hermelindo Bedolla DO   Admitting Physician:  Macario Dolan DO  Admission date and time: 5/24/2024  5:25 PM    Room:  [address] [place] [address]  Admitting diagnosis: UTI (urinary tract infection) [N39.0]  Disorientation [R41.0]  Urinary tract infection without hematuria, site unspecified [N39.0]  Other fatigue [R53.83]    Patient Name: Kike Holloway  MRN: [id number]    Date of Service: 5/27/2024     Subjective:  Kike is a 78 y.o. male who was seen and examined today,5/27/2024, at the bedside.  He is more awake and alert today but remains deviated from baseline.  We reviewed the results of the workup, plan of care moving forward and likelihood of skilled nursing facility placement.  He is understanding and agreeable.  Voices no new symptoms or concerns. No one present during my examination.    Review of systems:  Constitutional:   + fatigue and malaise , - fever/chills  HEENT:   Denies ear pain, sore throat, sinus or eye problems.  Cardiovascular:   - chest pain, irregular heartbeats, or palpitations.   Respiratory:   - dyspnea at rest, - dyspnea on exertion, - coughing, - sputum, - hemoptysis  Gastrointestinal:   - nausea, -vomiting. - diarrhea, - constipation. + poor appetite and poor intake. + but improved abdominal pain.  Genitourinary:    + for improved UTI symptoms.  Extremities:   Denies acute lower extremity swelling, edema or cyanosis.   Neurology:    Denies any headache or focal neurological deficits, positive for generalized weakness and fatigue without focal component  Psch:   Denies being anxious or  decline or death.  Continued cardiac monitoring and higher level of nursing are required. I am readily available for any further decision-making and intervention.       NURY Huffman - YO,   5/27/2024  9:15 AM

## 2024-05-27 NOTE — PROGRESS NOTES
CHIEF COMPLAINT: Chest pain    HISTORY OF PRESENT ILLNESS: Patient is a 78 y.o. male seen at the request of Hermelindo Bedolla DO and followed at our office by Dr. Coates.    Consulted for chest pain.    Patient is a poor historian.    Currently denies CP. Some SOB.     Past Medical History:   Diagnosis Date    Aortic stenosis, mild     Aortic valve sclerosis     moderate    Atrial fibrillation (HCC) 2014    CAD (coronary artery disease)     CHF (congestive heart failure) (HCC)     COPD (chronic obstructive pulmonary disease) (HCC)     Encounter for diabetic foot exam (HCC) 01/08/2024    Gallbladder attack     History of blood transfusion     History of tobacco use 09/17/2015    Hx of blood clots     Hyperlipidemia     Hypertension     Mild mitral regurgitation     Prostate cancer (HCC)     44 treatments of radiation    PVD (peripheral vascular disease) with claudication (HCC) 09/17/2015    RBBB     Tobacco abuse        Patient Active Problem List   Diagnosis    Hyperlipidemia with target LDL less than 100    BPH (benign prostatic hyperplasia)    ECG abnormal    Abnormal nuclear stress test    AS (aortic stenosis)    SOB (shortness of breath)    RBBB (right bundle branch block)    Anxiety    CAD (coronary artery disease)    Pulmonary emphysema (HCC)    Permanent atrial fibrillation (HCC)    AV block, Mobitz II    AV block    H/O bicuspid aortic valve    AI (aortic insufficiency)    S/P AVR (aortic valve replacement)    Anticoagulated on Coumadin    AAA (abdominal aortic aneurysm) without rupture (HCC)    Elevated liver enzymes    History of tobacco use    S/P CABG x 3    H/O aortic valve replacement with porcine valve    Aortic valve disease    Pure hypercholesterolemia    Normally functioning cardiac pacemaker present    History of smoking 30 or more pack years    Hx of CABG    Anticoagulated by anticoagulation treatment    S/P AAA repair using bifurcation graft    Bilateral leg weakness    Chronic systolic    Vaping Use    Vaping Use: Never used   Substance and Sexual Activity    Alcohol use: Yes     Alcohol/week: 0.0 standard drinks of alcohol     Comment: rare;  drinks 1 cup of coffee daily & occ Pepsi/Tea    Drug use: No    Sexual activity: Defer   Other Topics Concern    Not on file   Social History Narrative    Not on file     Social Determinants of Health     Financial Resource Strain: Low Risk  (5/14/2024)    Overall Financial Resource Strain (CARDIA)     Difficulty of Paying Living Expenses: Not hard at all   Food Insecurity: No Food Insecurity (5/25/2024)    Hunger Vital Sign     Worried About Running Out of Food in the Last Year: Never true     Ran Out of Food in the Last Year: Never true   Transportation Needs: No Transportation Needs (5/25/2024)    PRAPARE - Transportation     Lack of Transportation (Medical): No     Lack of Transportation (Non-Medical): No   Physical Activity: Insufficiently Active (4/6/2022)    Exercise Vital Sign     Days of Exercise per Week: 3 days     Minutes of Exercise per Session: 20 min   Stress: Not on file   Social Connections: Not on file   Intimate Partner Violence: Not on file   Housing Stability: Low Risk  (5/25/2024)    Housing Stability Vital Sign     Unable to Pay for Housing in the Last Year: No     Number of Places Lived in the Last Year: 1     Unstable Housing in the Last Year: No       Family History   Problem Relation Age of Onset    Heart Attack Brother     Stroke Father        Review of Systems:   Heart: as above   Lungs: as above   Eyes: denies changes in vision or discharge.   Ears: denies changes in hearing or pain.   Nose: denies epistaxis or masses   Throat: denies sore throat or trouble swallowing.   Neuro: denies numbness, tingling, tremors.   Skin: denies rashes or itching.   : denies hematuria, dysuria   GI: denies vomiting, diarrhea   Psych: denies mood changed, anxiety, depression.  all others negative.    Physical Exam   BP (!) 94/54   Pulse 77

## 2024-05-27 NOTE — PROGRESS NOTES
OCCUPATIONAL THERAPY INITIAL EVALUATION    [facility]  [address] [facility]         Date:2024                                                   Patient Name: Kike Holloway     MRN: [id number]     : 1945     Room: [address] [place] [address]       Evaluating OT: Alison Morales, OTR/L; [id number]       Referring Provider and Orders/Date:    OT eval and treat  Start:  24 1100,   End:  24 1100,   ONE TIME,   Standing Count:  1 Occurrences,   R       Tim Carl, APRN - CNP    Recommended placement: subacute rehab vs home with home health dependent on level of assistance family can provide on DC. Family requesting home health at time of eval       Diagnosis:   1. Urinary tract infection without hematuria, site unspecified    2. Disorientation    3. Other fatigue    4. History of prostate cancer         Surgery: none      Pertinent Medical History:        Past Medical History:   Diagnosis Date    Aortic stenosis, mild     Aortic valve sclerosis     moderate    Atrial fibrillation (HCC)     CAD (coronary artery disease)     CHF (congestive heart failure) ([facility])     COPD (chronic obstructive pulmonary disease) ([facility])     Encounter for diabetic foot exam ([facility]) 2024    Gallbladder attack     History of blood transfusion     History of tobacco use 2015    Hx of blood clots     Hyperlipidemia     Hypertension     Mild mitral regurgitation     Prostate cancer ([facility])     44 treatments of radiation    PVD (peripheral vascular disease) with claudication ([facility]) 2015    RBBB     Tobacco abuse           Past Surgical History:   Procedure Laterality Date    APPENDECTOMY      CARDIAC CATHETERIZATION  2014    DR Coates-pt. states had triple bypass \"5 years ago\"    COLONOSCOPY      COLONOSCOPY N/A 2024    COLONOSCOPY performed by Ryley Moeller MD at [facility] ENDOSCOPY    CORONARY ARTERY BYPASS GRAFT  2014    CABGx3 LIMA-LAD, SVG-D1,  CK     Functional Assessment:     Initial Eval Status  Date: 5/27/24 Treatment Status  Date: STGs = LTGs  Time frame: 10-14 days   Feeding Supervision and set up  Indep   Grooming Minimal Assist sitting EOB  Supervision    UB Dressing Moderate Assist with gown management sitting EOB  Supervision    LB Dressing Maximal Assist with socks EOB  Minimal Assist    Bathing Minimal Assist suspected, but not assessed on eval  Supervision    Toileting Maximal Assist with wing management  Minimal Assist    Bed Mobility  Supine to sit: Moderate Assist   Sit to supine: Moderate Assist     Supine to sit: Supervision   Sit to supine: Supervision    Functional Transfers Min Assist from bed and arm chair with walker. Decreased safety  Supervision    Functional Mobility Minimal Assist with walker and assist for line management  Supervision    Balance Sitting:     Static:  fair+    Dynamic:fair  Standing: fair-  Sitting:     Static:  good    Dynamic:good  Standing: fair+   Activity Tolerance Vitals with activity:fair tolerance overall with decreased motivation; sitting EOB for 5min and standing 1min    Increase standing tolerance for >3min with stable vital signs for carry over into toileting, functional tranfers and indep in ADLs   Visual/  Perceptual Glasses: not Present; WFL    Reports change in vision since admission: No     NA     Hand Dominance  [x] Right  [] Left    AROM (PROM) Strength Additional Info:  Goal:   RUE  WFL 4/5 good  and  FMC/dexterity noted during ADL tasks  Opposition [x] Intact [] Impaired  Finger to nose [x] Intact [] Impaired 5/5MMT generally for carry over into self care, functional transfers and functional mobility with AD.    LUE WFL 4/5 good  and  FMC/dexterity noted during ADL tasks  Opposition [x] Intact [] Impaired  Finger to nose [x] Intact [] Impaired 5/5MMT generally for carry over into self care, functional transfers and functional mobility with AD.      Hearing: WFL   Sensation:  No c/o

## 2024-05-27 NOTE — CONSULTS
abnormalities, normal ROM weak  Skin: warm, dry, normal turgor, normal color pale  Neurological: nerves II - XII grossly normal and symmetric  Psychology: mood normal, affect normal    Labs       Recent Labs     05/25/24  0548 05/26/24  0610 05/27/24 0459   WBC 8.5 11.0 6.8   RBC 4.45 3.72* 3.40*   HGB 13.2 10.9* 10.0*   HCT 40.7 34.3* 31.0*   MCV 91.5 92.2 91.2   RDW 16.4* 16.2* 15.9*    196 180        Recent Labs     05/25/24  0548 05/26/24  0610 05/27/24 0459    134 137   K 4.5 3.9 3.4*    102 106   CO2 18* 19* 19*   BUN 26* 27* 22   CREATININE 1.2 1.3* 1.1   GLUCOSE 96 105* 89   MG  --   --  2.0       Recent Labs     05/24/24  1824 05/25/24  0548 05/26/24  0610 05/27/24 0459   PROCAL 0.64*  --   --   --    AST  --  27 28 36   ALT  --  14 12 15   TRIG  --  88  --   --      Lab Results   Component Value Date/Time    CHOL 109 05/25/2024 05:48 AM    TRIG 88 05/25/2024 05:48 AM    HDL 31 05/25/2024 05:48 AM     Lab Results   Component Value Date/Time    VITD25 20.2 (L) 05/25/2024 05:48 AM        Recent Labs     05/25/24  0548 05/26/24  0610 05/27/24 0459   AST 27 28 36   ALT 14 12 15   BILITOT 1.1 0.7 0.5   ALKPHOS 111 87 86     Results       Procedure Component Value Units Date/Time    Legionella antigen, urine [[id number]] Collected: 05/24/24 2153    Order Status: Completed Specimen: Urine Updated: 05/25/24 0817     Legionella Pneumophilia Ag, Urine NEGATIVE     Comment:       L. pneumophila serogroup 1 antigen not detected.  A negative result does not exclude infection with Leginella pnemophila serogroup 1 nor does   it rule out other microbial-caused respiratory infections of disease caused by other   serogroups of Legionella pneumophila.         Strep Pneumoniae Antigen [[id number]] Collected: 05/24/24 0474    Order Status: Completed Specimen: Urine voided Updated: 05/25/24 6420     Source .CLEAN CATCH URINE     Strep pneumo Ag NEGATIVE     Comment:       Presumptive Negative  suggests no  intracranial abnormality. Stable diffuse volume loss and extensive chronic small vessel ischemic white matter disease.     XR CHEST (2 VW)    Result Date: 5/24/2024  Diminished signs volume overload/bilateral pleural effusions since the studies March 2024. Persistent mild left-sided pleural effusion.       Assessment/PLAN     Hospital Problems             Last Modified POA    * (Principal) Sepsis secondary to UTI (HCC) 5/27/2024 Yes    UTI (urinary tract infection) 5/27/2024 Yes     Sepsis  ESBL E coli  left retroperitoneal hematoma regressing  Persistent mild left-sided pleural effusion.   H/o cons bacteremia repeat blood cx   -cont atbx check final cx       meropenem (MERREM) 1,000 mg in sodium chloride 0.9 % 100 mL IVPB ([id number]), Q12H        .   Available labs, imaging studies, microbiologic studies have been reviewed.     The patient/FAMILY was educated about the diagnosis, prognosis, indications, risks and benefits of treatment.      An opportunity to ask questions was given to the patient/FAMILY and questions were answered.      Thank you for involving me in the care of Kike Holloway. Please call [phone]  for any questions or concerns.         Electronically signed by Bushra Martinez MD on 5/27/2024 at 10:56 AM

## 2024-05-28 LAB
ALBUMIN SERPL-MCNC: 2.9 G/DL (ref 3.5–5.2)
ALP SERPL-CCNC: 107 U/L (ref 40–129)
ALT SERPL-CCNC: 13 U/L (ref 0–40)
ANION GAP SERPL CALCULATED.3IONS-SCNC: 13 MMOL/L (ref 7–16)
AST SERPL-CCNC: 30 U/L (ref 0–39)
BASOPHILS # BLD: 0.04 K/UL (ref 0–0.2)
BASOPHILS NFR BLD: 1 % (ref 0–2)
BILIRUB SERPL-MCNC: 0.5 MG/DL (ref 0–1.2)
BUN SERPL-MCNC: 17 MG/DL (ref 6–23)
CALCIUM SERPL-MCNC: 8.5 MG/DL (ref 8.6–10.2)
CHLORIDE SERPL-SCNC: 108 MMOL/L (ref 98–107)
CO2 SERPL-SCNC: 18 MMOL/L (ref 22–29)
CREAT SERPL-MCNC: 0.9 MG/DL (ref 0.7–1.2)
EOSINOPHIL # BLD: 0.3 K/UL (ref 0.05–0.5)
EOSINOPHILS RELATIVE PERCENT: 4 % (ref 0–6)
ERYTHROCYTE [DISTWIDTH] IN BLOOD BY AUTOMATED COUNT: 16.1 % (ref 11.5–15)
GFR, ESTIMATED: 86 ML/MIN/1.73M2
GLUCOSE BLD-MCNC: 119 MG/DL (ref 74–99)
GLUCOSE SERPL-MCNC: 96 MG/DL (ref 74–99)
HCT VFR BLD AUTO: 32.2 % (ref 37–54)
HGB BLD-MCNC: 10.2 G/DL (ref 12.5–16.5)
IMM GRANULOCYTES # BLD AUTO: 0.04 K/UL (ref 0–0.58)
IMM GRANULOCYTES NFR BLD: 1 % (ref 0–5)
LYMPHOCYTES NFR BLD: 1.75 K/UL (ref 1.5–4)
LYMPHOCYTES RELATIVE PERCENT: 26 % (ref 20–42)
MAGNESIUM SERPL-MCNC: 1.9 MG/DL (ref 1.6–2.6)
MCH RBC QN AUTO: 29.1 PG (ref 26–35)
MCHC RBC AUTO-ENTMCNC: 31.7 G/DL (ref 32–34.5)
MCV RBC AUTO: 92 FL (ref 80–99.9)
MONOCYTES NFR BLD: 0.34 K/UL (ref 0.1–0.95)
MONOCYTES NFR BLD: 5 % (ref 2–12)
NEUTROPHILS NFR BLD: 64 % (ref 43–80)
NEUTS SEG NFR BLD: 4.4 K/UL (ref 1.8–7.3)
PLATELET # BLD AUTO: 173 K/UL (ref 130–450)
PMV BLD AUTO: 10.1 FL (ref 7–12)
POTASSIUM SERPL-SCNC: 3.3 MMOL/L (ref 3.5–5)
PROT SERPL-MCNC: 6.9 G/DL (ref 6.4–8.3)
RBC # BLD AUTO: 3.5 M/UL (ref 3.8–5.8)
SODIUM SERPL-SCNC: 139 MMOL/L (ref 132–146)
WBC OTHER # BLD: 6.9 K/UL (ref 4.5–11.5)

## 2024-05-28 PROCEDURE — 85025 COMPLETE CBC W/AUTO DIFF WBC: CPT

## 2024-05-28 PROCEDURE — 6370000000 HC RX 637 (ALT 250 FOR IP)

## 2024-05-28 PROCEDURE — 1200000000 HC SEMI PRIVATE

## 2024-05-28 PROCEDURE — 6360000002 HC RX W HCPCS: Performed by: NURSE PRACTITIONER

## 2024-05-28 PROCEDURE — 6370000000 HC RX 637 (ALT 250 FOR IP): Performed by: INTERNAL MEDICINE

## 2024-05-28 PROCEDURE — 80053 COMPREHEN METABOLIC PANEL: CPT

## 2024-05-28 PROCEDURE — 82962 GLUCOSE BLOOD TEST: CPT

## 2024-05-28 PROCEDURE — 94640 AIRWAY INHALATION TREATMENT: CPT

## 2024-05-28 PROCEDURE — 83735 ASSAY OF MAGNESIUM: CPT

## 2024-05-28 PROCEDURE — 99232 SBSQ HOSP IP/OBS MODERATE 35: CPT | Performed by: INTERNAL MEDICINE

## 2024-05-28 PROCEDURE — 2580000003 HC RX 258: Performed by: NURSE PRACTITIONER

## 2024-05-28 PROCEDURE — 6370000000 HC RX 637 (ALT 250 FOR IP): Performed by: NURSE PRACTITIONER

## 2024-05-28 PROCEDURE — 6360000002 HC RX W HCPCS

## 2024-05-28 PROCEDURE — 36415 COLL VENOUS BLD VENIPUNCTURE: CPT

## 2024-05-28 PROCEDURE — 2580000003 HC RX 258

## 2024-05-28 RX ORDER — PETROLATUM 42 G/100G
OINTMENT TOPICAL 2 TIMES DAILY
Status: DISCONTINUED | OUTPATIENT
Start: 2024-05-28 | End: 2024-05-28 | Stop reason: CLARIF

## 2024-05-28 RX ORDER — PETROLATUM 42 G/100G
OINTMENT TOPICAL 3 TIMES DAILY PRN
Status: DISCONTINUED | OUTPATIENT
Start: 2024-05-28 | End: 2024-05-28 | Stop reason: CLARIF

## 2024-05-28 RX ORDER — PETROLATUM 420 MG/G
OINTMENT TOPICAL 2 TIMES DAILY
Status: DISCONTINUED | OUTPATIENT
Start: 2024-05-28 | End: 2024-05-31 | Stop reason: HOSPADM

## 2024-05-28 RX ORDER — PETROLATUM 420 MG/G
OINTMENT TOPICAL 3 TIMES DAILY PRN
Status: DISCONTINUED | OUTPATIENT
Start: 2024-05-28 | End: 2024-05-31 | Stop reason: HOSPADM

## 2024-05-28 RX ADMIN — APIXABAN 2.5 MG: 5 TABLET, FILM COATED ORAL at 09:06

## 2024-05-28 RX ADMIN — EMPAGLIFLOZIN 10 MG: 10 TABLET, FILM COATED ORAL at 09:06

## 2024-05-28 RX ADMIN — METOPROLOL SUCCINATE 12.5 MG: 25 TABLET, EXTENDED RELEASE ORAL at 09:07

## 2024-05-28 RX ADMIN — MEROPENEM 1000 MG: 1 INJECTION, POWDER, FOR SOLUTION INTRAVENOUS at 09:05

## 2024-05-28 RX ADMIN — APIXABAN 2.5 MG: 5 TABLET, FILM COATED ORAL at 21:08

## 2024-05-28 RX ADMIN — MIDODRINE HYDROCHLORIDE 15 MG: 5 TABLET ORAL at 12:04

## 2024-05-28 RX ADMIN — MIDODRINE HYDROCHLORIDE 15 MG: 5 TABLET ORAL at 09:07

## 2024-05-28 RX ADMIN — LANSOPRAZOLE 30 MG: 30 TABLET, ORALLY DISINTEGRATING ORAL at 06:26

## 2024-05-28 RX ADMIN — ARFORMOTEROL TARTRATE 15 MCG: 15 SOLUTION RESPIRATORY (INHALATION) at 18:07

## 2024-05-28 RX ADMIN — IPRATROPIUM BROMIDE AND ALBUTEROL SULFATE 1 DOSE: .5; 2.5 SOLUTION RESPIRATORY (INHALATION) at 05:12

## 2024-05-28 RX ADMIN — SODIUM CHLORIDE: 9 INJECTION, SOLUTION INTRAVENOUS at 06:26

## 2024-05-28 RX ADMIN — PETROLATUM: 420 OINTMENT TOPICAL at 11:00

## 2024-05-28 RX ADMIN — POTASSIUM CHLORIDE 40 MEQ: 1500 TABLET, EXTENDED RELEASE ORAL at 11:17

## 2024-05-28 RX ADMIN — ATORVASTATIN CALCIUM 20 MG: 20 TABLET, FILM COATED ORAL at 21:03

## 2024-05-28 RX ADMIN — PETROLATUM: 420 OINTMENT TOPICAL at 21:07

## 2024-05-28 RX ADMIN — BUDESONIDE INHALATION 500 MCG: 0.5 SUSPENSION RESPIRATORY (INHALATION) at 05:12

## 2024-05-28 RX ADMIN — MIDODRINE HYDROCHLORIDE 15 MG: 5 TABLET ORAL at 17:03

## 2024-05-28 RX ADMIN — BUDESONIDE INHALATION 500 MCG: 0.5 SUSPENSION RESPIRATORY (INHALATION) at 18:07

## 2024-05-28 RX ADMIN — MEROPENEM 1000 MG: 1 INJECTION INTRAVENOUS at 17:12

## 2024-05-28 RX ADMIN — ARFORMOTEROL TARTRATE 15 MCG: 15 SOLUTION RESPIRATORY (INHALATION) at 05:12

## 2024-05-28 ASSESSMENT — PAIN SCALES - GENERAL: PAINLEVEL_OUTOF10: 0

## 2024-05-28 NOTE — PROGRESS NOTES
Internal Medicine Consult Note    [facility]    Macario Dolan D.O., REKHAOKarolynI.                    Jeet Sharpe D.O., QIANI.                             Lincoln Thomas D.O.     Yamilet Bee, MSN, APRN, NP-C  Jose Suresh, MSN, APRN-CNP  Tim Carl, MSN, APRN-CNP  Leila Sanders, MSN APRN-CNP  Madonna June, MSN. APRN-NP-C     Primary Care Physician: Hermelindo Bedolla DO   Admitting Physician:  Macario Dolan DO  Admission date and time: 5/24/2024  5:25 PM    Room:  [address] [place] [address]  Admitting diagnosis: UTI (urinary tract infection) [N39.0]  Disorientation [R41.0]  Urinary tract infection without hematuria, site unspecified [N39.0]  Other fatigue [R53.83]    Patient Name: Kike Holloway  MRN: [id number]    Date of Service: 5/28/2024     Subjective:  Kike is a 78 y.o. male who was seen and examined today,5/28/2024, at the bedside.  He continues to improve on a daily basis.  We have discussed positive urine culture results, IV antibiotics with the patient and the need for updated ID recommendations.  Voices no new symptoms or concerns. No one present during my examination.    Review of systems:  Constitutional:   + fatigue and malaise , - fever/chills  HEENT:   Denies ear pain, sore throat, sinus or eye problems.  Cardiovascular:   - chest pain, irregular heartbeats, or palpitations.   Respiratory:   - dyspnea at rest, - dyspnea on exertion, - coughing, - sputum, - hemoptysis  Gastrointestinal:   - nausea, -vomiting. - diarrhea, - constipation. + poor appetite and poor intake. + but improved abdominal pain.  Genitourinary:    + for improved UTI symptoms, with essentially no residual symptoms.  Extremities:   Denies acute lower extremity swelling, edema or cyanosis.   Neurology:    Denies any headache or focal neurological deficits, positive for generalized weakness and fatigue without focal component  Psch:   Denies being anxious or depressed.  Musculoskeletal:    Chronic   Renal infarction with recent left retroperitoneal hematoma  Coronary artery disease with demand ischemia  Valvular heart disease with aortic stenosis and aortic valve sclerosis  Chronic compensated systolic congestive heart failure  Chronic obstructive pulm disease with chronic oxygen use  Hyperlipidemia  Essential hypertension  History of prostate cancer 44 treatment radiation  Peripheral vascular disease with claudication      Plan:   Kike has improved dramatically with adjustments in antibiotic therapy.  We await updated recommendations from the ID team regDue to renal infarction suspected based on CT scan the urology team has been asked to follow as well, they recommend vascular surgery evaluation which will need to occur and close outpatient follow-up as vascular surgery is not available at this facility.  He will need to work with therapy teams as well determine plan for discharge with forward, social work/case management following.  Close monitoring of volume status with known systolic dysfunction.  Requires aggressive work with therapy teams and social work is following for discharge planning purposes.  Chronic comorbidities, laboratory values and vital signs are being monitored and addressed accordingly.    More than 50% of my  time was spent at the bedside counseling/coordinating care with the patient and/or family with face to face contact.  This time was spent reviewing notes and laboratory data as well as instructing and counseling the patient. Time I spent with the family or surrogate(s) is included only if the patient was incapable of providing the necessary information or participating in medical decisions. I also discussed the differential diagnosis and all of the proposed management plans with the patient and individuals accompanying the patient.    Kike requires this high level of physician care and nursing on the IMC/Telemetry unit due the complexity of decision management and chance of

## 2024-05-28 NOTE — CARE COORDINATION
Ss note: 5/28/2024.3:08 PM Addendum:   notified that pt will discharge on oral abx. Pt resides with wife, is active with [facility], resume order noted and liaison Laura aware of orders. SW cancelled referral to [facility]. Plan is home with wife and HHC. MICHAEL Duran    Ss note: 5/28/2024 12:46 PM Follow up visit to room earlier, wife is present. Discussed possible SNF at discharge if pt requires IV abx. Pt is Active with [facility], hx of [facility]. Wife relays no one is available to assist pt with home IV if needed at discharge. Initial referral made to Jos[facility] at [facility]\"" as possible dc plan, will await pts final abx need and duration, awaiting final cultures to finalize dc plan. SNF vs home with HHC. If SNF requires precert, hens and signed GRACIE. If home needs resume order of care for [facility]. MICHAEL Duran

## 2024-05-28 NOTE — PROGRESS NOTES
Physician Progress Note      PATIENT:               SEAN MCCARTHY  CSN #:                  [id number]  :                       1945  ADMIT DATE:       2024 5:25 PM  DISCH DATE:  RESPONDING  PROVIDER #:        Jeet LARIOS DO          QUERY TEXT:    Dear ,    Patient admitted with sepsis due to UTI. Noted to have severe malnutrition per   Dietician assessment done on 2024. If possible, please document in   progress notes and discharge summary if you are evaluating and /or treating   any of the following:    The medical record reflects the following:  Risk Factors: AMS. PMHx: Afib, NSTEMI, CAD, COPD, CHF, HLD, HTN, Blood clots,   RBB, PVD, Prostate cancer, CHF, pulmonary emphisema, Hx of CABG. Pt had   multiple hospital stays since 2024.  Clinical Indicators: per Dietician assessment done : \"Severe malnutrition,   In context of acute illness or injury related to catabolic illness (Hx COPD,   prostate CA) as evidenced by intake 26-50%, poor intake prior to admission,   weight loss greater than or equal to 5% in 1 month, Criteria as identified in   malnutrition assessment (21.4% wt loss in the last 3 months)\"; BMI 19.41  Treatment: Dietician consult; ONS and monitor, encourage oral intake    Thank You,  Kylee Bosch RN, BSN, CDS  Clinical Documentation Improvement Specialist  [phone]  Options provided:  -- Protein calorie malnutrition severe  -- Other - I will add my own diagnosis  -- Disagree - Not applicable / Not valid  -- Disagree - Clinically unable to determine / Unknown  -- Refer to Clinical Documentation Reviewer    PROVIDER RESPONSE TEXT:    This patient has severe protein calorie malnutrition.    Query created by: Kylee Bosch on 2024 7:32 AM      Electronically signed by:  Jeet LARIOS DO 2024 10:34 AM

## 2024-05-28 NOTE — PROGRESS NOTES
This patient is on medication that requires renal, weight, and/or indication dose adjustment.      Date Body Weight IBW  Adjusted BW SCr  CrCl Dialysis status BMI   5/28/2024 54.5 kg (120 lb 3.2 oz) Ideal body weight: 63.8 kg (140 lb 9.2 oz) Serum creatinine: 0.9 mg/dL 05/28/24 0622  Estimated creatinine clearance: 50 mL/min N/a Body mass index is 19.41 kg/m².       Pharmacy has dose-adjusted the following medication(s):    Ordered Medication: Meropenem 1000mg q12h     Order Changed/converted to: Meropenem 1000mg q8h    These changes were made per protocol according to the [facility]   Automatic Extended Infusion Dose Adjustment Policy.     *Please note this dose was readjusted due to patient's condition changed.    Please contact pharmacy with any questions regarding these changes.    Kirsty Krause, PharmD.  5/28/2024 12:15 PM    EL: [phone]

## 2024-05-28 NOTE — CARE COORDINATION
Nurse was called into room when I went to go give IMM, will return later to review and explain IMM with pt. .kp

## 2024-05-28 NOTE — PROGRESS NOTES
5.98\")   Wt 54.5 kg (120 lb 3.2 oz)   SpO2 98%   BMI 19.41 kg/m²   Temp  Av.9 °F (36.6 °C)  Min: 97.3 °F (36.3 °C)  Max: 98.3 °F (36.8 °C)  CONSTITUTIONAL:  IN BED no apparent distress   ENT:  Normocephalic, atraumatic,   [place]  LUNGS:  No increased work of breathing, clear to auscultation bilaterally, no crackles or wheezing  CARDIOVASCULAR:    regular rate and rhythm, normal S1 and S2,  no murmur noted  ABDOMEN:  Normal bowel sounds, soft, non-distended, non-tender  MUSCULOSKELETAL:  There is no redness, warmth, or swelling  BLE.  Full range of motion     NEUROLOGIC:  Awake, alert   Cranial nerves II-XII are grossly intact.     SKIN:  Normal skin color, texture, turgor and no rashes  Lines:          Peripheral Intravenous Line:  Peripheral IV 24 Left Antecubital (Active)   Site Assessment Clean, dry & intact 24   Line Status Infusing 24   Line Care Connections checked and tightened 24   Phlebitis Assessment No symptoms 24   Infiltration Assessment 0 24   Alcohol Cap Used Yes 24   Dressing Status Clean, dry & intact 24   Dressing Type Transparent 24   Dressing Intervention New 24 0000         Drain(s):  Urinary Catheter 24 Coude;Courtney (Active)   Catheter Indications Urinary retention (acute or chronic), continuous bladder irrigation or bladder outlet obstruction 24   Urine Color Lani 24   Urine Appearance Cloudy 24   Urine Odor Malodorous 24   Collection Container Standard 24   Securement Method Leg strap 24   Catheter Care  Soap and water 24 0551   Catheter Best Practices  Drainage tube clipped to bed;Catheter secured to thigh;Tamper seal intact;Bag below bladder;Bag not on floor;Lack of dependent loop in tubing;Drainage bag less than half full 24   Status Draining;Patent 24   Output (mL) 550 mL 24 0589  in the pelvis: 1.  Stable appearance for the abdominal aorta aneurysm with endovascular graft repair. 2.  Progression of ischemic changes in the upper pole of the left kidney since the study of March 31st related with a small accessory artery for the upper pole of the left kidney with atherosclerotic changes and multiple levels lumen encroachment or stenosis. 3.  Significant but partial regression of left retroperitoneal hematoma since March 31st.  No new retroperitoneal hematoma observed. 4.  Resolved bilateral hydronephrosis changes since the study of March 31st. 5.  However there is development of significant thickening of the bladder since the previous study of March 31st indicating ongoing active moderate to severe cystitis.  Please correlate clinically. 6.  Persistent mild left-sided pleural effusion. 7.  Small focus of hypervascularity in the segment 5 of the right liver not conspicuous seen previously.  Consider follow-up study in 3-6 months time interval for monitoring these finding.     Vascular duplex lower extremity venous bilateral    Result Date: 5/25/2024  No evidence of DVT in either lower extremity.     CT HEAD WO CONTRAST    Result Date: 5/24/2024  No acute intracranial abnormality. Stable diffuse volume loss and extensive chronic small vessel ischemic white matter disease.     XR CHEST (2 VW)    Result Date: 5/24/2024  Diminished signs volume overload/bilateral pleural effusions since the studies March 2024. Persistent mild left-sided pleural effusion.     Imaging and labs were reviewed per medical records.     Thank you for involving me in the care of Kike Holloway I will continue to follow. Please do not hesitate to call [phone] for any questions or concerns.    Electronically signed by Bushra Martinez MD on 5/28/2024 at 12:04 PM

## 2024-05-28 NOTE — PROGRESS NOTES
Physical Therapy    Room #:   0421/0421-02    Date: 2024       Patient Name: Kike Holloway  : 1945      MRN: [id number]     Patient unavailable for physical therapy treatment due to refusal. States I'm going home and I'm not getting up. Will attempt PT treatment at a later time or date. Thank you.      Rhonda Corbin, PTA

## 2024-05-28 NOTE — PROGRESS NOTES
CHIEF COMPLAINT: Chest pain    HISTORY OF PRESENT ILLNESS: Patient is a 78 y.o. male seen at the request of Hermelindo Bedolla DO and followed at our office by Dr. Coates.    Consulted for chest pain.    Patient is a poor historian.    Currently denies CP. Some SOB.     Past Medical History:   Diagnosis Date    Aortic stenosis, mild     Aortic valve sclerosis     moderate    Atrial fibrillation (HCC) 2014    CAD (coronary artery disease)     CHF (congestive heart failure) (HCC)     COPD (chronic obstructive pulmonary disease) (HCC)     Encounter for diabetic foot exam (HCC) 01/08/2024    Gallbladder attack     History of blood transfusion     History of tobacco use 09/17/2015    Hx of blood clots     Hyperlipidemia     Hypertension     Mild mitral regurgitation     Prostate cancer (HCC)     44 treatments of radiation    PVD (peripheral vascular disease) with claudication (HCC) 09/17/2015    RBBB     Tobacco abuse        Patient Active Problem List   Diagnosis    Hyperlipidemia with target LDL less than 100    BPH (benign prostatic hyperplasia)    ECG abnormal    Abnormal nuclear stress test    AS (aortic stenosis)    SOB (shortness of breath)    RBBB (right bundle branch block)    Anxiety    CAD (coronary artery disease)    Pulmonary emphysema (HCC)    Permanent atrial fibrillation (HCC)    AV block, Mobitz II    AV block    H/O bicuspid aortic valve    AI (aortic insufficiency)    S/P AVR (aortic valve replacement)    Anticoagulated on Coumadin    AAA (abdominal aortic aneurysm) without rupture (HCC)    Elevated liver enzymes    History of tobacco use    S/P CABG x 3    H/O aortic valve replacement with porcine valve    Aortic valve disease    Pure hypercholesterolemia    Normally functioning cardiac pacemaker present    History of smoking 30 or more pack years    Hx of CABG    Anticoagulated by anticoagulation treatment    S/P AAA repair using bifurcation graft    Bilateral leg weakness    Chronic systolic  battery life remaining  DDD--VVIR LRL 60 bpm  84% RV paced  100% AT/AF burden     Limited echo from 3/5/2024:  Suggestive of late right to left shunting consistent with intrapulmonary shunting.    ASSESSMENT AND PLAN:  Patient Active Problem List   Diagnosis    Hyperlipidemia with target LDL less than 100    BPH (benign prostatic hyperplasia)    ECG abnormal    Abnormal nuclear stress test    AS (aortic stenosis)    SOB (shortness of breath)    RBBB (right bundle branch block)    Anxiety    CAD (coronary artery disease)    Pulmonary emphysema (HCC)    Permanent atrial fibrillation (HCC)    AV block, Mobitz II    AV block    H/O bicuspid aortic valve    AI (aortic insufficiency)    S/P AVR (aortic valve replacement)    Anticoagulated on Coumadin    AAA (abdominal aortic aneurysm) without rupture (HCC)    Elevated liver enzymes    History of tobacco use    S/P CABG x 3    H/O aortic valve replacement with porcine valve    Aortic valve disease    Pure hypercholesterolemia    Normally functioning cardiac pacemaker present    History of smoking 30 or more pack years    Hx of CABG    Anticoagulated by anticoagulation treatment    S/P AAA repair using bifurcation graft    Bilateral leg weakness    Chronic systolic congestive heart failure (HCC)    PVD [facility]peripheral vascular disease) ([facility])    Stage 3a chronic kidney disease (HCC)    Pulmonary fibrosis (HCC)    Dyslipidemia    Essential hypertension    Primary insomnia    Cough in adult    OA (ocular albinism) (HCC)    Acute bacterial sinusitis    Tobacco abuse    Fatigue    History of prostate cancer    Ischemic cardiomyopathy    Palliative care encounter    NSTEMI (non-ST elevated myocardial infarction) (HCC)    GI bleed    Severe protein-calorie malnutrition (HCC)    History of pulmonary embolism    H/O non-ST elevation myocardial infarction (NSTEMI)    UTI (urinary tract infection)    Sepsis secondary to UTI (HCC)     1. Chest pain/CAD/Chronic troponin

## 2024-05-28 NOTE — FLOWSHEET NOTE
Inpatient Wound Care (Initial consult) 421-02    Admit Date: 5/24/2024  5:25 PM    Reason for consult:  Coccyx    Significant history:  Per H&P    CHIEF COMPLAINT:  altered mental status     HISTORY OF PRESENT ILLNESS:    [place] is Kike's wife. He had slept for twelve hours yesterday and today she notes he has been weak, disoriented in the sense that she was talking to him at home and he was \"just staring through me\". She notes that he was recently here for respiratory failure and in the ICU. I remember taking care of him as an RN there and note that his skin discoloration is chronic from his PVD. She notes that he does have home PT and nursing services come to the house but there was only one day left of nursing services. He follows with Dr. Coates for cardiology and takes Jardiance for his heart failure management not for endocrine purposes. Kike himself did not offer up any specific complaints but did not know where we were or what the date was.    Findings:     05/28/24 0941   Skin Integumentary    Skin Integrity   (dry flaky)   Location BLE   Skin Integrity Site 2   Skin Integrity Location 2   (redness blanchable soft)   Location 2 bilateral heels   Skin Integrity Site 3   Skin Integrity Location 3   (redness blanchable)    Location 3 Sacrum         Impression:  Skin assessment complete: see above documentation    Plan: Aquaphor  TAPS  Heel protectors  Low air loss module  Patient will need continued preventative care.      Faby Suresh RN 5/28/2024 9:57 AM

## 2024-05-28 NOTE — PROGRESS NOTES
05/28/24 1016   Encounter Summary   Encounter Overview/Reason Spiritual/Emotional Needs   Service Provided For Patient   Referral/Consult From [facility] [place]   Support System Spouse   Last Encounter  05/28/24  (L-CW)   Complexity of Encounter Moderate   Spiritual/Emotional needs   Type Spiritual Support   Assessment/Intervention/Outcome   Assessment Compromised coping   Intervention Active listening;Discussed belief system/Sabianism practices/carla;Discussed illness injury and it’s impact;Discussed relationship with God;Explored/Affirmed feelings, thoughts, concerns;Prayer (assurance of)/Bonnie;Read/Provided Scripture   Outcome Comfort;Engaged in conversation;Expressed Gratitude;Peace;Receptive      visited patient. Patient was laying in bed. Patient expressed he was looking forward to going home.  provided a listening presence, empathy and offered prayer. Spiritual care is on going as needed.     Angus Funez  [phone]

## 2024-05-29 LAB
ALBUMIN SERPL-MCNC: 2.9 G/DL (ref 3.5–5.2)
ALP SERPL-CCNC: 107 U/L (ref 40–129)
ALT SERPL-CCNC: 13 U/L (ref 0–40)
ANION GAP SERPL CALCULATED.3IONS-SCNC: 13 MMOL/L (ref 7–16)
AST SERPL-CCNC: 26 U/L (ref 0–39)
BASOPHILS # BLD: 0.07 K/UL (ref 0–0.2)
BASOPHILS NFR BLD: 1 % (ref 0–2)
BILIRUB SERPL-MCNC: 0.6 MG/DL (ref 0–1.2)
BUN SERPL-MCNC: 14 MG/DL (ref 6–23)
CALCIUM SERPL-MCNC: 8.5 MG/DL (ref 8.6–10.2)
CHLORIDE SERPL-SCNC: 108 MMOL/L (ref 98–107)
CO2 SERPL-SCNC: 18 MMOL/L (ref 22–29)
CREAT SERPL-MCNC: 0.8 MG/DL (ref 0.7–1.2)
EKG ATRIAL RATE: 111 BPM
EKG ATRIAL RATE: 500 BPM
EKG ATRIAL RATE: 83 BPM
EKG Q-T INTERVAL: 372 MS
EKG Q-T INTERVAL: 384 MS
EKG Q-T INTERVAL: 408 MS
EKG QRS DURATION: 148 MS
EKG QRS DURATION: 148 MS
EKG QRS DURATION: 154 MS
EKG QTC CALCULATION (BAZETT): 452 MS
EKG QTC CALCULATION (BAZETT): 500 MS
EKG QTC CALCULATION (BAZETT): 518 MS
EKG R AXIS: -51 DEGREES
EKG R AXIS: -58 DEGREES
EKG R AXIS: -59 DEGREES
EKG T AXIS: 108 DEGREES
EKG T AXIS: 113 DEGREES
EKG T AXIS: 90 DEGREES
EKG VENTRICULAR RATE: 102 BPM
EKG VENTRICULAR RATE: 117 BPM
EKG VENTRICULAR RATE: 74 BPM
EOSINOPHIL # BLD: 0.29 K/UL (ref 0.05–0.5)
EOSINOPHILS RELATIVE PERCENT: 4 % (ref 0–6)
ERYTHROCYTE [DISTWIDTH] IN BLOOD BY AUTOMATED COUNT: 16.2 % (ref 11.5–15)
GFR, ESTIMATED: 89 ML/MIN/1.73M2
GLUCOSE SERPL-MCNC: 87 MG/DL (ref 74–99)
HCT VFR BLD AUTO: 33.6 % (ref 37–54)
HGB BLD-MCNC: 10.5 G/DL (ref 12.5–16.5)
IMM GRANULOCYTES # BLD AUTO: 0.06 K/UL (ref 0–0.58)
IMM GRANULOCYTES NFR BLD: 1 % (ref 0–5)
LYMPHOCYTES NFR BLD: 1.39 K/UL (ref 1.5–4)
LYMPHOCYTES RELATIVE PERCENT: 18 % (ref 20–42)
MCH RBC QN AUTO: 29.1 PG (ref 26–35)
MCHC RBC AUTO-ENTMCNC: 31.3 G/DL (ref 32–34.5)
MCV RBC AUTO: 93.1 FL (ref 80–99.9)
MONOCYTES NFR BLD: 0.44 K/UL (ref 0.1–0.95)
MONOCYTES NFR BLD: 6 % (ref 2–12)
NEUTROPHILS NFR BLD: 71 % (ref 43–80)
NEUTS SEG NFR BLD: 5.48 K/UL (ref 1.8–7.3)
PLATELET # BLD AUTO: 199 K/UL (ref 130–450)
PMV BLD AUTO: 10.4 FL (ref 7–12)
POTASSIUM SERPL-SCNC: 3.7 MMOL/L (ref 3.5–5)
PROT SERPL-MCNC: 6.8 G/DL (ref 6.4–8.3)
RBC # BLD AUTO: 3.61 M/UL (ref 3.8–5.8)
SODIUM SERPL-SCNC: 139 MMOL/L (ref 132–146)
WBC OTHER # BLD: 7.7 K/UL (ref 4.5–11.5)

## 2024-05-29 PROCEDURE — 6370000000 HC RX 637 (ALT 250 FOR IP)

## 2024-05-29 PROCEDURE — 94640 AIRWAY INHALATION TREATMENT: CPT

## 2024-05-29 PROCEDURE — 6360000002 HC RX W HCPCS: Performed by: NURSE PRACTITIONER

## 2024-05-29 PROCEDURE — 97110 THERAPEUTIC EXERCISES: CPT

## 2024-05-29 PROCEDURE — 85025 COMPLETE CBC W/AUTO DIFF WBC: CPT

## 2024-05-29 PROCEDURE — 97530 THERAPEUTIC ACTIVITIES: CPT

## 2024-05-29 PROCEDURE — 2700000000 HC OXYGEN THERAPY PER DAY

## 2024-05-29 PROCEDURE — 2580000003 HC RX 258: Performed by: NURSE PRACTITIONER

## 2024-05-29 PROCEDURE — 80053 COMPREHEN METABOLIC PANEL: CPT

## 2024-05-29 PROCEDURE — 97535 SELF CARE MNGMENT TRAINING: CPT

## 2024-05-29 PROCEDURE — 2580000003 HC RX 258

## 2024-05-29 PROCEDURE — 36415 COLL VENOUS BLD VENIPUNCTURE: CPT

## 2024-05-29 PROCEDURE — 6360000002 HC RX W HCPCS

## 2024-05-29 PROCEDURE — 0202U NFCT DS 22 TRGT SARS-COV-2: CPT

## 2024-05-29 PROCEDURE — 1200000000 HC SEMI PRIVATE

## 2024-05-29 RX ORDER — METOPROLOL SUCCINATE 25 MG/1
25 TABLET, EXTENDED RELEASE ORAL DAILY
Status: DISCONTINUED | OUTPATIENT
Start: 2024-05-30 | End: 2024-05-31 | Stop reason: HOSPADM

## 2024-05-29 RX ADMIN — IPRATROPIUM BROMIDE AND ALBUTEROL SULFATE 1 DOSE: .5; 2.5 SOLUTION RESPIRATORY (INHALATION) at 06:25

## 2024-05-29 RX ADMIN — BUDESONIDE INHALATION 500 MCG: 0.5 SUSPENSION RESPIRATORY (INHALATION) at 06:25

## 2024-05-29 RX ADMIN — SODIUM CHLORIDE: 9 INJECTION, SOLUTION INTRAVENOUS at 01:54

## 2024-05-29 RX ADMIN — LANSOPRAZOLE 30 MG: 30 TABLET, ORALLY DISINTEGRATING ORAL at 05:08

## 2024-05-29 RX ADMIN — APIXABAN 2.5 MG: 5 TABLET, FILM COATED ORAL at 21:36

## 2024-05-29 RX ADMIN — MIDODRINE HYDROCHLORIDE 15 MG: 5 TABLET ORAL at 09:21

## 2024-05-29 RX ADMIN — MEROPENEM 1000 MG: 1 INJECTION INTRAVENOUS at 01:54

## 2024-05-29 RX ADMIN — MEROPENEM 1000 MG: 1 INJECTION INTRAVENOUS at 17:14

## 2024-05-29 RX ADMIN — PETROLATUM: 420 OINTMENT TOPICAL at 09:24

## 2024-05-29 RX ADMIN — EMPAGLIFLOZIN 10 MG: 10 TABLET, FILM COATED ORAL at 09:23

## 2024-05-29 RX ADMIN — MEROPENEM 1000 MG: 1 INJECTION INTRAVENOUS at 09:24

## 2024-05-29 RX ADMIN — METOPROLOL SUCCINATE 12.5 MG: 25 TABLET, EXTENDED RELEASE ORAL at 09:20

## 2024-05-29 RX ADMIN — APIXABAN 2.5 MG: 5 TABLET, FILM COATED ORAL at 09:21

## 2024-05-29 RX ADMIN — DOCUSATE SODIUM 100 MG: 100 CAPSULE, LIQUID FILLED ORAL at 09:21

## 2024-05-29 RX ADMIN — ARFORMOTEROL TARTRATE 15 MCG: 15 SOLUTION RESPIRATORY (INHALATION) at 06:25

## 2024-05-29 RX ADMIN — BUDESONIDE INHALATION 500 MCG: 0.5 SUSPENSION RESPIRATORY (INHALATION) at 17:21

## 2024-05-29 RX ADMIN — ATORVASTATIN CALCIUM 20 MG: 20 TABLET, FILM COATED ORAL at 21:36

## 2024-05-29 RX ADMIN — PETROLATUM: 420 OINTMENT TOPICAL at 21:37

## 2024-05-29 RX ADMIN — MIDODRINE HYDROCHLORIDE 15 MG: 5 TABLET ORAL at 12:31

## 2024-05-29 RX ADMIN — ARFORMOTEROL TARTRATE 15 MCG: 15 SOLUTION RESPIRATORY (INHALATION) at 17:21

## 2024-05-29 ASSESSMENT — PAIN SCALES - GENERAL: PAINLEVEL_OUTOF10: 0

## 2024-05-29 NOTE — PLAN OF CARE
Problem: Safety - Adult  Goal: Free from fall injury  Outcome: Progressing     Problem: Skin/Tissue Integrity  Goal: Absence of new skin breakdown  Description: 1.  Monitor for areas of redness and/or skin breakdown  2.  Assess vascular access sites hourly  3.  Every 4-6 hours minimum:  Change oxygen saturation probe site  4.  Every 4-6 hours:  If on nasal continuous positive airway pressure, respiratory therapy assess nares and determine need for appliance change or resting period.  Outcome: Progressing     Problem: Nutrition Deficit:  Goal: Optimize nutritional status  Outcome: Progressing     Problem: Chronic Conditions and Co-morbidities  Goal: Patient's chronic conditions and co-morbidity symptoms are monitored and maintained or improved  Outcome: Progressing     Problem: Pain  Goal: Verbalizes/displays adequate comfort level or baseline comfort level  Outcome: Progressing     Problem: Genitourinary - Adult  Goal: Urinary catheter remains patent  Outcome: Progressing

## 2024-05-29 NOTE — PROGRESS NOTES
Physical Therapy    Physical Therapy Treatment Note/Plan of Care    Room #:  0421/0421-02  Patient Name: Kike Holloway  YOB: 1945  MRN: [id number]    Date of Service: 5/29/2024     Tentative placement recommendation: Subacute Rehab  Equipment recommendation: Patient has needed equipment       Evaluating Physical Therapist: Willem Oneill, PT  #[id number]      Specific Provider Orders/Date/Referring Provider :     PT eval and treat  Start:  05/25/24 1100,   End:  05/25/24 1100,   ONE TIME,   Standing Count:  1 Occurrences,   R       Tim Carl, APRN - CNP    Admitting Diagnosis:   UTI (urinary tract infection) [N39.0]  Disorientation [R41.0]  Urinary tract infection without hematuria, site unspecified [N39.0]  Other fatigue [R53.83]    Admitted with    above, sepsis  Surgery: none  Visit Diagnoses         Codes    Disorientation     R41.0            Patient Active Problem List   Diagnosis    Hyperlipidemia with target LDL less than 100    BPH (benign prostatic hyperplasia)    ECG abnormal    Abnormal nuclear stress test    AS (aortic stenosis)    SOB (shortness of breath)    RBBB (right bundle branch block)    Anxiety    CAD (coronary artery disease)    Pulmonary emphysema (HCC)    Permanent atrial fibrillation (HCC)    AV block, Mobitz II    AV block    H/O bicuspid aortic valve    AI (aortic insufficiency)    S/P AVR (aortic valve replacement)    Anticoagulated on Coumadin    AAA (abdominal aortic aneurysm) without rupture (HCC)    Elevated liver enzymes    History of tobacco use    S/P CABG x 3    H/O aortic valve replacement with porcine valve    Aortic valve disease    Pure hypercholesterolemia    Normally functioning cardiac pacemaker present    History of smoking 30 or more pack years    Hx of CABG    Anticoagulated by anticoagulation treatment    S/P AAA repair using bifurcation graft    Bilateral leg weakness    Chronic systolic congestive heart failure (HCC)    PVD (peripheral vascular  disease) ([facility])    Stage 3a chronic kidney disease ([facility])    Pulmonary fibrosis ([facility])    Dyslipidemia    Essential hypertension    Primary insomnia    Cough in adult    OA (ocular albinism) ([facility])    Acute bacterial sinusitis    Tobacco abuse    Fatigue    History of prostate cancer    Ischemic cardiomyopathy    Palliative care encounter    NSTEMI (non-ST elevated myocardial infarction) ([facility])    GI bleed    Severe protein-calorie malnutrition ([facility])    History of pulmonary embolism    H/O non-ST elevation myocardial infarction (NSTEMI)    UTI (urinary tract infection)    Sepsis secondary to UTI ([facility])        ASSESSMENT of Current Deficits Patient exhibits decreased strength, balance, endurance, and coordination impairing functional mobility, transfers, gait , gait distance, and tolerance to activity are barriers to d/c and require skilled intervention to address concerns listed above to increase safety and independence at discharge.   Decreased strength, balance and endurance  increases patient's risk for fall.   Patient's distance and progress with therapy limited by O2 desaturation, shortness of breath, and impaired strength and endurance.  Pt needing his O2 increased to 8L from 3L to recover from O2 desaturation from 75% after transferring to the chair. Pt fatigues very quickly with minimal exertion.      PHYSICAL THERAPY  PLAN OF CARE       Physical therapy plan of care is established based on physician order,  patient diagnosis and clinical assessment    Current Treatment Recommendations:    -Bed Mobility: Lower and upper extremity exercises, and trunk control activities  -Standing Balance: Perform strengthening exercises in standing to promote motor control with or without upper extremity support   -Transfers: Provide instruction on proper hand and foot position for adequate transfer of weight onto lower extremities and use of gait device if needed and Cues for hand placement, technique and safety. Provide

## 2024-05-29 NOTE — CARE COORDINATION
Ss note: 5/29/2024 3:27 PM Follow up visit to room, pt is in contact isolation, is sleeping, wife is present. Wife relays pt has confusion today and is more weak. PTA pt is Active with [facility] and resume order were obtained however wife undecided about discharge plan, she is tearful. ID is on, awaiting final abx needs, pt is on 5 liters of oxygen per charting. Pt has a hx of [facility] skilled re[facility], lialeonor Morales is following for possible rehab at discharge. Would need PRECERT, Hens and signed GRACIE. Sw to follow up with wife tomorrow for final transition of care plan. MICHAEL Duran

## 2024-05-29 NOTE — PROGRESS NOTES
Bathed pt, incontinence care done, linens changed, wing care done. Pt does not tolerate laying flat for more than 2-3 minutes, turns severely cyanotic in the face/head/BUE, labored respirs 24-26, and c/o severe dyspnea. O2 sat dropped to 85% while flat. Increased o2 to 3L, o2 sat 90%.

## 2024-05-29 NOTE — PROGRESS NOTES
As stated above      PHYSICAL EXAMINATION    /82   Pulse 76   Temp 97.5 °F (36.4 °C) (Oral)   Resp 21   Ht 1.676 m (5' 5.98\")   Wt 55 kg (121 lb 4.1 oz)   SpO2 100%   BMI 19.58 kg/m²   Temp  Av.4 °F (36.3 °C)  Min: 97.3 °F (36.3 °C)  Max: 97.5 °F (36.4 °C)  CONSTITUTIONAL:  IN BED no apparent distress - seems confused.   ENT:  Normocephalic, atraumatic,   + [place] IRA.  Lips dusky in appearance.   LUNGS:  diminished- on nasal canula.   CARDIOVASCULAR:    regular rate and rhythm, normal S1 and S2,   ABDOMEN:  Normal bowel sounds, soft, non-distended, non-tender  MUSCULOSKELETAL:  There is no redness, warmth, or swelling  BLE.  NEUROLOGIC:  Awake, alert   Cranial nerves II-XII are grossly intact.     SKIN:  Normal skin color, texture, turgor and no rashes  Lines:       Peripheral Intravenous Line:  Peripheral IV 24 Left Antecubital (Active)   Site Assessment Clean, dry & intact 24   Line Status Infusing 24   Line Care Connections checked and tightened 24   Phlebitis Assessment No symptoms 24   Infiltration Assessment 0 24   Alcohol Cap Used Yes 24   Dressing Status Clean, dry & intact 24   Dressing Type Transparent 24   Dressing Intervention New 24 0000         Drain(s):  Urinary Catheter 24 Coude;Courtney (Active)   Catheter Indications Urinary retention (acute or chronic), continuous bladder irrigation or bladder outlet obstruction 24   Urine Color Lani 24   Urine Appearance Cloudy 24   Urine Odor Malodorous 24   Collection Container Standard 24   Securement Method Leg strap 24   Catheter Care  Soap and water 24 0551   Catheter Best Practices  Drainage tube clipped to bed;Catheter secured to thigh;Tamper seal intact;Bag below bladder;Bag not on floor;Lack of dependent loop in tubing;Drainage bag less than half full 24 0918

## 2024-05-29 NOTE — PROGRESS NOTES
OCCUPATIONAL THERAPY TREATMENT NOTE    [facility]   [address] [facility] [facility]        Date:2024  Patient Name: Kike Holloway  MRN: [id number]  : 1945  Room: [address] [place] [address]      Evaluating OT: Alison Morales, OTR/L; [id number]        Referring Provider and Orders/Date:        OT eval and treat  Start:  24 1100,   End:  24 1100,   ONE TIME,   Standing Count:  1 Occurrences,   R       Tim Carl, NURY - CNP     Recommended placement: subacute rehab vs home with home health dependent on level of assistance family can provide on DC. Family requesting home health at time of eval        Diagnosis:   1. Urinary tract infection without hematuria, site unspecified    2. Disorientation    3. Other fatigue    4. History of prostate cancer          Surgery: none      Pertinent Medical History:        Past Medical History        Past Medical History:   Diagnosis Date    Aortic stenosis, mild      Aortic valve sclerosis       moderate    Atrial fibrillation ([facility])     CAD (coronary artery disease)      CHF (congestive heart failure) ([facility])      COPD (chronic obstructive pulmonary disease) ([facility])      Encounter for diabetic foot exam ([facility]) 2024    Gallbladder attack      History of blood transfusion      History of tobacco use 2015    Hx of blood clots      Hyperlipidemia      Hypertension      Mild mitral regurgitation      Prostate cancer (HCC)       44 treatments of radiation    PVD (peripheral vascular disease) with claudication ([facility]) 2015    RBBB      Tobacco abuse               Past Surgical History         Past Surgical History:   Procedure Laterality Date    APPENDECTOMY        CARDIAC CATHETERIZATION   2014     DR Coates-pt. states had triple bypass \"5 years ago\"    COLONOSCOPY        COLONOSCOPY N/A 2024     COLONOSCOPY performed by Ryley Moeller MD at [facility] ENDOSCOPY    CORONARY ARTERY BYPASS GRAFT   2014  standing tolerance for >3min with stable vital signs for carry over into toileting, functional tranfers and indep in ADLs   Visual/  Perceptual Glasses: not Present; WFL     Reports change in vision since admission: No      NA       Comments: Upon arrival pt seated in chair, agreeable to therapy session. Pt educated with regards to self feeding, LE dressing AE, bathing AE, DME, energy conservation techniques, importance of increased activity and safety awareness. At end of session pt seated in chair,  all lines and tubes intact, call light within reach.     Pt has made fair-  progress towards set goals.   Continue with current plan of care      Treatment Time In:1130            Treatment Time Out: 1215                Treatment Charges: Mins Units   Ther Ex  47932     Manual Therapy 10762     Thera Activities 13644 15 1   ADL/Home Mgt 36895 30 2   Neuro Re-ed 44241     Group Therapy      Orthotic manage/training  98242     Non-Billable Time     Total Timed Treatment 45 3       Treatment Time additionally includes review of current medical information, gathering information on past medical history/social history and prior level of function, interpretation of standardized testing/informal observation of tasks, treatment for plan of care and goals.     Jeet WILCOX/MIC 48604

## 2024-05-30 LAB
ALBUMIN SERPL-MCNC: 3 G/DL (ref 3.5–5.2)
ALP SERPL-CCNC: 112 U/L (ref 40–129)
ALT SERPL-CCNC: 10 U/L (ref 0–40)
ANION GAP SERPL CALCULATED.3IONS-SCNC: 12 MMOL/L (ref 7–16)
AST SERPL-CCNC: 22 U/L (ref 0–39)
B PARAP IS1001 DNA NPH QL NAA+NON-PROBE: NOT DETECTED
B PERT DNA SPEC QL NAA+PROBE: NOT DETECTED
BASOPHILS # BLD: 0 K/UL (ref 0–0.2)
BASOPHILS NFR BLD: 0 % (ref 0–2)
BILIRUB SERPL-MCNC: 0.7 MG/DL (ref 0–1.2)
BUN SERPL-MCNC: 12 MG/DL (ref 6–23)
C PNEUM DNA NPH QL NAA+NON-PROBE: NOT DETECTED
CALCIUM SERPL-MCNC: 8.8 MG/DL (ref 8.6–10.2)
CHLORIDE SERPL-SCNC: 107 MMOL/L (ref 98–107)
CO2 SERPL-SCNC: 19 MMOL/L (ref 22–29)
CREAT SERPL-MCNC: 0.9 MG/DL (ref 0.7–1.2)
EOSINOPHIL # BLD: 0.38 K/UL (ref 0.05–0.5)
EOSINOPHILS RELATIVE PERCENT: 4 % (ref 0–6)
ERYTHROCYTE [DISTWIDTH] IN BLOOD BY AUTOMATED COUNT: 16.3 % (ref 11.5–15)
FLUAV RNA NPH QL NAA+NON-PROBE: NOT DETECTED
FLUBV RNA NPH QL NAA+NON-PROBE: NOT DETECTED
GFR, ESTIMATED: 88 ML/MIN/1.73M2
GLUCOSE SERPL-MCNC: 90 MG/DL (ref 74–99)
HADV DNA NPH QL NAA+NON-PROBE: NOT DETECTED
HCOV 229E RNA NPH QL NAA+NON-PROBE: NOT DETECTED
HCOV HKU1 RNA NPH QL NAA+NON-PROBE: NOT DETECTED
HCOV NL63 RNA NPH QL NAA+NON-PROBE: NOT DETECTED
HCOV OC43 RNA NPH QL NAA+NON-PROBE: NOT DETECTED
HCT VFR BLD AUTO: 34.7 % (ref 37–54)
HGB BLD-MCNC: 11 G/DL (ref 12.5–16.5)
HMPV RNA NPH QL NAA+NON-PROBE: NOT DETECTED
HPIV1 RNA NPH QL NAA+NON-PROBE: NOT DETECTED
HPIV2 RNA NPH QL NAA+NON-PROBE: NOT DETECTED
HPIV3 RNA NPH QL NAA+NON-PROBE: NOT DETECTED
HPIV4 RNA NPH QL NAA+NON-PROBE: NOT DETECTED
LYMPHOCYTES NFR BLD: 0.53 K/UL (ref 1.5–4)
LYMPHOCYTES RELATIVE PERCENT: 6 % (ref 20–42)
M PNEUMO DNA NPH QL NAA+NON-PROBE: NOT DETECTED
MCH RBC QN AUTO: 29.2 PG (ref 26–35)
MCHC RBC AUTO-ENTMCNC: 31.7 G/DL (ref 32–34.5)
MCV RBC AUTO: 92 FL (ref 80–99.9)
MONOCYTES NFR BLD: 0.45 K/UL (ref 0.1–0.95)
MONOCYTES NFR BLD: 5 % (ref 2–12)
NEUTROPHILS NFR BLD: 84 % (ref 43–80)
NEUTS SEG NFR BLD: 7.34 K/UL (ref 1.8–7.3)
PLATELET # BLD AUTO: 215 K/UL (ref 130–450)
PMV BLD AUTO: 10.5 FL (ref 7–12)
POTASSIUM SERPL-SCNC: 3.7 MMOL/L (ref 3.5–5)
PROT SERPL-MCNC: 6.9 G/DL (ref 6.4–8.3)
RBC # BLD AUTO: 3.77 M/UL (ref 3.8–5.8)
RBC # BLD: ABNORMAL 10*6/UL
RBC # BLD: ABNORMAL 10*6/UL
RSV RNA NPH QL NAA+NON-PROBE: NOT DETECTED
RV+EV RNA NPH QL NAA+NON-PROBE: NOT DETECTED
SARS-COV-2 RNA NPH QL NAA+NON-PROBE: NOT DETECTED
SODIUM SERPL-SCNC: 138 MMOL/L (ref 132–146)
SPECIMEN DESCRIPTION: NORMAL
WBC OTHER # BLD: 8.7 K/UL (ref 4.5–11.5)

## 2024-05-30 PROCEDURE — 94640 AIRWAY INHALATION TREATMENT: CPT

## 2024-05-30 PROCEDURE — 1200000000 HC SEMI PRIVATE

## 2024-05-30 PROCEDURE — 6370000000 HC RX 637 (ALT 250 FOR IP)

## 2024-05-30 PROCEDURE — 97530 THERAPEUTIC ACTIVITIES: CPT

## 2024-05-30 PROCEDURE — 97116 GAIT TRAINING THERAPY: CPT

## 2024-05-30 PROCEDURE — 80053 COMPREHEN METABOLIC PANEL: CPT

## 2024-05-30 PROCEDURE — 6360000002 HC RX W HCPCS: Performed by: NURSE PRACTITIONER

## 2024-05-30 PROCEDURE — 36415 COLL VENOUS BLD VENIPUNCTURE: CPT

## 2024-05-30 PROCEDURE — 2700000000 HC OXYGEN THERAPY PER DAY

## 2024-05-30 PROCEDURE — 85025 COMPLETE CBC W/AUTO DIFF WBC: CPT

## 2024-05-30 PROCEDURE — 2580000003 HC RX 258: Performed by: NURSE PRACTITIONER

## 2024-05-30 PROCEDURE — 6360000002 HC RX W HCPCS

## 2024-05-30 RX ORDER — LANSOPRAZOLE 30 MG/1
30 CAPSULE, DELAYED RELEASE ORAL DAILY
Qty: 90 CAPSULE | Refills: 1 | Status: SHIPPED | OUTPATIENT
Start: 2024-05-30

## 2024-05-30 RX ORDER — MIDODRINE HYDROCHLORIDE 5 MG/1
15 TABLET ORAL
Qty: 90 TABLET | Refills: 0 | Status: SHIPPED | OUTPATIENT
Start: 2024-05-30

## 2024-05-30 RX ADMIN — PETROLATUM: 420 OINTMENT TOPICAL at 12:39

## 2024-05-30 RX ADMIN — ARFORMOTEROL TARTRATE 15 MCG: 15 SOLUTION RESPIRATORY (INHALATION) at 06:26

## 2024-05-30 RX ADMIN — MIDODRINE HYDROCHLORIDE 15 MG: 5 TABLET ORAL at 09:15

## 2024-05-30 RX ADMIN — MIDODRINE HYDROCHLORIDE 15 MG: 5 TABLET ORAL at 12:38

## 2024-05-30 RX ADMIN — IPRATROPIUM BROMIDE AND ALBUTEROL SULFATE 1 DOSE: .5; 2.5 SOLUTION RESPIRATORY (INHALATION) at 16:01

## 2024-05-30 RX ADMIN — MEROPENEM 1000 MG: 1 INJECTION INTRAVENOUS at 17:26

## 2024-05-30 RX ADMIN — MEROPENEM 1000 MG: 1 INJECTION INTRAVENOUS at 01:49

## 2024-05-30 RX ADMIN — APIXABAN 2.5 MG: 5 TABLET, FILM COATED ORAL at 09:15

## 2024-05-30 RX ADMIN — APIXABAN 2.5 MG: 5 TABLET, FILM COATED ORAL at 20:21

## 2024-05-30 RX ADMIN — ARFORMOTEROL TARTRATE 15 MCG: 15 SOLUTION RESPIRATORY (INHALATION) at 16:01

## 2024-05-30 RX ADMIN — PETROLATUM: 420 OINTMENT TOPICAL at 20:29

## 2024-05-30 RX ADMIN — DOCUSATE SODIUM 100 MG: 100 CAPSULE, LIQUID FILLED ORAL at 09:15

## 2024-05-30 RX ADMIN — LANSOPRAZOLE 30 MG: 30 TABLET, ORALLY DISINTEGRATING ORAL at 06:11

## 2024-05-30 RX ADMIN — BUDESONIDE INHALATION 500 MCG: 0.5 SUSPENSION RESPIRATORY (INHALATION) at 06:26

## 2024-05-30 RX ADMIN — METOPROLOL SUCCINATE 25 MG: 25 TABLET, EXTENDED RELEASE ORAL at 09:15

## 2024-05-30 RX ADMIN — MIDODRINE HYDROCHLORIDE 15 MG: 5 TABLET ORAL at 17:24

## 2024-05-30 RX ADMIN — EMPAGLIFLOZIN 10 MG: 10 TABLET, FILM COATED ORAL at 09:15

## 2024-05-30 RX ADMIN — BUDESONIDE INHALATION 500 MCG: 0.5 SUSPENSION RESPIRATORY (INHALATION) at 16:01

## 2024-05-30 RX ADMIN — ATORVASTATIN CALCIUM 20 MG: 20 TABLET, FILM COATED ORAL at 20:21

## 2024-05-30 RX ADMIN — MEROPENEM 1000 MG: 1 INJECTION INTRAVENOUS at 09:22

## 2024-05-30 NOTE — PLAN OF CARE
Problem: Safety - Adult  Goal: Free from fall injury  5/30/2024 1423 by Natalia Martinez, RN  Outcome: Progressing     Problem: Skin/Tissue Integrity  Goal: Absence of new skin breakdown  Description: 1.  Monitor for areas of redness and/or skin breakdown  2.  Assess vascular access sites hourly  3.  Every 4-6 hours minimum:  Change oxygen saturation probe site  4.  Every 4-6 hours:  If on nasal continuous positive airway pressure, respiratory therapy assess nares and determine need for appliance change or resting period.  5/30/2024 1423 by Natalia Martinez, RN  Outcome: Progressing

## 2024-05-30 NOTE — PROGRESS NOTES
Internal Medicine Consult Note    [facility]    Macario Dolan D.O., REKHAOKarolynI.                    Jeet Sharpe D.O., REKHAOKarolynI.                             Lincoln Thomas D.O.     Yamilet Bee, MSN, APRN, NP-C  Jose Suresh, MSN, APRN-CNP  Tim Carl, MSN, APRN-CNP  Leila Sanders, MSN APRN-CNP  Madonna June, MSN. APRN-NP-C     Primary Care Physician: Hermelindo Bedolla DO   Admitting Physician:  Macario Dolan DO  Admission date and time: 5/24/2024  5:25 PM    Room:  [address] [place] [address]  Admitting diagnosis: UTI (urinary tract infection) [N39.0]  Disorientation [R41.0]  Urinary tract infection without hematuria, site unspecified [N39.0]  Other fatigue [R53.83]    Patient Name: Kike Holloway  MRN: [id number]    Date of Service: 5/30/2024     Subjective:  Kike is a 78 y.o. male who was seen and examined today,5/30/2024, at the bedside.  Patient resting comfortably at the present time.  Appears stable for discharge.  Infectious disease making recommendation for antibiotics.  Wife would like him evaluated today by physical therapy and if stable plan for discharge home, if not skilled rehab.  Patient resting comfortably on supplemental O2 at baseline status    Wife present at the bedside      Review of systems:  Constitutional:   + fatigue and malaise , - fever/chills  HEENT:   Denies ear pain, sore throat, sinus or eye problems.  Cardiovascular:   - chest pain, irregular heartbeats, or palpitations.   Respiratory:   - dyspnea at rest, - dyspnea on exertion, - coughing, - sputum, - hemoptysis  Gastrointestinal:   - nausea, -vomiting. - diarrhea, - constipation. + poor appetite and poor intake. + but improved abdominal pain.  Genitourinary:    + for improved UTI symptoms, with essentially no residual symptoms.  Extremities:   Denies acute lower extremity swelling, edema or cyanosis.   Neurology:    Denies any headache or focal neurological deficits, positive for generalized weakness

## 2024-05-30 NOTE — CARE COORDINATION
Ss note:5/30/2024 11:46 AM PT has worked with pt again today, wife was present. Plan is for pt to return home with [facility]. PTA pt active with [facility], resume order noted and liaison Laura notified of possible discharge home today. Wife relays pt is doing much better today, pt has home 02. MICHAEL Duran

## 2024-05-30 NOTE — PROGRESS NOTES
NAME: Kike Holloway  MR:  [id number]  :   1945  Admit Date:  2024    Elements of this note, were copied and pasted from Previous. Updates have been made where noted and reflect current exam and medical decision making from the DOS of this encounter.  CHIEF COMPLAINT       Chief Complaint   Patient presents with    Fatigue     Sent in by family from home for weakness     HISTORY OF PRESENT ILLNESS     Kike Holloway is a 78 y.o. male admitted on 2024 and has  has a past medical history of Aortic stenosis, mild, Aortic valve sclerosis, Atrial fibrillation (HCC), CAD (coronary artery disease), CHF (congestive heart failure) (HCC), COPD (chronic obstructive pulmonary disease) (HCC), Encounter for diabetic foot exam ([facility]), Gallbladder attack, History of blood transfusion, History of tobacco use, Hx of blood clots, Hyperlipidemia, Hypertension, Mild mitral regurgitation, Prostate cancer (HCC), PVD (peripheral vascular disease) with claudication ([facility]), RBBB, and Tobacco abuse.     This is a face to face encounter   24-in bed cyanotic lips on o2 nad  no concerns    patient is in bed- has been afebrile. On 5 liters nasal canula.   Patient seems confused today   2024- in bed has no c/o no f/c/n/v/d    Patient is tolerating medications. No reported adverse drug reactions.  Available labs, imaging studies, microbiologic studies have been reviewed with pt and family if present.  Assessment & Plan     Admitted for   UTI (urinary tract infection) [N39.0]  Disorientation [R41.0]  Urinary tract infection without hematuria, site unspecified [N39.0]  Other fatigue [R53.83]  ID following for   Sepsis  ESBL E coli   Courtney out   left retroperitoneal hematoma regressing Persistent mild left-sided pleural effusion.   H/o cons bacteremia repeat blood cx     Plan:    D/c with  Macrodantin 2-3 weeks  Labs reviewed   Respiratory panel neg         REVIEW OF SYSTEMS     As stated above      PHYSICAL  Specimen Description .NASOPHARYNGEAL SWAB     Adenovirus PCR Not Detected     Coronavirus 229E PCR Not Detected     Coronavirus HKU1 PCR Not Detected     Coronavirus NL63 PCR Not Detected     Coronavirus OC43 PCR Not Detected     SARS-CoV-2, PCR Not Detected     Human Metapneumovirus PCR Not Detected     Rhino/Enterovirus PCR Not Detected     Influenza A by PCR Not Detected     Influenza B by PCR Not Detected     Parainfluenza 1 PCR Not Detected     Parainfluenza 2 PCR Not Detected     Parainfluenza 3 PCR Not Detected     Parainfluenza 4 PCR Not Detected     Resp Syncytial Virus PCR Not Detected     Bordetella parapertussis by PCR Not Detected     B Pertussis by PCR Not Detected     Chlamydia pneumoniae By PCR Not Detected     Mycoplasma pneumo by PCR Not Detected     Comment: Performed by multiplexed nucleic acid assay.       Culture, Blood 2 [[id number]] Collected: 05/27/24 1530    Order Status: Completed Specimen: Blood Updated: 05/29/24 2023     Specimen Description .BLOOD .ARM     Special Requests          Culture NO GROWTH 2 DAYS    Culture, Blood 1 [[id number]] Collected: 05/27/24 1530    Order Status: Completed Specimen: Blood Updated: 05/29/24 2024     Specimen Description .BLOOD .ARM     Special Requests          Culture NO GROWTH 2 DAYS    Legionella antigen, urine [[id number]] Collected: 05/24/24 2153    Order Status: Completed Specimen: Urine Updated: 05/25/24 0817     Legionella Pneumophilia Ag, Urine NEGATIVE     Comment:       L. pneumophila serogroup 1 antigen not detected.  A negative result does not exclude infection with Leginella pnemophila serogroup 1 nor does   it rule out other microbial-caused respiratory infections of disease caused by other   serogroups of Legionella pneumophila.         Strep Pneumoniae Antigen [[id number]] Collected: 05/24/24 2153    Order Status: Completed Specimen: Urine voided Updated: 05/25/24 0817     Source .CLEAN CATCH URINE     Strep pneumo Ag NEGATIVE

## 2024-05-30 NOTE — PROGRESS NOTES
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[facility])    Acute bacterial sinusitis    Tobacco abuse    Fatigue    History of prostate cancer    Ischemic cardiomyopathy    Palliative care encounter    NSTEMI (non-ST elevated myocardial infarction) ([facility])    GI bleed    Severe protein-calorie malnutrition (HCC)    History of pulmonary embolism    H/O non-ST elevation myocardial infarction (NSTEMI)    UTI (urinary tract infection)    Sepsis secondary to UTI ([facility])        ASSESSMENT of Current Deficits Patient exhibits decreased strength, balance, endurance, and coordination impairing functional mobility, transfers, gait , gait distance, and tolerance to activity are barriers to d/c and require skilled intervention to address concerns listed above to increase safety and independence at discharge.   Decreased strength, balance and endurance  increases patient's risk for fall.   Patient feeling better today and able to increase his ambulation distance but needing standing rest periods due shortness of breath  Pt needing his O2 increased to 8L from 3L with activity to recover from O2 desaturation and shortness of breath. Pt and his wife both stated that felt comfortable with being discharged to home.      PHYSICAL THERAPY  PLAN OF CARE       Physical therapy plan of care is established based on physician order,  patient diagnosis and clinical assessment    Current Treatment Recommendations:    -Bed Mobility: Lower and upper extremity exercises, and trunk control activities  -Standing Balance: Perform strengthening exercises in standing to promote motor control with or without upper extremity support   -Transfers: Provide instruction on proper hand and foot position for adequate transfer of weight onto lower extremities and use of gait device if needed  and Cues for hand placement, technique and safety. Provide stabilization to prevent fall   -Gait: Gait training and Standing activities to improve: base of support, weight shift, weight bearing    -Endurance: Utilize Supervised activities to increase level of endurance to allow for safe functional mobility including transfers and gait   -Stairs: Stair training with instruction on proper technique and hand placement on rail  -Positioning for skin integrity and comfort    PT long term treatment goals are located in below grid    Patient and or family understand(s) diagnosis, prognosis, and plan of care.    Frequency of treatments: Patient will be seen  daily.         Prior Level of Function: Patient ambulated with wheeled walker    Rehab Potential: good   for baseline    Past medical history:   Past Medical History:   Diagnosis Date    Aortic stenosis, mild     Aortic valve sclerosis     moderate    Atrial fibrillation ([facility]) 2014    CAD (coronary artery disease)     CHF (congestive heart failure) ([facility])     COPD (chronic obstructive pulmonary disease) ([facility])     Encounter for diabetic foot exam ([facility]) 01/08/2024    Gallbladder attack     History of blood transfusion     History of tobacco use 09/17/2015    Hx of blood clots     Hyperlipidemia     Hypertension     Mild mitral regurgitation     Prostate cancer ([facility])     44 treatments of radiation    PVD (peripheral vascular disease) with claudication ([facility]) 09/17/2015    RBBB     Tobacco abuse      Past Surgical History:   Procedure Laterality Date    APPENDECTOMY      CARDIAC CATHETERIZATION  08/08/2014    DR Coates-pt. states had triple bypass \"5 years ago\"    COLONOSCOPY      COLONOSCOPY N/A 4/2/2024    COLONOSCOPY performed by Ryley Moeller MD at [facility] ENDOSCOPY    CORONARY ARTERY BYPASS GRAFT  08/11/2014    CABGx3 LIMA-LAD, SVG-D1, SVG-OM1, AVR Dr. Tinsley    ENDOSCOPY, COLON, DIAGNOSTIC      PACEMAKER INSERTION Left 08/22/2022    [facility] Dual PPM-GR    PACEMAKER

## 2024-05-30 NOTE — PROGRESS NOTES
Comprehensive Nutrition Assessment    Type and Reason for Visit:  Reassess    Nutrition Recommendations/Plan:   Continue Current Diet  Consult RD as needed     Malnutrition Assessment:  Malnutrition Status:  Severe malnutrition (05/30/24 1422)    Context:  Chronic Illness     Findings of the 6 clinical characteristics of malnutrition:  Energy Intake:  75% or less estimated energy requirements for 1 month or longer  Weight Loss:  Greater than 7.5% over 3 months (-19.4% in four months)     Body Fat Loss:  Severe body fat loss Orbital, Triceps   Muscle Mass Loss:  Severe muscle mass loss Temples (temporalis), Clavicles (pectoralis & deltoids)  Fluid Accumulation:  Mild Extremities   Strength:  Not Performed    Nutrition Assessment:    Pt admit for UTI, weakness, AMS. PMHx: Afib, NSTEMI, CAD, COPD, CHF, HLD, HTN, Blood clots, RBB, PVD, Prostate cancer, CHF, pulmonary emphisema, Hx of CABG. Pt had multiple hospital stays since January 2024. Pt meet criteria for severe malnutrition. Pt's wife at bedside, reports pt refuses ONS of any kind. PO intake ~26-50% meals. Will continue inpatient monitoring. 05/30/24: F/up: Pt continues w/ UTI and persistent left side plueral effusions. Overall improving. Pt remains on O2, po intake 25-50%, refusing ONS. Pt to be discharged in the next few days. Continue current diet, continue inpatient monitoring, f/up per policy.    Nutrition Related Findings:    A/O x4, I/O -354 since admit, abd wdl, bowel sounds active x4, e'lytes wnl, Troponin 57, Hgb 11.0, Hct 34.7, 5L/O2 Wound Type: None       Current Nutrition Intake & Therapies:    Average Meal Intake: 26-50%  Average Supplements Intake: Refusing to take  ADULT DIET; Regular; Low Sodium (2 gm)    Anthropometric Measures:  Height: 167.6 cm (5' 5.98\")  Ideal Body Weight (IBW): 142 lbs (65 kg)    Admission Body Weight: 53.5 kg (117 lb 15.1 oz) (05/24/24)  Current Body Weight: 55.1 kg (121 lb 7.6 oz), 85.5 % IBW. Weight Source: Bed Scale

## 2024-05-31 VITALS
TEMPERATURE: 97.6 F | BODY MASS INDEX: 21.41 KG/M2 | HEIGHT: 66 IN | WEIGHT: 133.2 LBS | OXYGEN SATURATION: 95 % | SYSTOLIC BLOOD PRESSURE: 111 MMHG | RESPIRATION RATE: 18 BRPM | DIASTOLIC BLOOD PRESSURE: 68 MMHG | HEART RATE: 69 BPM

## 2024-05-31 LAB
ALBUMIN SERPL-MCNC: 2.9 G/DL (ref 3.5–5.2)
ALP SERPL-CCNC: 135 U/L (ref 40–129)
ALT SERPL-CCNC: 12 U/L (ref 0–40)
ANION GAP SERPL CALCULATED.3IONS-SCNC: 12 MMOL/L (ref 7–16)
AST SERPL-CCNC: 25 U/L (ref 0–39)
BASOPHILS # BLD: 0.08 K/UL (ref 0–0.2)
BASOPHILS NFR BLD: 1 % (ref 0–2)
BILIRUB SERPL-MCNC: 0.6 MG/DL (ref 0–1.2)
BUN SERPL-MCNC: 15 MG/DL (ref 6–23)
CALCIUM SERPL-MCNC: 8.8 MG/DL (ref 8.6–10.2)
CHLORIDE SERPL-SCNC: 106 MMOL/L (ref 98–107)
CO2 SERPL-SCNC: 19 MMOL/L (ref 22–29)
CREAT SERPL-MCNC: 1 MG/DL (ref 0.7–1.2)
EOSINOPHIL # BLD: 0.46 K/UL (ref 0.05–0.5)
EOSINOPHILS RELATIVE PERCENT: 5 % (ref 0–6)
ERYTHROCYTE [DISTWIDTH] IN BLOOD BY AUTOMATED COUNT: 16.6 % (ref 11.5–15)
GFR, ESTIMATED: 80 ML/MIN/1.73M2
GLUCOSE SERPL-MCNC: 92 MG/DL (ref 74–99)
HCT VFR BLD AUTO: 34.4 % (ref 37–54)
HGB BLD-MCNC: 11 G/DL (ref 12.5–16.5)
LYMPHOCYTES NFR BLD: 1.65 K/UL (ref 1.5–4)
LYMPHOCYTES RELATIVE PERCENT: 18 % (ref 20–42)
MCH RBC QN AUTO: 29.7 PG (ref 26–35)
MCHC RBC AUTO-ENTMCNC: 32 G/DL (ref 32–34.5)
MCV RBC AUTO: 93 FL (ref 80–99.9)
MONOCYTES NFR BLD: 0.58 K/UL (ref 0.1–0.95)
MONOCYTES NFR BLD: 6 % (ref 2–12)
NEUTROPHILS NFR BLD: 69 % (ref 43–80)
NEUTS SEG NFR BLD: 6.46 K/UL (ref 1.8–7.3)
PLATELET # BLD AUTO: 264 K/UL (ref 130–450)
PMV BLD AUTO: 10.6 FL (ref 7–12)
POTASSIUM SERPL-SCNC: 3.9 MMOL/L (ref 3.5–5)
PROT SERPL-MCNC: 6.8 G/DL (ref 6.4–8.3)
RBC # BLD AUTO: 3.7 M/UL (ref 3.8–5.8)
SODIUM SERPL-SCNC: 137 MMOL/L (ref 132–146)
WBC OTHER # BLD: 9.4 K/UL (ref 4.5–11.5)

## 2024-05-31 PROCEDURE — 94640 AIRWAY INHALATION TREATMENT: CPT

## 2024-05-31 PROCEDURE — 6370000000 HC RX 637 (ALT 250 FOR IP)

## 2024-05-31 PROCEDURE — 36415 COLL VENOUS BLD VENIPUNCTURE: CPT

## 2024-05-31 PROCEDURE — 6360000002 HC RX W HCPCS

## 2024-05-31 PROCEDURE — 85025 COMPLETE CBC W/AUTO DIFF WBC: CPT

## 2024-05-31 PROCEDURE — 2700000000 HC OXYGEN THERAPY PER DAY

## 2024-05-31 PROCEDURE — 6360000002 HC RX W HCPCS: Performed by: NURSE PRACTITIONER

## 2024-05-31 PROCEDURE — 80053 COMPREHEN METABOLIC PANEL: CPT

## 2024-05-31 PROCEDURE — 2580000003 HC RX 258: Performed by: NURSE PRACTITIONER

## 2024-05-31 RX ORDER — NITROFURANTOIN 25; 75 MG/1; MG/1
100 CAPSULE ORAL DAILY
Status: DISCONTINUED | OUTPATIENT
Start: 2024-05-31 | End: 2024-05-31 | Stop reason: HOSPADM

## 2024-05-31 RX ORDER — NITROFURANTOIN 25; 75 MG/1; MG/1
100 CAPSULE ORAL DAILY
Qty: 21 CAPSULE | Refills: 0 | Status: SHIPPED | OUTPATIENT
Start: 2024-05-31 | End: 2024-06-21

## 2024-05-31 RX ADMIN — MEROPENEM 1000 MG: 1 INJECTION INTRAVENOUS at 01:43

## 2024-05-31 RX ADMIN — MEROPENEM 1000 MG: 1 INJECTION INTRAVENOUS at 07:58

## 2024-05-31 RX ADMIN — BUDESONIDE INHALATION 500 MCG: 0.5 SUSPENSION RESPIRATORY (INHALATION) at 06:23

## 2024-05-31 RX ADMIN — LANSOPRAZOLE 30 MG: 30 TABLET, ORALLY DISINTEGRATING ORAL at 06:16

## 2024-05-31 RX ADMIN — IPRATROPIUM BROMIDE AND ALBUTEROL SULFATE 1 DOSE: .5; 2.5 SOLUTION RESPIRATORY (INHALATION) at 06:22

## 2024-05-31 RX ADMIN — MIDODRINE HYDROCHLORIDE 15 MG: 5 TABLET ORAL at 07:52

## 2024-05-31 RX ADMIN — DOCUSATE SODIUM 100 MG: 100 CAPSULE, LIQUID FILLED ORAL at 07:52

## 2024-05-31 RX ADMIN — METOPROLOL SUCCINATE 25 MG: 25 TABLET, EXTENDED RELEASE ORAL at 07:52

## 2024-05-31 RX ADMIN — ARFORMOTEROL TARTRATE 15 MCG: 15 SOLUTION RESPIRATORY (INHALATION) at 06:23

## 2024-05-31 RX ADMIN — MIDODRINE HYDROCHLORIDE 15 MG: 5 TABLET ORAL at 11:26

## 2024-05-31 RX ADMIN — EMPAGLIFLOZIN 10 MG: 10 TABLET, FILM COATED ORAL at 07:52

## 2024-05-31 RX ADMIN — APIXABAN 2.5 MG: 5 TABLET, FILM COATED ORAL at 07:52

## 2024-05-31 RX ADMIN — PETROLATUM: 420 OINTMENT TOPICAL at 07:54

## 2024-05-31 NOTE — DISCHARGE INSTRUCTIONS
Your information:  Name: Kike Holloway  : 1945    Your instructions:    You are being discharged home. Please follow up with your PCP and take your medications as prescribed.     IF YOU EXPERIENCE ANY OF THE FOLLOWING SYMPTOMS, CHEST PAIN, SHORTNESS OF BREATH, COUGHING UP BLOOD OR BLOODY SPUTUM, STOMACH PAIN OR CRAMPING, DARK, TARRY STOOLS, LOSS OF APPETITE, GENERAL NOT FEELING WELL, SIGNS AND SYMPTOMS OF INFECTION LIKE FEVER AND OR CHILLS, PLEASE CALL DR WAGONER OR RETURN TO THE EMERGENCY ROOM.     What to do after you leave the hospital:    Recommended diet: regular diet and low sodium    Recommended activity: activity as tolerated        The following personal items were collected during your admission and were returned to you:    Belongings  Dental Appliances: None  Vision - Corrective Lenses: Eyeglasses (reading)  Hearing Aid: None  Clothing: Shirt  Jewelry: Ring  Body Piercings Removed: N/A  Electronic Devices: None  Weapons (Notify Protective Services/Security): None  Other Valuables: At home  Home Medications: None  Valuables Given To: Family (Comment)  Provide Name(s) of Who Valuable(s) Were Given To: spouse  Responsible person(s) in the waiting room: spouse  Patient approves for provider to speak to responsible person post operatively: Yes    Information obtained by:  By signing below, I understand that if any problems occur once I leave the hospital I am to contact PCP.  I understand and acknowledge receipt of the instructions indicated above.

## 2024-05-31 NOTE — PLAN OF CARE
Problem: Safety - Adult  Goal: Free from fall injury  5/31/2024 0027 by Marli Chew RN  Outcome: Progressing  5/31/2024 0026 by Marli Chew RN  Outcome: Progressing  5/30/2024 1423 by Natalia Martinez RN  Outcome: Progressing  5/30/2024 1239 by Natalia Martinez RN  Outcome: Progressing     Problem: Skin/Tissue Integrity  Goal: Absence of new skin breakdown  Description: 1.  Monitor for areas of redness and/or skin breakdown  2.  Assess vascular access sites hourly  3.  Every 4-6 hours minimum:  Change oxygen saturation probe site  4.  Every 4-6 hours:  If on nasal continuous positive airway pressure, respiratory therapy assess nares and determine need for appliance change or resting period.  5/31/2024 0027 by Marli Chew RN  Outcome: Progressing  5/31/2024 0026 by Marli Chew RN  Outcome: Progressing  5/30/2024 1423 by Natalia Martinez RN  Outcome: Progressing  5/30/2024 1239 by Natalia Martinez RN  Outcome: Progressing     Problem: Nutrition Deficit:  Goal: Optimize nutritional status  5/31/2024 0027 by Marli Chew RN  Outcome: Progressing  5/31/2024 0026 by Marli Chew RN  Outcome: Progressing  5/30/2024 1426 by Charlotte Segura RD, LD  Flowsheets (Taken 5/30/2024 1409)  Nutrient intake appropriate for improving, restoring, or maintaining nutritional needs:   Assess nutritional status and recommend course of action   Recommend appropriate diets, oral nutritional supplements, and vitamin/mineral supplements   Monitor oral intake, labs, and treatment plans  5/30/2024 1423 by Natalia Martinez RN  Outcome: Progressing  Flowsheets (Taken 5/30/2024 1409 by Charlotte Segura RD, CHANG)  Nutrient intake appropriate for improving, restoring, or maintaining nutritional needs:   Assess nutritional status and recommend course of action   Recommend appropriate diets, oral nutritional supplements, and vitamin/mineral supplements   Monitor oral intake, labs, and

## 2024-05-31 NOTE — PLAN OF CARE
Problem: Safety - Adult  Goal: Free from fall injury  5/31/2024 0026 by Marli Chew RN  Outcome: Progressing  5/30/2024 1423 by Natalia Martinez RN  Outcome: Progressing  5/30/2024 1239 by Natalia Martinez RN  Outcome: Progressing     Problem: Skin/Tissue Integrity  Goal: Absence of new skin breakdown  Description: 1.  Monitor for areas of redness and/or skin breakdown  2.  Assess vascular access sites hourly  3.  Every 4-6 hours minimum:  Change oxygen saturation probe site  4.  Every 4-6 hours:  If on nasal continuous positive airway pressure, respiratory therapy assess nares and determine need for appliance change or resting period.  5/31/2024 0026 by Marli Chew RN  Outcome: Progressing  5/30/2024 1423 by Natalia Martinez RN  Outcome: Progressing  5/30/2024 1239 by Natalia Martinez RN  Outcome: Progressing     Problem: Nutrition Deficit:  Goal: Optimize nutritional status  5/31/2024 0026 by Marli Chew RN  Outcome: Progressing  5/30/2024 1426 by Charlotte Segura RD, LD  Flowsheets (Taken 5/30/2024 1409)  Nutrient intake appropriate for improving, restoring, or maintaining nutritional needs:   Assess nutritional status and recommend course of action   Recommend appropriate diets, oral nutritional supplements, and vitamin/mineral supplements   Monitor oral intake, labs, and treatment plans  5/30/2024 1423 by Natalia Martinez RN  Outcome: Progressing  Flowsheets (Taken 5/30/2024 1409 by Charlotte Segura RD, CHANG)  Nutrient intake appropriate for improving, restoring, or maintaining nutritional needs:   Assess nutritional status and recommend course of action   Recommend appropriate diets, oral nutritional supplements, and vitamin/mineral supplements   Monitor oral intake, labs, and treatment plans  5/30/2024 1239 by Natalia Martinez RN  Outcome: Progressing     Problem: Chronic Conditions and Co-morbidities  Goal: Patient's chronic conditions and co-morbidity symptoms are  monitored and maintained or improved  5/31/2024 0026 by Marli Chew RN  Outcome: Progressing  5/30/2024 1423 by Natalia Martinez RN  Outcome: Progressing  5/30/2024 1239 by Natalia Martinez RN  Outcome: Progressing     Problem: Pain  Goal: Verbalizes/displays adequate comfort level or baseline comfort level  5/31/2024 0026 by Marli Chew RN  Outcome: Progressing  5/30/2024 1423 by Natalia Martinez RN  Outcome: Progressing  5/30/2024 1239 by Natalia Martinez RN  Outcome: Progressing     Problem: Genitourinary - Adult  Goal: Urinary catheter remains patent  5/31/2024 0026 by Marli Chew RN  Outcome: Progressing  5/30/2024 1423 by Natalia Martinez RN  Outcome: Progressing  5/30/2024 1239 by Natalia Martinez RN  Outcome: Progressing

## 2024-05-31 NOTE — PROGRESS NOTES
CLINICAL PHARMACY NOTE: MEDS TO BEDS    Total # of Prescriptions Filled: 2   The following medications were delivered to the patient:  Midodrine 5 mg  Nitrofurantoinb mono mac 100    Additional Documentation:   Delivered to patient in room

## 2024-05-31 NOTE — PROGRESS NOTES
This nurse bladder scanned this patient after the patient urinated 25mls. The Post void BS was 459. Dr. Dolan has ordered fro the wing catheter to be placed again.

## 2024-05-31 NOTE — CARE COORDINATION
SS note: 5/31/2024 12:56 PM Addendum. Additional HHC orders noted for lab work, liaison Laura with [facility] notified of additional orders, pt will dc with Wing, will dc on oral abx. Pt resides with wife and has home 02. MICHAEL Duran    Ss note:5/31/2024. 9:53 AM Per IDR pt has a Wing catheter again. HHC orders noted for wing care, PTA pt active with [facility], liaison has updated HHC orders and is aware of possible discharge home today. Oral abx at discharge, pt has home 02. MICHAEL Duran

## 2024-06-01 LAB
MICROORGANISM SPEC CULT: NORMAL
MICROORGANISM SPEC CULT: NORMAL
SERVICE CMNT-IMP: NORMAL
SERVICE CMNT-IMP: NORMAL
SPECIMEN DESCRIPTION: NORMAL
SPECIMEN DESCRIPTION: NORMAL

## 2024-06-01 NOTE — DISCHARGE SUMMARY
[address] [address]                            DISCHARGE SUMMARY      PATIENT NAME: SEAN MCCARTHY               : 1945  MED REC NO: [id number]                        ROOM: 0421  ACCOUNT NO: [id number]                       ADMIT DATE: 2024  PROVIDER: Macario Dolan DO      ADMITTING DIAGNOSIS:  Sepsis.    FINAL DISCHARGE DIAGNOSES:  Sepsis secondary to urinary tract infection with ESBL E coli; acute metabolic encephalopathy, improved.    SECONDARY DISCHARGE DIAGNOSES:  Paroxysmal atrial fibrillation, on Eliquis.  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  Coronary artery disease with demand ischemia; valvular heart disease with aortic stenosis and aortic valve sclerosis; compensated chronic systolic congestive heart failure; chronic obstructive pulmonary disease with chronic oxygen use; hyperlipidemia; essential hypertension; history of prostate cancer with 44 treatments, radiation; peripheral vascular disease.    COMPLICATION:  Urinary retention with placement of Courtney catheter.    OPERATION:  No operation.    CONSULTATIONS OBTAINED:  With Dr. Martinez, Urology, and Cardiology.  No OMT was given.    ADMITTING PHYSICIAN:  Dr. Sharpe.    CHIEF COMPLAINT AND HISTORY OF CHIEF COMPLAINT:  This is a pleasant 78-year-old white male who was admitted to [facility].  The patient presented to the hospital here to the emergency room.  The patient apparently slept for 12 hours yesterday at which time the patient did not feel well.  The patient presented to the hospital at which time was somewhat weak and disoriented in the sense that he did not know where he was.  He just kept staring.  The patient presented to the hospital here, was seen and evaluated.  The patient admitted to the hospital at this time under the service of Dr. Dolan and Dr. Sharpe with possible metabolic encephalopathy secondary to

## 2024-06-12 ENCOUNTER — OFFICE VISIT (OUTPATIENT)
Dept: FAMILY MEDICINE CLINIC | Age: 79
End: 2024-06-12

## 2024-06-12 VITALS
TEMPERATURE: 97.5 F | BODY MASS INDEX: 21.51 KG/M2 | HEIGHT: 66 IN | DIASTOLIC BLOOD PRESSURE: 64 MMHG | OXYGEN SATURATION: 97 % | SYSTOLIC BLOOD PRESSURE: 124 MMHG | HEART RATE: 72 BPM | RESPIRATION RATE: 22 BRPM

## 2024-06-12 DIAGNOSIS — E70.319 OA (OCULAR ALBINISM) (HCC): ICD-10-CM

## 2024-06-12 DIAGNOSIS — N39.0 URINARY TRACT INFECTION WITH HEMATURIA, SITE UNSPECIFIED: ICD-10-CM

## 2024-06-12 DIAGNOSIS — J43.9 PULMONARY EMPHYSEMA, UNSPECIFIED EMPHYSEMA TYPE (HCC): ICD-10-CM

## 2024-06-12 DIAGNOSIS — I73.9 PVD (PERIPHERAL VASCULAR DISEASE) (HCC): ICD-10-CM

## 2024-06-12 DIAGNOSIS — I10 ESSENTIAL HYPERTENSION: ICD-10-CM

## 2024-06-12 DIAGNOSIS — J84.10 PULMONARY FIBROSIS (HCC): ICD-10-CM

## 2024-06-12 DIAGNOSIS — I71.40 ABDOMINAL AORTIC ANEURYSM (AAA) WITHOUT RUPTURE, UNSPECIFIED PART (HCC): ICD-10-CM

## 2024-06-12 DIAGNOSIS — I48.21 PERMANENT ATRIAL FIBRILLATION (HCC): ICD-10-CM

## 2024-06-12 DIAGNOSIS — R06.02 SOB (SHORTNESS OF BREATH): ICD-10-CM

## 2024-06-12 DIAGNOSIS — R31.9 URINARY TRACT INFECTION WITH HEMATURIA, SITE UNSPECIFIED: ICD-10-CM

## 2024-06-12 DIAGNOSIS — I50.22 CHRONIC SYSTOLIC CONGESTIVE HEART FAILURE (HCC): Primary | ICD-10-CM

## 2024-06-12 DIAGNOSIS — I25.2 H/O NON-ST ELEVATION MYOCARDIAL INFARCTION (NSTEMI): ICD-10-CM

## 2024-06-12 DIAGNOSIS — N18.31 STAGE 3A CHRONIC KIDNEY DISEASE (HCC): ICD-10-CM

## 2024-06-12 DIAGNOSIS — E43 SEVERE PROTEIN-CALORIE MALNUTRITION (HCC): Chronic | ICD-10-CM

## 2024-06-12 DIAGNOSIS — Z09 HOSPITAL DISCHARGE FOLLOW-UP: ICD-10-CM

## 2024-06-12 PROBLEM — B96.89 ACUTE BACTERIAL SINUSITIS: Status: RESOLVED | Noted: 2024-01-08 | Resolved: 2024-06-12

## 2024-06-12 PROBLEM — I21.4 NSTEMI (NON-ST ELEVATED MYOCARDIAL INFARCTION) (HCC): Status: RESOLVED | Noted: 2024-03-04 | Resolved: 2024-06-12

## 2024-06-12 PROBLEM — J01.90 ACUTE BACTERIAL SINUSITIS: Status: RESOLVED | Noted: 2024-01-08 | Resolved: 2024-06-12

## 2024-06-12 PROBLEM — A41.9 SEPSIS (HCC): Status: RESOLVED | Noted: 2024-05-27 | Resolved: 2024-06-12

## 2024-06-12 RX ORDER — FLUTICASONE FUROATE, UMECLIDINIUM BROMIDE AND VILANTEROL TRIFENATATE 100; 62.5; 25 UG/1; UG/1; UG/1
1 POWDER RESPIRATORY (INHALATION) DAILY
Qty: 3 EACH | Refills: 3
Start: 2024-06-12

## 2024-06-12 NOTE — PROGRESS NOTES
Internal Medicine Consult Note    [facility]    Macario Dolan D.O., REKHAOKarolynI.                    Jeet Sharpe D.O., REKHAOKarolynI.                             VANNESSA PurcellO.     Yamilet Bee, MSN, APRN, NP-C  Jose Suresh, MSN, APRN-CNP  Tim Carl, MSN, APRN-CNP  Leila Sanders, MSN APRN-CNP  Madonna June, MSN. APRN-NP-C     Primary Care Physician: Hermelindo Bedolla DO   Admitting Physician:  Macario Dolan DO  Admission date and time: 5/24/2024  5:25 PM    Room:  [address] [place] [address]  Admitting diagnosis: UTI (urinary tract infection) [N39.0]  Disorientation [R41.0]  Urinary tract infection without hematuria, site unspecified [N39.0]  Other fatigue [R53.83]    Patient Name: Kike Holloway  MRN: [id number]    Date of Service: 5/29/2024     Subjective:  Kike is a 78 y.o. male who was seen and examined today,5/29/2024, at the bedside.  Patient continues to improve on a daily basis.  Awaiting infectious disease recommendation for antibiotics.  Discussed the condition with him his wife and he would like to go home.  Evaluated in the home needs.  He does have oxygen at home    Wife present at the bedside      Review of systems:  Constitutional:   + fatigue and malaise , - fever/chills  HEENT:   Denies ear pain, sore throat, sinus or eye problems.  Cardiovascular:   - chest pain, irregular heartbeats, or palpitations.   Respiratory:   - dyspnea at rest, - dyspnea on exertion, - coughing, - sputum, - hemoptysis  Gastrointestinal:   - nausea, -vomiting. - diarrhea, - constipation. + poor appetite and poor intake. + but improved abdominal pain.  Genitourinary:    + for improved UTI symptoms, with essentially no residual symptoms.  Extremities:   Denies acute lower extremity swelling, edema or cyanosis.   Neurology:    Denies any headache or focal neurological deficits, positive for generalized weakness and fatigue without focal component  Psch:   Denies being anxious or    Acute metabolic encephalopathy, improved  Persistent atrial fibrillation on Eliquis  ?  Renal infarction with recent left retroperitoneal hematoma  Coronary artery disease with demand ischemia  Valvular heart disease with aortic stenosis and aortic valve sclerosis  Chronic compensated systolic congestive heart failure  Chronic obstructive pulm disease with chronic oxygen use  Hyperlipidemia  Essential hypertension  History of prostate cancer 44 treatment radiation  Peripheral vascular disease with claudication      Plan:     Patient has home oxygen  Continue current plan and treatment  Disease and making recommendation for antibiotics  Possible discharge soon  Increase beta-blocker for rate control    More than 50% of my  time was spent at the bedside counseling/coordinating care with the patient and/or family with face to face contact.  This time was spent reviewing notes and laboratory data as well as instructing and counseling the patient. Time I spent with the family or surrogate(s) is included only if the patient was incapable of providing the necessary information or participating in medical decisions. I also discussed the differential diagnosis and all of the proposed management plans with the patient and individuals accompanying the patient.    Edward requires this high level of physician care and nursing on the Telemetry unit due the complexity of decision management and chance of rapid decline or death.  Continued cardiac monitoring and higher level of nursing are required. I am readily available for any further decision-making and intervention.       Macario Dolan DO,   5/29/2024  5:39 PM    5

## 2024-06-17 VITALS
DIASTOLIC BLOOD PRESSURE: 78 MMHG | SYSTOLIC BLOOD PRESSURE: 132 MMHG | HEART RATE: 78 BPM | RESPIRATION RATE: 18 BRPM | TEMPERATURE: 97.5 F | OXYGEN SATURATION: 99 % | BODY MASS INDEX: 21.3 KG/M2 | WEIGHT: 128 LBS

## 2024-06-17 ASSESSMENT — ENCOUNTER SYMPTOMS
NAUSEA: 0
CONSTIPATION: 0
COUGH: 0
BLOOD IN STOOL: 0
TROUBLE SWALLOWING: 0
DIARRHEA: 0
PHOTOPHOBIA: 0
SHORTNESS OF BREATH: 0
WHEEZING: 0
EYE PAIN: 0
CHEST TIGHTNESS: 0
SORE THROAT: 0
RHINORRHEA: 0
VOICE CHANGE: 0
ABDOMINAL PAIN: 0
BACK PAIN: 0
VOMITING: 0

## 2024-06-17 NOTE — PROGRESS NOTES
20 MG tablet TAKE 1 TABLET BY MOUTH ONCE  DAILY (Patient taking differently: Take 1 tablet by mouth nightly) 90 tablet 1    apixaban (ELIQUIS) 2.5 MG TABS tablet Take 1 tablet by mouth 2 times daily 180 tablet 1    OXYGEN Inhale 2 L/min into the lungs continuous      empagliflozin (JARDIANCE) 10 MG tablet Take 1 tablet by mouth daily 90 tablet 3    metoprolol succinate (TOPROL XL) 25 MG extended release tablet Take 0.5 tablets by mouth daily 30 tablet 3    sodium chloride (OCEAN, BABY AYR) 0.65 % nasal spray 1 spray by Nasal route as needed for Congestion  0    docusate sodium (COLACE, DULCOLAX) 100 MG CAPS Take 100 mg by mouth daily      albuterol sulfate HFA (PROVENTIL HFA) 108 (90 Base) MCG/ACT inhaler Inhale 2 puffs into the lungs every 6 hours as needed for Wheezing or Shortness of Breath 18 g 3    Handicap Placard MISC by Does not apply route It is my medical opinion that Kike Holloway requires a disability parking placard for the following reasons:  He cannot walk 200 feet without stopping to rest.  Duration of need: 5 years 1 each 0     No current facility-administered medications for this visit.       Current Meds: Refer to nursing home record    PMH:    Medical Problems: reviewed and updated       Diagnosis Date    Aortic stenosis, mild     Aortic valve sclerosis     moderate    Atrial fibrillation ([facility]) 2014    CAD (coronary artery disease)     CHF (congestive heart failure) ([facility])     COPD (chronic obstructive pulmonary disease) ([facility])     Encounter for diabetic foot exam ([facility]) 01/08/2024    Gallbladder attack     History of blood transfusion     History of tobacco use 09/17/2015    Hx of blood clots     Hyperlipidemia     Hypertension     Mild mitral regurgitation     Prostate cancer ([facility])     44 treatments of radiation    PVD (peripheral vascular disease) with claudication ([facility]) 09/17/2015    RBBB     Tobacco abuse         Surgical Hx: reviewed and updated   Past Surgical History:   Procedure Laterality

## 2024-06-26 PROBLEM — N39.0 UTI (URINARY TRACT INFECTION): Status: RESOLVED | Noted: 2024-05-24 | Resolved: 2024-06-26

## 2024-06-27 ENCOUNTER — PATIENT MESSAGE (OUTPATIENT)
Dept: FAMILY MEDICINE CLINIC | Age: 79
End: 2024-06-27

## 2024-06-27 ENCOUNTER — TELEPHONE (OUTPATIENT)
Dept: SURGERY | Age: 79
End: 2024-06-27

## 2024-06-27 ENCOUNTER — OFFICE VISIT (OUTPATIENT)
Dept: SURGERY | Age: 79
End: 2024-06-27
Payer: MEDICARE

## 2024-06-27 VITALS — SYSTOLIC BLOOD PRESSURE: 121 MMHG | TEMPERATURE: 97.5 F | DIASTOLIC BLOOD PRESSURE: 84 MMHG | HEART RATE: 74 BPM

## 2024-06-27 DIAGNOSIS — M79.89 SOFT TISSUE MASS: ICD-10-CM

## 2024-06-27 DIAGNOSIS — D49.2 SOFT TISSUE NEOPLASM: Primary | ICD-10-CM

## 2024-06-27 PROCEDURE — 3074F SYST BP LT 130 MM HG: CPT | Performed by: SURGERY

## 2024-06-27 PROCEDURE — 99204 OFFICE O/P NEW MOD 45 MIN: CPT | Performed by: SURGERY

## 2024-06-27 PROCEDURE — 1036F TOBACCO NON-USER: CPT | Performed by: SURGERY

## 2024-06-27 PROCEDURE — G8427 DOCREV CUR MEDS BY ELIG CLIN: HCPCS | Performed by: SURGERY

## 2024-06-27 PROCEDURE — 3079F DIAST BP 80-89 MM HG: CPT | Performed by: SURGERY

## 2024-06-27 PROCEDURE — G8420 CALC BMI NORM PARAMETERS: HCPCS | Performed by: SURGERY

## 2024-06-27 PROCEDURE — 1111F DSCHRG MED/CURRENT MED MERGE: CPT | Performed by: SURGERY

## 2024-06-27 PROCEDURE — 1123F ACP DISCUSS/DSCN MKR DOCD: CPT | Performed by: SURGERY

## 2024-06-27 NOTE — TELEPHONE ENCOUNTER
Per Dr. Manning, patient scheduled for excision soft tissue neoplasm abdominal wall at [facility] on 24. Surgery scheduled via [facility], surgeons calendar updated. Follow up appointment scheduled.  Patient to hold Jardiance 4 days preop and eliquis 2 days preop.  Electronically signed by Shanelle Buckley on 2024 at 11:08 AM    Prior Authorization Form:      DEMOGRAPHICS:                     Patient Name:  Sean Holloway  Patient :  1945            Insurance:  Payor: [facility] MEDICARE / Plan: [facility] MEDICARE COMPLETE / Product Type: *No Product type* /   Insurance ID Number:    Payer/Plan Subscr  Sex Relation Sub. Ins. ID Effective Group Num   1. [employer] MEDICARE SEAN OBRIEN 1945 Male Self [id number]                                   [address] [address]   2. [facility] MEDICARE * CLUSE,EDWARD C 1945 Male Self [id number]                                   [address] [address]         DIAGNOSIS & PROCEDURE:                       Procedure/Operation: Excision soft tissue neoplasm abdominal wall            CPT Code: 09903    Diagnosis:  soft tissue mass    ICD10 Code: M79.89    Location:  [facility]    Surgeon:  Nigel    SCHEDULING INFORMATION:                          Date: 24    Time: TBD              Anesthesia:  MAC/TIVA                                                       Status:  Outpatient        Special Comments:         Electronically signed by Shanelle Buckley on 2024 at 11:08 AM     operating room

## 2024-06-27 NOTE — TELEPHONE ENCOUNTER
Last seen 6/12/2024  Next appt 7/9/2024    Requested Prescriptions     Pending Prescriptions Disp Refills    midodrine (PROAMATINE) 5 MG tablet 90 tablet 0     Sig: Take 3 tablets by mouth 3 times daily (with meals)        Electronically signed by DEVON WEBER MA on 6/27/24 at 2:13 PM EDT

## 2024-06-27 NOTE — PROGRESS NOTES
Pacemaker has not been interrogated since October 2022 per Dr. Coates's office and notes.  Consulted anesthesia with pt.'s history and above info.  Dr. Dejesus asks to have interrogation morning of surgery.

## 2024-06-27 NOTE — TELEPHONE ENCOUNTER
From: Kike Holloway  To: Dr. Hermelindo Bedolla  Sent: 6/27/2024 2:05 PM EDT  Subject: Midodrine    This is [place] writing for Chase Jesuslebron.   He was prescribed Midodrine HCL 5MG in the hospital. He takes 3 tablets 3 times a day. I cannot request a refill on MyChart and we will be needing a refill very soon. Can you please refill this for him. We use [facility] for long term meds and [facility] for more immediate need. I do not have enough to last for 2 weeks which is how long it takes for meds to come from [facility] so can they be ordered from [facility]? Thank you.

## 2024-06-27 NOTE — PROGRESS NOTES
[facility]                                                                                                                    PRE OP INSTRUCTIONS FOR  Kike Holloway        Date: 6/27/2024    Date of surgery: 7/2/24   Arrival Time: Hospital will call you between 5pm and 7pm with your final arrival time for surgery. Go to     front of hospital and check in at information desk.    Nothing by mouth (NPO) as instructed. May have clear liquids up to 2 hours prior to surgery. Nothing solid after midnight. Examples: water, apple juice, black coffee, plain tea    Take the following medications with a small sip of water on the morning of Surgery: Prevacid, Midodrine, Metoprolol, Trelegy inhaler, bring rescue inhaler with you     Diabetics may take half the evening dose of insulin but none after midnight.  If you feel symptomatic or have low blood sugar morning of surgery drink 1-2 ounces of apple juice only. If you take a weekly insulin injection _______________, stop 7 days prior to surgery. If you take _______________, stop 3-4 days prior to surgery.    Aspirin, Ibuprofen, Advil, Naproxen, other Anti-inflammatory products should be stopped before surgery as directed by your surgeon, cardiologist, or primary care Doctor. Herbal supplements and Vitamin E should be stopped five days prior.  May take Tylenol unless instructed otherwise by your surgeon.    Check with your Doctor regarding stopping Plavix, Coumadin, Lovenox, Eliquis, Effient, or other blood thinners such as, pradaxa, lixiana, xaralto and savaysa.    Do not smoke, vape, or use illicit drugs and do not drink any alcoholic beverages 24 hours prior to surgery.    You may brush your teeth the morning of surgery.      You MUST make arrangements for a responsible adult, 18 and over, to take you home after your surgery. You will not be allowed to leave alone or drive yourself home.  You will need someone stay with you the first 24 hrs. Your

## 2024-06-28 ENCOUNTER — TELEPHONE (OUTPATIENT)
Dept: NON INVASIVE DIAGNOSTICS | Age: 79
End: 2024-06-28

## 2024-06-28 RX ORDER — MIDODRINE HYDROCHLORIDE 5 MG/1
15 TABLET ORAL
Qty: 90 TABLET | Refills: 0 | Status: SHIPPED | OUTPATIENT
Start: 2024-06-28

## 2024-06-28 NOTE — TELEPHONE ENCOUNTER
Spoke to Pre admission testing and Mr Holloway's wife regarding his pacemaker interrogation. We offered Mr Holloway an appointment today for his device check but d/t transportation issues with getting him in & out of the car they were unable to come in. Shanelle, the rep from [email or web address] will be at the [facility] on July 2nd between 7-8am to check Mr Holloway's pacemaker prior to his surgery. She will also set him up with a remote monitor so we can receive home transmissions in the future. Please notify us with any time changes [phone]

## 2024-06-28 NOTE — PROGRESS NOTES
General Surgery History and Physical    Patient's Name/Date of Birth: Kike Holloway / 1945    Date: June 28, 2024     Surgeon: Juan José Manning M.D.    PCP: Hermelindo Bedolla DO     Chief Complaint: soft tissue neoplasm of abd wall    HPI:   Kike Holloway is a 78 y.o. male who presents for evaluation of soft tissue neoplasm of abd wall. It has become more painful and is enlarging.       Past Medical History:   Diagnosis Date    Aortic stenosis, mild     Aortic valve sclerosis     moderate    Atrial fibrillation ([facility]) 2014    CAD (coronary artery disease)     CHF (congestive heart failure) ([facility])     COPD (chronic obstructive pulmonary disease) ([facility])     Encounter for diabetic foot exam ([facility]) 01/08/2024    Gallbladder attack     History of tobacco use 09/17/2015    Hx of blood clots     left chest    Hyperlipidemia     Hypertension     Mild mitral regurgitation     Prostate cancer ([facility])     44 treatments of radiation    PVD (peripheral vascular disease) with claudication ([facility]) 09/17/2015    RBBB     Tobacco abuse        Past Surgical History:   Procedure Laterality Date    APPENDECTOMY      CARDIAC CATHETERIZATION  08/08/2014    DR Coates-pt. states had triple bypass \"5 years ago\"    COLONOSCOPY      COLONOSCOPY N/A 04/02/2024    COLONOSCOPY performed by Ryley Moeller MD at [facility] ENDOSCOPY    CORONARY ARTERY BYPASS GRAFT  08/11/2014    CABGx3 LIMA-LAD, SVG-D1, SVG-OM1, AVR Dr. Tinsley    ENDOSCOPY, COLON, DIAGNOSTIC      EYE SURGERY      cataracts    PACEMAKER INSERTION Left 08/22/2022    [employer] Dual PPM-GR    PACEMAKER PLACEMENT Left 08/12/2014    Dr. Cast-Dual Chamber[employer]    IL VASCULAR EMBOLIZATION OR OCCLUSION ARTERIAL RS&I N/A 07/16/2018    ENDOVASCULAR  AORTIC ANEURYSM REPAIR , ABDOMINAL AORTIC ANEURYSM, WITH FINISTRATED GRAFT performed by Tu Chacon MD at [facility] OR    TONSILLECTOMY      VASCULAR SURGERY         Current Outpatient Medications   Medication Sig Dispense Refill

## 2024-06-28 NOTE — PROGRESS NOTES
Received call from Dr. Coates's office regarding interrogation of pacemaker preop.  A rep, Shanelle, will be available to do interrogation preop between 7-8 per office.  Asked scheduling to not change time of surgery for this reason.

## 2024-07-02 ENCOUNTER — ANESTHESIA (OUTPATIENT)
Dept: OPERATING ROOM | Age: 79
End: 2024-07-02
Payer: MEDICARE

## 2024-07-02 ENCOUNTER — PATIENT MESSAGE (OUTPATIENT)
Dept: FAMILY MEDICINE CLINIC | Age: 79
End: 2024-07-02

## 2024-07-02 ENCOUNTER — HOSPITAL ENCOUNTER (OUTPATIENT)
Age: 79
Setting detail: OUTPATIENT SURGERY
Discharge: HOME | End: 2024-07-02
Attending: SURGERY | Admitting: SURGERY
Payer: MEDICARE

## 2024-07-02 ENCOUNTER — ANESTHESIA EVENT (OUTPATIENT)
Dept: OPERATING ROOM | Age: 79
End: 2024-07-02
Payer: MEDICARE

## 2024-07-02 VITALS
WEIGHT: 118 LBS | TEMPERATURE: 97.7 F | HEIGHT: 66 IN | BODY MASS INDEX: 18.96 KG/M2 | RESPIRATION RATE: 18 BRPM | DIASTOLIC BLOOD PRESSURE: 85 MMHG | SYSTOLIC BLOOD PRESSURE: 113 MMHG | OXYGEN SATURATION: 97 % | HEART RATE: 70 BPM

## 2024-07-02 DIAGNOSIS — M79.89 SOFT TISSUE MASS: ICD-10-CM

## 2024-07-02 PROCEDURE — 7100000001 HC PACU RECOVERY - ADDTL 15 MIN: Performed by: SURGERY

## 2024-07-02 PROCEDURE — 88305 TISSUE EXAM BY PATHOLOGIST: CPT

## 2024-07-02 PROCEDURE — 2500000003 HC RX 250 WO HCPCS: Performed by: SURGERY

## 2024-07-02 PROCEDURE — 2709999900 HC NON-CHARGEABLE SUPPLY: Performed by: SURGERY

## 2024-07-02 PROCEDURE — 99024 POSTOP FOLLOW-UP VISIT: CPT | Performed by: SURGERY

## 2024-07-02 PROCEDURE — 7100000011 HC PHASE II RECOVERY - ADDTL 15 MIN: Performed by: SURGERY

## 2024-07-02 PROCEDURE — 7100000010 HC PHASE II RECOVERY - FIRST 15 MIN: Performed by: SURGERY

## 2024-07-02 PROCEDURE — 6360000002 HC RX W HCPCS: Performed by: SURGERY

## 2024-07-02 PROCEDURE — 7100000000 HC PACU RECOVERY - FIRST 15 MIN: Performed by: SURGERY

## 2024-07-02 PROCEDURE — 3700000001 HC ADD 15 MINUTES (ANESTHESIA): Performed by: SURGERY

## 2024-07-02 PROCEDURE — 3700000000 HC ANESTHESIA ATTENDED CARE: Performed by: SURGERY

## 2024-07-02 PROCEDURE — 6360000002 HC RX W HCPCS: Performed by: NURSE ANESTHETIST, CERTIFIED REGISTERED

## 2024-07-02 PROCEDURE — 2580000003 HC RX 258: Performed by: SURGERY

## 2024-07-02 PROCEDURE — 6370000000 HC RX 637 (ALT 250 FOR IP): Performed by: ANESTHESIOLOGY

## 2024-07-02 PROCEDURE — 2580000003 HC RX 258: Performed by: NURSE ANESTHETIST, CERTIFIED REGISTERED

## 2024-07-02 PROCEDURE — 3600000012 HC SURGERY LEVEL 2 ADDTL 15MIN: Performed by: SURGERY

## 2024-07-02 PROCEDURE — 2580000003 HC RX 258: Performed by: ANESTHESIOLOGY

## 2024-07-02 PROCEDURE — 3600000002 HC SURGERY LEVEL 2 BASE: Performed by: SURGERY

## 2024-07-02 RX ORDER — SODIUM CHLORIDE 0.9 % (FLUSH) 0.9 %
5-40 SYRINGE (ML) INJECTION EVERY 12 HOURS SCHEDULED
Status: DISCONTINUED | OUTPATIENT
Start: 2024-07-02 | End: 2024-07-02 | Stop reason: HOSPADM

## 2024-07-02 RX ORDER — IPRATROPIUM BROMIDE AND ALBUTEROL SULFATE 2.5; .5 MG/3ML; MG/3ML
1 SOLUTION RESPIRATORY (INHALATION) ONCE
Status: COMPLETED | OUTPATIENT
Start: 2024-07-02 | End: 2024-07-02

## 2024-07-02 RX ORDER — IBUPROFEN 800 MG/1
800 TABLET ORAL EVERY 6 HOURS PRN
Qty: 20 TABLET | Refills: 0 | Status: SHIPPED | OUTPATIENT
Start: 2024-07-02 | End: 2024-07-09

## 2024-07-02 RX ORDER — LIDOCAINE HYDROCHLORIDE 20 MG/ML
INJECTION, SOLUTION INTRAVENOUS PRN
Status: DISCONTINUED | OUTPATIENT
Start: 2024-07-02 | End: 2024-07-02 | Stop reason: SDUPTHER

## 2024-07-02 RX ORDER — SODIUM CHLORIDE 0.9 % (FLUSH) 0.9 %
5-40 SYRINGE (ML) INJECTION PRN
Status: DISCONTINUED | OUTPATIENT
Start: 2024-07-02 | End: 2024-07-02 | Stop reason: HOSPADM

## 2024-07-02 RX ORDER — SODIUM CHLORIDE, SODIUM LACTATE, POTASSIUM CHLORIDE, CALCIUM CHLORIDE 600; 310; 30; 20 MG/100ML; MG/100ML; MG/100ML; MG/100ML
INJECTION, SOLUTION INTRAVENOUS CONTINUOUS
Status: DISCONTINUED | OUTPATIENT
Start: 2024-07-02 | End: 2024-07-02 | Stop reason: HOSPADM

## 2024-07-02 RX ORDER — SODIUM CHLORIDE 9 MG/ML
INJECTION, SOLUTION INTRAVENOUS PRN
Status: DISCONTINUED | OUTPATIENT
Start: 2024-07-02 | End: 2024-07-02 | Stop reason: HOSPADM

## 2024-07-02 RX ORDER — ONDANSETRON 2 MG/ML
INJECTION INTRAMUSCULAR; INTRAVENOUS PRN
Status: DISCONTINUED | OUTPATIENT
Start: 2024-07-02 | End: 2024-07-02 | Stop reason: SDUPTHER

## 2024-07-02 RX ORDER — SODIUM CHLORIDE, SODIUM LACTATE, POTASSIUM CHLORIDE, CALCIUM CHLORIDE 600; 310; 30; 20 MG/100ML; MG/100ML; MG/100ML; MG/100ML
INJECTION, SOLUTION INTRAVENOUS CONTINUOUS PRN
Status: DISCONTINUED | OUTPATIENT
Start: 2024-07-02 | End: 2024-07-02 | Stop reason: SDUPTHER

## 2024-07-02 RX ORDER — PHENYLEPHRINE HYDROCHLORIDE 10 MG/ML
INJECTION INTRAVENOUS PRN
Status: DISCONTINUED | OUTPATIENT
Start: 2024-07-02 | End: 2024-07-02 | Stop reason: SDUPTHER

## 2024-07-02 RX ORDER — BUPIVACAINE HYDROCHLORIDE AND EPINEPHRINE 2.5; 5 MG/ML; UG/ML
INJECTION, SOLUTION EPIDURAL; INFILTRATION; INTRACAUDAL; PERINEURAL PRN
Status: DISCONTINUED | OUTPATIENT
Start: 2024-07-02 | End: 2024-07-02 | Stop reason: ALTCHOICE

## 2024-07-02 RX ORDER — PROPOFOL 10 MG/ML
INJECTION, EMULSION INTRAVENOUS PRN
Status: DISCONTINUED | OUTPATIENT
Start: 2024-07-02 | End: 2024-07-02 | Stop reason: SDUPTHER

## 2024-07-02 RX ADMIN — SODIUM CHLORIDE, POTASSIUM CHLORIDE, SODIUM LACTATE AND CALCIUM CHLORIDE: 600; 310; 30; 20 INJECTION, SOLUTION INTRAVENOUS at 07:29

## 2024-07-02 RX ADMIN — PHENYLEPHRINE HYDROCHLORIDE 200 MCG: 10 INJECTION INTRAVENOUS at 08:29

## 2024-07-02 RX ADMIN — IPRATROPIUM BROMIDE AND ALBUTEROL SULFATE 1 DOSE: .5; 2.5 SOLUTION RESPIRATORY (INHALATION) at 08:56

## 2024-07-02 RX ADMIN — PHENYLEPHRINE HYDROCHLORIDE 200 MCG: 10 INJECTION INTRAVENOUS at 08:27

## 2024-07-02 RX ADMIN — SODIUM CHLORIDE, POTASSIUM CHLORIDE, SODIUM LACTATE AND CALCIUM CHLORIDE: 600; 310; 30; 20 INJECTION, SOLUTION INTRAVENOUS at 08:12

## 2024-07-02 RX ADMIN — LIDOCAINE HYDROCHLORIDE 100 MG: 20 INJECTION, SOLUTION INTRAVENOUS at 08:23

## 2024-07-02 RX ADMIN — PROPOFOL 50 MCG/KG/MIN: 10 INJECTION, EMULSION INTRAVENOUS at 08:24

## 2024-07-02 RX ADMIN — CEFAZOLIN 2000 MG: 2 INJECTION, POWDER, FOR SOLUTION INTRAMUSCULAR; INTRAVENOUS at 08:12

## 2024-07-02 RX ADMIN — PROPOFOL 60 MG: 10 INJECTION, EMULSION INTRAVENOUS at 08:23

## 2024-07-02 RX ADMIN — PHENYLEPHRINE HYDROCHLORIDE 200 MCG: 10 INJECTION INTRAVENOUS at 08:41

## 2024-07-02 RX ADMIN — PHENYLEPHRINE HYDROCHLORIDE 100 MCG: 10 INJECTION INTRAVENOUS at 08:32

## 2024-07-02 RX ADMIN — PHENYLEPHRINE HYDROCHLORIDE 200 MCG: 10 INJECTION INTRAVENOUS at 08:47

## 2024-07-02 RX ADMIN — ONDANSETRON 4 MG: 2 INJECTION INTRAMUSCULAR; INTRAVENOUS at 08:34

## 2024-07-02 ASSESSMENT — ENCOUNTER SYMPTOMS
SHORTNESS OF BREATH: 1
DYSPNEA ACTIVITY LEVEL: AFTER AMBULATING 1 FLIGHT OF STAIRS

## 2024-07-02 ASSESSMENT — PAIN - FUNCTIONAL ASSESSMENT: PAIN_FUNCTIONAL_ASSESSMENT: NONE - DENIES PAIN

## 2024-07-02 NOTE — ANESTHESIA PRE PROCEDURE
Department of Anesthesiology  Preprocedure Note       Name:  Kike Holloway   Age:  78 y.o.  :  1945                                          MRN:  [id number]         Date:  2024      Surgeon: DR. VILLALBA    Procedure: EXCISION SOFT TISSUE NEOPLASM ABDOMINAL WALL    Medications prior to admission:   Prior to Admission medications    Medication Sig Start Date End Date Taking? Authorizing Provider   mupirocin (BACTROBAN) 2 % ointment Apply topically 3 times daily Apply topically 3 times daily.   Yes Provider, MD Chelsea   midodrine (PROAMATINE) 5 MG tablet Take 3 tablets by mouth 3 times daily (with meals) 24   Hermelindo Bedolla DO   fluticasone-umeclidin-vilant (TRELEGY ELLIPTA) 100-62.5-25 MCG/ACT AEPB inhaler Inhale 1 puff into the lungs daily 24   Hermelindo Bedolla DO   lansoprazole (PREVACID) 30 MG delayed release capsule Take 1 capsule by mouth daily 24   Tim Carl APRN - CNP   atorvastatin (LIPITOR) 20 MG tablet TAKE 1 TABLET BY MOUTH ONCE  DAILY  Patient taking differently: Take 1 tablet by mouth nightly 24   Hermelindo Bedolla DO   apixaban (ELIQUIS) 2.5 MG TABS tablet Take 1 tablet by mouth 2 times daily 24   Hermelindo Bedolla DO   OXYGEN Inhale 2 L/min into the lungs continuous    Provider, MD Chelsea   empagliflozin (JARDIANCE) 10 MG tablet Take 1 tablet by mouth daily 24   Hermelindo Bedolla DO   metoprolol succinate (TOPROL XL) 25 MG extended release tablet Take 0.5 tablets by mouth daily 24   Hermelindo Bedolla DO   sodium chloride (OCEAN, BABY AYR) 0.65 % nasal spray 1 spray by Nasal route as needed for Congestion 3/6/24   Tim Carl APRN - CNP   albuterol sulfate HFA (PROVENTIL HFA) 108 (90 Base) MCG/ACT inhaler Inhale 2 puffs into the lungs every 6 hours as needed for Wheezing or Shortness of Breath 24   Margoth Bedolla DO   Handicap Placard MISC by Does not apply route It is my medical opinion that

## 2024-07-02 NOTE — ANESTHESIA POSTPROCEDURE EVALUATION
Department of Anesthesiology  Postprocedure Note    Patient: Kike Holloway  MRN: [id number]  YOB: 1945  Date of evaluation: 7/2/2024    Procedure Summary       Date: 07/02/24 Room / Location: [address]    Anesthesia Start: 0812 Anesthesia Stop:     Procedure: **DO NOT CHANGE TIME** EXCISION SOFT TISSUE NEOPLASM ABDOMINAL WALL (Abdomen) Diagnosis:       Soft tissue mass      (Soft tissue mass [M79.89])    Surgeons: Juan José Manning MD Responsible Provider: Bandar Hutchins MD    Anesthesia Type: MAC ASA Status: 4            Anesthesia Type: No value filed.    Emilee Phase I: Emilee Score: 9    Emilee Phase II:      Anesthesia Post Evaluation    Patient location during evaluation: PACU  Patient participation: waiting for patient participation  Level of consciousness: lethargic  Pain score: 1  Airway patency: patent  Nausea & Vomiting: no nausea  Cardiovascular status: blood pressure returned to baseline  Respiratory status: acceptable and nasal cannula  Hydration status: euvolemic  Pain management: adequate    No notable events documented.

## 2024-07-02 NOTE — OP NOTE
DATE OF PROCEDURE: 7/2/2024   SURGEON: RED VILLALBA M.D.   ASSISTANT: margaret.   PREOPERATIVE DIAGNOSIS: painful abdominal wall lesion, 3 cm in size.   POSTOPERATIVE DIAGNOSIS: same.   OPERATION: Excision of painful subfascial abdominal wall lesion, 3 cm in size and foreign body of abdominal wall..   ANESTHESIA: LMAC and local.   ESTIMATED BLOOD LOSS: Minimal.   COMPLICATIONS: None.   FLUIDS: Crystalloid.   DISPOSITION: Discharged home.   SPECIMEN: skin lesion and foreign body.   PROCEDURE: The patient was brought into the operative suite and was placed   under LMAC anesthesia; was infused with local anesthetic after being prepped   and draped in a normal sterile condition.   Once this was done, approximately a 1x3-cm incision was made in the mid upper abdomen. Once this was done, the incision was carried down through the   dermis and fascia with electrocautery. This was then delivered and sent for   specimen. There was a long white wire that was also retrieved and sent for specimen.   The wound was sterilely irrigated, and electrocautery was used to obtain   hemostasis. Once this was done, deep dermal stitches were placed with a 3-0   Vicryl suture, and a running 4-0 Vicryl suture was used in a subcuticular   fashion to approximate the skin. Finally, Dermabond was placed, and the   patient was woken up in stable condition and taken to PACU.

## 2024-07-02 NOTE — DISCHARGE INSTRUCTIONS
Patient Discharge Instructions  Discharge Date:  7/2/2024    Discharged To:  Home    RESUME ACTIVITY:      BATHING:  May shower 24hrs after surgery, may bathe 3 days after surgery    DRIVING: No driving while on pain medications ok to drive on ibuprofen    RETURN TO WORK: day after surgery, no limitations    WALKING:  Yes    SEXUAL ACTIVITY: Yes    STAIRS:  Yes    LIFTING: no limitations    DIET: common adult    BOWELS: constipation is a side effect of your pain meds, take a daily laxative (miralax, dulcolax, etc.) as needed to keep your bowels moving as they normally do, do not go 2-3 days without having a bowel movement.    SPECIAL INSTRUCTIONS:     Call the office at  if you have a fever > 100 F, or if your incision becomes red, tender, or drains more than a small amount of clear fluid.    Call  for follow up appointment with Dr. Manning in:  as needed

## 2024-07-02 NOTE — H&P
General Surgery History and Physical     Patient's Name/Date of Birth: Kike Holloway / 1945     Date: 7/2/2024       Surgeon: Juan José Manning M.D.     PCP: Hermelindo Bedolla DO     Chief Complaint: soft tissue neoplasm of abd wall     HPI:    Subjective   Kike Holloway is a 78 y.o. male who presents for evaluation of soft tissue neoplasm of abd wall. It has become more painful and is enlarging.         Past Medical History        Past Medical History:   Diagnosis Date    Aortic stenosis, mild      Aortic valve sclerosis       moderate    Atrial fibrillation ([facility]) 2014    CAD (coronary artery disease)      CHF (congestive heart failure) ([facility])      COPD (chronic obstructive pulmonary disease) ([facility])      Encounter for diabetic foot exam ([facility]) 01/08/2024    Gallbladder attack      History of tobacco use 09/17/2015    Hx of blood clots       left chest    Hyperlipidemia      Hypertension      Mild mitral regurgitation      Prostate cancer ([facility])       44 treatments of radiation    PVD (peripheral vascular disease) with claudication ([facility]) 09/17/2015    RBBB      Tobacco abuse              Past Surgical History         Past Surgical History:   Procedure Laterality Date    APPENDECTOMY        CARDIAC CATHETERIZATION   08/08/2014     DR Coates-pt. states had triple bypass \"5 years ago\"    COLONOSCOPY        COLONOSCOPY N/A 04/02/2024     COLONOSCOPY performed by Ryley Moeller MD at [facility] ENDOSCOPY    CORONARY ARTERY BYPASS GRAFT   08/11/2014     CABGx3 LIMA-LAD, SVG-D1, SVG-OM1, AVR Dr. Tinsley    ENDOSCOPY, COLON, DIAGNOSTIC        EYE SURGERY         cataracts    PACEMAKER INSERTION Left 08/22/2022     [employer] Dual PPM-GR    PACEMAKER PLACEMENT Left 08/12/2014     Dr. Cast-Dual [employer]    DC VASCULAR EMBOLIZATION OR OCCLUSION ARTERIAL RS&I N/A 07/16/2018     ENDOVASCULAR  AORTIC ANEURYSM REPAIR , ABDOMINAL AORTIC ANEURYSM, WITH FINISTRATED GRAFT performed by Tu Chacon MD at [facility] OR  for - hearing change, nasal congestion or nasal discharge  Allergy and Immunology ROS: negative for - hives, itchy/watery eyes or nasal congestion  Hematological and Lymphatic ROS: negative for - blood clots, blood transfusions, bruising or fatigue  Endocrine ROS: negative for - malaise/lethargy, mood swings, palpitations or polydipsia/polyuria  Breast ROS: negative for - new or changing breast lumps or nipple changes  Respiratory ROS: negative for - sputum changes, stridor, tachypnea or wheezing  Cardiovascular ROS: negative for - irregular heartbeat, loss of consciousness, murmur or orthopnea  Gastrointestinal ROS: negative for - constipation, diarrhea, gas/bloating, heartburn or hematemesis  Genito-Urinary ROS: negative for -  genital discharge, genital ulcers or hematuria  Musculoskeletal ROS: negative for - gait disturbance, muscle pain or muscular weakness        Physical exam:   /84 (Site: Left Upper Arm, Position: Sitting, Cuff Size: Medium Adult)   Pulse 74   Temp 97.5 °F (36.4 °C)   General appearance:  NAD  Head: NCAT, PERRLA, EOMI, red conjunctiva  Neck: supple, no masses  Lungs: CTAB, equal chest rise bilateral  Heart: Reg rate  Abdomen: soft, nontender, nondistended  Skin; soft tissue neoplasm of abd wall that is soft rubbery and mobile.   Gu: no cva tenderness  Extremities: extremities normal, atraumatic, no cyanosis or edema  Pyschosocial: normal affect, no signs of depression        Assessment:  78 y.o. male with soft tissue neoplasm of abd wall     Plan:  Cbc, cmp and coags  To OR for excision Discussed the risk, benefits and alternatives of surgery including wound infections, bleeding, scar  and the risks of  anesthetic including MI, CVA, sudden death or reactions to anesthetic medications. The patient understands the risks and alternatives. All questions were answered to the patient's satisfaction and they freely signed the consent.     Juan José Manning MD

## 2024-07-02 NOTE — TELEPHONE ENCOUNTER
From: Kike Holloway  To: Dr. Hermelindo Bedolla  Sent: 7/2/2024 3:18 PM EDT  Subject: Midodrine    This is [place] writing for Chase Holloway.  Thank you for sending the request for Midodrine to [facility].  The prescription, however, was for 10 days and Ed will need it on-going.   We need the prescription to be sent to [facility] for a larger, continuous supply. I am concerned that his medication will be interrupted, and I am not able to do this on [facility].  Thank you for your help.

## 2024-07-03 RX ORDER — MIDODRINE HYDROCHLORIDE 5 MG/1
15 TABLET ORAL
Qty: 810 TABLET | Refills: 1 | Status: SHIPPED | OUTPATIENT
Start: 2024-07-03 | End: 2024-12-30

## 2024-07-12 LAB — SURGICAL PATHOLOGY REPORT: NORMAL

## (undated) DEVICE — TUBING ANGIO L48IN POLYUR HI PRSS 1200PSI AIRLESS ROT ADPT

## (undated) DEVICE — NEEDLE HYPO 25GA L1.5IN BLU POLYPR HUB S STL REG BVL STR

## (undated) DEVICE — SURGICAL PROCEDURE PACK VASC MAJ CUST

## (undated) DEVICE — ADVANCE, 35LP LOW PROFILE PTA BALLOON DILATATION CATHETER: Brand: ADVANCE

## (undated) DEVICE — BASIC DOUBLE BASIN 2-LF: Brand: MEDLINE INDUSTRIES, INC.

## (undated) DEVICE — Device

## (undated) DEVICE — GLOVE ORANGE PI 7 1/2   MSG9075

## (undated) DEVICE — GLOVE SURG SZ 75 STD WHT LTX SYN POLYMER BEAD REINF ANTI RL

## (undated) DEVICE — KENDALL 450 SERIES MONITORING FOAM ELECTRODE - RECTANGULAR SHAPE ( 3/PK): Brand: KENDALL

## (undated) DEVICE — APPLICATOR MEDICATED 26 CC SOLUTION HI LT ORNG CHLORAPREP

## (undated) DEVICE — KIT BEDSIDE REVITAL OX 500ML

## (undated) DEVICE — 18GA (1.3MM OD: 1.0MM ID) X 7CM INTRODUCER18GA X 7CM NEEDLENEEDLE: Brand: INTRODUCER NEEDLEINTRODUCER NEEDLE

## (undated) DEVICE — ANG DR W/PANELS: Brand: CONVERTORS

## (undated) DEVICE — SET SURG INSTR ART III

## (undated) DEVICE — GARMENT,MEDLINE,DVT,INT,CALF,MED, GEN2: Brand: MEDLINE

## (undated) DEVICE — TUBING, SUCTION, 1/4" X 10', STRAIGHT: Brand: MEDLINE

## (undated) DEVICE — YANKAUER,BULB TIP,W/O VENT,RIGID,STERILE: Brand: MEDLINE

## (undated) DEVICE — PACK,LAPAROTOMY,NO GOWNS: Brand: MEDLINE

## (undated) DEVICE — GAUZE,SPONGE,4"X4",16PLY,XRAY,STRL,LF: Brand: MEDLINE

## (undated) DEVICE — ELECTRODE PT RET AD L9FT HI MOIST COND ADH HYDRGEL CORDED

## (undated) DEVICE — TOTAL TRAY, 16FR 10ML SIL FOLEY, URN: Brand: MEDLINE

## (undated) DEVICE — CHECK-FLO PERFORMER INTRODUCER: Brand: PERFORMER

## (undated) DEVICE — RADIFOCUS GLIDEWIRE: Brand: GLIDEWIRE

## (undated) DEVICE — BEACON TIP TORCON NB ADVANTAGE CATHETER: Brand: BEACON TIP TORCON NB

## (undated) DEVICE — WIPES SKIN CLOTH READYPREP 9 X 10.5 IN 2% CHLORHEX GLUCONATE CHG PREOP

## (undated) DEVICE — COVER,LIGHT HANDLE,FLX,1/PK: Brand: MEDLINE INDUSTRIES, INC.

## (undated) DEVICE — MASK,FACE,MAXFLUIDPROTECT,SHIELD/ERLPS: Brand: MEDLINE

## (undated) DEVICE — 4-PORT MANIFOLD: Brand: NEPTUNE 2

## (undated) DEVICE — SOLUTION IRRIG 500ML 0.9% SOD CHLO USP POUR PLAS BTL

## (undated) DEVICE — CHLORAPREP 26ML ORANGE

## (undated) DEVICE — LARGE BORE STOPCOCK WITH ROTATING MALE LUER LOCK

## (undated) DEVICE — GUIDEWIRE WITH ICE™ HYDROPHILIC COATING: Brand: V-18™ CONTROL WIRE™

## (undated) DEVICE — GOWN,SIRUS,NONRNF,SETINSLV,XL,20/CS: Brand: MEDLINE

## (undated) DEVICE — SPONGE LAP W18XL18IN WHT COT 4 PLY FLD STRUNG RADPQ DISP ST

## (undated) DEVICE — BLADE CLIPPER GEN PURP NS

## (undated) DEVICE — SYRINGE MED 50ML LUERLOCK TIP

## (undated) DEVICE — 72" ARTERIAL PRESSURE TUBING: Brand: ICU MEDICAL

## (undated) DEVICE — ROSEN CURVED WIRE GUIDE: Brand: ROSEN

## (undated) DEVICE — GOWN,SIRUS,FABRNF,XL,20/CS: Brand: MEDLINE

## (undated) DEVICE — Z INACTIVE USE 2641837 CLIP LIG M BLU TI HRT SHP WIRE HORZ 600 PER BX

## (undated) DEVICE — CATHETER VASC DIAG MOD PERIPH W/O HYDRPHLC COAT AD

## (undated) DEVICE — BASIN NEURO

## (undated) DEVICE — SPONGE GZ 4IN 4IN 4 PLY N WVN AVANT

## (undated) DEVICE — DUP USE 223618 GUIDEWIRE EXTRA STIFF CRVD

## (undated) DEVICE — MARKER,SKIN,WI/RULER AND LABELS: Brand: MEDLINE

## (undated) DEVICE — TOWEL,OR,DSP,ST,BLUE,STD,6/PK,12PK/CS: Brand: MEDLINE

## (undated) DEVICE — BLADE ES ELASTOMERIC COAT INSUL DURABLE BEND UPTO 90DEG

## (undated) DEVICE — GLOVE ORANGE PI 8   MSG9080

## (undated) DEVICE — GOWN ISOLATN XL BLU POLYPR OVR HD OPN BK KNIT CUF PROTCT

## (undated) DEVICE — SODIUM CHL 09% SOL EXCEL

## (undated) DEVICE — JELLY,LUBE,STERILE,FLIP TOP,TUBE,4-OZ: Brand: MEDLINE

## (undated) DEVICE — FIXED CORE WIRE GUIDE SAFE-T-J, CURVED: Brand: COOK

## (undated) DEVICE — SOLUTION IV IRRIG POUR BRL 0.9% SODIUM CHL 2F7124

## (undated) DEVICE — NDL CNTR 40CT FM MAG: Brand: MEDLINE INDUSTRIES, INC.

## (undated) DEVICE — LIQUIBAND RAPID ADHESIVE 36/CS 0.8ML: Brand: MEDLINE

## (undated) DEVICE — TRAY,SKIN SCRUB,DRY,W/GAUZE: Brand: MEDLINE

## (undated) DEVICE — Device: Brand: DEFENDO VALVE AND CONNECTOR KIT

## (undated) DEVICE — SYRINGE IRRIG 60ML SFT PLIABLE BLB EZ TO GRP 1 HND USE W/

## (undated) DEVICE — BAG SPECIMEN BIOHAZARD 6IN X 9IN

## (undated) DEVICE — MEDIA CONTRAST SYRINGE 125ML PREFLL OPTIRAY 320 PWR INJ

## (undated) DEVICE — GOWN,SIRUS,FABRNF,L,20/CS: Brand: MEDLINE

## (undated) DEVICE — CODA, LP BALLOON CATHETER: Brand: CODA

## (undated) DEVICE — INTENDED FOR TISSUE SEPARATION, AND OTHER PROCEDURES THAT REQUIRE A SHARP SURGICAL BLADE TO PUNCTURE OR CUT.: Brand: BARD-PARKER ® STAINLESS STEEL BLADES

## (undated) DEVICE — CATHETER ETER IV 20GA L1IN POLYUR STR RADPQ INTROCAN SFTY

## (undated) DEVICE — 3M™ STERI-DRAPE™ CARDIOVASCULAR SHEET WITH IOBAN™ 2 INCISE FILM 6677: Brand: STERI-DRAPE™ IOBAN™ 2

## (undated) DEVICE — DRAPE 24X23IN RADIOLOGY CASS ADHES STRIP

## (undated) DEVICE — COVER,TABLE,60X90,STERILE: Brand: MEDLINE

## (undated) DEVICE — ELECTRODE NDL L2.8IN COAT LO PWR SET EDGE

## (undated) DEVICE — LOOP VES W25MM THK1MM MAXI RED SIL FLD REPELLENT 100 PER

## (undated) DEVICE — DILATOR ART

## (undated) DEVICE — CATHETER SUPP L90CM 50MM MRK BND SPC GWIRE 0.035IN

## (undated) DEVICE — GAUZE,SPONGE,4"X4",16PLY,STRL,LF,10/TRAY: Brand: MEDLINE

## (undated) DEVICE — FLEXOR, CHECK-FLO, INTRODUCER SET: Brand: FLEXOR

## (undated) DEVICE — FORCEPS BX L240CM JAW DIA2.8MM L CAP W/ NDL MIC MESH TOOTH

## (undated) DEVICE — SET INSTR ART 1

## (undated) DEVICE — SOLUTION IV IRRIG WATER 1000ML POUR BRL 2F7114

## (undated) DEVICE — GUIDE CATHETER: Brand: MACH1™

## (undated) DEVICE — SYRINGE MED 20ML STD CLR PLAS LUERLOCK TIP N CTRL DISP

## (undated) DEVICE — DEVICE INFL ENCORE MEDI 26

## (undated) DEVICE — SPONGE,PEANUT,XRAY,ST,SM,3/8",5/CARD: Brand: MEDLINE INDUSTRIES, INC.

## (undated) DEVICE — CONTAINER SPEC COLL 960ML POLYPR TRIANG GRAD INTAKE/OUTPUT

## (undated) DEVICE — PAD,NON-ADHERENT,3X8,STERILE,LF,1/PK: Brand: MEDLINE

## (undated) DEVICE — 3M™ BAIR HUGGER® UNDERBODY BLANKET, FULL ACCESS, 10 PER CASE 63500: Brand: BAIR HUGGER™

## (undated) DEVICE — LABEL MED 4 IN SURG PANEL W/ PEN STRL

## (undated) DEVICE — 3M™ STERI-DRAPE™ INCISE DRAPE 1050 (60CM X 45CM): Brand: STERI-DRAPE™

## (undated) DEVICE — FLEXOR, CHECK-FLO, INTRODUCER ANSEL 1 MODIFICATION - WITH HIGH-FLEX DILATOR AND HYDROPHILIC COATING: Brand: FLEXOR

## (undated) DEVICE — BLADE ES L6IN ELASTOMERIC COAT EXT DURABLE BEND UPTO 90DEG

## (undated) DEVICE — EXTRA LARGE CHECK-FLO INTRODUCER: Brand: CHECK-FLO

## (undated) DEVICE — SKIN AFFIX SURG ADHESIVE 72/CS 0.55ML: Brand: MEDLINE

## (undated) DEVICE — SNAP KOVER: Brand: UNBRANDED

## (undated) DEVICE — AIR/WATER CLEANING ADAPTER FOR OLYMPUS® GI ENDOSCOPE: Brand: BULLDOG®

## (undated) DEVICE — DOUBLE BASIN SET: Brand: MEDLINE INDUSTRIES, INC.